# Patient Record
Sex: FEMALE | Race: WHITE | Employment: OTHER | URBAN - METROPOLITAN AREA
[De-identification: names, ages, dates, MRNs, and addresses within clinical notes are randomized per-mention and may not be internally consistent; named-entity substitution may affect disease eponyms.]

---

## 2017-01-04 ENCOUNTER — MEDICAL CORRESPONDENCE (OUTPATIENT)
Dept: HEALTH INFORMATION MANAGEMENT | Facility: CLINIC | Age: 82
End: 2017-01-04

## 2017-01-31 DIAGNOSIS — I10 HYPERTENSION GOAL BP (BLOOD PRESSURE) < 140/90: Primary | ICD-10-CM

## 2017-01-31 RX ORDER — ATENOLOL 50 MG/1
50 TABLET ORAL 2 TIMES DAILY
Qty: 60 TABLET | Refills: 3 | Status: SHIPPED | OUTPATIENT
Start: 2017-01-31 | End: 2017-03-08

## 2017-01-31 NOTE — TELEPHONE ENCOUNTER
Atenolol      Last Written Prescription Date: 09/13/2016  Last Fill Quantity: 60, # refills: 3  Last Office Visit with G, P or [facility] prescribing provider: 09/13/2016       POTASSIUM   Date Value Ref Range Status   03/17/2016 3.6 3.4 - 5.3 mmol/L Final     CREATININE   Date Value Ref Range Status   03/17/2016 0.85 0.52 - 1.04 mg/dL Final     BP Readings from Last 3 Encounters:   09/13/16 124/71   05/25/16 119/52   05/19/16 138/68     Asael BRODERICK (R)

## 2017-02-07 DIAGNOSIS — E87.6 HYPOKALEMIA: Primary | ICD-10-CM

## 2017-02-07 DIAGNOSIS — E78.5 HYPERLIPIDEMIA LDL GOAL <130: ICD-10-CM

## 2017-02-07 DIAGNOSIS — G47.00 PERSISTENT INSOMNIA: ICD-10-CM

## 2017-02-07 NOTE — TELEPHONE ENCOUNTER
Hydroxyzine      Last Written Prescription Date: 12/23/2016  Last Fill Quantity: 30,  # refills: 2   Last Office Visit with [facility] or [facility] prescribing provider: 09/13/2016                                             potassium      Last Written Prescription Date: 01/03/2017  Last Fill Quantity: 30, # refills: 0  Last Office Visit with [facility] or [facility] prescribing provider: 09/13/2016       POTASSIUM   Date Value Ref Range Status   03/17/2016 3.6 3.4 - 5.3 mmol/L Final     CREATININE   Date Value Ref Range Status   03/17/2016 0.85 0.52 - 1.04 mg/dL Final     BP Readings from Last 3 Encounters:   09/13/16 124/71   05/25/16 119/52   05/19/16 138/68     Atorvastatin     Last Written Prescription Date: 01/03/2017  Last Fill Quantity: 30, # refills: 0  Last Office Visit with [facility], [facility] or  [facility] prescribing provider: 09/13/2016       CHOL      187   4/21/2015  HDL       52   4/21/2015  LDL      103   4/21/2015  TRIG      162   4/21/2015  CHOLHDLRATIO      3.6   4/21/2015    Asael BRODERICK (R)

## 2017-02-14 RX ORDER — HYDROXYZINE HYDROCHLORIDE 25 MG/1
25-50 TABLET, FILM COATED ORAL
Qty: 30 TABLET | Refills: 0 | Status: SHIPPED | OUTPATIENT
Start: 2017-02-14 | End: 2017-02-27

## 2017-02-14 RX ORDER — ATORVASTATIN CALCIUM 20 MG/1
20 TABLET, FILM COATED ORAL DAILY
Qty: 30 TABLET | Refills: 0 | Status: SHIPPED | OUTPATIENT
Start: 2017-02-14 | End: 2017-02-27

## 2017-02-14 RX ORDER — POTASSIUM CHLORIDE 750 MG/1
10 TABLET, EXTENDED RELEASE ORAL DAILY
Qty: 30 TABLET | Refills: 0 | Status: SHIPPED | OUTPATIENT
Start: 2017-02-14 | End: 2017-02-27

## 2017-02-27 DIAGNOSIS — E87.6 HYPOKALEMIA: ICD-10-CM

## 2017-02-27 DIAGNOSIS — I10 HYPERTENSION GOAL BP (BLOOD PRESSURE) < 140/90: ICD-10-CM

## 2017-02-27 DIAGNOSIS — E78.5 HYPERLIPIDEMIA LDL GOAL <130: ICD-10-CM

## 2017-02-27 DIAGNOSIS — G47.00 PERSISTENT INSOMNIA: ICD-10-CM

## 2017-02-27 NOTE — TELEPHONE ENCOUNTER
Losartan-HCTZ and Furosemide      Last Written Prescription Date: 12/23/16  Last Fill Quantity: 90, # refills: 0  Last Office Visit with [facility] or [facility] prescribing provider: 09/13/16       Potassium   Date Value Ref Range Status   03/17/2016 3.6 3.4 - 5.3 mmol/L Final     Creatinine   Date Value Ref Range Status   03/17/2016 0.85 0.52 - 1.04 mg/dL Final     BP Readings from Last 3 Encounters:   09/13/16 124/71   05/25/16 119/52   05/19/16 138/68   Amlodipine     Last Written Prescription Date: 12/23/16  Last Fill Quantity: 90,  # refills: 0   Last Office Visit with [facility] or [facility] prescribing provider: 09/13/16  Hydroxyzine     Last Written Prescription Date: 02/14/17  Last Fill Quantity: 30,  # refills: 0   Last Office Visit with [facility] or  [facility] prescribing provider: 09/13/16  Potassium Chloride     Last Written Prescription Date: 02/14/17  Last Fill Quantity: 30,  # refills: 0   Last Office Visit with [facility] or [facility] prescribing provider: 09/13/16  Atorvastatin        Last Written Prescription Date: 02/14/17  Last Fill Quantity: 30, # refills: 0    Last Office Visit with [facility] or [facility] prescribing provider:  09/13/16   Future Office Visit:      BP Readings from Last 3 Encounters:   09/13/16 124/71   05/25/16 119/52   05/19/16 138/68     Lab Results   Component Value Date    ALT 24 03/05/2016     Lab Results   Component Value Date    CHOL 187 04/21/2015     Lab Results   Component Value Date    HDL 52 04/21/2015     Lab Results   Component Value Date     04/21/2015     Lab Results   Component Value Date    TRIG 162 04/21/2015     Lab Results   Component Value Date    CHOLHDLRATIO 3.6 04/21/2015

## 2017-03-01 ENCOUNTER — TELEPHONE (OUTPATIENT)
Dept: SLEEP MEDICINE | Facility: CLINIC | Age: 82
End: 2017-03-01

## 2017-03-01 DIAGNOSIS — G47.33 OBSTRUCTIVE SLEEP APNEA (ADULT) (PEDIATRIC): Primary | ICD-10-CM

## 2017-03-02 RX ORDER — LOSARTAN POTASSIUM AND HYDROCHLOROTHIAZIDE 25; 100 MG/1; MG/1
1 TABLET ORAL DAILY
Qty: 90 TABLET | Refills: 0 | Status: SHIPPED | OUTPATIENT
Start: 2017-03-02 | End: 2017-03-08

## 2017-03-02 RX ORDER — HYDROXYZINE HYDROCHLORIDE 25 MG/1
25-50 TABLET, FILM COATED ORAL
Qty: 30 TABLET | Refills: 0 | Status: SHIPPED | OUTPATIENT
Start: 2017-03-02 | End: 2017-03-08

## 2017-03-02 RX ORDER — FUROSEMIDE 20 MG
20 TABLET ORAL DAILY
Qty: 90 TABLET | Refills: 0 | Status: SHIPPED | OUTPATIENT
Start: 2017-03-02 | End: 2017-03-08

## 2017-03-02 RX ORDER — POTASSIUM CHLORIDE 750 MG/1
10 TABLET, EXTENDED RELEASE ORAL DAILY
Qty: 30 TABLET | Refills: 0 | Status: SHIPPED | OUTPATIENT
Start: 2017-03-02 | End: 2017-03-08

## 2017-03-02 RX ORDER — AMLODIPINE BESYLATE 10 MG/1
10 TABLET ORAL DAILY
Qty: 90 TABLET | Refills: 0 | Status: SHIPPED | OUTPATIENT
Start: 2017-03-02 | End: 2017-03-08

## 2017-03-02 RX ORDER — ATORVASTATIN CALCIUM 20 MG/1
20 TABLET, FILM COATED ORAL DAILY
Qty: 30 TABLET | Refills: 0 | Status: SHIPPED | OUTPATIENT
Start: 2017-03-02 | End: 2017-03-08

## 2017-03-07 ENCOUNTER — DOCUMENTATION ONLY (OUTPATIENT)
Dept: SLEEP MEDICINE | Facility: CLINIC | Age: 82
End: 2017-03-07

## 2017-03-07 NOTE — PROGRESS NOTES
OLIVA WANTED TO TRY ON NEW FF MASK/ HEADGEAR  SHE TRIED ON SMALL AND MEDIUM AIRFIT F 20 MASK. SHE DIDN'T WANT TO TRY ON ANY OTHERS.  HER MASK OF CHOICE WAS MEDIUM AIRFIRT F20.  SHE ALSO RECEIVED TUBING, FILTER BLACK AND WATER CHAMBER.  WENT OVER SUPPLY REPLACEMENT SCHEDULE AND HOW TO CARE FOR CPAP EQUIPMENT.  GAVE HER NUMBER TO SHADIA AT [facility] TO ORDER EQUIPMENT FOR OXYGEN TUBING.

## 2017-03-08 ENCOUNTER — OFFICE VISIT (OUTPATIENT)
Dept: FAMILY MEDICINE | Facility: CLINIC | Age: 82
End: 2017-03-08
Payer: COMMERCIAL

## 2017-03-08 VITALS
WEIGHT: 236 LBS | TEMPERATURE: 98.5 F | BODY MASS INDEX: 39.32 KG/M2 | OXYGEN SATURATION: 93 % | HEART RATE: 80 BPM | DIASTOLIC BLOOD PRESSURE: 76 MMHG | SYSTOLIC BLOOD PRESSURE: 132 MMHG | HEIGHT: 65 IN

## 2017-03-08 DIAGNOSIS — G47.00 PERSISTENT INSOMNIA: ICD-10-CM

## 2017-03-08 DIAGNOSIS — I10 HYPERTENSION GOAL BP (BLOOD PRESSURE) < 140/90: ICD-10-CM

## 2017-03-08 DIAGNOSIS — J44.9 CHRONIC OBSTRUCTIVE PULMONARY DISEASE, UNSPECIFIED COPD TYPE (H): Primary | ICD-10-CM

## 2017-03-08 DIAGNOSIS — E87.6 HYPOKALEMIA: ICD-10-CM

## 2017-03-08 DIAGNOSIS — R73.03 PREDIABETES: ICD-10-CM

## 2017-03-08 DIAGNOSIS — L98.9 SKIN LESION: ICD-10-CM

## 2017-03-08 DIAGNOSIS — F32.1 MAJOR DEPRESSIVE DISORDER, SINGLE EPISODE, MODERATE (H): ICD-10-CM

## 2017-03-08 DIAGNOSIS — J44.9 CHRONIC OBSTRUCTIVE PULMONARY DISEASE, UNSPECIFIED COPD TYPE (H): Chronic | ICD-10-CM

## 2017-03-08 DIAGNOSIS — E78.5 HYPERLIPIDEMIA LDL GOAL <130: ICD-10-CM

## 2017-03-08 LAB
ANION GAP SERPL CALCULATED.3IONS-SCNC: 8 MMOL/L (ref 3–14)
BUN SERPL-MCNC: 29 MG/DL (ref 7–30)
CALCIUM SERPL-MCNC: 8.9 MG/DL (ref 8.5–10.1)
CHLORIDE SERPL-SCNC: 106 MMOL/L (ref 94–109)
CO2 SERPL-SCNC: 30 MMOL/L (ref 20–32)
CREAT SERPL-MCNC: 0.94 MG/DL (ref 0.52–1.04)
ERYTHROCYTE [DISTWIDTH] IN BLOOD BY AUTOMATED COUNT: 13.2 % (ref 10–15)
GFR SERPL CREATININE-BSD FRML MDRD: 57 ML/MIN/1.7M2
GLUCOSE SERPL-MCNC: 112 MG/DL (ref 70–99)
HCT VFR BLD AUTO: 39.7 % (ref 35–47)
HGB BLD-MCNC: 13.1 G/DL (ref 11.7–15.7)
MCH RBC QN AUTO: 31.2 PG (ref 26.5–33)
MCHC RBC AUTO-ENTMCNC: 33 G/DL (ref 31.5–36.5)
MCV RBC AUTO: 95 FL (ref 78–100)
PLATELET # BLD AUTO: 223 10E9/L (ref 150–450)
POTASSIUM SERPL-SCNC: 3.9 MMOL/L (ref 3.4–5.3)
RBC # BLD AUTO: 4.2 10E12/L (ref 3.8–5.2)
SODIUM SERPL-SCNC: 144 MMOL/L (ref 133–144)
WBC # BLD AUTO: 8.5 10E9/L (ref 4–11)

## 2017-03-08 PROCEDURE — 85027 COMPLETE CBC AUTOMATED: CPT | Performed by: NURSE PRACTITIONER

## 2017-03-08 PROCEDURE — 36415 COLL VENOUS BLD VENIPUNCTURE: CPT | Performed by: NURSE PRACTITIONER

## 2017-03-08 PROCEDURE — 80048 BASIC METABOLIC PNL TOTAL CA: CPT | Performed by: NURSE PRACTITIONER

## 2017-03-08 PROCEDURE — 99214 OFFICE O/P EST MOD 30 MIN: CPT | Performed by: NURSE PRACTITIONER

## 2017-03-08 RX ORDER — SERTRALINE HYDROCHLORIDE 25 MG/1
25 TABLET, FILM COATED ORAL DAILY
Qty: 30 TABLET | Refills: 1 | Status: SHIPPED | OUTPATIENT
Start: 2017-03-08 | End: 2017-04-19

## 2017-03-08 RX ORDER — ATORVASTATIN CALCIUM 20 MG/1
20 TABLET, FILM COATED ORAL DAILY
Qty: 90 TABLET | Refills: 3 | Status: SHIPPED | OUTPATIENT
Start: 2017-03-08 | End: 2017-12-06

## 2017-03-08 RX ORDER — TIOTROPIUM BROMIDE 18 UG/1
CAPSULE ORAL; RESPIRATORY (INHALATION)
Qty: 90 CAPSULE | Refills: 3 | Status: SHIPPED | OUTPATIENT
Start: 2017-03-08 | End: 2017-12-06

## 2017-03-08 RX ORDER — LOSARTAN POTASSIUM AND HYDROCHLOROTHIAZIDE 25; 100 MG/1; MG/1
1 TABLET ORAL DAILY
Qty: 90 TABLET | Refills: 3 | Status: SHIPPED | OUTPATIENT
Start: 2017-03-08 | End: 2017-07-06

## 2017-03-08 RX ORDER — ATENOLOL 50 MG/1
50 TABLET ORAL 2 TIMES DAILY
Qty: 180 TABLET | Refills: 3 | Status: SHIPPED | OUTPATIENT
Start: 2017-03-08 | End: 2017-12-06

## 2017-03-08 RX ORDER — AMLODIPINE BESYLATE 10 MG/1
10 TABLET ORAL DAILY
Qty: 90 TABLET | Refills: 3 | Status: ON HOLD | OUTPATIENT
Start: 2017-03-08 | End: 2017-06-13

## 2017-03-08 RX ORDER — BUDESONIDE AND FORMOTEROL FUMARATE DIHYDRATE 160; 4.5 UG/1; UG/1
2 AEROSOL RESPIRATORY (INHALATION) 2 TIMES DAILY
Qty: 1 INHALER | Refills: 5 | Status: SHIPPED | OUTPATIENT
Start: 2017-03-08 | End: 2017-11-15

## 2017-03-08 RX ORDER — FUROSEMIDE 20 MG
20 TABLET ORAL DAILY
Qty: 90 TABLET | Refills: 3 | Status: SHIPPED | OUTPATIENT
Start: 2017-03-08 | End: 2017-06-26

## 2017-03-08 RX ORDER — POTASSIUM CHLORIDE 750 MG/1
10 TABLET, EXTENDED RELEASE ORAL DAILY
Qty: 90 TABLET | Refills: 3 | Status: SHIPPED | OUTPATIENT
Start: 2017-03-08 | End: 2017-12-06

## 2017-03-08 RX ORDER — HYDROXYZINE HYDROCHLORIDE 25 MG/1
25-50 TABLET, FILM COATED ORAL
Qty: 90 TABLET | Refills: 3 | Status: SHIPPED | OUTPATIENT
Start: 2017-03-08 | End: 2017-06-15

## 2017-03-08 RX ORDER — ALBUTEROL SULFATE 90 UG/1
2 AEROSOL, METERED RESPIRATORY (INHALATION) EVERY 4 HOURS PRN
Qty: 3 INHALER | Refills: 3 | Status: SHIPPED | OUTPATIENT
Start: 2017-03-08 | End: 2017-12-06

## 2017-03-08 NOTE — LETTER
My COPD Action Plan   Name: Susan Haq    YOB: 1932   Date: 3/8/2017    My doctor: Ema Melendez NP   My clinic: [address] [address]  [phone]  My Controller Medicine: Tiotropium (Spiriva)   Formoterol/Budesonide (Symbicort)   Dose:       My Rescue Medicine: Albuterol nebulizer solution    Dose:       My Flare Up Medicine: Prednisone   Dose:    My COPD Severity: Very Severe = FeV1 < 30 %     Use of Oxygen: 3 Liters continuously        GREEN ZONE       Doing well today      Usual level of activity and exercise    Usual amount of cough and mucus    No shortness of breath    Usual level of health (thinking clearly, sleeping well, feel like eating) Actions:      Take daily medicines    Use oxygen as prescribed    Follow regular exercise and diet plan    Avoid cigarette smoke and other irritants that harm the lungs           YELLOW ZONE          Having a bad day or flare up      Short of breath more than usual    A lot more sputum (mucus) than usual    Sputum looks yellow, green, tan, brown or bloody    More coughing or wheezing    Fever or chills    Less energy; trouble completing activities    Trouble thinking or focusing    Using quick relief inhaler or nebulizer more often    Poor sleep; symptoms wake me up    Do not feel like eating Actions:      Get plenty of rest    Take daily medicines    Use quick relief inhaler every 4-6 hours    If you use oxygen, call you doctor to see if you should adjust your oxygen    Do breathing exercises or other things to help you relax    Let a loved one, friend or neighbor know you are feeling worse    Call your care team if you have 2 or more symptoms.  Start taking steroids or antibiotics if directed by your care team           RED ZONE       Need medical care now      Severe shortness of breath (feel you can't breathe)    Fever, chills    Not enough breath to do any activity    Trouble  coughing up mucus, walking or talking    Blood in mucus    Frequent coughing   Rescue medicines are not working    Not able to sleep because of breathing    Feel confused or drowsy    Chest pain    Actions:      Call your health care team.  If you cannot reach your care team, call 911 or go to the emergency room.        Electronically signed by: Ema Mleendez, March 8, 2017  Annual Reminders:  Meet with Care Team, Flu Shot every Fall and Pneumonia Shot at least once  Pharmacy:    [facility], [place] - [address], [address]  [facility] [place] - [address] [facility] [place] - [address] [place] [address]

## 2017-03-08 NOTE — PROGRESS NOTES
SUBJECTIVE:                                                    Susan Haq is a 85 year old female who presents to clinic today for the following health issues:    Derm Problem       Duration: ongoing for the past 2 months     Description (location/character/radiation): pt is concerned about a wound above her eye.     Intensity:  moderate    Accompanying signs and symptoms: scabbed, red, dime size.     History (similar episodes/previous evaluation): None    Precipitating or alleviating factors: None    Therapies tried and outcome: she was given a cream in clinic to use for 15 days and then stop. She did this and no relief     Used Bactroban for 15 days that was prescribed at last visit.    Using different CPAP mask so that it doesn't wear at skin at that site.    She reports area has not improved.  Reports itching and some pain.  The area is scabbed.  Has been leaving it open to air.     COPD Follow-Up    Symptoms are currently: slightly worsened    Current fatigue or dyspnea with ambulation: worsened from baseline    Shortness of breath: slightly worsened    Increased or change in Cough/Sputum: No    Fever(s): No    Baseline ambulation without stopping to rest < 10 feet. Able to walk up < 1 flights of stairs without stopping to rest.    Any ER/UC or hospital admissions since your last visit? No     History   Smoking Status     Former Smoker     Quit date: 1/1/1990   Smokeless Tobacco     Former User     Lab Results   Component Value Date    FEV1 1.31 10/07/2013    IXI5LYM 44% 10/07/2013          Amount of exercise or physical activity: None    Problems taking medications regularly: No    Medication side effects: none      Diet: regular (no restrictions)    Abnormal Mood Symptoms     Onset: months    Description:   Depression: YES  Anxiety: no    Accompanying Signs & Symptoms:  Still participating in activities that you used to enjoy: no  Fatigue: YES  Irritability: no  Difficulty concentrating: no  Changes  in appetite: no  Problems with sleep: no  Heart racing/beating fast : no  Thoughts of hurting yourself or others: none     History:   Recent stress: no  Prior depression hospitalization: None  Family history of depression: no  Family history of anxiety: no      Precipitating factors:   Alcohol/drug use: no    Alleviating factors:  none       Therapies Tried and outcome: None      Problem list and histories reviewed & adjusted, as indicated.  Additional history: as documented    Patient Active Problem List   Diagnosis     Hyperlipidemia LDL goal <130     Insomnia     Osteopenia     Hypertension goal BP (blood pressure) < 140/90     Obesity     Advance care planning     Adenomatous polyp of colon     PVC's (premature ventricular contractions)     SUSSY (obstructive sleep apnea)-Moderately severe (AHI 21)     Bilateral leg edema     Prediabetes     HL (hearing loss)     Umbilical hernia     Hypoxia     Hypokalemia     SNHL (sensorineural hearing loss)     Interstitial pulmonary fibrosis (H)     Chronic obstructive pulmonary disease, unspecified COPD type (H)     Past Surgical History   Procedure Laterality Date     Hysterectomy, cervix status unknown       C bso, omentectomy w/octaviano       Appendectomy       C nonspecific procedure       (L) clavicle orif     C stomach surgery procedure unlisted       Cholecystectomy, laporoscopic  10/13/2011     Cholecystectomy, Laparoscopic     Hernia repair, umbilical  10/13/2011       Social History   Substance Use Topics     Smoking status: Former Smoker     Quit date: 1/1/1990     Smokeless tobacco: Former User     Alcohol use Yes      Comment: OCC     Family History   Problem Relation Age of Onset     DIABETES Father      Coronary Artery Disease Maternal Grandmother      Coronary Artery Disease Paternal Uncle          Current Outpatient Prescriptions   Medication Sig Dispense Refill     atorvastatin (LIPITOR) 20 MG tablet Take 1 tablet (20 mg) by mouth daily (Needs fasting lab work)  30 tablet 0     furosemide (LASIX) 20 MG tablet Take 1 tablet (20 mg) by mouth daily 90 tablet 0     amLODIPine (NORVASC) 10 MG tablet Take 1 tablet (10 mg) by mouth daily 90 tablet 0     hydrOXYzine (ATARAX) 25 MG tablet Take 1-2 tablets (25-50 mg) by mouth nightly as needed for other (insomnia) 30 tablet 0     losartan-hydrochlorothiazide (HYZAAR) 100-25 MG per tablet Take 1 tablet by mouth daily Take 1 tablet by mouth daily. 90 tablet 0     potassium chloride (K-TAB,KLOR-CON) 10 MEQ tablet Take 1 tablet (10 mEq) by mouth daily 30 tablet 0     atenolol (TENORMIN) 50 MG tablet Take 1 tablet (50 mg) by mouth 2 times daily 60 tablet 3     cloNIDine (CATAPRES) 0.2 MG tablet Take 1 tablet (0.2 mg) by mouth 2 times daily 180 tablet 1     budesonide-formoterol (SYMBICORT) 160-4.5 MCG/ACT inhaler Inhale 2 puffs into the lungs 2 times daily 1 Inhaler 5     tiotropium (SPIRIVA HANDIHALER) 18 MCG inhalation capsule Inhale  into the lungs. Inhale contents of one capsule daily 90 capsule 3     albuterol (PROAIR HFA, PROVENTIL HFA, VENTOLIN HFA) 108 (90 BASE) MCG/ACT inhaler Inhale 2 puffs into the lungs every 4 hours as needed for shortness of breath / dyspnea or wheezing 3 Inhaler 3     Respiratory Therapy Supplies (NEBULIZER COMPRESSOR) KIT 1 kit every 4 hours as needed 1 kit 0     Calcium Carbonate-Vitamin D (CALCIUM 600 + D OR) Take  by mouth.       ibuprofen (ADVIL,MOTRIN) 600 MG tablet Take 1 tablet by mouth every 6 hours as needed for pain. 60 tablet 3     FISH OIL 1000 MG OR CAPS 1 cap daily       MULTIVITAMIN TABS   OR 1 TABLET DAILY       albuterol (2.5 MG/3ML) 0.083% nebulizer solution Take 1 vial (2.5 mg) by nebulization every 4 hours as needed for shortness of breath / dyspnea or wheezing 1 Box 0     ORDER FOR DME Equipment being ordered: Oxygen - 3 liters continous 1 Device 0     ORDER FOR DME Equipment being ordered: CPAP supplies 1 Units 0     ORDER FOR DME Equipment being ordered: Oxygen 1 Units 0     ORDER  FOR DME TEDs stockings knee high to be worn during the day. 1 Units 0     ORDER FOR DME 1.  CPAP pressure 12 cm/H20 with heated humidity and auto-titrating capability.   2.  Provide mask to fit and CPAP supplies.  3.  Length of need lifetime.  4.  Ok to d/c O2 bleed in to her PAP overnight.           Allergies   Allergen Reactions     Ace Inhibitors Cough     Asa [Aluminum Hydroxide]      Penicillins      Recent Labs   Lab Test  03/08/17   1010  03/17/16   0845  03/05/16   2030  02/24/16   1156  07/22/15   1219  04/21/15   1038  02/18/15   1353  10/24/14   1010  09/03/14   0902   12/15/09   1209   A1C   --    --    --    --   5.7   --    --    --    --    --    --    LDL   --    --    --    --    --   103   --   178*  140*   < >  95   HDL   --    --    --    --    --   52   --   41*  53   < >  52   TRIG   --    --    --    --    --   162*   --   179*  111   < >  141   ALT   --    --   24  22   --    --   20   --    --    < >  31   CR  0.94  0.85  1.11*  0.91   --   0.91  0.87   --   1.07*   < >  0.88   GFRESTIMATED  57*  64  47*  59*   --   59*  62   --   49*   < >  62   GFRESTBLACK  69  77  57*  71   --   71  75   --   59*   < >  75   POTASSIUM  3.9  3.6  3.2*  3.4   --   3.2*  3.9   --   3.8   < >  3.7   TSH   --    --    --    --    --    --   2.60   --    --    --   2.41    < > = values in this interval not displayed.      BP Readings from Last 3 Encounters:   03/08/17 (!) 155/96   09/13/16 124/71   05/25/16 119/52    Wt Readings from Last 3 Encounters:   03/08/17 236 lb (107 kg)   09/13/16 231 lb (104.8 kg)   05/25/16 234 lb 12.8 oz (106.5 kg)                  Labs reviewed in EPIC    Reviewed and updated as needed this visit by clinical staff       Reviewed and updated as needed this visit by Provider         ROS:  Constitutional, HEENT, cardiovascular, pulmonary, GI, , musculoskeletal, neuro, skin, endocrine and psych systems are negative, except as otherwise noted.    OBJECTIVE:                              "                       BP (!) 155/96  Pulse 80  Temp 98.5  F (36.9  C) (Tympanic)  Ht 5' 4.5\" (1.638 m)  Wt 236 lb (107 kg)  SpO2 93%  BMI 39.88 kg/m2  Body mass index is 39.88 kg/(m^2).  GENERAL: alert, no distress, frail and elderly  NECK: no adenopathy, no asymmetry, masses, or scars and thyroid normal to palpation  RESP: lungs clear to auscultation - no rales, rhonchi or wheezes and decreased breath sounds throughout, dyspnea on exertion.  On oxygen continuous at 3 liters.  CV: regular rate and rhythm, normal S1 S2, no S3 or S4, no murmur, click or rub, no peripheral edema and peripheral pulses strong  ABDOMEN: soft, nontender, no hepatosplenomegaly, no masses and bowel sounds normal  MS: no gross musculoskeletal defects noted, no edema  SKIN: Above right eye brown dime sized scabbed mildly erythematous skin lesion.  PSYCH: mentation appears normal, affect normal/bright    Diagnostic Test Results:  Results for orders placed or performed in visit on 03/08/17 (from the past 24 hour(s))   CBC with platelets   Result Value Ref Range    WBC 8.5 4.0 - 11.0 10e9/L    RBC Count 4.20 3.8 - 5.2 10e12/L    Hemoglobin 13.1 11.7 - 15.7 g/dL    Hematocrit 39.7 35.0 - 47.0 %    MCV 95 78 - 100 fl    MCH 31.2 26.5 - 33.0 pg    MCHC 33.0 31.5 - 36.5 g/dL    RDW 13.2 10.0 - 15.0 %    Platelet Count 223 150 - 450 10e9/L   Basic metabolic panel   Result Value Ref Range    Sodium 144 133 - 144 mmol/L    Potassium 3.9 3.4 - 5.3 mmol/L    Chloride 106 94 - 109 mmol/L    Carbon Dioxide 30 20 - 32 mmol/L    Anion Gap 8 3 - 14 mmol/L    Glucose 112 (H) 70 - 99 mg/dL    Urea Nitrogen 29 7 - 30 mg/dL    Creatinine 0.94 0.52 - 1.04 mg/dL    GFR Estimate 57 (L) >60 mL/min/1.7m2    GFR Estimate If Black 69 >60 mL/min/1.7m2    Calcium 8.9 8.5 - 10.1 mg/dL        ASSESSMENT/PLAN:                                                      1. Skin lesion  Non healing wound despite treatment.    - DERMATOLOGY REFERRAL    2. Chronic obstructive pulmonary " disease, unspecified COPD type (H)   progressive disease.  Discussed small frequent meals, breaks with activity.  Continue oxygen at 3 liters.    - CBC with platelets  - budesonide-formoterol (SYMBICORT) 160-4.5 MCG/ACT Inhaler; Inhale 2 puffs into the lungs 2 times daily  Dispense: 1 Inhaler; Refill: 5  - tiotropium (SPIRIVA HANDIHALER) 18 MCG capsule; Inhale  into the lungs. Inhale contents of one capsule daily  Dispense: 90 capsule; Refill: 3  - albuterol (PROAIR HFA/PROVENTIL HFA/VENTOLIN HFA) 108 (90 BASE) MCG/ACT Inhaler; Inhale 2 puffs into the lungs every 4 hours as needed for shortness of breath / dyspnea or wheezing  Dispense: 3 Inhaler; Refill: 3    3. Prediabetes     - Basic metabolic panel    4. Hyperlipidemia LDL goal <130     - Lipid panel reflex to direct LDL; Future  - atorvastatin (LIPITOR) 20 MG tablet; Take 1 tablet (20 mg) by mouth daily  Dispense: 90 tablet; Refill: 3    5. Hypokalemia     - furosemide (LASIX) 20 MG tablet; Take 1 tablet (20 mg) by mouth daily  Dispense: 90 tablet; Refill: 3  - potassium chloride (K-TAB,KLOR-CON) 10 MEQ tablet; Take 1 tablet (10 mEq) by mouth daily  Dispense: 90 tablet; Refill: 3    6. Hypertension goal BP (blood pressure) < 140/90     - amLODIPine (NORVASC) 10 MG tablet; Take 1 tablet (10 mg) by mouth daily  Dispense: 90 tablet; Refill: 3  - losartan-hydrochlorothiazide (HYZAAR) 100-25 MG per tablet; Take 1 tablet by mouth daily Take 1 tablet by mouth daily.  Dispense: 90 tablet; Refill: 3  - atenolol (TENORMIN) 50 MG tablet; Take 1 tablet (50 mg) by mouth 2 times daily  Dispense: 180 tablet; Refill: 3    7. Persistent insomnia   Continue CPAP - new mask is working better.    - hydrOXYzine (ATARAX) 25 MG tablet; Take 1-2 tablets (25-50 mg) by mouth nightly as needed for other (insomnia)  Dispense: 90 tablet; Refill: 3    8. Chronic obstructive pulmonary disease, unspecified COPD type (H)     - CBC with platelets  - budesonide-formoterol (SYMBICORT) 160-4.5  MCG/ACT Inhaler; Inhale 2 puffs into the lungs 2 times daily  Dispense: 1 Inhaler; Refill: 5  - tiotropium (SPIRIVA HANDIHALER) 18 MCG capsule; Inhale  into the lungs. Inhale contents of one capsule daily  Dispense: 90 capsule; Refill: 3  - albuterol (PROAIR HFA/PROVENTIL HFA/VENTOLIN HFA) 108 (90 BASE) MCG/ACT Inhaler; Inhale 2 puffs into the lungs every 4 hours as needed for shortness of breath / dyspnea or wheezing  Dispense: 3 Inhaler; Refill: 3    9. Major depressive disorder, single episode, moderate (H)   The risks, benefits and treatment options of prescribed medications or other treatments have been discussed with the patient. The patient verbalized their understanding and should call or follow up if no improvement or if they develop further problems.        - sertraline (ZOLOFT) 25 MG tablet; Take 1 tablet (25 mg) by mouth daily  Dispense: 30 tablet; Refill: 1    Patient Instructions   Possible side effects of antidepressants/anxiety meds, including but not limited to GI upset, disrupted sleep, loss of libido, worsening of mood or even possible risk of increased suicidal thoughts.   Often some of these things if not severe will improve after 1-2 weeks on medications but some may not see effects for 3-4 weeks,  if tolerable patients should continue meds and see if there is improvement.  If symptoms are intolerable or for any suicidal thoughts the medication should be stopped immediately and contact the clinic.      These medications should be used for 6-9 months before stopping, to avoid rebound symptoms.   Contact the clinic if having any problems tolerating these medications.  Take the medication daily and do not stop the medication abruptly.    Follow up with me or message in one month to discuss improvement and whether or not we need to change meds or increase dose.  Follow up sooner if problems.      Please plan to contact the clinic or 24 hour nurse line at any time if you are having any thoughts of  self harm.    ANNETTA Hoover, EDWIGE  [facility]

## 2017-03-08 NOTE — LETTER
[facility]  [address] [address]  Phone: [phone]    March 8, 2017    Susan Haq  [address] [place] [address]          Dear Ms. Haq,    The results of your recent lab tests were within normal limits. Enclosed is a copy of these results.  If you have any further questions or problems, please contact our office.    Sincerely,      ANNETTA Hoover / TERRY

## 2017-03-08 NOTE — NURSING NOTE
"Initial BP (!) 155/96  Pulse 80  Temp 98.5  F (36.9  C) (Tympanic)  Ht 5' 4.5\" (1.638 m)  Wt 236 lb (107 kg)  SpO2 93%  BMI 39.88 kg/m2 Estimated body mass index is 39.88 kg/(m^2) as calculated from the following:    Height as of this encounter: 5' 4.5\" (1.638 m).    Weight as of this encounter: 236 lb (107 kg). .    Kamini Triana, IVIS ([facility])  "

## 2017-03-08 NOTE — MR AVS SNAPSHOT
After Visit Summary   3/8/2017    Susan Haq    MRN: [id number]           Patient Information     Date Of Birth          1/10/1932        Visit Information        Provider Department      3/8/2017 10:40 AM Ema Melendez NP [facility]        Today's Diagnoses     Chronic obstructive pulmonary disease, unspecified COPD type (H)    -  1    Skin lesion        Prediabetes        Hyperlipidemia LDL goal <130        Hypokalemia        Hypertension goal BP (blood pressure) < 140/90        Persistent insomnia        Chronic obstructive pulmonary disease, unspecified COPD type (H)        Major depressive disorder, single episode, moderate (H)          Care Instructions    Possible side effects of antidepressants/anxiety meds, including but not limited to GI upset, disrupted sleep, loss of libido, worsening of mood or even possible risk of increased suicidal thoughts.   Often some of these things if not severe will improve after 1-2 weeks on medications but some may not see effects for 3-4 weeks,  if tolerable patients should continue meds and see if there is improvement.  If symptoms are intolerable or for any suicidal thoughts the medication should be stopped immediately and contact the clinic.      These medications should be used for 6-9 months before stopping, to avoid rebound symptoms.   Contact the clinic if having any problems tolerating these medications.  Take the medication daily and do not stop the medication abruptly.    Follow up with me or message in one month to discuss improvement and whether or not we need to change meds or increase dose.  Follow up sooner if problems.      Please plan to contact the clinic or 24 hour nurse line at any time if you are having any thoughts of self harm.    ANNETTA Hoover              Follow-ups after your visit        Additional Services     DERMATOLOGY REFERRAL       Your provider has referred you to: [facility]: [facility]  [facility] [phone]   [email or web address]    Please be aware that coverage of these services is subject to the terms and limitations of your health insurance plan.  Call member services at your health plan with any benefit or coverage questions.      Please bring the following with you to your appointment:    (1) Any X-Rays, CTs or MRIs which have been performed.  Contact the facility where they were done to arrange for  prior to your scheduled appointment.    (2) List of current medications  (3) This referral request   (4) Any documents/labs given to you for this referral                  Your next 10 appointments already scheduled     Mar 15, 2017  2:20 PM CDT   New Visit with Sarah Suazo PA-C   [facility] ([facility])    [address] [place] [address] [phone]              Future tests that were ordered for you today     Open Future Orders        Priority Expected Expires Ordered    Lipid panel reflex to direct LDL Routine 6/7/2017 9/6/2017 3/8/2017            Who to contact     If you have questions or need follow up information about today's clinic visit or your schedule please contact [facility] directly at [phone].  Normal or non-critical lab and imaging results will be communicated to you by CallsFreeCallshart, letter or phone within 4 business days after the clinic has received the results. If you do not hear from us within 7 days, please contact the clinic through CallsFreeCallshart or phone. If you have a critical or abnormal lab result, we will notify you by phone as soon as possible.  Submit refill requests through [employer] or call your pharmacy and they will forward the refill request to us. Please allow 3 business days for your refill to be completed.          Additional Information About Your Visit        [employer] Information     [employer] lets you send messages to your doctor, view your test results, renew  "your prescriptions, schedule appointments and more. To sign up, go to [email or web address] . Click on \"Log in\" on the left side of the screen, which will take you to the Welcome page. Then click on \"Sign up Now\" on the right side of the page.     You will be asked to enter the access code listed below, as well as some personal information. Please follow the directions to create your username and password.     Your access code is: [email or web address]  Expires: 2017 11:25 AM     Your access code will  in 90 days. If you need help or a new code, please call your [place] clinic or [phone].        Care EveryWhere ID     This is your Care EveryWhere ID. This could be used by other organizations to access your [place] medical records  [id number]        Your Vitals Were     Pulse Temperature Height Pulse Oximetry BMI (Body Mass Index)       80 98.5  F (36.9  C) (Tympanic) 5' 4.5\" (1.638 m) 93% 39.88 kg/m2        Blood Pressure from Last 3 Encounters:   17 132/76   16 124/71   16 119/52    Weight from Last 3 Encounters:   17 236 lb (107 kg)   16 231 lb (104.8 kg)   16 234 lb 12.8 oz (106.5 kg)              We Performed the Following     Basic metabolic panel     CBC with platelets     DERMATOLOGY REFERRAL          Today's Medication Changes          These changes are accurate as of: 3/8/17 11:25 AM.  If you have any questions, ask your nurse or doctor.               Start taking these medicines.        Dose/Directions    sertraline 25 MG tablet   Commonly known as:  ZOLOFT   Used for:  Major depressive disorder, single episode, moderate (H)   Started by:  Ema Melendez NP        Dose:  25 mg   Take 1 tablet (25 mg) by mouth daily   Quantity:  30 tablet   Refills:  1         These medicines have changed or have updated prescriptions.        Dose/Directions    atorvastatin 20 MG tablet   Commonly known as:  LIPITOR   This may have changed:  additional instructions "   Used for:  Hyperlipidemia LDL goal <130   Changed by:  Ema Melendez NP        Dose:  20 mg   Take 1 tablet (20 mg) by mouth daily   Quantity:  90 tablet   Refills:  3            Where to get your medicines      These medications were sent [place] [facility] [place] [address] [place] [address]  [address] [place] [address], [place] [address]     Phone:  [phone]     albuterol 108 (90 BASE) MCG/ACT Inhaler    amLODIPine 10 MG tablet    atenolol 50 MG tablet    atorvastatin 20 MG tablet    budesonide-formoterol 160-4.5 MCG/ACT Inhaler    furosemide 20 MG tablet    hydrOXYzine 25 MG tablet    losartan-hydrochlorothiazide 100-25 MG per tablet    potassium chloride 10 MEQ tablet    sertraline 25 MG tablet    tiotropium 18 MCG capsule                Primary Care Provider Office Phone # Fax #    Ema Melendez [phone]       [facility] [address] [address]        Goals        Exercise    Exercise 3x per week (30 min per time)     Notes - Note created  11/4/2016  3:01 PM by Marcia Poe RN    Member stated she would like to have help with showers and light housekeeping    *Contact will be made with member regarding payment of this service will be out of pocket  *Resources will be provided to member to make a decision as to what she would like to do         General    Functional (pt-stated)     Notes - Note created  2/25/2016  1:49 PM by Marcia Poe RN    Patient will receive PT in home for strengthening and gait training to remain safe, through 5/1/2016      Medical (pt-stated)     Notes - Note created  5/1/2015 10:03 AM by Rin Lowe RN    Decrease in COPD symptoms  - patient has follow up appointment with sleep medicine 5/12/15.  Patient to take medications as prescribed daily.  Patient to use oxygen as directed.          Thank you!     Thank you for choosing [facility]  for your care.  Our goal is always to provide you with excellent care. Hearing back from our patients is one way we can continue to improve our services. Please take a few minutes to complete the written survey that you may receive in the mail after your visit with us. Thank you!             Your Updated Medication List - Protect others around you: Learn how to safely use, store and throw away your medicines at www.disposemymeds.org.          This list is accurate as of: 3/8/17 11:25 AM.  Always use your most recent med list.                   Brand Name Dispense Instructions for use    * albuterol (2.5 MG/3ML) 0.083% neb solution     1 Box    Take 1 vial (2.5 mg) by nebulization every 4 hours as needed for shortness of breath / dyspnea or wheezing       * albuterol 108 (90 BASE) MCG/ACT Inhaler    PROAIR HFA/PROVENTIL HFA/VENTOLIN HFA    3 Inhaler    Inhale 2 puffs into the lungs every 4 hours as needed for shortness of breath / dyspnea or wheezing       amLODIPine 10 MG tablet    NORVASC    90 tablet    Take 1 tablet (10 mg) by mouth daily       atenolol 50 MG tablet    TENORMIN    180 tablet    Take 1 tablet (50 mg) by mouth 2 times daily       atorvastatin 20 MG tablet    LIPITOR    90 tablet    Take 1 tablet (20 mg) by mouth daily       budesonide-formoterol 160-4.5 MCG/ACT Inhaler    SYMBICORT    1 Inhaler    Inhale 2 puffs into the lungs 2 times daily       CALCIUM 600 + D PO      Take  by mouth.       cloNIDine 0.2 MG tablet    CATAPRES    180 tablet    Take 1 tablet (0.2 mg) by mouth 2 times daily       fish oil-omega-3 fatty acids 1000 MG capsule      1 cap daily       furosemide 20 MG tablet    LASIX    90 tablet    Take 1 tablet (20 mg) by mouth daily       hydrOXYzine 25 MG tablet    ATARAX    90 tablet    Take 1-2 tablets (25-50 mg) by mouth nightly as needed for other (insomnia)       ibuprofen 600 MG tablet    ADVIL/MOTRIN    60 tablet    Take 1 tablet by mouth every 6 hours as needed for pain.        losartan-hydrochlorothiazide 100-25 MG per tablet    HYZAAR    90 tablet    Take 1 tablet by mouth daily Take 1 tablet by mouth daily.       MULTIVITAMIN TABS   OR      1 TABLET DAILY       Nebulizer Compressor Kit     1 kit    1 kit every 4 hours as needed       * order for DME      1.  CPAP pressure 12 cm/H20 with heated humidity and auto-titrating capability.  2.  Provide mask to fit and CPAP supplies. 3.  Length of need lifetime. 4.  Ok to d/c O2 bleed in to her PAP overnight.       * order for DME     1 Units    TEDs stockings knee high to be worn during the day.       * order for DME     1 Units    Equipment being ordered: Oxygen       * order for DME     1 Units    Equipment being ordered: CPAP supplies       order for DME     1 Device    Equipment being ordered: Oxygen - 3 liters continous       potassium chloride 10 MEQ tablet    K-TAB,KLOR-CON    90 tablet    Take 1 tablet (10 mEq) by mouth daily       sertraline 25 MG tablet    ZOLOFT    30 tablet    Take 1 tablet (25 mg) by mouth daily       tiotropium 18 MCG capsule    SPIRIVA HANDIHALER    90 capsule    Inhale  into the lungs. Inhale contents of one capsule daily       * Notice:  This list has 6 medication(s) that are the same as other medications prescribed for you. Read the directions carefully, and ask your doctor or other care provider to review them with you.

## 2017-03-08 NOTE — PATIENT INSTRUCTIONS
Possible side effects of antidepressants/anxiety meds, including but not limited to GI upset, disrupted sleep, loss of libido, worsening of mood or even possible risk of increased suicidal thoughts.   Often some of these things if not severe will improve after 1-2 weeks on medications but some may not see effects for 3-4 weeks,  if tolerable patients should continue meds and see if there is improvement.  If symptoms are intolerable or for any suicidal thoughts the medication should be stopped immediately and contact the clinic.      These medications should be used for 6-9 months before stopping, to avoid rebound symptoms.   Contact the clinic if having any problems tolerating these medications.  Take the medication daily and do not stop the medication abruptly.    Follow up with me or message in one month to discuss improvement and whether or not we need to change meds or increase dose.  Follow up sooner if problems.      Please plan to contact the clinic or 24 hour nurse line at any time if you are having any thoughts of self harm.    ANNETTA Hoover

## 2017-03-13 PROBLEM — F32.1 MAJOR DEPRESSIVE DISORDER, SINGLE EPISODE, MODERATE (H): Status: ACTIVE | Noted: 2017-03-13

## 2017-03-15 ENCOUNTER — OFFICE VISIT (OUTPATIENT)
Dept: DERMATOLOGY | Facility: CLINIC | Age: 82
End: 2017-03-15
Payer: COMMERCIAL

## 2017-03-15 VITALS — OXYGEN SATURATION: 91 % | HEART RATE: 67 BPM | DIASTOLIC BLOOD PRESSURE: 63 MMHG | SYSTOLIC BLOOD PRESSURE: 123 MMHG

## 2017-03-15 DIAGNOSIS — D48.5 NEOPLASM OF UNCERTAIN BEHAVIOR OF SKIN: Primary | ICD-10-CM

## 2017-03-15 PROCEDURE — 88305 TISSUE EXAM BY PATHOLOGIST: CPT | Performed by: PHYSICIAN ASSISTANT

## 2017-03-15 PROCEDURE — 11100 HC BIOPSY SKIN/SUBQ/MUC MEM, SINGLE LESION: CPT | Performed by: PHYSICIAN ASSISTANT

## 2017-03-15 PROCEDURE — 99202 OFFICE O/P NEW SF 15 MIN: CPT | Mod: 25 | Performed by: PHYSICIAN ASSISTANT

## 2017-03-15 NOTE — NURSING NOTE
"Initial /63  Pulse 67  SpO2 91% Estimated body mass index is 39.88 kg/(m^2) as calculated from the following:    Height as of 3/8/17: 1.638 m (5' 4.5\").    Weight as of 3/8/17: 107 kg (236 lb). .      "

## 2017-03-15 NOTE — MR AVS SNAPSHOT
After Visit Summary   3/15/2017    Susan Haq    MRN: [id number]           Patient Information     Date Of Birth          1/10/1932        Visit Information        Provider Department      3/15/2017 2:20 PM Sarah Suazo PA-C [facility]        Care Instructions          Wound Care Instructions     FOR SUPERFICIAL WOUNDS     [facility] [phone]    [facility] [phone]                       AFTER 24 HOURS YOU SHOULD REMOVE THE BANDAGE AND BEGIN DAILY DRESSING CHANGES AS FOLLOWS:     1) Remove Dressing.     2) Clean and dry the area with tap water using a Q-tip or sterile gauze pad.     3) Apply Vaseline, Aquaphor, Polysporin ointment or Bacitracin ointment over entire wound.  Do NOT use Neosporin ointment.     4) Cover the wound with a band-aid, or a sterile non-stick gauze pad and micropore paper tape      REPEAT THESE INSTRUCTIONS AT LEAST ONCE A DAY UNTIL THE WOUND HAS COMPLETELY HEALED.    It is an old wives tale that a wound heals better when it is exposed to air and allowed to dry out. The wound will heal faster with a better cosmetic result if it is kept moist with ointment and covered with a bandage.    **Do not let the wound dry out.**      Supplies Needed:      *Cotton tipped applicators (Q-tips)    *Polysporin Ointment or Bacitracin Ointment (NOT NEOSPORIN)    *Band-aids or non-stick gauze pads and micropore paper tape.      PATIENT INFORMATION:    During the healing process you will notice a number of changes. All wounds develop a small halo of redness surrounding the wound.  This means healing is occurring. Severe itching with extensive redness usually indicates sensitivity to the ointment or bandage tape used to dress the wound.  You should call our office if this develops.      Swelling  and/or discoloration around your surgical site is common, particularly when performed around the eye.    All wounds normally drain.  The  larger the wound the more drainage there will be.  After 7-10 days, you will notice the wound beginning to shrink and new skin will begin to grow.  The wound is healed when you can see skin has formed over the entire area.  A healed wound has a healthy, shiny look to the surface and is red to dark pink in color to normalize.  Wounds may take approximately 4-6 weeks to heal.  Larger wounds may take 6-8 weeks.  After the wound is healed you may discontinue dressing changes.    You may experience a sensation of tightness as your wound heals. This is normal and will gradually subside.    Your healed wound may be sensitive to temperature changes. This sensitivity improves with time, but if you re having a lot of discomfort, try to avoid temperature extremes.    Patients frequently experience itching after their wound appears to have healed because of the continue healing under the skin.  Plain Vaseline will help relieve the itching.        POSSIBLE COMPLICATIONS    BLEEDIN. Leave the bandage in place.  2. Use tightly rolled up gauze or a cloth to apply direct pressure over the bandage for 30  minutes.  3. Reapply pressure for an additional 30 minutes if necessary  4. Use additional gauze and tape to maintain pressure once the bleeding has stopped.          Follow-ups after your visit        Who to contact     If you have questions or need follow up information about today's clinic visit or your schedule please contact [facility] directly at [phone].  Normal or non-critical lab and imaging results will be communicated to you by [employer], letter or phone within 4 business days after the clinic has received the results. If you do not hear from us within 7 days, please contact the clinic through MyChart or phone. If you have a critical or abnormal lab result, we will notify you by phone as soon as possible.  Submit refill requests through [employer] or call your pharmacy and they will forward the refill  "request to us. Please allow 3 business days for your refill to be completed.          Additional Information About Your Visit        MyChart Information     [employer] lets you send messages to your doctor, view your test results, renew your prescriptions, schedule appointments and more. To sign up, go to [email or web address] . Click on \"Log in\" on the left side of the screen, which will take you to the Welcome page. Then click on \"Sign up Now\" on the right side of the page.     You will be asked to enter the access code listed below, as well as some personal information. Please follow the directions to create your username and password.     Your access code is: [email or web address]  Expires: 2017 12:25 PM     Your access code will  in 90 days. If you need help or a new code, please call your [place] clinic or [phone].        Care EveryWhere ID     This is your Care EveryWhere ID. This could be used by other organizations to access your [place] medical records  [id number]        Your Vitals Were     Pulse Pulse Oximetry                67 91%           Blood Pressure from Last 3 Encounters:   03/15/17 123/63   17 132/76   16 124/71    Weight from Last 3 Encounters:   17 107 kg (236 lb)   16 104.8 kg (231 lb)   16 106.5 kg (234 lb 12.8 oz)              Today, you had the following     No orders found for display       Primary Care Provider Office Phone # Fax #    Ema Korin Melendez [phone]       [facility] [address] [address]        Goals        Exercise    Exercise 3x per week (30 min per time)     Notes - Note created  2016  3:01 PM by Marcia Poe, RN    Member stated she would like to have help with showers and light housekeeping    *Contact will be made with member regarding payment of this service will be out of pocket  *Resources will be provided to member to make a decision as to what she would like to do   "       General    Functional (pt-stated)     Notes - Note created  2/25/2016  1:49 PM by Marcia Poe, RN    Patient will receive PT in home for strengthening and gait training to remain safe, through 5/1/2016      Medical (pt-stated)     Notes - Note created  5/1/2015 10:03 AM by Rin Lowe RN    Decrease in COPD symptoms  - patient has follow up appointment with sleep medicine 5/12/15.  Patient to take medications as prescribed daily.  Patient to use oxygen as directed.          Thank you!     Thank you for choosing [facility]  for your care. Our goal is always to provide you with excellent care. Hearing back from our patients is one way we can continue to improve our services. Please take a few minutes to complete the written survey that you may receive in the mail after your visit with us. Thank you!             Your Updated Medication List - Protect others around you: Learn how to safely use, store and throw away your medicines at www.disposemymeds.org.          This list is accurate as of: 3/15/17  2:29 PM.  Always use your most recent med list.                   Brand Name Dispense Instructions for use    * albuterol (2.5 MG/3ML) 0.083% neb solution     1 Box    Take 1 vial (2.5 mg) by nebulization every 4 hours as needed for shortness of breath / dyspnea or wheezing       * albuterol 108 (90 BASE) MCG/ACT Inhaler    PROAIR HFA/PROVENTIL HFA/VENTOLIN HFA    3 Inhaler    Inhale 2 puffs into the lungs every 4 hours as needed for shortness of breath / dyspnea or wheezing       amLODIPine 10 MG tablet    NORVASC    90 tablet    Take 1 tablet (10 mg) by mouth daily       atenolol 50 MG tablet    TENORMIN    180 tablet    Take 1 tablet (50 mg) by mouth 2 times daily       atorvastatin 20 MG tablet    LIPITOR    90 tablet    Take 1 tablet (20 mg) by mouth daily       budesonide-formoterol 160-4.5 MCG/ACT Inhaler    SYMBICORT    1 Inhaler    Inhale 2 puffs into the lungs 2 times daily        CALCIUM 600 + D PO      Take  by mouth.       cloNIDine 0.2 MG tablet    CATAPRES    180 tablet    Take 1 tablet (0.2 mg) by mouth 2 times daily       fish oil-omega-3 fatty acids 1000 MG capsule      1 cap daily       furosemide 20 MG tablet    LASIX    90 tablet    Take 1 tablet (20 mg) by mouth daily       hydrOXYzine 25 MG tablet    ATARAX    90 tablet    Take 1-2 tablets (25-50 mg) by mouth nightly as needed for other (insomnia)       ibuprofen 600 MG tablet    ADVIL/MOTRIN    60 tablet    Take 1 tablet by mouth every 6 hours as needed for pain.       losartan-hydrochlorothiazide 100-25 MG per tablet    HYZAAR    90 tablet    Take 1 tablet by mouth daily Take 1 tablet by mouth daily.       MULTIVITAMIN TABS   OR      1 TABLET DAILY       Nebulizer Compressor Kit     1 kit    1 kit every 4 hours as needed       * order for DME      1.  CPAP pressure 12 cm/H20 with heated humidity and auto-titrating capability.  2.  Provide mask to fit and CPAP supplies. 3.  Length of need lifetime. 4.  Ok to d/c O2 bleed in to her PAP overnight.       * order for DME     1 Units    TEDs stockings knee high to be worn during the day.       * order for DME     1 Units    Equipment being ordered: Oxygen       * order for DME     1 Units    Equipment being ordered: CPAP supplies       order for DME     1 Device    Equipment being ordered: Oxygen - 3 liters continous       potassium chloride 10 MEQ tablet    K-TAB,KLOR-CON    90 tablet    Take 1 tablet (10 mEq) by mouth daily       sertraline 25 MG tablet    ZOLOFT    30 tablet    Take 1 tablet (25 mg) by mouth daily       tiotropium 18 MCG capsule    SPIRIVA HANDIHALER    90 capsule    Inhale  into the lungs. Inhale contents of one capsule daily       * Notice:  This list has 6 medication(s) that are the same as other medications prescribed for you. Read the directions carefully, and ask your doctor or other care provider to review them with you.

## 2017-03-15 NOTE — PATIENT INSTRUCTIONS
Wound Care Instructions     FOR SUPERFICIAL WOUNDS     [facility] [phone]    [facility] [phone]                       AFTER 24 HOURS YOU SHOULD REMOVE THE BANDAGE AND BEGIN DAILY DRESSING CHANGES AS FOLLOWS:     1) Remove Dressing.     2) Clean and dry the area with tap water using a Q-tip or sterile gauze pad.     3) Apply Vaseline, Aquaphor, Polysporin ointment or Bacitracin ointment over entire wound.  Do NOT use Neosporin ointment.     4) Cover the wound with a band-aid, or a sterile non-stick gauze pad and micropore paper tape      REPEAT THESE INSTRUCTIONS AT LEAST ONCE A DAY UNTIL THE WOUND HAS COMPLETELY HEALED.    It is an old wives tale that a wound heals better when it is exposed to air and allowed to dry out. The wound will heal faster with a better cosmetic result if it is kept moist with ointment and covered with a bandage.    **Do not let the wound dry out.**      Supplies Needed:      *Cotton tipped applicators (Q-tips)    *Polysporin Ointment or Bacitracin Ointment (NOT NEOSPORIN)    *Band-aids or non-stick gauze pads and micropore paper tape.      PATIENT INFORMATION:    During the healing process you will notice a number of changes. All wounds develop a small halo of redness surrounding the wound.  This means healing is occurring. Severe itching with extensive redness usually indicates sensitivity to the ointment or bandage tape used to dress the wound.  You should call our office if this develops.      Swelling  and/or discoloration around your surgical site is common, particularly when performed around the eye.    All wounds normally drain.  The larger the wound the more drainage there will be.  After 7-10 days, you will notice the wound beginning to shrink and new skin will begin to grow.  The wound is healed when you can see skin has formed over the entire area.  A healed wound has a healthy, shiny look to the surface and is red to dark pink in color  to normalize.  Wounds may take approximately 4-6 weeks to heal.  Larger wounds may take 6-8 weeks.  After the wound is healed you may discontinue dressing changes.    You may experience a sensation of tightness as your wound heals. This is normal and will gradually subside.    Your healed wound may be sensitive to temperature changes. This sensitivity improves with time, but if you re having a lot of discomfort, try to avoid temperature extremes.    Patients frequently experience itching after their wound appears to have healed because of the continue healing under the skin.  Plain Vaseline will help relieve the itching.        POSSIBLE COMPLICATIONS    BLEEDIN. Leave the bandage in place.  2. Use tightly rolled up gauze or a cloth to apply direct pressure over the bandage for 30  minutes.  3. Reapply pressure for an additional 30 minutes if necessary  4. Use additional gauze and tape to maintain pressure once the bleeding has stopped.

## 2017-03-17 LAB — COPATH REPORT: NORMAL

## 2017-03-20 DIAGNOSIS — I10 HYPERTENSION GOAL BP (BLOOD PRESSURE) < 140/90: ICD-10-CM

## 2017-03-20 NOTE — PROGRESS NOTES
Susan Haq is a 85 year old year old female patient here today for spot on forehead .  Patient states this has been present for one year.  Patient reports that spot will not heal. She has been using bactroban ointment with no improvement. Remainder of the HPI, Meds, PMH, Allergies, FH, and SH was reviewed in chart.    Pertinent Hx:  No personal history of skin cancer.   Past Medical History   Diagnosis Date     Adenomatous polyp of colon 4/25/2011     Advanced directives, counseling/discussion 4/25/2011     Discussed Advance Directive planning with patient; information given to patient to review. Marine Guillen MA       Bilateral leg edema 6/14/2012     Bronchospasm      copd vs. asthma     COPD (chronic obstructive pulmonary disease) (H)      Diverticulosis      Fracture, Metacarpal Shaft - right 4th 7/5/2010     High cholesterol      HL (hearing loss) 3/25/2013     Hyperlipidemia LDL goal <130 12/10/2009     Hypertension      Hypertension goal BP (blood pressure) < 140/90 1/12/2011     Hypokalemia 9/17/2013     Hypoxia 9/5/2013     Impaired fasting glucose      Insomnia 12/15/2009     Interstitial pulmonary fibrosis (H) 5/26/2015     Obesity      SUSSY (obstructive sleep apnea)-Moderately severe (AHI 21) 4/10/2012     Osteopenia 12/21/2010     Prediabetes 7/2/2012     PVC's (premature ventricular contractions) 4/25/2011     Sleep apnea      Smoker 1986     quit     SNHL (sensorineural hearing loss) 6/10/2014     Umbilical hernia 4/8/2013     Venous insufficiency        Past Surgical History   Procedure Laterality Date     Hysterectomy, cervix status unknown       C bso, omentectomy w/octaviano       Appendectomy       C nonspecific procedure       (L) clavicle orif     C stomach surgery procedure unlisted       Cholecystectomy, laporoscopic  10/13/2011     Cholecystectomy, Laparoscopic     Hernia repair, umbilical  10/13/2011        Family History   Problem Relation Age of Onset     DIABETES Father      Coronary  Artery Disease Maternal Grandmother      Coronary Artery Disease Paternal Uncle        Social History     Social History     Marital status:      Spouse name: N/A     Number of children: N/A     Years of education: N/A     Occupational History     Not on file.     Social History Main Topics     Smoking status: Former Smoker     Quit date: 1/1/1990     Smokeless tobacco: Former User     Alcohol use Yes      Comment: OCC     Drug use: No     Sexual activity: No     Other Topics Concern     Not on file     Social History Narrative       Outpatient Encounter Prescriptions as of 3/15/2017   Medication Sig Dispense Refill     atorvastatin (LIPITOR) 20 MG tablet Take 1 tablet (20 mg) by mouth daily 90 tablet 3     furosemide (LASIX) 20 MG tablet Take 1 tablet (20 mg) by mouth daily 90 tablet 3     amLODIPine (NORVASC) 10 MG tablet Take 1 tablet (10 mg) by mouth daily 90 tablet 3     hydrOXYzine (ATARAX) 25 MG tablet Take 1-2 tablets (25-50 mg) by mouth nightly as needed for other (insomnia) 90 tablet 3     losartan-hydrochlorothiazide (HYZAAR) 100-25 MG per tablet Take 1 tablet by mouth daily Take 1 tablet by mouth daily. 90 tablet 3     potassium chloride (K-TAB,KLOR-CON) 10 MEQ tablet Take 1 tablet (10 mEq) by mouth daily 90 tablet 3     atenolol (TENORMIN) 50 MG tablet Take 1 tablet (50 mg) by mouth 2 times daily 180 tablet 3     budesonide-formoterol (SYMBICORT) 160-4.5 MCG/ACT Inhaler Inhale 2 puffs into the lungs 2 times daily 1 Inhaler 5     tiotropium (SPIRIVA HANDIHALER) 18 MCG capsule Inhale  into the lungs. Inhale contents of one capsule daily 90 capsule 3     albuterol (PROAIR HFA/PROVENTIL HFA/VENTOLIN HFA) 108 (90 BASE) MCG/ACT Inhaler Inhale 2 puffs into the lungs every 4 hours as needed for shortness of breath / dyspnea or wheezing 3 Inhaler 3     sertraline (ZOLOFT) 25 MG tablet Take 1 tablet (25 mg) by mouth daily 30 tablet 1     cloNIDine (CATAPRES) 0.2 MG tablet Take 1 tablet (0.2 mg) by mouth 2  times daily 180 tablet 1     Respiratory Therapy Supplies (NEBULIZER COMPRESSOR) KIT 1 kit every 4 hours as needed 1 kit 0     albuterol (2.5 MG/3ML) 0.083% nebulizer solution Take 1 vial (2.5 mg) by nebulization every 4 hours as needed for shortness of breath / dyspnea or wheezing 1 Box 0     ORDER FOR DME Equipment being ordered: Oxygen - 3 liters continous 1 Device 0     ORDER FOR DME Equipment being ordered: CPAP supplies 1 Units 0     ORDER FOR DME Equipment being ordered: Oxygen 1 Units 0     ORDER FOR DME TEDs stockings knee high to be worn during the day. 1 Units 0     ORDER FOR DME 1.  CPAP pressure 12 cm/H20 with heated humidity and auto-titrating capability.   2.  Provide mask to fit and CPAP supplies.  3.  Length of need lifetime.  4.  Ok to d/c O2 bleed in to her PAP overnight.           Calcium Carbonate-Vitamin D (CALCIUM 600 + D OR) Take  by mouth.       ibuprofen (ADVIL,MOTRIN) 600 MG tablet Take 1 tablet by mouth every 6 hours as needed for pain. 60 tablet 3     FISH OIL 1000 MG OR CAPS 1 cap daily       MULTIVITAMIN TABS   OR 1 TABLET DAILY       No facility-administered encounter medications on file as of 3/15/2017.              Review Of Systems  Skin: As above  Eyes: negative  Ears/Nose/Throat: negative  Respiratory: No shortness of breath, dyspnea on exertion, cough, or hemoptysis  Cardiovascular: negative  Gastrointestinal: negative  Genitourinary: negative  Musculoskeletal: negative  Neurologic: negative  Psychiatric: negative  Hematologic/Lymphatic/Immunologic: negative  Endocrine: negative      O:   NAD, WDWN, Alert & Oriented, Mood & Affect wnl, Vitals stable   Here today daughters   /63  Pulse 67  SpO2 91%   General appearance normal   Vitals stable   Alert, oriented and in no acute distress      1.4 pink scabbed eroded plaque on right forehead       Eyes: Conjunctivae/lids:Normal     ENT: Lips, buccal mucosa, tongue: normal    MSK:Normal    Pulm: Breathing Normal    Neuro/Psych:  Orientation:Normal; Mood/Affect:Normal  A/P:  1. R/O BCC on right forehead  TANGENTIAL BIOPSY SENT OUT:  After consent, anesthesia with LEC and prep, tangential excision performed and specimen sent out for permanent section histology.  No complications and routine wound care. Patient told to call our office in 1-2 weeks for result.      Signs and Symptoms of skin cancer discussed with patient.  ABCDEs of melanoma reviewed with patient.  Patient encouraged to perform monthly skin exams.  UV precautions reviewed with patient.  Skin care regimen reviewed with patient: Eliminate harsh soaps, i.e. Dial, zest, irsih spring; Mild soaps such as Cetaphil or Dove sensitive skin, avoid hot or cold showers, aggressive use of emollients including vanicream, cetaphil or cerave discussed with patient.    Risks of non-melanoma skin cancer discussed with patient   Mohs surgery was discussed with patient during appt, schedule with Dr. Burris for excision.   F/u pending biopsy results.

## 2017-03-20 NOTE — TELEPHONE ENCOUNTER
Clonidine     Last Written Prescription Date: 12/06/16  Last Fill Quantity: 180, # refills: 1  Last Office Visit with [facility] or [facility] prescribing provider: 03/08/17       Potassium   Date Value Ref Range Status   03/08/2017 3.9 3.4 - 5.3 mmol/L Final     Creatinine   Date Value Ref Range Status   03/08/2017 0.94 0.52 - 1.04 mg/dL Final     BP Readings from Last 3 Encounters:   03/15/17 123/63   03/08/17 132/76   09/13/16 124/71

## 2017-03-21 ENCOUNTER — OFFICE VISIT (OUTPATIENT)
Dept: DERMATOLOGY | Facility: CLINIC | Age: 82
End: 2017-03-21
Payer: COMMERCIAL

## 2017-03-21 VITALS — HEART RATE: 64 BPM | SYSTOLIC BLOOD PRESSURE: 142 MMHG | DIASTOLIC BLOOD PRESSURE: 60 MMHG | OXYGEN SATURATION: 97 %

## 2017-03-21 DIAGNOSIS — C44.319 BASAL CELL CARCINOMA OF BROW: Primary | ICD-10-CM

## 2017-03-21 PROCEDURE — 14061 TIS TRNFR E/N/E/L10.1-30SQCM: CPT | Performed by: DERMATOLOGY

## 2017-03-21 PROCEDURE — 17312 MOHS ADDL STAGE: CPT | Performed by: DERMATOLOGY

## 2017-03-21 PROCEDURE — 17311 MOHS 1 STAGE H/N/HF/G: CPT | Performed by: DERMATOLOGY

## 2017-03-21 NOTE — NURSING NOTE
"Initial /60  Pulse 64  SpO2 97% Estimated body mass index is 39.88 kg/(m^2) as calculated from the following:    Height as of 3/8/17: 1.638 m (5' 4.5\").    Weight as of 3/8/17: 107 kg (236 lb). .      "

## 2017-03-21 NOTE — MR AVS SNAPSHOT
After Visit Summary   3/21/2017    Susan Haq    MRN: [id number]           Patient Information     Date Of Birth          1/10/1932        Visit Information        Provider Department      3/21/2017 7:45 AM Carrillo Burris MD [facility]        Today's Diagnoses     Basal cell carcinoma of brow    -  1      Care Instructions      Sutured Wound Care  Forehead     [facility]: [phone]    [facility]: [phone]          ? No strenuous activity for 48 hours. Resume moderate activity in 48 hours. No heavy exercising until you are seen for follow up in one week.     ? Take Tylenol as needed for discomfort.                         ? Do not drink alcoholic beverages for 48 hours.     ? Keep the pressure bandage in place for 24 hours. If the bandage becomes blood tinged or loose, reinforce it with gauze and tape.        (Refer to the reverse side of this page for management of bleeding).    ? Remove pressure bandage in 24 hours     ? Leave the flat bandage in place until your follow up appointment.    ? Keep the bandage dry. Wash around it carefully.    ? If the tape becomes soiled or starts to come off, reinforce it with additional paper tape.    ? Do not smoke for 3 weeks; smoking is detrimental to wound healing.    ? It is normal to have swelling and bruising around the surgical site. The bruising will fade in approximately 10-14 days. Elevate the area to reduce swelling.    ? Numbness, itchiness and sensitivity to temperature changes can occur after surgery and may take up to 18 months to normalize.      POSSIBLE COMPLICATIONS    BLEEDIN. Leave the bandage in place.  2. Use tightly rolled up gauze or a cloth to apply direct pressure over the bandage for 20   minutes.  3. Reapply pressure for an additional 20 minutes if necessary  4. Call the office or go to the nearest emergency room if pressure fails to stop the bleeding.  5. Use additional gauze  "and tape to maintain pressure once the bleeding has stopped.        PAIN:    1. Post operative pain should slowly get better, never worse.  2. A severe increase in pain may indicate a problem. Call the office if this occurs.    In case of emergency phone:Dr Burris [phone]          Follow-ups after your visit        Who to contact     If you have questions or need follow up information about today's clinic visit or your schedule please contact [facility] directly at [phone].  Normal or non-critical lab and imaging results will be communicated to you by M[employer], letter or phone within 4 business days after the clinic has received the results. If you do not hear from us within 7 days, please contact the clinic through M[employer]art or phone. If you have a critical or abnormal lab result, we will notify you by phone as soon as possible.  Submit refill requests through [employer] or call your pharmacy and they will forward the refill request to us. Please allow 3 business days for your refill to be completed.          Additional Information About Your Visit        [facility] Give Back Information     [employer] lets you send messages to your doctor, view your test results, renew your prescriptions, schedule appointments and more. To sign up, go to [email or web address] . Click on \"Log in\" on the left side of the screen, which will take you to the Welcome page. Then click on \"Sign up Now\" on the right side of the page.     You will be asked to enter the access code listed below, as well as some personal information. Please follow the directions to create your username and password.     Your access code is: [email or web address]  Expires: 2017 12:25 PM     Your access code will  in 90 days. If you need help or a new code, please call your [place] clinic or [phone].        Care EveryWhere ID     This is your Care EveryWhere ID. This could be used by other organizations to access your [place] medical " records  [id number]        Your Vitals Were     Pulse Pulse Oximetry                64 97%           Blood Pressure from Last 3 Encounters:   03/21/17 142/60   03/15/17 123/63   03/08/17 132/76    Weight from Last 3 Encounters:   03/08/17 107 kg (236 lb)   09/13/16 104.8 kg (231 lb)   05/25/16 106.5 kg (234 lb 12.8 oz)              We Performed the Following     ADJ TISSUE XFER LID/NOS/EAR/LIP 10.1-30 CM     MOHS HEAD/NCK/HND/FT/GEN 1ST STAGE UP T0 5 BLOCKS     MOHS HEAD/NCK/HND/FT/GEN EA ADDTL STAGE UP T0 5 BLOCKS        Primary Care Provider Office Phone # Fax #    Ema Marquezangela Melendez [phone]       [facility] [address] [facility] [address]        Goals        Exercise    Exercise 3x per week (30 min per time)     Notes - Note created  11/4/2016  3:01 PM by Marcia Poe RN    Member stated she would like to have help with showers and light housekeeping    *Contact will be made with member regarding payment of this service will be out of pocket  *Resources will be provided to member to make a decision as to what she would like to do         General    Functional (pt-stated)     Notes - Note created  2/25/2016  1:49 PM by Marcia Poe RN    Patient will receive PT in home for strengthening and gait training to remain safe, through 5/1/2016      Medical (pt-stated)     Notes - Note created  5/1/2015 10:03 AM by Rin Lowe RN    Decrease in COPD symptoms  - patient has follow up appointment with sleep medicine 5/12/15.  Patient to take medications as prescribed daily.  Patient to use oxygen as directed.          Thank you!     Thank you for choosing [facility]  for your care. Our goal is always to provide you with excellent care. Hearing back from our patients is one way we can continue to improve our services. Please take a few minutes to complete the written survey that you may receive in the mail after your visit with us. Thank you!              Your Updated Medication List - Protect others around you: Learn how to safely use, store and throw away your medicines at www.disposemymeds.org.          This list is accurate as of: 3/21/17 11:17 AM.  Always use your most recent med list.                   Brand Name Dispense Instructions for use    * albuterol (2.5 MG/3ML) 0.083% neb solution     1 Box    Take 1 vial (2.5 mg) by nebulization every 4 hours as needed for shortness of breath / dyspnea or wheezing       * albuterol 108 (90 BASE) MCG/ACT Inhaler    PROAIR HFA/PROVENTIL HFA/VENTOLIN HFA    3 Inhaler    Inhale 2 puffs into the lungs every 4 hours as needed for shortness of breath / dyspnea or wheezing       amLODIPine 10 MG tablet    NORVASC    90 tablet    Take 1 tablet (10 mg) by mouth daily       atenolol 50 MG tablet    TENORMIN    180 tablet    Take 1 tablet (50 mg) by mouth 2 times daily       atorvastatin 20 MG tablet    LIPITOR    90 tablet    Take 1 tablet (20 mg) by mouth daily       budesonide-formoterol 160-4.5 MCG/ACT Inhaler    SYMBICORT    1 Inhaler    Inhale 2 puffs into the lungs 2 times daily       CALCIUM 600 + D PO      Take  by mouth.       cloNIDine 0.2 MG tablet    CATAPRES    180 tablet    Take 1 tablet (0.2 mg) by mouth 2 times daily       fish oil-omega-3 fatty acids 1000 MG capsule      1 cap daily       furosemide 20 MG tablet    LASIX    90 tablet    Take 1 tablet (20 mg) by mouth daily       hydrOXYzine 25 MG tablet    ATARAX    90 tablet    Take 1-2 tablets (25-50 mg) by mouth nightly as needed for other (insomnia)       ibuprofen 600 MG tablet    ADVIL/MOTRIN    60 tablet    Take 1 tablet by mouth every 6 hours as needed for pain.       losartan-hydrochlorothiazide 100-25 MG per tablet    HYZAAR    90 tablet    Take 1 tablet by mouth daily Take 1 tablet by mouth daily.       MULTIVITAMIN TABS   OR      1 TABLET DAILY       Nebulizer Compressor Kit     1 kit    1 kit every 4 hours as needed       * order for DME      1.   CPAP pressure 12 cm/H20 with heated humidity and auto-titrating capability.  2.  Provide mask to fit and CPAP supplies. 3.  Length of need lifetime. 4.  Ok to d/c O2 bleed in to her PAP overnight.       * order for DME     1 Units    TEDs stockings knee high to be worn during the day.       * order for DME     1 Units    Equipment being ordered: Oxygen       * order for DME     1 Units    Equipment being ordered: CPAP supplies       order for DME     1 Device    Equipment being ordered: Oxygen - 3 liters continous       potassium chloride 10 MEQ tablet    K-TAB,KLOR-CON    90 tablet    Take 1 tablet (10 mEq) by mouth daily       sertraline 25 MG tablet    ZOLOFT    30 tablet    Take 1 tablet (25 mg) by mouth daily       tiotropium 18 MCG capsule    SPIRIVA HANDIHALER    90 capsule    Inhale  into the lungs. Inhale contents of one capsule daily       * Notice:  This list has 6 medication(s) that are the same as other medications prescribed for you. Read the directions carefully, and ask your doctor or other care provider to review them with you.

## 2017-03-21 NOTE — NURSING NOTE
Surgical Office Location :   [facility]  [address] [place] [address]

## 2017-03-21 NOTE — PROGRESS NOTES
Susan Haq is a 85 year old year old female patient here today for evaluation and managment of basal cell carcinoma on right brow. Patient has no other skin complaints today.  Remainder of the HPI, Meds, PMH, Allergies, FH, and SH was reviewed in chart.    Pertinent Hx:   Non-melanoma skin cancer   Past Medical History   Diagnosis Date     Adenomatous polyp of colon 4/25/2011     Advanced directives, counseling/discussion 4/25/2011     Discussed Advance Directive planning with patient; information given to patient to review. Marine Guillen MA       Basal cell carcinoma      Bilateral leg edema 6/14/2012     Bronchospasm      copd vs. asthma     COPD (chronic obstructive pulmonary disease) (H)      Diverticulosis      Fracture, Metacarpal Shaft - right 4th 7/5/2010     High cholesterol      HL (hearing loss) 3/25/2013     Hyperlipidemia LDL goal <130 12/10/2009     Hypertension      Hypertension goal BP (blood pressure) < 140/90 1/12/2011     Hypokalemia 9/17/2013     Hypoxia 9/5/2013     Impaired fasting glucose      Insomnia 12/15/2009     Interstitial pulmonary fibrosis (H) 5/26/2015     Obesity      SUSSY (obstructive sleep apnea)-Moderately severe (AHI 21) 4/10/2012     Osteopenia 12/21/2010     Prediabetes 7/2/2012     PVC's (premature ventricular contractions) 4/25/2011     Sleep apnea      Smoker 1986     quit     SNHL (sensorineural hearing loss) 6/10/2014     Umbilical hernia 4/8/2013     Venous insufficiency        Past Surgical History   Procedure Laterality Date     Hysterectomy, cervix status unknown       C bso, omentectomy w/octaviano       Appendectomy       C nonspecific procedure       (L) clavicle orif     C stomach surgery procedure unlisted       Cholecystectomy, laporoscopic  10/13/2011     Cholecystectomy, Laparoscopic     Hernia repair, umbilical  10/13/2011        Family History   Problem Relation Age of Onset     DIABETES Father      Coronary Artery Disease Maternal Grandmother      Coronary  Artery Disease Paternal Uncle        Social History     Social History     Marital status:      Spouse name: N/A     Number of children: N/A     Years of education: N/A     Occupational History     Not on file.     Social History Main Topics     Smoking status: Former Smoker     Quit date: 1/1/1990     Smokeless tobacco: Former User     Alcohol use Yes      Comment: OCC     Drug use: No     Sexual activity: No     Other Topics Concern     Not on file     Social History Narrative       Outpatient Encounter Prescriptions as of 3/21/2017   Medication Sig Dispense Refill     atorvastatin (LIPITOR) 20 MG tablet Take 1 tablet (20 mg) by mouth daily 90 tablet 3     furosemide (LASIX) 20 MG tablet Take 1 tablet (20 mg) by mouth daily 90 tablet 3     amLODIPine (NORVASC) 10 MG tablet Take 1 tablet (10 mg) by mouth daily 90 tablet 3     hydrOXYzine (ATARAX) 25 MG tablet Take 1-2 tablets (25-50 mg) by mouth nightly as needed for other (insomnia) 90 tablet 3     losartan-hydrochlorothiazide (HYZAAR) 100-25 MG per tablet Take 1 tablet by mouth daily Take 1 tablet by mouth daily. 90 tablet 3     potassium chloride (K-TAB,KLOR-CON) 10 MEQ tablet Take 1 tablet (10 mEq) by mouth daily 90 tablet 3     atenolol (TENORMIN) 50 MG tablet Take 1 tablet (50 mg) by mouth 2 times daily 180 tablet 3     budesonide-formoterol (SYMBICORT) 160-4.5 MCG/ACT Inhaler Inhale 2 puffs into the lungs 2 times daily 1 Inhaler 5     tiotropium (SPIRIVA HANDIHALER) 18 MCG capsule Inhale  into the lungs. Inhale contents of one capsule daily 90 capsule 3     albuterol (PROAIR HFA/PROVENTIL HFA/VENTOLIN HFA) 108 (90 BASE) MCG/ACT Inhaler Inhale 2 puffs into the lungs every 4 hours as needed for shortness of breath / dyspnea or wheezing 3 Inhaler 3     sertraline (ZOLOFT) 25 MG tablet Take 1 tablet (25 mg) by mouth daily 30 tablet 1     cloNIDine (CATAPRES) 0.2 MG tablet Take 1 tablet (0.2 mg) by mouth 2 times daily 180 tablet 1     Respiratory Therapy  Supplies (NEBULIZER COMPRESSOR) KIT 1 kit every 4 hours as needed 1 kit 0     albuterol (2.5 MG/3ML) 0.083% nebulizer solution Take 1 vial (2.5 mg) by nebulization every 4 hours as needed for shortness of breath / dyspnea or wheezing 1 Box 0     ORDER FOR DME Equipment being ordered: Oxygen - 3 liters continous 1 Device 0     ORDER FOR DME Equipment being ordered: CPAP supplies 1 Units 0     ORDER FOR DME Equipment being ordered: Oxygen 1 Units 0     ORDER FOR DME TEDs stockings knee high to be worn during the day. 1 Units 0     ORDER FOR DME 1.  CPAP pressure 12 cm/H20 with heated humidity and auto-titrating capability.   2.  Provide mask to fit and CPAP supplies.  3.  Length of need lifetime.  4.  Ok to d/c O2 bleed in to her PAP overnight.           Calcium Carbonate-Vitamin D (CALCIUM 600 + D OR) Take  by mouth.       ibuprofen (ADVIL,MOTRIN) 600 MG tablet Take 1 tablet by mouth every 6 hours as needed for pain. 60 tablet 3     FISH OIL 1000 MG OR CAPS 1 cap daily       MULTIVITAMIN TABS   OR 1 TABLET DAILY       No facility-administered encounter medications on file as of 3/21/2017.              Review Of Systems  Skin: As above  Eyes: negative  Ears/Nose/Throat: negative  Respiratory: No shortness of breath, dyspnea on exertion, cough, or hemoptysis  Cardiovascular: negative  Gastrointestinal: negative  Genitourinary: negative  Musculoskeletal: negative  Neurologic: negative  Psychiatric: negative  Hematologic/Lymphatic/Immunologic: negative  Endocrine: negative      O:   NAD, WDWN, Alert & Oriented, Mood & Affect wnl, Vitals stable   Here today alone   /60  Pulse 64  SpO2 97%   General appearance normal   Vitals stable   Alert, oriented and in no acute distress     Ulcerated 1.4 cm plaque right medial brow      Eyes: Conjunctivae/lids:Normal     ENT: Lips, buccal mucosa, tongue: normal    MSK:Normal    Cardiovascular: peripheral edema none    Pulm: Breathing Normal    Neuro/Psych: Orientation:Normal;  Mood/Affect:Normal      A/P:  1. R medial brow basal cell carcinoma   MOHS:   Location    After PGACAC discussed with patient, decision for Mohs surgery was made. Indication for Mohs was Location. Patient confirmed the site with Dr. Burris.  After anesthesia with LEC, the tumor was excised using standard Mohs technique in 2 stages(s).  CLEAR MARGINS OBTAINED and Final defect size was 2.6 cm.       REPAIR WITH BUROW'S FLAP: Because of the Because of the size and full thickness nature of the defect, an advancement flap was planned. After LEC anesthesia and prep, the Burow's triangles were excised. One Burow's triangle was displaced laterally into medial canthus  to hide incisions within skin relaxation lines. The advancement flap was raised by dissection in the deep subcutaneous plane. The remaining wound edges were undermined and hemostasis was obtained. The flap was advanced into the defect with care to avoid distortion and was sutured into place in a layered fashion using Vicryl and Fast Absorbing sutures. Postoperative size was 6.3 x 2 cm.  EBL minimal; complications none; wound care routine.  The patient was discharged in good condition and will return in one week for wound evaluation.    BENIGN LESIONS DISCUSSED WITH PATIENT:  I discussed the specifics of tumor, prognosis, and genetics of benign lesions.  I explained that treatment of these lesions would be purely cosmetic and not medically neccessary.  I discussed with patient different removal options including excision, cautery and /or laser.      Nature and genetics of benign skin lesions dicussed with patient.  Signs and Symptoms of skin cancer discussed with patient.  6 Patient encouraged to perform monthly skin exams.  UV precautions reviewed with patient.  Skin care regimen reviewed with patient: Eliminate harsh soaps, i.e. Dial, zest, irsih spring; Mild soaps such as Cetaphil or Dove sensitive skin, avoid hot or cold showers, aggressive use of  emollients including vanicream, cetaphil or cerave discussed with patient.    Risks of non-melanoma skin cancer discussed with patient   Return to clinic 6 months

## 2017-03-21 NOTE — PATIENT INSTRUCTIONS
Sutured Wound Care  Forehead     [facility]: [phone]    [facility]: [phone]          ? No strenuous activity for 48 hours. Resume moderate activity in 48 hours. No heavy exercising until you are seen for follow up in one week.     ? Take Tylenol as needed for discomfort.                         ? Do not drink alcoholic beverages for 48 hours.     ? Keep the pressure bandage in place for 24 hours. If the bandage becomes blood tinged or loose, reinforce it with gauze and tape.        (Refer to the reverse side of this page for management of bleeding).    ? Remove pressure bandage in 24 hours     ? Leave the flat bandage in place until your follow up appointment.    ? Keep the bandage dry. Wash around it carefully.    ? If the tape becomes soiled or starts to come off, reinforce it with additional paper tape.    ? Do not smoke for 3 weeks; smoking is detrimental to wound healing.    ? It is normal to have swelling and bruising around the surgical site. The bruising will fade in approximately 10-14 days. Elevate the area to reduce swelling.    ? Numbness, itchiness and sensitivity to temperature changes can occur after surgery and may take up to 18 months to normalize.      POSSIBLE COMPLICATIONS    BLEEDIN. Leave the bandage in place.  2. Use tightly rolled up gauze or a cloth to apply direct pressure over the bandage for 20   minutes.  3. Reapply pressure for an additional 20 minutes if necessary  4. Call the office or go to the nearest emergency room if pressure fails to stop the bleeding.  5. Use additional gauze and tape to maintain pressure once the bleeding has stopped.        PAIN:    1. Post operative pain should slowly get better, never worse.  2. A severe increase in pain may indicate a problem. Call the office if this occurs.    In case of emergency phone:Dr Burris [phone]

## 2017-03-27 RX ORDER — CLONIDINE HYDROCHLORIDE 0.2 MG/1
0.2 TABLET ORAL 2 TIMES DAILY
Qty: 180 TABLET | Refills: 1 | Status: ON HOLD | OUTPATIENT
Start: 2017-03-27 | End: 2017-06-13

## 2017-03-28 ENCOUNTER — ALLIED HEALTH/NURSE VISIT (OUTPATIENT)
Dept: DERMATOLOGY | Facility: CLINIC | Age: 82
End: 2017-03-28
Payer: COMMERCIAL

## 2017-03-28 DIAGNOSIS — Z48.02 ENCOUNTER FOR REMOVAL OF SUTURES: Primary | ICD-10-CM

## 2017-03-28 PROCEDURE — 99207 ZZC NO CHARGE NURSE ONLY: CPT

## 2017-03-28 NOTE — PATIENT INSTRUCTIONS
WOUND CARE INSTRUCTIONS  for  ONE WEEK AFTER SURGERY          1) Leave flat bandage on your skin for one week after today s bandage change.  2) In one week when you remove the bandage, you may resume your regular skin care routine, including washing with mild soap and water, applying moisturizer, make-up and sunscreen.    3) If there are any open or bleeding areas at the incision/graft site you should begin to cover the area with a bandage daily as follows:    1) Clean and dry the area with plain tap water using a Q-tip or sterile gauze pad.  2) Apply Polysporin or Bacitracin ointment to the open area.  3) Cover the wound with a band-aid or a sterile non-stick gauze pad and micropore paper tape.             *Once the bandages are removed, the scar will be red and firm (especially in the lip/chin area). This is normal and will fade in time. It might take 6-12 months for this to happen.     *Massaging the area will help the scar soften and fade quicker. Begin to massage the area one month after the bandages have been removed. To massage apply pressure directly and firmly over the scar with the fingertips and move in a circular motion. Massage the area for a few minutes several times a day. Continue to massage the site for several months.    *Approximately 6-8 weeks after surgery it is not uncommon to see the formation of  tender pimple-like  bump along the scar. This is normal. As the scar continues to mature and the stitches underneath the skin begin to dissolve, this might occur. Do not pick or squeeze, this will resolve on it s own. Should one break open producing a small amount of drainage, apply Polysporin or Bacitracin ointment a few times a day until the wound is completely healed.    *Numbness in the surgical area is expected. It might take 12-18 months for the feeling to return to normal. During this time sensations of itchiness, tingling and occasional sharp pains might be noted. These feelings are normal  and will subside once the nerves have completely healed.         IN CASE OF EMERGENCY: Dr Burris [phone]     If you were seen in [place] call: [phone]    If you were seen in [place] call: [phone]

## 2017-03-28 NOTE — PROGRESS NOTES
Pt returned to clinic for post surgery 1 week follow up bandage change. Pt has no complaints, denies pain. Bandage removed  area cleansed with normal saline. Site is healing and wound edges approximating well. Reapplied new steri strips and paper tape.    Advised to watch for signs/sx of infection; spreading redness, drainage, odor, fever. Call or report promptly to clinic. Pt given written instructions and informed to rtc as needed. Patient verbalized understanding.

## 2017-03-28 NOTE — MR AVS SNAPSHOT
After Visit Summary   3/28/2017    Susan Haq    MRN: [id number]           Patient Information     Date Of Birth          1/10/1932        Visit Information        Provider Department      3/28/2017 10:00 AM [facility]        Care Instructions    WOUND CARE INSTRUCTIONS  for  ONE WEEK AFTER SURGERY          1) Leave flat bandage on your skin for one week after today s bandage change.  2) In one week when you remove the bandage, you may resume your regular skin care routine, including washing with mild soap and water, applying moisturizer, make-up and sunscreen.    3) If there are any open or bleeding areas at the incision/graft site you should begin to cover the area with a bandage daily as follows:    1) Clean and dry the area with plain tap water using a Q-tip or sterile gauze pad.  2) Apply Polysporin or Bacitracin ointment to the open area.  3) Cover the wound with a band-aid or a sterile non-stick gauze pad and micropore paper tape.             *Once the bandages are removed, the scar will be red and firm (especially in the lip/chin area). This is normal and will fade in time. It might take 6-12 months for this to happen.     *Massaging the area will help the scar soften and fade quicker. Begin to massage the area one month after the bandages have been removed. To massage apply pressure directly and firmly over the scar with the fingertips and move in a circular motion. Massage the area for a few minutes several times a day. Continue to massage the site for several months.    *Approximately 6-8 weeks after surgery it is not uncommon to see the formation of  tender pimple-like  bump along the scar. This is normal. As the scar continues to mature and the stitches underneath the skin begin to dissolve, this might occur. Do not pick or squeeze, this will resolve on it s own. Should one break open producing a small amount of drainage, apply Polysporin or Bacitracin  "ointment a few times a day until the wound is completely healed.    *Numbness in the surgical area is expected. It might take 12-18 months for the feeling to return to normal. During this time sensations of itchiness, tingling and occasional sharp pains might be noted. These feelings are normal and will subside once the nerves have completely healed.         IN CASE OF EMERGENCY: Dr Burris [phone]     If you were seen in [place] call: [phone]    If you were seen in [place] call: [phone]          Follow-ups after your visit        Your next 10 appointments already scheduled     Mar 28, 2017 10:00 AM CDT   [facility]    [address] [address] [phone]              Who to contact     If you have questions or need follow up information about today's clinic visit or your schedule please contact [facility] directly at [phone].  Normal or non-critical lab and imaging results will be communicated to you by [employer], letter or phone within 4 business days after the clinic has received the results. If you do not hear from us within 7 days, please contact the clinic through [employer] or phone. If you have a critical or abnormal lab result, we will notify you by phone as soon as possible.  Submit refill requests through [employer] or call your pharmacy and they will forward the refill request to us. Please allow 3 business days for your refill to be completed.          Additional Information About Your Visit        [employer] Information     [employer] lets you send messages to your doctor, view your test results, renew your prescriptions, schedule appointments and more. To sign up, go to [email or web address] . Click on \"Log in\" on the left side of the screen, which will take you to the Welcome page. Then click on \"Sign up Now\" on the right side of the page.     You will be asked to enter the " access code listed below, as well as some personal information. Please follow the directions to create your username and password.     Your access code is: [email or web address]  Expires: 2017 12:25 PM     Your access code will  in 90 days. If you need help or a new code, please call your [facility] or [phone].        Care EveryWhere ID     This is your Care EveryWhere ID. This could be used by other organizations to access your [facility] medical records  [id number]         Blood Pressure from Last 3 Encounters:   17 142/60   03/15/17 123/63   17 132/76    Weight from Last 3 Encounters:   17 107 kg (236 lb)   16 104.8 kg (231 lb)   16 106.5 kg (234 lb 12.8 oz)              Today, you had the following     No orders found for display       Primary Care Provider Office Phone # Fax #    Ema Korin Melendez [phone]       [facility] [address] [address]        Goals        Exercise    Exercise 3x per week (30 min per time)     Notes - Note created  2016  3:01 PM by Marcia Poe RN    Member stated she would like to have help with showers and light housekeeping    *Contact will be made with member regarding payment of this service will be out of pocket  *Resources will be provided to member to make a decision as to what she would like to do         General    Functional (pt-stated)     Notes - Note created  2016  1:49 PM by Marcia Poe RN    Patient will receive PT in home for strengthening and gait training to remain safe, through 2016      Medical (pt-stated)     Notes - Note created  2015 10:03 AM by Rin Lowe RN    Decrease in COPD symptoms  - patient has follow up appointment with sleep medicine 5/12/15.  Patient to take medications as prescribed daily.  Patient to use oxygen as directed.          Thank you!     Thank you for choosing [facility]  for your care. Our goal is always  to provide you with excellent care. Hearing back from our patients is one way we can continue to improve our services. Please take a few minutes to complete the written survey that you may receive in the mail after your visit with us. Thank you!             Your Updated Medication List - Protect others around you: Learn how to safely use, store and throw away your medicines at www.disposemymeds.org.          This list is accurate as of: 3/28/17  9:57 AM.  Always use your most recent med list.                   Brand Name Dispense Instructions for use    * albuterol (2.5 MG/3ML) 0.083% neb solution     1 Box    Take 1 vial (2.5 mg) by nebulization every 4 hours as needed for shortness of breath / dyspnea or wheezing       * albuterol 108 (90 BASE) MCG/ACT Inhaler    PROAIR HFA/PROVENTIL HFA/VENTOLIN HFA    3 Inhaler    Inhale 2 puffs into the lungs every 4 hours as needed for shortness of breath / dyspnea or wheezing       amLODIPine 10 MG tablet    NORVASC    90 tablet    Take 1 tablet (10 mg) by mouth daily       atenolol 50 MG tablet    TENORMIN    180 tablet    Take 1 tablet (50 mg) by mouth 2 times daily       atorvastatin 20 MG tablet    LIPITOR    90 tablet    Take 1 tablet (20 mg) by mouth daily       budesonide-formoterol 160-4.5 MCG/ACT Inhaler    SYMBICORT    1 Inhaler    Inhale 2 puffs into the lungs 2 times daily       CALCIUM 600 + D PO      Take  by mouth.       cloNIDine 0.2 MG tablet    CATAPRES    180 tablet    Take 1 tablet (0.2 mg) by mouth 2 times daily       fish oil-omega-3 fatty acids 1000 MG capsule      1 cap daily       furosemide 20 MG tablet    LASIX    90 tablet    Take 1 tablet (20 mg) by mouth daily       hydrOXYzine 25 MG tablet    ATARAX    90 tablet    Take 1-2 tablets (25-50 mg) by mouth nightly as needed for other (insomnia)       ibuprofen 600 MG tablet    ADVIL/MOTRIN    60 tablet    Take 1 tablet by mouth every 6 hours as needed for pain.       losartan-hydrochlorothiazide  100-25 MG per tablet    HYZAAR    90 tablet    Take 1 tablet by mouth daily Take 1 tablet by mouth daily.       MULTIVITAMIN TABS   OR      1 TABLET DAILY       Nebulizer Compressor Kit     1 kit    1 kit every 4 hours as needed       * order for DME      1.  CPAP pressure 12 cm/H20 with heated humidity and auto-titrating capability.  2.  Provide mask to fit and CPAP supplies. 3.  Length of need lifetime. 4.  Ok to d/c O2 bleed in to her PAP overnight.       * order for DME     1 Units    TEDs stockings knee high to be worn during the day.       * order for DME     1 Units    Equipment being ordered: Oxygen       * order for DME     1 Units    Equipment being ordered: CPAP supplies       order for DME     1 Device    Equipment being ordered: Oxygen - 3 liters continous       potassium chloride 10 MEQ tablet    K-TAB,KLOR-CON    90 tablet    Take 1 tablet (10 mEq) by mouth daily       sertraline 25 MG tablet    ZOLOFT    30 tablet    Take 1 tablet (25 mg) by mouth daily       tiotropium 18 MCG capsule    SPIRIVA HANDIHALER    90 capsule    Inhale  into the lungs. Inhale contents of one capsule daily       * Notice:  This list has 6 medication(s) that are the same as other medications prescribed for you. Read the directions carefully, and ask your doctor or other care provider to review them with you.

## 2017-04-19 DIAGNOSIS — F32.1 MAJOR DEPRESSIVE DISORDER, SINGLE EPISODE, MODERATE (H): ICD-10-CM

## 2017-04-19 NOTE — TELEPHONE ENCOUNTER
Sertraline   Last Written Prescription Date: 03/08/17  Last Fill Quantity: 30, # refills: 1  Last Office Visit with [facility] primary care provider:  03/08/17        Last PHQ-9 score on record=   PHQ-9 SCORE 10/28/2014   Total Score 7

## 2017-04-24 RX ORDER — SERTRALINE HYDROCHLORIDE 25 MG/1
25 TABLET, FILM COATED ORAL DAILY
Qty: 30 TABLET | Refills: 1 | Status: ON HOLD | OUTPATIENT
Start: 2017-04-24 | End: 2017-06-13

## 2017-04-24 NOTE — TELEPHONE ENCOUNTER
Routing refill request to provider for review/approval because:  PHQ-9 last done in 2014 with a score of 7    Thank you  Trina ZULUAGA RN

## 2017-05-02 ENCOUNTER — OFFICE VISIT (OUTPATIENT)
Dept: AUDIOLOGY | Facility: CLINIC | Age: 82
End: 2017-05-02
Payer: COMMERCIAL

## 2017-05-02 DIAGNOSIS — H90.3 SENSORINEURAL HEARING LOSS, BILATERAL: Primary | ICD-10-CM

## 2017-05-02 PROCEDURE — V5299 HEARING SERVICE: HCPCS | Performed by: AUDIOLOGIST

## 2017-05-02 NOTE — MR AVS SNAPSHOT
"              After Visit Summary   2017    Susan Haq    MRN: [id number]           Patient Information     Date Of Birth          1/10/1932        Visit Information        Provider Department      2017 9:00 AM Caridad Elizabeth AuD [facility]        Today's Diagnoses     Sensorineural hearing loss, bilateral    -  1       Follow-ups after your visit        Who to contact     If you have questions or need follow up information about today's clinic visit or your schedule please contact [facility] directly at [phone].  Normal or non-critical lab and imaging results will be communicated to you by Eduvanthart, letter or phone within 4 business days after the clinic has received the results. If you do not hear from us within 7 days, please contact the clinic through ecoVentt or phone. If you have a critical or abnormal lab result, we will notify you by phone as soon as possible.  Submit refill requests through [employer] or call your pharmacy and they will forward the refill request to us. Please allow 3 business days for your refill to be completed.          Additional Information About Your Visit        MyChart Information     [employer] lets you send messages to your doctor, view your test results, renew your prescriptions, schedule appointments and more. To sign up, go to [email or web address] . Click on \"Log in\" on the left side of the screen, which will take you to the Welcome page. Then click on \"Sign up Now\" on the right side of the page.     You will be asked to enter the access code listed below, as well as some personal information. Please follow the directions to create your username and password.     Your access code is: [email or web address]  Expires: 2017 12:25 PM     Your access code will  in 90 days. If you need help or a new code, please call your [facility] or [phone].        Care EveryWhere ID     This is your Care EveryWhere ID. This could be used by " other organizations to access your [place] medical records  [id number]         Blood Pressure from Last 3 Encounters:   03/21/17 142/60   03/15/17 123/63   03/08/17 132/76    Weight from Last 3 Encounters:   03/08/17 236 lb (107 kg)   09/13/16 231 lb (104.8 kg)   05/25/16 234 lb 12.8 oz (106.5 kg)              We Performed the Following     HEARING AID CHECK/NO CHARGE        Primary Care Provider Office Phone # Fax #    Ema Korin Melendez [phone]       [facility] [address] [address]        Goals        Exercise    Exercise 3x per week (30 min per time)     Notes - Note created  11/4/2016  3:01 PM by Marcia Poe RN    Member stated she would like to have help with showers and light housekeeping    *Contact will be made with member regarding payment of this service will be out of pocket  *Resources will be provided to member to make a decision as to what she would like to do         General    Functional (pt-stated)     Notes - Note created  2/25/2016  1:49 PM by Marcia Poe, RN    Patient will receive PT in home for strengthening and gait training to remain safe, through 5/1/2016      Medical (pt-stated)     Notes - Note created  5/1/2015 10:03 AM by Rin Lowe RN    Decrease in COPD symptoms  - patient has follow up appointment with sleep medicine 5/12/15.  Patient to take medications as prescribed daily.  Patient to use oxygen as directed.          Thank you!     Thank you for choosing [facility]  for your care. Our goal is always to provide you with excellent care. Hearing back from our patients is one way we can continue to improve our services. Please take a few minutes to complete the written survey that you may receive in the mail after your visit with us. Thank you!             Your Updated Medication List - Protect others around you: Learn how to safely use, store and throw away your medicines at www.disposemymeds.org.           This list is accurate as of: 5/2/17 10:28 AM.  Always use your most recent med list.                   Brand Name Dispense Instructions for use    * albuterol (2.5 MG/3ML) 0.083% neb solution     1 Box    Take 1 vial (2.5 mg) by nebulization every 4 hours as needed for shortness of breath / dyspnea or wheezing       * albuterol 108 (90 BASE) MCG/ACT Inhaler    PROAIR HFA/PROVENTIL HFA/VENTOLIN HFA    3 Inhaler    Inhale 2 puffs into the lungs every 4 hours as needed for shortness of breath / dyspnea or wheezing       amLODIPine 10 MG tablet    NORVASC    90 tablet    Take 1 tablet (10 mg) by mouth daily       atenolol 50 MG tablet    TENORMIN    180 tablet    Take 1 tablet (50 mg) by mouth 2 times daily       atorvastatin 20 MG tablet    LIPITOR    90 tablet    Take 1 tablet (20 mg) by mouth daily       budesonide-formoterol 160-4.5 MCG/ACT Inhaler    SYMBICORT    1 Inhaler    Inhale 2 puffs into the lungs 2 times daily       CALCIUM 600 + D PO      Take  by mouth.       cloNIDine 0.2 MG tablet    CATAPRES    180 tablet    Take 1 tablet (0.2 mg) by mouth 2 times daily       fish oil-omega-3 fatty acids 1000 MG capsule      1 cap daily       furosemide 20 MG tablet    LASIX    90 tablet    Take 1 tablet (20 mg) by mouth daily       hydrOXYzine 25 MG tablet    ATARAX    90 tablet    Take 1-2 tablets (25-50 mg) by mouth nightly as needed for other (insomnia)       ibuprofen 600 MG tablet    ADVIL/MOTRIN    60 tablet    Take 1 tablet by mouth every 6 hours as needed for pain.       losartan-hydrochlorothiazide 100-25 MG per tablet    HYZAAR    90 tablet    Take 1 tablet by mouth daily Take 1 tablet by mouth daily.       MULTIVITAMIN TABS   OR      1 TABLET DAILY       Nebulizer Compressor Kit     1 kit    1 kit every 4 hours as needed       * order for DME      1.  CPAP pressure 12 cm/H20 with heated humidity and auto-titrating capability.  2.  Provide mask to fit and CPAP supplies. 3.  Length of need lifetime. 4.   Ok to d/c O2 bleed in to her PAP overnight.       * order for DME     1 Units    TEDs stockings knee high to be worn during the day.       * order for DME     1 Units    Equipment being ordered: Oxygen       * order for DME     1 Units    Equipment being ordered: CPAP supplies       order for DME     1 Device    Equipment being ordered: Oxygen - 3 liters continous       potassium chloride 10 MEQ tablet    K-TAB,KLOR-CON    90 tablet    Take 1 tablet (10 mEq) by mouth daily       sertraline 25 MG tablet    ZOLOFT    30 tablet    Take 1 tablet (25 mg) by mouth daily       tiotropium 18 MCG capsule    SPIRIVA HANDIHALER    90 capsule    Inhale  into the lungs. Inhale contents of one capsule daily       * Notice:  This list has 6 medication(s) that are the same as other medications prescribed for you. Read the directions carefully, and ask your doctor or other care provider to review them with you.

## 2017-05-02 NOTE — PROGRESS NOTES
85 year old female requests repair of her 2015 Phonak Bolero V50-P hearing aid. She reports the earmold tubing is falling out.     Replaced stiff and plugged left earmold tubing. Cleaned earmold, hearing aid battery contacts and case. Verified hearing aid functionality.  See chart in the hearing aid room.     Discussed in-office repair. Reviewed warranty expires 7/14/2017.     Will  hearing aid at the specialty clinic .     Caridad YANEZ-KIAN, #[id number]

## 2017-05-15 ENCOUNTER — OFFICE VISIT (OUTPATIENT)
Dept: AUDIOLOGY | Facility: CLINIC | Age: 82
End: 2017-05-15
Payer: COMMERCIAL

## 2017-05-15 DIAGNOSIS — H90.3 SENSORINEURAL HEARING LOSS, BILATERAL: Primary | ICD-10-CM

## 2017-05-15 PROCEDURE — V5299 HEARING SERVICE: HCPCS | Performed by: AUDIOLOGIST

## 2017-05-15 NOTE — PROGRESS NOTES
85 year old requests repair of her hearing aid earmold.     Replaced the left earmold tubing. Cleaned hearing aid and the case. Verified hearing aid functionality.  See chart in the hearing aid room.     Discussed in-office repair. Friend will  repaired earmold at the specialty clinic .    Return to clinic as needed.    Caridad VALERO, #[id number]

## 2017-05-15 NOTE — MR AVS SNAPSHOT
"              After Visit Summary   5/15/2017    Susan Haq    MRN: [id number]           Patient Information     Date Of Birth          1/10/1932        Visit Information        Provider Department      5/15/2017 10:00 AM Caridad Elizabeth AuD [facility]        Today's Diagnoses     Sensorineural hearing loss, bilateral    -  1       Follow-ups after your visit        Who to contact     If you have questions or need follow up information about today's clinic visit or your schedule please contact [facility] directly at [phone].  Normal or non-critical lab and imaging results will be communicated to you by Method CRMhart, letter or phone within 4 business days after the clinic has received the results. If you do not hear from us within 7 days, please contact the clinic through DianDiant or phone. If you have a critical or abnormal lab result, we will notify you by phone as soon as possible.  Submit refill requests through Fat[employer] or call your pharmacy and they will forward the refill request to us. Please allow 3 business days for your refill to be completed.          Additional Information About Your Visit        MyChart Information     FatSk[employer] lets you send messages to your doctor, view your test results, renew your prescriptions, schedule appointments and more. To sign up, go to [email or web address] . Click on \"Log in\" on the left side of the screen, which will take you to the Welcome page. Then click on \"Sign up Now\" on the right side of the page.     You will be asked to enter the access code listed below, as well as some personal information. Please follow the directions to create your username and password.     Your access code is: [email or web address]  Expires: 2017 12:25 PM     Your access code will  in 90 days. If you need help or a new code, please call your [facility] or [phone].        Care EveryWhere ID     This is your Care EveryWhere ID. This could be used by " other organizations to access your [place] medical records  [id number]         Blood Pressure from Last 3 Encounters:   03/21/17 142/60   03/15/17 123/63   03/08/17 132/76    Weight from Last 3 Encounters:   03/08/17 236 lb (107 kg)   09/13/16 231 lb (104.8 kg)   05/25/16 234 lb 12.8 oz (106.5 kg)              We Performed the Following     HEARING AID CHECK/NO CHARGE        Primary Care Provider Office Phone # Fax #    Ema Korin Melendez [phone]       [facility] [address] [address]        Goals        Exercise    Exercise 3x per week (30 min per time)     Notes - Note created  11/4/2016  3:01 PM by Marcia Poe RN    Member stated she would like to have help with showers and light housekeeping    *Contact will be made with member regarding payment of this service will be out of pocket  *Resources will be provided to member to make a decision as to what she would like to do         General    Functional (pt-stated)     Notes - Note created  2/25/2016  1:49 PM by Marcia Poe, RN    Patient will receive PT in home for strengthening and gait training to remain safe, through 5/1/2016      Medical (pt-stated)     Notes - Note created  5/1/2015 10:03 AM by Rin Lowe RN    Decrease in COPD symptoms  - patient has follow up appointment with sleep medicine 5/12/15.  Patient to take medications as prescribed daily.  Patient to use oxygen as directed.          Thank you!     Thank you for choosing [facility]  for your care. Our goal is always to provide you with excellent care. Hearing back from our patients is one way we can continue to improve our services. Please take a few minutes to complete the written survey that you may receive in the mail after your visit with us. Thank you!             Your Updated Medication List - Protect others around you: Learn how to safely use, store and throw away your medicines at www.disposemymeds.org.           This list is accurate as of: 5/15/17 12:00 PM.  Always use your most recent med list.                   Brand Name Dispense Instructions for use    * albuterol (2.5 MG/3ML) 0.083% neb solution     1 Box    Take 1 vial (2.5 mg) by nebulization every 4 hours as needed for shortness of breath / dyspnea or wheezing       * albuterol 108 (90 BASE) MCG/ACT Inhaler    PROAIR HFA/PROVENTIL HFA/VENTOLIN HFA    3 Inhaler    Inhale 2 puffs into the lungs every 4 hours as needed for shortness of breath / dyspnea or wheezing       amLODIPine 10 MG tablet    NORVASC    90 tablet    Take 1 tablet (10 mg) by mouth daily       atenolol 50 MG tablet    TENORMIN    180 tablet    Take 1 tablet (50 mg) by mouth 2 times daily       atorvastatin 20 MG tablet    LIPITOR    90 tablet    Take 1 tablet (20 mg) by mouth daily       budesonide-formoterol 160-4.5 MCG/ACT Inhaler    SYMBICORT    1 Inhaler    Inhale 2 puffs into the lungs 2 times daily       CALCIUM 600 + D PO      Take  by mouth.       cloNIDine 0.2 MG tablet    CATAPRES    180 tablet    Take 1 tablet (0.2 mg) by mouth 2 times daily       fish oil-omega-3 fatty acids 1000 MG capsule      1 cap daily       furosemide 20 MG tablet    LASIX    90 tablet    Take 1 tablet (20 mg) by mouth daily       hydrOXYzine 25 MG tablet    ATARAX    90 tablet    Take 1-2 tablets (25-50 mg) by mouth nightly as needed for other (insomnia)       ibuprofen 600 MG tablet    ADVIL/MOTRIN    60 tablet    Take 1 tablet by mouth every 6 hours as needed for pain.       losartan-hydrochlorothiazide 100-25 MG per tablet    HYZAAR    90 tablet    Take 1 tablet by mouth daily Take 1 tablet by mouth daily.       MULTIVITAMIN TABS   OR      1 TABLET DAILY       Nebulizer Compressor Kit     1 kit    1 kit every 4 hours as needed       * order for DME      1.  CPAP pressure 12 cm/H20 with heated humidity and auto-titrating capability.  2.  Provide mask to fit and CPAP supplies. 3.  Length of need lifetime. 4.   Ok to d/c O2 bleed in to her PAP overnight.       * order for DME     1 Units    TEDs stockings knee high to be worn during the day.       * order for DME     1 Units    Equipment being ordered: Oxygen       * order for DME     1 Units    Equipment being ordered: CPAP supplies       order for DME     1 Device    Equipment being ordered: Oxygen - 3 liters continous       potassium chloride 10 MEQ tablet    K-TAB,KLOR-CON    90 tablet    Take 1 tablet (10 mEq) by mouth daily       sertraline 25 MG tablet    ZOLOFT    30 tablet    Take 1 tablet (25 mg) by mouth daily       tiotropium 18 MCG capsule    SPIRIVA HANDIHALER    90 capsule    Inhale  into the lungs. Inhale contents of one capsule daily       * Notice:  This list has 6 medication(s) that are the same as other medications prescribed for you. Read the directions carefully, and ask your doctor or other care provider to review them with you.

## 2017-06-10 ENCOUNTER — APPOINTMENT (OUTPATIENT)
Dept: MRI IMAGING | Facility: CLINIC | Age: 82
DRG: 543 | End: 2017-06-10
Attending: FAMILY MEDICINE
Payer: COMMERCIAL

## 2017-06-10 ENCOUNTER — APPOINTMENT (OUTPATIENT)
Dept: GENERAL RADIOLOGY | Facility: CLINIC | Age: 82
DRG: 543 | End: 2017-06-10
Attending: FAMILY MEDICINE
Payer: COMMERCIAL

## 2017-06-10 ENCOUNTER — HOSPITAL ENCOUNTER (INPATIENT)
Facility: CLINIC | Age: 82
LOS: 3 days | Discharge: NURSING, REHAB OR LONG-TERM CARE | DRG: 543 | End: 2017-06-13
Attending: FAMILY MEDICINE | Admitting: INTERNAL MEDICINE
Payer: COMMERCIAL

## 2017-06-10 DIAGNOSIS — J84.10 POSTINFLAMMATORY PULMONARY FIBROSIS (H): ICD-10-CM

## 2017-06-10 DIAGNOSIS — S92.514A CLOSED NONDISPLACED FRACTURE OF PROXIMAL PHALANX OF LESSER TOE OF RIGHT FOOT, INITIAL ENCOUNTER: ICD-10-CM
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
## 2017-06-10 DIAGNOSIS — R44.0 AUDITORY HALLUCINATION: ICD-10-CM

## 2017-06-10 DIAGNOSIS — I10 HYPERTENSION GOAL BP (BLOOD PRESSURE) < 140/90: Chronic | ICD-10-CM

## 2017-06-10 DIAGNOSIS — R44.0 AUDITORY HALLUCINATIONS: ICD-10-CM

## 2017-06-10 DIAGNOSIS — S82.831A CLOSED FRACTURE OF DISTAL END OF RIGHT FIBULA, UNSPECIFIED FRACTURE MORPHOLOGY, INITIAL ENCOUNTER: ICD-10-CM
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
## 2017-06-10 DIAGNOSIS — J84.10 INTERSTITIAL PULMONARY FIBROSIS (H): Chronic | ICD-10-CM

## 2017-06-10 DIAGNOSIS — S82.839A CLOSED FRACTURE OF PROXIMAL END OF FIBULA, UNSPECIFIED FRACTURE MORPHOLOGY, INITIAL ENCOUNTER: ICD-10-CM

## 2017-06-10 DIAGNOSIS — N30.00 ACUTE CYSTITIS WITHOUT HEMATURIA: ICD-10-CM

## 2017-06-10 DIAGNOSIS — L30.4 INTERTRIGO: Chronic | ICD-10-CM

## 2017-06-10 PROBLEM — Z91.81 RISK FOR FALLS: Status: ACTIVE | Noted: 2017-06-10

## 2017-06-10 LAB
ALBUMIN SERPL-MCNC: 3.1 G/DL (ref 3.4–5)
ALP SERPL-CCNC: 109 U/L (ref 40–150)
ALT SERPL W P-5'-P-CCNC: 26 U/L (ref 0–50)
ANION GAP SERPL CALCULATED.3IONS-SCNC: 8 MMOL/L (ref 3–14)
APTT PPP: 20 SEC (ref 22–37)
APTT PPP: NORMAL SEC (ref 22–37)
AST SERPL W P-5'-P-CCNC: 18 U/L (ref 0–45)
BASOPHILS # BLD AUTO: 0 10E9/L (ref 0–0.2)
BASOPHILS # BLD AUTO: NORMAL 10E9/L (ref 0–0.2)
BASOPHILS NFR BLD AUTO: 0.1 %
BASOPHILS NFR BLD AUTO: NORMAL %
BILIRUB SERPL-MCNC: 0.6 MG/DL (ref 0.2–1.3)
BUN SERPL-MCNC: 27 MG/DL (ref 7–30)
CALCIUM SERPL-MCNC: 8.3 MG/DL (ref 8.5–10.1)
CHLORIDE SERPL-SCNC: 104 MMOL/L (ref 94–109)
CK SERPL-CCNC: 80 U/L (ref 30–225)
CO2 SERPL-SCNC: 28 MMOL/L (ref 20–32)
CREAT SERPL-MCNC: 1.01 MG/DL (ref 0.52–1.04)
DIFFERENTIAL METHOD BLD: NORMAL
EOSINOPHIL # BLD AUTO: 0 10E9/L (ref 0–0.7)
EOSINOPHIL # BLD AUTO: NORMAL 10E9/L (ref 0–0.7)
EOSINOPHIL NFR BLD AUTO: 0.5 %
EOSINOPHIL NFR BLD AUTO: NORMAL %
ERYTHROCYTE [DISTWIDTH] IN BLOOD BY AUTOMATED COUNT: 12.7 % (ref 10–15)
ERYTHROCYTE [DISTWIDTH] IN BLOOD BY AUTOMATED COUNT: NORMAL % (ref 10–15)
ERYTHROCYTE [DISTWIDTH] IN BLOOD BY AUTOMATED COUNT: NORMAL % (ref 10–15)
FOLATE SERPL-MCNC: 31.2 NG/ML
GFR SERPL CREATININE-BSD FRML MDRD: 52 ML/MIN/1.7M2
GLUCOSE SERPL-MCNC: 119 MG/DL (ref 70–99)
HCT VFR BLD AUTO: 36.7 % (ref 35–47)
HCT VFR BLD AUTO: NORMAL % (ref 35–47)
HCT VFR BLD AUTO: NORMAL % (ref 35–47)
HGB BLD-MCNC: 12.3 G/DL (ref 11.7–15.7)
HGB BLD-MCNC: NORMAL G/DL (ref 11.7–15.7)
HGB BLD-MCNC: NORMAL G/DL (ref 11.7–15.7)
IMM GRANULOCYTES # BLD: 0 10E9/L (ref 0–0.4)
IMM GRANULOCYTES # BLD: NORMAL 10E9/L (ref 0–0.4)
IMM GRANULOCYTES NFR BLD: 0.4 %
IMM GRANULOCYTES NFR BLD: NORMAL %
INR PPP: 1.03 (ref 0.86–1.14)
INR PPP: NORMAL (ref 0.86–1.14)
LDH SERPL L TO P-CCNC: 267 U/L (ref 81–234)
LYMPHOCYTES # BLD AUTO: 1.3 10E9/L (ref 0.8–5.3)
LYMPHOCYTES # BLD AUTO: NORMAL 10E9/L (ref 0.8–5.3)
LYMPHOCYTES NFR BLD AUTO: 15.5 %
LYMPHOCYTES NFR BLD AUTO: NORMAL %
MCH RBC QN AUTO: 31.3 PG (ref 26.5–33)
MCH RBC QN AUTO: NORMAL PG (ref 26.5–33)
MCH RBC QN AUTO: NORMAL PG (ref 26.5–33)
MCHC RBC AUTO-ENTMCNC: 33.5 G/DL (ref 31.5–36.5)
MCHC RBC AUTO-ENTMCNC: NORMAL G/DL (ref 31.5–36.5)
MCHC RBC AUTO-ENTMCNC: NORMAL G/DL (ref 31.5–36.5)
MCV RBC AUTO: 93 FL (ref 78–100)
MCV RBC AUTO: NORMAL FL (ref 78–100)
MCV RBC AUTO: NORMAL FL (ref 78–100)
MONOCYTES # BLD AUTO: 1.2 10E9/L (ref 0–1.3)
MONOCYTES # BLD AUTO: NORMAL 10E9/L (ref 0–1.3)
MONOCYTES NFR BLD AUTO: 13.9 %
MONOCYTES NFR BLD AUTO: NORMAL %
NEUTROPHILS # BLD AUTO: 5.8 10E9/L (ref 1.6–8.3)
NEUTROPHILS # BLD AUTO: NORMAL 10E9/L (ref 1.6–8.3)
NEUTROPHILS NFR BLD AUTO: 69.6 %
NEUTROPHILS NFR BLD AUTO: NORMAL %
PLATELET # BLD AUTO: 199 10E9/L (ref 150–450)
PLATELET # BLD AUTO: NORMAL 10E9/L (ref 150–450)
PLATELET # BLD AUTO: NORMAL 10E9/L (ref 150–450)
POTASSIUM SERPL-SCNC: 3.5 MMOL/L (ref 3.4–5.3)
PROT SERPL-MCNC: 5.8 G/DL (ref 6.8–8.8)
RBC # BLD AUTO: 3.93 10E12/L (ref 3.8–5.2)
RBC # BLD AUTO: NORMAL 10E12/L (ref 3.8–5.2)
RBC # BLD AUTO: NORMAL 10E12/L (ref 3.8–5.2)
SODIUM SERPL-SCNC: 140 MMOL/L (ref 133–144)
TROPONIN I SERPL-MCNC: NORMAL UG/L (ref 0–0.04)
TSH SERPL DL<=0.005 MIU/L-ACNC: 3.06 MU/L (ref 0.4–4)
VIT B12 SERPL-MCNC: 560 PG/ML (ref 193–986)
WBC # BLD AUTO: 8.3 10E9/L (ref 4–11)
WBC # BLD AUTO: NORMAL 10E9/L (ref 4–11)
WBC # BLD AUTO: NORMAL 10E9/L (ref 4–11)

## 2017-06-10 PROCEDURE — 83615 LACTATE (LD) (LDH) ENZYME: CPT | Performed by: FAMILY MEDICINE

## 2017-06-10 PROCEDURE — 25000132 ZZH RX MED GY IP 250 OP 250 PS 637: Performed by: PHYSICIAN ASSISTANT

## 2017-06-10 PROCEDURE — 25000128 H RX IP 250 OP 636: Performed by: FAMILY MEDICINE

## 2017-06-10 PROCEDURE — A9585 GADOBUTROL INJECTION: HCPCS | Performed by: FAMILY MEDICINE

## 2017-06-10 PROCEDURE — 85610 PROTHROMBIN TIME: CPT | Performed by: FAMILY MEDICINE

## 2017-06-10 PROCEDURE — 80053 COMPREHEN METABOLIC PANEL: CPT | Performed by: FAMILY MEDICINE

## 2017-06-10 PROCEDURE — 85730 THROMBOPLASTIN TIME PARTIAL: CPT | Performed by: FAMILY MEDICINE

## 2017-06-10 PROCEDURE — 99223 1ST HOSP IP/OBS HIGH 75: CPT | Mod: AI | Performed by: PHYSICIAN ASSISTANT

## 2017-06-10 PROCEDURE — 82607 VITAMIN B-12: CPT | Performed by: FAMILY MEDICINE

## 2017-06-10 PROCEDURE — 99285 EMERGENCY DEPT VISIT HI MDM: CPT | Mod: 25 | Performed by: FAMILY MEDICINE

## 2017-06-10 PROCEDURE — 83010 ASSAY OF HAPTOGLOBIN QUANT: CPT | Performed by: FAMILY MEDICINE

## 2017-06-10 PROCEDURE — 85060 BLOOD SMEAR INTERPRETATION: CPT | Performed by: PHYSICIAN ASSISTANT

## 2017-06-10 PROCEDURE — 12000007 ZZH R&B INTERMEDIATE

## 2017-06-10 PROCEDURE — 73630 X-RAY EXAM OF FOOT: CPT | Mod: RT

## 2017-06-10 PROCEDURE — 93005 ELECTROCARDIOGRAM TRACING: CPT | Performed by: FAMILY MEDICINE

## 2017-06-10 PROCEDURE — 29505 APPLICATION LONG LEG SPLINT: CPT | Mod: Z6 | Performed by: FAMILY MEDICINE

## 2017-06-10 PROCEDURE — 73590 X-RAY EXAM OF LOWER LEG: CPT | Mod: RT

## 2017-06-10 PROCEDURE — 84484 ASSAY OF TROPONIN QUANT: CPT | Performed by: FAMILY MEDICINE

## 2017-06-10 PROCEDURE — 40000847 ZZHCL STATISTIC MORPHOLOGY W/INTERP HISTOLOGY TC 85060: Performed by: PHYSICIAN ASSISTANT

## 2017-06-10 PROCEDURE — 85025 COMPLETE CBC W/AUTO DIFF WBC: CPT | Performed by: INTERNAL MEDICINE

## 2017-06-10 PROCEDURE — 70553 MRI BRAIN STEM W/O & W/DYE: CPT

## 2017-06-10 PROCEDURE — 29505 APPLICATION LONG LEG SPLINT: CPT | Performed by: FAMILY MEDICINE

## 2017-06-10 PROCEDURE — 72170 X-RAY EXAM OF PELVIS: CPT

## 2017-06-10 PROCEDURE — 82550 ASSAY OF CK (CPK): CPT | Performed by: FAMILY MEDICINE

## 2017-06-10 PROCEDURE — 84443 ASSAY THYROID STIM HORMONE: CPT | Performed by: FAMILY MEDICINE

## 2017-06-10 PROCEDURE — 73090 X-RAY EXAM OF FOREARM: CPT | Mod: RT

## 2017-06-10 PROCEDURE — 82746 ASSAY OF FOLIC ACID SERUM: CPT | Performed by: PHYSICIAN ASSISTANT

## 2017-06-10 RX ORDER — ONDANSETRON 2 MG/ML
4 INJECTION INTRAMUSCULAR; INTRAVENOUS EVERY 6 HOURS PRN
Status: DISCONTINUED | OUTPATIENT
Start: 2017-06-10 | End: 2017-06-13 | Stop reason: HOSPADM

## 2017-06-10 RX ORDER — NALOXONE HYDROCHLORIDE 0.4 MG/ML
.1-.4 INJECTION, SOLUTION INTRAMUSCULAR; INTRAVENOUS; SUBCUTANEOUS
Status: DISCONTINUED | OUTPATIENT
Start: 2017-06-10 | End: 2017-06-13 | Stop reason: HOSPADM

## 2017-06-10 RX ORDER — HYDROXYZINE HYDROCHLORIDE 25 MG/1
25-50 TABLET, FILM COATED ORAL
Status: DISCONTINUED | OUTPATIENT
Start: 2017-06-10 | End: 2017-06-13 | Stop reason: HOSPADM

## 2017-06-10 RX ORDER — ACETAMINOPHEN 325 MG/1
650 TABLET ORAL EVERY 4 HOURS PRN
Status: DISCONTINUED | OUTPATIENT
Start: 2017-06-10 | End: 2017-06-13 | Stop reason: HOSPADM

## 2017-06-10 RX ORDER — ONDANSETRON 4 MG/1
4 TABLET, ORALLY DISINTEGRATING ORAL EVERY 6 HOURS PRN
Status: DISCONTINUED | OUTPATIENT
Start: 2017-06-10 | End: 2017-06-13 | Stop reason: HOSPADM

## 2017-06-10 RX ORDER — ATENOLOL 50 MG/1
50 TABLET ORAL 2 TIMES DAILY
Status: DISCONTINUED | OUTPATIENT
Start: 2017-06-10 | End: 2017-06-13 | Stop reason: HOSPADM

## 2017-06-10 RX ORDER — AMOXICILLIN 250 MG
1-2 CAPSULE ORAL 2 TIMES DAILY PRN
Status: DISCONTINUED | OUTPATIENT
Start: 2017-06-10 | End: 2017-06-13 | Stop reason: HOSPADM

## 2017-06-10 RX ORDER — ATORVASTATIN CALCIUM 20 MG/1
20 TABLET, FILM COATED ORAL AT BEDTIME
Status: DISCONTINUED | OUTPATIENT
Start: 2017-06-10 | End: 2017-06-13 | Stop reason: HOSPADM

## 2017-06-10 RX ORDER — ALBUTEROL SULFATE 0.83 MG/ML
1 SOLUTION RESPIRATORY (INHALATION)
Status: DISCONTINUED | OUTPATIENT
Start: 2017-06-10 | End: 2017-06-13 | Stop reason: HOSPADM

## 2017-06-10 RX ORDER — BUDESONIDE AND FORMOTEROL FUMARATE DIHYDRATE 160; 4.5 UG/1; UG/1
2 AEROSOL RESPIRATORY (INHALATION) 2 TIMES DAILY
Status: DISCONTINUED | OUTPATIENT
Start: 2017-06-10 | End: 2017-06-10 | Stop reason: CLARIF

## 2017-06-10 RX ORDER — ACETAMINOPHEN 500 MG
1000 TABLET ORAL EVERY 8 HOURS
Status: DISCONTINUED | OUTPATIENT
Start: 2017-06-10 | End: 2017-06-13 | Stop reason: HOSPADM

## 2017-06-10 RX ORDER — NALOXONE HYDROCHLORIDE 0.4 MG/ML
.1-.4 INJECTION, SOLUTION INTRAMUSCULAR; INTRAVENOUS; SUBCUTANEOUS
Status: DISCONTINUED | OUTPATIENT
Start: 2017-06-10 | End: 2017-06-10

## 2017-06-10 RX ORDER — GADOBUTROL 604.72 MG/ML
0.1 INJECTION INTRAVENOUS ONCE
Status: COMPLETED | OUTPATIENT
Start: 2017-06-10 | End: 2017-06-10

## 2017-06-10 RX ORDER — TIOTROPIUM BROMIDE 18 UG/1
18 CAPSULE ORAL; RESPIRATORY (INHALATION) DAILY
Status: DISCONTINUED | OUTPATIENT
Start: 2017-06-11 | End: 2017-06-10 | Stop reason: CLARIF

## 2017-06-10 RX ADMIN — MICONAZOLE NITRATE: 2 POWDER TOPICAL at 19:57

## 2017-06-10 RX ADMIN — ACETAMINOPHEN 1000 MG: 500 TABLET ORAL at 19:57

## 2017-06-10 RX ADMIN — GADOBUTROL 10 ML: 604.72 INJECTION INTRAVENOUS at 16:55

## 2017-06-10 RX ADMIN — HYDROXYZINE HYDROCHLORIDE 25 MG: 25 TABLET, FILM COATED ORAL at 22:34

## 2017-06-10 ASSESSMENT — ENCOUNTER SYMPTOMS
ABDOMINAL PAIN: 0
WHEEZING: 0
FREQUENCY: 0
DIARRHEA: 0
PALPITATIONS: 0
SORE THROAT: 0
DIAPHORESIS: 0
CHILLS: 0
SINUS PRESSURE: 0
BLOOD IN STOOL: 0
CONSTIPATION: 0
FEVER: 0
NAUSEA: 0
SEIZURES: 0
HEADACHES: 1
SHORTNESS OF BREATH: 1
SPEECH DIFFICULTY: 0
COUGH: 0
DYSURIA: 0
VOMITING: 0

## 2017-06-10 NOTE — LETTER
Transition Communication Hand-off for Care Transitions to Next Level of Care Provider    Name: Susan Haq  MRN #: [id number]  Primary Care Provider: Ema Melendez  Primary Care MD Name:  (Nora)  Primary Clinic: [facility] [address] [address]  Primary Care Clinic Name:  ([facility])  Reason for Hospitalization:  Auditory hallucinations [R44.0]  Interstitial pulmonary fibrosis (H) [J84.10]  Closed nondisplaced fracture of proximal phalanx of lesser toe of right foot, initial encounter [S92.514A]  Fall, initial encounter [W19.XXXA]  Closed fracture of proximal end of fibula, unspecified fracture morphology, initial encounter [S82.839A]  Closed fracture of distal end of right fibula, unspecified fracture morphology, initial encounter [S82.831A]  Admit Date/Time: 6/10/2017  1:39 PM  Discharge Date: 6/13/17  Payor Source: Payor: [employer] / Plan: ARE FOR S[employer]IORS / Product Type: HMO /     Readmission Assessment Measure (KAL) Risk Score/category: average           Reason for Communication Hand-off Referral: Fragility    Discharge Plan: .[facility]       Concern for non-adherence with plan of care:   Y/N no  Discharge Needs Assessment:      Already enrolled in Tele-monitoring program and name of program:  no  Follow-up specialty is recommended: No    Follow-up plan:  No future appointments.    Any outstanding tests or procedures:        Referrals     Future Labs/Procedures    Occupational Therapy Adult Consult     Comments:    Evaluate and treat as clinically indicated.    Reason:  Non-surgical tib/fib fracture    Physical Therapy Adult Consult     Comments:    Evaluate and treat as clinically indicated.    Reason:  Non-surgical tib/fib fracture            Key Recommendations:  Pt discharging to [facility] Phone: [phone]) Fax: ([phone]) today. Pt is from home alone, has neighbors and friends who help out at home.     Deloris Blanco MSW, LICSW, ACM  [phone]    AVS/Discharge Summary is the source of truth; this is a helpful guide for improved communication of patient story

## 2017-06-10 NOTE — IP AVS SNAPSHOT
"          [facility]: [phone]                                              INTERAGENCY TRANSFER FORM - LAB / IMAGING / EKG / EMG RESULTS   6/10/2017                    Hospital Admission Date: 6/10/2017  OLIVA NAVA   : 1/10/1932  Sex: Female        Attending Provider: Philip Shabazz MD     Allergies:  Ace Inhibitors, Asa [Aluminum Hydroxide], Penicillins    Infection:  None   Service:  HOSPITALIST    Ht:  1.638 m (5' 4.5\")   Wt:  108.7 kg (239 lb 10.2 oz)   Admission Wt:  107 kg (236 lb)    BMI:  40.5 kg/m 2   BSA:  2.22 m 2            Patient PCP Information     Provider PCP Type    Ema Melendez NP General         Lab Results - 3 Days      Haptoglobin [[id number]] (Abnormal)  Resulted: 17 1501, Result status: Final result    Ordering provider: Karol Pal PA-C  06/10/17 1630 Resulting lab: [facility]    Specimen Information    Type Source Collected On   Blood  06/10/17 1440          Components       Value Reference Range Flag Lab   Haptoglobin 228 35 - 175 mg/dL H 51   Comment:  Add on Order            Urine Culture Aerobic Bacterial [[id number]]  Resulted: 17 1032, Result status: Preliminary result    Ordering provider: Jerrod Turk MD  17 1650 Resulting lab: [facility]    Specimen Information    Type Source Collected On     17 1650          Components       Value Reference Range Flag Lab   Specimen Description Unspecified Urine   59   Culture Micro Culture in progress   59   Micro Report Status Pending   59            Blood Morphology Pathology Review [[id number]]  Resulted: 17 0853, Result status: In process    Ordering provider: Karol Pal PA-C  06/10/17 1629 Resulting lab: [facility]    Specimen Information    Type Source Collected On   Blood  06/10/17 1630            Basic metabolic panel [[id number]] (Abnormal)  Resulted: 17 0652, Result status: Final " result    Ordering provider: Cora Fernandez, DO  06/12/17 0000 Resulting lab: [facility]    Specimen Information    Type Source Collected On   Blood  06/12/17 0618          Components       Value Reference Range Flag Lab   Sodium 141 133 - 144 mmol/L  59   Potassium 3.4 3.4 - 5.3 mmol/L  59   Chloride 109 94 - 109 mmol/L  59   Carbon Dioxide 28 20 - 32 mmol/L  59   Anion Gap 4 3 - 14 mmol/L  59   Glucose 110 70 - 99 mg/dL H 59   Urea Nitrogen 18 7 - 30 mg/dL  59   Creatinine 0.75 0.52 - 1.04 mg/dL  59   GFR Estimate 73 >60 mL/min/1.7m2  59   Comment:  Non  GFR Calc   GFR Estimate If Black 88 >60 mL/min/1.7m2  59   Comment:  African American GFR Calc   Calcium 8.2 8.5 - 10.1 mg/dL L 59            CBC with platelets [[id number]] (Abnormal)  Resulted: 06/12/17 0635, Result status: Final result    Ordering provider: Cora Fernandez,   06/12/17 0000 Resulting lab: [facility]    Specimen Information    Type Source Collected On   Blood  06/12/17 0618          Components       Value Reference Range Flag Lab   WBC 6.3 4.0 - 11.0 10e9/L  59   RBC Count 3.77 3.8 - 5.2 10e12/L L 59   Hemoglobin 11.8 11.7 - 15.7 g/dL  59   Hematocrit 35.2 35.0 - 47.0 %  59   MCV 93 78 - 100 fl  59   MCH 31.3 26.5 - 33.0 pg  59   MCHC 33.5 31.5 - 36.5 g/dL  59   RDW 12.6 10.0 - 15.0 %  59   Platelet Count 204 150 - 450 10e9/L  59            UA reflex to Microscopic and Culture [[id number]] (Abnormal)  Resulted: 06/11/17 1725, Result status: Final result    Ordering provider: Cora Fernandez, DO  06/11/17 1305 Resulting lab: [facility]    Specimen Information    Type Source Collected On   Urine  06/11/17 1650          Components       Value Reference Range Flag Lab   Color Urine Yellow   59   Appearance Urine Cloudy   59   Glucose Urine Negative NEG mg/dL  59   Bilirubin Urine Negative NEG  59   Ketones Urine Negative NEG mg/dL  59   Specific Gravity Urine  1.018 1.003 - 1.035  59   Blood Urine Trace NEG A 59   pH Urine 6.0 5.0 - 7.0 pH  59   Protein Albumin Urine 30 NEG mg/dL A 59   Urobilinogen mg/dL Normal 0.0 - 2.0 mg/dL  59   Nitrite Urine Positive NEG A 59   Leukocyte Esterase Urine Large NEG A 59   Source Unspecified Urine   59   RBC Urine 59 0 - 2 /HPF H 59   WBC Urine >182 0 - 2 /HPF H 59   WBC Clumps Present NEG /HPF A 59   Bacteria Urine Many NEG /HPF A 59   Squamous Epithelial /HPF Urine 97 0 - 1 /HPF H 59   Transitional Epi 19 0 - 1 /HPF H 59   Mucous Urine Present NEG /LPF A 59            Basic metabolic panel [[id number]] (Abnormal)  Resulted: 06/11/17 0704, Result status: Final result    Ordering provider: Karol Pal PA-C  06/11/17 0000 Resulting lab: [facility]    Specimen Information    Type Source Collected On   Blood  06/11/17 0613          Components       Value Reference Range Flag Lab   Sodium 143 133 - 144 mmol/L  59   Potassium 3.2 3.4 - 5.3 mmol/L L 59   Chloride 106 94 - 109 mmol/L  59   Carbon Dioxide 29 20 - 32 mmol/L  59   Anion Gap 8 3 - 14 mmol/L  59   Glucose 115 70 - 99 mg/dL H 59   Urea Nitrogen 24 7 - 30 mg/dL  59   Creatinine 0.79 0.52 - 1.04 mg/dL  59   GFR Estimate 69 >60 mL/min/1.7m2  59   Comment:  Non  GFR Calc   GFR Estimate If Black 84 >60 mL/min/1.7m2  59   Comment:  African American GFR Calc   Calcium 8.1 8.5 - 10.1 mg/dL L 59            CBC with platelets differential [[id number]] (Abnormal)  Resulted: 06/11/17 0653, Result status: Final result    Ordering provider: Anton Haro MD  06/11/17 0000 Resulting lab: [facility]    Specimen Information    Type Source Collected On   Blood  06/11/17 0613          Components       Value Reference Range Flag Lab   WBC 7.1 4.0 - 11.0 10e9/L  59   RBC Count 3.69 3.8 - 5.2 10e12/L L 59   Hemoglobin 11.6 11.7 - 15.7 g/dL L 59   Hematocrit 34.8 35.0 - 47.0 % L 59   MCV 94 78 - 100 fl  59   MCH 31.4 26.5 - 33.0 pg  59    MCHC 33.3 31.5 - 36.5 g/dL  59   RDW 12.7 10.0 - 15.0 %  59   Platelet Count 193 150 - 450 10e9/L  59   Diff Method Automated Method   59   % Neutrophils 60.1 %  59   % Lymphocytes 20.5 %  59   % Monocytes 16.6 %  59   % Eosinophils 2.3 %  59   % Basophils 0.1 %  59   % Immature Granulocytes 0.4 %  59   Absolute Neutrophil 4.2 1.6 - 8.3 10e9/L  59   Absolute Lymphocytes 1.5 0.8 - 5.3 10e9/L  59   Absolute Monocytes 1.2 0.0 - 1.3 10e9/L  59   Absolute Eosinophils 0.2 0.0 - 0.7 10e9/L  59   Absolute Basophils 0.0 0.0 - 0.2 10e9/L  59   Abs Immature Granulocytes 0.0 0 - 0.4 10e9/L  59            Folate [[id number]]  Resulted: 06/10/17 2302, Result status: Final result    Ordering provider: Karol Pal PA-C  06/10/17 1850 Resulting lab: [facility]    Specimen Information    Type Source Collected On   Blood  06/10/17 1855          Components       Value Reference Range Flag Lab   Folate 31.2 >5.4 ng/mL  51            Vitamin B12 [[id number]]  Resulted: 06/10/17 2246, Result status: Final result    Ordering provider: Anton Haro MD  06/10/17 1624 Resulting lab: [facility]    Specimen Information    Type Source Collected On   Blood  06/10/17 1440          Components       Value Reference Range Flag Lab   Vitamin B12 560 193 - 986 pg/mL  51            CBC with platelets differential [[id number]]  Resulted: 06/10/17 1918, Result status: Final result    Ordering provider: Jerrod Turk MD  06/10/17 1855 Resulting lab: [facility]    Specimen Information    Type Source Collected On     06/10/17 1855          Components       Value Reference Range Flag Lab   WBC 8.3 4.0 - 11.0 10e9/L  59   RBC Count 3.93 3.8 - 5.2 10e12/L  59   Hemoglobin 12.3 11.7 - 15.7 g/dL  59   Hematocrit 36.7 35.0 - 47.0 %  59   MCV 93 78 - 100 fl  59   MCH 31.3 26.5 - 33.0 pg  59   MCHC 33.5 31.5 - 36.5 g/dL  59   RDW 12.7 10.0 - 15.0 %  59    Platelet Count 199 150 - 450 10e9/L  59   Diff Method Automated Method   59   % Neutrophils 69.6 %  59   % Lymphocytes 15.5 %  59   % Monocytes 13.9 %  59   % Eosinophils 0.5 %  59   % Basophils 0.1 %  59   % Immature Granulocytes 0.4 %  59   Absolute Neutrophil 5.8 1.6 - 8.3 10e9/L  59   Absolute Lymphocytes 1.3 0.8 - 5.3 10e9/L  59   Absolute Monocytes 1.2 0.0 - 1.3 10e9/L  59   Absolute Eosinophils 0.0 0.0 - 0.7 10e9/L  59   Absolute Basophils 0.0 0.0 - 0.2 10e9/L  59   Abs Immature Granulocytes 0.0 0 - 0.4 10e9/L  59            CBC with platelets differential [[id number]]  Resulted: 06/10/17 1907, Result status: Edited Result - FINAL    Ordering provider: Anton Haro MD  06/10/17 1427 Resulting lab: [facility]    Specimen Information    Type Source Collected On   Blood  06/10/17 1440          Components       Value Reference Range Flag Lab   WBC -- 4.0 - 11.0 10e9/L  59   Result:         Canceled, Test credited   Unsatisfactory specimen - clotted  specimen will need to be recollected, notified ARIADNA Barahona 6/10/17 1630     CORRECTED ON 06/10 AT 1707: PREVIOUSLY REPORTED AS 8.7     RBC Count -- 3.8 - 5.2 10e12/L  59   Result:         Canceled, Test credited   Unsatisfactory specimen - clotted  specimen will need to be recollected, notified ARIADNA Barahona 6/10/17 1630   JM  CORRECTED ON 06/10 AT 1707: PREVIOUSLY REPORTED AS 3.84     Hemoglobin -- 11.7 - 15.7 g/dL  59   Result:         Canceled, Test credited   Unsatisfactory specimen - clotted  specimen will need to be recollected, notified ARIADNA Barahona 6/10/17 1630   JM  CORRECTED ON 06/10 AT 1707: PREVIOUSLY REPORTED AS 12.1     Hematocrit -- 35.0 - 47.0 %  59   Result:         Canceled, Test credited   Unsatisfactory specimen - clotted  specimen will need to be recollected, notified ARIADNA Barahona 6/10/17 1630   JM  CORRECTED ON 06/10 AT 1707: PREVIOUSLY REPORTED AS 36.2     MCV -- 78 - 100 fl  59   Result:          Canceled, Test credited   Unsatisfactory specimen - clotted  specimen will need to be recollected, notified Tiffany Fuentes RN [facility] 6/10/17 1630   JM  CORRECTED ON 06/10 AT 1707: PREVIOUSLY REPORTED AS 94     MCH -- 26.5 - 33.0 pg  59   Result:         Canceled, Test credited   Unsatisfactory specimen - clotted  specimen will need to be recollected, notified Tiffany Fuentes RN [facility] 6/10/17 1630   JM  CORRECTED ON 06/10 AT 1707: PREVIOUSLY REPORTED AS 31.5     MCHC -- 31.5 - 36.5 g/dL  59   Result:         Canceled, Test credited   Unsatisfactory specimen - clotted  specimen will need to be recollected, notified ARIADNA Barahona 6/10/17 1630   JM  CORRECTED ON 06/10 AT 1707: PREVIOUSLY REPORTED AS 33.4     RDW -- 10.0 - 15.0 %  59   Result:         Canceled, Test credited   Unsatisfactory specimen - clotted  specimen will need to be recollected, notified ARIADNA Barahona 6/10/17 1630   JM  CORRECTED ON 06/10 AT 1707: PREVIOUSLY REPORTED AS 12.7     Platelet Count -- 150 - 450 10e9/L  59   Result:         Canceled, Test credited   Unsatisfactory specimen - clotted  specimen will need to be recollected, notified Tiffany Fuentes RN [facility] 6/10/17 1630   JM  CORRECTED ON 06/10 AT 1707: PREVIOUSLY REPORTED AS 58     Diff Method --   59   Result:         Canceled, Test credited   Unsatisfactory specimen - clotted  specimen will need to be recollected, notified ARIADNA Barahona 6/10/17 1630   JM  CORRECTED ON 06/10 AT 1707: PREVIOUSLY REPORTED AS Automated Method     % Neutrophils -- %  59   Result:         Canceled, Test credited   Unsatisfactory specimen - clotted  CORRECTED ON 06/10 AT 1903: PREVIOUSLY REPORTED AS 78.4     % Lymphocytes -- %  59   Result:         Canceled, Test credited   Unsatisfactory specimen - clotted  CORRECTED ON 06/10 AT 1903: PREVIOUSLY REPORTED AS 11.4     % Monocytes -- %  59   Result:         Canceled, Test credited   Unsatisfactory specimen - clotted  CORRECTED ON 06/10 AT 1903:  PREVIOUSLY REPORTED AS 9.2     % Eosinophils -- %  59   Result:         Canceled, Test credited   Unsatisfactory specimen - clotted  CORRECTED ON 06/10 AT 1906: PREVIOUSLY REPORTED AS 0.6     % Basophils -- %  59   Result:         Canceled, Test credited   Unsatisfactory specimen - clotted  CORRECTED ON 06/10 AT 1903: PREVIOUSLY REPORTED AS 0.1     % Immature Granulocytes -- %  59   Result:         Canceled, Test credited   Unsatisfactory specimen - clotted  CORRECTED ON 06/10 AT 1903: PREVIOUSLY REPORTED AS 0.3     Absolute Neutrophil -- 1.6 - 8.3 10e9/L  59   Result:         Canceled, Test credited   Unsatisfactory specimen - clotted  CORRECTED ON 06/10 AT 1903: PREVIOUSLY REPORTED AS 6.8     Absolute Lymphocytes -- 0.8 - 5.3 10e9/L  59   Result:         Canceled, Test credited   Unsatisfactory specimen - clotted  CORRECTED ON 06/10 AT 1903: PREVIOUSLY REPORTED AS 1.0     Absolute Monocytes -- 0.0 - 1.3 10e9/L  59   Result:         Canceled, Test credited   Unsatisfactory specimen - clotted  CORRECTED ON 06/10 AT 1903: PREVIOUSLY REPORTED AS 0.8     Absolute Eosinophils -- 0.0 - 0.7 10e9/L  59   Result:         Canceled, Test credited   Unsatisfactory specimen - clotted  CORRECTED ON 06/10 AT 1903: PREVIOUSLY REPORTED AS 0.1     Absolute Basophils -- 0.0 - 0.2 10e9/L  59   Result:         Canceled, Test credited   Unsatisfactory specimen - clotted  CORRECTED ON 06/10 AT 1906: PREVIOUSLY REPORTED AS 0.0     Abs Immature Granulocytes -- 0 - 0.4 10e9/L  59   Result:         Canceled, Test credited   Unsatisfactory specimen - clotted  CORRECTED ON 06/10 AT 1907: PREVIOUSLY REPORTED AS 0.0              Partial thromboplastin time [[id number]] (Abnormal)  Resulted: 06/10/17 1828, Result status: Final result    Ordering provider: Anton Haro MD  06/10/17 1738 Resulting lab: [facility]    Specimen Information    Type Source Collected On   Blood  06/10/17 1735          Components       Value Reference  Range Flag Lab   PTT 20 22 - 37 sec L 59   Comment:  Results confirmed by repeat test            CBC with platelets differential [[id number]]  Resulted: 06/10/17 1809, Result status: Final result    Ordering provider: Anton Haro MD  06/10/17 3159 Resulting lab: [facility]    Specimen Information    Type Source Collected On   Blood  06/10/17 1737          Components       Value Reference Range Flag Lab   WBC -- 4.0 - 11.0 10e9/L  59   Result:         Canceled, Test credited   Unsatisfactory specimen - clotted  Notified [facility] 6/10/17 1800 JM     RBC Count -- 3.8 - 5.2 10e12/L  59   Result:         Canceled, Test credited   Unsatisfactory specimen - clotted  Notified [facility] 6/10/17 1800 JM     Hemoglobin -- 11.7 - 15.7 g/dL  59   Result:         Canceled, Test credited   Unsatisfactory specimen - clotted  Notified [facility] 6/10/17 1800 JM     Hematocrit -- 35.0 - 47.0 %  59   Result:         Canceled, Test credited   Unsatisfactory specimen - clotted  Notified [facility] 6/10/17 1800 JM     MCV -- 78 - 100 fl  59   Result:         Canceled, Test credited   Unsatisfactory specimen - clotted  Notified [facility] 6/10/17 1800 JM     MCH -- 26.5 - 33.0 pg  59   Result:         Canceled, Test credited   Unsatisfactory specimen - clotted  Notified [facility] 6/10/17 1800 JM     MCHC -- 31.5 - 36.5 g/dL  59   Result:         Canceled, Test credited   Unsatisfactory specimen - clotted  Notified [facility] 6/10/17 1800 JM     RDW -- 10.0 - 15.0 %  59   Result:         Canceled, Test credited   Unsatisfactory specimen - clotted  Notified [facility] 6/10/17 1800 JM     Platelet Count -- 150 - 450 10e9/L  59   Result:         Canceled, Test credited   Unsatisfactory specimen - clotted  Notified [facility] 6/10/17 1800 JM     Diff Method --   59   Result:         Canceled, Test credited   Unsatisfactory specimen - clotted  Notified [facility] 6/10/17 1800 JM              INR [[id number]]  Resulted: 06/10/17  1755, Result status: Final result    Ordering provider: Anton Haro MD  06/10/17 1738 Resulting lab: [facility]    Specimen Information    Type Source Collected On   Blood  06/10/17 1735          Components       Value Reference Range Flag Lab   INR 1.03 0.86 - 1.14  59            INR [[id number]]  Resulted: 06/10/17 1704, Result status: Edited Result - FINAL    Ordering provider: Karol Pal PA-C  06/10/17 1627 Resulting lab: [facility]    Specimen Information    Type Source Collected On   Blood  06/10/17 1440          Components       Value Reference Range Flag Lab   INR -- 0.86 - 1.14  59   Result:         Canceled, Test credited   Unsatisfactory specimen - clotted  Specimen will need to be recollected, notified Libby in [facility] 6/10/17 1600 JM  CORRECTED ON 06/10 AT 1704: PREVIOUSLY REPORTED AS 1.12              Partial thromboplastin time [[id number]]  Resulted: 06/10/17 1704, Result status: Final result    Ordering provider: Karol Pal PA-C  06/10/17 1627 Resulting lab: [facility]    Specimen Information    Type Source Collected On   Blood  06/10/17 1440          Components       Value Reference Range Flag Lab   PTT -- 22 - 37 sec  59   Result:         Canceled, Test credited   Unsatisfactory specimen - clotted  Specimen will need to be recollected, notified Libby in [facility] 6/10/17 1600 JM              TSH with free T4 reflex [[id number]]  Resulted: 06/10/17 1700, Result status: Final result    Ordering provider: Anton Haro MD  06/10/17 1620 Resulting lab: [facility]    Specimen Information    Type Source Collected On   Blood  06/10/17 1440          Components       Value Reference Range Flag Lab   TSH 3.06 0.40 - 4.00 mU/L  59            Lactate Dehydrogenase [[id number]] (Abnormal)  Resulted: 06/10/17 1657, Result status: Final result    Ordering provider: Karol Pal PA-C  06/10/17 1627  Resulting lab: [facility]    Specimen Information    Type Source Collected On   Blood  06/10/17 1440          Components       Value Reference Range Flag Lab   Lactate Dehydrogenase 267 81 - 234 U/L H 59            CK total [[id number]]  Resulted: 06/10/17 1604, Result status: Final result    Ordering provider: Anton Haro MD  06/10/17 1441 Resulting lab: [facility]    Specimen Information    Type Source Collected On   Blood  06/10/17 1440          Components       Value Reference Range Flag Lab   CK Total 80 30 - 225 U/L  59            Comprehensive metabolic panel [[id number]] (Abnormal)  Resulted: 06/10/17 1519, Result status: Final result    Ordering provider: Anton Haro MD  06/10/17 1420 Resulting lab: [facility]    Specimen Information    Type Source Collected On   Blood  06/10/17 1440          Components       Value Reference Range Flag Lab   Sodium 140 133 - 144 mmol/L  59   Potassium 3.5 3.4 - 5.3 mmol/L  59   Chloride 104 94 - 109 mmol/L  59   Carbon Dioxide 28 20 - 32 mmol/L  59   Anion Gap 8 3 - 14 mmol/L  59   Glucose 119 70 - 99 mg/dL H 59   Urea Nitrogen 27 7 - 30 mg/dL  59   Creatinine 1.01 0.52 - 1.04 mg/dL  59   GFR Estimate 52 >60 mL/min/1.7m2 L 59   Comment:  Non  GFR Calc   GFR Estimate If Black 63 >60 mL/min/1.7m2  59   Comment:  African American GFR Calc   Calcium 8.3 8.5 - 10.1 mg/dL L 59   Bilirubin Total 0.6 0.2 - 1.3 mg/dL  59   Albumin 3.1 3.4 - 5.0 g/dL L 59   Protein Total 5.8 6.8 - 8.8 g/dL L 59   Alkaline Phosphatase 109 40 - 150 U/L  59   ALT 26 0 - 50 U/L  59   AST 18 0 - 45 U/L  59            Troponin I [[id number]]  Resulted: 06/10/17 1519, Result status: Final result    Ordering provider: Anton Haro MD  06/10/17 8037 Resulting lab: [facility]    Specimen Information    Type Source Collected On   Blood  06/10/17 1440          Components       Value Reference Range Flag Lab    Troponin I ES -- 0.000 - 0.045 ug/L  59   Result:         <0.015  The 99th percentile for upper reference range is 0.045 ug/L.  Troponin values in   the range of 0.045 - 0.120 ug/L may be associated with risks of adverse   clinical events.              Testing Performed By     Lab - Abbreviation Name Director Address Valid Date Range    51 - Unknown [facility] [place] Unknown [address] [facility] [address] 12/31/14 1010 - Present    59 - Unknown [facility] Unknown [address] [facility] [address] 12/31/14 1006 - Present    88 - Unknown [facility] Unknown Unknown 10/30/02 0000 - Present            Unresulted Labs (24h ago through future)    Start       Ordered    06/14/17 0600  Platelet count  (Pharmacological Prophylaxis - enoxaparin (LOVENOX) *Use only if creatinine clearance is greater than 30 mL/min)  EVERY THREE DAYS,   Routine     Comments:  Repeat every 3 days while on VTE prophylaxis.  Notify provider and hold enoxaparin if platelet count falls by 50% of baseline. If no result is listed, this lab has not been done the past 365 days. LATEST LAB RESULT: Platelet Count (10e9/L)       Date                     Value                 06/11/2017               193              ----------    06/11/17 1313    06/14/17 0000  Creatinine  (Pharmacological Prophylaxis - enoxaparin (LOVENOX) *Use only if creatinine clearance is greater than 30 mL/min)  EVERY THREE DAYS,   Routine     Comments:  Repeat every 3 days while on VTE prophylaxis.    06/11/17 1313         Imaging Results - 3 Days      XR Elbow Port Right 2 Views [[id number]]  Resulted: 06/12/17 1524, Result status: Final result    Ordering provider: Karol Pal PA-C  06/12/17 0005 Resulted by: Noam Asif MD    Performed: 06/12/17 0606 - 06/12/17 0630 Resulting lab: RADIOLOGY RESULTS    Narrative:       ELBOW PORTABLE RIGHT TWO VIEWS June 12, 2017 6:30 AM     HISTORY: Right elbow  pain.    COMPARISON: None.    FINDINGS: The visualized bones and joint spaces are within normal  limits.      Impression:       IMPRESSION: No evidence for fracture, dislocation or significant  degenerative change of the right elbow on these two portable views.    KATT CHOWDHURY MD      XR Shoulder Right Port G/E 2 Views [[id number]]  Resulted: 06/11/17 1045, Result status: Final result    Ordering provider: Cora Fernandez,   06/11/17 1021 Resulted by: Shanta De La Vega MD    Performed: 06/11/17 1024 - 06/11/17 1042 Resulting lab: RADIOLOGY RESULTS    Narrative:       SHOULDER TWO VIEWS RIGHT  6/11/2017 10:42 AM     HISTORY: pain    COMPARISON: None.      Impression:       IMPRESSION : No significant degenerative change or acute bony  abnormality. No fracture or dislocation.    SHANTA DE LA VEGA MD      MR Brain w/o & w Contrast [[id number]]  Resulted: 06/1935, Result status: Final result    Ordering provider: Anton Haro MD  06/10/17 1422 Resulted by: Sherwin Haynes MD    Performed: 06/10/17 1600 - 06/10/17 1656 Resulting lab: RADIOLOGY RESULTS    Narrative:       MRI BRAIN WITHOUT AND WITH CONTRAST  6/10/2017 4:56 PM    HISTORY:  Fall today, striking occiput; one week of constant auditory  hallucinations, constant hearing of music.     TECHNIQUE:  Multiplanar, multisequence MRI of the brain without and  with 10 mL IV Gadavist.    COMPARISON: None.    FINDINGS:  There is generalized atrophy of the brain.  White matter  changes are present in the cerebral hemispheres that are consistent  with small vessel ischemic disease in this age patient. Old lacunar  infarct is seen in the right corona radiata and upper right putamen.  There is no evidence of hemorrhage, mass, acute infarct, or anomaly.   There are no gadolinium enhancing lesions.    The facial structures appear normal. The arteries at the base of the  brain and the dural venous sinuses appear patent.       Impression:       IMPRESSION:  1.  No acute abnormality. No evidence of intracranial trauma.  2. Brain atrophy and white matter changes consistent with sequelae of  small vessel ischemic disease.  3. Old lacunar infarct in the right corona radiata and upper right  putamen.    ROBBIE FARFAN MD      Tib/Fib XR, right [[id number]]  Resulted: 06/10/17 1608, Result status: Final result    Ordering provider: Anton Haro MD  06/10/17 1427 Resulted by: Ramila Eubanks MD    Performed: 06/10/17 1520 - 06/10/17 1552 Resulting lab: RADIOLOGY RESULTS    Narrative:       RIGHT TIBIA AND FIBULA TWO VIEWS   6/10/2017 3:52 PM     HISTORY: Ankle pain, tib-fib pain.    COMPARISON: None.      Impression:       IMPRESSION: Oblique fracture through the distal fibula, minimally  displaced, extending to the level of the ankle mortise. Minimally  displaced fracture in the proximal aspect of the fibula, as well. This  may be associated with damage to interosseous ligament.     RAMILA EUBANKS MD      Pelvis XR, 1-2 views [[id number]]  Resulted: 06/10/17 1608, Result status: Final result    Ordering provider: Anton Haro MD  06/10/17 1448 Resulted by: Ramila Eubanks MD    Performed: 06/10/17 1520 - 06/10/17 1551 Resulting lab: RADIOLOGY RESULTS    Narrative:       PELVIS ONE TO TWO VIEWS  6/10/2017 3:51 PM     HISTORY: Fall.    COMPARISON: September 13, 2016.      Impression:       IMPRESSION: Both femoral heads articulate normally with the respective  acetabula. Minimal joint space narrowing, though no osteophyte  formation. Right lateral knee radiograph demonstrates normal alignment  without effusion. Proximal fibular fracture again visible.     RAMILA EUBANKS MD      Foot  XR, G/E 3 views, right [[id number]]  Resulted: 06/10/17 1608, Result status: Final result    Ordering provider: Anton Haro MD  06/10/17 1427 Resulted by: Ramila Eubanks MD    Performed: 06/10/17 1521 - 06/10/17 1552 Resulting lab: RADIOLOGY RESULTS    Narrative:        RIGHT FOOT THREE OR MORE VIEWS  6/10/2017 3:52 PM     HISTORY: Ecchymosis distal toes, pain at left ankle, foot ecchymosis,  bruising.    COMPARISON: None.      Impression:       IMPRESSION: Mild hammertoe alignment of second through fifth toes.  Bones are normally aligned. Lucency in the mid fifth proximal phalanx,  suggestive of a nondisplaced fracture, clinically correlate with pain.  Additional lucency in the distal aspect of the second proximal  phalanx, possible location of fracture given the appearance, though  this may be somewhat degenerative.     RAMILA EUBANKS MD      Radius/Ulna XR, PA & LAT, right [[id number]]  Resulted: 06/10/17 1603, Result status: Final result    Ordering provider: Anton Haro MD  06/10/17 1427 Resulted by: Ramila Eubanks MD    Performed: 06/10/17 1519 - 06/10/17 1553 Resulting lab: RADIOLOGY RESULTS    Narrative:       RIGHT FOREARM TWO VIEWS   6/10/2017 3:53 PM     HISTORY: Fall, forearm injury.    COMPARISON: None.      Impression:       IMPRESSION: Proximal and distal radial and ulnar articulations intact.  No acute fracture.       RAMILA EUBANKS MD      Testing Performed By     Lab - Abbreviation Name Director Address Valid Date Range    104 - Rad Rslts RADIOLOGY RESULTS Unknown Unknown 02/16/05 1553 - Present            Encounter-Level Documents:     There are no encounter-level documents.      Order-Level Documents:     There are no order-level documents.

## 2017-06-10 NOTE — IP AVS SNAPSHOT
"    [facility] SURGICAL: [phone]                                              INTERAGENCY TRANSFER FORM - PHYSICIAN ORDERS   6/10/2017                    Hospital Admission Date: 6/10/2017  OLIVA NAVA   : 1/10/1932  Sex: Female        Attending Provider: Philip Shabazz MD     Allergies:  Ace Inhibitors, Asa [Aluminum Hydroxide], Penicillins    Infection:  None   Service:  HOSPITALIST    Ht:  1.638 m (5' 4.5\")   Wt:  108.7 kg (239 lb 10.2 oz)   Admission Wt:  107 kg (236 lb)    BMI:  40.5 kg/m 2   BSA:  2.22 m 2            Patient PCP Information     Provider PCP Type    Ema Melendez NP General      ED Clinical Impression     Diagnosis Description Comment Added By Time Added    Fall, initial encounter [W19.XXXA] Fall, initial encounter [W19.XXXA]  Anton Haro MD 6/10/2017  4:39 PM    Closed fracture of proximal end of fibula, unspecified fracture morphology, initial encounter [S82.839A] Closed fracture of proximal end of fibula, unspecified fracture morphology, initial encounter [S82.839A]  Anton Haro MD 6/10/2017  4:40 PM    Closed fracture of distal end of right fibula, unspecified fracture morphology, initial encounter [S82.831A] Closed fracture of distal end of right fibula, unspecified fracture morphology, initial encounter [S82.831A]  Anton Haro MD 6/10/2017  4:41 PM    Auditory hallucinations [R44.0] Auditory hallucinations [R44.0]  Anton Haro MD 6/10/2017  4:41 PM    Interstitial pulmonary fibrosis (H) [J84.10] Interstitial pulmonary fibrosis (H) [J84.10]  Anton Haro MD 6/10/2017  5:01 PM    Closed nondisplaced fracture of proximal phalanx of lesser toe of right foot, initial encounter [S92.514A] Closed nondisplaced fracture of proximal phalanx of lesser toe of right foot, initial encounter [S92.514A]  Anton Haro MD 6/10/2017  5:02 PM      Hospital Problems as of 2017              Priority Class Noted POA    Hyperlipidemia LDL goal <130   " 12/10/2009 Yes    Insomnia   12/15/2009 Yes    Hypertension goal BP (blood pressure) < 140/90   1/12/2011 Yes    Type 2 diabetes mellitus (H) Medium  9/26/2011 Yes    SUSSY (obstructive sleep apnea)-Moderately severe (AHI 21)   4/10/2012 Yes    Bilateral leg edema   6/14/2012 Yes    Interstitial pulmonary fibrosis (H)   5/26/2015 Yes    Chronic obstructive pulmonary disease, unspecified COPD type (H) Medium  5/19/2016 Yes    Major depressive disorder, single episode, moderate (H) Medium  3/13/2017 Yes    Auditory hallucination Medium  6/11/2017 Yes    Right elbow pain Medium  6/11/2017 Yes    Alcohol abuse Medium  6/11/2017 Yes    * (Principal)Tibia/fibula fracture, right, closed, initial encounter Medium  6/11/2017 Yes    Intertrigo   6/11/2017 Yes      Non-Hospital Problems as of 6/13/2017              Priority Class Noted    Osteopenia   12/21/2010    Obesity   1/12/2011    Advance care planning   4/25/2011    Adenomatous polyp of colon   4/25/2011    PVC's (premature ventricular contractions)   4/25/2011    HL (hearing loss)   3/25/2013    Umbilical hernia   4/8/2013    SNHL (sensorineural hearing loss)   6/10/2014    Risk for falls Medium  6/10/2017      Code Status History     Date Active Date Inactive Code Status Order ID Comments User Context    6/13/2017 12:31 PM  DNR/DNI [id number]  Heaven Montana PA-C Outpatient    6/10/2017  6:50 PM 6/13/2017 12:31 PM DNR/DNI [id number]  Karol Pal PA-C Inpatient    3/29/2016  2:10 PM 6/10/2017  6:50 PM DNR/DNI [id number] POLST signed by Dr. Valentin 3/22/16 Briana Gates, RN Outpatient         Medication Review      START taking        Dose / Directions Comments    acetaminophen 500 MG tablet   Commonly known as:  TYLENOL   Used for:  Closed fracture of proximal end of fibula, unspecified fracture morphology, initial encounter        Dose:  1000 mg   Take 2 tablets (1,000 mg) by mouth every 8 hours   Quantity:  40 tablet   Refills:  0        aspirin EC  325 MG EC tablet   Used for:  Closed fracture of proximal end of fibula, unspecified fracture morphology, initial encounter        Dose:  325 mg   Take 1 tablet (325 mg) by mouth every 6 hours as needed for moderate pain   Quantity:  14 tablet   Refills:  0        folic acid 1 MG tablet   Commonly known as:  FOLVITE   Used for:  Interstitial pulmonary fibrosis (H)        Dose:  1 mg   Take 1 tablet (1 mg) by mouth daily   Quantity:  4 tablet   Refills:  0        miconazole 2 % powder   Commonly known as:  MICATIN; MICRO GUARD        Apply topically 2 times daily   Quantity:  30 g   Refills:  1        senna-docusate 8.6-50 MG per tablet   Commonly known as:  SENOKOT-S;PERICOLACE   Used for:  Closed fracture of proximal end of fibula, unspecified fracture morphology, initial encounter        Dose:  1-2 tablet   Take 1-2 tablets by mouth 2 times daily as needed (constipation )   Quantity:  100 tablet   Refills:  0        sulfamethoxazole-trimethoprim 800-160 MG per tablet   Commonly known as:  BACTRIM DS/SEPTRA DS   Used for:  Hypertension goal BP (blood pressure) < 140/90, Acute cystitis without hematuria        Dose:  1 tablet   Take 1 tablet by mouth 2 times daily for 3 days   Quantity:  6 tablet   Refills:  0        thiamine 100 MG tablet   Used for:  Auditory hallucinations        Dose:  100 mg   Take 1 tablet (100 mg) by mouth daily   Quantity:  4 tablet   Refills:  0        traMADol 50 MG tablet   Commonly known as:  ULTRAM   Used for:  Closed fracture of proximal end of fibula, unspecified fracture morphology, initial encounter        Dose:  25-50 mg   Take 0.5-1 tablets (25-50 mg) by mouth every 6 hours as needed for moderate pain or severe pain   Quantity:  28 tablet   Refills:  0          CONTINUE these medications which have NOT CHANGED        Dose / Directions Comments    * albuterol (2.5 MG/3ML) 0.083% neb solution        Dose:  1 vial   Take 1 vial (2.5 mg) by nebulization every 4 hours as needed for  shortness of breath / dyspnea or wheezing   Quantity:  1 Box   Refills:  0        * albuterol 108 (90 BASE) MCG/ACT Inhaler   Commonly known as:  PROAIR HFA/PROVENTIL HFA/VENTOLIN HFA   Used for:  Chronic obstructive pulmonary disease, unspecified COPD type (H)        Dose:  2 puff   Inhale 2 puffs into the lungs every 4 hours as needed for shortness of breath / dyspnea or wheezing   Quantity:  3 Inhaler   Refills:  3        atenolol 50 MG tablet   Commonly known as:  TENORMIN   Used for:  Hypertension goal BP (blood pressure) < 140/90        Dose:  50 mg   Take 1 tablet (50 mg) by mouth 2 times daily   Quantity:  180 tablet   Refills:  3        atorvastatin 20 MG tablet   Commonly known as:  LIPITOR   Used for:  Hyperlipidemia LDL goal <130        Dose:  20 mg   Take 1 tablet (20 mg) by mouth daily   Quantity:  90 tablet   Refills:  3        budesonide-formoterol 160-4.5 MCG/ACT Inhaler   Commonly known as:  SYMBICORT   Used for:  Chronic obstructive pulmonary disease, unspecified COPD type (H)        Dose:  2 puff   Inhale 2 puffs into the lungs 2 times daily   Quantity:  1 Inhaler   Refills:  5        CALCIUM 600 + D PO        Take  by mouth.   Refills:  0        cloNIDine 0.2 MG tablet   Commonly known as:  CATAPRES   Used for:  Hypertension goal BP (blood pressure) < 140/90        Dose:  0.2 mg   Take 1 tablet (0.2 mg) by mouth 2 times daily   Quantity:  60 tablet   Refills:  1        fish oil-omega-3 fatty acids 1000 MG capsule        1 cap daily   Refills:  0        furosemide 20 MG tablet   Commonly known as:  LASIX   Used for:  Hypokalemia        Dose:  20 mg   Take 1 tablet (20 mg) by mouth daily   Quantity:  90 tablet   Refills:  3        hydrOXYzine 25 MG tablet   Commonly known as:  ATARAX   Used for:  Persistent insomnia        Dose:  25-50 mg   Take 1-2 tablets (25-50 mg) by mouth nightly as needed for other (insomnia)   Quantity:  90 tablet   Refills:  3        losartan-hydrochlorothiazide 100-25 MG  per tablet   Commonly known as:  HYZAAR   Used for:  Hypertension goal BP (blood pressure) < 140/90        Dose:  1 tablet   Take 1 tablet by mouth daily Take 1 tablet by mouth daily.   Quantity:  90 tablet   Refills:  3        MULTIVITAMIN TABS   OR        1 TABLET DAILY   Refills:  0        Nebulizer Compressor Kit        Dose:  1 kit   1 kit every 4 hours as needed   Quantity:  1 kit   Refills:  0        * order for DME   Used for:  SUSSY (obstructive sleep apnea)        1.  CPAP pressure 12 cm/H20 with heated humidity and auto-titrating capability.  2.  Provide mask to fit and CPAP supplies. 3.  Length of need lifetime. 4.  Ok to d/c O2 bleed in to her PAP overnight.   Refills:  0        * order for DME   Used for:  Leg edema        TEDs stockings knee high to be worn during the day.   Quantity:  1 Units   Refills:  0        * order for DME   Used for:  Hypoxia        Equipment being ordered: Oxygen   Quantity:  1 Units   Refills:  0        * order for DME   Used for:  SUSSY (obstructive sleep apnea)        Equipment being ordered: CPAP supplies   Quantity:  1 Units   Refills:  0        order for DME   Used for:  COPD (chronic obstructive pulmonary disease) (H)        Equipment being ordered: Oxygen - 3 liters continous   Quantity:  1 Device   Refills:  0        potassium chloride 10 MEQ tablet   Commonly known as:  K-TAB,KLOR-CON   Used for:  Hypokalemia        Dose:  10 mEq   Take 1 tablet (10 mEq) by mouth daily   Quantity:  90 tablet   Refills:  3        tiotropium 18 MCG capsule   Commonly known as:  SPIRIVA HANDIHALER   Used for:  Chronic obstructive pulmonary disease, unspecified COPD type (H)        Inhale  into the lungs. Inhale contents of one capsule daily   Quantity:  90 capsule   Refills:  3        * Notice:  This list has 6 medication(s) that are the same as other medications prescribed for you. Read the directions carefully, and ask your doctor or other care provider to review them with you.      STOP  taking     amLODIPine 10 MG tablet   Commonly known as:  NORVASC           ibuprofen 600 MG tablet   Commonly known as:  ADVIL/MOTRIN           sertraline 25 MG tablet   Commonly known as:  ZOLOFT                     Further instructions from your care team       Orthopedic Instructions:  1.  Follow up with Dr. Carrillo Kiser on 6/15/17 in [place] for post injury visit and discuss future care for your ankle fracture.  Call [phone] to schedule your appointment.  2.  Use pain medication as directed  3.  Keep splint clean, dry and intact until your appointment. Cover with a plastic bag for bathing/ showering. ]    Blood Pressure Instructions:  Please have the staff at the TCU reassess your BP within 3 days.  We have discharged you to their facility WITHOUT your home clonidine nor amlodipine.  You will need to be watched closely to see if these medications become neccessary.            Summary of Visit     Reason for your hospital stay       Non-surgical tibia/fibula fracture             After Care     Activity       Non-weight bearing to right lower extremity.  Weight bearing as tolerating to right upper extremity.       Activity - Up with nursing assistance           Daily weights       Call Provider for weight gain of more than 2 pounds per day or 5 pounds per week.       Diet       Follow this diet upon discharge: Orders Placed This Encounter      Regular Diet Adult       Fall precautions           General info for SNF       Length of Stay Estimate: Short Term Care: Estimated # of Days <30  Condition at Discharge: Improving  Level of care:skilled   Rehabilitation Potential: Excellent  Admission H&P remains valid and up-to-date: Yes  Recent Chemotherapy: N/A  Use Nursing Home Standing Orders: Yes       Intake and output       Every shift       Mantoux instructions       Give two-step Mantoux (PPD) Per Facility Policy Yes             Referrals     Occupational Therapy Adult Consult       Evaluate and treat  as clinically indicated.    Reason:  Non-surgical tib/fib fracture       Physical Therapy Adult Consult       Evaluate and treat as clinically indicated.    Reason:  Non-surgical tib/fib fracture             Follow-Up Appointment Instructions     Future Labs/Procedures    Follow-up and recommended labs and tests      Comments:    Follow up with the physician at the TCU and with your primary care provider, Ema Melendez, within 2 days to evaluate medication change. We have discharged your without your home amlodipine nor clonidine. The following labs/tests are recommended: BP recheck.      Follow-Up Appointment Instructions     Follow-up and recommended labs and tests        Follow up with the physician at the TCU and with your primary care provider, Ema Melendez, within 2 days to evaluate medication change. We have discharged your without your home amlodipine nor clonidine. The following labs/tests are recommended: BP recheck.             Statement of Approval     Ordered          06/13/17 1318  I have reviewed and agree with all the recommendations and orders detailed in this document.  EFFECTIVE NOW     Approved and electronically signed by:  Philip Shabazz MD

## 2017-06-10 NOTE — IP AVS SNAPSHOT
[facility]    [address] [address]    Phone:  [phone]    Fax:  [phone]                                       After Visit Summary   6/10/2017    Susan Haq    MRN: [id number]           After Visit Summary Signature Page     I have received my discharge instructions, and my questions have been answered. I have discussed any challenges I see with this plan with the nurse or doctor.    ..........................................................................................................................................  Patient/Patient Representative Signature      ..........................................................................................................................................  Patient Representative Print Name and Relationship to Patient    ..................................................               ................................................  Date                                            Time    ..........................................................................................................................................  Reviewed by Signature/Title    ...................................................              ..............................................  Date                                                            Time

## 2017-06-10 NOTE — IP AVS SNAPSHOT
` `     [facility] SURGICAL: [phone]                 INTERAGENCY TRANSFER FORM - NOTES (H&P, Discharge Summary, Consults, Procedures, Therapies)   6/10/2017                    Hospital Admission Date: 6/10/2017  OLIVA HAQ   : 1/10/1932  Sex: Female        Patient PCP Information     Provider PCP Type    Ema Melendez NP General         History & Physicals      H&P by Karol Pal PA-C at 6/10/2017  4:59 PM     Author:  Karol Pal PA-C Service:  Hospitalist Author Type:  Physician Assistant Isaías MEADE    Filed:  2017 12:35 AM Date of Service:  6/10/2017  4:59 PM Creation Time:  6/10/2017  4:20 PM    Status:  Cosign Needed :  Karol Pal PA-C (Physician Assistant Isaías MEADE)    Cosign Required:  Yes             [facility]    History and Physical  Hospital Medicine       Date of Admission:  6/10/2017  Date of Service: 6/10/2017[CL1.1]     Assessment & Plan[CL1.2]   Oliva Haq is a 85 year old female with[CL1.1] pulmonary fibrosis, COPD on 3L chronically, SUSSY, HLD, HTN, lower extremity edema[CL1.3] who presents[CL1.1] following fall at home.[CL1.3]    Fibula Fracture, Right[CL1.1]  Potential Fifth Phalanx Fracture[CL1.4]   Presented following fall at home. XR noted[CL1.1] o[CL1.3]blique fracture through distal fibular, minimally displaced through ankle mortise with minimally displaced fracture in proximal fibula. Patient placed in splint currently.[CL1.1] XR foot noted lucency in mid fifth proximal phalanx - suggestive of fracture. Hip XR negative. XR right ulna/radius negative for fracture.[CL1.4]   Pain is controlled in ED[CL1.1].[CL1.4]  -ortho consultation pending  -no weight bearing[CL1.1] until ortho consulted[CL1.4]  -pain control with scheduled Tylenol, low-dose Tramadol if needed  -PT/OT pending  -car consultation pending safe dispo - home care vs TCU[CL1.1]    Right Elbow Pain   Retain pain following fall.  Ulna/radius XR negative.   -XR right elbow pending[CL1.3]    Auditory Hallucinations   Reports 1 week history of music playing in her head. MRI[CL1.1] negative.   DDx: inner ear pathology, seizure, psychosis - depression/schizophrenia hearing aids    Does follow with audiologist. Hears when hearing aids are removed. No focal neuro deficits. Does not appear in psychosis.  -continue to monitor  -may need ENT referral or EEG for possible seizure (non-dominant hemisphere foci)[CL1.4]    Fall at Home[CL1.1], Unclear Etiology[CL1.4]   Patient lives alone.[CL1.1] No presyncopal symptoms but patient appears unclear with history. Denies LOC. Did hit head and not on anti-coagulation. MRI negative.   DDx: near syncope/syncope, medications causing orthostasis, TIA/CVA  -telem[CL1.4]etry[CL1.1] overnight[CL1.4]  -orthostatics[CL1.1] if able to with foot pain[CL1.4]  -PT/OT as above  -pharmacy consultation to assess for orthostasis[CL1.1]  -holding PTA BP medications until tomorrow, suggest reducing clonidine     Alcohol Abuse   Drinks about 3oz almost daily of liquor. No history of withdrawal/seizures. Last drink 6/8 evening.     No evidence of withdrawal currently - will hold off on CIWA protocol.  -continue to monitor clinically[CL1.3]     RCRI Risk Assessment  Anesthesia issues:[CL1.4] none[CL1.3]  Baseline Activity:[CL1.4] likely <4METs has issues with SOB going up a flight of stairs and has to stop for breaks[CL1.3]  Chest Pain: no  Shortness of breath: YES at baseline, 3L chronically  Cardiac Risk Factors/Assessment:                High Risk Surgery: no              History Ischemic Heart Disease: no              History of Congestive Heart Failure: no - does have lower extremity edema on lasix              History of CVA: no              Preoperative Treatment with Insulin: no              Preoperative Creatinine greater than 2.0: no              Total Number of Points:[CL1.4] 0[CL1.3] =[CL1.4] 0.4[CL1.3]% risk of major  cardiac event[CL1.4]  -would proceed cautiously with/only if necessary with surgery given SOB[CL1.3]    Int[CL1.4]e[CL1.3]r[CL1.4]quyen[CL1.3] of Breast[CL1.4]s[CL1.3]  -start miconazole   -wound consultation[CL1.4] pending[CL1.3]    Lower Extremity Edema  -holding PTA lasix/potassium[CL1.4] given possible orthostasis  -[CL1.3]I/O[CL1.4] and[CL1.3] daily weights[CL1.4]    Pulmonary Fibrosis/COPD  Chronic Respiratory Failure[CL1.1]   Currently at baseline. On 3L NC continuously  -continue PTA oxygen  -continue PTA albuterol PRN, spirivia, symbicort[CL1.4]    SUSSY  -continue PTA CPAP settings at home.    Prediabetes[CL1.1]  L[CL1.3]ast A1C[CL1.4] (2015) 5.7. . Will monitor BG with BMP in AM[CL1.3]    HLD[CL1.1]  -continue PTA atorvastatin[CL1.4]    HTN[CL1.1]   Controlled on admission. Per pharmacy, all BP medications may be contributing but clonidine may be contributing the most.   -continue PTA atenolol  -[CL1.4]hold[CL1.3] PTA amlodipine, losartan-HCTZ[CL1.4] and clonidine[CL1.3]    Insomnia[CL1.1]  -continue PTA hydroxyzine PRN[CL1.4]    Depression[CL1.1]   Has not seen effect since starting 3/2017.  -holding PTA sertraline since only new medication change.[CL1.4]     FEN:  -[CL1.1]start NS 0.9% 75mL/hour[CL1.3]  -Will monitor electrolytes and replace as needed  -[CL1.1]regular diet with NPO at midnight until ortho consultation[CL1.3]    DVT Prophylaxis: SCDs on intact lower extremity - pharmacological ppx contraindicated due to thrombocytopenia  Code Status: DNR / DNI    Disposition: Anticipate discharge in[CL1.1] 2-3[CL1.3] days once pain is improved with orals, ortho is consulted with safe disposition[CL1.1] - given age and multiple fractures[CL1.3]. Appropriate for[CL1.1] INPATIENT[CL1.3] care    I have discussed patient and formulated plan with Dr. Turk. Assessment and plan as above.     Karol Pal PA-C  [facility][CL1.1]        Primary Care Physician[CL1.2]   Ema Melendez  [phone]    History is obtained from the patient, ED notes and review of the EMR.[CL1.1]    Past Medical History    Past Medical History:   Diagnosis Date     Adenomatous polyp of colon 4/25/2011     Advanced directives, counseling/discussion 4/25/2011    Discussed Advance Directive planning with patient; information given to patient to review. Marine Guillen MA       Basal cell carcinoma      Bilateral leg edema 6/14/2012     Bronchospasm     copd vs. asthma     COPD (chronic obstructive pulmonary disease) (H)      Diverticulosis      Fracture, Metacarpal Shaft - right 4th 7/5/2010     High cholesterol      HL (hearing loss) 3/25/2013     Hyperlipidemia LDL goal <130 12/10/2009     Hypertension      Hypertension goal BP (blood pressure) < 140/90 1/12/2011     Hypokalemia 9/17/2013     Hypokalemia 9/17/2013     Hypoxia 9/5/2013     Hypoxia 9/5/2013     Impaired fasting glucose      Insomnia 12/15/2009     Interstitial pulmonary fibrosis (H) 5/26/2015     Obesity      SUSSY (obstructive sleep apnea)-Moderately severe (AHI 21) 4/10/2012     Osteopenia 12/21/2010     Prediabetes 7/2/2012     PVC's (premature ventricular contractions) 4/25/2011     Sleep apnea      Smoker 1986    quit     SNHL (sensorineural hearing loss) 6/10/2014     Umbilical hernia 4/8/2013     Venous insufficiency       Diagnosis Date Noted     Closed fracture of fibula 06/11/2017     Priority: Medium     Auditory hallucination 06/11/2017     Priority: Medium     Right elbow pain 06/11/2017     Priority: Medium     Intertrigo 06/11/2017     Priority: Medium     Of breasts     Risk for falls 06/10/2017     Priority: Medium     Major depressive disorder, single episode, moderate (H) 03/13/2017     Priority: Medium     Chronic obstructive pulmonary disease, unspecified COPD type (H) 05/19/2016     Priority: Medium     Continuous oxygen at 3 liters        Interstitial pulmonary fibrosis (H) 05/26/2015     SNHL (sensorineural hearing loss) 06/10/2014  "    Umbilical hernia 04/08/2013     HL (hearing loss) 03/25/2013     Prediabetes 07/02/2012     Bilateral leg edema 06/14/2012     SUSSY (obstructive sleep apnea)-Moderately severe (AHI 21) 04/10/2012     Initial diagnosis 2007 at [facility]   Polysomnography 4/9/2012: 244.1 lbs.  BMI 40.7.  Adrian sleepiness scale 0.0.  Re-evalaution of previously diagnosed moderately severe SUSSY. She was on auto-BiPAP with 2L O2 bleed in and nocturnal oximetry showed persistently low SpO2. Diagnostic PSG maximum TCM of 50 mmHg and baseline TCM of 43.  Baseline ABG showed a PaCO2 of 38 which is normal. Baseline oxygen saturation was 87.8%.  The lowest oxygen saturation was 59.2%.  Snoring was reported as loud.  Apnea/Hypopnea Index 20.5 events per hour.  REM AHI 48.0.  RERA index 16.0. CPAP  titrated at pressures ranging from 6 cm/H20 up to 12 cm/H20.  The optimal pressure was 12.0 with an AHI of 0 including lateral REM sleep.       Adenomatous polyp of colon 04/25/2011     PVC's (premature ventricular contractions) 04/25/2011     Hypertension goal BP (blood pressure) < 140/90 01/12/2011     Obesity 01/12/2011     Osteopenia 12/21/2010     Insomnia 12/15/2009     Hyperlipidemia LDL goal <130 12/10/2009      Past Surgical History   Past Surgical History:   Procedure Laterality Date     APPENDECTOMY       C BSO, OMENTECTOMY W/SHANIA       C NONSPECIFIC PROCEDURE      (L) clavicle orif     C STOMACH SURGERY PROCEDURE UNLISTED       CHOLECYSTECTOMY, LAPOROSCOPIC  10/13/2011    Cholecystectomy, Laparoscopic     HERNIA REPAIR, UMBILICAL  10/13/2011     HYSTERECTOMY, CERVIX STATUS UNKNOWN        History of Present Illness[CL1.2]   Susan Haq is a 85 year old female who presents[CL1.1] following fall.     Patient presents after having a fall at home. Pt remembers standing in her kitchen at the refrigerator getting a glass of water. Patient is unclear how she fell, states \"maybe I tripped or something.\" She ended up on the " "floor, however, still holding the glass of water without spilling. Unclear how she landed but had pain in her right elbow, right lower extremity and right foot. Patient adamantly denies loss of consciousness and states she remembers the entire fall. She denies dizziness or lightheaded sensation surrounding the falls. Believes she hit the left side of her head and is not on anti-coagulation. Denies other falls at home and uses a cane at baseline. Denies HA, changes in vision.     She was unable to pick herself up but has friends who are also her care takers who stop by every day who found her still in the kitchen sitting crossed legged on the floor. Sitting on floor for likely 30min - friends had to convince patient to come to ER. They mention once standing patient up she stated, \"I think I might pass-out.\" Friends state she is at baseline mentation. Patient lives independently alone in her own home with help of her two friends. Friends agree that it is going well and they love helping the patient set up medication and purchase groceries. No home care currently.     Of note, friends mention that patient frequently tells them she needs to lie down, with standing or even sitting for long periods of time. Patient believes this is due to lightheadedness rather than a dizzy sensation.[CL1.4]     Denies any fever, chills, myalgias, cold-like symptoms (congestion, pharyngitis, sinus pressure, rhinorrhea),  CP, SOB[CL1.1] as it is baseline on 3L NC[CL1.4], cough, abdominal pain, N/V/D, dysuria, hematuria,[CL1.1] hematochezia, melena, gingivial bleeding or increased bruising or bleeding,[CL1.4] headaches, dizziness, joint swelling, joint pain, leg swelling[CL1.1] - has history of lower extremity edema[CL1.4], rashes[CL1.1]/wounds/sores. Denies any other pain including right hip or neck pain.    Patient also endorses hearing a background of classical music intermittently but for the good part of a week. She is even able to " "sing the song when she hears it. Has hearing aids but heard the music when in the MRI scanner when they were out.     Has started anti-depression (sertraline) in March for anhedonia, feeling \"blah\" with encouragement from friends. They have not seen change in mood. Patient currently feels \"happy, I am getting such good care.\"     Denies diet changes or odd diets - has meal delivering program.     Former smoker with 30 pack year history. Drinks about 1 drink a night (3oz). No history of withdrawal. Last drink  evening.[CL1.4]     Prior to Admission Medications   Prior to Admission Medications   Prescriptions Last Dose Informant Patient Reported? Taking?   Calcium Carbonate-Vitamin D (CALCIUM 600 + D OR) 6/10/2017 at am Self Yes Yes   Sig: Take  by mouth.   FISH OIL 1000 MG OR CAPS 6/10/2017 at am Self Yes Yes   Si cap daily   MULTIVITAMIN TABS   OR 6/10/2017 at am Self Yes Yes   Si TABLET DAILY   ORDER FOR DME  Self No Yes   Si.  CPAP pressure 12 cm/H20 with heated humidity and auto-titrating capability.   2.  Provide mask to fit and CPAP supplies.  3.  Length of need lifetime.  4.  Ok to d/c O2 bleed in to her PAP overnight.       ORDER FOR DME  Self No No   Sig: TEDs stockings knee high to be worn during the day.   ORDER FOR DME  Self No No   Sig: Equipment being ordered: Oxygen   ORDER FOR DME  Self No No   Sig: Equipment being ordered: CPAP supplies   ORDER FOR DME  Self No No   Sig: Equipment being ordered: Oxygen - 3 liters continous   Respiratory Therapy Supplies (NEBULIZER COMPRESSOR) KIT  Self No No   Si kit every 4 hours as needed   albuterol (2.5 MG/3ML) 0.083% nebulizer solution  Self No No   Sig: Take 1 vial (2.5 mg) by nebulization every 4 hours as needed for shortness of breath / dyspnea or wheezing   albuterol (PROAIR HFA/PROVENTIL HFA/VENTOLIN HFA) 108 (90 BASE) MCG/ACT Inhaler  Self No No   Sig: Inhale 2 puffs into the lungs every 4 hours as needed for shortness of breath / " dyspnea or wheezing   amLODIPine (NORVASC) 10 MG tablet 6/10/2017 at am Self No Yes   Sig: Take 1 tablet (10 mg) by mouth daily   atenolol (TENORMIN) 50 MG tablet 6/10/2017 at am Self No Yes   Sig: Take 1 tablet (50 mg) by mouth 2 times daily   atorvastatin (LIPITOR) 20 MG tablet 6/9/2017 at pm Self No Yes   Sig: Take 1 tablet (20 mg) by mouth daily   budesonide-formoterol (SYMBICORT) 160-4.5 MCG/ACT Inhaler 6/10/2017 at am Self No Yes   Sig: Inhale 2 puffs into the lungs 2 times daily   cloNIDine (CATAPRES) 0.2 MG tablet 6/10/2017 at am Self No Yes   Sig: Take 1 tablet (0.2 mg) by mouth 2 times daily   furosemide (LASIX) 20 MG tablet 6/10/2017 at am Self No Yes   Sig: Take 1 tablet (20 mg) by mouth daily   hydrOXYzine (ATARAX) 25 MG tablet Past Week at Unknown time Self No Yes   Sig: Take 1-2 tablets (25-50 mg) by mouth nightly as needed for other (insomnia)   ibuprofen (ADVIL,MOTRIN) 600 MG tablet  Self No No   Sig: Take 1 tablet by mouth every 6 hours as needed for pain.   losartan-hydrochlorothiazide (HYZAAR) 100-25 MG per tablet 6/10/2017 at am Self No Yes   Sig: Take 1 tablet by mouth daily Take 1 tablet by mouth daily.   potassium chloride (K-TAB,KLOR-CON) 10 MEQ tablet 6/10/2017 at am Self No Yes   Sig: Take 1 tablet (10 mEq) by mouth daily   sertraline (ZOLOFT) 25 MG tablet 6/10/2017 at am Self No Yes   Sig: Take 1 tablet (25 mg) by mouth daily   tiotropium (SPIRIVA HANDIHALER) 18 MCG capsule 6/10/2017 at am Self No Yes   Sig: Inhale  into the lungs. Inhale contents of one capsule daily      Facility-Administered Medications: None     Allergies   Allergies   Allergen Reactions     Ace Inhibitors Cough     Asa [Aluminum Hydroxide]      Penicillins      Family History    Family History   Problem Relation Age of Onset     DIABETES Father      Coronary Artery Disease Maternal Grandmother      Coronary Artery Disease Paternal Uncle        Social History   Social History     Social History     Marital status:       Spouse name: N/A     Number of children: N/A     Years of education: N/A     Occupational History      Retired      in [place]     Social History Main Topics     Smoking status: Former Smoker     Packs/day: 1.00     Years: 35.00     Quit date: 1/1/1990     Smokeless tobacco: Former User     Alcohol use Yes      Comment: Drink 1 (3oz) drink a day     Drug use: No     Sexual activity: No     Other Topics Concern     Not on file     Social History Narrative    Lives independently in own home.      Review of Systems[CL1.2]   The 10 point Review of Systems is negative other than noted in the HPI.[CL1.4]    Physical Exam   /59 (BP Location: Left arm)  Pulse 60  Temp 98.2  F (36.8  C) (Oral)  Resp 18  Wt 107 kg (236 lb)  SpO2 97%  BMI 39.88 kg/m2[CL1.2]     Weight:[CL1.1] 236 lbs 0 oz Body mass index is 39.88 kg/(m^2).[CL1.2]     Constitutional: Alert, oriented, cooperative, no apparent distress, appears nontoxic, Appears stated age.  Eyes: Sclera are anicteric, EOMI, PEERLA[CL1.1]. No nystagmus noted.[CL1.4]  HENT: Normocephalic. Atraumatic.[CL1.1] MMM.[CL1.4]  Lymph/Hematologic: No preauricular, postauricular, occipital, sub-mandibular, tonsillar, sub-mental, anterior or posterior cervical, or supraclavicular lymphadenopathy is appreciated.  Cardiovascular: Regular rate and rhythm,[CL1.1] heart sounds distant[CL1.4]. Radial pulses are 2+ bilaterally. Distal pulses are intact. No lower extremity edema.  Respiratory:[CL1.1] on 3L NC.[CL1.4] No accessory muscle usage. Speaking in full sentences.[CL1.1] Diminished throughout but clear.[CL1.4]   GI:[CL1.1]. Periumbilical hernia noted - reducible. Obese abdomen.[CL1.4] bowel sounds present, soft, non-tender,non-distended. No rebound or guarding.   Genitourinary: Deferred  Musculoskeletal: Normal muscle bulk and tone.[CL1.1] Right lower extremity in posterior short leg splint. Able to move upper extremities appropriately. Pain in  right elbow but no limitations with flexion or extension. No limitations of right wrist ROM. FROM of right shoulder. No tenderness to palpation of right shoulder, right elbow or right hip.[CL1.4]   Skin: Warm and dry, no rashes.[CL1.1] Bruising near metatarsal heads between 1st and 2nd toe of right foot.[CL1.4]   Neurologic: Neck supple. Cranial nerves 3-12 are grossly intact.  is symmetric.[CL1.1] Strength of upper extremities is 5/5 bilaterally. Coordination of upper extremities is intact with finger-to-nose testing. Strength of right lower extremity is 4/5 - likely weak core but unable to compare with LLE due to splint. Biceps DTRs 2+ bilaterally.[CL1.4]    Data[CL1.2]   Data reviewed today:[CL1.1]     Recent Labs  Lab 06/10/17  1855 06/10/17  1735 06/10/17  1440   WBC 8.3 Canceled, Test credited Unsatisfactory specimen - clottedNotified Libby [facility] 6/10/17 1800 JM Canceled, Test credited Unsatisfactory specimen - clottedspecimen will need to be recollected, notified Tiffany Fuentes RN [facility] 6/10/17 1630 JMCORRECTED ON 06/10 AT 1707: PREVIOUSLY REPORTED AS 8.7   HGB 12.3 Canceled, Test credited Unsatisfactory specimen - clottedNotified Libby [facility] 6/10/17 1800 JM Canceled, Test credited Unsatisfactory specimen - clottedspecimen will need to be recollected, notified Tiffany Fuentes RN [facility] 6/10/17 1630 JMCORRECTED ON 06/10 AT 1707: PREVIOUSLY REPORTED AS 12.1   MCV 93 Canceled, Test credited Unsatisfactory specimen - clottedNotified Libby [facility] 6/10/17 1800 JM Canceled, Test credited Unsatisfactory specimen - clottedspecimen will need to be recollected, notified Tiffany uFentes RN [facility] 6/10/17 1630 JMCORRECTED ON 06/10 AT 1707: PREVIOUSLY REPORTED AS 94    Canceled, Test credited Unsatisfactory specimen - clottedNotified Libby [facility] 6/10/17 1800 JM Canceled, Test credited Unsatisfactory specimen - clottedspecimen will need to be recollected, notified Tiffany Fuentes RN [facility] 6/10/17 1630 JMCORRECTED ON 06/10 AT 1707:  PREVIOUSLY REPORTED AS 58   INR  --  1.03 Canceled, Test credited Unsatisfactory specimen - clottedSpecimen will need to be recollected, notified Libby in [place] 6/10/17 1600 JMCORRECTED ON 06/10 AT 1704: PREVIOUSLY REPORTED AS 1.12   NA  --   --  140   POTASSIUM  --   --  3.5   CHLORIDE  --   --  104   CO2  --   --  28   BUN  --   --  27   CR  --   --  1.01   ANIONGAP  --   --  8   JAGDISH  --   --  8.3*   GLC  --   --  119*   ALBUMIN  --   --  3.1*   PROTTOTAL  --   --  5.8*   BILITOTAL  --   --  0.6   ALKPHOS  --   --  109   ALT  --   --  26   AST  --   --  18   TROPI  --   --  <0.015The 99th percentile for upper reference range is 0.045 ug/L.  Troponin values in the range of 0.045 - 0.120 ug/L may be associated with risks of adverse clinical events.     Recent Results (from the past 24 hour(s))   Pelvis XR, 1-2 views    Narrative    PELVIS ONE TO TWO VIEWS  6/10/2017 3:51 PM     HISTORY: Fall.    COMPARISON: September 13, 2016.      Impression    IMPRESSION: Both femoral heads articulate normally with the respective  acetabula. Minimal joint space narrowing, though no osteophyte  formation. Right lateral knee radiograph demonstrates normal alignment  without effusion. Proximal fibular fracture again visible.     RAMILA BARNES MD   Tib/Fib XR, right    Narrative    RIGHT TIBIA AND FIBULA TWO VIEWS   6/10/2017 3:52 PM     HISTORY: Ankle pain, tib-fib pain.    COMPARISON: None.      Impression    IMPRESSION: Oblique fracture through the distal fibula, minimally  displaced, extending to the level of the ankle mortise. Minimally  displaced fracture in the proximal aspect of the fibula, as well. This  may be associated with damage to interosseous ligament.     RAMILA BARNES MD   Foot  XR, G/E 3 views, right    Narrative    RIGHT FOOT THREE OR MORE VIEWS  6/10/2017 3:52 PM     HISTORY: Ecchymosis distal toes, pain at left ankle, foot ecchymosis,  bruising.    COMPARISON: None.      Impression    IMPRESSION: Mild hammertoe  alignment of second through fifth toes.  Bones are normally aligned. Lucency in the mid fifth proximal phalanx,  suggestive of a nondisplaced fracture, clinically correlate with pain.  Additional lucency in the distal aspect of the second proximal  phalanx, possible location of fracture given the appearance, though  this may be somewhat degenerative.     RAMILA BARNES MD   Radius/Ulna XR, PA & LAT, right    Narrative    RIGHT FOREARM TWO VIEWS   6/10/2017 3:53 PM     HISTORY: Fall, forearm injury.    COMPARISON: None.      Impression    IMPRESSION: Proximal and distal radial and ulnar articulations intact.  No acute fracture.       RAMILA BARNES MD   MR Brain w/o & w Contrast    Narrative    MRI BRAIN WITHOUT AND WITH CONTRAST  6/10/2017 4:56 PM    HISTORY:  Fall today, striking occiput; one week of constant auditory  hallucinations, constant hearing of music.     TECHNIQUE:  Multiplanar, multisequence MRI of the brain without and  with 10 mL IV Gadavist.    COMPARISON: None.    FINDINGS:  There is generalized atrophy of the brain.  White matter  changes are present in the cerebral hemispheres that are consistent  with small vessel ischemic disease in this age patient. Old lacunar  infarct is seen in the right corona radiata and upper right putamen.  There is no evidence of hemorrhage, mass, acute infarct, or anomaly.   There are no gadolinium enhancing lesions.    The facial structures appear normal. The arteries at the base of the  brain and the dural venous sinuses appear patent.       Impression    IMPRESSION:  1. No acute abnormality. No evidence of intracranial trauma.  2. Brain atrophy and white matter changes consistent with sequelae of  small vessel ischemic disease.  3. Old lacunar infarct in the right corona radiata and upper right  putamen.    ROBBIE FARFAN MD[CL1.2]     I personally revie[CL1.1]w[CL1.3]ed[CL1.1]:  EKG shows sinus bradycardia.  TIB/FIB XR shows proximal and distal fracture of  fibula.  Foot XR notes possible fracture of second proximal metatarsal and possible fifth proximal metatarsal[CL1.3]     I have discussed patient and formulated plan with[CL1.1] Dr. Turk. Assessment and plan as above.[CL1.3]     Chart documentation with keystrokes and/or Dragon voice recognition software. Although reviewed after completion, some word and grammatical error may remain.  Karol Pal PA-C  Hospital Medicine[CL1.1]             Revision History        User Key Date/Time User Provider Type Action    > CL1.2 6/11/2017 12:35 AM Karol Pal PA-C Physician Assistant Isaías MEADE Sign     CL1.3 6/11/2017 12:07 AM Karol Pal PA-C Physician Assistant Isaías MAEDE      CL1.4 6/10/2017  6:07 PM Karol Pal PA-C Physician Assistant - C      CL1.1 6/10/2017  4:20 PM Karol Pal PA-C Physician Assistant - C                      Discharge Summaries      Discharge Summaries by Heaven Montana PA-C at 6/13/2017 12:51 PM     Author:  Heaven Montana PA-C Service:  Internal Medicine Author Type:  Physician Assistant - C    Filed:  6/13/2017 12:51 PM Date of Service:  6/13/2017 12:51 PM Creation Time:  6/13/2017 12:37 PM    Status:  In Progress :  Heaven Montana PA-C (Physician Assistant Isaías MEADE)    Cosign Required:  Yes             [facility]    Discharge Summary  Hospital Medicine    Date of Admission:  6/10/2017  Date of Discharge:  6/13/2017   Discharging Provider:[CH1.1] Heaven Montana[CH1.2]  Date of Service: 6/13/2017     Primary Care     Ema Melendez  [facility] [address] [address]      Identification and Chief Complaint: Susan Haq is a 85 year old female who presented on 6/10/2017 with complaint of pain to right knee and elbow.[CH1.1]    Discharge Diagnoses[CH1.2]       Tibia/fibula fracture, right, closed, initial encounter    Chronic obstructive pulmonary disease, unspecified  COPD type (H)    Major depressive disorder, single episode, moderate (H)    Auditory hallucination    Right elbow pain    Alcohol abuse    Type 2 diabetes mellitus (H)    Hyperlipidemia LDL goal <130    Insomnia    Hypertension goal BP (blood pressure) < 140/90    SUSSY (obstructive sleep apnea)-Moderately severe (AHI 21)    Bilateral leg edema    Interstitial pulmonary fibrosis (H)    Intertrigo    Prediabetes    Closed fracture of fibula[CH1.1]    Discharge Disposition[CH1.2]   Discharged to rehabilitation facility[CH1.1]    Discharge Orders     General info for SNF   Length of Stay Estimate: Short Term Care: Estimated # of Days <30  Condition at Discharge: Improving  Level of care:skilled   Rehabilitation Potential: Excellent  Admission H&P remains valid and up-to-date: Yes  Recent Chemotherapy: N/A  Use Nursing Home Standing Orders: Yes     Mantoux instructions   Give two-step Mantoux (PPD) Per Facility Policy Yes     Intake and output   Every shift     Daily weights   Call Provider for weight gain of more than 2 pounds per day or 5 pounds per week.     Reason for your hospital stay   Non-surgical tibia/fibula fracture     Activity - Up with nursing assistance     Follow-up and recommended labs and tests    Follow up with the physician at the TCU and with your primary care provider, Ema Melendez, within 2 days to evaluate medication change. We have discharged your without your home amlodipine nor clonidine. The following labs/tests are recommended: BP recheck.     Activity   Non-weight bearing to right lower extremity.  Weight bearing as tolerating to right upper extremity.     DNR/DNI     Physical Therapy Adult Consult   Evaluate and treat as clinically indicated.    Reason:  Non-surgical tib/fib fracture     Occupational Therapy Adult Consult   Evaluate and treat as clinically indicated.    Reason:  Non-surgical tib/fib fracture     Fall precautions     Diet   Follow this diet upon discharge: Orders  Placed This Encounter     Regular Diet Adult       Discharge Medications   Current Discharge Medication List      START taking these medications    Details   miconazole (MICATIN; MICRO GUARD) 2 % powder Apply topically 2 times daily  Qty: 30 g, Refills: 1    Associated Diagnoses: Intertrigo      folic acid (FOLVITE) 1 MG tablet Take 1 tablet (1 mg) by mouth daily  Qty: 4 tablet, Refills: 0    Associated Diagnoses: Interstitial pulmonary fibrosis (H)      senna-docusate (SENOKOT-S;PERICOLACE) 8.6-50 MG per tablet Take 1-2 tablets by mouth 2 times daily as needed (constipation )  Qty: 100 tablet, Refills: 0    Associated Diagnoses: Closed fracture of proximal end of fibula, unspecified fracture morphology, initial encounter      thiamine 100 MG tablet Take 1 tablet (100 mg) by mouth daily  Qty: 4 tablet, Refills: 0    Associated Diagnoses: Auditory hallucinations      sulfamethoxazole-trimethoprim (BACTRIM DS/SEPTRA DS) 800-160 MG per tablet Take 1 tablet by mouth 2 times daily for 3 days  Qty: 6 tablet, Refills: 0    Associated Diagnoses: Hypertension goal BP (blood pressure) < 140/90; Acute cystitis without hematuria      traMADol (ULTRAM) 50 MG tablet Take 0.5-1 tablets (25-50 mg) by mouth every 6 hours as needed for moderate pain or severe pain  Qty: 28 tablet, Refills: 0    Associated Diagnoses: Closed fracture of proximal end of fibula, unspecified fracture morphology, initial encounter      acetaminophen (TYLENOL) 500 MG tablet Take 2 tablets (1,000 mg) by mouth every 8 hours  Qty: 40 tablet, Refills: 0    Associated Diagnoses: Closed fracture of proximal end of fibula, unspecified fracture morphology, initial encounter      aspirin  MG EC tablet Take 1 tablet (325 mg) by mouth every 6 hours as needed for moderate pain  Qty: 14 tablet, Refills: 0    Associated Diagnoses: Closed fracture of proximal end of fibula, unspecified fracture morphology, initial encounter         CONTINUE these medications which  have NOT CHANGED    Details   atorvastatin (LIPITOR) 20 MG tablet Take 1 tablet (20 mg) by mouth daily  Qty: 90 tablet, Refills: 3    Associated Diagnoses: Hyperlipidemia LDL goal <130      furosemide (LASIX) 20 MG tablet Take 1 tablet (20 mg) by mouth daily  Qty: 90 tablet, Refills: 3    Associated Diagnoses: Hypokalemia      hydrOXYzine (ATARAX) 25 MG tablet Take 1-2 tablets (25-50 mg) by mouth nightly as needed for other (insomnia)  Qty: 90 tablet, Refills: 3    Associated Diagnoses: Persistent insomnia      losartan-hydrochlorothiazide (HYZAAR) 100-25 MG per tablet Take 1 tablet by mouth daily Take 1 tablet by mouth daily.  Qty: 90 tablet, Refills: 3    Associated Diagnoses: Hypertension goal BP (blood pressure) < 140/90      potassium chloride (K-TAB,KLOR-CON) 10 MEQ tablet Take 1 tablet (10 mEq) by mouth daily  Qty: 90 tablet, Refills: 3    Associated Diagnoses: Hypokalemia      atenolol (TENORMIN) 50 MG tablet Take 1 tablet (50 mg) by mouth 2 times daily  Qty: 180 tablet, Refills: 3    Associated Diagnoses: Hypertension goal BP (blood pressure) < 140/90      budesonide-formoterol (SYMBICORT) 160-4.5 MCG/ACT Inhaler Inhale 2 puffs into the lungs 2 times daily  Qty: 1 Inhaler, Refills: 5    Associated Diagnoses: Chronic obstructive pulmonary disease, unspecified COPD type (H)      tiotropium (SPIRIVA HANDIHALER) 18 MCG capsule Inhale  into the lungs. Inhale contents of one capsule daily  Qty: 90 capsule, Refills: 3    Associated Diagnoses: Chronic obstructive pulmonary disease, unspecified COPD type (H)      !! ORDER FOR DME 1.  CPAP pressure 12 cm/H20 with heated humidity and auto-titrating capability.   2.  Provide mask to fit and CPAP supplies.  3.  Length of need lifetime.  4.  Ok to d/c O2 bleed in to her PAP overnight.        Associated Diagnoses: SUSSY (obstructive sleep apnea)      Calcium Carbonate-Vitamin D (CALCIUM 600 + D OR) Take  by mouth.      FISH OIL 1000 MG OR CAPS 1 cap daily      MULTIVITAMIN  TABS   OR 1 TABLET DAILY      albuterol (PROAIR HFA/PROVENTIL HFA/VENTOLIN HFA) 108 (90 BASE) MCG/ACT Inhaler Inhale 2 puffs into the lungs every 4 hours as needed for shortness of breath / dyspnea or wheezing  Qty: 3 Inhaler, Refills: 3    Associated Diagnoses: Chronic obstructive pulmonary disease, unspecified COPD type (H)      Respiratory Therapy Supplies (NEBULIZER COMPRESSOR) KIT 1 kit every 4 hours as needed  Qty: 1 kit, Refills: 0      albuterol (2.5 MG/3ML) 0.083% nebulizer solution Take 1 vial (2.5 mg) by nebulization every 4 hours as needed for shortness of breath / dyspnea or wheezing  Qty: 1 Box, Refills: 0      !! ORDER FOR DME Equipment being ordered: Oxygen - 3 liters continous  Qty: 1 Device, Refills: 0    Associated Diagnoses: COPD (chronic obstructive pulmonary disease) (H)      !! ORDER FOR DME Equipment being ordered: CPAP supplies  Qty: 1 Units, Refills: 0    Associated Diagnoses: SUSSY (obstructive sleep apnea)      !! ORDER FOR DME Equipment being ordered: Oxygen  Qty: 1 Units, Refills: 0    Associated Diagnoses: Hypoxia      !! ORDER FOR DME TEDs stockings knee high to be worn during the day.  Qty: 1 Units, Refills: 0    Associated Diagnoses: Leg edema       !! - Potential duplicate medications found. Please discuss with provider.      STOP taking these medications       sertraline (ZOLOFT) 25 MG tablet Comments:   Reason for Stopping:         cloNIDine (CATAPRES) 0.2 MG tablet Comments:   Reason for Stopping:         amLODIPine (NORVASC) 10 MG tablet Comments:   Reason for Stopping:         ibuprofen (ADVIL,MOTRIN) 600 MG tablet Comments:   Reason for Stopping:[CH1.2]             Allergies   Allergies   Allergen Reactions     Ace Inhibitors Cough     Asa [Aluminum Hydroxide]      Penicillins[CH1.3]        Consultations This Hospital Stay    PHARMACY IP CONSULT  ORTHOPEDIC SURGERY IP CONSULT  PHYSICAL THERAPY ADULT IP CONSULT  OCCUPATIONAL THERAPY ADULT IP CONSULT  CARE TRANSITION RN/SW IP  CONSULT  WOUND OSTOMY CONTINENCE NURSE  IP CONSULT[CH1.1]    Significant Results and Procedures[CH1.3]   Procedures    None[CH1.1]    Data[CH1.3]   Results for orders placed or performed during the hospital encounter of 06/10/17   MR Brain w/o & w Contrast    Narrative    MRI BRAIN WITHOUT AND WITH CONTRAST  6/10/2017 4:56 PM    HISTORY:  Fall today, striking occiput; one week of constant auditory  hallucinations, constant hearing of music.     TECHNIQUE:  Multiplanar, multisequence MRI of the brain without and  with 10 mL IV Gadavist.    COMPARISON: None.    FINDINGS:  There is generalized atrophy of the brain.  White matter  changes are present in the cerebral hemispheres that are consistent  with small vessel ischemic disease in this age patient. Old lacunar  infarct is seen in the right corona radiata and upper right putamen.  There is no evidence of hemorrhage, mass, acute infarct, or anomaly.   There are no gadolinium enhancing lesions.    The facial structures appear normal. The arteries at the base of the  brain and the dural venous sinuses appear patent.       Impression    IMPRESSION:  1. No acute abnormality. No evidence of intracranial trauma.  2. Brain atrophy and white matter changes consistent with sequelae of  small vessel ischemic disease.  3. Old lacunar infarct in the right corona radiata and upper right  putamen.    ROBBIE FARFAN MD   Radius/Ulna XR, PA & LAT, right    Narrative    RIGHT FOREARM TWO VIEWS   6/10/2017 3:53 PM     HISTORY: Fall, forearm injury.    COMPARISON: None.      Impression    IMPRESSION: Proximal and distal radial and ulnar articulations intact.  No acute fracture.       RAMILA BARNES MD   Foot  XR, G/E 3 views, right    Narrative    RIGHT FOOT THREE OR MORE VIEWS  6/10/2017 3:52 PM     HISTORY: Ecchymosis distal toes, pain at left ankle, foot ecchymosis,  bruising.    COMPARISON: None.      Impression    IMPRESSION: Mild hammertoe alignment of second through fifth  toes.  Bones are normally aligned. Lucency in the mid fifth proximal phalanx,  suggestive of a nondisplaced fracture, clinically correlate with pain.  Additional lucency in the distal aspect of the second proximal  phalanx, possible location of fracture given the appearance, though  this may be somewhat degenerative.     RAMILA BARNES MD   Tib/Fib XR, right    Narrative    RIGHT TIBIA AND FIBULA TWO VIEWS   6/10/2017 3:52 PM     HISTORY: Ankle pain, tib-fib pain.    COMPARISON: None.      Impression    IMPRESSION: Oblique fracture through the distal fibula, minimally  displaced, extending to the level of the ankle mortise. Minimally  displaced fracture in the proximal aspect of the fibula, as well. This  may be associated with damage to interosseous ligament.     RAMILA BARNES MD   Pelvis XR, 1-2 views    Narrative    PELVIS ONE TO TWO VIEWS  6/10/2017 3:51 PM     HISTORY: Fall.    COMPARISON: September 13, 2016.      Impression    IMPRESSION: Both femoral heads articulate normally with the respective  acetabula. Minimal joint space narrowing, though no osteophyte  formation. Right lateral knee radiograph demonstrates normal alignment  without effusion. Proximal fibular fracture again visible.     RAMILA BARNES MD   XR Shoulder Right Port G/E 2 Views    Narrative    SHOULDER TWO VIEWS RIGHT  6/11/2017 10:42 AM     HISTORY: pain    COMPARISON: None.      Impression    IMPRESSION : No significant degenerative change or acute bony  abnormality. No fracture or dislocation.    SHANTA HOOKS MD   XR Elbow Port Right 2 Views    Narrative    ELBOW PORTABLE RIGHT TWO VIEWS June 12, 2017 6:30 AM     HISTORY: Right elbow pain.    COMPARISON: None.    FINDINGS: The visualized bones and joint spaces are within normal  limits.      Impression    IMPRESSION: No evidence for fracture, dislocation or significant  degenerative change of the right elbow on these two portable views.    KATT CHOWDHURY MD[CH1.1]       History of  "Present Illness[CH1.3]   (From H&P) Susan Haq is a 85 year old female who presents following fall.      Patient presents after having a fall at home. Pt remembers standing in her kitchen at the refrigerator getting a glass of water. Patient is unclear how she fell, states \"maybe I tripped or something.\" She ended up on the floor, however, still holding the glass of water without spilling. Unclear how she landed but had pain in her right elbow, right lower extremity and right foot. Patient adamantly denies loss of consciousness and states she remembers the entire fall. She denies dizziness or lightheaded sensation surrounding the falls. Believes she hit the left side of her head and is not on anti-coagulation. Denies other falls at home and uses a cane at baseline. Denies HA, changes in vision.      She was unable to pick herself up but has friends who are also her care takers who stop by every day who found her still in the kitchen sitting crossed legged on the floor. Sitting on floor for likely 30min - friends had to convince patient to come to ER. They mention once standing patient up she stated, \"I think I might pass-out.\" Friends state she is at baseline mentation. Patient lives independently alone in her own home with help of her two friends. Friends agree that it is going well and they love helping the patient set up medication and purchase groceries. No home care currently.      Of note, friends mention that patient frequently tells them she needs to lie down, with standing or even sitting for long periods of time. Patient believes this is due to lightheadedness rather than a dizzy sensation.      Denies any fever, chills, myalgias, cold-like symptoms (congestion, pharyngitis, sinus pressure, rhinorrhea),  CP, SOB as it is baseline on 3L NC, cough, abdominal pain, N/V/D, dysuria, hematuria, hematochezia, melena, gingival bleeding or increased bruising or bleeding, headaches, dizziness, joint swelling, " "joint pain, leg swelling - has history of lower extremity edema, rashes/wounds/sores. Denies any other pain including right hip or neck pain.     Patient also endorses hearing a background of classical music intermittently but for the good part of a week. She is even able to sing the song when she hears it. Has hearing aids but heard the music when in the MRI scanner when they were out.      Has started anti-depression (sertraline) in March for anhedonia, feeling \"blah\" with encouragement from friends. They have not seen change in mood. Patient currently feels \"happy, I am getting such good care.\"      Denies diet changes or odd diets - has meal delivering program.      Former smoker with 30 pack year history. Drinks about 1 drink a night (3oz). No history of withdrawal. Last drink 6/8 evening.[CH1.1]     Hospital Course[CH1.3]   Susan Haq was admitted on 6/10/2017.  The following problems were addressed during her hospitalization:    S/p tibia/fibular fracture, right  Pain management is adequate with scheduled tylenol and PRN tramadol.  These will be continued on discharge to the TCU. Evaluated by orthopedic service 06/12. Recommended aspirin 325mg for 14 days after discharge.  Relays that the patient should be NWB to RLE with splint in place.  In addition, should be WBAT to RUE.  The patient plans to follow up with Dr. Kiser on 06/15 in [place].  Discharged to TCU.     Right phalanx fracture  Visualized on xray.  Continue conservative treatment as above on discharge.      Insomnia  Continue PTA melatonin on discharge.     Urinary Tract Infection  Noted 06/12.  Started on IV ceftriaxone.  Received 2 doses while inpatient.  Discharged to TCU with 3 additional days of bactrim therapy for a total of 5 days of antibiotic therapy.      Hypertension goal BP (blood pressure) < 140/90  BP initially soft on admission.  As such, all antihypertensives were held (Hyzaar, lasix, clonidine, amlodipine).  Home " atenolol was continued.  Half dose of patient's home Hyzaar was resumed on 06/11.  BP was significantly elevated on the morning of 06/13; as such, full dose Hyzaar and patient's home lasix was resumed.  Home clonidine and amlodipine were held on discharge.  The patient was instructed to follow up with the TCU physician and with her own PCP for ongoing BP management and assessment of when/if these medications should be resumed.      Chronic obstructive pulmonary disease, unspecified COPD type (H)  Continue PTA Symbicort, Spiriva, PRN albuterol on discharge.      Major depressive disorder, single episode, moderate (H)  Zoloft was held on admit given possible contribution to falls.  We will continue to hold this medication on discharge and recommend that the patient follow up with her PCP and assess need for, and/or possible medication switch.      Auditory hallucination  Alcohol abuse  Last drink was 06/08.  Reported 3oz of alcohol daily. Was not started on CIWA on admit.  Given oral multivitamins 06/13 and discharged to TCU with 4 additional days of multivitamin, folic acid and thiamine.      Type 2 diabetes mellitus (H)  Not on medication. A1c 5.7%.      Hyperlipidemia LDL goal <130  Continue PTA Lipitor on discharge.     SUSSY (obstructive sleep apnea)-Moderately severe (AHI 21)  Non-compliant with CPAP      Bilateral leg edema  Chronic, unchanged.  Weight is up 4 pounds this admission, but home lasix was held until 06/13.  Continue home lasix on discharge.      Interstitial pulmonary fibrosis (H)  Chronically on 3L NC oxygen.  Continue supplemental O2 on discharge.    Intertrigo  RN care was in place throughout admission, daily hygiene.  Discharged to TCU with Rx for miconazole BID.[CH1.1]    Pending Results   Unresulted Labs Ordered in the Past 30 Days of this Admission     Date and Time Order Name Status Description    6/11/2017 1650 Urine Culture Aerobic Bacterial Preliminary     6/10/2017 1629 Blood Morphology  Pathology Review In process           Physical Exam   Temp:  [98  F (36.7  C)-98.6  F (37  C)] 98.6  F (37  C)  Heart Rate:  [59-74] 74  Resp:  [16-18] 18  BP: (152-203)/(49-70) 193/70  SpO2:  [94 %-96 %] 94 %  Vitals:    06/10/17 1338 17 0500 17 0635   Weight: 107 kg (236 lb) 108.9 kg (240 lb 1.3 oz) 108.7 kg (239 lb 10.2 oz)[CH1.3]       Constitutional: alert and oriented x4.  No acute distress.   CV: Regular rate/rhythm.  No appreciable murmur, rub, gallop.  Respiratory: CTA bilaterally.  No appreciable rales, crackles.  NC oxygen in place.  GI: Soft, nontender.  Normoactive BS.  Skin: Warm and dry  Neuro: equal  strength    The discharge plan was discussed with the patient and patient agrees to plan.    Total time on this discharge was greater than 30 minutes.    Heaven Montana PA-C  [facility][CH1.1]             Revision History        User Key Date/Time User Provider Type Action    > CH1.2 2017 12:51 PM Heaven Montana PA-C Physician Assistant Isaías MEADE Sign     CH1.3 2017 12:50 PM Heaven Montana PA-C Physician Assistant Isaías MEADE      CH1.1 2017 12:37 PM Heaven Montana PA-C Physician Assistant Isaías MEADE                      Consult Notes      Consults by Miguel A Mcintosh PA-C at 2017 11:00 AM     Author:  Miguel A Mcintosh PA-C Service:  Orthopedics Author Type:  Physician Assistant - C    Filed:  2017 11:00 AM Date of Service:  2017 11:00 AM Creation Time:  2017 10:37 AM    Status:  Signed :  Miguel A Mcintosh PA-C (Physician Assistant Isaías MEADE)     Consult Orders:    1. Orthopedic Surgery IP Consult: Patient to be seen: Routine - within 24 hours; fall/trauma, fracture; Consultant may enter orders: Yes [[id number]] ordered by Anton Haro MD at 06/10/17 1650                [facility] Consultation    Consultation - [facility]  Level of consult: Consult, follow and place orders    Susan Haq DOB  1/10/1932, MRN [id number]     Admitting Dx: Auditory hallucinations [R44.0]  Interstitial pulmonary fibrosis (H) [J84.10]  Closed nondisplaced fracture of proximal phalanx of lesser toe of right foot, initial encounter [S92.514A]  Fall, initial encounter [W19.XXXA]  Closed fracture of proximal end of fibula, unspecified fracture morphology, initial encounter [S82.839A]  Closed fracture of distal end of right fibula, unspecified fracture morphology, initial encounter [S82.831A]     PCP: Ema Melendez, [phone]     Code status:[SO1.1]  DNR/DNI[SO1.2]     Extended Emergency Contact Information  Primary Emergency Contact: Martina Pickard   [facility]  Home Phone: [phone]  Work Phone: [phone]  Mobile Phone: [phone]  Relation: Friend     Assessment:  Right distal fibula fracture and right shoulder pain    Plan:  Patient should remain NWB on RLE.  Splint should not be removed until follow up visit.  She can wear a sling for comfort on the right arm if she chooses.    OK to d/c NPO status.  She should follow up with Dr. Carrillo Kiser on Thursday in [place].  Call [phone] to schedule.[SO1.1]      Principal Problem:    Closed fracture of fibula  Active Problems:    Chronic obstructive pulmonary disease, unspecified COPD type (H)    Major depressive disorder, single episode, moderate (H)    Risk for falls    Auditory hallucination    Right elbow pain    Alcohol abuse    Hyperlipidemia LDL goal <130    Insomnia    Hypertension goal BP (blood pressure) < 140/90    SUSSY (obstructive sleep apnea)-Moderately severe (AHI 21)    Bilateral leg edema    Prediabetes    Interstitial pulmonary fibrosis (H)    Intertrigo[SO1.2]       Chief Complaint  Right ankle pain and right shoulder pain     HPI  We have been requested by Dr. Anton Haro MD to evaluate Susan Haq who is a 85 year old year old female for a right distal fibula fracture, questionable mid 5th proximal phalanx fracture and  right shoulder pain.  Yesterday morning around 0900 the patient had removed a water jug from the refrigerator to pour a glass of water.  She took the glass, turned and tripped on a new shaggy rug she had placed there a few days prior.  She said she fell 'flat down' and immediately felt intense pain at her ankle.  She stayed on the floor because she 'had nothing to grab onto' to pull herself up.  She had friends that were scheduled to stop by that morning so she waited on the floor until they arrived and could take her to ED.  She describes the pain as sharp primarily on the lateral aspect of the ankle.  It worsens with movement and improves with rest.  She also mentions shoulder pain.  She doesn't recall falling on the shoulder, but cannot raise it up without pain.  The right shoulder pain is predominantly on the lateral aspect.  She denies any toe pain at this time.  She denies any LOC, numbness or tingling.       History is obtained from the patient     Past Medical History[SO1.1]  Past Medical History:   Diagnosis Date     Basal cell carcinoma      Bronchospasm     copd vs. asthma     Diverticulosis      Fracture, Metacarpal Shaft - right 4th 7/5/2010     High cholesterol      HL (hearing loss) 3/25/2013     Hypertension      Hypokalemia 9/17/2013     Hypoxia 9/5/2013     PVC's (premature ventricular contractions) 4/25/2011     Smoker 1986    quit     Venous insufficiency[SO1.2]        Surgical History[SO1.1]  Past Surgical History:   Procedure Laterality Date     APPENDECTOMY       C BSO, OMENTECTOMY W/SHANIA       C NONSPECIFIC PROCEDURE      (L) clavicle orif     C STOMACH SURGERY PROCEDURE UNLISTED       CHOLECYSTECTOMY, LAPOROSCOPIC  10/13/2011    Cholecystectomy, Laparoscopic     HERNIA REPAIR, UMBILICAL  10/13/2011     HYSTERECTOMY, CERVIX STATUS UNKNOWN[SO1.2]          Social History[SO1.1]  Social History     Social History     Marital status:      Spouse name: N/A     Number of children: N/A      Years of education: N/A     Occupational History      Retired      in [place]     Social History Main Topics     Smoking status: Former Smoker     Packs/day: 1.00     Years: 35.00     Quit date: 1/1/1990     Smokeless tobacco: Former User     Alcohol use Yes      Comment: Drink 1 (3oz) drink a day     Drug use: No     Sexual activity: No     Other Topics Concern     Not on file     Social History Narrative    Lives independently in own home.[SO1.2]       Family History[SO1.1]  Family History   Problem Relation Age of Onset     DIABETES Father      Coronary Artery Disease Maternal Grandmother      Coronary Artery Disease Paternal Uncle[SO1.2]         Allergies:[SO1.1]  Ace inhibitors; Asa [aluminum hydroxide]; and Penicillins[SO1.2]      Current Medications:[SO1.1]  Current Facility-Administered Medications   Medication     0.9% sodium chloride infusion     traMADol (ULTRAM) half-tab 25-50 mg     albuterol neb solution 2.5 mg     hydrOXYzine (ATARAX) tablet 25-50 mg     fluticasone-vilanterol (BREO ELLIPTA) 200-25 MCG/INH oral inhaler 1 puff     umeclidinium (INCRUSE ELLIPTA) 62.5 MCG/INH oral inhaler 1 puff     naloxone (NARCAN) injection 0.1-0.4 mg     acetaminophen (TYLENOL) tablet 650 mg     senna-docusate (SENOKOT-S;PERICOLACE) 8.6-50 MG per tablet 1-2 tablet     ondansetron (ZOFRAN-ODT) ODT tab 4 mg    Or     ondansetron (ZOFRAN) injection 4 mg     acetaminophen (TYLENOL) tablet 1,000 mg     miconazole (MICATIN; MICRO GUARD) 2 % powder     atenolol (TENORMIN) tablet 50 mg     atorvastatin (LIPITOR) tablet 20 mg[SO1.2]       Review of Systems:  The Review of Systems is negative other than noted in the HPI    Physical Exam:[SO1.1]  Temp:  [97.7  F (36.5  C)-98.7  F (37.1  C)] 97.7  F (36.5  C)  Pulse:  [57-75] 61  Heart Rate:  [58-61] 61  Resp:  [16-20] 18  BP: (125-179)/(44-84) 141/44  SpO2:  [89 %-98 %] 97 %[SO1.2]    The patient is alert and oriented x 3 and is not in any acute distress.     She does not have labored breathing and her color and turgor are normal.    The right ankle is in a fiberglass splint with ace bandage covering.  She has good capillary refill at both the fingers and toes bilaterally and her sensation is intact to light touch.  She can easily wiggle her right toes without pain. She does not have any pain with palpation of the little toe.  She is tender over the right distal fibula.  She is unable to move her ankle at this time due to the splint.  Her right shoulder is slightly tender when palpated over the entire lateral aspect.  She has limited motion due to pain.       Pertinent Labs  Lab Results: personally reviewed.[SO1.1]  Lab Results   Component Value Date    WBC 7.1 06/11/2017    HGB 11.6 (L) 06/11/2017    HCT 34.8 (L) 06/11/2017    MCV 94 06/11/2017     06/11/2017       Recent Labs  Lab 06/10/17  1735 06/10/17  1440   INR 1.03 Canceled, Test credited Unsatisfactory specimen - clottedSpecimen will need to be recollected, notified Libby in [facility] 6/10/17 1600 JMCORRECTED ON 06/10 AT 1704: PREVIOUSLY REPORTED AS 1.12[SO1.2]       Pertinent Radiology  Radiology Results: images and radiology report reviewed  Recent Results (from the past 24 hour(s))   Pelvis XR, 1-2 views    Narrative    PELVIS ONE TO TWO VIEWS  6/10/2017 3:51 PM     HISTORY: Fall.    COMPARISON: September 13, 2016.      Impression    IMPRESSION: Both femoral heads articulate normally with the respective  acetabula. Minimal joint space narrowing, though no osteophyte  formation. Right lateral knee radiograph demonstrates normal alignment  without effusion. Proximal fibular fracture again visible.     RAMILA BARNES MD   Tib/Fib XR, right    Narrative    RIGHT TIBIA AND FIBULA TWO VIEWS   6/10/2017 3:52 PM     HISTORY: Ankle pain, tib-fib pain.    COMPARISON: None.      Impression    IMPRESSION: Oblique fracture through the distal fibula, minimally  displaced, extending to the level of the ankle mortise.  Minimally  displaced fracture in the proximal aspect of the fibula, as well. This  may be associated with damage to interosseous ligament.     RAMILA BARNES MD   Foot  XR, G/E 3 views, right    Narrative    RIGHT FOOT THREE OR MORE VIEWS  6/10/2017 3:52 PM     HISTORY: Ecchymosis distal toes, pain at left ankle, foot ecchymosis,  bruising.    COMPARISON: None.      Impression    IMPRESSION: Mild hammertoe alignment of second through fifth toes.  Bones are normally aligned. Lucency in the mid fifth proximal phalanx,  suggestive of a nondisplaced fracture, clinically correlate with pain.  Additional lucency in the distal aspect of the second proximal  phalanx, possible location of fracture given the appearance, though  this may be somewhat degenerative.     RAMILA BARNES MD   Radius/Ulna XR, PA & LAT, right    Narrative    RIGHT FOREARM TWO VIEWS   6/10/2017 3:53 PM     HISTORY: Fall, forearm injury.    COMPARISON: None.      Impression    IMPRESSION: Proximal and distal radial and ulnar articulations intact.  No acute fracture.       RAMILA BARNES MD   MR Brain w/o & w Contrast    Narrative    MRI BRAIN WITHOUT AND WITH CONTRAST  6/10/2017 4:56 PM    HISTORY:  Fall today, striking occiput; one week of constant auditory  hallucinations, constant hearing of music.     TECHNIQUE:  Multiplanar, multisequence MRI of the brain without and  with 10 mL IV Gadavist.    COMPARISON: None.    FINDINGS:  There is generalized atrophy of the brain.  White matter  changes are present in the cerebral hemispheres that are consistent  with small vessel ischemic disease in this age patient. Old lacunar  infarct is seen in the right corona radiata and upper right putamen.  There is no evidence of hemorrhage, mass, acute infarct, or anomaly.   There are no gadolinium enhancing lesions.    The facial structures appear normal. The arteries at the base of the  brain and the dural venous sinuses appear patent.       Impression     IMPRESSION:  1. No acute abnormality. No evidence of intracranial trauma.  2. Brain atrophy and white matter changes consistent with sequelae of  small vessel ischemic disease.  3. Old lacunar infarct in the right corona radiata and upper right  putamen.    ROBBIE FARFAN MD       Attestation:  I have reviewed today's vital signs, notes, medications, labs and imaging.  Amount of time performed on this consult: 30 minutes.     Miguel A Mcintosh[SO1.1]       Revision History        User Key Date/Time User Provider Type Action    > SO1.2 6/11/2017 11:00 AM Miguel A Mcintosh PA-C Physician Assistant - DESHAUN Sign     SO1.1 6/11/2017 10:37 AM Miguel A Mcintosh PA-C Physician Assistant - C             Consults by Deloris Blanco LICSW at 6/11/2017 10:02 AM     Author:  Deloris Blanco LICSW Service:  (none) Author Type:      Filed:  6/11/2017 10:02 AM Date of Service:  6/11/2017 10:02 AM Creation Time:  6/11/2017 10:01 AM    Status:  Signed :  Deloris Blanco LICSW ()     Consult Orders:    1. Care Transition RN/SW IP Consult [[id number]] ordered by Karol Pal PA-C at 06/10/17 3093                CARE TRANSITION SOCIAL WORK INITIAL ASSESSMENT:      Met with: Patient.    DATA  Principal Problem:    Closed fracture of fibula  Active Problems:    Hyperlipidemia LDL goal <130    Insomnia    Hypertension goal BP (blood pressure) < 140/90    SUSSY (obstructive sleep apnea)-Moderately severe (AHI 21)    Bilateral leg edema    Prediabetes    Interstitial pulmonary fibrosis (H)    Chronic obstructive pulmonary disease, unspecified COPD type (H)    Major depressive disorder, single episode, moderate (H)    Risk for falls    Auditory hallucination    Right elbow pain    Intertrigo    Alcohol abuse       Primary Care [facility] Name:  ([facility])  Primary Care MD Name:  Eri)  Contact information and PCP information verified: Yes      ASSESSMENT  Cognitive Status: awake, alert and  oriented.       Resources List: Home Care, Transitional Care     Lives With: alone        Description of Support System: Supportive, Involved   Who is your support system?: Children, Neighbor   Support Assessment: Adequate family and caregiver support, Adequate social supports   Insurance Concerns: No Insurance issues identified          This writer met with pt, introduced self and role. Pt reports that she lives alone. This writer discussed medicare guidelines for home care and TCU. Pt does not want to dc to a TCU, she is agreeable to home care if she needs it. CTS will follow, therapy to evaluate.       PLAN    CTS to follow      Deloris GUZMAN, GABBY, [facility] [phone][AK1.1]         Revision History        User Key Date/Time User Provider Type Action    > AK1.1 6/11/2017 10:02 AM Deloris Blanco LICSW  Sign            Consults by Ashley Landeros RPH at 6/10/2017  5:55 PM     Author:  Ashley Landeros RPH Service:  (none) Author Type:  Pharmacist    Filed:  6/10/2017  5:55 PM Date of Service:  6/10/2017  5:55 PM Creation Time:  6/10/2017  5:37 PM    Status:  Signed :  Ashley Landeros RPH (Pharmacist)         Consult received regarding potential medications in which patient has been taking which have the potential to cause orthostasis.      Patient's PTA medication list includes:  Albuterol (nebs and inhaler), amlodipine, atenolol, atorvastatin, budesonide-formoterol, calcium carbonate-vitamin D, clonidine, fish oil, furosemide, hydroxyzine, ibuprofen, losartan-hydrochlorothiazide, and multivitamin.    Patient's blood pressure medications can certainly cause hypotension.  These include amlodipine, atenolol, clonidine, furosemide, and losartan-hydrochlorothiazide.  Of these furosemide is associated with orthostatsis when aggravated by alcohol, narcotic or barbituate use.      However, MOST notable is clonidine in which orthostasis occurs in approximately 30 % of patients.  Patient has  been on clonidine since 2012, however patient's renal function has most likely declined with increasing age (despite her creatinine only increasing during this time period from a baseline of 0.9 to today's value of 1.01).      It may be reasonable to try decreasing her clonidine dose or even titrate patient off this medication.    Thank you for this consultation and please contact pharmacy should you need any further assistance.    Ashley Landeros, Pharm.D.[DP1.1]         Revision History        User Key Date/Time User Provider Type Action    > DP1.1 6/10/2017  5:55 PM Ashley Landeros, [facility] Pharmacist Sign                     Progress Notes - Physician (Notes from 06/10/17 through 06/13/17)      Progress Notes by Heaven Montana PA-C at 6/13/2017 11:15 AM     Author:  Heaven Montana PA-C Service:  Internal Medicine Author Type:  Physician Assistant - C    Filed:  6/13/2017 12:21 PM Date of Service:  6/13/2017 11:15 AM Creation Time:  6/13/2017 11:15 AM    Status:  Attested :  Heaven Montana PA-C (Physician Assistant - C)    Cosigner:  Philip Shabazz MD at 6/13/2017  1:24 PM        Attestation signed by Philip Shabazz MD at 6/13/2017  1:24 PM        Physician Attestation   IPhilip, saw and evaluated Susan Haq as part of a shared visit.  I have reviewed and discussed with the advanced practice provider their history, physical and plan.    I personally reviewed the vital signs and medications.    My key history or physical exam findings: lungs clear    Key management decisions made by me: Restart home blood pressure meds including clonidine    Philip Shabazz  Date of Service (when I saw the patient): 06/13/17                               [facility]    Hospital Medicine Progress Note  Date of Service: 06/13/2017    Assessment & Plan   Susan Haq is a 85 year old female who presented on 6/10/2017 for scheduled  by Philip Shabazz MD and is being followed by  the hospital medicine service for co-management of acute and/or chronic perioperative medical problems.    S/p tibia/fibular fracture, right  Pain management is adequate  - IP orthopedic consult placed; Non Operative management  - Non-Weight baring on the Right Lower Limb  - will discharge to TCU today at 3pm    Right phalanx fracture  Visualized on xray.    - conservative treatment as above.     Insomnia  - continue melatonin    Urinary Tract Infection  - continue rocephin 1g IV, Q24 (day 2 of treatment is today)  - transition to outpatient cefazolin PO  - monitor I and O's    Hypertension goal BP (blood pressure) < 140/90  BP quite elevated this morning. Was on lower dose of home Hyzaar  - resume home Hyzaar, lasix  - continue PTA atenolol  - continue to hold home clonidine, amlodipine as this may be contributing to dizziness/fall  - recommend outpatient follow up and frequent BP checks at TCU. (still not getting home amlodipine nor clonidine)    Chronic obstructive pulmonary disease, unspecified COPD type (H)  - continue PTA Symbicort, Spiriva, PRN albuterol      Major depressive disorder, single episode, moderate (H)  - Zoloft held on admit given possible contribution to falls  - recommend outpatient follow up with PCP, assess possible medication switch.      Auditory hallucination  Alcohol abuse  Last drink 06/08.  3oz of alcohol daily. Was not started on CIWA on admit  - give multivitamin, thiamine, folic acid today      Type 2 diabetes mellitus (H)  Not on medication. A1c 5.7%.      Hyperlipidemia LDL goal <130  - continue PTA Lipitor    SUSSY (obstructive sleep apnea)-Moderately severe (AHI 21)  Non-compliant with CPAP      Bilateral leg edema  Chronic, unchanged.  Weight is up 4 pounds this admission.  - no indication for IVF  - resume home lasix today      Interstitial pulmonary fibrosis (H)      Intertrigo  - RN care in place, daily hygiene      DVT Prophylaxis: as per orthopedic surgery service - defer to  ortho  Code Status: DNR/DNI    Lines: PIV, without edema/erythema   Fox catheter: not indicated    Discussion: Medically, the patient appears stable.    Disposition: Anticipate discharge later this afternoon.     Attestation:  I have reviewed today's vital signs, notes, medications, labs and imaging.    Heaven Montana PA-C      Interval History[CH1.1]   Reports pain is well controlled.  Remains hesitant about discharge to TCU.  Otherwise denies nausea, fever, CP, SOB, cough, headache, changes to vision, abdominal pain, dysuria, and lower extremity pain.  Believes her left lower extremity is mildly edematous.[CH1.2]    Physical Exam   Temp:  [98  F (36.7  C)-98.6  F (37  C)] 98.5  F (36.9  C)  Heart Rate:  [59-70] 67  Resp:  [16-18] 18  BP: (152-203)/(49-64) 203/63  SpO2:  [94 %-96 %] 96 %    Weights:   Vitals:    06/10/17 1338 06/12/17 0500 06/13/17 0635   Weight: 107 kg (236 lb) 108.9 kg (240 lb 1.3 oz) 108.7 kg (239 lb 10.2 oz)    Body mass index is 40.5 kg/(m^2).    General: alert and oriented x4, in no acute distress  CV: Regular rate/rhythm, no appreciable murmur, rub, or gallop  Respiratory: CTA bilaterally, equal chest expansion  GI: Soft, nontender, normoactive S  Skin: Warm and dry  Musculoskeletal:[CH1.1] Negative[CH1.2] homans[CH1.1] to left lower extremity[CH1.2].[CH1.1]  Non-tender[CH1.2] to palpation of posterior calves.[CH1.1] Mild[CH1.2] edema to[CH1.1] left LE, non-pitting[CH1.2]  Neuro: equal  strength    Data     Recent Labs  Lab 06/12/17  0618 06/11/17  0613 06/10/17  1855 06/10/17  1735 06/10/17  1440   WBC 6.3 7.1 8.3 Canceled, Test credited Unsatisfactory specimen - clottedNotified Libby DRAPER 6/10/17 1800 JM Canceled, Test credited Unsatisfactory specimen - clottedspecimen will need to be recollected, notified Tiffany Fuentes RN [facility] 6/10/17 1630 JMCORRECTED ON 06/10 AT 1707: PREVIOUSLY REPORTED AS 8.7   HGB 11.8 11.6* 12.3 Canceled, Test credited Unsatisfactory specimen - clottedNotified  Libby [facility] 6/10/17 1800 JM Canceled, Test credited Unsatisfactory specimen - clottedspecimen will need to be recollected, notified Tiffany Fuentes RN [facility] 6/10/17 1630 JMCORRECTED ON 06/10 AT 1707: PREVIOUSLY REPORTED AS 12.1   MCV 93 94 93 Canceled, Test credited Unsatisfactory specimen - clottedNotified Libby [facility] 6/10/17 1800 JM Canceled, Test credited Unsatisfactory specimen - clottedspecimen will need to be recollected, notified Tiffany Fuentes RN [facility] 6/10/17 1630 JMCORRECTED ON 06/10 AT 1707: PREVIOUSLY REPORTED AS 94    193 199 Canceled, Test credited Unsatisfactory specimen - clottedNotified Libby [facility] 6/10/17 1800 JM Canceled, Test credited Unsatisfactory specimen - clottedspecimen will need to be recollected, notified Tiffany Fuentes RN [facility] 6/10/17 1630 JMCORRECTED ON 06/10 AT 1707: PREVIOUSLY REPORTED AS 58   INR  --   --   --  1.03 Canceled, Test credited Unsatisfactory specimen - clottedSpecimen will need to be recollected, enma phipps [facility] 6/10/17 1600 JMCORRECTED ON 06/10 AT 1704: PREVIOUSLY REPORTED AS 1.12    143  --   --  140   POTASSIUM 3.4 3.2*  --   --  3.5   CHLORIDE 109 106  --   --  104   CO2 28 29  --   --  28   BUN 18 24  --   --  27   CR 0.75 0.79  --   --  1.01   ANIONGAP 4 8  --   --  8   JAGDISH 8.2* 8.1*  --   --  8.3*   * 115*  --   --  119*   ALBUMIN  --   --   --   --  3.1*   PROTTOTAL  --   --   --   --  5.8*   BILITOTAL  --   --   --   --  0.6   ALKPHOS  --   --   --   --  109   ALT  --   --   --   --  26   AST  --   --   --   --  18   TROPI  --   --   --   --  <0.015The 99th percentile for upper reference range is 0.045 ug/L.  Troponin values in the range of 0.045 - 0.120 ug/L may be associated with risks of adverse clinical events.         Recent Labs  Lab 06/12/17  0618 06/11/17  0613 06/10/17  1440   * 115* 119*        Unresulted Labs Ordered in the Past 30 Days of this Admission     Date and Time Order Name Status Description    6/11/2017 1650 Urine Culture  Aerobic Bacterial Preliminary     6/10/2017 1629 Blood Morphology Pathology Review In process            Imaging  No results found for this or any previous visit (from the past 24 hour(s)).     I reviewed all new labs and imaging results over the last 24 hours. I personally reviewed[CH1.1] no images or EKG's today[CH1.2].    Medications        enoxaparin  40 mg Subcutaneous Q24H     losartan  25 mg Oral Daily     hydrochlorothiazide  12.5 mg Oral Daily     sodium chloride (PF)  3 mL Intracatheter Q8H     cefTRIAXone  1 g Intravenous Q24H     fluticasone-vilanterol  1 puff Inhalation Daily     umeclidinium  1 puff Inhalation Daily     acetaminophen  1,000 mg Oral Q8H     miconazole   Topical BID     atenolol  50 mg Oral BID     atorvastatin  20 mg Oral At Bedtime       Heaven Montana PA-C[CH1.1]          Revision History        User Key Date/Time User Provider Type Action    > CH1.2 6/13/2017 12:21 PM Heaven Montana PA-C Physician Assistant Isaías MEADE Sign     CH1.1 6/13/2017 11:15 AM Heaven Montana PA-C Physician Assistant - C             Progress Notes by Deloris Blanco LICSW at 6/13/2017  1:03 PM     Author:  Deloris Blanco LICSW Service:  (none) Author Type:      Filed:  6/13/2017  1:23 PM Date of Service:  6/13/2017  1:03 PM Creation Time:  6/13/2017  1:03 PM    Status:  Addendum :  Deloris Blanco LICSW ()         Reason for Follow up: DC planning    Anticipated discharge needs: Waiting on confirmation for TCU bed at [facility] Phone: ([phone]) Fax: ([phone]).     PAS-RR    Per DHS regulation, CTS team completed and submitted PAS-RR to [place] [employer] Direct Connect via the [employer]. CTS team advised SNF and they are aware a PAS-RR has been submitted.     CTS team reviewed with pt or health care agent that they may be contacted for a follow up appointment within 10 days of hospital discharge if SNF stay is <30 days. Contact  information for [employer] was also provided.     Pt or health care agent verbalized understanding.     PAS-RR # [id number]      Next steps: Waiting on bed, anticipate dc today    Delrois Deckermari [facility]BY [facility]MAN[facility] [phone][AK1.1]         SW addendum:Pt has been accepted at [facility] Phone: ([phone]) Fax: ([phone]) and will dc today. Pt in agreement with dc plan. Deloris Deckermari GABBY GU[facility]MAN[facility] [phone][AK1.2]       Revision History        User Key Date/Time User Provider Type Action    > AK1.2 6/13/2017  1:23 PM Deloris Blanco LICSW  Addend     AK1.1 6/13/2017  1:04 PM Deloris Blanco LICSW  Sign            Progress Notes by Yarely Plasencia PA-C at 6/12/2017 11:24 AM     Author:  Yaerly Plasencia PA-C Service:  Orthopedics Author Type:  Physician Assistant Isaías MEADE    Filed:  6/12/2017  8:33 PM Date of Service:  6/12/2017 11:24 AM Creation Time:  6/12/2017  8:24 PM    Status:  Addendum :  Yarely Plasencia PA-C (Physician Assistant - C)         [facility] Orthopaedics Progress Note[TW1.1]      RUE pain  R proximal 1/3 and distal 1/3 fibula fx with minimal displacement.   R foot proximal phalanx of lesser toe[TW1.2]     Subjective:    Pain: minimal  aching to R ankle and R arm. Pain is not focal to the R arm. Denies shooting pain, parasthesias, numbness and weakness.   denies Chest pain, SOB, O2 required: Nasal Cannula  denies nausea/ emesis  denies lightheadedness/ dizziness        Objective:  Blood pressure 168/57, pulse 69, temperature 98.2  F (36.8  C), temperature source Tympanic, resp. rate 18, weight 108.9 kg (240 lb 1.3 oz), SpO2 94 %, not currently breastfeeding.    Patient Vitals for the past 24 hrs:   BP Temp Temp src Pulse Heart Rate Resp SpO2 Weight   06/12/17 1909 168/57 98.2  F (36.8  C) Tympanic - 70 18 94 % -   06/12/17 1608 152/49 98.4  F (36.9  C) Oral - 59 16 94 % -   06/12/17 0749 189/60 98.3  F (36.8  C) Oral 69 - 16 95 %  -   06/12/17 0500 - - - - - - - 108.9 kg (240 lb 1.3 oz)   06/12/17 0313 171/51 98.5  F (36.9  C) Oral - 64 16 95 % -   06/11/17 2342 176/60 98.4  F (36.9  C) Oral - 65 18 93 % -       Wt Readings from Last 4 Encounters:   06/12/17 108.9 kg (240 lb 1.3 oz)   03/08/17 107 kg (236 lb)   09/13/16 104.8 kg (231 lb)   05/25/16 106.5 kg (234 lb 12.8 oz)     General: Alert and orientated. No apparent distress. Non-labored breathing. Appropriate affect.   MSK: R ankle: dressing Clean, dry, and intact. Skin intact at digits and proximal to splint. Splint in place, exchanged for a short leg splint. Digits mobile, ecchymosis noted at great and 2nd digit.   RUE: non-tender to palpation. AROM limited due to pain. Full elbow, wrist and shoulder PROM. Drop arm test negative.       Pertinent Labs   Lab Results: personally reviewed.     Recent Labs   Lab Test  06/12/17   0618  06/11/17   0613  06/10/17   1855  06/10/17   1735  06/10/17   1440   INR   --    --    --   1.03  Canceled, Test credited   Unsatisfactory specimen - clotted  Specimen will need to be recollected, enma phipps [facility] 6/10/17 1600 JM  CORRECTED ON 06/10 AT 1704: PREVIOUSLY REPORTED AS 1.12     HGB  11.8  11.6*  12.3  Canceled, Test credited   Unsatisfactory specimen - clotted  Notified Libby [facility] 6/10/17 1800 JM    Canceled, Test credited   Unsatisfactory specimen - clotted  specimen will need to be recollected, notified Tiffany Fuentes RN [facility] 6/10/17 1630   JM  CORRECTED ON 06/10 AT 1707: PREVIOUSLY REPORTED AS 12.1     HCT  35.2  34.8*  36.7  Canceled, Test credited   Unsatisfactory specimen - clotted  Notified Libby [facility] 6/10/17 1800 JM    Canceled, Test credited   Unsatisfactory specimen - clotted  specimen will need to be recollected, notified ARIADNA Barahona 6/10/17 1630   JM  CORRECTED ON 06/10 AT 1707: PREVIOUSLY REPORTED AS 36.2     MCV  93  94  93  Canceled, Test credited   Unsatisfactory specimen - clotted  Notified Libby DRAPER 6/10/17 1800 JM     Canceled, Test credited   Unsatisfactory specimen - clotted  specimen will need to be recollected, notified Tiffany Fuentes RN [facility] 6/10/17 1630     CORRECTED ON 06/10 AT 1707: PREVIOUSLY REPORTED AS 94     PLT  204  193  199  Canceled, Test credited   Unsatisfactory specimen - clotted  Notified Libby [facility] 6/10/17 1800 JM    Canceled, Test credited   Unsatisfactory specimen - clotted  specimen will need to be recollected, notified Tiffany Fuentes RN [facility] 6/10/17 1630     CORRECTED ON 06/10 AT 1707: PREVIOUSLY REPORTED AS 58     NA  141  143   --    --   140       Plan: Anticoagulation protocol: Lovenox inpatient and then  mg daily at discharge  x 14  days            Pain medications:  Per primary            Weight bearing status:  NWB RLE, splint to remain C/D/I until follow up. Sling for RUE and WBAT.             Disposition:  TCU likely.              Follow up: with Dr. Carrillo Kiser on 6/15 in [facility]    Discussed the possibility of having surgery. She is to discuss this with the surgeon on Thursday for potential fixation of the distal fibula fx.     Report completed by:  Yarely Plasencia PA-C  Date: 6/12/2017  Time: 8:24 PM[TW1.1]       Revision History        User Key Date/Time User Provider Type Action    > TW1.2 6/12/2017  8:33 PM Yarely Plasencia PA-C Physician Assistant - C Addend     TW1.1 6/12/2017  8:30 PM Yarely Plasencia PA-C Physician Assistant - C Sign            Progress Notes by Philip Shabazz MD at 6/12/2017  6:54 PM     Author:  Philip Shabazz MD Service:  Hospitalist Author Type:  Physician    Filed:  6/12/2017  7:10 PM Date of Service:  6/12/2017  6:54 PM Creation Time:  6/12/2017  6:54 PM    Status:  Signed :  Philip Shabazz MD (Physician)         [facility] Medicine Progress Note  Date of Service:[RK1.1] 06/12/2017        Assessment & Plan[RK1.2]   Susan Haq is a 85 year old female who  presented on 6/10/2017 with  pulmonary fibrosis, on 3L chronically, SUSSY, HLD, HTN, lower extremity edema who was admitted 6/10/17 after a fall, resulting in multiple fractures.[RK1.1]    Principal Problem:[RK1.2]      Acute[RK1.1] Tibia/fibula fracture, right, closed, initial encounter[RK1.2]  Pain management is adequate  -Non Operative management as per Orthopedics  - Non-Weight baring on the Right Lower Limb  - No surgery can eat    Right arm pain: patient reported to admitting provider it was elbow; to me this AM, she reports it is her shoulder.  She reports she cannot move the arm due to severe pain.  Xrays of shoulder and wrist are negative.  No focal pain with palpation in area of elbow.  Discussed that her severe arm pain will make it very difficult for her to care for self at home, now that she is NWB RLE.  Patient still hesitant for TCU    Right right phalanx fracture: seen on xray.    -Conservative Treatment as above.    Insomnia  -will give Melatonin[RK1.1]    Active Problems:    Chronic obstructive pulmonary disease, unspecified COPD type (H)[RK1.2] - Stable[RK1.1]    Major depressive disorder, single episode, moderate (H)    Auditory hallucination    Right elbow pain    Alcohol abuse    Type 2 diabetes mellitus (H)    Hyperlipidemia LDL goal <130    Insomnia    Hypertension goal BP (blood pressure) < 140/90    SUSSY (obstructive sleep apnea)-Moderately severe (AHI 21)    Bilateral leg edema    Interstitial pulmonary fibrosis (H)    Intertrigo[RK1.2]      DVT Prophylaxis: Enoxaparin (Lovenox) SQ  Code Status:[RK1.1] DNR/DNI[RK1.2]         Plan:   Discharge as per Ortho recommendation   Will discuss with Ortho the plan of care for the Hand  Melatonin for sleep      Disposition: Anticipate discharge 6/13/2017[RK1.1]               Interval History[RK1.2]   Denies any pain in the Right Lower Limb when not moving  On moving she is having Non Weight Spring on the Right Lower Limb  No chest pain or  SOB[RK1.1]    Physical Exam   Temp:  [98.3  F (36.8  C)-98.8  F (37.1  C)] 98.4  F (36.9  C)  Pulse:  [69] 69  Heart Rate:  [59-65] 59  Resp:  [16-18] 16  BP: (152-189)/(49-61) 152/49  SpO2:  [93 %-95 %] 94 %[RK1.2]    Weights:[RK1.1]   Vitals:    06/10/17 1338 06/12/17 0500   Weight: 107 kg (236 lb) 108.9 kg (240 lb 1.3 oz)    Body mass index is 40.57 kg/(m^2).[RK1.2]    Constitutional: Not in any acute distress   EYES: Extra Occular movements are intact, Conjunctiva is clear   NECK: no JVD  CVS: S1 S2  Regular  Respiratory: Clear to auscultation without crepitations or Wheezing  bilaterally  GI: Soft, non tender, Bowel Sounds are Positive   Skin: Warm and dry, No Rashes  CNS: Alert and Oriented x 3   Musculoskeletal: Lower Limbs with Pedal Edema[RK1.1]            Data     Recent Labs  Lab 06/12/17  0618 06/11/17  0613 06/10/17  1855 06/10/17  1735 06/10/17  1440   WBC 6.3 7.1 8.3 Canceled, Test credited Unsatisfactory specimen - clottedNotified Libby [facility] 6/10/17 1800 JM Canceled, Test credited Unsatisfactory specimen - clottedspecimen will need to be recollected, notified Tiffany Fuentes RN [facility] 6/10/17 1630 JMCORRECTED ON 06/10 AT 1707: PREVIOUSLY REPORTED AS 8.7   HGB 11.8 11.6* 12.3 Canceled, Test credited Unsatisfactory specimen - clottedNotified Libby [facility] 6/10/17 1800 JM Canceled, Test credited Unsatisfactory specimen - clottedspecimen will need to be recollected, notified Tiffany Fuentes RN [facility] 6/10/17 1630 JMCORRECTED ON 06/10 AT 1707: PREVIOUSLY REPORTED AS 12.1   MCV 93 94 93 Canceled, Test credited Unsatisfactory specimen - clottedNotified Libby [facility] 6/10/17 1800 JM Canceled, Test credited Unsatisfactory specimen - clottedspecimen will need to be recollected, notified Tiffany Fuentes RN [facility] 6/10/17 1630 JMCORRECTED ON 06/10 AT 1707: PREVIOUSLY REPORTED AS 94    193 199 Canceled, Test credited Unsatisfactory specimen - clottedNotified Libby [facility] 6/10/17 1800  Canceled, Test credited Unsatisfactory  specimen - clottedspecimen will need to be recollected, notified Tiffany Fuentes RN [facility] 6/10/17 1630 JMCORRECTED ON 06/10 AT 1707: PREVIOUSLY REPORTED AS 58   INR  --   --   --  1.03 Canceled, Test credited Unsatisfactory specimen - clottedSpecimen will need to be recollected, notified Libby in [facility] 6/10/17 1600 JMCORRECTED ON 06/10 AT 1704: PREVIOUSLY REPORTED AS 1.12    143  --   --  140   POTASSIUM 3.4 3.2*  --   --  3.5   CHLORIDE 109 106  --   --  104   CO2 28 29  --   --  28   BUN 18 24  --   --  27   CR 0.75 0.79  --   --  1.01   ANIONGAP 4 8  --   --  8   JAGDISH 8.2* 8.1*  --   --  8.3*   * 115*  --   --  119*   ALBUMIN  --   --   --   --  3.1*   PROTTOTAL  --   --   --   --  5.8*   BILITOTAL  --   --   --   --  0.6   ALKPHOS  --   --   --   --  109   ALT  --   --   --   --  26   AST  --   --   --   --  18   TROPI  --   --   --   --  <0.015The 99th percentile for upper reference range is 0.045 ug/L.  Troponin values in the range of 0.045 - 0.120 ug/L may be associated with risks of adverse clinical events.         Recent Labs  Lab 06/12/17  0618 06/11/17  0613 06/10/17  1440   * 115* 119*        Unresulted Labs Ordered in the Past 30 Days of this Admission     Date and Time Order Name Status Description    6/11/2017 1650 Urine Culture Aerobic Bacterial Preliminary     6/10/2017 1629 Blood Morphology Pathology Review In process[RK1.2]            Imaging[RK1.1]  Recent Results (from the past 24 hour(s))   XR Elbow Port Right 2 Views    Narrative    ELBOW PORTABLE RIGHT TWO VIEWS June 12, 2017 6:30 AM     HISTORY: Right elbow pain.    COMPARISON: None.    FINDINGS: The visualized bones and joint spaces are within normal  limits.      Impression    IMPRESSION: No evidence for fracture, dislocation or significant  degenerative change of the right elbow on these two portable views.    KATT CHOWDHURY MD           Medications        enoxaparin  40 mg Subcutaneous Q24H     losartan  25 mg Oral Daily      hydrochlorothiazide  12.5 mg Oral Daily     sodium chloride (PF)  3 mL Intracatheter Q8H     cefTRIAXone  1 g Intravenous Q24H     fluticasone-vilanterol  1 puff Inhalation Daily     umeclidinium  1 puff Inhalation Daily     acetaminophen  1,000 mg Oral Q8H     miconazole   Topical BID     atenolol  50 mg Oral BID     atorvastatin  20 mg Oral At Bedtime[RK1.2]              Philip MENG  Hospitalist - Internal Medicine  [facility][RK1.1]        Revision History        User Key Date/Time User Provider Type Action    > RK1.2 6/12/2017  7:10 PM Philip Shabazz MD Physician Sign     RK1.1 6/12/2017  6:54 PM Philip Shabazz MD Physician             Progress Notes by Daniela Rao RN at 6/12/2017  7:13 AM     Author:  Daniela Rao RN Service:  (none) Author Type:  Registered Nurse    Filed:  6/12/2017  7:16 AM Date of Service:  6/12/2017  7:13 AM Creation Time:  6/12/2017  7:13 AM    Status:  Signed :  Daniela Rao RN (Registered Nurse)         Patient A/O x4; moments of forgetfulness and had episode of confusion during the night where she was trying to get out of bed due to feeling she was trapped. Aware of needing to use call light and uses it appropriately; otherwise. RT set up CPAP that friends brought in from pt's home. Restful sleep on and off throughout the night.   Continue to monitor and implement poc.[ES1.1]     Revision History        User Key Date/Time User Provider Type Action    > ES1.1 6/12/2017  7:16 AM Daniela Rao RN Registered Nurse Sign            Progress Notes by Karol Pal PA-C at 6/11/2017  6:52 PM     Author:  Karol Pal PA-C Service:  Hospitalist Author Type:  Physician Assistant - C    Filed:  6/12/2017 12:36 AM Date of Service:  6/11/2017  6:52 PM Creation Time:  6/11/2017  6:52 PM    Status:  Signed :  Karol Pal PA-C (Physician Assistant - C)         UA positive. History to PCN - hives in[CL1.1]  distant[CL1.2] past. Upon review of antibiotics[CL1.1] no evidence of cephalosporins in past. Borderline long QT.[CL1.2]     Will start[CL1.1] rocephin[CL1.2] for UTI[CL1.1] and monitor closely for reaction.[CL1.2] Culture pending[CL1.1]    I have discussed patient with Dr. Fernandez. Assessment and plan as above.    Karol Pal PA-C[CL1.2]       Revision History        User Key Date/Time User Provider Type Action    > CL1.2 6/12/2017 12:36 AM Karol Pal PA-C Physician Assistant - DESHAUN Sign     CL1.1 6/11/2017  6:52 PM Karol Pal PA-C Physician Assistant - C             Progress Notes by Rosamaria Kumari RN at 6/11/2017  4:57 PM     Author:  Rosamaria Kumari RN Service:  (none) Author Type:  Registered Nurse    Filed:  6/11/2017  4:57 PM Date of Service:  6/11/2017  4:57 PM Creation Time:  6/11/2017  4:57 PM    Status:  Signed :  Rosamaria Kumari RN (Registered Nurse)         Pt's friends brought in bilateral hearing aides and home CPAP, RT notified and will set up at bedtime.[DF1.1]      Revision History        User Key Date/Time User Provider Type Action    > DF1.1 6/11/2017  4:57 PM Rosamaria Kumari, RN Registered Nurse Sign            Progress Notes by Cora Fernandez DO at 6/11/2017  7:44 AM     Author:  Cora Fernandez DO Service:  Internal Medicine Author Type:  Physician    Filed:  6/11/2017  1:53 PM Date of Service:  6/11/2017  7:44 AM Creation Time:  6/11/2017  7:44 AM    Status:  Signed :  Cora Fernandez DO (Physician)         [facility]         Assessment and Plan:[RH1.1]   Susan Haq is a 85 year old female with pulmonary fibrosis, on 3L chronically, SUSSY, HLD, HTN, lower extremity edema who was admitted 6/10/17 after a fall, resulting in multiple fractures.[RH1.2]    Assessment & Plan:[RH1.1]    Tibia/fibula fracture, right, closed, initial encounter[RH1.3]: after fall at  home.  Was not able to bear weight after fall.  XR noted oblique fracture through distal fibular, minimally displaced through ankle mortise with minimally displaced fracture in proximal fibula.   --Ortho following, appreciate recs  --NWB on RLE  --Remain in splint until seen in outpatient visit within 1 week  --physical therapy/OT.  Anticipate patient will need TCU, however she is very resistant.  --Tylenol, tramadol for pain.  Could add Norco or Percocet if needed[RH1.2]    Right arm pain: patient reported to admitting provider it was elbow; to me this AM, she reports it is her shoulder.  She reports she cannot move the arm due to severe pain.  Xrays of shoulder and wrist are negative.  No focal pain with palpation in area of elbow.  Discussed that her severe arm pain will make it very difficult for her to care for self at home, now that she is NWB RLE.  Patient still hesitant for TCU  --physical therapy/OT, pain control as above[RH1.4]    Right right phalanx fracture: seen on xray.  Treatment as above.[RH1.2]    Fall:  Patient reports mechanical fall - tripping over throw rug.  She remained on ground for ~ 30 min after fall until friends came.  Discussed [facility] at home.  Head imaging negative.  --DC telemetry  --slowly resume blood pressure medications-may lower dose of clonidine vs holding it altogether[RH1.4] on dc[RH1.5].    Auditory Hallucinations: Reports 1 week history of music playing in her head. MRI negative.   DDx: inner ear pathology, seizure, psychosis - depression/schizophrenia hearing aids    Does follow with audiologist. Hears when hearing aids are removed. No focal neuro deficits. Does not appear in psychosis.  -continue to monitor  -may need ENT referral or EEG for possible seizure (non-dominant hemisphere foci)    Hypertension: on prior to admission amlodipine 10 mg daily, clonidine 0.2 mg BID, furosemide 20 mg daily, losartan/HCTZ 100/25 daily, potassium 10 meq daily.  --slowly resume blood  pressure meds  --start losartan 25, HCTZ 12.5 - start tomorrow  --hold if SBP < 100    Alcohol Abuse   Drinks about 3oz almost daily of liquor. No history of withdrawal/seizures. Last drink 6/8 evening.     No evidence of withdrawal currently - will hold off on CIWA protocol.  -continue to monitor clinically     Intertrigo of Breasts  -start miconazole   -wound consultation pending     Lower Extremity Edema  -holding PTA lasix/potassium given possible orthostasis  -I/O and daily weights     Pulmonary Fibrosis/COPD  Chronic Respiratory Failure   Currently at baseline. On 3L NC continuously  -continue PTA oxygen  -continue PTA albuterol PRN, spirivia, symbicort     SUSSY  -continue PTA CPAP settings at home, family will bring in machine     Prediabetes  Last A1C (2015) 5.7. . Will monitor BG with BMP in AM     HLD  -continue PTA atorvastatin      Insomnia  -continue PTA hydroxyzine PRN     Depression   Has not seen effect since starting 3/2017.  -holding PTA sertraline since only new medication change.      FEN:  -[RH1.4]d/c IVF, can hydrate orally[RH1.5]  -Will monitor electrolytes and replace as needed  -regular diet with NPO at midnight until ortho consultation     DVT Prophylaxis:[RH1.4] Will need chemical DVT prophylactic given recent fracture and NWB status.[RH1.2]  Code Status: DNR / DNI     Disposition: Anticipate discharge in[RH1.4] 1-2[RH1.5] days once pain is improved with orals, ortho is consulted with safe disposition - given age and multiple fractures. Appropriate for INPATIENT care[RH1.4]           Interval History:[RH1.1]   Patient still having severe pain in entire right arm - initially points to elbow then to lateral shoulder.  Pain is controlled in leg/foot. States she fell at home because she tripped over a throw rug.  She doesn't want to go to TCU because she has been to one and did not like it.  She is not sure how she would manage at home if she is NWB on RLE and cannot use RUE to full  use.[RH1.5]         Review of Systems:   A comprehensive review of systems was performed and found to be negative except as described in this note          Medications:     Current Facility-Administered Medications   Medication     0.9% sodium chloride infusion     albuterol neb solution 2.5 mg     hydrOXYzine (ATARAX) tablet 25-50 mg     fluticasone-vilanterol (BREO ELLIPTA) 200-25 MCG/INH oral inhaler 1 puff     umeclidinium (INCRUSE ELLIPTA) 62.5 MCG/INH oral inhaler 1 puff     naloxone (NARCAN) injection 0.1-0.4 mg     acetaminophen (TYLENOL) tablet 650 mg     senna-docusate (SENOKOT-S;PERICOLACE) 8.6-50 MG per tablet 1-2 tablet     ondansetron (ZOFRAN-ODT) ODT tab 4 mg    Or     ondansetron (ZOFRAN) injection 4 mg     acetaminophen (TYLENOL) tablet 1,000 mg     miconazole (MICATIN; MICRO GUARD) 2 % powder     atenolol (TENORMIN) tablet 50 mg     atorvastatin (LIPITOR) tablet 20 mg             Physical Exam:   Vitals were reviewed  Patient Vitals for the past 24 hrs:   BP Temp Temp src Pulse Heart Rate Resp SpO2 Weight   06/11/17 0358 125/48 98.7  F (37.1  C) Oral 62 - 16 98 % -   06/10/17 2248 147/59 98.2  F (36.8  C) Oral 60 - 18 - -   06/10/17 1945 - - - 69 - - 97 % -   06/10/17 1939 - - - 75 - - 97 % -   06/10/17 1907 130/53 98.3  F (36.8  C) Oral 69 - 18 97 % -   06/10/17 1843 - - - - 58 - - -   06/10/17 1806 - - - - - - (!) 89 % -   06/10/17 1800 - - - - - - 91 % -   06/10/17 1754 126/55 - - - - - 91 % -   06/10/17 1445 - - - - - - 97 % -   06/10/17 1430 - - - - - - 97 % -   06/10/17 1415 - - - - - - 97 % -   06/10/17 1400 - - - - - - 96 % -   06/10/17 1338 179/84 97.9  F (36.6  C) Oral 57 - 20 95 % 107 kg (236 lb)[RH1.1]   Gen: alert, in no distress, obese, elderly woman, wearing oxygen.  Eyes: conjunctiva clear.  Neck: supple, no lymphadenopathy   CV: RRR, S1 and S2 normal, no murmurs, distant. Radial and pedal pulses symmetric and normal  Respiratory: Lungs clear bilaterally  Abd: Soft, obese,  non-tender.  Normal bowel sounds.  No hepatosplenomegaly   Lymph: No peripheral edema  MSK: right leg in splint.  No pain to palpation of right hand, wrist, elbow, but pain to lateral shoulder area.  Full ROM of hand/wrist, elbow, but patient has severe pain with shoulder ROM, both active and passive.  Skin: no significant rashes.[RH1.5]           Data:     Results for orders placed or performed during the hospital encounter of 06/10/17 (from the past 24 hour(s))   CBC with platelets differential   Result Value Ref Range    WBC  4.0 - 11.0 10e9/L     Canceled, Test credited   Unsatisfactory specimen - clotted  specimen will need to be recollected, notified ARIADNA Barahona 6/10/17 1630   JM  CORRECTED ON 06/10 AT 1707: PREVIOUSLY REPORTED AS 8.7      RBC Count  3.8 - 5.2 10e12/L     Canceled, Test credited   Unsatisfactory specimen - clotted  specimen will need to be recollected, notified ARIADNA Barahona 6/10/17 1630   JM  CORRECTED ON 06/10 AT 1707: PREVIOUSLY REPORTED AS 3.84      Hemoglobin  11.7 - 15.7 g/dL     Canceled, Test credited   Unsatisfactory specimen - clotted  specimen will need to be recollected, ernestoied ARIADNA Barahona 6/10/17 1630   JM  CORRECTED ON 06/10 AT 1707: PREVIOUSLY REPORTED AS 12.1      Hematocrit  35.0 - 47.0 %     Canceled, Test credited   Unsatisfactory specimen - clotted  specimen will need to be recollected, notified ARIADNA Barahona 6/10/17 1630   JM  CORRECTED ON 06/10 AT 1707: PREVIOUSLY REPORTED AS 36.2      MCV  78 - 100 fl     Canceled, Test credited   Unsatisfactory specimen - clotted  specimen will need to be recollected, ARIADNA Peralta 6/10/17 1630   JM  CORRECTED ON 06/10 AT 1707: PREVIOUSLY REPORTED AS 94      MCH  26.5 - 33.0 pg     Canceled, Test credited   Unsatisfactory specimen - clotted  specimen will need to be recollected, notified ARIADNA Barahona 6/10/17 1630   JM  CORRECTED ON 06/10 AT 1707: PREVIOUSLY REPORTED AS 31.5       MCHC  31.5 - 36.5 g/dL     Canceled, Test credited   Unsatisfactory specimen - clotted  specimen will need to be recollected, notified Tiffany Fuentes RN [facility] 6/10/17 1630   JM  CORRECTED ON 06/10 AT 1707: PREVIOUSLY REPORTED AS 33.4      RDW  10.0 - 15.0 %     Canceled, Test credited   Unsatisfactory specimen - clotted  specimen will need to be recollected, notified Tiffany Fuentes RN [facility] 6/10/17 1630   JM  CORRECTED ON 06/10 AT 1707: PREVIOUSLY REPORTED AS 12.7      Platelet Count  150 - 450 10e9/L     Canceled, Test credited   Unsatisfactory specimen - clotted  specimen will need to be recollected, notified Tiffany Fuentes RN [facility] 6/10/17 1630   JM  CORRECTED ON 06/10 AT 1707: PREVIOUSLY REPORTED AS 58      Diff Method       Canceled, Test credited   Unsatisfactory specimen - clotted  specimen will need to be recollected, notified ARIADNA Barahona 6/10/17 1630   JM  CORRECTED ON 06/10 AT 1707: PREVIOUSLY REPORTED AS Automated Method      % Neutrophils  %     Canceled, Test credited   Unsatisfactory specimen - clotted  CORRECTED ON 06/10 AT 1903: PREVIOUSLY REPORTED AS 78.4      % Lymphocytes  %     Canceled, Test credited   Unsatisfactory specimen - clotted  CORRECTED ON 06/10 AT 1903: PREVIOUSLY REPORTED AS 11.4      % Monocytes  %     Canceled, Test credited   Unsatisfactory specimen - clotted  CORRECTED ON 06/10 AT 1903: PREVIOUSLY REPORTED AS 9.2      % Eosinophils  %     Canceled, Test credited   Unsatisfactory specimen - clotted  CORRECTED ON 06/10 AT 1906: PREVIOUSLY REPORTED AS 0.6      % Basophils  %     Canceled, Test credited   Unsatisfactory specimen - clotted  CORRECTED ON 06/10 AT 1903: PREVIOUSLY REPORTED AS 0.1      % Immature Granulocytes  %     Canceled, Test credited   Unsatisfactory specimen - clotted  CORRECTED ON 06/10 AT 1903: PREVIOUSLY REPORTED AS 0.3      Absolute Neutrophil  1.6 - 8.3 10e9/L     Canceled, Test credited   Unsatisfactory specimen - clotted  CORRECTED ON 06/10 AT 1903:  PREVIOUSLY REPORTED AS 6.8      Absolute Lymphocytes  0.8 - 5.3 10e9/L     Canceled, Test credited   Unsatisfactory specimen - clotted  CORRECTED ON 06/10 AT 1903: PREVIOUSLY REPORTED AS 1.0      Absolute Monocytes  0.0 - 1.3 10e9/L     Canceled, Test credited   Unsatisfactory specimen - clotted  CORRECTED ON 06/10 AT 1903: PREVIOUSLY REPORTED AS 0.8      Absolute Eosinophils  0.0 - 0.7 10e9/L     Canceled, Test credited   Unsatisfactory specimen - clotted  CORRECTED ON 06/10 AT 1903: PREVIOUSLY REPORTED AS 0.1      Absolute Basophils  0.0 - 0.2 10e9/L     Canceled, Test credited   Unsatisfactory specimen - clotted  CORRECTED ON 06/10 AT 1906: PREVIOUSLY REPORTED AS 0.0      Abs Immature Granulocytes  0 - 0.4 10e9/L     Canceled, Test credited   Unsatisfactory specimen - clotted  CORRECTED ON 06/10 AT 1907: PREVIOUSLY REPORTED AS 0.0     Comprehensive metabolic panel   Result Value Ref Range    Sodium 140 133 - 144 mmol/L    Potassium 3.5 3.4 - 5.3 mmol/L    Chloride 104 94 - 109 mmol/L    Carbon Dioxide 28 20 - 32 mmol/L    Anion Gap 8 3 - 14 mmol/L    Glucose 119 (H) 70 - 99 mg/dL    Urea Nitrogen 27 7 - 30 mg/dL    Creatinine 1.01 0.52 - 1.04 mg/dL    GFR Estimate 52 (L) >60 mL/min/1.7m2    GFR Estimate If Black 63 >60 mL/min/1.7m2    Calcium 8.3 (L) 8.5 - 10.1 mg/dL    Bilirubin Total 0.6 0.2 - 1.3 mg/dL    Albumin 3.1 (L) 3.4 - 5.0 g/dL    Protein Total 5.8 (L) 6.8 - 8.8 g/dL    Alkaline Phosphatase 109 40 - 150 U/L    ALT 26 0 - 50 U/L    AST 18 0 - 45 U/L   Troponin I   Result Value Ref Range    Troponin I ES  0.000 - 0.045 ug/L     <0.015  The 99th percentile for upper reference range is 0.045 ug/L.  Troponin values in   the range of 0.045 - 0.120 ug/L may be associated with risks of adverse   clinical events.     CK total   Result Value Ref Range    CK Total 80 30 - 225 U/L   TSH with free T4 reflex   Result Value Ref Range    TSH 3.06 0.40 - 4.00 mU/L   Vitamin B12   Result Value Ref Range    Vitamin B12  560 193 - 986 pg/mL   INR   Result Value Ref Range    INR  0.86 - 1.14     Canceled, Test credited   Unsatisfactory specimen - clotted  Specimen will need to be recollected, notified Libby in [facility] 6/10/17 1600 JM  CORRECTED ON 06/10 AT 1704: PREVIOUSLY REPORTED AS 1.12     Partial thromboplastin time   Result Value Ref Range    PTT  22 - 37 sec     Canceled, Test credited   Unsatisfactory specimen - clotted  Specimen will need to be recollected, notified Libby in [facility] 6/10/17 1600 JM     Lactate Dehydrogenase   Result Value Ref Range    Lactate Dehydrogenase 267 (H) 81 - 234 U/L   Pelvis XR, 1-2 views    Narrative    PELVIS ONE TO TWO VIEWS  6/10/2017 3:51 PM     HISTORY: Fall.    COMPARISON: September 13, 2016.      Impression    IMPRESSION: Both femoral heads articulate normally with the respective  acetabula. Minimal joint space narrowing, though no osteophyte  formation. Right lateral knee radiograph demonstrates normal alignment  without effusion. Proximal fibular fracture again visible.     RAMILA BARNES MD   Tib/Fib XR, right    Narrative    RIGHT TIBIA AND FIBULA TWO VIEWS   6/10/2017 3:52 PM     HISTORY: Ankle pain, tib-fib pain.    COMPARISON: None.      Impression    IMPRESSION: Oblique fracture through the distal fibula, minimally  displaced, extending to the level of the ankle mortise. Minimally  displaced fracture in the proximal aspect of the fibula, as well. This  may be associated with damage to interosseous ligament.     RAMILA BARNES MD   Foot  XR, G/E 3 views, right    Narrative    RIGHT FOOT THREE OR MORE VIEWS  6/10/2017 3:52 PM     HISTORY: Ecchymosis distal toes, pain at left ankle, foot ecchymosis,  bruising.    COMPARISON: None.      Impression    IMPRESSION: Mild hammertoe alignment of second through fifth toes.  Bones are normally aligned. Lucency in the mid fifth proximal phalanx,  suggestive of a nondisplaced fracture, clinically correlate with pain.  Additional lucency in the distal  aspect of the second proximal  phalanx, possible location of fracture given the appearance, though  this may be somewhat degenerative.     RAMILA BARNES MD   Radius/Ulna XR, PA & LAT, right    Narrative    RIGHT FOREARM TWO VIEWS   6/10/2017 3:53 PM     HISTORY: Fall, forearm injury.    COMPARISON: None.      Impression    IMPRESSION: Proximal and distal radial and ulnar articulations intact.  No acute fracture.       RAMLIA BARNES MD   MR Brain w/o & w Contrast    Narrative    MRI BRAIN WITHOUT AND WITH CONTRAST  6/10/2017 4:56 PM    HISTORY:  Fall today, striking occiput; one week of constant auditory  hallucinations, constant hearing of music.     TECHNIQUE:  Multiplanar, multisequence MRI of the brain without and  with 10 mL IV Gadavist.    COMPARISON: None.    FINDINGS:  There is generalized atrophy of the brain.  White matter  changes are present in the cerebral hemispheres that are consistent  with small vessel ischemic disease in this age patient. Old lacunar  infarct is seen in the right corona radiata and upper right putamen.  There is no evidence of hemorrhage, mass, acute infarct, or anomaly.   There are no gadolinium enhancing lesions.    The facial structures appear normal. The arteries at the base of the  brain and the dural venous sinuses appear patent.       Impression    IMPRESSION:  1. No acute abnormality. No evidence of intracranial trauma.  2. Brain atrophy and white matter changes consistent with sequelae of  small vessel ischemic disease.  3. Old lacunar infarct in the right corona radiata and upper right  putamen.    ROBBIE FARFAN MD   CBC with platelets differential   Result Value Ref Range    WBC  4.0 - 11.0 10e9/L     Canceled, Test credited   Unsatisfactory specimen - clotted  Notified Libby DRAPER 6/10/17 1800 JM      RBC Count  3.8 - 5.2 10e12/L     Canceled, Test credited   Unsatisfactory specimen - clotted  Notified Libby DRAPER 6/10/17 1800 JM      Hemoglobin  11.7 - 15.7 g/dL      Canceled, Test credited   Unsatisfactory specimen - clotted  Notified [facility] 6/10/17 1800       Hematocrit  35.0 - 47.0 %     Canceled, Test credited   Unsatisfactory specimen - clotted  Notified [facility] 6/10/17 1800       MCV  78 - 100 fl     Canceled, Test credited   Unsatisfactory specimen - clotted  Notified [facility] 6/10/17 1800       MCH  26.5 - 33.0 pg     Canceled, Test credited   Unsatisfactory specimen - clotted  Notified [facility] 6/10/17 1800       MCHC  31.5 - 36.5 g/dL     Canceled, Test credited   Unsatisfactory specimen - clotted  Notified [facility] 6/10/17 1800       RDW  10.0 - 15.0 %     Canceled, Test credited   Unsatisfactory specimen - clotted  Notified [facility] 6/10/17 1800       Platelet Count  150 - 450 10e9/L     Canceled, Test credited   Unsatisfactory specimen - clotted  Notified [facility] 6/10/17 1800       Diff Method       Canceled, Test credited   Unsatisfactory specimen - clotted  Notified [facility] 6/10/17 1800      INR   Result Value Ref Range    INR 1.03 0.86 - 1.14   Partial thromboplastin time   Result Value Ref Range    PTT 20 (L) 22 - 37 sec   Splint application    Narrative    Anton Lopez MD     6/10/2017  6:45 PM  Splint application  Date/Time: 6/10/2017 4:57 PM  Performed by: ANTON LOPEZ  Authorized by: ANTON LOPEZ   Consent: Verbal consent obtained.  Risks and benefits: risks, benefits and alternatives were discussed  Consent given by: patient  Required items: required blood products, implants, devices, and special   equipment available  Patient identity confirmed: verbally with patient  Location details: right leg  Splint type: long leg  Supplies used: Ortho-Glass  Post-procedure: The splinted body part was neurovascularly unchanged   following the procedure.  Patient tolerance: Patient tolerated the procedure well with no immediate   complications     Folate   Result Value Ref Range    Folate 31.2 >5.4 ng/mL   CBC with platelets  differential   Result Value Ref Range    WBC 8.3 4.0 - 11.0 10e9/L    RBC Count 3.93 3.8 - 5.2 10e12/L    Hemoglobin 12.3 11.7 - 15.7 g/dL    Hematocrit 36.7 35.0 - 47.0 %    MCV 93 78 - 100 fl    MCH 31.3 26.5 - 33.0 pg    MCHC 33.5 31.5 - 36.5 g/dL    RDW 12.7 10.0 - 15.0 %    Platelet Count 199 150 - 450 10e9/L    Diff Method Automated Method     % Neutrophils 69.6 %    % Lymphocytes 15.5 %    % Monocytes 13.9 %    % Eosinophils 0.5 %    % Basophils 0.1 %    % Immature Granulocytes 0.4 %    Absolute Neutrophil 5.8 1.6 - 8.3 10e9/L    Absolute Lymphocytes 1.3 0.8 - 5.3 10e9/L    Absolute Monocytes 1.2 0.0 - 1.3 10e9/L    Absolute Eosinophils 0.0 0.0 - 0.7 10e9/L    Absolute Basophils 0.0 0.0 - 0.2 10e9/L    Abs Immature Granulocytes 0.0 0 - 0.4 10e9/L   CBC with platelets differential   Result Value Ref Range    WBC 7.1 4.0 - 11.0 10e9/L    RBC Count 3.69 (L) 3.8 - 5.2 10e12/L    Hemoglobin 11.6 (L) 11.7 - 15.7 g/dL    Hematocrit 34.8 (L) 35.0 - 47.0 %    MCV 94 78 - 100 fl    MCH 31.4 26.5 - 33.0 pg    MCHC 33.3 31.5 - 36.5 g/dL    RDW 12.7 10.0 - 15.0 %    Platelet Count 193 150 - 450 10e9/L    Diff Method Automated Method     % Neutrophils 60.1 %    % Lymphocytes 20.5 %    % Monocytes 16.6 %    % Eosinophils 2.3 %    % Basophils 0.1 %    % Immature Granulocytes 0.4 %    Absolute Neutrophil 4.2 1.6 - 8.3 10e9/L    Absolute Lymphocytes 1.5 0.8 - 5.3 10e9/L    Absolute Monocytes 1.2 0.0 - 1.3 10e9/L    Absolute Eosinophils 0.2 0.0 - 0.7 10e9/L    Absolute Basophils 0.0 0.0 - 0.2 10e9/L    Abs Immature Granulocytes 0.0 0 - 0.4 10e9/L   Basic metabolic panel   Result Value Ref Range    Sodium 143 133 - 144 mmol/L    Potassium 3.2 (L) 3.4 - 5.3 mmol/L    Chloride 106 94 - 109 mmol/L    Carbon Dioxide 29 20 - 32 mmol/L    Anion Gap 8 3 - 14 mmol/L    Glucose 115 (H) 70 - 99 mg/dL    Urea Nitrogen 24 7 - 30 mg/dL    Creatinine 0.79 0.52 - 1.04 mg/dL    GFR Estimate 69 >60 mL/min/1.7m2    GFR Estimate If Black 84 >60  mL/min/1.7m2    Calcium 8.1 (L) 8.5 - 10.1 mg/dL       Attestation:    I have personally discussed the patient's care with[RH1.1] Ortho[RH1.4] today    Amount of time performed on this progress note:[RH1.1] 30[RH1.4] minutes.    Dr. Cora Fernandez, DO  [facility] Internal Medicine[RH1.1]                  Revision History        User Key Date/Time User Provider Type Action    > RH1.5 6/11/2017  1:53 PM Cora Fernandez,  Physician Sign     RH1.4 6/11/2017  1:23 PM Cora Fernandez,  Physician      RH1.3 6/11/2017  1:09 PM Cora Fernandez,  Physician      RH1.2 6/11/2017  1:06 PM Cora Fernandez,  Physician      RH1.1 6/11/2017  7:44 AM Cora Fernandez,  Physician             Progress Notes by Ana Paula Escobar PT at 6/11/2017 12:37 PM     Author:  Ana Paula Escobar PT Service:  (none) Author Type:  Physical Therapist    Filed:  6/11/2017 12:37 PM Date of Service:  6/11/2017 12:37 PM Creation Time:  6/11/2017 12:37 PM    Status:  Signed :  Ana Paula Escobar PT (Physical Therapist)         Physical Therapy Evaluation     06/11/17 1230   Quick Adds   Type of Visit Initial PT Evaluation   Living Environment   Lives With alone   Living Arrangements house   Number of Stairs to Enter Home 4   Living Environment Comment Has main level set up   Functional Level Prior   Prior Functional Level Comment Indep with ADLs, assistance housekeeping, shopping and transportation   General Information   Onset of Illness/Injury or Date of Surgery - Date 06/10/17   Patient/Family Goals Statement To go home   Pertinent History of Current Problem (include personal factors and/or comorbidities that impact the POC) Pt admitted secondary to fall with R tib fx and RUE injury. Hx of COPD, ETOH, HTN and depression   Precautions/Limitations fall precautions   Weight-Bearing Status - RUE weight-bearing as tolerated   Weight-Bearing Status - LLE full weight-bearing   Weight-Bearing Status - RLE nonweight-bearing  "  General Observations Up in bed upon arrival   General Info Comments IV   Cognitive Status Examination   Orientation orientation to person, place and time   Level of Consciousness alert   Follows Commands and Answers Questions 100% of the time   Personal Safety and Judgment impaired;at risk behaviors demonstrated   Memory intact   Strength   Strength Comments LLE 5/5, R hip flex 3+/5, R knee flex/ext 4-/5   Bed Mobility   Bed Mobility Comments Min A of 1 for sup>sit, assisted with RLE   Transfer Skills   Transfer Comments Mod A of 1 from BSC/lower surface and min A 1 from elevated surface. Pt needing VC on hand placement and to use UE to control descend into chair, pt fell back into chair before chair was set up.   Gait   Gait Comments Pt able to stand pivot transfer with mod A of 2 from bed>BSC. VC to maintain NWB but question if she was able to   General Therapy Interventions   Planned Therapy Interventions gait training;transfer training;strengthening   Clinical Impression   Criteria for Skilled Therapeutic Intervention yes, treatment indicated   PT Diagnosis Impaired mobility   Influenced by the following impairments NWB, weakness and pain   Functional limitations due to impairments mobility   Clinical Presentation Stable/Uncomplicated   Clinical Decision Making (Complexity) Low complexity   Therapy Frequency` daily   Predicted Duration of Therapy Intervention (days/wks) 2-3 days   Anticipated Discharge Disposition Transitional Care Facility   Risk & Benefits of therapy have been explained Yes   Patient, Family & other staff in agreement with plan of care Yes   Clinical Impression Comments Pt would benefit from skilled PT to address strength and mobility. Recommnding TCU due to home set and limited mobility   [facility] AM-PAC TM \"6 Clicks\"   2016, Trustees of [facility], under license to [employer].  All rights reserved.   6 Clicks Short Forms Basic Mobility Inpatient Short Form   [place] " [facility] AM-PAC  \"6 Clicks\" V.2 Basic Mobility Inpatient Short Form   1. Turning from your back to your side while in a flat bed without using bedrails? 2 - A Lot   2. Moving from lying on your back to sitting on the side of a flat bed without using bedrails? 2 - A Lot   3. Moving to and from a bed to a chair (including a wheelchair)? 2 - A Lot   4. Standing up from a chair using your arms (e.g., wheelchair, or bedside chair)? 2 - A Lot   5. To walk in hospital room? 1 - Total   6. Climbing 3-5 steps with a railing? 1 - Total   Basic Mobility Raw Score (Score out of 24.Lower scores equate to lower levels of function) 10     See Care Plan for goals.[JK1.1]       Revision History        User Key Date/Time User Provider Type Action    > JK1.1 6/11/2017 12:37 PM Ana Paula Escobar PT Physical Therapist Sign            Progress Notes by Altagracia Cuellar OT at 6/11/2017 12:36 PM     Author:  Altagracia Cuellar OT Service:  Acute IP Rehab Author Type:  Occupational Therapist    Filed:  6/11/2017 12:36 PM Date of Service:  6/11/2017 12:36 PM Creation Time:  6/11/2017 12:36 PM    Status:  Signed :  Altagracia Cuellar OT (Occupational Therapist)            06/11/17 1200   Signing Clinician's Name / Credentials   Signing clinician's name / credentials Altagracia Cuellar OTR   Quick Adds   Rehab Discipline OT   ADL Training   Minutes of Treatment 15   Symptoms Noted During/After Treatment none   Treatment ADL training within precautions   Treatment Detail Pt is A of 2-3 for commode to recliner transfer. She is unable to manage hygiene after BM   Additional Documentation   Rehab Comments Recommend TCU at this point   OT Plan See POC   [facility]  \"6 Clicks\" Daily Activity Inpatient Short Form   1. Putting on and taking off regular lower body clothing? 1 - Total   2. Bathing (including washing, rinsing, drying)? 2 - A Lot   3. Toileting, which includes using toilet, bedpan or urinal? 1 - Total   4. " Putting on and taking off regular upper body clothing? 3 - A Little   5. Taking care of personal grooming such as brushing teeth? 4 - None   6. Eating meals? 4 - None   Daily Activity Raw Score (Score out of 24.Lower scores equate to lower levels of function) 15   Total Session Time   Total Session Time (minutes) 30 minutes     TORITO Salgado[SL1.1]     Revision History        User Key Date/Time User Provider Type Action    > SL1.1 6/11/2017 12:36 PM Altagracia Cuellar OT Occupational Therapist Sign            Progress Notes by Altagracia Cuellar OT at 6/11/2017 12:33 PM     Author:  Altagracia Cuellar OT Service:  Acute IP Rehab Author Type:  Occupational Therapist    Filed:  6/11/2017 12:34 PM Date of Service:  6/11/2017 12:33 PM Creation Time:  6/11/2017 12:33 PM    Status:  Signed :  Altagracia Cuellar OT (Occupational Therapist)            06/11/17 1200   Quick Adds   Type of Visit Initial Occupational Therapy Evaluation   Living Environment   Lives With alone   Living Arrangements house   Home Accessibility bed and bath on same level;stairs to enter home   Number of Stairs to Enter Home 4   Self-Care   Dominant Hand right   General Information   Onset of Illness/Injury or Date of Surgery - Date 06/10/17   Referring Physician Kae   Patient/Family Goals Statement Pt would like to return home   Additional Occupational Profile Info/Pertinent History of Current Problem 86 yo F who slipped an a rug at home and fell resulting in a proximal fib fx, distal tib and R shoulder trauma   Precautions/Limitations fall precautions   Weight-Bearing Status - RLE nonweight-bearing   General Info Comments Previously I with self cares.Gets Moms meals and friends A with homemaking. Did not use an AD at home. Walk-in shower in which she stands   Cognitive Status Examination   Orientation orientation to person, place and time   Level of Consciousness alert   Able to Follow Commands mild impairment    Cognitive Comment Impulsive   Pain Assessment   Patient Currently in Pain (7/10 R shoulder with PROM to about 55 flex)   Range of Motion (ROM)   ROM Comment No functional R shoulder flex, elbow and digits WFLs   Strength   Strength Comments Decreased strength to manage functional ADL related transfers maintaining NWB'ing   Bed Mobility Skill: Supine to Sit   Level of [place]: Supine/Sit minimum assist (75% patients effort)   Physical Assist/Nonphysical Assist: Supine/Sit 1 person assist;other (see comments)  (A to guide R leg)   Transfer Skill: Bed to Chair/Chair to Bed   Level of [place]: Bed to Chair maximum assist (25% patients effort)   Physical Assist/Nonphysical Assist: Bed to Chair 2 persons   Weight-Bearing Restrictions nonweight-bearing   Assistive Device - Transfer Skill Bed to Chair Chair to Bed Rehab Eval rolling walker   Transfer Skill: Sit to Stand   Level of [place]: Sit/Stand moderate assist (50% patients effort)   Physical Assist/Nonphysical Assist: Sit/Stand 1 person assist   Transfer Skill: Sit to Stand nonweight-bearing   Assistive Device for Transfer: Sit/Stand rolling walker   Transfer Skill: Toilet Transfer   Level of [place]: Toilet (Bedside commode)   Physical Assist/Nonphysical Assist: Toilet 2 persons;other (see comments)  (2 on 3 off for positioning)   Weight-Bearing Restrictions: Toilet nonweight-bearing   Assistive Device rolling walker   Toileting   Level of [place]: Toilet unable to perform   Activities of Daily Living Analysis   Impairments Contributing to Impaired Activities of Daily Living balance impaired;cognition impaired;flexibility decreased;pain;post surgical precautions;strength decreased   General Therapy Interventions   Planned Therapy Interventions ADL retraining;bed mobility training;transfer training   Clinical Impression   Criteria for Skilled Therapeutic Interventions Met yes, treatment indicated   OT Diagnosis weakness, impaired ADLs and  "related mobility   Influenced by the following impairments closed nondisplaced fx of proximal phalanx of lesser R toe; proximal fibula and distal tibula fx. NWB'ing RLE. R shoulder pain resulting in decreased functional use   Assessment of Occupational Performance 5 or more Performance Deficits   Identified Performance Deficits toileting, dressing, bathing, H/G, standing, ADL related mobility   Clinical Decision Making (Complexity) Low complexity   Therapy Frequency daily   Predicted Duration of Therapy Intervention (days/wks) 3 days   Anticipated Discharge Disposition Transitional Care Facility   Risks and Benefits of Treatment have been explained. Yes   Patient, Family & other staff in agreement with plan of care Yes   Clinical Impression Comments Pt is unable to maintain NWB'ing status during minimal transferring   [facility] AM-PAC  \"6 Clicks\" Daily Activity Inpatient Short Form   1. Putting on and taking off regular lower body clothing? 1 - Total   2. Bathing (including washing, rinsing, drying)? 2 - A Lot   3. Toileting, which includes using toilet, bedpan or urinal? 1 - Total   4. Putting on and taking off regular upper body clothing? 3 - A Little   5. Taking care of personal grooming such as brushing teeth? 4 - None   6. Eating meals? 4 - None   Daily Activity Raw Score (Score out of 24.Lower scores equate to lower levels of function) 15   Total Evaluation Time   Total Evaluation Time (Minutes) 15     TORITO Salgado[SL1.1]     Revision History        User Key Date/Time User Provider Type Action    > SL1.1 6/11/2017 12:34 PM Altagracia Cuellar OT Occupational Therapist Sign            Progress Notes by Daniela Crook RN at 6/10/2017  7:17 PM     Author:  Daniela Crook RN Service:  (none) Author Type:  Registered Nurse    Filed:  6/10/2017  7:18 PM Date of Service:  6/10/2017  7:17 PM Creation Time:  6/10/2017  7:17 PM    Status:  Signed :  Daniela Crook RN (Registered " Nurse)         [facility] ADMISSION NOTE    Patient admitted to room 2305 at approximately 1840 via cart from emergency room. Patient was accompanied by transport tech.     Verbal SBAR report received from Lashaun prior to patient arrival.     Patient trasferred to bed via air evelyn. Patient alert and oriented X 2. The patient is not having any pain. 0-10 Pain Scale: 5. Admission vital signs: Blood pressure 130/53, pulse 69, temperature 98.3  F (36.8  C), temperature source Oral, resp. rate 18, weight 107 kg (236 lb), SpO2 97 %, not currently breastfeeding. Patient was oriented to plan of care, call light, bed controls, tv, telephone, bathroom and visiting hours.     The following safety risks were identified during admission: fall. Yellow risk band applied: YES. Bed alarm on & active for safety.    Daniela Crook RN[ET1.1]       Revision History        User Key Date/Time User Provider Type Action    > ET1.1 6/10/2017  7:18 PM Daniela Crook RN Registered Nurse Sign            ED Provider Notes by Anton Haro MD at 6/10/2017  1:30 PM     Author:  Anton Haro MD Service:  Emergency Medicine Author Type:  Physician    Filed:  6/10/2017  6:45 PM Date of Service:  6/10/2017  1:30 PM Creation Time:  6/10/2017  2:27 PM    Status:  Signed :  Anton Haro MD (Physician)     Procedure Orders:    1. Splint application [[id number]] ordered by Anton Haro MD at 06/10/17 1657            Post-procedure Diagnoses:    1. Closed fracture of proximal end of fibula, unspecified fracture morphology, initial encounter [S82.267W]    2. Closed fracture of distal end of right fibula, unspecified fracture morphology, initial encounter [S82.831A]    3. Closed nondisplaced fracture of proximal phalanx of lesser toe of right foot, initial encounter [S92.108A]                 History     Chief Complaint   Patient presents with     Fall     Fell to the floor while getting breakfast. Not sure what caused the fall. Injured  right leg and right arm. Uses oxygen at home, 3L per [place].      HPI  Susna Haq is a 85 year old female who[SM1.1] resents with a fall that occurred at home today while she was at her refrigerator, she feels that she lost her balance and fell over - she did not have associated loss of consciousness and had no associated neck pain.  Headache developed later more generalized and mild.  There is no associated weakness of arms or legs.  She fell onto her right side and has secondary pain at the right forearm and the right ankle and foot.  Pain is as high as the level of the proximal tibia region.  She was not able to ambulate when her friends arrived.  They did however his sister to a vehicle and was transported by private vehicle here.  She appears to be lucid per her friends.  She does note a 1 week history of constantly hearing music in her head.  She says that this is  constant and unrelenting.  She has no other associated neurologic changes.  She never had seizures before.  She is otherwise been feeling well.  Preceding her fall this morning she had no associated presyncopal symptoms no chest pain or shortness of breath.  No vision difficulties.  No changes in her speech or swallowing.    She is on several agents that could cause orthostatic hypotension including Lasix, Catapres, Atarax, amlodipine, Hyzaar, atenolol    She has a history of idiopathic pulmonary fibrosis and is oxygen dependent at home[SM1.2]    I have reviewed the Medications, Allergies, Past Medical and Surgical History, and Social History in the Epic system.    Allergies:   Allergies   Allergen Reactions     Ace Inhibitors Cough     Asa [Aluminum Hydroxide]      Penicillins          No current facility-administered medications on file prior to encounter.   Current Outpatient Prescriptions on File Prior to Encounter:  sertraline (ZOLOFT) 25 MG tablet Take 1 tablet (25 mg) by mouth daily   cloNIDine (CATAPRES) 0.2 MG tablet Take 1 tablet (0.2  mg) by mouth 2 times daily   atorvastatin (LIPITOR) 20 MG tablet Take 1 tablet (20 mg) by mouth daily   furosemide (LASIX) 20 MG tablet Take 1 tablet (20 mg) by mouth daily   amLODIPine (NORVASC) 10 MG tablet Take 1 tablet (10 mg) by mouth daily   hydrOXYzine (ATARAX) 25 MG tablet Take 1-2 tablets (25-50 mg) by mouth nightly as needed for other (insomnia)   losartan-hydrochlorothiazide (HYZAAR) 100-25 MG per tablet Take 1 tablet by mouth daily Take 1 tablet by mouth daily.   potassium chloride (K-TAB,KLOR-CON) 10 MEQ tablet Take 1 tablet (10 mEq) by mouth daily   atenolol (TENORMIN) 50 MG tablet Take 1 tablet (50 mg) by mouth 2 times daily   budesonide-formoterol (SYMBICORT) 160-4.5 MCG/ACT Inhaler Inhale 2 puffs into the lungs 2 times daily   tiotropium (SPIRIVA HANDIHALER) 18 MCG capsule Inhale  into the lungs. Inhale contents of one capsule daily   albuterol (PROAIR HFA/PROVENTIL HFA/VENTOLIN HFA) 108 (90 BASE) MCG/ACT Inhaler Inhale 2 puffs into the lungs every 4 hours as needed for shortness of breath / dyspnea or wheezing   Respiratory Therapy Supplies (NEBULIZER COMPRESSOR) KIT 1 kit every 4 hours as needed   albuterol (2.5 MG/3ML) 0.083% nebulizer solution Take 1 vial (2.5 mg) by nebulization every 4 hours as needed for shortness of breath / dyspnea or wheezing   ORDER FOR DME Equipment being ordered: Oxygen - 3 liters continous   ORDER FOR DME Equipment being ordered: CPAP supplies   ORDER FOR DME Equipment being ordered: Oxygen   ORDER FOR DME TEDs stockings knee high to be worn during the day.   ORDER FOR DME 1.  CPAP pressure 12 cm/H20 with heated humidity and auto-titrating capability. 2.  Provide mask to fit and CPAP supplies.3.  Length of need lifetime.4.  Ok to d/c O2 bleed in to her PAP overnight.   Calcium Carbonate-Vitamin D (CALCIUM 600 + D OR) Take  by mouth.   ibuprofen (ADVIL,MOTRIN) 600 MG tablet Take 1 tablet by mouth every 6 hours as needed for pain.   FISH OIL 1000 MG OR CAPS 1 cap daily  "  MULTIVITAMIN TABS   OR 1 TABLET DAILY       Patient Active Problem List   Diagnosis     Hyperlipidemia LDL goal <130     Insomnia     Osteopenia     Hypertension goal BP (blood pressure) < 140/90     Obesity     Advance care planning     Adenomatous polyp of colon     PVC's (premature ventricular contractions)     SUSSY (obstructive sleep apnea)-Moderately severe (AHI 21)     Bilateral leg edema     Prediabetes     HL (hearing loss)     Umbilical hernia     Hypoxia     Hypokalemia     SNHL (sensorineural hearing loss)     Interstitial pulmonary fibrosis (H)     Chronic obstructive pulmonary disease, unspecified COPD type (H)     Major depressive disorder, single episode, moderate (H)       Past Surgical History:   Procedure Laterality Date     APPENDECTOMY       C BSO, OMENTECTOMY W/SHANIA       C NONSPECIFIC PROCEDURE      (L) clavicle orif     C STOMACH SURGERY PROCEDURE UNLISTED       CHOLECYSTECTOMY, LAPOROSCOPIC  10/13/2011    Cholecystectomy, Laparoscopic     HERNIA REPAIR, UMBILICAL  10/13/2011     HYSTERECTOMY, CERVIX STATUS UNKNOWN         Social History   Substance Use Topics     Smoking status: Former Smoker     Quit date: 1/1/1990     Smokeless tobacco: Former User     Alcohol use Yes      Comment: OCC       Most Recent Immunizations   Administered Date(s) Administered     Influenza (High Dose) 3 valent vaccine 09/13/2016     Influenza (IIV3) 10/04/2012     Influenza Vaccine IM 3yrs+ 4 Valent IIV4 10/07/2013     Pneumococcal (PCV 13) 09/13/2016     Pneumococcal 23 valent 01/24/2006     TD (ADULT, 7+) 01/01/2004     TDAP Vaccine (Boostrix) 10/07/2013     Zoster vaccine, live 05/30/2007       BMI: Estimated body mass index is 39.88 kg/(m^2) as calculated from the following:    Height as of 3/8/17: 1.638 m (5' 4.5\").    Weight as of this encounter: 107 kg (236 lb).[SM1.1]      Review of Systems   Constitutional: Negative for chills, diaphoresis and fever.   HENT: Negative for ear pain, sinus pressure and sore " throat.    Eyes: Negative for visual disturbance.   Respiratory: Positive for shortness of breath (chronic). Negative for cough and wheezing.    Cardiovascular: Negative for chest pain and palpitations.   Gastrointestinal: Negative for abdominal pain, blood in stool, constipation, diarrhea, nausea and vomiting.   Genitourinary: Negative for dysuria, frequency and urgency.   Skin: Negative for rash.   Neurological: Positive for headaches. Negative for seizures and speech difficulty.   All other systems reviewed and are negative.[SM1.2]      Physical Exam   BP: 179/84  Pulse: 57  Temp: 97.9  F (36.6  C)  Resp: 20  Weight: 107 kg (236 lb)  SpO2: 95 %[SM1.1]  Physical Exam   Constitutional: She is oriented to person, place, and time. No distress.   HENT:   Mouth/Throat: Oropharynx is clear and moist.   Eyes: Conjunctivae and EOM are normal. Pupils are equal, round, and reactive to light.   Neck: Neck supple.   Cardiovascular: Normal rate and regular rhythm.  Exam reveals no gallop and no friction rub.    No murmur heard.  Pulmonary/Chest: Effort normal and breath sounds normal. No respiratory distress. She has no wheezes. She has no rales.   Abdominal: Soft. She exhibits no distension. There is no tenderness. There is no rebound and no guarding.   Musculoskeletal: She exhibits no edema.        Cervical back: She exhibits normal range of motion, no tenderness, no bony tenderness, no swelling, no deformity, no pain and no spasm.        Right upper arm: She exhibits no tenderness, no bony tenderness, no edema and no deformity.        Left upper arm: She exhibits no tenderness, no bony tenderness and no deformity.        Right forearm: She exhibits tenderness and bony tenderness. She exhibits no swelling, no edema and no deformity.        Right hand: She exhibits normal capillary refill. Normal sensation noted. Decreased sensation is not present in the ulnar distribution, is not present in the medial distribution and is not  present in the radial distribution. Normal strength noted. She exhibits no thumb/finger opposition and no wrist extension trouble.        Right upper leg: She exhibits no tenderness, no bony tenderness and no swelling.        Right lower leg: She exhibits tenderness and bony tenderness. She exhibits no swelling, no edema and no deformity.        Right foot: There is decreased range of motion and tenderness. There is normal capillary refill and no deformity.        Feet:    Neurological: She is alert and oriented to person, place, and time. She displays normal reflexes. No cranial nerve deficit. She exhibits normal muscle tone. Coordination normal.   Skin: No rash noted. She is not diaphoretic.[SM1.2]       ED Course     ED Course[SM1.1]     Splint application[SM1.3]  Date/Time:[SM1.2] 6/10/2017 4:57 PM[SM1.3]  Performed by:[SM1.2] JUDE LOPEZ[SM1.3]  Authorized by:[SM1.2] JUDE LOPEZ[SM1.3]   Consent:[SM1.2] Verbal consent obtained[SM1.3].  Risks and benefits:[SM1.2] risks, benefits and alternatives were discussed[SM1.3]  Consent given by:[SM1.2] patient[SM1.3]  Required items:[SM1.2] required blood products, implants, devices, and special equipment available[SM1.3]  Patient identity confirmed:[SM1.2] verbally with patient[SM1.3]  Location details:[SM1.2] right leg[SM1.3]  Splint type:[SM1.2] long leg[SM1.3]  Supplies used:[SM1.2] Ortho-Glass[SM1.3]  Post-procedure:[SM1.2] The splinted body part was neurovascularly unchanged following the procedure[SM1.3].  Patient tolerance:[SM1.2] Patient tolerated the procedure well with no immediate complications[SM1.3]              EKG 1434 hrs. demonstrates a sinus rhythm at 50 bpm low-voltage, she has normal axis and no ST change.  No T wave changes.  Normal R progression and no Q waves.  Normal intervals with the exception of a mildly long [place].  No ectopy.  Normal conduction.  Impression sinus rhythm bradycardia low voltage but no other acute changes and no significant  change from her EKG done 03/05/2016.[SM1.4]    Critical Care time:[SM1.1]  none[SM1.4]       Trauma Summary Disposition     Patient is trauma admission:  Standard Emergency Evaluation    Spine  Backboard removal time: Backboard not applied   C-collar and immobilization: not indicated, cleared.  CSpine Clearance: by Nexus Criteria  Full Primary and Secondary survey with appropriate immobilization of spine completed in exam section.     Neuro  GCS at arrival:  Motor 6=Obeys commands   Verbal 5=Oriented   Eye Opening 4=Spontaneous   Total: 15     GCS at disposition: unchanged    ED Procedures completed  Splinting of right leg, CMS intact post procedure    Admitting Consultants:  Orthopedic consult with Dr. Moises Kiser MD at[SM1.3] 4:56 PM[SM1.5] . Plan: will see tomorrow  Consult order placed into Epic:   Yes  Imaging reviewed by consultant:  No[SM1.3]               Results for orders placed or performed during the hospital encounter of 06/10/17   MR Brain w/o & w Contrast    Narrative    MRI BRAIN WITHOUT AND WITH CONTRAST  6/10/2017 4:56 PM    HISTORY:  Fall today, striking occiput; one week of constant auditory  hallucinations, constant hearing of music.     TECHNIQUE:  Multiplanar, multisequence MRI of the brain without and  with 10 mL IV Gadavist.    COMPARISON: None.    FINDINGS:  There is generalized atrophy of the brain.  White matter  changes are present in the cerebral hemispheres that are consistent  with small vessel ischemic disease in this age patient. Old lacunar  infarct is seen in the right corona radiata and upper right putamen.  There is no evidence of hemorrhage, mass, acute infarct, or anomaly.   There are no gadolinium enhancing lesions.    The facial structures appear normal. The arteries at the base of the  brain and the dural venous sinuses appear patent.       Impression    IMPRESSION:  1. No acute abnormality. No evidence of intracranial trauma.  2. Brain atrophy and white matter  changes consistent with sequelae of  small vessel ischemic disease.  3. Old lacunar infarct in the right corona radiata and upper right  putamen.   Radius/Ulna XR, PA & LAT, right    Narrative    RIGHT FOREARM TWO VIEWS   6/10/2017 3:53 PM     HISTORY: Fall, forearm injury.    COMPARISON: None.      Impression    IMPRESSION: Proximal and distal radial and ulnar articulations intact.  No acute fracture.       RAMILA BARNES MD   Foot  XR, G/E 3 views, right    Narrative    RIGHT FOOT THREE OR MORE VIEWS  6/10/2017 3:52 PM     HISTORY: Ecchymosis distal toes, pain at left ankle, foot ecchymosis,  bruising.    COMPARISON: None.      Impression    IMPRESSION: Mild hammertoe alignment of second through fifth toes.  Bones are normally aligned. Lucency in the mid fifth proximal phalanx,  suggestive of a nondisplaced fracture, clinically correlate with pain.  Additional lucency in the distal aspect of the second proximal  phalanx, possible location of fracture given the appearance, though  this may be somewhat degenerative.     RAMILA BARNES MD   Tib/Fib XR, right    Narrative    RIGHT TIBIA AND FIBULA TWO VIEWS   6/10/2017 3:52 PM     HISTORY: Ankle pain, tib-fib pain.    COMPARISON: None.      Impression    IMPRESSION: Oblique fracture through the distal fibula, minimally  displaced, extending to the level of the ankle mortise. Minimally  displaced fracture in the proximal aspect of the fibula, as well. This  may be associated with damage to interosseous ligament.     RAMILA BARNES MD   Pelvis XR, 1-2 views    Narrative    PELVIS ONE TO TWO VIEWS  6/10/2017 3:51 PM     HISTORY: Fall.    COMPARISON: September 13, 2016.      Impression    IMPRESSION: Both femoral heads articulate normally with the respective  acetabula. Minimal joint space narrowing, though no osteophyte  formation. Right lateral knee radiograph demonstrates normal alignment  without effusion. Proximal fibular fracture again visible.     RAMILA  MD MOLLY   CBC with platelets differential   Result Value Ref Range    WBC  4.0 - 11.0 10e9/L     Canceled, Test credited   Unsatisfactory specimen - clotted  specimen will need to be recollected, notified ARIADNA Barahona 6/10/17 1630   JM  CORRECTED ON 06/10 AT 1707: PREVIOUSLY REPORTED AS 8.7      RBC Count  3.8 - 5.2 10e12/L     Canceled, Test credited   Unsatisfactory specimen - clotted  specimen will need to be recollected, notified ARIADNA Barahona 6/10/17 1630   JM  CORRECTED ON 06/10 AT 1707: PREVIOUSLY REPORTED AS 3.84      Hemoglobin  11.7 - 15.7 g/dL     Canceled, Test credited   Unsatisfactory specimen - clotted  specimen will need to be recollected, notified ARIADNA Barahona 6/10/17 1630   JM  CORRECTED ON 06/10 AT 1707: PREVIOUSLY REPORTED AS 12.1      Hematocrit  35.0 - 47.0 %     Canceled, Test credited   Unsatisfactory specimen - clotted  specimen will need to be recollected, notified ARIADNA Barahona 6/10/17 1630   JM  CORRECTED ON 06/10 AT 1707: PREVIOUSLY REPORTED AS 36.2      MCV  78 - 100 fl     Canceled, Test credited   Unsatisfactory specimen - clotted  specimen will need to be recollected, notified ARIADNA Barahona 6/10/17 1630   JM  CORRECTED ON 06/10 AT 1707: PREVIOUSLY REPORTED AS 94      MCH  26.5 - 33.0 pg     Canceled, Test credited   Unsatisfactory specimen - clotted  specimen will need to be recollected, notifARIADNA Aguirre 6/10/17 1630   JM  CORRECTED ON 06/10 AT 1707: PREVIOUSLY REPORTED AS 31.5      MCHC  31.5 - 36.5 g/dL     Canceled, Test credited   Unsatisfactory specimen - clotted  specimen will need to be recollected, ARIADNA Peralta 6/10/17 1630   JM  CORRECTED ON 06/10 AT 1707: PREVIOUSLY REPORTED AS 33.4      RDW  10.0 - 15.0 %     Canceled, Test credited   Unsatisfactory specimen - clotted  specimen will need to be recollected, ARIADNA Peralta 6/10/17 1630   JM  CORRECTED ON 06/10 AT 1707: PREVIOUSLY REPORTED  AS 12.7      Platelet Count  150 - 450 10e9/L     Canceled, Test credited   Unsatisfactory specimen - clotted  specimen will need to be recollected, notified Tiffany Fuentes RN [facility] 6/10/17 1630     CORRECTED ON 06/10 AT 1707: PREVIOUSLY REPORTED AS 58      Diff Method       Canceled, Test credited   Unsatisfactory specimen - clotted  specimen will need to be recollected, notified ARIADNA Barahona 6/10/17 1630     CORRECTED ON 06/10 AT 1707: PREVIOUSLY REPORTED AS Automated Method      % Neutrophils 78.4 %    % Lymphocytes 11.4 %    % Monocytes 9.2 %    % Eosinophils 0.6 %    % Basophils 0.1 %    % Immature Granulocytes 0.3 %    Absolute Neutrophil 6.8 1.6 - 8.3 10e9/L    Absolute Lymphocytes 1.0 0.8 - 5.3 10e9/L    Absolute Monocytes 0.8 0.0 - 1.3 10e9/L    Absolute Eosinophils 0.1 0.0 - 0.7 10e9/L    Absolute Basophils 0.0 0.0 - 0.2 10e9/L    Abs Immature Granulocytes 0.0 0 - 0.4 10e9/L   Comprehensive metabolic panel   Result Value Ref Range    Sodium 140 133 - 144 mmol/L    Potassium 3.5 3.4 - 5.3 mmol/L    Chloride 104 94 - 109 mmol/L    Carbon Dioxide 28 20 - 32 mmol/L    Anion Gap 8 3 - 14 mmol/L    Glucose 119 (H) 70 - 99 mg/dL    Urea Nitrogen 27 7 - 30 mg/dL    Creatinine 1.01 0.52 - 1.04 mg/dL    GFR Estimate 52 (L) >60 mL/min/1.7m2    GFR Estimate If Black 63 >60 mL/min/1.7m2    Calcium 8.3 (L) 8.5 - 10.1 mg/dL    Bilirubin Total 0.6 0.2 - 1.3 mg/dL    Albumin 3.1 (L) 3.4 - 5.0 g/dL    Protein Total 5.8 (L) 6.8 - 8.8 g/dL    Alkaline Phosphatase 109 40 - 150 U/L    ALT 26 0 - 50 U/L    AST 18 0 - 45 U/L   Troponin I   Result Value Ref Range    Troponin I ES  0.000 - 0.045 ug/L     <0.015  The 99th percentile for upper reference range is 0.045 ug/L.  Troponin values in   the range of 0.045 - 0.120 ug/L may be associated with risks of adverse   clinical events.     CK total   Result Value Ref Range    CK Total 80 30 - 225 U/L   TSH with free T4 reflex   Result Value Ref Range    TSH 3.06 0.40 - 4.00  mU/L   INR   Result Value Ref Range    INR  0.86 - 1.14     Canceled, Test credited   Unsatisfactory specimen - clotted  Specimen will need to be recollected, notified Libby in [facility] 6/10/17 1600 JM  CORRECTED ON 06/10 AT 1704: PREVIOUSLY REPORTED AS 1.12     Partial thromboplastin time   Result Value Ref Range    PTT  22 - 37 sec     Canceled, Test credited   Unsatisfactory specimen - clotted  Specimen will need to be recollected, notified Libby in [facility] 6/10/17 1600 JM     Lactate Dehydrogenase   Result Value Ref Range    Lactate Dehydrogenase 267 (H) 81 - 234 U/L   CBC with platelets differential   Result Value Ref Range    WBC  4.0 - 11.0 10e9/L     Canceled, Test credited   Unsatisfactory specimen - clotted  [facility] 6[facility]17 1800 JM      RBC Count  3.8 - 5.2 10e12/L     Canceled, Test credited   Unsatisfactory specimen - clotted  [facility] 6/10/17 1800       Hemoglobin  11.7 - 15.7 g/dL     Canceled, Test credited   Unsatisfactory specimen - clotted  [facility] 6/10/17 1800       Hematocrit  35.0 - 47.0 %     Canceled, Test credited   Unsatisfactory specimen - clotted  [facility] 6/10/17 1800       MCV  78 - 100 fl     Canceled, Test credited   Unsatisfactory specimen - clotted  [facility] 6/10/17 1800       MCH  26.5 - 33.0 pg     Canceled, Test credited   Unsatisfactory specimen - clotted  [facility] 6/10/17 1800       MCHC  31.5 - 36.5 g/dL     Canceled, Test credited   Unsatisfactory specimen - clotted  [facility] 6/10/17 1800       RDW  10.0 - 15.0 %     Canceled, Test credited   Unsatisfactory specimen - clotted  [facility] 6/10/17 1800 JM      Platelet Count  150 - 450 10e9/L     Canceled, Test credited   Unsatisfactory specimen - clotted  [facility] 6/10/17 1800 JM      Diff Method       Canceled, Test credited   Unsatisfactory specimen - clotted  [facility] 6/10/17 1800 JM     INR   Result Value Ref Range    INR  1.03 0.86 - 1.14   Partial thromboplastin time   Result Value Ref Range    PTT 20 (L) 22 - 37 sec[SM1.6]         Assessments & Plan (with Medical Decision Making)[SM1.1]     MDM: Susan Haq is a 85 year old female who presented with fall at home while at her refrigerator unprovoked.[SM1.4]  Who presented with history of oxygen dependent IPF with a fall at home today  where she lives alone.  She was found by friends who came over to sit with her as they often do and she could not get  off the floor.   She had pain in the right arm and right leg. She struck her head on the floor. She had no presyncopal symptoms prior to the fall.  Imaging demonstrated a fracture of the right fibula Both proximal and distal but it was well approximated and did not need repair. She also had a Proximal phalanx fracture second toe.  Ther were splinted with the posterior splint.      She is I believe unsafe to return to home.  Recommend evaluation In the hospital with occupational therapy and physical therapy evaluations.  has a splint -  ambulation will be more difficult.      She also noted That she was hearing music constantly over the last week when it was not playing and was Present whether her hearing aids were in or not. Others could not hear this. As she had struck her head and we need to head imaging today, Along with these new almost auditory hallucinations, I recommend we pursue MRI instead of head CT.This could potentially demonstrate a temporal lobe lesion.  This demonstrated only old changes including an old lacunar infarct but no acute changes.[SM1.7]      I have reviewed the nursing notes.      I have reviewed the findings, diagnosis, plan and need for follow up with the patient.[SM1.1]       ED to Inpatient Handoff:    Discussed with Karol GREEN[SM1.4]edwin[SM1.7].  at 1630[SM1.4] PM[SM1.8]   Patient accepted for Inpatient Stay  Pending studies include MRI  Code Status:[SM1.4] Not addressed[SM1.7]           New  Prescriptions    No medications on file[SM1.1]       Final diagnoses:   Fall, initial encounter   Closed fracture of proximal end of fibula, unspecified fracture morphology, initial encounter   Closed fracture of distal end of right fibula, unspecified fracture morphology, initial encounter   Auditory hallucinations   Interstitial pulmonary fibrosis (H)   Closed nondisplaced fracture of proximal phalanx of lesser toe of right foot, initial encounter[SM1.9]       6/10/2017   [facility] EMERGENCY DEPARTMENT[SM1.1]     Anton Haro MD  06/10/17 1845  [SM1.10]     Revision History        User Key Date/Time User Provider Type Action    > SM1.10 6/10/2017  6:45 PM Anton Haro MD Physician Sign     SM1.6 6/10/2017  6:39 PM Anton Haro MD Physician      SM1.7 6/10/2017  6:38 PM Anton Haro MD Physician      SM1.9 6/10/2017  5:02 PM Anton Haro MD Physician      SM1.8 6/10/2017  5:00 PM Anton Haro MD Physician      SM1.4 6/10/2017  4:58 PM Anton Haro MD Physician      SM1.5 6/10/2017  4:56 PM Anton Haro MD Physician      SM1.3 6/10/2017  4:55 PM Anton Haro MD Physician      SM1.2 6/10/2017  2:40 PM Anton Haro MD Physician      SM1.1 6/10/2017  2:27 PM Anton Haro MD Physician             ED Notes by Galina Rincon RN at 6/10/2017  3:18 PM     Author:  Galina Rincon RN Service:  (none) Author Type:  Registered Nurse    Filed:  6/10/2017  3:18 PM Date of Service:  6/10/2017  3:18 PM Creation Time:  6/10/2017  3:18 PM    Status:  Signed :  Galina Rincon RN (Registered Nurse)         Pt unable to stand for orthostatic HTN test d/t pain in RLE.[NS1.1]      Revision History        User Key Date/Time User Provider Type Action    > NS1.1 6/10/2017  3:18 PM Galina Rincon RN Registered Nurse Sign            ED Notes by Galina Rincon RN at 6/10/2017  2:23 PM     Author:  Galina Rincon, RN Service:  (none) Author Type:  Registered Nurse    Filed:   6/10/2017  2:29 PM Date of Service:  6/10/2017  2:23 PM Creation Time:  6/10/2017  2:29 PM    Status:  Signed :  Galina Rincon RN (Registered Nurse)         RN in room upon MD assessment.  Pt states that she has heard music in her head.  Per MD he would like to order MRI.  Pt has bump on back of head from fall.  Pt not on anticoags.  Pt denies n/v/d, lightheadedness, dizziness, CP or SOB.  Lateral Right ankle pain upon MD palpation.[NS1.1]       Revision History        User Key Date/Time User Provider Type Action    > NS1.1 6/10/2017  2:29 PM Galina Rincon, RN Registered Nurse Sign            ED Notes by Soniya Issa RN at 6/10/2017  1:49 PM     Author:  Soniya Issa RN Service:  (none) Author Type:  Registered Nurse    Filed:  6/10/2017  1:54 PM Date of Service:  6/10/2017  1:49 PM Creation Time:  6/10/2017  1:50 PM    Status:  Addendum :  Soniya Issa RN (Registered Nurse)         Patient getting water out of refrigerator when she tripped over something and fell.  Injured right ankle and toes.  Right ankle has abrasion and redness[CR1.1] 1, 2 4 and 5th[CR1.2]  toes bruising.  CMS good.  Also c/o right upper arm pain.  No deformity noted.  Ice pack to foot and ankle no neck or back pain.  Not on blood thinners No head pain[CR1.1]     Revision History        User Key Date/Time User Provider Type Action    > CR1.2 6/10/2017  1:54 PM Soniya Issa RN Registered Nurse Addend     CR1.1 6/10/2017  1:50 PM Soniya Issa RN Registered Nurse Sign                  Procedure Notes     No notes of this type exist for this encounter.         Progress Notes - Therapies (Notes from 06/10/17 through 06/13/17)      Progress Notes by Ana Paula Escobar PT at 6/11/2017 12:37 PM     Author:  Ana Paula Escobar PT Service:  (none) Author Type:  Physical Therapist    Filed:  6/11/2017 12:37 PM Date of Service:  6/11/2017 12:37 PM Creation Time:  6/11/2017 12:37 PM    Status:  Signed :  Ana Paula Escobar PT  (Physical Therapist)         Physical Therapy Evaluation     06/11/17 1230   Quick Adds   Type of Visit Initial PT Evaluation   Living Environment   Lives With alone   Living Arrangements house   Number of Stairs to Enter Home 4   Living Environment Comment Has main level set up   Functional Level Prior   Prior Functional Level Comment Indep with ADLs, assistance housekeeping, shopping and transportation   General Information   Onset of Illness/Injury or Date of Surgery - Date 06/10/17   Patient/Family Goals Statement To go home   Pertinent History of Current Problem (include personal factors and/or comorbidities that impact the POC) Pt admitted secondary to fall with R tib fx and RUE injury. Hx of COPD, ETOH, HTN and depression   Precautions/Limitations fall precautions   Weight-Bearing Status - RUE weight-bearing as tolerated   Weight-Bearing Status - LLE full weight-bearing   Weight-Bearing Status - RLE nonweight-bearing   General Observations Up in bed upon arrival   General Info Comments IV   Cognitive Status Examination   Orientation orientation to person, place and time   Level of Consciousness alert   Follows Commands and Answers Questions 100% of the time   Personal Safety and Judgment impaired;at risk behaviors demonstrated   Memory intact   Strength   Strength Comments LLE 5/5, R hip flex 3+/5, R knee flex/ext 4-/5   Bed Mobility   Bed Mobility Comments Min A of 1 for sup>sit, assisted with RLE   Transfer Skills   Transfer Comments Mod A of 1 from BSC/lower surface and min A 1 from elevated surface. Pt needing VC on hand placement and to use UE to control descend into chair, pt fell back into chair before chair was set up.   Gait   Gait Comments Pt able to stand pivot transfer with mod A of 2 from bed>BSC. VC to maintain NWB but question if she was able to   General Therapy Interventions   Planned Therapy Interventions gait training;transfer training;strengthening   Clinical Impression   Criteria for  "Skilled Therapeutic Intervention yes, treatment indicated   PT Diagnosis Impaired mobility   Influenced by the following impairments NWB, weakness and pain   Functional limitations due to impairments mobility   Clinical Presentation Stable/Uncomplicated   Clinical Decision Making (Complexity) Low complexity   Therapy Frequency` daily   Predicted Duration of Therapy Intervention (days/wks) 2-3 days   Anticipated Discharge Disposition Transitional Care Facility   Risk & Benefits of therapy have been explained Yes   Patient, Family & other staff in agreement with plan of care Yes   Clinical Impression Comments Pt would benefit from skilled PT to address strength and mobility. Recommnding TCU due to home set and limited mobility   [facility] AM-PAC TM \"6 Clicks\"   2016, Trustees of [facility], under license to [employer].  All rights reserved.   6 Clicks Short Forms Basic Mobility Inpatient Short Form   [facility] AM-PAC  \"6 Clicks\" V.2 Basic Mobility Inpatient Short Form   1. Turning from your back to your side while in a flat bed without using bedrails? 2 - A Lot   2. Moving from lying on your back to sitting on the side of a flat bed without using bedrails? 2 - A Lot   3. Moving to and from a bed to a chair (including a wheelchair)? 2 - A Lot   4. Standing up from a chair using your arms (e.g., wheelchair, or bedside chair)? 2 - A Lot   5. To walk in hospital room? 1 - Total   6. Climbing 3-5 steps with a railing? 1 - Total   Basic Mobility Raw Score (Score out of 24.Lower scores equate to lower levels of function) 10     See Care Plan for goals.[JK1.1]       Revision History        User Key Date/Time User Provider Type Action    > JK1.1 6/11/2017 12:37 PM Ana Paula Escobar, PT Physical Therapist Sign            Progress Notes by Altagracia Cuellar OT at 6/11/2017 12:36 PM     Author:  Altagracia Cuellar OT Service:  Acute IP Rehab Author Type:  Occupational Therapist    Filed:  6/11/2017 " "12:36 PM Date of Service:  6/11/2017 12:36 PM Creation Time:  6/11/2017 12:36 PM    Status:  Signed :  Altagracia Cuellar OT (Occupational Therapist)            06/11/17 1200   Signing Clinician's Name / Credentials   Signing clinician's name / credentials TORITO Salgado   Quick Adds   Rehab Discipline OT   ADL Training   Minutes of Treatment 15   Symptoms Noted During/After Treatment none   Treatment ADL training within precautions   Treatment Detail Pt is A of 2-3 for commode to recliner transfer. She is unable to manage hygiene after BM   Additional Documentation   Rehab Comments Recommend TCU at this point   OT Plan See POC   [facility] AM-PAC  \"6 Clicks\" Daily Activity Inpatient Short Form   1. Putting on and taking off regular lower body clothing? 1 - Total   2. Bathing (including washing, rinsing, drying)? 2 - A Lot   3. Toileting, which includes using toilet, bedpan or urinal? 1 - Total   4. Putting on and taking off regular upper body clothing? 3 - A Little   5. Taking care of personal grooming such as brushing teeth? 4 - None   6. Eating meals? 4 - None   Daily Activity Raw Score (Score out of 24.Lower scores equate to lower levels of function) 15   Total Session Time   Total Session Time (minutes) 30 minutes     TORITO Salgado[SL1.1]     Revision History        User Key Date/Time User Provider Type Action    > SL1.1 6/11/2017 12:36 PM Altagracia Cuellar OT Occupational Therapist Sign            Progress Notes by Altagracia Cuellar OT at 6/11/2017 12:33 PM     Author:  Altagracia Cuellar OT Service:  Acute IP Rehab Author Type:  Occupational Therapist    Filed:  6/11/2017 12:34 PM Date of Service:  6/11/2017 12:33 PM Creation Time:  6/11/2017 12:33 PM    Status:  Signed :  Altagracia Cuellar OT (Occupational Therapist)            06/11/17 1200   Quick Adds   Type of Visit Initial Occupational Therapy Evaluation   Living Environment   Lives With alone   Living " Arrangements house   Home Accessibility bed and bath on same level;stairs to enter home   Number of Stairs to Enter Home 4   Self-Care   Dominant Hand right   General Information   Onset of Illness/Injury or Date of Surgery - Date 06/10/17   Referring Physician Kae   Patient/Family Goals Statement Pt would like to return home   Additional Occupational Profile Info/Pertinent History of Current Problem 84 yo F who slipped an a rug at home and fell resulting in a proximal fib fx, distal tib and R shoulder trauma   Precautions/Limitations fall precautions   Weight-Bearing Status - RLE nonweight-bearing   General Info Comments Previously I with self cares.Gets Moms meals and friends A with homemaking. Did not use an AD at home. Walk-in shower in which she stands   Cognitive Status Examination   Orientation orientation to person, place and time   Level of Consciousness alert   Able to Follow Commands mild impairment   Cognitive Comment Impulsive   Pain Assessment   Patient Currently in Pain (7/10 R shoulder with PROM to about 55 flex)   Range of Motion (ROM)   ROM Comment No functional R shoulder flex, elbow and digits WFLs   Strength   Strength Comments Decreased strength to manage functional ADL related transfers maintaining NWB'ing   Bed Mobility Skill: Supine to Sit   Level of [place]: Supine/Sit minimum assist (75% patients effort)   Physical Assist/Nonphysical Assist: Supine/Sit 1 person assist;other (see comments)  (A to guide R leg)   Transfer Skill: Bed to Chair/Chair to Bed   Level of [place]: Bed to Chair maximum assist (25% patients effort)   Physical Assist/Nonphysical Assist: Bed to Chair 2 persons   Weight-Bearing Restrictions nonweight-bearing   Assistive Device - Transfer Skill Bed to Chair Chair to Bed Rehab Eval rolling walker   Transfer Skill: Sit to Stand   Level of [place]: Sit/Stand moderate assist (50% patients effort)   Physical Assist/Nonphysical Assist: Sit/Stand 1 person  "assist   Transfer Skill: Sit to Stand nonweight-bearing   Assistive Device for Transfer: Sit/Stand rolling walker   Transfer Skill: Toilet Transfer   Level of [place]: Toilet (Bedside commode)   Physical Assist/Nonphysical Assist: Toilet 2 persons;other (see comments)  (2 on 3 off for positioning)   Weight-Bearing Restrictions: Toilet nonweight-bearing   Assistive Device rolling walker   Toileting   Level of [place]: Toilet unable to perform   Activities of Daily Living Analysis   Impairments Contributing to Impaired Activities of Daily Living balance impaired;cognition impaired;flexibility decreased;pain;post surgical precautions;strength decreased   General Therapy Interventions   Planned Therapy Interventions ADL retraining;bed mobility training;transfer training   Clinical Impression   Criteria for Skilled Therapeutic Interventions Met yes, treatment indicated   OT Diagnosis weakness, impaired ADLs and related mobility   Influenced by the following impairments closed nondisplaced fx of proximal phalanx of lesser R toe; proximal fibula and distal tibula fx. NWB'ing RLE. R shoulder pain resulting in decreased functional use   Assessment of Occupational Performance 5 or more Performance Deficits   Identified Performance Deficits toileting, dressing, bathing, H/G, standing, ADL related mobility   Clinical Decision Making (Complexity) Low complexity   Therapy Frequency daily   Predicted Duration of Therapy Intervention (days/wks) 3 days   Anticipated Discharge Disposition Transitional Care Facility   Risks and Benefits of Treatment have been explained. Yes   Patient, Family & other staff in agreement with plan of care Yes   Clinical Impression Comments Pt is unable to maintain NWB'ing status during minimal transferring   [facility] AM-PAC  \"6 Clicks\" Daily Activity Inpatient Short Form   1. Putting on and taking off regular lower body clothing? 1 - Total   2. Bathing (including washing, rinsing, " drying)? 2 - A Lot   3. Toileting, which includes using toilet, bedpan or urinal? 1 - Total   4. Putting on and taking off regular upper body clothing? 3 - A Little   5. Taking care of personal grooming such as brushing teeth? 4 - None   6. Eating meals? 4 - None   Daily Activity Raw Score (Score out of 24.Lower scores equate to lower levels of function) 15   Total Evaluation Time   Total Evaluation Time (Minutes) 15     TORITO Salgado[SL1.1]     Revision History        User Key Date/Time User Provider Type Action    > SL1.1 6/11/2017 12:34 PM Altagracia Cuellar OT Occupational Therapist Sign

## 2017-06-10 NOTE — IP AVS SNAPSHOT
` `     [facility] SURGICAL: [phone]            Medication Administration Report for Susan Haq MIKIE as of 06/13/17 1341   Legend:    Given Hold Not Given Due Canceled Entry Other Actions    Time Time (Time) Time  Time-Action       Inactive    Active    Linked        Medications 06/07/17 06/08/17 06/09/17 06/10/17 06/11/17 06/12/17 06/13/17    acetaminophen (TYLENOL) tablet 1,000 mg  Dose: 1,000 mg Freq: EVERY 8 HOURS Route: PO  Start: 06/10/17 1900   Admin Instructions: Maximum acetaminophen dose from all sources = 75 mg/kg/day not to exceed 4 gram        1957 (1,000 mg)-Given        (0527)-Not Given       1230 (1,000 mg)-Given       2122 (1,000 mg)-Given        0216 (1,000 mg)-Given       1155 (1,000 mg)-Given During Downtime       1947 (1,000 mg)-Given        0257 (1,000 mg)-Given       1152 (1,000 mg)-Given       [ ] 1900           acetaminophen (TYLENOL) tablet 650 mg  Dose: 650 mg Freq: EVERY 4 HOURS PRN Route: PO  PRN Reason: mild pain  Start: 06/10/17 1848   Admin Instructions: Alternate ibuprofen (if ordered) with acetaminophen.  Maximum acetaminophen dose from all sources = 75 mg/kg/day not to exceed 4 grams/day.               albuterol neb solution 2.5 mg  Dose: 1 vial Freq: EVERY 2 HOURS PRN Route: NEBULIZATION  PRN Reasons: wheezing,shortness of breath / dyspnea  Start: 06/10/17 1834              atenolol (TENORMIN) tablet 50 mg  Dose: 50 mg Freq: 2 TIMES DAILY Route: PO  Start: 06/10/17 2330        0010 (50 mg)-Given              1230 (50 mg)-Given              2124 (50 mg)-Given        0849 (50 mg)-Given       1947 (50 mg)-Given        0946 (50 mg)-Given       [ ] 2000           atorvastatin (LIPITOR) tablet 20 mg  Dose: 20 mg Freq: AT BEDTIME Route: PO  Start: 06/10/17 2330        0010 (20 mg)-Given       2122 (20 mg)-Given        2205 (20 mg)-Given        [ ] 2200           cefTRIAXone (ROCEPHIN) 1 g vial to attach to  mL bag for ADULTS or NS 50 mL bag for PEDS  Dose: 1 g  Freq: EVERY 24 HOURS Route: IV  Indications of Use: URINARY TRACT INFECTION  Start: 06/11/17 2300         0103 (1 g)-New Bag       (2346)-Not Given        0148 (1 g)-New Bag           enoxaparin (LOVENOX) injection 40 mg  Dose: 40 mg Freq: EVERY 24 HOURS Route: SC  Start: 06/11/17 1330   Admin Instructions: HOLD if platelet count falls below 50% of baseline or less than 100,000/ L and notify provider.         1420 (40 mg)-Given        1330 (40 mg)-Given [C]        1258 (40 mg)-Given           fluticasone-vilanterol (BREO ELLIPTA) 200-25 MCG/INH oral inhaler 1 puff  Dose: 1 puff Freq: DAILY Route: IN  Start: 06/11/17 0800   Admin Instructions: *Do not use more frequently than once daily.*  Rinse mouth after use.    Formulary substitution for Symbicort 160-4.5 mcg         0807 (1 puff)-Given        0851 (1 puff)-Given        0947 (1 puff)-Given           folic acid (FOLVITE) tablet 1 mg  Dose: 1 mg Freq: DAILY Route: PO  Start: 06/13/17 1230   Admin Instructions: If patient is unable to tolerate oral folic acid, an intravenous dose of folic acid should be considered.           1258 (1 mg)-Given           furosemide (LASIX) tablet 20 mg  Dose: 20 mg Freq: DAILY Route: PO  Start: 06/13/17 1130          1152 (20 mg)-Given           hydrOXYzine (ATARAX) tablet 25-50 mg  Dose: 25-50 mg Freq: AT BEDTIME PRN Route: PO  PRN Reason: other  PRN Comment: insomnia  Start: 06/10/17 1835       2234 (25 mg)-Given         0112 (50 mg)-Given       2252 (50 mg)-Given            miconazole (MICATIN; MICRO GUARD) 2 % powder  Freq: 2 TIMES DAILY Route: Top  Start: 06/10/17 2000   Admin Instructions: Apply to breast folds        1957 ( )-Given        0810 ( )-Given       2125 ( )-Given        0849 ( )-Given       1947 ( )-Given        0948 ( )-Given       [ ] 2000           multivitamin, therapeutic with minerals (THERA-VIT-M) tablet 1 tablet  Dose: 1 tablet Freq: DAILY Route: PO  Start: 06/13/17 1230   Admin Instructions: If patient is  unable to tolerate oral multivitamins an intravenous dose of multivitamins should be considered.           1258 (1 tablet)-Given           naloxone (NARCAN) injection 0.1-0.4 mg  Dose: 0.1-0.4 mg Freq: EVERY 2 MIN PRN Route: IV  PRN Reason: opioid reversal  Start: 06/10/17 1845   Admin Instructions: For respiratory rate LESS than or EQUAL to 8.  Partial reversal dose:  0.1 mg titrated q 2 minutes for Analgesia Side Effects Monitoring Sedation Level of 3 (frequently drowsy, arousable, drifts to sleep during conversation).Full reversal dose:  0.4 mg bolus for Analgesia Side Effects Monitoring Sedation Level of 4 (somnolent, minimal or no response to stimulation).               ondansetron (ZOFRAN-ODT) ODT tab 4 mg  Dose: 4 mg Freq: EVERY 6 HOURS PRN Route: PO  PRN Reason: nausea  Start: 06/10/17 1848   Admin Instructions: This is Step 1 of nausea and vomiting management.  If nausea not resolved in 15 minutes, go to Step 2 prochlorperazine (COMPAZINE). Do not push through foil backing. Peel back foil and gently remove. Place on tongue immediately. Administration with liquid unnecessary              Or  ondansetron (ZOFRAN) injection 4 mg  Dose: 4 mg Freq: EVERY 6 HOURS PRN Route: IV  PRN Reasons: nausea,vomiting  Start: 06/10/17 1848   Admin Instructions: This is Step 1 of nausea and vomiting management.  If nausea not resolved in 15 minutes, go to Step 2 prochlorperazine (COMPAZINE).  Irritant.               potassium chloride (K-TAB,KLOR-CON) CR tablet 10 mEq  Dose: 10 mEq Freq: DAILY Route: PO  Start: 06/13/17 1130   Admin Instructions: DO NOT CRUSH.           1152 (10 mEq)-Given           senna-docusate (SENOKOT-S;PERICOLACE) 8.6-50 MG per tablet 1-2 tablet  Dose: 1-2 tablet Freq: 2 TIMES DAILY PRN Route: PO  PRN Comment: constipation   Start: 06/10/17 1848   Admin Instructions: If no bowel movement in 24 hours, increase to 2 tablets PO BID.  Hold for loose stools.   This is the first step of a three step  constipation treatment protocol.               sodium chloride (PF) 0.9% PF flush 3 mL  Dose: 3 mL Freq: EVERY 8 HOURS Route: IK  Start: 06/11/17 1700        1659 (3 mL)-Given        (0142)-Not Given       0849 (3 mL)-Given       1756 (3 mL)-Given        0209 (3 mL)-Given       0949 (3 mL)-Given       [ ] 1700           thiamine tablet 100 mg  Dose: 100 mg Freq: DAILY Route: PO  Start: 06/13/17 1230   End: 06/18/17 0759   Admin Instructions: Administer first dose as soon as order set is initiated unless given in ED.   If patient is unable to tolerate oral thiamine, an intravenous dose of thiamine should be considered.           1258 (100 mg)-Given           traMADol (ULTRAM) half-tab 25-50 mg  Dose: 25-50 mg Freq: EVERY 6 HOURS PRN Route: PO  PRN Reasons: moderate pain,severe pain  Start: 06/11/17 0907              umeclidinium (INCRUSE ELLIPTA) 62.5 MCG/INH oral inhaler 1 puff  Dose: 1 puff Freq: DAILY Route: IN  Start: 06/11/17 0800   Admin Instructions: Formulary substitution for Spiriva 18 mcg daily         0807 (1 puff)-Given        0851 (1 puff)-Given        0947 (1 puff)-Given          Future Medications  Medications 06/07/17 06/08/17 06/09/17 06/10/17 06/11/17 06/12/17 06/13/17       losartan (COZAAR) tablet 100 mg  Dose: 100 mg Freq: DAILY Route: PO  Start: 06/14/17 0800             And  hydrochlorothiazide (HYDRODIURIL) tablet 25 mg  Dose: 25 mg Freq: DAILY Route: PO  Start: 06/14/17 0800             Completed Medications  Medications 06/07/17 06/08/17 06/09/17 06/10/17 06/11/17 06/12/17 06/13/17         Dose: 0.1 mL/kg Freq: ONCE Route: IV  Start: 06/10/17 1656   End: 06/10/17 1655   Admin Instructions: Supplied by, and administered by MRI.        1655 (10 mL)-Given                Dose: 12.5 mg Freq: ONCE Route: PO  Start: 06/13/17 1130   End: 06/13/17 1152          1152 (12.5 mg)-Given             Dose: 25 mg Freq: ONCE Route: PO  Start: 06/13/17 1130   End: 06/13/17 1152          1152 (25 mg)-Given              Dose: 40 mEq Freq: ONCE Route: PO  Start: 06/11/17 1445   End: 06/11/17 1455   Admin Instructions: Dissolve packet contents in 4-8 ounces of cold water or juice.         1455 (40 mEq)-Given            Discontinued Medications  Medications 06/07/17 06/08/17 06/09/17 06/10/17 06/11/17 06/12/17 06/13/17         Rate: 75 mL/hr Freq: CONTINUOUS Route: IV  Last Dose: Stopped (06/11/17 1400)  Start: 06/11/17 0030   End: 06/11/17 1312        0038 ( )-New Bag       1312-Med Discontinued  1400-Stopped               Dose: 2 puff Freq: 2 TIMES DAILY Route: IN  Start: 06/10/17 2000   End: 06/10/17 1840   Admin Instructions: *Do not use more frequently than twice daily.*  Rinse mouth after use.        1840-Med Discontinued            Dose: 12.5 mg Freq: DAILY Route: PO  Start: 06/12/17 0800   End: 06/13/17 1127         0849 (12.5 mg)-Given        0946 (12.5 mg)-Given       1127-Med Discontinued         Dose: 25 mg Freq: DAILY Route: PO  Start: 06/12/17 0800   End: 06/13/17 1127         0849 (25 mg)-Given        0947 (25 mg)-Given       1127-Med Discontinued         Dose: 1 tablet Freq: DAILY Route: PO  Start: 06/14/17 0800   End: 06/13/17 1143          1143-Med Discontinued         Dose: 1 tablet Freq: DAILY Route: PO  Start: 06/13/17 1130   End: 06/13/17 1127          1127-Med Discontinued         Dose: 0.1-0.4 mg Freq: EVERY 2 MIN PRN Route: IV  PRN Reason: opioid reversal  Start: 06/10/17 1835   End: 06/10/17 1851   Admin Instructions: For respiratory rate LESS than or EQUAL to 8.  Partial reversal dose:  0.1 mg titrated q 2 minutes for Analgesia Side Effects Monitoring Sedation Level of 3 (frequently drowsy, arousable, drifts to sleep during conversation).Full reversal dose:  0.4 mg bolus for Analgesia Side Effects Monitoring Sedation Level of 4 (somnolent, minimal or no response to stimulation).        1851-Med Discontinued            Dose: 18 mcg Freq: DAILY Route: IN  Start: 06/11/17 0800   End: 06/10/17 6076    Admin Instructions: Use only ONE capsule. To ensure the full dose is administered, TWO inhalations should be performed at a rate sufficient to hear or feel the capsule vibrate.        1842-Med Discontinued            Dose: 25-50 mg Freq: EVERY 6 HOURS PRN Route: PO  PRN Reasons: moderate pain,severe pain  Start: 06/10/17 1848   End: 06/10/17 1853   Admin Instructions: Start with 25mg and may give another if not effective in 45 min        1853-Med Discontinued       Medications 06/07/17 06/08/17 06/09/17 06/10/17 06/11/17 06/12/17 06/13/17

## 2017-06-10 NOTE — ED NOTES
RN in room upon MD assessment.  Pt states that she has heard music in her head.  Per MD he would like to order MRI.  Pt has bump on back of head from fall.  Pt not on anticoags.  Pt denies n/v/d, lightheadedness, dizziness, CP or SOB.  Lateral Right ankle pain upon MD palpation.

## 2017-06-10 NOTE — IP AVS SNAPSHOT
MRN:[id number]                      After Visit Summary   6/10/2017    Susan Haq    MRN: [id number]           Thank you!     Thank you for choosing [facility] for your care. Our goal is always to provide you with excellent care. Hearing back from our patients is one way we can continue to improve our services. Please take a few minutes to complete the written survey that you may receive in the mail after you visit with us. Thank you!        Patient Information     Date Of Birth          1/10/1932        Designated Caregiver       Most Recent Value    Caregiver    Will someone help with your care after discharge? no      About your hospital stay     You were admitted on:  Lisa 10, 2017 You last received care in the:  [facility]    You were discharged on:  June 13, 2017        Reason for your hospital stay       Non-surgical tibia/fibula fracture                  Who to Call     For medical emergencies, please call 911.  For non-urgent questions about your medical care, please call your primary care provider or clinic, [phone]          Attending Provider     Provider Specialty    Anton Haro MD [facility], Jerrod Vizcaino MD Internal Medicine    Cora Fernandez DO Internal Medicine    Philip Shabazz MD Internal Medicine       Primary Care Provider Office Phone # Fax #    Ema Korin Melendez [phone]      After Care Instructions     Activity       Non-weight bearing to right lower extremity.  Weight bearing as tolerating to right upper extremity.            Activity - Up with nursing assistance           Daily weights       Call Provider for weight gain of more than 2 pounds per day or 5 pounds per week.            Diet       Follow this diet upon discharge: Orders Placed This Encounter      Regular Diet Adult            Fall precautions           General info for SNF       Length of Stay Estimate: Short Term Care: Estimated #  of Days <30  Condition at Discharge: Improving  Level of care:skilled   Rehabilitation Potential: Excellent  Admission H&P remains valid and up-to-date: Yes  Recent Chemotherapy: N/A  Use Nursing Home Standing Orders: Yes            Intake and output       Every shift            Mantoux instructions       Give two-step Mantoux (PPD) Per Facility Policy Yes                  Follow-up Appointments     Follow-up and recommended labs and tests        Follow up with the physician at the TCU and with your primary care provider, Ema Melendez, within 2 days to evaluate medication change. We have discharged your without your home amlodipine nor clonidine. The following labs/tests are recommended: BP recheck.                  Additional Services     Occupational Therapy Adult Consult       Evaluate and treat as clinically indicated.    Reason:  Non-surgical tib/fib fracture            Physical Therapy Adult Consult       Evaluate and treat as clinically indicated.    Reason:  Non-surgical tib/fib fracture                  Further instructions from your care team       Orthopedic Instructions:  1.  Follow up with Dr. Carrillo Kiser on 6/15/17 in [place] for post injury visit and discuss future care for your ankle fracture.  Call [phone] to schedule your appointment.  2.  Use pain medication as directed  3.  Keep splint clean, dry and intact until your appointment. Cover with a plastic bag for bathing/ showering. ]    Blood Pressure Instructions:  Please have the staff at the TCU reassess your BP within 3 days.  We have discharged you to their facility WITHOUT your home clonidine nor amlodipine.  You will need to be watched closely to see if these medications become neccessary.            Pending Results     Date and Time Order Name Status Description    6/11/2017 1650 Urine Culture Aerobic Bacterial Preliminary     6/10/2017 1629 Blood Morphology Pathology Review In process             Statement of Approval  "    Ordered          17 1318  I have reviewed and agree with all the recommendations and orders detailed in this document.  EFFECTIVE NOW     Approved and electronically signed by:  Philip Shabazz MD             Admission Information     Date & Time Provider Department Dept. Phone    6/10/2017 Philip Shabazz MD [facility] [phone]      Your Vitals Were     Blood Pressure Pulse Temperature Respirations Weight Pulse Oximetry    178/67 (BP Location: Left arm) 75 98.6  F (37  C) (Oral) 18 108.7 kg (239 lb 10.2 oz) 94%    BMI (Body Mass Index)                   40.5 kg/m2           MyChart Information     [employer] lets you send messages to your doctor, view your test results, renew your prescriptions, schedule appointments and more. To sign up, go to [email or web address] . Click on \"Log in\" on the left side of the screen, which will take you to the Welcome page. Then click on \"Sign up Now\" on the right side of the page.     You will be asked to enter the access code listed below, as well as some personal information. Please follow the directions to create your username and password.     Your access code is: [email or web address]  Expires: 2017  4:49 PM     Your access code will  in 90 days. If you need help or a new code, please call your [facility] clinic or [phone].        Care EveryWhere ID     This is your Care EveryWhere ID. This could be used by other organizations to access your [facility] medical records  [id number]           Review of your medicines      START taking        Dose / Directions    acetaminophen 500 MG tablet   Commonly known as:  TYLENOL   Used for:  Closed fracture of proximal end of fibula, unspecified fracture morphology, initial encounter        Dose:  1000 mg   Take 2 tablets (1,000 mg) by mouth every 8 hours   Quantity:  40 tablet   Refills:  0       aspirin  MG EC tablet   Used for:  Closed fracture of proximal end of fibula, unspecified fracture " morphology, initial encounter        Dose:  325 mg   Take 1 tablet (325 mg) by mouth every 6 hours as needed for moderate pain   Quantity:  14 tablet   Refills:  0       folic acid 1 MG tablet   Commonly known as:  FOLVITE   Used for:  Interstitial pulmonary fibrosis (H)        Dose:  1 mg   Take 1 tablet (1 mg) by mouth daily   Quantity:  4 tablet   Refills:  0       miconazole 2 % powder   Commonly known as:  MICATIN; MICRO GUARD        Apply topically 2 times daily   Quantity:  30 g   Refills:  1       senna-docusate 8.6-50 MG per tablet   Commonly known as:  SENOKOT-S;PERICOLACE   Used for:  Closed fracture of proximal end of fibula, unspecified fracture morphology, initial encounter        Dose:  1-2 tablet   Take 1-2 tablets by mouth 2 times daily as needed (constipation )   Quantity:  100 tablet   Refills:  0       sulfamethoxazole-trimethoprim 800-160 MG per tablet   Commonly known as:  BACTRIM DS/SEPTRA DS   Used for:  Hypertension goal BP (blood pressure) < 140/90, Acute cystitis without hematuria        Dose:  1 tablet   Take 1 tablet by mouth 2 times daily for 3 days   Quantity:  6 tablet   Refills:  0       thiamine 100 MG tablet   Used for:  Auditory hallucinations        Dose:  100 mg   Take 1 tablet (100 mg) by mouth daily   Quantity:  4 tablet   Refills:  0       traMADol 50 MG tablet   Commonly known as:  ULTRAM   Used for:  Closed fracture of proximal end of fibula, unspecified fracture morphology, initial encounter        Dose:  25-50 mg   Take 0.5-1 tablets (25-50 mg) by mouth every 6 hours as needed for moderate pain or severe pain   Quantity:  28 tablet   Refills:  0         CONTINUE these medicines which have NOT CHANGED        Dose / Directions    * albuterol (2.5 MG/3ML) 0.083% neb solution        Dose:  1 vial   Take 1 vial (2.5 mg) by nebulization every 4 hours as needed for shortness of breath / dyspnea or wheezing   Quantity:  1 Box   Refills:  0       * albuterol 108 (90 BASE) MCG/ACT  Inhaler   Commonly known as:  PROAIR HFA/PROVENTIL HFA/VENTOLIN HFA   Used for:  Chronic obstructive pulmonary disease, unspecified COPD type (H)        Dose:  2 puff   Inhale 2 puffs into the lungs every 4 hours as needed for shortness of breath / dyspnea or wheezing   Quantity:  3 Inhaler   Refills:  3       atenolol 50 MG tablet   Commonly known as:  TENORMIN   Used for:  Hypertension goal BP (blood pressure) < 140/90        Dose:  50 mg   Take 1 tablet (50 mg) by mouth 2 times daily   Quantity:  180 tablet   Refills:  3       atorvastatin 20 MG tablet   Commonly known as:  LIPITOR   Used for:  Hyperlipidemia LDL goal <130        Dose:  20 mg   Take 1 tablet (20 mg) by mouth daily   Quantity:  90 tablet   Refills:  3       budesonide-formoterol 160-4.5 MCG/ACT Inhaler   Commonly known as:  SYMBICORT   Used for:  Chronic obstructive pulmonary disease, unspecified COPD type (H)        Dose:  2 puff   Inhale 2 puffs into the lungs 2 times daily   Quantity:  1 Inhaler   Refills:  5       CALCIUM 600 + D PO        Take  by mouth.   Refills:  0       cloNIDine 0.2 MG tablet   Commonly known as:  CATAPRES   Used for:  Hypertension goal BP (blood pressure) < 140/90        Dose:  0.2 mg   Take 1 tablet (0.2 mg) by mouth 2 times daily   Quantity:  60 tablet   Refills:  1       fish oil-omega-3 fatty acids 1000 MG capsule        1 cap daily   Refills:  0       furosemide 20 MG tablet   Commonly known as:  LASIX   Used for:  Hypokalemia        Dose:  20 mg   Take 1 tablet (20 mg) by mouth daily   Quantity:  90 tablet   Refills:  3       hydrOXYzine 25 MG tablet   Commonly known as:  ATARAX   Used for:  Persistent insomnia        Dose:  25-50 mg   Take 1-2 tablets (25-50 mg) by mouth nightly as needed for other (insomnia)   Quantity:  90 tablet   Refills:  3       losartan-hydrochlorothiazide 100-25 MG per tablet   Commonly known as:  HYZAAR   Used for:  Hypertension goal BP (blood pressure) < 140/90        Dose:  1 tablet    Take 1 tablet by mouth daily Take 1 tablet by mouth daily.   Quantity:  90 tablet   Refills:  3       MULTIVITAMIN TABS   OR        1 TABLET DAILY   Refills:  0       Nebulizer Compressor Kit        Dose:  1 kit   1 kit every 4 hours as needed   Quantity:  1 kit   Refills:  0       * order for DME   Used for:  SUSSY (obstructive sleep apnea)        1.  CPAP pressure 12 cm/H20 with heated humidity and auto-titrating capability.  2.  Provide mask to fit and CPAP supplies. 3.  Length of need lifetime. 4.  Ok to d/c O2 bleed in to her PAP overnight.   Refills:  0       * order for DME   Used for:  Leg edema        TEDs stockings knee high to be worn during the day.   Quantity:  1 Units   Refills:  0       * order for DME   Used for:  Hypoxia        Equipment being ordered: Oxygen   Quantity:  1 Units   Refills:  0       * order for DME   Used for:  SUSYS (obstructive sleep apnea)        Equipment being ordered: CPAP supplies   Quantity:  1 Units   Refills:  0       order for DME   Used for:  COPD (chronic obstructive pulmonary disease) (H)        Equipment being ordered: Oxygen - 3 liters continous   Quantity:  1 Device   Refills:  0       potassium chloride 10 MEQ tablet   Commonly known as:  K-TAB,KLOR-CON   Used for:  Hypokalemia        Dose:  10 mEq   Take 1 tablet (10 mEq) by mouth daily   Quantity:  90 tablet   Refills:  3       tiotropium 18 MCG capsule   Commonly known as:  SPIRIVA HANDIHALER   Used for:  Chronic obstructive pulmonary disease, unspecified COPD type (H)        Inhale  into the lungs. Inhale contents of one capsule daily   Quantity:  90 capsule   Refills:  3       * Notice:  This list has 6 medication(s) that are the same as other medications prescribed for you. Read the directions carefully, and ask your doctor or other care provider to review them with you.      STOP taking     amLODIPine 10 MG tablet   Commonly known as:  NORVASC           ibuprofen 600 MG tablet   Commonly known as:   ADVIL/MOTRIN           sertraline 25 MG tablet   Commonly known as:  ZOLOFT                Where to get your medicines      These medications were sent to [facility] [place] - [address] [place] [address]  [address] [place] [address], [place] [address]     Phone:  [phone]     cloNIDine 0.2 MG tablet    folic acid 1 MG tablet         Some of these will need a paper prescription and others can be bought over the counter. Ask your nurse if you have questions.     You don't need a prescription for these medications     acetaminophen 500 MG tablet    aspirin  MG EC tablet    miconazole 2 % powder    senna-docusate 8.6-50 MG per tablet    sulfamethoxazole-trimethoprim 800-160 MG per tablet    thiamine 100 MG tablet         Information about where to get these medications is not yet available     ! Ask your nurse or doctor about these medications     traMADol 50 MG tablet                Protect others around you: Learn how to safely use, store and throw away your medicines at www.disposemymeds.org.             Medication List: This is a list of all your medications and when to take them. Check marks below indicate your daily home schedule. Keep this list as a reference.      Medications           Morning Afternoon Evening Bedtime As Needed    acetaminophen 500 MG tablet   Commonly known as:  TYLENOL   Take 2 tablets (1,000 mg) by mouth every 8 hours   Last time this was given:  1,000 mg on 6/13/2017 11:52 AM                                * albuterol (2.5 MG/3ML) 0.083% neb solution   Take 1 vial (2.5 mg) by nebulization every 4 hours as needed for shortness of breath / dyspnea or wheezing                                * albuterol 108 (90 BASE) MCG/ACT Inhaler   Commonly known as:  PROAIR HFA/PROVENTIL HFA/VENTOLIN HFA   Inhale 2 puffs into the lungs every 4 hours as needed for shortness of breath / dyspnea or wheezing                                aspirin  MG EC  tablet   Take 1 tablet (325 mg) by mouth every 6 hours as needed for moderate pain                                atenolol 50 MG tablet   Commonly known as:  TENORMIN   Take 1 tablet (50 mg) by mouth 2 times daily   Last time this was given:  50 mg on 6/13/2017  9:46 AM                                atorvastatin 20 MG tablet   Commonly known as:  LIPITOR   Take 1 tablet (20 mg) by mouth daily   Last time this was given:  20 mg on 6/12/2017 10:05 PM                                budesonide-formoterol 160-4.5 MCG/ACT Inhaler   Commonly known as:  SYMBICORT   Inhale 2 puffs into the lungs 2 times daily                                CALCIUM 600 + D PO   Take  by mouth.                                cloNIDine 0.2 MG tablet   Commonly known as:  CATAPRES   Take 1 tablet (0.2 mg) by mouth 2 times daily                                fish oil-omega-3 fatty acids 1000 MG capsule   1 cap daily                                folic acid 1 MG tablet   Commonly known as:  FOLVITE   Take 1 tablet (1 mg) by mouth daily   Last time this was given:  1 mg on 6/13/2017 12:58 PM                                furosemide 20 MG tablet   Commonly known as:  LASIX   Take 1 tablet (20 mg) by mouth daily   Last time this was given:  20 mg on 6/13/2017 11:52 AM                                hydrOXYzine 25 MG tablet   Commonly known as:  ATARAX   Take 1-2 tablets (25-50 mg) by mouth nightly as needed for other (insomnia)   Last time this was given:  50 mg on 6/12/2017 10:52 PM                                losartan-hydrochlorothiazide 100-25 MG per tablet   Commonly known as:  HYZAAR   Take 1 tablet by mouth daily Take 1 tablet by mouth daily.                                miconazole 2 % powder   Commonly known as:  MICATIN; MICRO GUARD   Apply topically 2 times daily   Last time this was given:  6/13/2017  9:48 AM                                MULTIVITAMIN TABS   OR   1 TABLET DAILY                                Nebulizer Compressor  Kit   1 kit every 4 hours as needed                                * order for DME   1.  CPAP pressure 12 cm/H20 with heated humidity and auto-titrating capability.  2.  Provide mask to fit and CPAP supplies. 3.  Length of need lifetime. 4.  Ok to d/c O2 bleed in to her PAP overnight.                                * order for DME   TEDs stockings knee high to be worn during the day.                                * order for DME   Equipment being ordered: Oxygen                                * order for DME   Equipment being ordered: CPAP supplies                                order for DME   Equipment being ordered: Oxygen - 3 liters continous                                potassium chloride 10 MEQ tablet   Commonly known as:  K-TAB,KLOR-CON   Take 1 tablet (10 mEq) by mouth daily   Last time this was given:  10 mEq on 6/13/2017 11:52 AM                                senna-docusate 8.6-50 MG per tablet   Commonly known as:  SENOKOT-S;PERICOLACE   Take 1-2 tablets by mouth 2 times daily as needed (constipation )                                sulfamethoxazole-trimethoprim 800-160 MG per tablet   Commonly known as:  BACTRIM DS/SEPTRA DS   Take 1 tablet by mouth 2 times daily for 3 days                                thiamine 100 MG tablet   Take 1 tablet (100 mg) by mouth daily   Last time this was given:  100 mg on 6/13/2017 12:58 PM                                tiotropium 18 MCG capsule   Commonly known as:  SPIRIVA HANDIHALER   Inhale  into the lungs. Inhale contents of one capsule daily                                traMADol 50 MG tablet   Commonly known as:  ULTRAM   Take 0.5-1 tablets (25-50 mg) by mouth every 6 hours as needed for moderate pain or severe pain                                * Notice:  This list has 6 medication(s) that are the same as other medications prescribed for you. Read the directions carefully, and ask your doctor or other care provider to review them with you.

## 2017-06-10 NOTE — ED PROVIDER NOTES
History     Chief Complaint   Patient presents with     Fall     Fell to the floor while getting breakfast. Not sure what caused the fall. Injured right leg and right arm. Uses oxygen at home, 3L per [place].      HPI  Susan Haq is a 85 year old female who resents with a fall that occurred at home today while she was at her refrigerator, she feels that she lost her balance and fell over - she did not have associated loss of consciousness and had no associated neck pain.  Headache developed later more generalized and mild.  There is no associated weakness of arms or legs.  She fell onto her right side and has secondary pain at the right forearm and the right ankle and foot.  Pain is as high as the level of the proximal tibia region.  She was not able to ambulate when her friends arrived.  They did however his sister to a vehicle and was transported by private vehicle here.  She appears to be lucid per her friends.  She does note a 1 week history of constantly hearing music in her head.  She says that this is  constant and unrelenting.  She has no other associated neurologic changes.  She never had seizures before.  She is otherwise been feeling well.  Preceding her fall this morning she had no associated presyncopal symptoms no chest pain or shortness of breath.  No vision difficulties.  No changes in her speech or swallowing.    She is on several agents that could cause orthostatic hypotension including Lasix, Catapres, Atarax, amlodipine, Hyzaar, atenolol    She has a history of idiopathic pulmonary fibrosis and is oxygen dependent at home    I have reviewed the Medications, Allergies, Past Medical and Surgical History, and Social History in the Epic system.    Allergies:   Allergies   Allergen Reactions     Ace Inhibitors Cough     Asa [Aluminum Hydroxide]      Penicillins          No current facility-administered medications on file prior to encounter.   Current Outpatient Prescriptions on File Prior to  Encounter:  sertraline (ZOLOFT) 25 MG tablet Take 1 tablet (25 mg) by mouth daily   cloNIDine (CATAPRES) 0.2 MG tablet Take 1 tablet (0.2 mg) by mouth 2 times daily   atorvastatin (LIPITOR) 20 MG tablet Take 1 tablet (20 mg) by mouth daily   furosemide (LASIX) 20 MG tablet Take 1 tablet (20 mg) by mouth daily   amLODIPine (NORVASC) 10 MG tablet Take 1 tablet (10 mg) by mouth daily   hydrOXYzine (ATARAX) 25 MG tablet Take 1-2 tablets (25-50 mg) by mouth nightly as needed for other (insomnia)   losartan-hydrochlorothiazide (HYZAAR) 100-25 MG per tablet Take 1 tablet by mouth daily Take 1 tablet by mouth daily.   potassium chloride (K-TAB,KLOR-CON) 10 MEQ tablet Take 1 tablet (10 mEq) by mouth daily   atenolol (TENORMIN) 50 MG tablet Take 1 tablet (50 mg) by mouth 2 times daily   budesonide-formoterol (SYMBICORT) 160-4.5 MCG/ACT Inhaler Inhale 2 puffs into the lungs 2 times daily   tiotropium (SPIRIVA HANDIHALER) 18 MCG capsule Inhale  into the lungs. Inhale contents of one capsule daily   albuterol (PROAIR HFA/PROVENTIL HFA/VENTOLIN HFA) 108 (90 BASE) MCG/ACT Inhaler Inhale 2 puffs into the lungs every 4 hours as needed for shortness of breath / dyspnea or wheezing   Respiratory Therapy Supplies (NEBULIZER COMPRESSOR) KIT 1 kit every 4 hours as needed   albuterol (2.5 MG/3ML) 0.083% nebulizer solution Take 1 vial (2.5 mg) by nebulization every 4 hours as needed for shortness of breath / dyspnea or wheezing   ORDER FOR DME Equipment being ordered: Oxygen - 3 liters continous   ORDER FOR DME Equipment being ordered: CPAP supplies   ORDER FOR DME Equipment being ordered: Oxygen   ORDER FOR DME TEDs stockings knee high to be worn during the day.   ORDER FOR DME 1.  CPAP pressure 12 cm/H20 with heated humidity and auto-titrating capability. 2.  Provide mask to fit and CPAP supplies.3.  Length of need lifetime.4.  Ok to d/c O2 bleed in to her PAP overnight.   Calcium Carbonate-Vitamin D (CALCIUM 600 + D OR) Take  by mouth.  "  ibuprofen (ADVIL,MOTRIN) 600 MG tablet Take 1 tablet by mouth every 6 hours as needed for pain.   FISH OIL 1000 MG OR CAPS 1 cap daily   MULTIVITAMIN TABS   OR 1 TABLET DAILY       Patient Active Problem List   Diagnosis     Hyperlipidemia LDL goal <130     Insomnia     Osteopenia     Hypertension goal BP (blood pressure) < 140/90     Obesity     Advance care planning     Adenomatous polyp of colon     PVC's (premature ventricular contractions)     SUSSY (obstructive sleep apnea)-Moderately severe (AHI 21)     Bilateral leg edema     Prediabetes     HL (hearing loss)     Umbilical hernia     Hypoxia     Hypokalemia     SNHL (sensorineural hearing loss)     Interstitial pulmonary fibrosis (H)     Chronic obstructive pulmonary disease, unspecified COPD type (H)     Major depressive disorder, single episode, moderate (H)       Past Surgical History:   Procedure Laterality Date     APPENDECTOMY       C BSO, OMENTECTOMY W/SHANIA       C NONSPECIFIC PROCEDURE      (L) clavicle orif     C STOMACH SURGERY PROCEDURE UNLISTED       CHOLECYSTECTOMY, LAPOROSCOPIC  10/13/2011    Cholecystectomy, Laparoscopic     HERNIA REPAIR, UMBILICAL  10/13/2011     HYSTERECTOMY, CERVIX STATUS UNKNOWN         Social History   Substance Use Topics     Smoking status: Former Smoker     Quit date: 1/1/1990     Smokeless tobacco: Former User     Alcohol use Yes      Comment: OCC       Most Recent Immunizations   Administered Date(s) Administered     Influenza (High Dose) 3 valent vaccine 09/13/2016     Influenza (IIV3) 10/04/2012     Influenza Vaccine IM 3yrs+ 4 Valent IIV4 10/07/2013     Pneumococcal (PCV 13) 09/13/2016     Pneumococcal 23 valent 01/24/2006     TD (ADULT, 7+) 01/01/2004     TDAP Vaccine (Boostrix) 10/07/2013     Zoster vaccine, live 05/30/2007       BMI: Estimated body mass index is 39.88 kg/(m^2) as calculated from the following:    Height as of 3/8/17: 1.638 m (5' 4.5\").    Weight as of this encounter: 107 kg (236 " lb).      Review of Systems   Constitutional: Negative for chills, diaphoresis and fever.   HENT: Negative for ear pain, sinus pressure and sore throat.    Eyes: Negative for visual disturbance.   Respiratory: Positive for shortness of breath (chronic). Negative for cough and wheezing.    Cardiovascular: Negative for chest pain and palpitations.   Gastrointestinal: Negative for abdominal pain, blood in stool, constipation, diarrhea, nausea and vomiting.   Genitourinary: Negative for dysuria, frequency and urgency.   Skin: Negative for rash.   Neurological: Positive for headaches. Negative for seizures and speech difficulty.   All other systems reviewed and are negative.      Physical Exam   BP: 179/84  Pulse: 57  Temp: 97.9  F (36.6  C)  Resp: 20  Weight: 107 kg (236 lb)  SpO2: 95 %  Physical Exam   Constitutional: She is oriented to person, place, and time. No distress.   HENT:   Mouth/Throat: Oropharynx is clear and moist.   Eyes: Conjunctivae and EOM are normal. Pupils are equal, round, and reactive to light.   Neck: Neck supple.   Cardiovascular: Normal rate and regular rhythm.  Exam reveals no gallop and no friction rub.    No murmur heard.  Pulmonary/Chest: Effort normal and breath sounds normal. No respiratory distress. She has no wheezes. She has no rales.   Abdominal: Soft. She exhibits no distension. There is no tenderness. There is no rebound and no guarding.   Musculoskeletal: She exhibits no edema.        Cervical back: She exhibits normal range of motion, no tenderness, no bony tenderness, no swelling, no deformity, no pain and no spasm.        Right upper arm: She exhibits no tenderness, no bony tenderness, no edema and no deformity.        Left upper arm: She exhibits no tenderness, no bony tenderness and no deformity.        Right forearm: She exhibits tenderness and bony tenderness. She exhibits no swelling, no edema and no deformity.        Right hand: She exhibits normal capillary refill. Normal  sensation noted. Decreased sensation is not present in the ulnar distribution, is not present in the medial distribution and is not present in the radial distribution. Normal strength noted. She exhibits no thumb/finger opposition and no wrist extension trouble.        Right upper leg: She exhibits no tenderness, no bony tenderness and no swelling.        Right lower leg: She exhibits tenderness and bony tenderness. She exhibits no swelling, no edema and no deformity.        Right foot: There is decreased range of motion and tenderness. There is normal capillary refill and no deformity.        Feet:    Neurological: She is alert and oriented to person, place, and time. She displays normal reflexes. No cranial nerve deficit. She exhibits normal muscle tone. Coordination normal.   Skin: No rash noted. She is not diaphoretic.       ED Course     ED Course     Splint application  Date/Time: 6/10/2017 4:57 PM  Performed by: JUDE LOPEZ  Authorized by: JUDE LOPEZ   Consent: Verbal consent obtained.  Risks and benefits: risks, benefits and alternatives were discussed  Consent given by: patient  Required items: required blood products, implants, devices, and special equipment available  Patient identity confirmed: verbally with patient  Location details: right leg  Splint type: long leg  Supplies used: Ortho-Glass  Post-procedure: The splinted body part was neurovascularly unchanged following the procedure.  Patient tolerance: Patient tolerated the procedure well with no immediate complications              EKG 1434 hrs. demonstrates a sinus rhythm at 50 bpm low-voltage, she has normal axis and no ST change.  No T wave changes.  Normal R progression and no Q waves.  Normal intervals with the exception of a mildly long OK.  No ectopy.  Normal conduction.  Impression sinus rhythm bradycardia low voltage but no other acute changes and no significant change from her EKG done 03/05/2016.    Critical Care time:  none        Trauma Summary Disposition     Patient is trauma admission:  Standard Emergency Evaluation    Spine  Backboard removal time: Backboard not applied   C-collar and immobilization: not indicated, cleared.  CSpine Clearance: by Nexus Criteria  Full Primary and Secondary survey with appropriate immobilization of spine completed in exam section.     Neuro  GCS at arrival:  Motor 6=Obeys commands   Verbal 5=Oriented   Eye Opening 4=Spontaneous   Total: 15     GCS at disposition: unchanged    ED Procedures completed  Splinting of right leg, CMS intact post procedure    Admitting Consultants:  Orthopedic consult with Dr. Moises Kiser MD at 4:56 PM . Plan: will see tomorrow  Consult order placed into Epic:   Yes  Imaging reviewed by consultant:  No               Results for orders placed or performed during the hospital encounter of 06/10/17   MR Brain w/o & w Contrast    Narrative    MRI BRAIN WITHOUT AND WITH CONTRAST  6/10/2017 4:56 PM    HISTORY:  Fall today, striking occiput; one week of constant auditory  hallucinations, constant hearing of music.     TECHNIQUE:  Multiplanar, multisequence MRI of the brain without and  with 10 mL IV Gadavist.    COMPARISON: None.    FINDINGS:  There is generalized atrophy of the brain.  White matter  changes are present in the cerebral hemispheres that are consistent  with small vessel ischemic disease in this age patient. Old lacunar  infarct is seen in the right corona radiata and upper right putamen.  There is no evidence of hemorrhage, mass, acute infarct, or anomaly.   There are no gadolinium enhancing lesions.    The facial structures appear normal. The arteries at the base of the  brain and the dural venous sinuses appear patent.       Impression    IMPRESSION:  1. No acute abnormality. No evidence of intracranial trauma.  2. Brain atrophy and white matter changes consistent with sequelae of  small vessel ischemic disease.  3. Old lacunar infarct in the right corona radiata  and upper right  putamen.   Radius/Ulna XR, PA & LAT, right    Narrative    RIGHT FOREARM TWO VIEWS   6/10/2017 3:53 PM     HISTORY: Fall, forearm injury.    COMPARISON: None.      Impression    IMPRESSION: Proximal and distal radial and ulnar articulations intact.  No acute fracture.       RAMILA BARNES MD   Foot  XR, G/E 3 views, right    Narrative    RIGHT FOOT THREE OR MORE VIEWS  6/10/2017 3:52 PM     HISTORY: Ecchymosis distal toes, pain at left ankle, foot ecchymosis,  bruising.    COMPARISON: None.      Impression    IMPRESSION: Mild hammertoe alignment of second through fifth toes.  Bones are normally aligned. Lucency in the mid fifth proximal phalanx,  suggestive of a nondisplaced fracture, clinically correlate with pain.  Additional lucency in the distal aspect of the second proximal  phalanx, possible location of fracture given the appearance, though  this may be somewhat degenerative.     RAMILA BARNES MD   Tib/Fib XR, right    Narrative    RIGHT TIBIA AND FIBULA TWO VIEWS   6/10/2017 3:52 PM     HISTORY: Ankle pain, tib-fib pain.    COMPARISON: None.      Impression    IMPRESSION: Oblique fracture through the distal fibula, minimally  displaced, extending to the level of the ankle mortise. Minimally  displaced fracture in the proximal aspect of the fibula, as well. This  may be associated with damage to interosseous ligament.     RAMILA BARNES MD   Pelvis XR, 1-2 views    Narrative    PELVIS ONE TO TWO VIEWS  6/10/2017 3:51 PM     HISTORY: Fall.    COMPARISON: September 13, 2016.      Impression    IMPRESSION: Both femoral heads articulate normally with the respective  acetabula. Minimal joint space narrowing, though no osteophyte  formation. Right lateral knee radiograph demonstrates normal alignment  without effusion. Proximal fibular fracture again visible.     RAMILA BARNES MD   CBC with platelets differential   Result Value Ref Range    WBC  4.0 - 11.0 10e9/L     Canceled, Test credited    Unsatisfactory specimen - clotted  specimen will need to be recollected, notified Tiffany Fuentes RN [facility] 6/10/17 1630   JM  CORRECTED ON 06/10 AT 1707: PREVIOUSLY REPORTED AS 8.7      RBC Count  3.8 - 5.2 10e12/L     Canceled, Test credited   Unsatisfactory specimen - clotted  specimen will need to be recollected, notified Tiffany Fuentes RN [facility] 6/10/17 1630   JM  CORRECTED ON 06/10 AT 1707: PREVIOUSLY REPORTED AS 3.84      Hemoglobin  11.7 - 15.7 g/dL     Canceled, Test credited   Unsatisfactory specimen - clotted  specimen will need to be recollected, notified Tiffany Fuentes RN [facility] 6/10/17 1630   JM  CORRECTED ON 06/10 AT 1707: PREVIOUSLY REPORTED AS 12.1      Hematocrit  35.0 - 47.0 %     Canceled, Test credited   Unsatisfactory specimen - clotted  specimen will need to be recollected, notified Tiffany Fuentes RN [facility] 6/10/17 1630   JM  CORRECTED ON 06/10 AT 1707: PREVIOUSLY REPORTED AS 36.2      MCV  78 - 100 fl     Canceled, Test credited   Unsatisfactory specimen - clotted  specimen will need to be recollected, notified Tiffany Fuenets RN [facility] 6/10/17 1630   JM  CORRECTED ON 06/10 AT 1707: PREVIOUSLY REPORTED AS 94      MCH  26.5 - 33.0 pg     Canceled, Test credited   Unsatisfactory specimen - clotted  specimen will need to be recollected, notified Tiffany Fuentes RN [facility] 6/10/17 1630   JM  CORRECTED ON 06/10 AT 1707: PREVIOUSLY REPORTED AS 31.5      MCHC  31.5 - 36.5 g/dL     Canceled, Test credited   Unsatisfactory specimen - clotted  specimen will need to be recollected, notified Tiffany Fuentes RN [facility] 6/10/17 1630   JM  CORRECTED ON 06/10 AT 1707: PREVIOUSLY REPORTED AS 33.4      RDW  10.0 - 15.0 %     Canceled, Test credited   Unsatisfactory specimen - clotted  specimen will need to be recollected, notified ARIADNA Barahona 6/10/17 1630   JM  CORRECTED ON 06/10 AT 1707: PREVIOUSLY REPORTED AS 12.7      Platelet Count  150 - 450 10e9/L     Canceled, Test credited   Unsatisfactory specimen - clotted  specimen will  need to be recollected, notified Tiffany Fuentes RN [facility] 6/10/17 1630     CORRECTED ON 06/10 AT 1707: PREVIOUSLY REPORTED AS 58      Diff Method       Canceled, Test credited   Unsatisfactory specimen - clotted  specimen will need to be recollected, notified Tiffany Fuentes RN [facility] 6/10/17 1630     CORRECTED ON 06/10 AT 1707: PREVIOUSLY REPORTED AS Automated Method      % Neutrophils 78.4 %    % Lymphocytes 11.4 %    % Monocytes 9.2 %    % Eosinophils 0.6 %    % Basophils 0.1 %    % Immature Granulocytes 0.3 %    Absolute Neutrophil 6.8 1.6 - 8.3 10e9/L    Absolute Lymphocytes 1.0 0.8 - 5.3 10e9/L    Absolute Monocytes 0.8 0.0 - 1.3 10e9/L    Absolute Eosinophils 0.1 0.0 - 0.7 10e9/L    Absolute Basophils 0.0 0.0 - 0.2 10e9/L    Abs Immature Granulocytes 0.0 0 - 0.4 10e9/L   Comprehensive metabolic panel   Result Value Ref Range    Sodium 140 133 - 144 mmol/L    Potassium 3.5 3.4 - 5.3 mmol/L    Chloride 104 94 - 109 mmol/L    Carbon Dioxide 28 20 - 32 mmol/L    Anion Gap 8 3 - 14 mmol/L    Glucose 119 (H) 70 - 99 mg/dL    Urea Nitrogen 27 7 - 30 mg/dL    Creatinine 1.01 0.52 - 1.04 mg/dL    GFR Estimate 52 (L) >60 mL/min/1.7m2    GFR Estimate If Black 63 >60 mL/min/1.7m2    Calcium 8.3 (L) 8.5 - 10.1 mg/dL    Bilirubin Total 0.6 0.2 - 1.3 mg/dL    Albumin 3.1 (L) 3.4 - 5.0 g/dL    Protein Total 5.8 (L) 6.8 - 8.8 g/dL    Alkaline Phosphatase 109 40 - 150 U/L    ALT 26 0 - 50 U/L    AST 18 0 - 45 U/L   Troponin I   Result Value Ref Range    Troponin I ES  0.000 - 0.045 ug/L     <0.015  The 99th percentile for upper reference range is 0.045 ug/L.  Troponin values in   the range of 0.045 - 0.120 ug/L may be associated with risks of adverse   clinical events.     CK total   Result Value Ref Range    CK Total 80 30 - 225 U/L   TSH with free T4 reflex   Result Value Ref Range    TSH 3.06 0.40 - 4.00 mU/L   INR   Result Value Ref Range    INR  0.86 - 1.14     Canceled, Test credited   Unsatisfactory specimen -  clotted  Specimen will need to be recollected, notified Libby in [facility] 6/10/17 1600 JM  CORRECTED ON 06/10 AT 1704: PREVIOUSLY REPORTED AS 1.12     Partial thromboplastin time   Result Value Ref Range    PTT  22 - 37 sec     Canceled, Test credited   Unsatisfactory specimen - clotted  Specimen will need to be recollected, notified Libby in [facility] 6/10/17 1600 JM     Lactate Dehydrogenase   Result Value Ref Range    Lactate Dehydrogenase 267 (H) 81 - 234 U/L   CBC with platelets differential   Result Value Ref Range    WBC  4.0 - 11.0 10e9/L     Canceled, Test credited   Unsatisfactory specimen - clotted  Notified [facility] 6/10/17 1800 JM      RBC Count  3.8 - 5.2 10e12/L     Canceled, Test credited   Unsatisfactory specimen - clotted  Notified [facility] 6/10/17 1800 JM      Hemoglobin  11.7 - 15.7 g/dL     Canceled, Test credited   Unsatisfactory specimen - clotted  Notified [facility] 6/10/17 1800 JM      Hematocrit  35.0 - 47.0 %     Canceled, Test credited   Unsatisfactory specimen - clotted  Notified [facility] 6/10/17 1800 JM      MCV  78 - 100 fl     Canceled, Test credited   Unsatisfactory specimen - clotted  Notified [facility] 6/10/17 1800 JM      MCH  26.5 - 33.0 pg     Canceled, Test credited   Unsatisfactory specimen - clotted  Notified [facility] 6/10/17 1800 JM      MCHC  31.5 - 36.5 g/dL     Canceled, Test credited   Unsatisfactory specimen - clotted  Notified [facility] 6/10/17 1800 JM      RDW  10.0 - 15.0 %     Canceled, Test credited   Unsatisfactory specimen - clotted  Notified [facility] 6/10/17 1800 JM      Platelet Count  150 - 450 10e9/L     Canceled, Test credited   Unsatisfactory specimen - clotted  Notified [facility] 6/10/17 1800 JM      Diff Method       Canceled, Test credited   Unsatisfactory specimen - clotted  Notified [facility] 6/10/17 1800 JM     INR   Result Value Ref Range    INR 1.03 0.86 - 1.14   Partial thromboplastin time   Result Value Ref Range    PTT 20 (L) 22 - 37 sec          Assessments & Plan (with Medical Decision Making)     MDM: Susan Haq is a 85 year old female who presented with fall at home while at her refrigerator unprovoked.  Who presented with history of oxygen dependent IPF with a fall at home today  where she lives alone.  She was found by friends who came over to sit with her as they often do and she could not get  off the floor.   She had pain in the right arm and right leg. She struck her head on the floor. She had no presyncopal symptoms prior to the fall.  Imaging demonstrated a fracture of the right fibula Both proximal and distal but it was well approximated and did not need repair. She also had a Proximal phalanx fracture second toe.  Ther were splinted with the posterior splint.      She is I believe unsafe to return to home.  Recommend evaluation In the hospital with occupational therapy and physical therapy evaluations.  has a splint -  ambulation will be more difficult.      She also noted That she was hearing music constantly over the last week when it was not playing and was Present whether her hearing aids were in or not. Others could not hear this. As she had struck her head and we need to head imaging today, Along with these new almost auditory hallucinations, I recommend we pursue MRI instead of head CT.This could potentially demonstrate a temporal lobe lesion.  This demonstrated only old changes including an old lacunar infarct but no acute changes.      I have reviewed the nursing notes.      I have reviewed the findings, diagnosis, plan and need for follow up with the patient.       ED to Inpatient Handoff:    Discussed with Karol Gore.  at 1630 PM   Patient accepted for Inpatient Stay  Pending studies include MRI  Code Status: Not addressed           New Prescriptions    No medications on file       Final diagnoses:   Fall, initial encounter   Closed fracture of proximal end of fibula, unspecified fracture morphology, initial  encounter   Closed fracture of distal end of right fibula, unspecified fracture morphology, initial encounter   Auditory hallucinations   Interstitial pulmonary fibrosis (H)   Closed nondisplaced fracture of proximal phalanx of lesser toe of right foot, initial encounter       6/10/2017   [facility] EMERGENCY DEPARTMENT     Anton Haro MD  06/10/17 [id number]

## 2017-06-10 NOTE — IP AVS SNAPSHOT
` ` Patient Information     Patient Name Sex Susan Sanabria ([id number]) Female 1/10/1932       Room Bed    2309 1391-34      Patient Demographics     Address Phone    [address] [place] [address] [phone] (Home)  none (Work)      Patient Ethnicity & Race     Ethnic Group Patient Race    American White      Emergency Contact(s)     Name Relation Home Work Mobile    Martina Pickard Friend [phone]      Documents on File        Status Date Received Description       Documents for the Patient    Insurance Card  () 06/30/10     Face Sheet Received () 09     Privacy Notice - [place] Received 09     External Medication Information Consent Accepted () 09     Face Sheet Received () 06/24/10     External Medication Information Consent Accepted () 09/08/10     Insurance Card  09/27/10     Patient ID  () 09/27/10     Consent for Services - Hospital/Clinic Received () 10/29/10     Insurance Card Received () 13 [employer]    Consent for Services - Hospital/Clinic Received () 11     External Medication Information Consent Accepted () 10/05/11     CMS IM for Patient Signature       Consent for Services - Hospital/Clinic Received () 12     HIM YEMI Authorization - File Only   2/3/12 YEMI- [facility]    External Medication Information Consent Accepted 10/04/12     Consent for EHR Access  13 Copied from existing Consent for services - C/HOD collected on 2012    [facility] Specified Other       Consent for Services - Hospital/Clinic Received () 13     Consent for Services - Hospital/Clinic Received () 14     Insurance Card Received () 14 [employer]    Patient ID Received () 14     Consent for Services - Hospital/Clinic  () 03/11/15 CONSENT FOR SERVICE    Consent to Communicate   Consent to  Communicate  4/22/15    Consent for Services - Hospital/Clinic Received () 05/15/15     Consent for Services/Privacy Notice - Hospital/Clinic Received () 16     Insurance Card  ()      Insurance Card Received 16 [employer]    Advance Directives and Living Will Received 16 POLST 2016    Patient ID Received 16 [place] 2019    HIM YEMI Authorization  16 [facility] HUMAN SERV    Consent for Services/Privacy Notice - Hospital/Clinic Received 17     Consent for Services - Hospital/Clinic Received (Deleted) 11     Advance Directives and Living Will Received (Deleted) 12        Documents for the Encounter    CMS IM for Patient Signature Received 17     ECG   ECG Report      Admission Information     Attending Provider Admitting Provider Admission Type Admission Date/Time    Philip Shabazz MD  Emergency 06/10/17  1339    Discharge Date Hospital Service Auth/Cert Status Service Area     Hospitalist [facility]    Unit Room/Bed Admission Status       [facility] 2305/2305- Admission (Confirmed)       Admission     Complaint    Risk for falls      Hospital Account     Name Acct ID Class Status Primary Coverage    Susan Haq [id number] Inpatient Open UCARE - [employer] FOR S[employer]IORS            Guarantor Account (for Hospital Account #[id number])     Name Relation to Pt Service Area Active? Acct Type    Susan Haq  FCS Yes Personal/Family    Address Phone          [address] [place] [address] [phone](H)              Coverage Information (for Hospital Account #[id number])     F/O Payor/Plan Precert #    UCARE/UCARE FOR SENIORS     Subscriber Subscriber #    Susan Haq [id number]    Address Phone    [address]  [place] [address] [phone]

## 2017-06-10 NOTE — H&P
[facility]    History and Physical  Hospital Medicine       Date of Admission:  6/10/2017  Date of Service: 6/10/2017     Assessment & Plan   Susan Haq is a 85 year old female with pulmonary fibrosis, COPD on 3L chronically, SUSSY, HLD, HTN, lower extremity edema who presents following fall at home.    Fibula Fracture, Right  Potential Fifth Phalanx Fracture   Presented following fall at home. XR noted oblique fracture through distal fibular, minimally displaced through ankle mortise with minimally displaced fracture in proximal fibula. Patient placed in splint currently. XR foot noted lucency in mid fifth proximal phalanx - suggestive of fracture. Hip XR negative. XR right ulna/radius negative for fracture.   Pain is controlled in ED.  -ortho consultation pending  -no weight bearing until ortho consulted  -pain control with scheduled Tylenol, low-dose Tramadol if needed  -PT/OT pending  -car consultation pending safe dispo - home care vs TCU    Right Elbow Pain   Retain pain following fall. Ulna/radius XR negative.   -XR right elbow pending    Auditory Hallucinations   Reports 1 week history of music playing in her head. MRI negative.   DDx: inner ear pathology, seizure, psychosis - depression/schizophrenia hearing aids    Does follow with audiologist. Hears when hearing aids are removed. No focal neuro deficits. Does not appear in psychosis.  -continue to monitor  -may need ENT referral or EEG for possible seizure (non-dominant hemisphere foci)    Fall at Home, Unclear Etiology   Patient lives alone. No presyncopal symptoms but patient appears unclear with history. Denies LOC. Did hit head and not on anti-coagulation. MRI negative.   DDx: near syncope/syncope, medications causing orthostasis, TIA/CVA  -telemetry overnight  -orthostatics if able to with foot pain  -PT/OT as above  -pharmacy consultation to assess for orthostasis  -holding PTA BP medications until tomorrow, suggest  reducing clonidine     Alcohol Abuse   Drinks about 3oz almost daily of liquor. No history of withdrawal/seizures. Last drink 6/8 evening.     No evidence of withdrawal currently - will hold off on CIWA protocol.  -continue to monitor clinically     RCRI Risk Assessment  Anesthesia issues: none  Baseline Activity: likely <4METs has issues with SOB going up a flight of stairs and has to stop for breaks  Chest Pain: no  Shortness of breath: YES at baseline, 3L chronically  Cardiac Risk Factors/Assessment:                High Risk Surgery: no              History Ischemic Heart Disease: no              History of Congestive Heart Failure: no - does have lower extremity edema on lasix              History of CVA: no              Preoperative Treatment with Insulin: no              Preoperative Creatinine greater than 2.0: no              Total Number of Points: 0 = 0.4% risk of major cardiac event  -would proceed cautiously with/only if necessary with surgery given SOB    Intertrigo of Breasts  -start miconazole   -wound consultation pending    Lower Extremity Edema  -holding PTA lasix/potassium given possible orthostasis  -I/O and daily weights    Pulmonary Fibrosis/COPD  Chronic Respiratory Failure   Currently at baseline. On 3L NC continuously  -continue PTA oxygen  -continue PTA albuterol PRN, spirivia, symbicort    SUSSY  -continue PTA CPAP settings at home.    Prediabetes  Last A1C (2015) 5.7. . Will monitor BG with BMP in AM    HLD  -continue PTA atorvastatin    HTN   Controlled on admission. Per pharmacy, all BP medications may be contributing but clonidine may be contributing the most.   -continue PTA atenolol  -hold PTA amlodipine, losartan-HCTZ and clonidine    Insomnia  -continue PTA hydroxyzine PRN    Depression   Has not seen effect since starting 3/2017.  -holding PTA sertraline since only new medication change.     FEN:  -start NS 0.9% 75mL/hour  -Will monitor electrolytes and replace as  needed  -regular diet with NPO at midnight until ortho consultation    DVT Prophylaxis: SCDs on intact lower extremity - pharmacological ppx contraindicated due to thrombocytopenia  Code Status: DNR / DNI    Disposition: Anticipate discharge in 2-3 days once pain is improved with orals, ortho is consulted with safe disposition - given age and multiple fractures. Appropriate for INPATIENT care    I have discussed patient and formulated plan with Dr. Turk. Assessment and plan as above.     Karol Pal PA-C  [facility]        Primary Care Physician   Ema Melendez [phone]    History is obtained from the patient, ED notes and review of the EMR.    Past Medical History    Past Medical History:   Diagnosis Date     Adenomatous polyp of colon 4/25/2011     Advanced directives, counseling/discussion 4/25/2011    Discussed Advance Directive planning with patient; information given to patient to review. Marine Guillen MA       Basal cell carcinoma      Bilateral leg edema 6/14/2012     Bronchospasm     copd vs. asthma     COPD (chronic obstructive pulmonary disease) (H)      Diverticulosis      Fracture, Metacarpal Shaft - right 4th 7/5/2010     High cholesterol      HL (hearing loss) 3/25/2013     Hyperlipidemia LDL goal <130 12/10/2009     Hypertension      Hypertension goal BP (blood pressure) < 140/90 1/12/2011     Hypokalemia 9/17/2013     Hypokalemia 9/17/2013     Hypoxia 9/5/2013     Hypoxia 9/5/2013     Impaired fasting glucose      Insomnia 12/15/2009     Interstitial pulmonary fibrosis (H) 5/26/2015     Obesity      SUSSY (obstructive sleep apnea)-Moderately severe (AHI 21) 4/10/2012     Osteopenia 12/21/2010     Prediabetes 7/2/2012     PVC's (premature ventricular contractions) 4/25/2011     Sleep apnea      Smoker 1986    quit     SNHL (sensorineural hearing loss) 6/10/2014     Umbilical hernia 4/8/2013     Venous insufficiency       Diagnosis Date Noted     Closed fracture of fibula  06/11/2017     Priority: Medium     Auditory hallucination 06/11/2017     Priority: Medium     Right elbow pain 06/11/2017     Priority: Medium     Intertrigo 06/11/2017     Priority: Medium     Of breasts     Risk for falls 06/10/2017     Priority: Medium     Major depressive disorder, single episode, moderate (H) 03/13/2017     Priority: Medium     Chronic obstructive pulmonary disease, unspecified COPD type (H) 05/19/2016     Priority: Medium     Continuous oxygen at 3 liters        Interstitial pulmonary fibrosis (H) 05/26/2015     SNHL (sensorineural hearing loss) 06/10/2014     Umbilical hernia 04/08/2013     HL (hearing loss) 03/25/2013     Prediabetes 07/02/2012     Bilateral leg edema 06/14/2012     SUSSY (obstructive sleep apnea)-Moderately severe (AHI 21) 04/10/2012     Initial diagnosis 2007 at [facility]   Polysomnography 4/9/2012: 244.1 lbs.  BMI 40.7.  [place] sleepiness scale 0.0.  Re-evalaution of previously diagnosed moderately severe SUSSY. She was on auto-BiPAP with 2L O2 bleed in and nocturnal oximetry showed persistently low SpO2. Diagnostic PSG maximum TCM of 50 mmHg and baseline TCM of 43.  Baseline ABG showed a PaCO2 of 38 which is normal. Baseline oxygen saturation was 87.8%.  The lowest oxygen saturation was 59.2%.  Snoring was reported as loud.  Apnea/Hypopnea Index 20.5 events per hour.  REM AHI 48.0.  RERA index 16.0. CPAP  titrated at pressures ranging from 6 cm/H20 up to 12 cm/H20.  The optimal pressure was 12.0 with an AHI of 0 including lateral REM sleep.       Adenomatous polyp of colon 04/25/2011     PVC's (premature ventricular contractions) 04/25/2011     Hypertension goal BP (blood pressure) < 140/90 01/12/2011     Obesity 01/12/2011     Osteopenia 12/21/2010     Insomnia 12/15/2009     Hyperlipidemia LDL goal <130 12/10/2009      Past Surgical History   Past Surgical History:   Procedure Laterality Date     APPENDECTOMY       C BSO, OMENTECTOMY W/SHANIA       C  "NONSPECIFIC PROCEDURE      (L) clavicle orif     C STOMACH SURGERY PROCEDURE UNLISTED       CHOLECYSTECTOMY, LAPOROSCOPIC  10/13/2011    Cholecystectomy, Laparoscopic     HERNIA REPAIR, UMBILICAL  10/13/2011     HYSTERECTOMY, CERVIX STATUS UNKNOWN        History of Present Illness   Susan Haq is a 85 year old female who presents following fall.     Patient presents after having a fall at home. Pt remembers standing in her kitchen at the refrigerator getting a glass of water. Patient is unclear how she fell, states \"maybe I tripped or something.\" She ended up on the floor, however, still holding the glass of water without spilling. Unclear how she landed but had pain in her right elbow, right lower extremity and right foot. Patient adamantly denies loss of consciousness and states she remembers the entire fall. She denies dizziness or lightheaded sensation surrounding the falls. Believes she hit the left side of her head and is not on anti-coagulation. Denies other falls at home and uses a cane at baseline. Denies HA, changes in vision.     She was unable to pick herself up but has friends who are also her care takers who stop by every day who found her still in the kitchen sitting crossed legged on the floor. Sitting on floor for likely 30min - friends had to convince patient to come to ER. They mention once standing patient up she stated, \"I think I might pass-out.\" Friends state she is at baseline mentation. Patient lives independently alone in her own home with help of her two friends. Friends agree that it is going well and they love helping the patient set up medication and purchase groceries. No home care currently.     Of note, friends mention that patient frequently tells them she needs to lie down, with standing or even sitting for long periods of time. Patient believes this is due to lightheadedness rather than a dizzy sensation.     Denies any fever, chills, myalgias, cold-like symptoms " "(congestion, pharyngitis, sinus pressure, rhinorrhea),  CP, SOB as it is baseline on 3L NC, cough, abdominal pain, N/V/D, dysuria, hematuria, hematochezia, melena, gingivial bleeding or increased bruising or bleeding, headaches, dizziness, joint swelling, joint pain, leg swelling - has history of lower extremity edema, rashes/wounds/sores. Denies any other pain including right hip or neck pain.    Patient also endorses hearing a background of classical music intermittently but for the good part of a week. She is even able to sing the song when she hears it. Has hearing aids but heard the music when in the MRI scanner when they were out.     Has started anti-depression (sertraline) in March for anhedonia, feeling \"blah\" with encouragement from friends. They have not seen change in mood. Patient currently feels \"happy, I am getting such good care.\"     Denies diet changes or odd diets - has meal delivering program.     Former smoker with 30 pack year history. Drinks about 1 drink a night (3oz). No history of withdrawal. Last drink  evening.     Prior to Admission Medications   Prior to Admission Medications   Prescriptions Last Dose Informant Patient Reported? Taking?   Calcium Carbonate-Vitamin D (CALCIUM 600 + D OR) 6/10/2017 at am Self Yes Yes   Sig: Take  by mouth.   FISH OIL 1000 MG OR CAPS 6/10/2017 at am Self Yes Yes   Si cap daily   MULTIVITAMIN TABS   OR 6/10/2017 at am Self Yes Yes   Si TABLET DAILY   ORDER FOR DME  Self No Yes   Si.  CPAP pressure 12 cm/H20 with heated humidity and auto-titrating capability.   2.  Provide mask to fit and CPAP supplies.  3.  Length of need lifetime.  4.  Ok to d/c O2 bleed in to her PAP overnight.       ORDER FOR DME  Self No No   Sig: TEDs stockings knee high to be worn during the day.   ORDER FOR DME  Self No No   Sig: Equipment being ordered: Oxygen   ORDER FOR DME  Self No No   Sig: Equipment being ordered: CPAP supplies   ORDER FOR DME  Self No No   Sig: " Equipment being ordered: Oxygen - 3 liters continous   Respiratory Therapy Supplies (NEBULIZER COMPRESSOR) KIT  Self No No   Si kit every 4 hours as needed   albuterol (2.5 MG/3ML) 0.083% nebulizer solution  Self No No   Sig: Take 1 vial (2.5 mg) by nebulization every 4 hours as needed for shortness of breath / dyspnea or wheezing   albuterol (PROAIR HFA/PROVENTIL HFA/VENTOLIN HFA) 108 (90 BASE) MCG/ACT Inhaler  Self No No   Sig: Inhale 2 puffs into the lungs every 4 hours as needed for shortness of breath / dyspnea or wheezing   amLODIPine (NORVASC) 10 MG tablet 6/10/2017 at am Self No Yes   Sig: Take 1 tablet (10 mg) by mouth daily   atenolol (TENORMIN) 50 MG tablet 6/10/2017 at am Self No Yes   Sig: Take 1 tablet (50 mg) by mouth 2 times daily   atorvastatin (LIPITOR) 20 MG tablet 2017 at pm Self No Yes   Sig: Take 1 tablet (20 mg) by mouth daily   budesonide-formoterol (SYMBICORT) 160-4.5 MCG/ACT Inhaler 6/10/2017 at am Self No Yes   Sig: Inhale 2 puffs into the lungs 2 times daily   cloNIDine (CATAPRES) 0.2 MG tablet 6/10/2017 at am Self No Yes   Sig: Take 1 tablet (0.2 mg) by mouth 2 times daily   furosemide (LASIX) 20 MG tablet 6/10/2017 at am Self No Yes   Sig: Take 1 tablet (20 mg) by mouth daily   hydrOXYzine (ATARAX) 25 MG tablet Past Week at Unknown time Self No Yes   Sig: Take 1-2 tablets (25-50 mg) by mouth nightly as needed for other (insomnia)   ibuprofen (ADVIL,MOTRIN) 600 MG tablet  Self No No   Sig: Take 1 tablet by mouth every 6 hours as needed for pain.   losartan-hydrochlorothiazide (HYZAAR) 100-25 MG per tablet 6/10/2017 at am Self No Yes   Sig: Take 1 tablet by mouth daily Take 1 tablet by mouth daily.   potassium chloride (K-TAB,KLOR-CON) 10 MEQ tablet 6/10/2017 at am Self No Yes   Sig: Take 1 tablet (10 mEq) by mouth daily   sertraline (ZOLOFT) 25 MG tablet 6/10/2017 at am Self No Yes   Sig: Take 1 tablet (25 mg) by mouth daily   tiotropium (SPIRIVA HANDIHALER) 18 MCG capsule  6/10/2017 at am Self No Yes   Sig: Inhale  into the lungs. Inhale contents of one capsule daily      Facility-Administered Medications: None     Allergies   Allergies   Allergen Reactions     Ace Inhibitors Cough     Asa [Aluminum Hydroxide]      Penicillins      Family History    Family History   Problem Relation Age of Onset     DIABETES Father      Coronary Artery Disease Maternal Grandmother      Coronary Artery Disease Paternal Uncle        Social History   Social History     Social History     Marital status:      Spouse name: N/A     Number of children: N/A     Years of education: N/A     Occupational History      Retired      in [place]     Social History Main Topics     Smoking status: Former Smoker     Packs/day: 1.00     Years: 35.00     Quit date: 1/1/1990     Smokeless tobacco: Former User     Alcohol use Yes      Comment: Drink 1 (3oz) drink a day     Drug use: No     Sexual activity: No     Other Topics Concern     Not on file     Social History Narrative    Lives independently in own home.      Review of Systems   The 10 point Review of Systems is negative other than noted in the HPI.    Physical Exam   /59 (BP Location: Left arm)  Pulse 60  Temp 98.2  F (36.8  C) (Oral)  Resp 18  Wt 107 kg (236 lb)  SpO2 97%  BMI 39.88 kg/m2     Weight: 236 lbs 0 oz Body mass index is 39.88 kg/(m^2).     Constitutional: Alert, oriented, cooperative, no apparent distress, appears nontoxic, Appears stated age.  Eyes: Sclera are anicteric, EOMI, PEERLA. No nystagmus noted.  HENT: Normocephalic. Atraumatic. MMM.  Lymph/Hematologic: No preauricular, postauricular, occipital, sub-mandibular, tonsillar, sub-mental, anterior or posterior cervical, or supraclavicular lymphadenopathy is appreciated.  Cardiovascular: Regular rate and rhythm, heart sounds distant. Radial pulses are 2+ bilaterally. Distal pulses are intact. No lower extremity edema.  Respiratory: on 3L NC. No accessory  muscle usage. Speaking in full sentences. Diminished throughout but clear.   GI:. Periumbilical hernia noted - reducible. Obese abdomen. bowel sounds present, soft, non-tender,non-distended. No rebound or guarding.   Genitourinary: Deferred  Musculoskeletal: Normal muscle bulk and tone. Right lower extremity in posterior short leg splint. Able to move upper extremities appropriately. Pain in right elbow but no limitations with flexion or extension. No limitations of right wrist ROM. FROM of right shoulder. No tenderness to palpation of right shoulder, right elbow or right hip.   Skin: Warm and dry, no rashes. Bruising near metatarsal heads between 1st and 2nd toe of right foot.   Neurologic: Neck supple. Cranial nerves 3-12 are grossly intact.  is symmetric. Strength of upper extremities is 5/5 bilaterally. Coordination of upper extremities is intact with finger-to-nose testing. Strength of right lower extremity is 4/5 - likely weak core but unable to compare with LLE due to splint. Biceps DTRs 2+ bilaterally.    Data   Data reviewed today:     Recent Labs  Lab 06/10/17  1855 06/10/17  1735 06/10/17  1440   WBC 8.3 Canceled, Test credited Unsatisfactory specimen - clottedNotified Libby [facility] 6/10/17 1800 JM Canceled, Test credited Unsatisfactory specimen - clottedspecimen will need to be recollected, notified Tiffany Fuentes RN [place] 6/10/17 1630 JMCORRECTED ON 06/10 AT 1707: PREVIOUSLY REPORTED AS 8.7   HGB 12.3 Canceled, Test credited Unsatisfactory specimen - clottedNotified Libby [facility] 6/10/17 1800 JM Canceled, Test credited Unsatisfactory specimen - clottedspecimen will need to be recollected, notified Tiffany Fuentes RN [place] 6/10/17 1630 JMCORRECTED ON 06/10 AT 1707: PREVIOUSLY REPORTED AS 12.1   MCV 93 Canceled, Test credited Unsatisfactory specimen - clottedNotified Libby [facility] 6/10/17 1800 JM Canceled, Test credited Unsatisfactory specimen - clottedspecimen will need to be recollected, notified ARIADNA BarahonaED  6/10/17 1630 JMCORRECTED ON 06/10 AT 1707: PREVIOUSLY REPORTED AS 94    Canceled, Test credited Unsatisfactory specimen - clottedNotified Libby [facility] 6/10/17 1800 JM Canceled, Test credited Unsatisfactory specimen - clottedspecimen will need to be recollected, notified Tiffany Fuentes RN [facility] 6/10/17 1630 JMCORRECTED ON 06/10 AT 1707: PREVIOUSLY REPORTED AS 58   INR  --  1.03 Canceled, Test credited Unsatisfactory specimen - clottedSpecimen will need to be recollected, notified Libby in [facility] 6/10/17 1600 JMCORRECTED ON 06/10 AT 1704: PREVIOUSLY REPORTED AS 1.12   NA  --   --  140   POTASSIUM  --   --  3.5   CHLORIDE  --   --  104   CO2  --   --  28   BUN  --   --  27   CR  --   --  1.01   ANIONGAP  --   --  8   JAGDISH  --   --  8.3*   GLC  --   --  119*   ALBUMIN  --   --  3.1*   PROTTOTAL  --   --  5.8*   BILITOTAL  --   --  0.6   ALKPHOS  --   --  109   ALT  --   --  26   AST  --   --  18   TROPI  --   --  <0.015The 99th percentile for upper reference range is 0.045 ug/L.  Troponin values in the range of 0.045 - 0.120 ug/L may be associated with risks of adverse clinical events.     Recent Results (from the past 24 hour(s))   Pelvis XR, 1-2 views    Narrative    PELVIS ONE TO TWO VIEWS  6/10/2017 3:51 PM     HISTORY: Fall.    COMPARISON: September 13, 2016.      Impression    IMPRESSION: Both femoral heads articulate normally with the respective  acetabula. Minimal joint space narrowing, though no osteophyte  formation. Right lateral knee radiograph demonstrates normal alignment  without effusion. Proximal fibular fracture again visible.     RAMILA BARNES MD   Tib/Fib XR, right    Narrative    RIGHT TIBIA AND FIBULA TWO VIEWS   6/10/2017 3:52 PM     HISTORY: Ankle pain, tib-fib pain.    COMPARISON: None.      Impression    IMPRESSION: Oblique fracture through the distal fibula, minimally  displaced, extending to the level of the ankle mortise. Minimally  displaced fracture in the proximal aspect of the fibula, as  well. This  may be associated with damage to interosseous ligament.     RAMILA BARNES MD   Foot  XR, G/E 3 views, right    Narrative    RIGHT FOOT THREE OR MORE VIEWS  6/10/2017 3:52 PM     HISTORY: Ecchymosis distal toes, pain at left ankle, foot ecchymosis,  bruising.    COMPARISON: None.      Impression    IMPRESSION: Mild hammertoe alignment of second through fifth toes.  Bones are normally aligned. Lucency in the mid fifth proximal phalanx,  suggestive of a nondisplaced fracture, clinically correlate with pain.  Additional lucency in the distal aspect of the second proximal  phalanx, possible location of fracture given the appearance, though  this may be somewhat degenerative.     RAMILA BARNES MD   Radius/Ulna XR, PA & LAT, right    Narrative    RIGHT FOREARM TWO VIEWS   6/10/2017 3:53 PM     HISTORY: Fall, forearm injury.    COMPARISON: None.      Impression    IMPRESSION: Proximal and distal radial and ulnar articulations intact.  No acute fracture.       ARMILA BARNES MD   MR Brain w/o & w Contrast    Narrative    MRI BRAIN WITHOUT AND WITH CONTRAST  6/10/2017 4:56 PM    HISTORY:  Fall today, striking occiput; one week of constant auditory  hallucinations, constant hearing of music.     TECHNIQUE:  Multiplanar, multisequence MRI of the brain without and  with 10 mL IV Gadavist.    COMPARISON: None.    FINDINGS:  There is generalized atrophy of the brain.  White matter  changes are present in the cerebral hemispheres that are consistent  with small vessel ischemic disease in this age patient. Old lacunar  infarct is seen in the right corona radiata and upper right putamen.  There is no evidence of hemorrhage, mass, acute infarct, or anomaly.   There are no gadolinium enhancing lesions.    The facial structures appear normal. The arteries at the base of the  brain and the dural venous sinuses appear patent.       Impression    IMPRESSION:  1. No acute abnormality. No evidence of intracranial trauma.  2.  Brain atrophy and white matter changes consistent with sequelae of  small vessel ischemic disease.  3. Old lacunar infarct in the right corona radiata and upper right  putamen.    ROBBIE FARFAN MD     I personally reviewed:  EKG shows sinus bradycardia.  TIB/FIB XR shows proximal and distal fracture of fibula.  Foot XR notes possible fracture of second proximal metatarsal and possible fifth proximal metatarsal     I have discussed patient and formulated plan with Dr. Turk. Assessment and plan as above.     Chart documentation with keystrokes and/or Dragon voice recognition software. Although reviewed after completion, some word and grammatical error may remain.  Karol Pal [facility]

## 2017-06-10 NOTE — CONSULTS
Consult received regarding potential medications in which patient has been taking which have the potential to cause orthostasis.      Patient's PTA medication list includes:  Albuterol (nebs and inhaler), amlodipine, atenolol, atorvastatin, budesonide-formoterol, calcium carbonate-vitamin D, clonidine, fish oil, furosemide, hydroxyzine, ibuprofen, losartan-hydrochlorothiazide, and multivitamin.    Patient's blood pressure medications can certainly cause hypotension.  These include amlodipine, atenolol, clonidine, furosemide, and losartan-hydrochlorothiazide.  Of these furosemide is associated with orthostatsis when aggravated by alcohol, narcotic or barbituate use.      However, MOST notable is clonidine in which orthostasis occurs in approximately 30 % of patients.  Patient has been on clonidine since 2012, however patient's renal function has most likely declined with increasing age (despite her creatinine only increasing during this time period from a baseline of 0.9 to today's value of 1.01).      It may be reasonable to try decreasing her clonidine dose or even titrate patient off this medication.    Thank you for this consultation and please contact pharmacy should you need any further assistance.    Ashley Landeros, Pharm.D.

## 2017-06-10 NOTE — ED NOTES
Patient getting water out of refrigerator when she tripped over something and fell.  Injured right ankle and toes.  Right ankle has abrasion and redness 1, 2 4 and 5th  toes bruising.  CMS good.  Also c/o right upper arm pain.  No deformity noted.  Ice pack to foot and ankle no neck or back pain.  Not on blood thinners No head pain

## 2017-06-10 NOTE — IP AVS SNAPSHOT
"` `           [facility] SURGICAL: [phone]                                              INTERAGENCY TRANSFER FORM - NURSING   6/10/2017                    Hospital Admission Date: 6/10/2017  OLIVA NAVA   : 1/10/1932  Sex: Female        Attending Provider: Philip Shabazz MD     Allergies:  Ace Inhibitors, Asa [Aluminum Hydroxide], Penicillins    Infection:  None   Service:  HOSPITALIST    Ht:  1.638 m (5' 4.5\")   Wt:  108.7 kg (239 lb 10.2 oz)   Admission Wt:  107 kg (236 lb)    BMI:  40.5 kg/m 2   BSA:  2.22 m 2            Patient PCP Information     Provider PCP Type    Ema Melendez NP General      Current Code Status     Date Active Code Status Order ID Comments User Context       Prior      Code Status History     Date Active Date Inactive Code Status Order ID Comments User Context    2017 12:31 PM  DNR/DNI [id number]  Heaven Montana PA-C Outpatient    6/10/2017  6:50 PM 2017 12:31 PM DNR/DNI [id number]  Karol Pal PA-C Inpatient    3/29/2016  2:10 PM 6/10/2017  6:50 PM DNR/DNI [id number] POLST signed by Dr. Valentin 3/22/16 Briana Gates, RN Outpatient      Advance Directives        Does patient have a scanned Advance Directive/ACP document in EPIC?           No        Hospital Problems as of 2017              Priority Class Noted POA    Hyperlipidemia LDL goal <130   12/10/2009 Yes    Insomnia   12/15/2009 Yes    Hypertension goal BP (blood pressure) < 140/90   2011 Yes    Type 2 diabetes mellitus (H) Medium  2011 Yes    SUSSY (obstructive sleep apnea)-Moderately severe (AHI 21)   4/10/2012 Yes    Bilateral leg edema   2012 Yes    Interstitial pulmonary fibrosis (H)   2015 Yes    Chronic obstructive pulmonary disease, unspecified COPD type (H) Medium  2016 Yes    Major depressive disorder, single episode, moderate (H) Medium  3/13/2017 Yes    Auditory hallucination Medium  2017 Yes    Right elbow pain Medium  " 6/11/2017 Yes    Alcohol abuse Medium  6/11/2017 Yes    * (Principal)Tibia/fibula fracture, right, closed, initial encounter Medium  6/11/2017 Yes    Intertrigo   6/11/2017 Yes      Non-Hospital Problems as of 6/13/2017              Priority Class Noted    Osteopenia   12/21/2010    Obesity   1/12/2011    Advance care planning   4/25/2011    Adenomatous polyp of colon   4/25/2011    PVC's (premature ventricular contractions)   4/25/2011    HL (hearing loss)   3/25/2013    Umbilical hernia   4/8/2013    SNHL (sensorineural hearing loss)   6/10/2014    Risk for falls Medium  6/10/2017      Immunizations     Name Date      Influenza (High Dose) 3 valent vaccine 09/13/16     Influenza (High Dose) 3 valent vaccine 09/28/15     Influenza (High Dose) 3 valent vaccine 10/28/14     Influenza (High Dose) 3 valent vaccine 09/27/10     Influenza (IIV3) 10/04/12     Influenza (IIV3) 10/01/11     Influenza (IIV3) 10/22/08     Influenza Vaccine IM 3yrs+ 4 Valent IIV4 10/07/13     Pneumococcal (PCV 13) 09/13/16     Pneumococcal 23 valent 01/24/06     TD (ADULT, 7+) 01/01/04     TDAP Vaccine (Boostrix) 10/07/13     Zoster vaccine, live 05/30/07          END      ASSESSMENT     Discharge Profile Flowsheet     EXPECTED DISCHARGE     Referrals Placed  Home Care 06/16/16 1032    Expected Discharge Date  06/13/17 06/11/17 1001   Existing Resources/Services  DME 05/14/15 1143    DISCHARGE NEEDS ASSESSMENT     SKIN      Concerns To Be Addressed  other (see comments) (need of HHA to assist with bathing/light housekeeping) 12/20/16 1139   Inspection  Full 06/12/17 1819    Equipment Used at Home  oxygen 05/14/15 1143   Skin WDL  ex 06/13/17 0340    GASTROINTESTINAL (ADULT,PEDIATRIC,OB)     Skin Color/Characteristics  bruised (ecchymotic) 06/13/17 0340    GI WDL  ex 06/13/17 1001   Skin Temperature  warm 06/13/17 0340    Abdominal Appearance  obese 06/13/17 1001   Skin Moisture  dry 06/13/17 0340    Last Bowel Movement  06/12/17 06/13/17 0958   " Skin Elasticity  quick return to original state 06/12/17 0227    Passing flatus  yes 06/13/17 0340   Skin Integrity  rash(es) 06/13/17 0340    COMMUNICATION ASSESSMENT     SAFETY      Patient's communication style  spoken language (English or Bilingual) 06/10/17 1334   Safety WDL  WDL 06/13/17 0340    FINAL RESOURCES     Safety Factors  bed in low position;wheels locked;call light in reach;upper side rails raised x 2;ID band on 06/13/17 0340    Resources List  Home Care;Transitional Care 06/11/17 1001                      Assessment WDL (Within Defined Limits) Definitions           Safety WDL     Effective: 09/28/15    Row Information: <b>WDL Definition:</b> Bed in low position, wheels locked; call light in reach; upper side rails up x 2; ID band on<br> <font color=\"gray\"><i>Item=AS safety wdl>>List=AS safety wdl>>Version=F14</i></font>      Skin WDL     Effective: 09/28/15    Row Information: <b>WDL Definition:</b> Warm; dry; intact; elastic; without discoloration; pressure points without redness<br> <font color=\"gray\"><i>Item=AS skin wdl>>List=AS skin wdl>>Version=F14</i></font>      Vitals     Vital Signs Flowsheet     VITAL SIGNS     HEIGHT AND WEIGHT      Temp  98.6  F (37  C) 06/13/17 1131   Height Method  Actual 06/10/17 1338    Temp src  Oral 06/13/17 1131   Weight  108.7 kg (239 lb 10.2 oz) 06/13/17 0635    Resp  18 06/13/17 1131   SITTING ORTHOSTATIC BP      Pulse  75 06/13/17 1302   Sitting Orthostatic BP  129/59 (unable to obtain standing BP.) 06/10/17 1949    Heart Rate  74 06/13/17 1131   Sitting Orthostatic Pulse  72 bpm 06/10/17 1506    Pulse/Heart Rate Source  Monitor 06/13/17 1300   LINDA COMA SCALE      BP  178/67 06/13/17 1302   Best Eye Response  4-->(E4) spontaneous 06/13/17 0318    BP Location  Left arm 06/13/17 1300   Best Motor Response  6-->(M6) obeys commands 06/13/17 0318    LYING ORTHOSTATIC BP     Best Verbal Response  5-->(V5) oriented 06/13/17 0318    Lying Orthostatic BP  -- " 06/10/17 1948   Cee Coma Scale Score  15 06/13/17 0318    Lying Orthostatic Pulse  70 bpm 06/10/17 1506   ECG      OXYGEN THERAPY     ECG Rhythm  Sinus rhythm 06/11/17 1021    SpO2  94 % 06/13/17 1131   Ectopy  None 06/11/17 1021    O2 Device  Nasal cannula 06/13/17 1131   TX Interval  0.21 06/11/17 1021    Oxygen Delivery  3 LPM 06/13/17 1148   QRS Interval  0.08 06/11/17 1021    PAIN/COMFORT     QT Interval  0.36 06/11/17 1021    Patient Currently in Pain  denies 06/13/17 0318   Lead Monitored  Lead II 06/11/17 1021    Preferred Pain Scale  CAPA (Clinically Aligned Pain Assessment) ([facility] Adults Only) 06/13/17 0318   POSITIONING      0-10 Pain Scale  5 06/10/17 1338   Body Position  supine 06/13/17 0318    Pain Management Interventions  pain plan reviewed with patient/caregiver 06/13/17 0318   Head of Bed (HOB)  HOB at 20-30 degrees 06/13/17 0318    CLINICALLY ALIGNED PAIN ASSESSMENT (CAPA) ([facility] ADULTS ONLY)     Chair  Recline and up in chair 06/13/17 1046    Comfort  negligible pain 06/13/17 0950   DAILY CARE      Change in Pain  about the same 06/13/17 0318   Activity Type  up in chair 06/13/17 1153    Pain Control  fully effective 06/13/17 0318   Activity Level of Assistance  assistance, 1 person 06/13/17 1046    Functioning  pain keeps me from doing most of what I need to do 06/13/17 0318   Activity Assistive Device  mechanical lift 06/13/17 0957    Sleep  normal sleep 06/13/17 0318                 Patient Lines/Drains/Airways Status    Active LINES/DRAINS/AIRWAYS     Name: Placement date: Placement time: Site: Days: Last dressing change:    Peripheral IV 06/10/17 06/10/17   1737      2     Rash 06/10/17 1838 Bilateral chest other (see comments) 06/10/17   1838    2             Patient Lines/Drains/Airways Status    Active PICC/CVC     None            Intake/Output Detail Report     Date Intake     Output Net    Shift P.O. I.V. IV Piggyback Total Urine Total        Noc 06/11/17 2300 - 06/12/17 0659 -- -- -- -- 600 600 -600    Day 06/12/17 0700 - 06/12/17 1459 600 -- -- 600 350 350 250    Pam 06/12/17 1500 - 06/12/17 2259 480 -- -- 480 1150 1150 -670    Noc 06/12/17 2300 - 06/13/17 0659 -- -- -- -- 400 400 -400    Day 06/13/17 0700 - 06/13/17 1459 620 -- -- 620 200 200 420      Last Void/BM       Most Recent Value    Urine Occurrence 1 at 06/13/2017 0830    Stool Occurrence 1 at 06/13/2017 0830      Case Management/Discharge Planning     Case Management/Discharge Planning Flowsheet     REFERRAL INFORMATION     Expected Discharge Date  06/13/17 06/11/17 1001    Did the Initial Social Work Assessment result in a Social Work Case?  Yes 06/11/17 1001   ASSESSMENT/CONCERNS TO BE ADDRESSED      Admission Type  inpatient 06/11/17 1001   Concerns To Be Addressed  other (see comments) (need of HHA to assist with bathing/light housekeeping) 12/20/16 1139    Arrived From  home or self-care 06/11/17 1001   DISCHARGE PLANNING      [facility] Name  -- ([facility]) 06/11/17 1001   Equipment Used at Home  oxygen 05/14/15 1143    Primary Care MD Name  -- (Nora) 06/11/17 1001   FINAL RESOURCES      LIVING ENVIRONMENT     Resources List  Home Care;Transitional Care 06/11/17 1001    Lives With  alone 06/11/17 1238   Referrals Placed  Home Care 06/16/16 1032    Living Arrangements  house 06/11/17 1238   Existing Resources/Services  [facility] [place] 05/14/15 1143    ASSESSMENT OF FAMILY/SOCIAL SUPPORT     ABUSE RISK SCREEN      Who is your support system?  Children;Neighbor 06/11/17 1001   QUESTION TO PATIENT:  Has a member of your family or a partner(now or in the past) intimidated, hurt, manipulated, or controlled you in any way?  no 06/10/17 1338    Description of Support System  Supportive;Involved 06/11/17 1001   QUESTION TO PATIENT: Do you feel safe going back to the place where you are living?  yes 06/10/17 1338    Support Assessment  Adequate family and caregiver support;Adequate social supports  06/11/17 1001   OBSERVATION: Is there reason to believe there has been maltreatment of a vulnerable adult (ie. Physical/Sexual/Emotional abuse, self neglect, lack of adequate food, shelter, medical care, or financial exploitation)?  no 06/10/17 1338    COPING/STRESS     (R) MENTAL HEALTH SUICIDE RISK      Major Change/Loss/Stressor  none 06/10/17 1847   Are you depressed or being treated for depression?  No 06/10/17 1847    EXPECTED DISCHARGE

## 2017-06-11 ENCOUNTER — APPOINTMENT (OUTPATIENT)
Dept: GENERAL RADIOLOGY | Facility: CLINIC | Age: 82
DRG: 543 | End: 2017-06-11
Attending: INTERNAL MEDICINE
Payer: COMMERCIAL

## 2017-06-11 ENCOUNTER — APPOINTMENT (OUTPATIENT)
Dept: OCCUPATIONAL THERAPY | Facility: CLINIC | Age: 82
DRG: 543 | End: 2017-06-11
Payer: COMMERCIAL

## 2017-06-11 ENCOUNTER — APPOINTMENT (OUTPATIENT)
Dept: PHYSICAL THERAPY | Facility: CLINIC | Age: 82
DRG: 543 | End: 2017-06-11
Payer: COMMERCIAL

## 2017-06-11 PROBLEM — S82.409A CLOSED FRACTURE OF FIBULA: Status: RESOLVED | Noted: 2017-06-11 | Resolved: 2017-06-11

## 2017-06-11 PROBLEM — R44.0 AUDITORY HALLUCINATION: Status: ACTIVE | Noted: 2017-06-11

## 2017-06-11 PROBLEM — L30.4 INTERTRIGO: Status: ACTIVE | Noted: 2017-06-11

## 2017-06-11 PROBLEM — F10.10 ALCOHOL ABUSE: Status: ACTIVE | Noted: 2017-06-11

## 2017-06-11 PROBLEM — S82.401A TIBIA/FIBULA FRACTURE, RIGHT, CLOSED, INITIAL ENCOUNTER: Status: ACTIVE | Noted: 2017-06-11

## 2017-06-11 PROBLEM — S82.409A CLOSED FRACTURE OF FIBULA: Status: ACTIVE | Noted: 2017-06-11

## 2017-06-11 PROBLEM — S82.201A TIBIA/FIBULA FRACTURE, RIGHT, CLOSED, INITIAL ENCOUNTER: Status: ACTIVE | Noted: 2017-06-11

## 2017-06-11 PROBLEM — F32.1 MAJOR DEPRESSIVE DISORDER, SINGLE EPISODE, MODERATE (H): Chronic | Status: ACTIVE | Noted: 2017-03-13

## 2017-06-11 PROBLEM — M25.521 RIGHT ELBOW PAIN: Status: ACTIVE | Noted: 2017-06-11

## 2017-06-11 LAB
ALBUMIN UR-MCNC: 30 MG/DL
ANION GAP SERPL CALCULATED.3IONS-SCNC: 8 MMOL/L (ref 3–14)
APPEARANCE UR: ABNORMAL
BACTERIA #/AREA URNS HPF: ABNORMAL /HPF
BASOPHILS # BLD AUTO: 0 10E9/L (ref 0–0.2)
BASOPHILS NFR BLD AUTO: 0.1 %
BILIRUB UR QL STRIP: NEGATIVE
BUN SERPL-MCNC: 24 MG/DL (ref 7–30)
CALCIUM SERPL-MCNC: 8.1 MG/DL (ref 8.5–10.1)
CHLORIDE SERPL-SCNC: 106 MMOL/L (ref 94–109)
CO2 SERPL-SCNC: 29 MMOL/L (ref 20–32)
COLOR UR AUTO: YELLOW
CREAT SERPL-MCNC: 0.79 MG/DL (ref 0.52–1.04)
DIFFERENTIAL METHOD BLD: ABNORMAL
EOSINOPHIL # BLD AUTO: 0.2 10E9/L (ref 0–0.7)
EOSINOPHIL NFR BLD AUTO: 2.3 %
ERYTHROCYTE [DISTWIDTH] IN BLOOD BY AUTOMATED COUNT: 12.7 % (ref 10–15)
GFR SERPL CREATININE-BSD FRML MDRD: 69 ML/MIN/1.7M2
GLUCOSE SERPL-MCNC: 115 MG/DL (ref 70–99)
GLUCOSE UR STRIP-MCNC: NEGATIVE MG/DL
HCT VFR BLD AUTO: 34.8 % (ref 35–47)
HGB BLD-MCNC: 11.6 G/DL (ref 11.7–15.7)
HGB UR QL STRIP: ABNORMAL
IMM GRANULOCYTES # BLD: 0 10E9/L (ref 0–0.4)
IMM GRANULOCYTES NFR BLD: 0.4 %
KETONES UR STRIP-MCNC: NEGATIVE MG/DL
LEUKOCYTE ESTERASE UR QL STRIP: ABNORMAL
LYMPHOCYTES # BLD AUTO: 1.5 10E9/L (ref 0.8–5.3)
LYMPHOCYTES NFR BLD AUTO: 20.5 %
MCH RBC QN AUTO: 31.4 PG (ref 26.5–33)
MCHC RBC AUTO-ENTMCNC: 33.3 G/DL (ref 31.5–36.5)
MCV RBC AUTO: 94 FL (ref 78–100)
MONOCYTES # BLD AUTO: 1.2 10E9/L (ref 0–1.3)
MONOCYTES NFR BLD AUTO: 16.6 %
MUCOUS THREADS #/AREA URNS LPF: PRESENT /LPF
NEUTROPHILS # BLD AUTO: 4.2 10E9/L (ref 1.6–8.3)
NEUTROPHILS NFR BLD AUTO: 60.1 %
NITRATE UR QL: POSITIVE
PH UR STRIP: 6 PH (ref 5–7)
PLATELET # BLD AUTO: 193 10E9/L (ref 150–450)
POTASSIUM SERPL-SCNC: 3.2 MMOL/L (ref 3.4–5.3)
RBC # BLD AUTO: 3.69 10E12/L (ref 3.8–5.2)
RBC #/AREA URNS AUTO: 59 /HPF (ref 0–2)
SODIUM SERPL-SCNC: 143 MMOL/L (ref 133–144)
SP GR UR STRIP: 1.02 (ref 1–1.03)
SQUAMOUS #/AREA URNS AUTO: 97 /HPF (ref 0–1)
TRANS CELLS #/AREA URNS HPF: 19 /HPF (ref 0–1)
URN SPEC COLLECT METH UR: ABNORMAL
UROBILINOGEN UR STRIP-MCNC: NORMAL MG/DL (ref 0–2)
WBC # BLD AUTO: 7.1 10E9/L (ref 4–11)
WBC #/AREA URNS AUTO: >182 /HPF (ref 0–2)
WBC CLUMPS #/AREA URNS HPF: PRESENT /HPF

## 2017-06-11 PROCEDURE — 12000000 ZZH R&B MED SURG/OB

## 2017-06-11 PROCEDURE — 97161 PT EVAL LOW COMPLEX 20 MIN: CPT | Mod: GP | Performed by: PHYSICAL THERAPIST

## 2017-06-11 PROCEDURE — 87086 URINE CULTURE/COLONY COUNT: CPT | Performed by: INTERNAL MEDICINE

## 2017-06-11 PROCEDURE — 87088 URINE BACTERIA CULTURE: CPT | Performed by: INTERNAL MEDICINE

## 2017-06-11 PROCEDURE — 25000128 H RX IP 250 OP 636: Performed by: PHYSICIAN ASSISTANT

## 2017-06-11 PROCEDURE — 40000133 ZZH STATISTIC OT WARD VISIT

## 2017-06-11 PROCEDURE — 25000128 H RX IP 250 OP 636: Performed by: INTERNAL MEDICINE

## 2017-06-11 PROCEDURE — 40000193 ZZH STATISTIC PT WARD VISIT: Performed by: PHYSICAL THERAPIST

## 2017-06-11 PROCEDURE — 81001 URINALYSIS AUTO W/SCOPE: CPT | Performed by: INTERNAL MEDICINE

## 2017-06-11 PROCEDURE — 36415 COLL VENOUS BLD VENIPUNCTURE: CPT | Performed by: FAMILY MEDICINE

## 2017-06-11 PROCEDURE — 25000132 ZZH RX MED GY IP 250 OP 250 PS 637: Performed by: INTERNAL MEDICINE

## 2017-06-11 PROCEDURE — 87186 SC STD MICRODIL/AGAR DIL: CPT | Performed by: INTERNAL MEDICINE

## 2017-06-11 PROCEDURE — 97535 SELF CARE MNGMENT TRAINING: CPT | Mod: GO

## 2017-06-11 PROCEDURE — 97165 OT EVAL LOW COMPLEX 30 MIN: CPT | Mod: GO

## 2017-06-11 PROCEDURE — 99232 SBSQ HOSP IP/OBS MODERATE 35: CPT | Performed by: INTERNAL MEDICINE

## 2017-06-11 PROCEDURE — 85025 COMPLETE CBC W/AUTO DIFF WBC: CPT | Performed by: FAMILY MEDICINE

## 2017-06-11 PROCEDURE — 25000132 ZZH RX MED GY IP 250 OP 250 PS 637: Performed by: PHYSICIAN ASSISTANT

## 2017-06-11 PROCEDURE — 80048 BASIC METABOLIC PNL TOTAL CA: CPT | Performed by: FAMILY MEDICINE

## 2017-06-11 PROCEDURE — 73030 X-RAY EXAM OF SHOULDER: CPT | Mod: RT

## 2017-06-11 RX ORDER — SODIUM CHLORIDE 9 MG/ML
INJECTION, SOLUTION INTRAVENOUS CONTINUOUS
Status: DISCONTINUED | OUTPATIENT
Start: 2017-06-11 | End: 2017-06-11

## 2017-06-11 RX ORDER — HYDROCHLOROTHIAZIDE 12.5 MG/1
12.5 CAPSULE ORAL DAILY
Status: DISCONTINUED | OUTPATIENT
Start: 2017-06-12 | End: 2017-06-13

## 2017-06-11 RX ORDER — CEFTRIAXONE 1 G/1
1 INJECTION, POWDER, FOR SOLUTION INTRAMUSCULAR; INTRAVENOUS EVERY 24 HOURS
Status: DISCONTINUED | OUTPATIENT
Start: 2017-06-11 | End: 2017-06-13 | Stop reason: HOSPADM

## 2017-06-11 RX ORDER — LOSARTAN POTASSIUM 25 MG/1
25 TABLET ORAL DAILY
Status: DISCONTINUED | OUTPATIENT
Start: 2017-06-12 | End: 2017-06-13

## 2017-06-11 RX ORDER — POTASSIUM CHLORIDE 1.5 G/1.58G
40 POWDER, FOR SOLUTION ORAL ONCE
Status: COMPLETED | OUTPATIENT
Start: 2017-06-11 | End: 2017-06-11

## 2017-06-11 RX ADMIN — POTASSIUM CHLORIDE 40 MEQ: 1.5 POWDER, FOR SOLUTION ORAL at 14:55

## 2017-06-11 RX ADMIN — UMECLIDINIUM 1 PUFF: 62.5 AEROSOL, POWDER ORAL at 08:07

## 2017-06-11 RX ADMIN — ATORVASTATIN CALCIUM 20 MG: 20 TABLET, FILM COATED ORAL at 21:22

## 2017-06-11 RX ADMIN — MICONAZOLE NITRATE: 2 POWDER TOPICAL at 08:10

## 2017-06-11 RX ADMIN — SODIUM CHLORIDE: 0.9 INJECTION, SOLUTION INTRAVENOUS at 00:38

## 2017-06-11 RX ADMIN — ATENOLOL 50 MG: 50 TABLET ORAL at 21:24

## 2017-06-11 RX ADMIN — ATENOLOL 50 MG: 50 TABLET ORAL at 00:10

## 2017-06-11 RX ADMIN — ACETAMINOPHEN 1000 MG: 500 TABLET ORAL at 21:22

## 2017-06-11 RX ADMIN — MICONAZOLE NITRATE: 2 POWDER TOPICAL at 21:25

## 2017-06-11 RX ADMIN — ENOXAPARIN SODIUM 40 MG: 40 INJECTION SUBCUTANEOUS at 14:20

## 2017-06-11 RX ADMIN — ATENOLOL 50 MG: 50 TABLET ORAL at 12:30

## 2017-06-11 RX ADMIN — FLUTICASONE FUROATE AND VILANTEROL TRIFENATATE 1 PUFF: 200; 25 POWDER RESPIRATORY (INHALATION) at 08:07

## 2017-06-11 RX ADMIN — ACETAMINOPHEN 1000 MG: 500 TABLET ORAL at 12:30

## 2017-06-11 RX ADMIN — ATORVASTATIN CALCIUM 20 MG: 20 TABLET, FILM COATED ORAL at 00:10

## 2017-06-11 NOTE — PROGRESS NOTES
[facility] [place] Hospitalist Progress Note         Assessment and Plan:   Susan Haq is a 85 year old female with pulmonary fibrosis, on 3L chronically, SUSSY, HLD, HTN, lower extremity edema who was admitted 6/10/17 after a fall, resulting in multiple fractures.    Assessment & Plan:    Tibia/fibula fracture, right, closed, initial encounter: after fall at home.  Was not able to bear weight after fall.  XR noted oblique fracture through distal fibular, minimally displaced through ankle mortise with minimally displaced fracture in proximal fibula.   --Ortho following, appreciate recs  --NWB on RLE  --Remain in splint until seen in outpatient visit within 1 week  --physical therapy/OT.  Anticipate patient will need TCU, however she is very resistant.  --Tylenol, tramadol for pain.  Could add Norco or Percocet if needed    Right arm pain: patient reported to admitting provider it was elbow; to me this AM, she reports it is her shoulder.  She reports she cannot move the arm due to severe pain.  Xrays of shoulder and wrist are negative.  No focal pain with palpation in area of elbow.  Discussed that her severe arm pain will make it very difficult for her to care for self at home, now that she is NWB RLE.  Patient still hesitant for TCU  --physical therapy/OT, pain control as above    Right right phalanx fracture: seen on xray.  Treatment as above.    Fall:  Patient reports mechanical fall - tripping over throw rug.  She remained on ground for ~ 30 min after fall until friends came.  Discussed [facility] at home.  Head imaging negative.  --DC telemetry  --slowly resume blood pressure medications-may lower dose of clonidine vs holding it altogether on dc.    Auditory Hallucinations: Reports 1 week history of music playing in her head. MRI negative.   DDx: inner ear pathology, seizure, psychosis - depression/schizophrenia hearing aids    Does follow with audiologist. Hears when  hearing aids are removed. No focal neuro deficits. Does not appear in psychosis.  -continue to monitor  -may need ENT referral or EEG for possible seizure (non-dominant hemisphere foci)    Hypertension: on prior to admission amlodipine 10 mg daily, clonidine 0.2 mg BID, furosemide 20 mg daily, losartan/HCTZ 100/25 daily, potassium 10 meq daily.  --slowly resume blood pressure meds  --start losartan 25, HCTZ 12.5 - start tomorrow  --hold if SBP < 100    Alcohol Abuse   Drinks about 3oz almost daily of liquor. No history of withdrawal/seizures. Last drink 6/8 evening.     No evidence of withdrawal currently - will hold off on CIWA protocol.  -continue to monitor clinically     Intertrigo of Breasts  -start miconazole   -wound consultation pending     Lower Extremity Edema  -holding PTA lasix/potassium given possible orthostasis  -I/O and daily weights     Pulmonary Fibrosis/COPD  Chronic Respiratory Failure   Currently at baseline. On 3L NC continuously  -continue PTA oxygen  -continue PTA albuterol PRN, spirivia, symbicort     SUSSY  -continue PTA CPAP settings at home, family will bring in machine     Prediabetes  Last A1C (2015) 5.7. . Will monitor BG with BMP in AM     HLD  -continue PTA atorvastatin      Insomnia  -continue PTA hydroxyzine PRN     Depression   Has not seen effect since starting 3/2017.  -holding PTA sertraline since only new medication change.      FEN:  -d/c IVF, can hydrate orally  -Will monitor electrolytes and replace as needed  -regular diet with NPO at midnight until ortho consultation     DVT Prophylaxis: Will need chemical DVT prophylactic given recent fracture and NWB status.  Code Status: DNR / DNI     Disposition: Anticipate discharge in 1-2 days once pain is improved with orals, ortho is consulted with safe disposition - given age and multiple fractures. Appropriate for INPATIENT care           Interval History:   Patient still having severe pain in entire right arm - initially  points to elbow then to lateral shoulder.  Pain is controlled in leg/foot. States she fell at home because she tripped over a throw rug.  She doesn't want to go to TCU because she has been to one and did not like it.  She is not sure how she would manage at home if she is NWB on RLE and cannot use RUE to full use.         Review of Systems:   A comprehensive review of systems was performed and found to be negative except as described in this note          Medications:     Current Facility-Administered Medications   Medication     0.9% sodium chloride infusion     albuterol neb solution 2.5 mg     hydrOXYzine (ATARAX) tablet 25-50 mg     fluticasone-vilanterol (BREO ELLIPTA) 200-25 MCG/INH oral inhaler 1 puff     umeclidinium (INCRUSE ELLIPTA) 62.5 MCG/INH oral inhaler 1 puff     naloxone (NARCAN) injection 0.1-0.4 mg     acetaminophen (TYLENOL) tablet 650 mg     senna-docusate (SENOKOT-S;PERICOLACE) 8.6-50 MG per tablet 1-2 tablet     ondansetron (ZOFRAN-ODT) ODT tab 4 mg    Or     ondansetron (ZOFRAN) injection 4 mg     acetaminophen (TYLENOL) tablet 1,000 mg     miconazole (MICATIN; MICRO GUARD) 2 % powder     atenolol (TENORMIN) tablet 50 mg     atorvastatin (LIPITOR) tablet 20 mg             Physical Exam:   Vitals were reviewed  Patient Vitals for the past 24 hrs:   BP Temp Temp src Pulse Heart Rate Resp SpO2 Weight   06/11/17 0358 125/48 98.7  F (37.1  C) Oral 62 - 16 98 % -   06/10/17 2248 147/59 98.2  F (36.8  C) Oral 60 - 18 - -   06/10/17 1945 - - - 69 - - 97 % -   06/10/17 1939 - - - 75 - - 97 % -   06/10/17 1907 130/53 98.3  F (36.8  C) Oral 69 - 18 97 % -   06/10/17 1843 - - - - 58 - - -   06/10/17 1806 - - - - - - (!) 89 % -   06/10/17 1800 - - - - - - 91 % -   06/10/17 1754 126/55 - - - - - 91 % -   06/10/17 1445 - - - - - - 97 % -   06/10/17 1430 - - - - - - 97 % -   06/10/17 1415 - - - - - - 97 % -   06/10/17 1400 - - - - - - 96 % -   06/10/17 1338 179/84 97.9  F (36.6  C) Oral 57 - 20 95 % 107 kg  (236 lb)   Gen: alert, in no distress, obese, elderly woman, wearing oxygen.  Eyes: conjunctiva clear.  Neck: supple, no lymphadenopathy   CV: RRR, S1 and S2 normal, no murmurs, distant. Radial and pedal pulses symmetric and normal  Respiratory: Lungs clear bilaterally  Abd: Soft, obese, non-tender.  Normal bowel sounds.  No hepatosplenomegaly   Lymph: No peripheral edema  MSK: right leg in splint.  No pain to palpation of right hand, wrist, elbow, but pain to lateral shoulder area.  Full ROM of hand/wrist, elbow, but patient has severe pain with shoulder ROM, both active and passive.  Skin: no significant rashes.           Data:     Results for orders placed or performed during the hospital encounter of 06/10/17 (from the past 24 hour(s))   CBC with platelets differential   Result Value Ref Range    WBC  4.0 - 11.0 10e9/L     Canceled, Test credited   Unsatisfactory specimen - clotted  specimen will need to be recollected, notified ARIADNA Barahona 6/10/17 1630   JM  CORRECTED ON 06/10 AT 1707: PREVIOUSLY REPORTED AS 8.7      RBC Count  3.8 - 5.2 10e12/L     Canceled, Test credited   Unsatisfactory specimen - clotted  specimen will need to be recollected, notified ARIADNA Barahona 6/10/17 1630   JM  CORRECTED ON 06/10 AT 1707: PREVIOUSLY REPORTED AS 3.84      Hemoglobin  11.7 - 15.7 g/dL     Canceled, Test credited   Unsatisfactory specimen - clotted  specimen will need to be recollected, notified ARIADNA Barahona 6/10/17 1630   JM  CORRECTED ON 06/10 AT 1707: PREVIOUSLY REPORTED AS 12.1      Hematocrit  35.0 - 47.0 %     Canceled, Test credited   Unsatisfactory specimen - clotted  specimen will need to be recollected, notified ARIADNA Barahona 6/10/17 1630   JM  CORRECTED ON 06/10 AT 1707: PREVIOUSLY REPORTED AS 36.2      MCV  78 - 100 fl     Canceled, Test credited   Unsatisfactory specimen - clotted  specimen will need to be recollected, notified ARIADNA Barahona 6/10/17 1630   JM  CORRECTED  ON 06/10 AT 1707: PREVIOUSLY REPORTED AS 94      MCH  26.5 - 33.0 pg     Canceled, Test credited   Unsatisfactory specimen - clotted  specimen will need to be recollected, notified Tiffany Fuentes RN [facility] 6/10/17 1630   JM  CORRECTED ON 06/10 AT 1707: PREVIOUSLY REPORTED AS 31.5      MCHC  31.5 - 36.5 g/dL     Canceled, Test credited   Unsatisfactory specimen - clotted  specimen will need to be recollected, notified Tiffany Fuentes RN [facility] 6/10/17 1630   JM  CORRECTED ON 06/10 AT 1707: PREVIOUSLY REPORTED AS 33.4      RDW  10.0 - 15.0 %     Canceled, Test credited   Unsatisfactory specimen - clotted  specimen will need to be recollected, notified Tiffany Fuentes RN [facility] 6/10/17 1630   JM  CORRECTED ON 06/10 AT 1707: PREVIOUSLY REPORTED AS 12.7      Platelet Count  150 - 450 10e9/L     Canceled, Test credited   Unsatisfactory specimen - clotted  specimen will need to be recollected, notified Tiffany Fuentes RN [facility] 6/10/17 1630   JM  CORRECTED ON 06/10 AT 1707: PREVIOUSLY REPORTED AS 58      Diff Method       Canceled, Test credited   Unsatisfactory specimen - clotted  specimen will need to be recollected, ernestoied Tiffany Fuentes RN [facility] 6/10/17 1630   JM  CORRECTED ON 06/10 AT 1707: PREVIOUSLY REPORTED AS Automated Method      % Neutrophils  %     Canceled, Test credited   Unsatisfactory specimen - clotted  CORRECTED ON 06/10 AT 1903: PREVIOUSLY REPORTED AS 78.4      % Lymphocytes  %     Canceled, Test credited   Unsatisfactory specimen - clotted  CORRECTED ON 06/10 AT 1903: PREVIOUSLY REPORTED AS 11.4      % Monocytes  %     Canceled, Test credited   Unsatisfactory specimen - clotted  CORRECTED ON 06/10 AT 1903: PREVIOUSLY REPORTED AS 9.2      % Eosinophils  %     Canceled, Test credited   Unsatisfactory specimen - clotted  CORRECTED ON 06/10 AT 1906: PREVIOUSLY REPORTED AS 0.6      % Basophils  %     Canceled, Test credited   Unsatisfactory specimen - clotted  CORRECTED ON 06/10 AT 1903: PREVIOUSLY REPORTED AS 0.1      %  Immature Granulocytes  %     Canceled, Test credited   Unsatisfactory specimen - clotted  CORRECTED ON 06/10 AT 1903: PREVIOUSLY REPORTED AS 0.3      Absolute Neutrophil  1.6 - 8.3 10e9/L     Canceled, Test credited   Unsatisfactory specimen - clotted  CORRECTED ON 06/10 AT 1903: PREVIOUSLY REPORTED AS 6.8      Absolute Lymphocytes  0.8 - 5.3 10e9/L     Canceled, Test credited   Unsatisfactory specimen - clotted  CORRECTED ON 06/10 AT 1903: PREVIOUSLY REPORTED AS 1.0      Absolute Monocytes  0.0 - 1.3 10e9/L     Canceled, Test credited   Unsatisfactory specimen - clotted  CORRECTED ON 06/10 AT 1903: PREVIOUSLY REPORTED AS 0.8      Absolute Eosinophils  0.0 - 0.7 10e9/L     Canceled, Test credited   Unsatisfactory specimen - clotted  CORRECTED ON 06/10 AT 1903: PREVIOUSLY REPORTED AS 0.1      Absolute Basophils  0.0 - 0.2 10e9/L     Canceled, Test credited   Unsatisfactory specimen - clotted  CORRECTED ON 06/10 AT 1906: PREVIOUSLY REPORTED AS 0.0      Abs Immature Granulocytes  0 - 0.4 10e9/L     Canceled, Test credited   Unsatisfactory specimen - clotted  CORRECTED ON 06/10 AT 1907: PREVIOUSLY REPORTED AS 0.0     Comprehensive metabolic panel   Result Value Ref Range    Sodium 140 133 - 144 mmol/L    Potassium 3.5 3.4 - 5.3 mmol/L    Chloride 104 94 - 109 mmol/L    Carbon Dioxide 28 20 - 32 mmol/L    Anion Gap 8 3 - 14 mmol/L    Glucose 119 (H) 70 - 99 mg/dL    Urea Nitrogen 27 7 - 30 mg/dL    Creatinine 1.01 0.52 - 1.04 mg/dL    GFR Estimate 52 (L) >60 mL/min/1.7m2    GFR Estimate If Black 63 >60 mL/min/1.7m2    Calcium 8.3 (L) 8.5 - 10.1 mg/dL    Bilirubin Total 0.6 0.2 - 1.3 mg/dL    Albumin 3.1 (L) 3.4 - 5.0 g/dL    Protein Total 5.8 (L) 6.8 - 8.8 g/dL    Alkaline Phosphatase 109 40 - 150 U/L    ALT 26 0 - 50 U/L    AST 18 0 - 45 U/L   Troponin I   Result Value Ref Range    Troponin I ES  0.000 - 0.045 ug/L     <0.015  The 99th percentile for upper reference range is 0.045 ug/L.  Troponin values in   the range  of 0.045 - 0.120 ug/L may be associated with risks of adverse   clinical events.     CK total   Result Value Ref Range    CK Total 80 30 - 225 U/L   TSH with free T4 reflex   Result Value Ref Range    TSH 3.06 0.40 - 4.00 mU/L   Vitamin B12   Result Value Ref Range    Vitamin B12 560 193 - 986 pg/mL   INR   Result Value Ref Range    INR  0.86 - 1.14     Canceled, Test credited   Unsatisfactory specimen - clotted  Specimen will need to be recollected, notified Libby in [facility] 6/10/17 1600 JM  CORRECTED ON 06/10 AT 1704: PREVIOUSLY REPORTED AS 1.12     Partial thromboplastin time   Result Value Ref Range    PTT  22 - 37 sec     Canceled, Test credited   Unsatisfactory specimen - clotted  Specimen will need to be recollected, notified Libby in [facility] 6/10/17 1600 JM     Lactate Dehydrogenase   Result Value Ref Range    Lactate Dehydrogenase 267 (H) 81 - 234 U/L   Pelvis XR, 1-2 views    Narrative    PELVIS ONE TO TWO VIEWS  6/10/2017 3:51 PM     HISTORY: Fall.    COMPARISON: September 13, 2016.      Impression    IMPRESSION: Both femoral heads articulate normally with the respective  acetabula. Minimal joint space narrowing, though no osteophyte  formation. Right lateral knee radiograph demonstrates normal alignment  without effusion. Proximal fibular fracture again visible.     RAMILA BARNES MD   Tib/Fib XR, right    Narrative    RIGHT TIBIA AND FIBULA TWO VIEWS   6/10/2017 3:52 PM     HISTORY: Ankle pain, tib-fib pain.    COMPARISON: None.      Impression    IMPRESSION: Oblique fracture through the distal fibula, minimally  displaced, extending to the level of the ankle mortise. Minimally  displaced fracture in the proximal aspect of the fibula, as well. This  may be associated with damage to interosseous ligament.     RAMILA BARNES MD   Foot  XR, G/E 3 views, right    Narrative    RIGHT FOOT THREE OR MORE VIEWS  6/10/2017 3:52 PM     HISTORY: Ecchymosis distal toes, pain at left ankle, foot  ecchymosis,  bruising.    COMPARISON: None.      Impression    IMPRESSION: Mild hammertoe alignment of second through fifth toes.  Bones are normally aligned. Lucency in the mid fifth proximal phalanx,  suggestive of a nondisplaced fracture, clinically correlate with pain.  Additional lucency in the distal aspect of the second proximal  phalanx, possible location of fracture given the appearance, though  this may be somewhat degenerative.     RAMILA BARNES MD   Radius/Ulna XR, PA & LAT, right    Narrative    RIGHT FOREARM TWO VIEWS   6/10/2017 3:53 PM     HISTORY: Fall, forearm injury.    COMPARISON: None.      Impression    IMPRESSION: Proximal and distal radial and ulnar articulations intact.  No acute fracture.       RAMILA BARNES MD   MR Brain w/o & w Contrast    Narrative    MRI BRAIN WITHOUT AND WITH CONTRAST  6/10/2017 4:56 PM    HISTORY:  Fall today, striking occiput; one week of constant auditory  hallucinations, constant hearing of music.     TECHNIQUE:  Multiplanar, multisequence MRI of the brain without and  with 10 mL IV Gadavist.    COMPARISON: None.    FINDINGS:  There is generalized atrophy of the brain.  White matter  changes are present in the cerebral hemispheres that are consistent  with small vessel ischemic disease in this age patient. Old lacunar  infarct is seen in the right corona radiata and upper right putamen.  There is no evidence of hemorrhage, mass, acute infarct, or anomaly.   There are no gadolinium enhancing lesions.    The facial structures appear normal. The arteries at the base of the  brain and the dural venous sinuses appear patent.       Impression    IMPRESSION:  1. No acute abnormality. No evidence of intracranial trauma.  2. Brain atrophy and white matter changes consistent with sequelae of  small vessel ischemic disease.  3. Old lacunar infarct in the right corona radiata and upper right  putamen.    ROBBIE FARFAN MD   CBC with platelets differential   Result Value  Ref Range    WBC  4.0 - 11.0 10e9/L     Canceled, Test credited   Unsatisfactory specimen - clotted  Notified [facility] 6/10/17 1800 JM      RBC Count  3.8 - 5.2 10e12/L     Canceled, Test credited   Unsatisfactory specimen - clotted  Notified [facility] 6/10/17 1800 JM      Hemoglobin  11.7 - 15.7 g/dL     Canceled, Test credited   Unsatisfactory specimen - clotted  Notified [facility] 6/10/17 1800       Hematocrit  35.0 - 47.0 %     Canceled, Test credited   Unsatisfactory specimen - clotted  Notified [facility] 6/10/17 1800 JM      MCV  78 - 100 fl     Canceled, Test credited   Unsatisfactory specimen - clotted  Notified [facility] 6/10/17 1800       MCH  26.5 - 33.0 pg     Canceled, Test credited   Unsatisfactory specimen - clotted  Notified [facility] 6/10/17 1800       MCHC  31.5 - 36.5 g/dL     Canceled, Test credited   Unsatisfactory specimen - clotted  Notified [facility] 6/10/17 1800 JM      RDW  10.0 - 15.0 %     Canceled, Test credited   Unsatisfactory specimen - clotted  Notified [facility] 6/10/17 1800 JM      Platelet Count  150 - 450 10e9/L     Canceled, Test credited   Unsatisfactory specimen - clotted  Notified [facility] 6/10/17 1800 JM      Diff Method       Canceled, Test credited   Unsatisfactory specimen - clotted  Notified [facility] 6/10/17 1800 JM     INR   Result Value Ref Range    INR 1.03 0.86 - 1.14   Partial thromboplastin time   Result Value Ref Range    PTT 20 (L) 22 - 37 sec   Splint application    Narrative    Anton Lopez MD     6/10/2017  6:45 PM  Splint application  Date/Time: 6/10/2017 4:57 PM  Performed by: ANTON LOPEZ  Authorized by: ANTON LOPEZ   Consent: Verbal consent obtained.  Risks and benefits: risks, benefits and alternatives were discussed  Consent given by: patient  Required items: required blood products, implants, devices, and special   equipment available  Patient identity confirmed: verbally with patient  Location details: right leg  Splint type: long  leg  Supplies used: Ortho-Glass  Post-procedure: The splinted body part was neurovascularly unchanged   following the procedure.  Patient tolerance: Patient tolerated the procedure well with no immediate   complications     Folate   Result Value Ref Range    Folate 31.2 >5.4 ng/mL   CBC with platelets differential   Result Value Ref Range    WBC 8.3 4.0 - 11.0 10e9/L    RBC Count 3.93 3.8 - 5.2 10e12/L    Hemoglobin 12.3 11.7 - 15.7 g/dL    Hematocrit 36.7 35.0 - 47.0 %    MCV 93 78 - 100 fl    MCH 31.3 26.5 - 33.0 pg    MCHC 33.5 31.5 - 36.5 g/dL    RDW 12.7 10.0 - 15.0 %    Platelet Count 199 150 - 450 10e9/L    Diff Method Automated Method     % Neutrophils 69.6 %    % Lymphocytes 15.5 %    % Monocytes 13.9 %    % Eosinophils 0.5 %    % Basophils 0.1 %    % Immature Granulocytes 0.4 %    Absolute Neutrophil 5.8 1.6 - 8.3 10e9/L    Absolute Lymphocytes 1.3 0.8 - 5.3 10e9/L    Absolute Monocytes 1.2 0.0 - 1.3 10e9/L    Absolute Eosinophils 0.0 0.0 - 0.7 10e9/L    Absolute Basophils 0.0 0.0 - 0.2 10e9/L    Abs Immature Granulocytes 0.0 0 - 0.4 10e9/L   CBC with platelets differential   Result Value Ref Range    WBC 7.1 4.0 - 11.0 10e9/L    RBC Count 3.69 (L) 3.8 - 5.2 10e12/L    Hemoglobin 11.6 (L) 11.7 - 15.7 g/dL    Hematocrit 34.8 (L) 35.0 - 47.0 %    MCV 94 78 - 100 fl    MCH 31.4 26.5 - 33.0 pg    MCHC 33.3 31.5 - 36.5 g/dL    RDW 12.7 10.0 - 15.0 %    Platelet Count 193 150 - 450 10e9/L    Diff Method Automated Method     % Neutrophils 60.1 %    % Lymphocytes 20.5 %    % Monocytes 16.6 %    % Eosinophils 2.3 %    % Basophils 0.1 %    % Immature Granulocytes 0.4 %    Absolute Neutrophil 4.2 1.6 - 8.3 10e9/L    Absolute Lymphocytes 1.5 0.8 - 5.3 10e9/L    Absolute Monocytes 1.2 0.0 - 1.3 10e9/L    Absolute Eosinophils 0.2 0.0 - 0.7 10e9/L    Absolute Basophils 0.0 0.0 - 0.2 10e9/L    Abs Immature Granulocytes 0.0 0 - 0.4 10e9/L   Basic metabolic panel   Result Value Ref Range    Sodium 143 133 - 144 mmol/L     Potassium 3.2 (L) 3.4 - 5.3 mmol/L    Chloride 106 94 - 109 mmol/L    Carbon Dioxide 29 20 - 32 mmol/L    Anion Gap 8 3 - 14 mmol/L    Glucose 115 (H) 70 - 99 mg/dL    Urea Nitrogen 24 7 - 30 mg/dL    Creatinine 0.79 0.52 - 1.04 mg/dL    GFR Estimate 69 >60 mL/min/1.7m2    GFR Estimate If Black 84 >60 mL/min/1.7m2    Calcium 8.1 (L) 8.5 - 10.1 mg/dL       Attestation:    I have personally discussed the patient's care with Ortho today    Amount of time performed on this progress note: 30 minutes.    Dr. Cora Fernandez, DO  [facility]

## 2017-06-11 NOTE — PLAN OF CARE
Problem: Goal Outcome Summary  Goal: Goal Outcome Summary  OT eval and tx initiated. Pt is an extensive A for cares at this time d/t NWB'ing and difficulting maintaining during mobility. At this point would recommend TCU

## 2017-06-11 NOTE — PROGRESS NOTES
Pt's friends brought in bilateral hearing aides and home CPAP, RT notified and will set up at bedtime.

## 2017-06-11 NOTE — PLAN OF CARE
Problem: Fracture Orthopaedic (Adult)  Goal: Signs and Symptoms of Listed Potential Problems Will be Absent or Manageable (Fracture Orthopaedic)  Signs and symptoms of listed potential problems will be absent or manageable by discharge/transition of care (reference Fracture Orthopaedic (Adult) CPG).  Outcome: Improving  Pt denies pain in RLE. + CMS. Bruising on metatarsals on R foot. Splint on & intact. RLE elevated with pillow. A & O self & situation. Bed alarm on for safety. Unable to obtain standing BP. Will use ceiling lift to move, until seen by Ortho for weight bearing status. O2 on at 3 lpm via NC, per home use. RT notified of need for CPAP, as pt unable to bring one from home. LS diminished through out. VSS, afebrile. Notified PA no urine out put for this shift. Bladder scan for 64cc. Daniela Crook RN

## 2017-06-11 NOTE — PROGRESS NOTES
"   06/11/17 1200   Signing Clinician's Name / Credentials   Signing clinician's name / credentials TORITO Salgado   Quick Adds   Rehab Discipline OT   ADL Training   Minutes of Treatment 15   Symptoms Noted During/After Treatment none   Treatment ADL training within precautions   Treatment Detail Pt is A of 2-3 for commode to recliner transfer. She is unable to manage hygiene after BM   Additional Documentation   Rehab Comments Recommend TCU at this point   OT Plan See [facility] AM-PAC  \"6 Clicks\" Daily Activity Inpatient Short Form   1. Putting on and taking off regular lower body clothing? 1 - Total   2. Bathing (including washing, rinsing, drying)? 2 - A Lot   3. Toileting, which includes using toilet, bedpan or urinal? 1 - Total   4. Putting on and taking off regular upper body clothing? 3 - A Little   5. Taking care of personal grooming such as brushing teeth? 4 - None   6. Eating meals? 4 - None   Daily Activity Raw Score (Score out of 24.Lower scores equate to lower levels of function) 15   Total Session Time   Total Session Time (minutes) 30 minutes     TORITO Salgado  "

## 2017-06-11 NOTE — PROGRESS NOTES
06/11/17 1200   Quick Adds   Type of Visit Initial Occupational Therapy Evaluation   Living Environment   Lives With alone   Living Arrangements house   Home Accessibility bed and bath on same level;stairs to enter home   Number of Stairs to Enter Home 4   Self-Care   Dominant Hand right   General Information   Onset of Illness/Injury or Date of Surgery - Date 06/10/17   Referring Physician Kae   Patient/Family Goals Statement Pt would like to return home   Additional Occupational Profile Info/Pertinent History of Current Problem 84 yo F who slipped an a rug at home and fell resulting in a proximal fib fx, distal tib and R shoulder trauma   Precautions/Limitations fall precautions   Weight-Bearing Status - RLE nonweight-bearing   General Info Comments Previously I with self cares.Gets Moms meals and friends A with homemaking. Did not use an AD at home. Walk-in shower in which she stands   Cognitive Status Examination   Orientation orientation to person, place and time   Level of Consciousness alert   Able to Follow Commands mild impairment   Cognitive Comment Impulsive   Pain Assessment   Patient Currently in Pain (7/10 R shoulder with PROM to about 55 flex)   Range of Motion (ROM)   ROM Comment No functional R shoulder flex, elbow and digits WFLs   Strength   Strength Comments Decreased strength to manage functional ADL related transfers maintaining NWB'ing   Bed Mobility Skill: Supine to Sit   Level of [place]: Supine/Sit minimum assist (75% patients effort)   Physical Assist/Nonphysical Assist: Supine/Sit 1 person assist;other (see comments)  (A to guide R leg)   Transfer Skill: Bed to Chair/Chair to Bed   Level of [place]: Bed to Chair maximum assist (25% patients effort)   Physical Assist/Nonphysical Assist: Bed to Chair 2 persons   Weight-Bearing Restrictions nonweight-bearing   Assistive Device - Transfer Skill Bed to Chair Chair to Bed Rehab Eval rolling walker   Transfer Skill: Sit to  "Stand   Level of [place]: Sit/Stand moderate assist (50% patients effort)   Physical Assist/Nonphysical Assist: Sit/Stand 1 person assist   Transfer Skill: Sit to Stand nonweight-bearing   Assistive Device for Transfer: Sit/Stand rolling walker   Transfer Skill: Toilet Transfer   Level of [place]: Toilet (Bedside commode)   Physical Assist/Nonphysical Assist: Toilet 2 persons;other (see comments)  (2 on 3 off for positioning)   Weight-Bearing Restrictions: Toilet nonweight-bearing   Assistive Device rolling walker   Toileting   Level of [place]: Toilet unable to perform   Activities of Daily Living Analysis   Impairments Contributing to Impaired Activities of Daily Living balance impaired;cognition impaired;flexibility decreased;pain;post surgical precautions;strength decreased   General Therapy Interventions   Planned Therapy Interventions ADL retraining;bed mobility training;transfer training   Clinical Impression   Criteria for Skilled Therapeutic Interventions Met yes, treatment indicated   OT Diagnosis weakness, impaired ADLs and related mobility   Influenced by the following impairments closed nondisplaced fx of proximal phalanx of lesser R toe; proximal fibula and distal tibula fx. NWB'ing RLE. R shoulder pain resulting in decreased functional use   Assessment of Occupational Performance 5 or more Performance Deficits   Identified Performance Deficits toileting, dressing, bathing, H/G, standing, ADL related mobility   Clinical Decision Making (Complexity) Low complexity   Therapy Frequency daily   Predicted Duration of Therapy Intervention (days/wks) 3 days   Anticipated Discharge Disposition Transitional Care Facility   Risks and Benefits of Treatment have been explained. Yes   Patient, Family & other staff in agreement with plan of care Yes   Clinical Impression Comments Pt is unable to maintain NWB'ing status during minimal transferring   [facility] AM-PAC  \"6 Clicks\" Daily Activity " Inpatient Short Form   1. Putting on and taking off regular lower body clothing? 1 - Total   2. Bathing (including washing, rinsing, drying)? 2 - A Lot   3. Toileting, which includes using toilet, bedpan or urinal? 1 - Total   4. Putting on and taking off regular upper body clothing? 3 - A Little   5. Taking care of personal grooming such as brushing teeth? 4 - None   6. Eating meals? 4 - None   Daily Activity Raw Score (Score out of 24.Lower scores equate to lower levels of function) 15   Total Evaluation Time   Total Evaluation Time (Minutes) 15     TORITO Salgado

## 2017-06-11 NOTE — CONSULTS
[facility] Consultation    Consultation - [facility]  Level of consult: Consult, follow and place orders    Susan JOSE Haq,  1/10/1932, MRN [id number]     Admitting Dx: Auditory hallucinations [R44.0]  Interstitial pulmonary fibrosis (H) [J84.10]  Closed nondisplaced fracture of proximal phalanx of lesser toe of right foot, initial encounter [S92.514A]  Fall, initial encounter [W19.XXXA]  Closed fracture of proximal end of fibula, unspecified fracture morphology, initial encounter [S82.839A]  Closed fracture of distal end of right fibula, unspecified fracture morphology, initial encounter [S82.831A]     PCP: Ema Melendez, [phone]     Code status:  DNR/DNI     Extended Emergency Contact Information  Primary Emergency Contact: Martina Pickard   [facility]  Home Phone: [phone]  Work Phone: [phone]  Mobile Phone: [phone]  Relation: Friend     Assessment:  Right distal fibula fracture and right shoulder pain    Plan:  Patient should remain NWB on RLE.  Splint should not be removed until follow up visit.  She can wear a sling for comfort on the right arm if she chooses.    OK to d/c NPO status.  She should follow up with Dr. Carrillo Kiser on Thursday in [place].  Call [phone] to schedule.      Principal Problem:    Closed fracture of fibula  Active Problems:    Chronic obstructive pulmonary disease, unspecified COPD type (H)    Major depressive disorder, single episode, moderate (H)    Risk for falls    Auditory hallucination    Right elbow pain    Alcohol abuse    Hyperlipidemia LDL goal <130    Insomnia    Hypertension goal BP (blood pressure) < 140/90    SUSSY (obstructive sleep apnea)-Moderately severe (AHI 21)    Bilateral leg edema    Prediabetes    Interstitial pulmonary fibrosis (H)    Intertrigo       Chief Complaint  Right ankle pain and right shoulder pain     HPI  We have been requested by Dr. Anton Haro MD to evaluate Susan DELUNA  Severino who is a 85 year old year old female for a right distal fibula fracture, questionable mid 5th proximal phalanx fracture and right shoulder pain.  Yesterday morning around 0900 the patient had removed a water jug from the refrigerator to pour a glass of water.  She took the glass, turned and tripped on a new shaggy rug she had placed there a few days prior.  She said she fell 'flat down' and immediately felt intense pain at her ankle.  She stayed on the floor because she 'had nothing to grab onto' to pull herself up.  She had friends that were scheduled to stop by that morning so she waited on the floor until they arrived and could take her to ED.  She describes the pain as sharp primarily on the lateral aspect of the ankle.  It worsens with movement and improves with rest.  She also mentions shoulder pain.  She doesn't recall falling on the shoulder, but cannot raise it up without pain.  The right shoulder pain is predominantly on the lateral aspect.  She denies any toe pain at this time.  She denies any LOC, numbness or tingling.       History is obtained from the patient     Past Medical History  Past Medical History:   Diagnosis Date     Basal cell carcinoma      Bronchospasm     copd vs. asthma     Diverticulosis      Fracture, Metacarpal Shaft - right 4th 7/5/2010     High cholesterol      HL (hearing loss) 3/25/2013     Hypertension      Hypokalemia 9/17/2013     Hypoxia 9/5/2013     PVC's (premature ventricular contractions) 4/25/2011     Smoker 1986    quit     Venous insufficiency        Surgical History  Past Surgical History:   Procedure Laterality Date     APPENDECTOMY       C BSO, OMENTECTOMY W/SHANIA       C NONSPECIFIC PROCEDURE      (L) clavicle orif     C STOMACH SURGERY PROCEDURE UNLISTED       CHOLECYSTECTOMY, LAPOROSCOPIC  10/13/2011    Cholecystectomy, Laparoscopic     HERNIA REPAIR, UMBILICAL  10/13/2011     HYSTERECTOMY, CERVIX STATUS UNKNOWN          Social History  Social History      Social History     Marital status:      Spouse name: N/A     Number of children: N/A     Years of education: N/A     Occupational History      Retired      in [place]     Social History Main Topics     Smoking status: Former Smoker     Packs/day: 1.00     Years: 35.00     Quit date: 1/1/1990     Smokeless tobacco: Former User     Alcohol use Yes      Comment: Drink 1 (3oz) drink a day     Drug use: No     Sexual activity: No     Other Topics Concern     Not on file     Social History Narrative    Lives independently in own home.       Family History  Family History   Problem Relation Age of Onset     DIABETES Father      Coronary Artery Disease Maternal Grandmother      Coronary Artery Disease Paternal Uncle         Allergies:  Ace inhibitors; Asa [aluminum hydroxide]; and Penicillins      Current Medications:  Current Facility-Administered Medications   Medication     0.9% sodium chloride infusion     traMADol (ULTRAM) half-tab 25-50 mg     albuterol neb solution 2.5 mg     hydrOXYzine (ATARAX) tablet 25-50 mg     fluticasone-vilanterol (BREO ELLIPTA) 200-25 MCG/INH oral inhaler 1 puff     umeclidinium (INCRUSE ELLIPTA) 62.5 MCG/INH oral inhaler 1 puff     naloxone (NARCAN) injection 0.1-0.4 mg     acetaminophen (TYLENOL) tablet 650 mg     senna-docusate (SENOKOT-S;PERICOLACE) 8.6-50 MG per tablet 1-2 tablet     ondansetron (ZOFRAN-ODT) ODT tab 4 mg    Or     ondansetron (ZOFRAN) injection 4 mg     acetaminophen (TYLENOL) tablet 1,000 mg     miconazole (MICATIN; MICRO GUARD) 2 % powder     atenolol (TENORMIN) tablet 50 mg     atorvastatin (LIPITOR) tablet 20 mg       Review of Systems:  The Review of Systems is negative other than noted in the HPI    Physical Exam:  Temp:  [97.7  F (36.5  C)-98.7  F (37.1  C)] 97.7  F (36.5  C)  Pulse:  [57-75] 61  Heart Rate:  [58-61] 61  Resp:  [16-20] 18  BP: (125-179)/(44-84) 141/44  SpO2:  [89 %-98 %] 97 %    The patient is alert and oriented  x 3 and is not in any acute distress.    She does not have labored breathing and her color and turgor are normal.    The right ankle is in a fiberglass splint with ace bandage covering.  She has good capillary refill at both the fingers and toes bilaterally and her sensation is intact to light touch.  She can easily wiggle her right toes without pain. She does not have any pain with palpation of the little toe.  She is tender over the right distal fibula.  She is unable to move her ankle at this time due to the splint.  Her right shoulder is slightly tender when palpated over the entire lateral aspect.  She has limited motion due to pain.       Pertinent Labs  Lab Results: personally reviewed.  Lab Results   Component Value Date    WBC 7.1 06/11/2017    HGB 11.6 (L) 06/11/2017    HCT 34.8 (L) 06/11/2017    MCV 94 06/11/2017     06/11/2017       Recent Labs  Lab 06/10/17  1735 06/10/17  1440   INR 1.03 Canceled, Test credited Unsatisfactory specimen - clottedSpecimen will need to be recollected, notified Libby in [place] 6/10/17 1600 JMCORRECTED ON 06/10 AT 1704: PREVIOUSLY REPORTED AS 1.12       Pertinent Radiology  Radiology Results: images and radiology report reviewed  Recent Results (from the past 24 hour(s))   Pelvis XR, 1-2 views    Narrative    PELVIS ONE TO TWO VIEWS  6/10/2017 3:51 PM     HISTORY: Fall.    COMPARISON: September 13, 2016.      Impression    IMPRESSION: Both femoral heads articulate normally with the respective  acetabula. Minimal joint space narrowing, though no osteophyte  formation. Right lateral knee radiograph demonstrates normal alignment  without effusion. Proximal fibular fracture again visible.     RAMILA BARNES MD   Tib/Fib XR, right    Narrative    RIGHT TIBIA AND FIBULA TWO VIEWS   6/10/2017 3:52 PM     HISTORY: Ankle pain, tib-fib pain.    COMPARISON: None.      Impression    IMPRESSION: Oblique fracture through the distal fibula, minimally  displaced, extending to the level  of the ankle mortise. Minimally  displaced fracture in the proximal aspect of the fibula, as well. This  may be associated with damage to interosseous ligament.     RAMILA BARNES MD   Foot  XR, G/E 3 views, right    Narrative    RIGHT FOOT THREE OR MORE VIEWS  6/10/2017 3:52 PM     HISTORY: Ecchymosis distal toes, pain at left ankle, foot ecchymosis,  bruising.    COMPARISON: None.      Impression    IMPRESSION: Mild hammertoe alignment of second through fifth toes.  Bones are normally aligned. Lucency in the mid fifth proximal phalanx,  suggestive of a nondisplaced fracture, clinically correlate with pain.  Additional lucency in the distal aspect of the second proximal  phalanx, possible location of fracture given the appearance, though  this may be somewhat degenerative.     RAMILA BARNES MD   Radius/Ulna XR, PA & LAT, right    Narrative    RIGHT FOREARM TWO VIEWS   6/10/2017 3:53 PM     HISTORY: Fall, forearm injury.    COMPARISON: None.      Impression    IMPRESSION: Proximal and distal radial and ulnar articulations intact.  No acute fracture.       RAMILA BARNES MD   MR Brain w/o & w Contrast    Narrative    MRI BRAIN WITHOUT AND WITH CONTRAST  6/10/2017 4:56 PM    HISTORY:  Fall today, striking occiput; one week of constant auditory  hallucinations, constant hearing of music.     TECHNIQUE:  Multiplanar, multisequence MRI of the brain without and  with 10 mL IV Gadavist.    COMPARISON: None.    FINDINGS:  There is generalized atrophy of the brain.  White matter  changes are present in the cerebral hemispheres that are consistent  with small vessel ischemic disease in this age patient. Old lacunar  infarct is seen in the right corona radiata and upper right putamen.  There is no evidence of hemorrhage, mass, acute infarct, or anomaly.   There are no gadolinium enhancing lesions.    The facial structures appear normal. The arteries at the base of the  brain and the dural venous sinuses appear patent.        Impression    IMPRESSION:  1. No acute abnormality. No evidence of intracranial trauma.  2. Brain atrophy and white matter changes consistent with sequelae of  small vessel ischemic disease.  3. Old lacunar infarct in the right corona radiata and upper right  putamen.    ROBBIE AFRFAN MD       Attestation:  I have reviewed today's vital signs, notes, medications, labs and imaging.  Amount of time performed on this consult: 30 minutes.     Miguel A Mcintosh

## 2017-06-11 NOTE — PLAN OF CARE
Problem: Fracture Orthopaedic (Adult)  Goal: Signs and Symptoms of Listed Potential Problems Will be Absent or Manageable (Fracture Orthopaedic)  Signs and symptoms of listed potential problems will be absent or manageable by discharge/transition of care (reference Fracture Orthopaedic (Adult) CPG).   Outcome: No Change  Patient denies the need for pain meds. Rt arm still bothering her though. Right leg immobilized with good CMS. Heavy assist with transfers and actually needed the ceiling lift to transfer back into bed this afternoon. Is realizing that TCU placement is needed. VSS. Will monitor.

## 2017-06-11 NOTE — PROGRESS NOTES
UA positive. History to PCN - hives in distant past. Upon review of antibiotics no evidence of cephalosporins in past. Borderline long QT.     Will start rocephin for UTI and monitor closely for reaction. Culture pending    I have discussed patient with Dr. Fernandez. Assessment and plan as above.    Karol Pal PA-C

## 2017-06-11 NOTE — PROGRESS NOTES
Physical Therapy Evaluation     06/11/17 1230   Quick Adds   Type of Visit Initial PT Evaluation   Living Environment   Lives With alone   Living Arrangements house   Number of Stairs to Enter Home 4   Living Environment Comment Has main level set up   Functional Level Prior   Prior Functional Level Comment Indep with ADLs, assistance housekeeping, shopping and transportation   General Information   Onset of Illness/Injury or Date of Surgery - Date 06/10/17   Patient/Family Goals Statement To go home   Pertinent History of Current Problem (include personal factors and/or comorbidities that impact the POC) Pt admitted secondary to fall with R tib fx and RUE injury. Hx of COPD, ETOH, HTN and depression   Precautions/Limitations fall precautions   Weight-Bearing Status - RUE weight-bearing as tolerated   Weight-Bearing Status - LLE full weight-bearing   Weight-Bearing Status - RLE nonweight-bearing   General Observations Up in bed upon arrival   General Info Comments IV   Cognitive Status Examination   Orientation orientation to person, place and time   Level of Consciousness alert   Follows Commands and Answers Questions 100% of the time   Personal Safety and Judgment impaired;at risk behaviors demonstrated   Memory intact   Strength   Strength Comments LLE 5/5, R hip flex 3+/5, R knee flex/ext 4-/5   Bed Mobility   Bed Mobility Comments Min A of 1 for sup>sit, assisted with RLE   Transfer Skills   Transfer Comments Mod A of 1 from BSC/lower surface and min A 1 from elevated surface. Pt needing VC on hand placement and to use UE to control descend into chair, pt fell back into chair before chair was set up.   Gait   Gait Comments Pt able to stand pivot transfer with mod A of 2 from bed>BSC. VC to maintain NWB but question if she was able to   General Therapy Interventions   Planned Therapy Interventions gait training;transfer training;strengthening   Clinical Impression   Criteria for Skilled Therapeutic Intervention  "yes, treatment indicated   PT Diagnosis Impaired mobility   Influenced by the following impairments NWB, weakness and pain   Functional limitations due to impairments mobility   Clinical Presentation Stable/Uncomplicated   Clinical Decision Making (Complexity) Low complexity   Therapy Frequency` daily   Predicted Duration of Therapy Intervention (days/wks) 2-3 days   Anticipated Discharge Disposition Transitional Care Facility   Risk & Benefits of therapy have been explained Yes   Patient, Family & other staff in agreement with plan of care Yes   Clinical Impression Comments Pt would benefit from skilled PT to address strength and mobility. Recommnding TCU due to home set and limited mobility   [facility] AM-PAC TM \"6 Clicks\"   2016, Trustees of [facility], under license to [employer].  All rights reserved.   6 Clicks Short Forms Basic Mobility Inpatient Short Form   [facility] AM-PAC  \"6 Clicks\" V.2 Basic Mobility Inpatient Short Form   1. Turning from your back to your side while in a flat bed without using bedrails? 2 - A Lot   2. Moving from lying on your back to sitting on the side of a flat bed without using bedrails? 2 - A Lot   3. Moving to and from a bed to a chair (including a wheelchair)? 2 - A Lot   4. Standing up from a chair using your arms (e.g., wheelchair, or bedside chair)? 2 - A Lot   5. To walk in hospital room? 1 - Total   6. Climbing 3-5 steps with a railing? 1 - Total   Basic Mobility Raw Score (Score out of 24.Lower scores equate to lower levels of function) 10     See Care Plan for goals.    "

## 2017-06-11 NOTE — PROGRESS NOTES
[place] [facility] ADMISSION NOTE    Patient admitted to room 2305 at approximately 1840 via cart from emergency room. Patient was accompanied by transport tech.     Verbal SBAR report received from Lashaun prior to patient arrival.     Patient trasferred to bed via air evelyn. Patient alert and oriented X 2. The patient is not having any pain. 0-10 Pain Scale: 5. Admission vital signs: Blood pressure 130/53, pulse 69, temperature 98.3  F (36.8  C), temperature source Oral, resp. rate 18, weight 107 kg (236 lb), SpO2 97 %, not currently breastfeeding. Patient was oriented to plan of care, call light, bed controls, tv, telephone, bathroom and visiting hours.     The following safety risks were identified during admission: fall. Yellow risk band applied: YES. Bed alarm on & active for safety.    Daniela Crook RN

## 2017-06-11 NOTE — CONSULTS
CARE TRANSITION SOCIAL WORK INITIAL ASSESSMENT:      Met with: Patient.    DATA  Principal Problem:    Closed fracture of fibula  Active Problems:    Hyperlipidemia LDL goal <130    Insomnia    Hypertension goal BP (blood pressure) < 140/90    SUSSY (obstructive sleep apnea)-Moderately severe (AHI 21)    Bilateral leg edema    Prediabetes    Interstitial pulmonary fibrosis (H)    Chronic obstructive pulmonary disease, unspecified COPD type (H)    Major depressive disorder, single episode, moderate (H)    Risk for falls    Auditory hallucination    Right elbow pain    Intertrigo    Alcohol abuse       Primary Care [facility] Name:  ([facility])  Primary Care MD Name:  (Nora)  Contact information and PCP information verified: Yes      ASSESSMENT  Cognitive Status: awake, alert and oriented.       Resources List: Home Care, Transitional Care     Lives With: alone        Description of Support System: Supportive, Involved   Who is your support system?: Children, Neighbor   Support Assessment: Adequate family and caregiver support, Adequate social supports   Insurance Concerns: No Insurance issues identified          This writer met with pt, introduced self and role. Pt reports that she lives alone. This writer discussed medicare guidelines for home care and TCU. Pt does not want to dc to a TCU, she is agreeable to home care if she needs it. CTS will follow, therapy to evaluate.       PLAN    CTS to follow      Deloris GUZMAN, [facility] CenterSW, [phone]

## 2017-06-12 ENCOUNTER — APPOINTMENT (OUTPATIENT)
Dept: GENERAL RADIOLOGY | Facility: CLINIC | Age: 82
DRG: 543 | End: 2017-06-12
Attending: PHYSICIAN ASSISTANT
Payer: COMMERCIAL

## 2017-06-12 PROBLEM — L30.4 INTERTRIGO: Chronic | Status: ACTIVE | Noted: 2017-06-11

## 2017-06-12 LAB
ANION GAP SERPL CALCULATED.3IONS-SCNC: 4 MMOL/L (ref 3–14)
BUN SERPL-MCNC: 18 MG/DL (ref 7–30)
CALCIUM SERPL-MCNC: 8.2 MG/DL (ref 8.5–10.1)
CHLORIDE SERPL-SCNC: 109 MMOL/L (ref 94–109)
CO2 SERPL-SCNC: 28 MMOL/L (ref 20–32)
CREAT SERPL-MCNC: 0.75 MG/DL (ref 0.52–1.04)
ERYTHROCYTE [DISTWIDTH] IN BLOOD BY AUTOMATED COUNT: 12.6 % (ref 10–15)
GFR SERPL CREATININE-BSD FRML MDRD: 73 ML/MIN/1.7M2
GLUCOSE SERPL-MCNC: 110 MG/DL (ref 70–99)
HAPTOGLOB SERPL-MCNC: 228 MG/DL (ref 35–175)
HCT VFR BLD AUTO: 35.2 % (ref 35–47)
HGB BLD-MCNC: 11.8 G/DL (ref 11.7–15.7)
MCH RBC QN AUTO: 31.3 PG (ref 26.5–33)
MCHC RBC AUTO-ENTMCNC: 33.5 G/DL (ref 31.5–36.5)
MCV RBC AUTO: 93 FL (ref 78–100)
PLATELET # BLD AUTO: 204 10E9/L (ref 150–450)
POTASSIUM SERPL-SCNC: 3.4 MMOL/L (ref 3.4–5.3)
RBC # BLD AUTO: 3.77 10E12/L (ref 3.8–5.2)
SODIUM SERPL-SCNC: 141 MMOL/L (ref 133–144)
WBC # BLD AUTO: 6.3 10E9/L (ref 4–11)

## 2017-06-12 PROCEDURE — 25000132 ZZH RX MED GY IP 250 OP 250 PS 637: Performed by: INTERNAL MEDICINE

## 2017-06-12 PROCEDURE — 36415 COLL VENOUS BLD VENIPUNCTURE: CPT | Performed by: INTERNAL MEDICINE

## 2017-06-12 PROCEDURE — 73070 X-RAY EXAM OF ELBOW: CPT | Mod: RT

## 2017-06-12 PROCEDURE — 40000274 ZZH STATISTIC RCP CONSULT EA 30 MIN

## 2017-06-12 PROCEDURE — 80048 BASIC METABOLIC PNL TOTAL CA: CPT | Performed by: INTERNAL MEDICINE

## 2017-06-12 PROCEDURE — 40000193 ZZH STATISTIC PT WARD VISIT: Performed by: REHABILITATION PRACTITIONER

## 2017-06-12 PROCEDURE — 25000128 H RX IP 250 OP 636: Performed by: PHYSICIAN ASSISTANT

## 2017-06-12 PROCEDURE — 97530 THERAPEUTIC ACTIVITIES: CPT | Mod: GP | Performed by: REHABILITATION PRACTITIONER

## 2017-06-12 PROCEDURE — 97110 THERAPEUTIC EXERCISES: CPT | Mod: GP | Performed by: REHABILITATION PRACTITIONER

## 2017-06-12 PROCEDURE — 12000000 ZZH R&B MED SURG/OB

## 2017-06-12 PROCEDURE — 25000128 H RX IP 250 OP 636: Performed by: INTERNAL MEDICINE

## 2017-06-12 PROCEDURE — 85027 COMPLETE CBC AUTOMATED: CPT | Performed by: INTERNAL MEDICINE

## 2017-06-12 PROCEDURE — 25000132 ZZH RX MED GY IP 250 OP 250 PS 637: Performed by: PHYSICIAN ASSISTANT

## 2017-06-12 PROCEDURE — 99232 SBSQ HOSP IP/OBS MODERATE 35: CPT | Performed by: INTERNAL MEDICINE

## 2017-06-12 RX ADMIN — ATORVASTATIN CALCIUM 20 MG: 20 TABLET, FILM COATED ORAL at 22:05

## 2017-06-12 RX ADMIN — LOSARTAN POTASSIUM 25 MG: 25 TABLET, FILM COATED ORAL at 08:49

## 2017-06-12 RX ADMIN — ATENOLOL 50 MG: 50 TABLET ORAL at 08:49

## 2017-06-12 RX ADMIN — FLUTICASONE FUROATE AND VILANTEROL TRIFENATATE 1 PUFF: 200; 25 POWDER RESPIRATORY (INHALATION) at 08:51

## 2017-06-12 RX ADMIN — ENOXAPARIN SODIUM 40 MG: 40 INJECTION SUBCUTANEOUS at 13:30

## 2017-06-12 RX ADMIN — UMECLIDINIUM 1 PUFF: 62.5 AEROSOL, POWDER ORAL at 08:51

## 2017-06-12 RX ADMIN — HYDROXYZINE HYDROCHLORIDE 50 MG: 25 TABLET, FILM COATED ORAL at 22:52

## 2017-06-12 RX ADMIN — ACETAMINOPHEN 1000 MG: 500 TABLET ORAL at 02:16

## 2017-06-12 RX ADMIN — MICONAZOLE NITRATE: 2 POWDER TOPICAL at 08:49

## 2017-06-12 RX ADMIN — HYDROXYZINE HYDROCHLORIDE 50 MG: 25 TABLET, FILM COATED ORAL at 01:12

## 2017-06-12 RX ADMIN — ACETAMINOPHEN 1000 MG: 500 TABLET ORAL at 19:47

## 2017-06-12 RX ADMIN — ATENOLOL 50 MG: 50 TABLET ORAL at 19:47

## 2017-06-12 RX ADMIN — HYDROCHLOROTHIAZIDE 12.5 MG: 12.5 CAPSULE ORAL at 08:49

## 2017-06-12 RX ADMIN — CEFTRIAXONE 1 G: 1 INJECTION, POWDER, FOR SOLUTION INTRAMUSCULAR; INTRAVENOUS at 01:03

## 2017-06-12 RX ADMIN — MICONAZOLE NITRATE: 2 POWDER TOPICAL at 19:47

## 2017-06-12 RX ADMIN — ACETAMINOPHEN 1000 MG: 500 TABLET ORAL at 11:55

## 2017-06-12 NOTE — DOWNTIME EVENT NOTE
The EMR was down for 5 hours on 6/12/2017.    RN/NST/MD was responsible for completing the paper charting during this time period.     The following information was re-entered into the system by Caroline Hyde: MAR    The following information will remain in the paper chart: Head-to-toe assessment, Care plan and education    Caroline Hyde  6/12/2017

## 2017-06-12 NOTE — PROGRESS NOTES
Patient A/O x4; moments of forgetfulness and had episode of confusion during the night where she was trying to get out of bed due to feeling she was trapped. Aware of needing to use call light and uses it appropriately; otherwise. RT set up CPAP that friends brought in from pt's home. Restful sleep on and off throughout the night.   Continue to monitor and implement poc.

## 2017-06-12 NOTE — PROGRESS NOTES
[facility] Medicine Progress Note  Date of Service: 06/12/2017        Assessment & Plan   Susan Haq is a 85 year old female who presented on 6/10/2017 with  pulmonary fibrosis, on 3L chronically, SUSSY, HLD, HTN, lower extremity edema who was admitted 6/10/17 after a fall, resulting in multiple fractures.    Principal Problem:      Acute Tibia/fibula fracture, right, closed, initial encounter  Pain management is adequate  -Non Operative management as per Orthopedics  - Non-Weight baring on the Right Lower Limb  - No surgery can eat    Right arm pain: patient reported to admitting provider it was elbow; to me this AM, she reports it is her shoulder.  She reports she cannot move the arm due to severe pain.  Xrays of shoulder and wrist are negative.  No focal pain with palpation in area of elbow.  Discussed that her severe arm pain will make it very difficult for her to care for self at home, now that she is NWB RLE.  Patient still hesitant for TCU    Right right phalanx fracture: seen on xray.    -Conservative Treatment as above.    Insomnia  -will give Melatonin    Active Problems:    Chronic obstructive pulmonary disease, unspecified COPD type (H) - Stable    Major depressive disorder, single episode, moderate (H)    Auditory hallucination    Right elbow pain    Alcohol abuse    Type 2 diabetes mellitus (H)    Hyperlipidemia LDL goal <130    Insomnia    Hypertension goal BP (blood pressure) < 140/90    SUSSY (obstructive sleep apnea)-Moderately severe (AHI 21)    Bilateral leg edema    Interstitial pulmonary fibrosis (H)    Intertrigo      DVT Prophylaxis: Enoxaparin (Lovenox) SQ  Code Status: DNR/DNI         Plan:   Discharge as per Ortho recommendation   Will discuss with Ortho the plan of care for the Hand  Melatonin for sleep      Disposition: Anticipate discharge 6/13/2017               Interval History   Denies any pain in the Right Lower Limb when not moving  On  moving she is having Non Weight [place] on the Right Lower Limb  No chest pain or SOB    Physical Exam   Temp:  [98.3  F (36.8  C)-98.8  F (37.1  C)] 98.4  F (36.9  C)  Pulse:  [69] 69  Heart Rate:  [59-65] 59  Resp:  [16-18] 16  BP: (152-189)/(49-61) 152/49  SpO2:  [93 %-95 %] 94 %    Weights:   Vitals:    06/10/17 1338 06/12/17 0500   Weight: 107 kg (236 lb) 108.9 kg (240 lb 1.3 oz)    Body mass index is 40.57 kg/(m^2).    Constitutional: Not in any acute distress   EYES: Extra Occular movements are intact, Conjunctiva is clear   NECK: no JVD  CVS: S1 S2  Regular  Respiratory: Clear to auscultation without crepitations or Wheezing  bilaterally  GI: Soft, non tender, Bowel Sounds are Positive   Skin: Warm and dry, No Rashes  CNS: Alert and Oriented x 3   Musculoskeletal: Lower Limbs with Pedal Edema            Data     Recent Labs  Lab 06/12/17  0618 06/11/17  0613 06/10/17  1855 06/10/17  1735 06/10/17  1440   WBC 6.3 7.1 8.3 Canceled, Test credited Unsatisfactory specimen - clottedNotified Libby [facility] 6/10/17 1800 JM Canceled, Test credited Unsatisfactory specimen - clottedspecimen will need to be recollected, notified Tiffany Fuentes RN [facility] 6/10/17 1630 JMCORRECTED ON 06/10 AT 1707: PREVIOUSLY REPORTED AS 8.7   HGB 11.8 11.6* 12.3 Canceled, Test credited Unsatisfactory specimen - clottedNotified Libby [facility] 6/10/17 1800 JM Canceled, Test credited Unsatisfactory specimen - clottedspecimen will need to be recollected, notified Tiffany Fuentes RN [facility] 6/10/17 1630 JMCORRECTED ON 06/10 AT 1707: PREVIOUSLY REPORTED AS 12.1   MCV 93 94 93 Canceled, Test credited Unsatisfactory specimen - clottedNotified Libby [facility] 6/10/17 1800 JM Canceled, Test credited Unsatisfactory specimen - clottedspecimen will need to be recollected, notified ARIADNA Barahona 6/10/17 1630 JMCORRECTED ON 06/10 AT 1707: PREVIOUSLY REPORTED AS 94    193 199 Canceled, Test credited Unsatisfactory specimen - clottedNotified Libby [place] 6/10/17 1800 JM  Canceled, Test credited Unsatisfactory specimen - clottedspecimen will need to be recollected, notified Tiffany Fuentes RN [facility] 6/10/17 1630 JMCORRECTED ON 06/10 AT 1707: PREVIOUSLY REPORTED AS 58   INR  --   --   --  1.03 Canceled, Test credited Unsatisfactory specimen - clottedSpecimen will need to be recollected, notified Libby phipps [facility] 6/10/17 1600 JMCORRECTED ON 06/10 AT 1704: PREVIOUSLY REPORTED AS 1.12    143  --   --  140   POTASSIUM 3.4 3.2*  --   --  3.5   CHLORIDE 109 106  --   --  104   CO2 28 29  --   --  28   BUN 18 24  --   --  27   CR 0.75 0.79  --   --  1.01   ANIONGAP 4 8  --   --  8   JAGDISH 8.2* 8.1*  --   --  8.3*   * 115*  --   --  119*   ALBUMIN  --   --   --   --  3.1*   PROTTOTAL  --   --   --   --  5.8*   BILITOTAL  --   --   --   --  0.6   ALKPHOS  --   --   --   --  109   ALT  --   --   --   --  26   AST  --   --   --   --  18   TROPI  --   --   --   --  <0.015The 99th percentile for upper reference range is 0.045 ug/L.  Troponin values in the range of 0.045 - 0.120 ug/L may be associated with risks of adverse clinical events.         Recent Labs  Lab 06/12/17  0618 06/11/17  0613 06/10/17  1440   * 115* 119*        Unresulted Labs Ordered in the Past 30 Days of this Admission     Date and Time Order Name Status Description    6/11/2017 1650 Urine Culture Aerobic Bacterial Preliminary     6/10/2017 1629 Blood Morphology Pathology Review In process            Imaging  Recent Results (from the past 24 hour(s))   XR Elbow Port Right 2 Views    Narrative    ELBOW PORTABLE RIGHT TWO VIEWS June 12, 2017 6:30 AM     HISTORY: Right elbow pain.    COMPARISON: None.    FINDINGS: The visualized bones and joint spaces are within normal  limits.      Impression    IMPRESSION: No evidence for fracture, dislocation or significant  degenerative change of the right elbow on these two portable views.    KATT CHOWDHURY MD           Medications        enoxaparin  40 mg Subcutaneous Q24H      losartan  25 mg Oral Daily     hydrochlorothiazide  12.5 mg Oral Daily     sodium chloride (PF)  3 mL Intracatheter Q8H     cefTRIAXone  1 g Intravenous Q24H     fluticasone-vilanterol  1 puff Inhalation Daily     umeclidinium  1 puff Inhalation Daily     acetaminophen  1,000 mg Oral Q8H     miconazole   Topical BID     atenolol  50 mg Oral BID     atorvastatin  20 mg Oral At Bedtime              Philip CHAPMAN.  Hospitalist - Internal Medicine  [facility]

## 2017-06-12 NOTE — PLAN OF CARE
Problem: Goal Outcome Summary  Goal: Goal Outcome Summary  Up to chair for supper using ceiling lift.  Pt denies pain with RLE in splint.  Moves right arm independently.  Ate well for supper feeding self. Voided very small amount cloudy odorous urine on commode.  UA positive, DORA GREEN aware.

## 2017-06-13 ENCOUNTER — APPOINTMENT (OUTPATIENT)
Dept: PHYSICAL THERAPY | Facility: CLINIC | Age: 82
DRG: 543 | End: 2017-06-13
Payer: COMMERCIAL

## 2017-06-13 ENCOUNTER — CARE COORDINATION (OUTPATIENT)
Dept: CASE MANAGEMENT | Facility: CLINIC | Age: 82
End: 2017-06-13

## 2017-06-13 VITALS
OXYGEN SATURATION: 94 % | SYSTOLIC BLOOD PRESSURE: 178 MMHG | WEIGHT: 239.64 LBS | HEART RATE: 75 BPM | BODY MASS INDEX: 40.5 KG/M2 | DIASTOLIC BLOOD PRESSURE: 67 MMHG | TEMPERATURE: 98.6 F | RESPIRATION RATE: 18 BRPM

## 2017-06-13 PROCEDURE — 99239 HOSP IP/OBS DSCHRG MGMT >30: CPT | Performed by: PHYSICIAN ASSISTANT

## 2017-06-13 PROCEDURE — 25000132 ZZH RX MED GY IP 250 OP 250 PS 637: Performed by: PHYSICIAN ASSISTANT

## 2017-06-13 PROCEDURE — 25000128 H RX IP 250 OP 636: Performed by: INTERNAL MEDICINE

## 2017-06-13 PROCEDURE — 25000132 ZZH RX MED GY IP 250 OP 250 PS 637: Performed by: INTERNAL MEDICINE

## 2017-06-13 PROCEDURE — 25000128 H RX IP 250 OP 636: Performed by: PHYSICIAN ASSISTANT

## 2017-06-13 RX ORDER — FUROSEMIDE 20 MG
20 TABLET ORAL DAILY
Status: DISCONTINUED | OUTPATIENT
Start: 2017-06-13 | End: 2017-06-13 | Stop reason: HOSPADM

## 2017-06-13 RX ORDER — LANOLIN ALCOHOL/MO/W.PET/CERES
100 CREAM (GRAM) TOPICAL DAILY
Status: DISCONTINUED | OUTPATIENT
Start: 2017-06-13 | End: 2017-06-13 | Stop reason: HOSPADM

## 2017-06-13 RX ORDER — ACETAMINOPHEN 500 MG
1000 TABLET ORAL EVERY 8 HOURS
Qty: 40 TABLET | Refills: 0 | DISCHARGE
Start: 2017-06-13 | End: 2017-12-06

## 2017-06-13 RX ORDER — HYDROCHLOROTHIAZIDE 12.5 MG/1
12.5 CAPSULE ORAL ONCE
Status: COMPLETED | OUTPATIENT
Start: 2017-06-13 | End: 2017-06-13

## 2017-06-13 RX ORDER — LOSARTAN POTASSIUM AND HYDROCHLOROTHIAZIDE 25; 100 MG/1; MG/1
1 TABLET ORAL DAILY
Status: DISCONTINUED | OUTPATIENT
Start: 2017-06-13 | End: 2017-06-13

## 2017-06-13 RX ORDER — LOSARTAN POTASSIUM 50 MG/1
100 TABLET ORAL DAILY
Status: DISCONTINUED | OUTPATIENT
Start: 2017-06-14 | End: 2017-06-13 | Stop reason: HOSPADM

## 2017-06-13 RX ORDER — LANOLIN ALCOHOL/MO/W.PET/CERES
100 CREAM (GRAM) TOPICAL DAILY
Qty: 4 TABLET | Refills: 0 | DISCHARGE
Start: 2017-06-13 | End: 2017-06-15

## 2017-06-13 RX ORDER — AMOXICILLIN 250 MG
1-2 CAPSULE ORAL 2 TIMES DAILY PRN
Qty: 100 TABLET | Refills: 0 | DISCHARGE
Start: 2017-06-13 | End: 2017-12-06

## 2017-06-13 RX ORDER — LOSARTAN POTASSIUM AND HYDROCHLOROTHIAZIDE 25; 100 MG/1; MG/1
1 TABLET ORAL DAILY
Status: DISCONTINUED | OUTPATIENT
Start: 2017-06-14 | End: 2017-06-13 | Stop reason: RX

## 2017-06-13 RX ORDER — SULFAMETHOXAZOLE/TRIMETHOPRIM 800-160 MG
1 TABLET ORAL 2 TIMES DAILY
Qty: 6 TABLET | Refills: 0 | DISCHARGE
Start: 2017-06-13 | End: 2017-06-16

## 2017-06-13 RX ORDER — MULTIPLE VITAMINS W/ MINERALS TAB 9MG-400MCG
1 TAB ORAL DAILY
Status: DISCONTINUED | OUTPATIENT
Start: 2017-06-13 | End: 2017-06-13 | Stop reason: HOSPADM

## 2017-06-13 RX ORDER — POTASSIUM CHLORIDE 750 MG/1
10 TABLET, EXTENDED RELEASE ORAL DAILY
Status: DISCONTINUED | OUTPATIENT
Start: 2017-06-13 | End: 2017-06-13 | Stop reason: HOSPADM

## 2017-06-13 RX ORDER — TRAMADOL HYDROCHLORIDE 50 MG/1
25-50 TABLET ORAL EVERY 6 HOURS PRN
Qty: 28 TABLET | Refills: 0 | Status: SHIPPED | DISCHARGE
Start: 2017-06-13 | End: 2017-06-15

## 2017-06-13 RX ORDER — HYDROCHLOROTHIAZIDE 25 MG/1
25 TABLET ORAL DAILY
Status: DISCONTINUED | OUTPATIENT
Start: 2017-06-14 | End: 2017-06-13 | Stop reason: HOSPADM

## 2017-06-13 RX ORDER — FOLIC ACID 1 MG/1
1 TABLET ORAL DAILY
Status: DISCONTINUED | OUTPATIENT
Start: 2017-06-13 | End: 2017-06-13 | Stop reason: HOSPADM

## 2017-06-13 RX ORDER — FOLIC ACID 1 MG/1
1 TABLET ORAL DAILY
Qty: 4 TABLET | Refills: 0 | Status: SHIPPED | OUTPATIENT
Start: 2017-06-13 | End: 2017-06-15

## 2017-06-13 RX ORDER — LOSARTAN POTASSIUM 25 MG/1
25 TABLET ORAL ONCE
Status: COMPLETED | OUTPATIENT
Start: 2017-06-13 | End: 2017-06-13

## 2017-06-13 RX ORDER — CLONIDINE HYDROCHLORIDE 0.2 MG/1
0.2 TABLET ORAL 2 TIMES DAILY
Qty: 60 TABLET | Refills: 1 | Status: SHIPPED | OUTPATIENT
Start: 2017-06-13 | End: 2017-06-15

## 2017-06-13 RX ADMIN — ENOXAPARIN SODIUM 40 MG: 40 INJECTION SUBCUTANEOUS at 12:58

## 2017-06-13 RX ADMIN — FUROSEMIDE 20 MG: 20 TABLET ORAL at 11:52

## 2017-06-13 RX ADMIN — LOSARTAN POTASSIUM 25 MG: 25 TABLET, FILM COATED ORAL at 11:52

## 2017-06-13 RX ADMIN — MICONAZOLE NITRATE: 2 POWDER TOPICAL at 09:48

## 2017-06-13 RX ADMIN — ACETAMINOPHEN 1000 MG: 500 TABLET ORAL at 02:57

## 2017-06-13 RX ADMIN — FOLIC ACID 1 MG: 1 TABLET ORAL at 12:58

## 2017-06-13 RX ADMIN — HYDROCHLOROTHIAZIDE 12.5 MG: 12.5 CAPSULE ORAL at 09:46

## 2017-06-13 RX ADMIN — POTASSIUM CHLORIDE 10 MEQ: 750 TABLET, FILM COATED, EXTENDED RELEASE ORAL at 11:52

## 2017-06-13 RX ADMIN — Medication 100 MG: at 12:58

## 2017-06-13 RX ADMIN — CEFTRIAXONE 1 G: 1 INJECTION, POWDER, FOR SOLUTION INTRAMUSCULAR; INTRAVENOUS at 01:48

## 2017-06-13 RX ADMIN — LOSARTAN POTASSIUM 25 MG: 25 TABLET, FILM COATED ORAL at 09:47

## 2017-06-13 RX ADMIN — HYDROCHLOROTHIAZIDE 12.5 MG: 12.5 CAPSULE ORAL at 11:52

## 2017-06-13 RX ADMIN — ATENOLOL 50 MG: 50 TABLET ORAL at 09:46

## 2017-06-13 RX ADMIN — UMECLIDINIUM 1 PUFF: 62.5 AEROSOL, POWDER ORAL at 09:47

## 2017-06-13 RX ADMIN — ACETAMINOPHEN 1000 MG: 500 TABLET ORAL at 11:52

## 2017-06-13 RX ADMIN — FLUTICASONE FUROATE AND VILANTEROL TRIFENATATE 1 PUFF: 200; 25 POWDER RESPIRATORY (INHALATION) at 09:47

## 2017-06-13 RX ADMIN — MULTIPLE VITAMINS W/ MINERALS TAB 1 TABLET: TAB at 12:58

## 2017-06-13 NOTE — PROGRESS NOTES
[facility] Medicine   Care Update  Date of Service: 6/13/2017     BP recheck after admin of home hyzzar and lasix 178/64.    Will discharge to TCU on clonidine.        Heaven Montana PA-C

## 2017-06-13 NOTE — DISCHARGE INSTRUCTIONS
Orthopedic Instructions:  1.  Follow up with Dr. Carrillo Kiser on 6/15/17 in [place] for post injury visit and discuss future care for your ankle fracture.  Call [phone] to schedule your appointment.  2.  Use pain medication as directed  3.  Keep splint clean, dry and intact until your appointment. Cover with a plastic bag for bathing/ showering. ]    Blood Pressure Instructions:  Please have the staff at the TCU reassess your BP within 3 days.  We have discharged you to their facility WITHOUT your home clonidine nor amlodipine.  You will need to be watched closely to see if these medications become neccessary.

## 2017-06-13 NOTE — PHARMACY - DISCHARGE MEDICATION RECONCILIATION
Discharge medication review for this patient is complete. Pharmacist assisted with medication reconciliation of discharge medications with prior to admission medications.     The following changes were made to the discharge medication list based on pharmacist review:  Added:  Clonidine restarted intentionally at discharge by Heaven Montana  Discontinued: none  Changed: none      Patient's Discharge Medication List  - medications as listed on After Visit Summary (AVS)     Review of your medicines      START taking       Dose / Directions    acetaminophen 500 MG tablet   Commonly known as:  TYLENOL   Used for:  Closed fracture of proximal end of fibula, unspecified fracture morphology, initial encounter        Dose:  1000 mg   Take 2 tablets (1,000 mg) by mouth every 8 hours   Quantity:  40 tablet   Refills:  0       aspirin  MG EC tablet   Used for:  Closed fracture of proximal end of fibula, unspecified fracture morphology, initial encounter        Dose:  325 mg   Take 1 tablet (325 mg) by mouth every 6 hours as needed for moderate pain   Quantity:  14 tablet   Refills:  0       folic acid 1 MG tablet   Commonly known as:  FOLVITE   Used for:  Interstitial pulmonary fibrosis (H)        Dose:  1 mg   Take 1 tablet (1 mg) by mouth daily   Quantity:  4 tablet   Refills:  0       miconazole 2 % powder   Commonly known as:  MICATIN; MICRO GUARD        Apply topically 2 times daily   Quantity:  30 g   Refills:  1       senna-docusate 8.6-50 MG per tablet   Commonly known as:  SENOKOT-S;PERICOLACE   Used for:  Closed fracture of proximal end of fibula, unspecified fracture morphology, initial encounter        Dose:  1-2 tablet   Take 1-2 tablets by mouth 2 times daily as needed (constipation )   Quantity:  100 tablet   Refills:  0       sulfamethoxazole-trimethoprim 800-160 MG per tablet   Commonly known as:  BACTRIM DS/SEPTRA DS   Used for:  Hypertension goal BP (blood pressure) < 140/90, Acute cystitis without  hematuria        Dose:  1 tablet   Take 1 tablet by mouth 2 times daily for 3 days   Quantity:  6 tablet   Refills:  0       thiamine 100 MG tablet   Used for:  Auditory hallucinations        Dose:  100 mg   Take 1 tablet (100 mg) by mouth daily   Quantity:  4 tablet   Refills:  0       traMADol 50 MG tablet   Commonly known as:  ULTRAM   Used for:  Closed fracture of proximal end of fibula, unspecified fracture morphology, initial encounter        Dose:  25-50 mg   Take 0.5-1 tablets (25-50 mg) by mouth every 6 hours as needed for moderate pain or severe pain   Quantity:  28 tablet   Refills:  0         CONTINUE these medicines which have NOT CHANGED       Dose / Directions    * albuterol (2.5 MG/3ML) 0.083% neb solution        Dose:  1 vial   Take 1 vial (2.5 mg) by nebulization every 4 hours as needed for shortness of breath / dyspnea or wheezing   Quantity:  1 Box   Refills:  0       * albuterol 108 (90 BASE) MCG/ACT Inhaler   Commonly known as:  PROAIR HFA/PROVENTIL HFA/VENTOLIN HFA   Used for:  Chronic obstructive pulmonary disease, unspecified COPD type (H)        Dose:  2 puff   Inhale 2 puffs into the lungs every 4 hours as needed for shortness of breath / dyspnea or wheezing   Quantity:  3 Inhaler   Refills:  3       atenolol 50 MG tablet   Commonly known as:  TENORMIN   Used for:  Hypertension goal BP (blood pressure) < 140/90        Dose:  50 mg   Take 1 tablet (50 mg) by mouth 2 times daily   Quantity:  180 tablet   Refills:  3       atorvastatin 20 MG tablet   Commonly known as:  LIPITOR   Used for:  Hyperlipidemia LDL goal <130        Dose:  20 mg   Take 1 tablet (20 mg) by mouth daily   Quantity:  90 tablet   Refills:  3       budesonide-formoterol 160-4.5 MCG/ACT Inhaler   Commonly known as:  SYMBICORT   Used for:  Chronic obstructive pulmonary disease, unspecified COPD type (H)        Dose:  2 puff   Inhale 2 puffs into the lungs 2 times daily   Quantity:  1 Inhaler   Refills:  5       CALCIUM 600  + D PO        Take  by mouth.   Refills:  0       cloNIDine 0.2 MG tablet   Commonly known as:  CATAPRES   Used for:  Hypertension goal BP (blood pressure) < 140/90        Dose:  0.2 mg   Take 1 tablet (0.2 mg) by mouth 2 times daily   Quantity:  60 tablet   Refills:  1       fish oil-omega-3 fatty acids 1000 MG capsule        1 cap daily   Refills:  0       furosemide 20 MG tablet   Commonly known as:  LASIX   Used for:  Hypokalemia        Dose:  20 mg   Take 1 tablet (20 mg) by mouth daily   Quantity:  90 tablet   Refills:  3       hydrOXYzine 25 MG tablet   Commonly known as:  ATARAX   Used for:  Persistent insomnia        Dose:  25-50 mg   Take 1-2 tablets (25-50 mg) by mouth nightly as needed for other (insomnia)   Quantity:  90 tablet   Refills:  3       losartan-hydrochlorothiazide 100-25 MG per tablet   Commonly known as:  HYZAAR   Used for:  Hypertension goal BP (blood pressure) < 140/90        Dose:  1 tablet   Take 1 tablet by mouth daily Take 1 tablet by mouth daily.   Quantity:  90 tablet   Refills:  3       MULTIVITAMIN TABS   OR        1 TABLET DAILY   Refills:  0       Nebulizer Compressor Kit        Dose:  1 kit   1 kit every 4 hours as needed   Quantity:  1 kit   Refills:  0       * order for DME   Used for:  SUSSY (obstructive sleep apnea)        1.  CPAP pressure 12 cm/H20 with heated humidity and auto-titrating capability.  2.  Provide mask to fit and CPAP supplies. 3.  Length of need lifetime. 4.  Ok to d/c O2 bleed in to her PAP overnight.   Refills:  0       * order for DME   Used for:  Leg edema        TEDs stockings knee high to be worn during the day.   Quantity:  1 Units   Refills:  0       * order for DME   Used for:  Hypoxia        Equipment being ordered: Oxygen   Quantity:  1 Units   Refills:  0       * order for DME   Used for:  SUSSY (obstructive sleep apnea)        Equipment being ordered: CPAP supplies   Quantity:  1 Units   Refills:  0       order for DME   Used for:  COPD (chronic  obstructive pulmonary disease) (H)        Equipment being ordered: Oxygen - 3 liters continous   Quantity:  1 Device   Refills:  0       potassium chloride 10 MEQ tablet   Commonly known as:  K-TAB,KLOR-CON   Used for:  Hypokalemia        Dose:  10 mEq   Take 1 tablet (10 mEq) by mouth daily   Quantity:  90 tablet   Refills:  3       tiotropium 18 MCG capsule   Commonly known as:  SPIRIVA HANDIHALER   Used for:  Chronic obstructive pulmonary disease, unspecified COPD type (H)        Inhale  into the lungs. Inhale contents of one capsule daily   Quantity:  90 capsule   Refills:  3       * Notice:  This list has 6 medication(s) that are the same as other medications prescribed for you. Read the directions carefully, and ask your doctor or other care provider to review them with you.      STOP taking          amLODIPine 10 MG tablet   Commonly known as:  NORVASC           ibuprofen 600 MG tablet   Commonly known as:  ADVIL/MOTRIN           sertraline 25 MG tablet   Commonly known as:  ZOLOFT                Where to get your medicines      These medications were sent to [facility] [place] - [address] [place] [address]  [address] [place] [address], [place] [address]     Phone:  [phone]      cloNIDine 0.2 MG tablet     folic acid 1 MG tablet         Some of these will need a paper prescription and others can be bought over the counter. Ask your nurse if you have questions.     You don't need a prescription for these medications      acetaminophen 500 MG tablet     aspirin  MG EC tablet     miconazole 2 % powder     senna-docusate 8.6-50 MG per tablet     sulfamethoxazole-trimethoprim 800-160 MG per tablet     thiamine 100 MG tablet         Information about where to get these medications is not yet available     ! Ask your nurse or doctor about these medications      traMADol 50 MG tablet

## 2017-06-13 NOTE — PLAN OF CARE
Problem: Discharge Planning  Goal: Discharge Planning (Adult, OB, Behavioral, Peds)  Outcome: Adequate for Discharge Date Met:  06/13/17  [place] [facility] DISCHARGE NOTE     Patient discharged to transitional care unit at 3:35 PM via wheel chair. Accompanied by other:[facility] transport. Discharge instructions reviewed with caregiver, opportunity offered to ask questions. Prescriptions sent with patient to fill . All belongings sent with patient.  Report called to Phyllis Robbins at [facility] in SBAR format.     Problem: Goal Outcome Summary  Goal: Goal Outcome Summary  Outcome: Adequate for Discharge Date Met:  06/13/17  Pt up to chair and commode using ceiling lift today.  Pain managed using scheduled tylenol.  Pt reports being able to move and use her right arm more today. Denies pain to RLE, toes warm and bruised, wiggles toes upon command, sensation intact.

## 2017-06-13 NOTE — DISCHARGE SUMMARY
[facility]    Discharge Summary  Hospital Medicine    Date of Admission:  6/10/2017  Date of Discharge:  6/13/2017   Discharging Provider: Philip Shabazz  Date of Service: 6/13/2017     Primary Care     Ema Melendez  [facility] [address] [facility] [address]      Identification and Chief Complaint: Susan Haq is a 85 year old female who presented on 6/10/2017 with complaint of pain to right knee and elbow.    Discharge Diagnoses       Right tib/fib fracture secondary to fall and underlying osteoporosis    Chronic obstructive pulmonary disease, unspecified COPD type (H)    Major depressive disorder, single episode, moderate (H)    Auditory hallucination    Right elbow pain    Alcohol abuse    Type 2 diabetes mellitus (H)    Hyperlipidemia LDL goal <130    Insomnia    Hypertension goal BP (blood pressure) < 140/90    SUSSY (obstructive sleep apnea)-Moderately severe (AHI 21)    Bilateral leg edema    Interstitial pulmonary fibrosis (H)    Intertrigo    Prediabetes    Closed fracture of fibula    Discharge Disposition   Discharged to rehabilitation facility    Discharge Orders     General info for SNF   Length of Stay Estimate: Short Term Care: Estimated # of Days <30  Condition at Discharge: Improving  Level of care:skilled   Rehabilitation Potential: Excellent  Admission H&P remains valid and up-to-date: Yes  Recent Chemotherapy: N/A  Use Nursing Home Standing Orders: Yes     Mantoux instructions   Give two-step Mantoux (PPD) Per Facility Policy Yes     Intake and output   Every shift     Daily weights   Call Provider for weight gain of more than 2 pounds per day or 5 pounds per week.     Reason for your hospital stay   Non-surgical tibia/fibula fracture     Activity - Up with nursing assistance     Follow-up and recommended labs and tests    Follow up with the physician at the TCU and with your primary care provider, Ema Melendez, within 2 days to  evaluate medication change. We have discharged your without your home amlodipine nor clonidine. The following labs/tests are recommended: BP recheck.     Activity   Non-weight bearing to right lower extremity.  Weight bearing as tolerating to right upper extremity.     DNR/DNI     Physical Therapy Adult Consult   Evaluate and treat as clinically indicated.    Reason:  Non-surgical tib/fib fracture     Occupational Therapy Adult Consult   Evaluate and treat as clinically indicated.    Reason:  Non-surgical tib/fib fracture     Fall precautions     Diet   Follow this diet upon discharge: Orders Placed This Encounter     Regular Diet Adult       Discharge Medications   Current Discharge Medication List      START taking these medications    Details   miconazole (MICATIN; MICRO GUARD) 2 % powder Apply topically 2 times daily  Qty: 30 g, Refills: 1    Associated Diagnoses: Intertrigo      folic acid (FOLVITE) 1 MG tablet Take 1 tablet (1 mg) by mouth daily  Qty: 4 tablet, Refills: 0    Associated Diagnoses: Interstitial pulmonary fibrosis (H)      senna-docusate (SENOKOT-S;PERICOLACE) 8.6-50 MG per tablet Take 1-2 tablets by mouth 2 times daily as needed (constipation )  Qty: 100 tablet, Refills: 0    Associated Diagnoses: Closed fracture of proximal end of fibula, unspecified fracture morphology, initial encounter      thiamine 100 MG tablet Take 1 tablet (100 mg) by mouth daily  Qty: 4 tablet, Refills: 0    Associated Diagnoses: Auditory hallucinations      sulfamethoxazole-trimethoprim (BACTRIM DS/SEPTRA DS) 800-160 MG per tablet Take 1 tablet by mouth 2 times daily for 3 days  Qty: 6 tablet, Refills: 0    Associated Diagnoses: Hypertension goal BP (blood pressure) < 140/90; Acute cystitis without hematuria      traMADol (ULTRAM) 50 MG tablet Take 0.5-1 tablets (25-50 mg) by mouth every 6 hours as needed for moderate pain or severe pain  Qty: 28 tablet, Refills: 0    Associated Diagnoses: Closed fracture of proximal  end of fibula, unspecified fracture morphology, initial encounter      acetaminophen (TYLENOL) 500 MG tablet Take 2 tablets (1,000 mg) by mouth every 8 hours  Qty: 40 tablet, Refills: 0    Associated Diagnoses: Closed fracture of proximal end of fibula, unspecified fracture morphology, initial encounter      aspirin  MG EC tablet Take 1 tablet (325 mg) by mouth every 6 hours as needed for moderate pain  Qty: 14 tablet, Refills: 0    Associated Diagnoses: Closed fracture of proximal end of fibula, unspecified fracture morphology, initial encounter         CONTINUE these medications which have CHANGED    Details   cloNIDine (CATAPRES) 0.2 MG tablet Take 1 tablet (0.2 mg) by mouth 2 times daily  Qty: 60 tablet, Refills: 1    Associated Diagnoses: Hypertension goal BP (blood pressure) < 140/90         CONTINUE these medications which have NOT CHANGED    Details   atorvastatin (LIPITOR) 20 MG tablet Take 1 tablet (20 mg) by mouth daily  Qty: 90 tablet, Refills: 3    Associated Diagnoses: Hyperlipidemia LDL goal <130      furosemide (LASIX) 20 MG tablet Take 1 tablet (20 mg) by mouth daily  Qty: 90 tablet, Refills: 3    Associated Diagnoses: Hypokalemia      hydrOXYzine (ATARAX) 25 MG tablet Take 1-2 tablets (25-50 mg) by mouth nightly as needed for other (insomnia)  Qty: 90 tablet, Refills: 3    Associated Diagnoses: Persistent insomnia      losartan-hydrochlorothiazide (HYZAAR) 100-25 MG per tablet Take 1 tablet by mouth daily Take 1 tablet by mouth daily.  Qty: 90 tablet, Refills: 3    Associated Diagnoses: Hypertension goal BP (blood pressure) < 140/90      potassium chloride (K-TAB,KLOR-CON) 10 MEQ tablet Take 1 tablet (10 mEq) by mouth daily  Qty: 90 tablet, Refills: 3    Associated Diagnoses: Hypokalemia      atenolol (TENORMIN) 50 MG tablet Take 1 tablet (50 mg) by mouth 2 times daily  Qty: 180 tablet, Refills: 3    Associated Diagnoses: Hypertension goal BP (blood pressure) < 140/90       budesonide-formoterol (SYMBICORT) 160-4.5 MCG/ACT Inhaler Inhale 2 puffs into the lungs 2 times daily  Qty: 1 Inhaler, Refills: 5    Associated Diagnoses: Chronic obstructive pulmonary disease, unspecified COPD type (H)      tiotropium (SPIRIVA HANDIHALER) 18 MCG capsule Inhale  into the lungs. Inhale contents of one capsule daily  Qty: 90 capsule, Refills: 3    Associated Diagnoses: Chronic obstructive pulmonary disease, unspecified COPD type (H)      !! ORDER FOR DME 1.  CPAP pressure 12 cm/H20 with heated humidity and auto-titrating capability.   2.  Provide mask to fit and CPAP supplies.  3.  Length of need lifetime.  4.  Ok to d/c O2 bleed in to her PAP overnight.        Associated Diagnoses: SUSSY (obstructive sleep apnea)      Calcium Carbonate-Vitamin D (CALCIUM 600 + D OR) Take  by mouth.      FISH OIL 1000 MG OR CAPS 1 cap daily      MULTIVITAMIN TABS   OR 1 TABLET DAILY      albuterol (PROAIR HFA/PROVENTIL HFA/VENTOLIN HFA) 108 (90 BASE) MCG/ACT Inhaler Inhale 2 puffs into the lungs every 4 hours as needed for shortness of breath / dyspnea or wheezing  Qty: 3 Inhaler, Refills: 3    Associated Diagnoses: Chronic obstructive pulmonary disease, unspecified COPD type (H)      Respiratory Therapy Supplies (NEBULIZER COMPRESSOR) KIT 1 kit every 4 hours as needed  Qty: 1 kit, Refills: 0      albuterol (2.5 MG/3ML) 0.083% nebulizer solution Take 1 vial (2.5 mg) by nebulization every 4 hours as needed for shortness of breath / dyspnea or wheezing  Qty: 1 Box, Refills: 0      !! ORDER FOR DME Equipment being ordered: Oxygen - 3 liters continous  Qty: 1 Device, Refills: 0    Associated Diagnoses: COPD (chronic obstructive pulmonary disease) (H)      !! ORDER FOR DME Equipment being ordered: CPAP supplies  Qty: 1 Units, Refills: 0    Associated Diagnoses: SUSSY (obstructive sleep apnea)      !! ORDER FOR DME Equipment being ordered: Oxygen  Qty: 1 Units, Refills: 0    Associated Diagnoses: Hypoxia      !! ORDER FOR DME  TEDs stockings knee high to be worn during the day.  Qty: 1 Units, Refills: 0    Associated Diagnoses: Leg edema       !! - Potential duplicate medications found. Please discuss with provider.      STOP taking these medications       sertraline (ZOLOFT) 25 MG tablet Comments:   Reason for Stopping:         amLODIPine (NORVASC) 10 MG tablet Comments:   Reason for Stopping:         ibuprofen (ADVIL,MOTRIN) 600 MG tablet Comments:   Reason for Stopping:             Allergies   Allergies   Allergen Reactions     Ace Inhibitors Cough     Asa [Aluminum Hydroxide]      Penicillins        Consultations This Hospital Stay    PHARMACY IP CONSULT  ORTHOPEDIC SURGERY IP CONSULT  PHYSICAL THERAPY ADULT IP CONSULT  OCCUPATIONAL THERAPY ADULT IP CONSULT  CARE TRANSITION RN/SW IP CONSULT  WOUND OSTOMY CONTINENCE NURSE  IP CONSULT    Significant Results and Procedures   Procedures    None    Data   Results for orders placed or performed during the hospital encounter of 06/10/17   MR Brain w/o & w Contrast    Narrative    MRI BRAIN WITHOUT AND WITH CONTRAST  6/10/2017 4:56 PM    HISTORY:  Fall today, striking occiput; one week of constant auditory  hallucinations, constant hearing of music.     TECHNIQUE:  Multiplanar, multisequence MRI of the brain without and  with 10 mL IV Gadavist.    COMPARISON: None.    FINDINGS:  There is generalized atrophy of the brain.  White matter  changes are present in the cerebral hemispheres that are consistent  with small vessel ischemic disease in this age patient. Old lacunar  infarct is seen in the right corona radiata and upper right putamen.  There is no evidence of hemorrhage, mass, acute infarct, or anomaly.   There are no gadolinium enhancing lesions.    The facial structures appear normal. The arteries at the base of the  brain and the dural venous sinuses appear patent.       Impression    IMPRESSION:  1. No acute abnormality. No evidence of intracranial trauma.  2. Brain atrophy and white  matter changes consistent with sequelae of  small vessel ischemic disease.  3. Old lacunar infarct in the right corona radiata and upper right  putamen.    ROBBIE FARFAN MD   Radius/Ulna XR, PA & LAT, right    Narrative    RIGHT FOREARM TWO VIEWS   6/10/2017 3:53 PM     HISTORY: Fall, forearm injury.    COMPARISON: None.      Impression    IMPRESSION: Proximal and distal radial and ulnar articulations intact.  No acute fracture.       RAMILA BARNES MD   Foot  XR, G/E 3 views, right    Narrative    RIGHT FOOT THREE OR MORE VIEWS  6/10/2017 3:52 PM     HISTORY: Ecchymosis distal toes, pain at left ankle, foot ecchymosis,  bruising.    COMPARISON: None.      Impression    IMPRESSION: Mild hammertoe alignment of second through fifth toes.  Bones are normally aligned. Lucency in the mid fifth proximal phalanx,  suggestive of a nondisplaced fracture, clinically correlate with pain.  Additional lucency in the distal aspect of the second proximal  phalanx, possible location of fracture given the appearance, though  this may be somewhat degenerative.     RAMILA BARNES MD   Tib/Fib XR, right    Narrative    RIGHT TIBIA AND FIBULA TWO VIEWS   6/10/2017 3:52 PM     HISTORY: Ankle pain, tib-fib pain.    COMPARISON: None.      Impression    IMPRESSION: Oblique fracture through the distal fibula, minimally  displaced, extending to the level of the ankle mortise. Minimally  displaced fracture in the proximal aspect of the fibula, as well. This  may be associated with damage to interosseous ligament.     RAMILA BARNES MD   Pelvis XR, 1-2 views    Narrative    PELVIS ONE TO TWO VIEWS  6/10/2017 3:51 PM     HISTORY: Fall.    COMPARISON: September 13, 2016.      Impression    IMPRESSION: Both femoral heads articulate normally with the respective  acetabula. Minimal joint space narrowing, though no osteophyte  formation. Right lateral knee radiograph demonstrates normal alignment  without effusion. Proximal fibular fracture again  "visible.     RAMILA BARNES MD   XR Shoulder Right Port G/E 2 Views    Narrative    SHOULDER TWO VIEWS RIGHT  6/11/2017 10:42 AM     HISTORY: pain    COMPARISON: None.      Impression    IMPRESSION : No significant degenerative change or acute bony  abnormality. No fracture or dislocation.    SHANTA HOOKS MD   XR Elbow Port Right 2 Views    Narrative    ELBOW PORTABLE RIGHT TWO VIEWS June 12, 2017 6:30 AM     HISTORY: Right elbow pain.    COMPARISON: None.    FINDINGS: The visualized bones and joint spaces are within normal  limits.      Impression    IMPRESSION: No evidence for fracture, dislocation or significant  degenerative change of the right elbow on these two portable views.    KATT CHOWDHURY MD       History of Present Illness   (From H&P) Susan Haq is a 85 year old female who presents following fall.      Patient presents after having a fall at home. Pt remembers standing in her kitchen at the refrigerator getting a glass of water. Patient is unclear how she fell, states \"maybe I tripped or something.\" She ended up on the floor, however, still holding the glass of water without spilling. Unclear how she landed but had pain in her right elbow, right lower extremity and right foot. Patient adamantly denies loss of consciousness and states she remembers the entire fall. She denies dizziness or lightheaded sensation surrounding the falls. Believes she hit the left side of her head and is not on anti-coagulation. Denies other falls at home and uses a cane at baseline. Denies HA, changes in vision.      She was unable to pick herself up but has friends who are also her care takers who stop by every day who found her still in the kitchen sitting crossed legged on the floor. Sitting on floor for likely 30min - friends had to convince patient to come to ER. They mention once standing patient up she stated, \"I think I might pass-out.\" Friends state she is at baseline mentation. Patient lives independently " "alone in her own home with help of her two friends. Friends agree that it is going well and they love helping the patient set up medication and purchase groceries. No home care currently.      Of note, friends mention that patient frequently tells them she needs to lie down, with standing or even sitting for long periods of time. Patient believes this is due to lightheadedness rather than a dizzy sensation.      Denies any fever, chills, myalgias, cold-like symptoms (congestion, pharyngitis, sinus pressure, rhinorrhea),  CP, SOB as it is baseline on 3L NC, cough, abdominal pain, N/V/D, dysuria, hematuria, hematochezia, melena, gingival bleeding or increased bruising or bleeding, headaches, dizziness, joint swelling, joint pain, leg swelling - has history of lower extremity edema, rashes/wounds/sores. Denies any other pain including right hip or neck pain.     Patient also endorses hearing a background of classical music intermittently but for the good part of a week. She is even able to sing the song when she hears it. Has hearing aids but heard the music when in the MRI scanner when they were out.      Has started anti-depression (sertraline) in March for anhedonia, feeling \"blah\" with encouragement from friends. They have not seen change in mood. Patient currently feels \"happy, I am getting such good care.\"      Denies diet changes or odd diets - has meal delivering program.      Former smoker with 30 pack year history. Drinks about 1 drink a night (3oz). No history of withdrawal. Last drink 6/8 evening.     Hospital Course   Susan Haq was admitted on 6/10/2017.  The following problems were addressed during her hospitalization:    S/p tibia/fibular fracture, right  Pain management is adequate with scheduled tylenol and PRN tramadol.  These will be continued on discharge to the TCU. Evaluated by orthopedic service 06/12. Recommended aspirin 325mg for 14 days after discharge.  Relays that the patient should " be NWB to RLE with splint in place.  In addition, should be WBAT to RUE.  The patient plans to follow up with Dr. Kiser on 06/15 in [place].  Discharged to TCU.     Right phalanx fracture  Visualized on xray.  Continue conservative treatment as above on discharge.      Insomnia  Continue PTA melatonin on discharge.     Urinary Tract Infection  Noted 06/12.  Started on IV ceftriaxone.  Received 2 doses while inpatient.  Discharged to TCU with 3 additional days of bactrim therapy for a total of 5 days of antibiotic therapy.      Hypertension goal BP (blood pressure) < 140/90  BP initially soft on admission.  As such, all antihypertensives were held (Hyzaar, lasix, clonidine, amlodipine).  Home atenolol was continued.  Half dose of patient's home Hyzaar was resumed on 06/11.  BP was significantly elevated on the morning of 06/13; as such, full dose Hyzaar and patient's home lasix was resumed.  Home clonidine and amlodipine were held on discharge.  The patient was instructed to follow up with the TCU physician and with her own PCP for ongoing BP management and assessment of when/if these medications should be resumed.      Chronic obstructive pulmonary disease, unspecified COPD type (H)  Continue PTA Symbicort, Spiriva, PRN albuterol on discharge.      Major depressive disorder, single episode, moderate (H)  Zoloft was held on admit given possible contribution to falls.  We will continue to hold this medication on discharge and recommend that the patient follow up with her PCP and assess need for, and/or possible medication switch.      Auditory hallucination  Alcohol abuse  Last drink was 06/08.  Reported 3oz of alcohol daily. Was not started on CIWA on admit.  Given oral multivitamins 06/13 and discharged to TCU with 4 additional days of multivitamin, folic acid and thiamine.      Type 2 diabetes mellitus (H)  Not on medication. A1c 5.7%.      Hyperlipidemia LDL goal <130  Continue PTA Lipitor on  discharge.     SUSSY (obstructive sleep apnea)-Moderately severe (AHI 21)  Non-compliant with CPAP      Bilateral leg edema  Chronic, unchanged.  Weight is up 4 pounds this admission, but home lasix was held until 06/13.  Continue home lasix on discharge.      Interstitial pulmonary fibrosis (H)  Chronically on 3L NC oxygen.  Continue supplemental O2 on discharge.    Intertrigo  RN care was in place throughout admission, daily hygiene.  Discharged to TCU with Rx for miconazole BID.    Pending Results   Unresulted Labs Ordered in the Past 30 Days of this Admission     Date and Time Order Name Status Description    6/11/2017 1650 Urine Culture Aerobic Bacterial Preliminary     6/10/2017 1629 Blood Morphology Pathology Review In process           Physical Exam   Temp:  [98  F (36.7  C)-98.6  F (37  C)] 98.6  F (37  C)  Pulse:  [75] 75  Heart Rate:  [59-74] 74  Resp:  [16-18] 18  BP: (152-203)/(49-70) 178/67  SpO2:  [94 %-96 %] 94 %  Vitals:    06/10/17 1338 06/12/17 0500 06/13/17 0635   Weight: 107 kg (236 lb) 108.9 kg (240 lb 1.3 oz) 108.7 kg (239 lb 10.2 oz)       Constitutional: alert and oriented x4.  No acute distress.   CV: Regular rate/rhythm.  No appreciable murmur, rub, gallop.  Respiratory: CTA bilaterally.  No appreciable rales, crackles.  NC oxygen in place.  GI: Soft, nontender.  Normoactive BS.  Skin: Warm and dry  Neuro: equal  strength    The discharge plan was discussed with the patient and patient agrees to plan.    Total time on this discharge was greater than 30 minutes.    Heaven Montana PA-C  [facility]

## 2017-06-13 NOTE — PROGRESS NOTES
Reason for Follow up: DC planning    Anticipated discharge needs: Waiting on confirmation for TCU bed at [facility] Phone: ([phone]) Fax: ([phone]).     PAS-RR    Per DHS regulation, CTS team completed and submitted PAS-RR to [place] [employer] Direct [employer] via the [employer]. CTS team advised SNF and they are aware a PAS-RR has been submitted.     CTS team reviewed with pt or health care agent that they may be contacted for a follow up appointment within 10 days of hospital discharge if SNF stay is <30 days. Contact information for [employer] was also provided.     Pt or health care agent verbalized understanding.     PAS-RR # [id number]      Next steps: Waiting on bed, anticipate dc today    Deloris GUZMAN, GABBY, [facility] [phone]          addendum:Pt has been accepted at [facility] Phone: ([phone]) Fax: ([phone]) and will dc today. Pt in agreement with dc plan. Deloris GUZMAN, GABBY, [facility] [phone]

## 2017-06-13 NOTE — PLAN OF CARE
Problem: Goal Outcome Summary  Goal: Goal Outcome Summary  Physical Therapy Discharge Summary     Reason for therapy discharge:    Discharged to transitional care facility.     Progress towards therapy goal(s). See goals on Care Plan in [facility] electronic health record for goal details.  Goals partially met.  Barriers to achieving goals:   decreased strength      Therapy recommendation(s):    Continued therapy is recommended.  Rationale/Recommendations:  to addresss strength, improve pt's ability to transfer w/ NWB status.      Armida Matt, PT

## 2017-06-13 NOTE — PROGRESS NOTES
[facility] Orthopaedics Progress Note      RUE pain  R proximal 1/3 and distal 1/3 fibula fx with minimal displacement.   R foot proximal phalanx of lesser toe     Subjective:    Pain: minimal  aching to R ankle and R arm. Pain is not focal to the R arm. Denies shooting pain, parasthesias, numbness and weakness.   denies Chest pain, SOB, O2 required: Nasal Cannula  denies nausea/ emesis  denies lightheadedness/ dizziness        Objective:  Blood pressure 168/57, pulse 69, temperature 98.2  F (36.8  C), temperature source Tympanic, resp. rate 18, weight 108.9 kg (240 lb 1.3 oz), SpO2 94 %, not currently breastfeeding.    Patient Vitals for the past 24 hrs:   BP Temp Temp src Pulse Heart Rate Resp SpO2 Weight   06/12/17 1909 168/57 98.2  F (36.8  C) Tympanic - 70 18 94 % -   06/12/17 1608 152/49 98.4  F (36.9  C) Oral - 59 16 94 % -   06/12/17 0749 189/60 98.3  F (36.8  C) Oral 69 - 16 95 % -   06/12/17 0500 - - - - - - - 108.9 kg (240 lb 1.3 oz)   06/12/17 0313 171/51 98.5  F (36.9  C) Oral - 64 16 95 % -   06/11/17 2342 176/60 98.4  F (36.9  C) Oral - 65 18 93 % -       Wt Readings from Last 4 Encounters:   06/12/17 108.9 kg (240 lb 1.3 oz)   03/08/17 107 kg (236 lb)   09/13/16 104.8 kg (231 lb)   05/25/16 106.5 kg (234 lb 12.8 oz)     General: Alert and orientated. No apparent distress. Non-labored breathing. Appropriate affect.   MSK: R ankle: dressing Clean, dry, and intact. Skin intact at digits and proximal to splint. Splint in place, exchanged for a short leg splint. Digits mobile, ecchymosis noted at great and 2nd digit.   RUE: non-tender to palpation. AROM limited due to pain. Full elbow, wrist and shoulder PROM. Drop arm test negative.       Pertinent Labs   Lab Results: personally reviewed.     Recent Labs   Lab Test  06/12/17   0618  06/11/17   0613  06/10/17   1855  06/10/17   1735  06/10/17   1440   INR   --    --    --   1.03  Canceled, Test credited   Unsatisfactory specimen - clotted  Specimen will  need to be recollected, notified Libby in [facility] 6/10/17 1600 JM  CORRECTED ON 06/10 AT 1704: PREVIOUSLY REPORTED AS 1.12     HGB  11.8  11.6*  12.3  Canceled, Test credited   Unsatisfactory specimen - clotted  Notified Libby [facility] 6/10/17 1800 JM    Canceled, Test credited   Unsatisfactory specimen - clotted  specimen will need to be recollected, notified Tiffany Fuentes RN [facility] 6/10/17 1630   JM  CORRECTED ON 06/10 AT 1707: PREVIOUSLY REPORTED AS 12.1     HCT  35.2  34.8*  36.7  Canceled, Test credited   Unsatisfactory specimen - clotted  Notified Libby [facility] 6/10/17 1800 JM    Canceled, Test credited   Unsatisfactory specimen - clotted  specimen will need to be recollected, notified Tiffany Fuentes RN [facility] 6/10/17 1630   JM  CORRECTED ON 06/10 AT 1707: PREVIOUSLY REPORTED AS 36.2     MCV  93  94  93  Canceled, Test credited   Unsatisfactory specimen - clotted  Notified Libby [facility] 6/10/17 1800 JM    Canceled, Test credited   Unsatisfactory specimen - clotted  specimen will need to be recollected, notified Tiffany Fuentes RN [facility] 6/10/17 1630   JM  CORRECTED ON 06/10 AT 1707: PREVIOUSLY REPORTED AS 94     PLT  204  193  199  Canceled, Test credited   Unsatisfactory specimen - clotted  Notified [facility] 6/10/17 1800 JM    Canceled, Test credited   Unsatisfactory specimen - clotted  specimen will need to be recollected, notified Tiffany Fuentes RN [facility] 6/10/17 1630   JM  CORRECTED ON 06/10 AT 1707: PREVIOUSLY REPORTED AS 58     NA  141  143   --    --   140       Plan: Anticoagulation protocol: Lovenox inpatient and then  mg daily at discharge  x 14  days            Pain medications:  Per primary            Weight bearing status:  NWB RLE, splint to remain C/D/I until follow up. Sling for RUE and WBAT.             Disposition:  TCU likely.              Follow up: with Dr. Carrillo Kiser on 6/15 in [facility] cares and rehabilitation    Discussed the possibility of having surgery. She is to discuss this  with the surgeon on Thursday for potential fixation of the distal fibula fx.     Report completed by:  Yarely Plasencia PA-C  Date: 6/12/2017  Time: 8:24 PM

## 2017-06-13 NOTE — PLAN OF CARE
Problem: Goal Outcome Summary  Goal: Goal Outcome Summary  Occupational Therapy Discharge Summary     Reason for therapy discharge:    Discharged to transitional care facility.     Progress towards therapy goal(s). See goals on Care Plan in [facility] electronic health record for goal details.  Goals partially met.  Barriers to achieving goals:   discharge from facility.     Therapy recommendation(s):    Continued therapy is recommended.  Rationale/Recommendations:  TCU to increase independence with ADLs and functional transfers within NWB precautions. .

## 2017-06-13 NOTE — PLAN OF CARE
"Problem: Fracture Orthopaedic (Adult)  Goal: Signs and Symptoms of Listed Potential Problems Will be Absent or Manageable (Fracture Orthopaedic)  Signs and symptoms of listed potential problems will be absent or manageable by discharge/transition of care (reference Fracture Orthopaedic (Adult) CPG).   Outcome: No Change  Patient alert and oriented.  Uses call light appropriately.  Denies Patient in right leg but says \"my right shoulder is sore\".  Scheduled tylenol 1000 mg given at 0300.  Patient using cpap from home with 3 liters oxygen.  No reports of dyspnea, But patient has remained in bed all shift.  Uses bedpan when needed.  Right foot toes bruised but good cms.  Right leg wrapped in ace/brace.  Bed alarm activated for patient safety.        "

## 2017-06-13 NOTE — PROGRESS NOTES
[facility] Medicine Progress Note  Date of Service: 06/13/2017    Assessment & Plan   Susan Haq is a 85 year old female who presented on 6/10/2017 for scheduled  by Philip Shabazz MD and is being followed by the hospital medicine service for co-management of acute and/or chronic perioperative medical problems.    S/p tibia/fibular fracture, right  Pain management is adequate  - IP orthopedic consult placed; Non Operative management  - Non-Weight baring on the Right Lower Limb  - will discharge to TCU today at 3pm    Right phalanx fracture  Visualized on xray.    - conservative treatment as above.     Insomnia  - continue melatonin    Urinary Tract Infection  - continue rocephin 1g IV, Q24 (day 2 of treatment is today)  - transition to outpatient cefazolin PO  - monitor I and O's    Hypertension goal BP (blood pressure) < 140/90  BP quite elevated this morning. Was on lower dose of home Hyzaar  - resume home Hyzaar, lasix  - continue PTA atenolol  - continue to hold home clonidine, amlodipine as this may be contributing to dizziness/fall  - recommend outpatient follow up and frequent BP checks at TCU. (still not getting home amlodipine nor clonidine)    Chronic obstructive pulmonary disease, unspecified COPD type (H)  - continue PTA Symbicort, Spiriva, PRN albuterol      Major depressive disorder, single episode, moderate (H)  - Zoloft held on admit given possible contribution to falls  - recommend outpatient follow up with PCP, assess possible medication switch.      Auditory hallucination  Alcohol abuse  Last drink 06/08.  3oz of alcohol daily. Was not started on CIWA on admit  - give multivitamin, thiamine, folic acid today      Type 2 diabetes mellitus (H)  Not on medication. A1c 5.7%.      Hyperlipidemia LDL goal <130  - continue PTA Lipitor    SUSSY (obstructive sleep apnea)-Moderately severe (AHI 21)  Non-compliant with CPAP      Bilateral leg edema  Chronic,  unchanged.  Weight is up 4 pounds this admission.  - no indication for IVF  - resume home lasix today      Interstitial pulmonary fibrosis (H)      Intertrigo  - RN care in place, daily hygiene      DVT Prophylaxis: as per orthopedic surgery service - defer to ortho  Code Status: DNR/DNI    Lines: PIV, without edema/erythema   Fox catheter: not indicated    Discussion: Medically, the patient appears stable.    Disposition: Anticipate discharge later this afternoon.     Attestation:  I have reviewed today's vital signs, notes, medications, labs and imaging.    Heaven Montana PA-C      Interval History   Reports pain is well controlled.  Remains hesitant about discharge to TCU.  Otherwise denies nausea, fever, CP, SOB, cough, headache, changes to vision, abdominal pain, dysuria, and lower extremity pain.  Believes her left lower extremity is mildly edematous.    Physical Exam   Temp:  [98  F (36.7  C)-98.6  F (37  C)] 98.5  F (36.9  C)  Heart Rate:  [59-70] 67  Resp:  [16-18] 18  BP: (152-203)/(49-64) 203/63  SpO2:  [94 %-96 %] 96 %    Weights:   Vitals:    06/10/17 1338 06/12/17 0500 06/13/17 0635   Weight: 107 kg (236 lb) 108.9 kg (240 lb 1.3 oz) 108.7 kg (239 lb 10.2 oz)    Body mass index is 40.5 kg/(m^2).    General: alert and oriented x4, in no acute distress  CV: Regular rate/rhythm, no appreciable murmur, rub, or gallop  Respiratory: CTA bilaterally, equal chest expansion  GI: Soft, nontender, normoactive S  Skin: Warm and dry  Musculoskeletal: Negative homans to left lower extremity.  Non-tender to palpation of posterior calves. Mild edema to left LE, non-pitting  Neuro: equal  strength    Data     Recent Labs  Lab 06/12/17  0618 06/11/17  0613 06/10/17  1855 06/10/17  1735 06/10/17  1440   WBC 6.3 7.1 8.3 Canceled, Test credited Unsatisfactory specimen - clottedNotified Libby DRAPER 6/10/17 1800 JM Canceled, Test credited Unsatisfactory specimen - clottedspecimen will need to be recollected, notified  Tiffany Fuentes RN [facility] 6/10/17 1630 JMCORRECTED ON 06/10 AT 1707: PREVIOUSLY REPORTED AS 8.7   HGB 11.8 11.6* 12.3 Canceled, Test credited Unsatisfactory specimen - clottedNotified Libby [facility] 6/10/17 1800 JM Canceled, Test credited Unsatisfactory specimen - clottedspecimen will need to be recollected, notified Tiffany Fuentes RN [facility] 6/10/17 1630 JMCORRECTED ON 06/10 AT 1707: PREVIOUSLY REPORTED AS 12.1   MCV 93 94 93 Canceled, Test credited Unsatisfactory specimen - clottedNotified Libby [facility] 6/10/17 1800 JM Canceled, Test credited Unsatisfactory specimen - clottedspecimen will need to be recollected, notified Tiffany Fuentes RN [facility] 6/10/17 1630 JMCORRECTED ON 06/10 AT 1707: PREVIOUSLY REPORTED AS 94    193 199 Canceled, Test credited Unsatisfactory specimen - clottedNotified Libby [facility] 6/10/17 1800 JM Canceled, Test credited Unsatisfactory specimen - clottedspecimen will need to be recollected, notified Tiffany Fuentes RN [facility] 6/10/17 1630 JMCORRECTED ON 06/10 AT 1707: PREVIOUSLY REPORTED AS 58   INR  --   --   --  1.03 Canceled, Test credited Unsatisfactory specimen - clottedSpecimen will need to be recollected, enma phipps [facility] 6/10/17 1600 JMCORRECTED ON 06/10 AT 1704: PREVIOUSLY REPORTED AS 1.12    143  --   --  140   POTASSIUM 3.4 3.2*  --   --  3.5   CHLORIDE 109 106  --   --  104   CO2 28 29  --   --  28   BUN 18 24  --   --  27   CR 0.75 0.79  --   --  1.01   ANIONGAP 4 8  --   --  8   JAGDISH 8.2* 8.1*  --   --  8.3*   * 115*  --   --  119*   ALBUMIN  --   --   --   --  3.1*   PROTTOTAL  --   --   --   --  5.8*   BILITOTAL  --   --   --   --  0.6   ALKPHOS  --   --   --   --  109   ALT  --   --   --   --  26   AST  --   --   --   --  18   TROPI  --   --   --   --  <0.015The 99th percentile for upper reference range is 0.045 ug/L.  Troponin values in the range of 0.045 - 0.120 ug/L may be associated with risks of adverse clinical events.         Recent Labs  Lab 06/12/17  0618 06/11/17  0613  06/10/17  1440   * 115* 119*        Unresulted Labs Ordered in the Past 30 Days of this Admission     Date and Time Order Name Status Description    6/11/2017 1650 Urine Culture Aerobic Bacterial Preliminary     6/10/2017 1629 Blood Morphology Pathology Review In process            Imaging  No results found for this or any previous visit (from the past 24 hour(s)).     I reviewed all new labs and imaging results over the last 24 hours. I personally reviewed no images or EKG's today.    Medications        enoxaparin  40 mg Subcutaneous Q24H     losartan  25 mg Oral Daily     hydrochlorothiazide  12.5 mg Oral Daily     sodium chloride (PF)  3 mL Intracatheter Q8H     cefTRIAXone  1 g Intravenous Q24H     fluticasone-vilanterol  1 puff Inhalation Daily     umeclidinium  1 puff Inhalation Daily     acetaminophen  1,000 mg Oral Q8H     miconazole   Topical BID     atenolol  50 mg Oral BID     atorvastatin  20 mg Oral At Bedtime       Heaven Montana PA-C

## 2017-06-13 NOTE — PLAN OF CARE
Problem: Goal Outcome Summary  Goal: Goal Outcome Summary  Outcome: No Change  Pt continues to be NWB on right leg- using a ceiling lift to get into chair and commode. PT has been doing exercises while in bed. Is using scheduled Tylenol for pain with relief. Rash noted under left breast- powder applied. Expiratory wheezes noted in both lungs- chronically on 3 liters of oxygen. Plan is for TCU and continue to follow up with ortho outpatient. /57  Pulse 69  Temp 98.2  F (36.8  C) (Tympanic)  Resp 18  Wt 108.9 kg (240 lb 1.3 oz)  SpO2 94%  BMI 40.57 kg/m2  Will continue to monitor.  Odette Hyde RN BSN

## 2017-06-14 LAB
BACTERIA SPEC CULT: ABNORMAL
COPATH REPORT: NORMAL
MICRO REPORT STATUS: ABNORMAL
MICROORGANISM SPEC CULT: ABNORMAL
SPECIMEN SOURCE: ABNORMAL

## 2017-06-15 ENCOUNTER — NURSING HOME VISIT (OUTPATIENT)
Dept: GERIATRICS | Facility: CLINIC | Age: 82
End: 2017-06-15
Payer: COMMERCIAL

## 2017-06-15 ENCOUNTER — TELEPHONE (OUTPATIENT)
Dept: FAMILY MEDICINE | Facility: CLINIC | Age: 82
End: 2017-06-15

## 2017-06-15 VITALS
SYSTOLIC BLOOD PRESSURE: 95 MMHG | BODY MASS INDEX: 40.9 KG/M2 | TEMPERATURE: 98.1 F | WEIGHT: 242 LBS | OXYGEN SATURATION: 96 % | DIASTOLIC BLOOD PRESSURE: 51 MMHG | RESPIRATION RATE: 16 BRPM | HEART RATE: 63 BPM

## 2017-06-15 DIAGNOSIS — F32.1 MAJOR DEPRESSIVE DISORDER, SINGLE EPISODE, MODERATE (H): ICD-10-CM

## 2017-06-15 DIAGNOSIS — S82.201A TIBIA/FIBULA FRACTURE, RIGHT, CLOSED, INITIAL ENCOUNTER: Primary | ICD-10-CM

## 2017-06-15 DIAGNOSIS — I10 HYPERTENSION GOAL BP (BLOOD PRESSURE) < 140/90: Chronic | ICD-10-CM

## 2017-06-15 DIAGNOSIS — S82.401A TIBIA/FIBULA FRACTURE, RIGHT, CLOSED, INITIAL ENCOUNTER: Primary | ICD-10-CM

## 2017-06-15 DIAGNOSIS — R44.0 AUDITORY HALLUCINATIONS: ICD-10-CM

## 2017-06-15 DIAGNOSIS — G47.00 INSOMNIA, UNSPECIFIED TYPE: ICD-10-CM

## 2017-06-15 DIAGNOSIS — M25.511 ACUTE PAIN OF RIGHT SHOULDER: ICD-10-CM

## 2017-06-15 PROCEDURE — 99310 SBSQ NF CARE HIGH MDM 45: CPT | Performed by: NURSE PRACTITIONER

## 2017-06-15 NOTE — PROGRESS NOTES
[facility]  PRIMARY CARE PROVIDER AND CLINIC:  [facility] [address] / [place] *  Chief Complaint   Patient presents with     Hospital F/U       HPI:    Susan Haq is a 85 year old  (1/10/1932),admitted to the [place] [facility] from [facility].  Hospital stay 6/10/17 through 6/13/17.  Admitted to this facility for  rehab, medical management and nursing care.  Current issues are:      Tibia/fibula fracture, right, closed, initial encounter  Fall at home - presumed to be mechanical in nature, that resulted in fracture tib/fib, needs ongoing follow up at ortho.     Acute pain of right shoulder  New onset since fall, activity exacerbates the pain.     Hypertension goal BP (blood pressure) < 140/90  On clonidine, losartan, hydrochlorothiazide, atenolol, lasix, K+. BP today noted to be extremely low.     Auditory hallucinations  She has reported hearing classical music being played while in the hospital, no music noted by others. She reports none of that today.     Insomnia, unspecified type  She reports chronci difficulty with insomnia, takes hydroxyzine at home, DC summary reports to resume melatonin, but she is unclear if she has ever been on this before, cannot recall. No record of melatonin in previous office visits.       CODE STATUS/ADVANCE DIRECTIVES DISCUSSION:   DNR / DNI  Patient's living condition: lives alone    ALLERGIES:Ace inhibitors; Asa [aluminum hydroxide]; and Penicillins  PAST MEDICAL HISTORY:  has a past medical history of Basal cell carcinoma; Bronchospasm; Diverticulosis; Fracture, Metacarpal Shaft - right 4th (7/5/2010); High cholesterol; HL (hearing loss) (3/25/2013); Hypertension; Hypokalemia (9/17/2013); Hypoxia (9/5/2013); PVC's (premature ventricular contractions) (4/25/2011); Smoker (1986); Type 2 diabetes mellitus (H) (9/26/2011); and Venous insufficiency. She also has no past medical history of  Cancer (H); Chronic kidney disease; Malignant melanoma (H); Squamous cell carcinoma (H); or Thyroid disease.  PAST SURGICAL HISTORY:  has a past surgical history that includes hysterectomy, cervix status unknown; BSO, OMENTECTOMY W/SHANIA; appendectomy; NONSPECIFIC PROCEDURE; STOMACH SURGERY PROCEDURE UNLISTED; cholecystectomy, laporoscopic (10/13/2011); and hernia repair, umbilical (10/13/2011).  FAMILY HISTORY: family history includes Coronary Artery Disease in her maternal grandmother and paternal uncle; DIABETES in her father.  SOCIAL HISTORY:  reports that she quit smoking about 27 years ago. She has a 35.00 pack-year smoking history. She has quit using smokeless tobacco. She reports that she drinks alcohol. She reports that she does not use illicit drugs.    Post Discharge Medication Reconciliation Status: discharge medications reconciled and changed, per note/orders (see AVS).  Current Outpatient Prescriptions   Medication Sig Dispense Refill     miconazole (MICATIN; MICRO GUARD) 2 % powder Apply topically 2 times daily 30 g 1     folic acid (FOLVITE) 1 MG tablet Take 1 tablet (1 mg) by mouth daily 4 tablet 0     senna-docusate (SENOKOT-S;PERICOLACE) 8.6-50 MG per tablet Take 1-2 tablets by mouth 2 times daily as needed (constipation ) 100 tablet 0     thiamine 100 MG tablet Take 1 tablet (100 mg) by mouth daily 4 tablet 0     sulfamethoxazole-trimethoprim (BACTRIM DS/SEPTRA DS) 800-160 MG per tablet Take 1 tablet by mouth 2 times daily for 3 days 6 tablet 0     traMADol (ULTRAM) 50 MG tablet Take 0.5-1 tablets (25-50 mg) by mouth every 6 hours as needed for moderate pain or severe pain 28 tablet 0     acetaminophen (TYLENOL) 500 MG tablet Take 2 tablets (1,000 mg) by mouth every 8 hours 40 tablet 0     aspirin  MG EC tablet Take 1 tablet (325 mg) by mouth every 6 hours as needed for moderate pain 14 tablet 0     cloNIDine (CATAPRES) 0.2 MG tablet Take 1 tablet (0.2 mg) by mouth 2 times daily 60 tablet 1      atorvastatin (LIPITOR) 20 MG tablet Take 1 tablet (20 mg) by mouth daily 90 tablet 3     furosemide (LASIX) 20 MG tablet Take 1 tablet (20 mg) by mouth daily 90 tablet 3     hydrOXYzine (ATARAX) 25 MG tablet Take 1-2 tablets (25-50 mg) by mouth nightly as needed for other (insomnia) 90 tablet 3     losartan-hydrochlorothiazide (HYZAAR) 100-25 MG per tablet Take 1 tablet by mouth daily Take 1 tablet by mouth daily. 90 tablet 3     potassium chloride (K-TAB,KLOR-CON) 10 MEQ tablet Take 1 tablet (10 mEq) by mouth daily 90 tablet 3     atenolol (TENORMIN) 50 MG tablet Take 1 tablet (50 mg) by mouth 2 times daily 180 tablet 3     budesonide-formoterol (SYMBICORT) 160-4.5 MCG/ACT Inhaler Inhale 2 puffs into the lungs 2 times daily 1 Inhaler 5     tiotropium (SPIRIVA HANDIHALER) 18 MCG capsule Inhale  into the lungs. Inhale contents of one capsule daily 90 capsule 3     albuterol (PROAIR HFA/PROVENTIL HFA/VENTOLIN HFA) 108 (90 BASE) MCG/ACT Inhaler Inhale 2 puffs into the lungs every 4 hours as needed for shortness of breath / dyspnea or wheezing 3 Inhaler 3     Respiratory Therapy Supplies (NEBULIZER COMPRESSOR) KIT 1 kit every 4 hours as needed 1 kit 0     albuterol (2.5 MG/3ML) 0.083% nebulizer solution Take 1 vial (2.5 mg) by nebulization every 4 hours as needed for shortness of breath / dyspnea or wheezing 1 Box 0     ORDER FOR DME Equipment being ordered: Oxygen - 3 liters continous 1 Device 0     ORDER FOR DME Equipment being ordered: CPAP supplies 1 Units 0     ORDER FOR DME Equipment being ordered: Oxygen 1 Units 0     ORDER FOR DME TEDs stockings knee high to be worn during the day. 1 Units 0     ORDER FOR DME 1.  CPAP pressure 12 cm/H20 with heated humidity and auto-titrating capability.   2.  Provide mask to fit and CPAP supplies.  3.  Length of need lifetime.  4.  Ok to d/c O2 bleed in to her PAP overnight.           Calcium Carbonate-Vitamin D (CALCIUM 600 + D OR) Take  by mouth.       FISH OIL 1000 MG OR  CAPS 1 cap daily       MULTIVITAMIN TABS   OR 1 TABLET DAILY         ROS:  Susan Haq complains of no significant pain, no chest pain or pressure, noshortness of breath. Sleep is reported as poor last night, appetite good. Susan Haq does complain of bowel changes, is constipated. S/he does not complain of bladder changes.  All other systems reviewed and are negative unless otherwise noted in HPI.     Exam:  BP 95/51  Pulse 63  Temp 98.1  F (36.7  C)  Resp 16  Wt 242 lb (109.8 kg)  SpO2 96%  BMI 40.9 kg/m2  GENERAL APPEARANCE:  Alert, in no acute distress  HEAD:  Normal, normocephalic, atraumatic  EYE EXAM: normal external eye, conjunctiva, lids, MAMI  NECK EXAM: supple, no JVD  CHEST/RESP:  respiratory effort normal, lung sounds diminished, clear , no respiratory distress  CV:  Rate reg, rhythm reg, no murmur, trace peripheral edema   GI/ABDOMEN:  normal bowel sounds, soft, obese, nontender, large periumbilical hernia-easily reduced, no pain  M/S:   extremities abnormal, gait abnormal-NWB to R LE until seen by ortho, normal muscle tone, and range of motion normal in unaffected extremities.  SKIN EXAM: bruising to R toes, + CMS  NEUROLOGIC EXAM: Normal gross motor movement, tone and coordination. No tremor.  Cranial nerves 2-12 are normal tested and grossly at patient's baseline  PSYCH:  Alert and oriented to self and surroundings, affect pleasant , judgement appropriate       Lab/Diagnostic data:   CBC RESULTS:   Recent Labs   Lab Test  06/12/17 0618  06/11/17 0613   WBC  6.3  7.1   RBC  3.77*  3.69*   HGB  11.8  11.6*   HCT  35.2  34.8*   MCV  93  94   MCH  31.3  31.4   MCHC  33.5  33.3   RDW  12.6  12.7   PLT  204  193       Last Basic Metabolic Panel:  Recent Labs   Lab Test  06/12/17 0618 06/11/17 0613   NA  141  143   POTASSIUM  3.4  3.2*   CHLORIDE  109  106   JAGDISH  8.2*  8.1*   CO2  28  29   BUN  18  24   CR  0.75  0.79   GLC  110*  115*       Liver Function Studies -   Recent Labs    Lab Test  06/10/17   1440  03/05/16   2030   PROTTOTAL  5.8*  6.6*   ALBUMIN  3.1*  3.4   BILITOTAL  0.6  0.3   ALKPHOS  109  115   AST  18  21   ALT  26  24       TSH   Date Value Ref Range Status   06/10/2017 3.06 0.40 - 4.00 mU/L Final   02/18/2015 2.60 0.40 - 4.00 mU/L Final     Comment:     Effective 7/30/2014, the reference range for this assay has changed to reflect   new instrumentation/methodology.     ]    Lab Results   Component Value Date    A1C 5.7 07/22/2015    A1C 5.9@ 10/22/2008       ASSESSMENT/PLAN:  Tibia/fibula fracture, right, closed, initial encounter  Needs to see ortho to determine next steps, for now has splint and NWB to R LE. Minimal pain, bruising in toes is dark purple>brown    Acute pain of right shoulder  New since fall, has limited ROM, pain with spontaneous movement and less pain with passive ROM but is limited. No fracture found on Xray.     Hypertension goal BP (blood pressure) < 140/90  BP apparently trending low during hospital stay and some changes made. Today, BP very low, but is noted to be on clonidine-which was to be stopped during hospital stay. Since admission to TCU BP trending <130 SBP. No headache, no dizziness. Medications clarified and clonidine to be stopped. Will monitor.     Auditory hallucinations  She reports no hallucinations today. Monitor.     Insomnia, unspecified type  She reports chronic difficulty with sleep, using hydroxyzine at home-reports 1 tab nightly. Will monitor sleep habits, consider addition of melatonin. She is unclear if she has ever been on this before.        Orders:  Order Clarification:  1.  D/C folic acid after 4 doses at TCU-ETOH use.  2.  Senna S 1 tab po bid prn constipation.  3.  D/C thiamine after 4 doses at TCU-ETOH use.  4.  Clarify Tramadol to:   25 mg po q 6 hours prn pain 1-5   50 mg po q 6 hours prn pain 6-10.  5.  D/C prn aspirin 325 mg.  6.  Aspirin 325 mg po qd x 14 days for DVT prophylaxis.  7.  Clarify hydroxyzine 25 mg  to 1 tab po q hs prn insomnia.  8.  Clarify Ca/D to Ca600/D400 po qd supplement.  9.  D/C MVI after 4 dose at [facility]-ETOH use.  10.  D/C intake and output.  11.  Prune Juice 4 oz today-constipation.  12.  No prn tyl-D/C if ordered.  13.  Max daily dose tyl 3 gm.  14.  D/C Clonidine.  15.  Lab draw on 6/19/17   BMP- HTN   CBC -Anemia      Electronically signed by:  Dayanna Guzmán CNP   [facility]  [phone] cell

## 2017-06-15 NOTE — PATIENT INSTRUCTIONS
Orders:  Order Clarification:  1.  D/C folic acid after 4 doses at TCU-ETOH use.  2.  Senna S 1 tab po bid prn constipation.  3.  D/C thiamine after 4 doses at TCU-ETOH use.  4.  Clarify Tramadol to:   25 mg po q 6 hours prn pain 1-5   50 mg po q 6 hours prn pain 6-10.  5.  D/C prn aspirin 325 mg.  6.  Aspirin 325 mg po qd x 14 days for DVT prophylaxis.  7.  Clarify hydroxyzine 25 mg to 1 tab po q hs prn insomnia.  8.  Clarify Ca/D to Ca600/D400 po qd supplement.  9.  D/C MTV after 4 dose at TCU-ETOH use.  10.  D?C intake and output.  11.  Prune Juice 4 oz today-constipation.  12.  No prn tyl-D/C if ordered.  13.  Max daily dose tyl 3 gm.  14.  D/C Clonidine.  15.  Lab draw on 6/19/17   BMP- HTN   CBC -Anemia

## 2017-06-18 VITALS
DIASTOLIC BLOOD PRESSURE: 77 MMHG | HEART RATE: 67 BPM | OXYGEN SATURATION: 96 % | SYSTOLIC BLOOD PRESSURE: 141 MMHG | TEMPERATURE: 98 F | BODY MASS INDEX: 40.9 KG/M2 | WEIGHT: 242 LBS | RESPIRATION RATE: 24 BRPM

## 2017-06-19 ENCOUNTER — NURSING HOME VISIT (OUTPATIENT)
Dept: GERIATRICS | Facility: CLINIC | Age: 82
End: 2017-06-19
Payer: COMMERCIAL

## 2017-06-19 DIAGNOSIS — S82.201A TIBIA/FIBULA FRACTURE, RIGHT, CLOSED, INITIAL ENCOUNTER: Primary | ICD-10-CM

## 2017-06-19 DIAGNOSIS — I10 HYPERTENSION GOAL BP (BLOOD PRESSURE) < 140/90: ICD-10-CM

## 2017-06-19 DIAGNOSIS — J44.9 CHRONIC OBSTRUCTIVE PULMONARY DISEASE, UNSPECIFIED COPD TYPE (H): ICD-10-CM

## 2017-06-19 DIAGNOSIS — S82.401A TIBIA/FIBULA FRACTURE, RIGHT, CLOSED, INITIAL ENCOUNTER: Primary | ICD-10-CM

## 2017-06-19 PROCEDURE — 99309 SBSQ NF CARE MODERATE MDM 30: CPT | Performed by: NURSE PRACTITIONER

## 2017-06-19 NOTE — PROGRESS NOTES
[facility]    Chief Complaint   Patient presents with     RECHECK       HPI:    Susan Haq is a 85 year old  (1/10/1932), who is being seen today for an episodic care visit at [facility].  HPI information obtained from: facility chart records, facility staff and patient report.Today's concern is:  Tibia/fibula fracture, right, closed, initial encounter  No pain, moving toes, remains NWB. Sees ortho the end of this week.     Hypertension goal BP (blood pressure) < 140/90  No headache, no dizziness.     Chronic obstructive pulmonary disease, unspecified COPD type (H)  Complaints of some wheezing, has been using albuterol inhaler. Afebrile.       ALLERGIES: Ace inhibitors; Asa [aluminum hydroxide]; and Penicillins  Past Medical, Surgical, Family and Social History reviewed and updated in [facility].    Current Outpatient Prescriptions   Medication Sig Dispense Refill     TRAMADOL HCL PO Take 25 mg by mouth every 6 hours as needed for moderate to severe pain (for pain 1-5) And 50 mg po q 6 hours for pain 6-10       HYDROXYZINE HCL PO Take 25 mg by mouth At Bedtime       miconazole (MICATIN; MICRO GUARD) 2 % powder Apply topically 2 times daily 30 g 1     senna-docusate (SENOKOT-S;PERICOLACE) 8.6-50 MG per tablet Take 1-2 tablets by mouth 2 times daily as needed (constipation ) 100 tablet 0     acetaminophen (TYLENOL) 500 MG tablet Take 2 tablets (1,000 mg) by mouth every 8 hours 40 tablet 0     atorvastatin (LIPITOR) 20 MG tablet Take 1 tablet (20 mg) by mouth daily 90 tablet 3     furosemide (LASIX) 20 MG tablet Take 1 tablet (20 mg) by mouth daily 90 tablet 3     losartan-hydrochlorothiazide (HYZAAR) 100-25 MG per tablet Take 1 tablet by mouth daily Take 1 tablet by mouth daily. 90 tablet 3     potassium chloride (K-TAB,KLOR-CON) 10 MEQ tablet Take 1 tablet (10 mEq) by mouth daily 90 tablet 3     atenolol (TENORMIN) 50 MG tablet Take 1 tablet (50 mg) by mouth 2 times daily 180 tablet 3      budesonide-formoterol (SYMBICORT) 160-4.5 MCG/ACT Inhaler Inhale 2 puffs into the lungs 2 times daily 1 Inhaler 5     tiotropium (SPIRIVA HANDIHALER) 18 MCG capsule Inhale  into the lungs. Inhale contents of one capsule daily 90 capsule 3     albuterol (PROAIR HFA/PROVENTIL HFA/VENTOLIN HFA) 108 (90 BASE) MCG/ACT Inhaler Inhale 2 puffs into the lungs every 4 hours as needed for shortness of breath / dyspnea or wheezing 3 Inhaler 3     Respiratory Therapy Supplies (NEBULIZER COMPRESSOR) KIT 1 kit every 4 hours as needed 1 kit 0     albuterol (2.5 MG/3ML) 0.083% nebulizer solution Take 1 vial (2.5 mg) by nebulization every 4 hours as needed for shortness of breath / dyspnea or wheezing 1 Box 0     ORDER FOR DME Equipment being ordered: Oxygen - 3 liters continous 1 Device 0     ORDER FOR DME Equipment being ordered: CPAP supplies 1 Units 0     ORDER FOR DME Equipment being ordered: Oxygen 1 Units 0     ORDER FOR DME TEDs stockings knee high to be worn during the day. 1 Units 0     ORDER FOR DME 1.  CPAP pressure 12 cm/H20 with heated humidity and auto-titrating capability.   2.  Provide mask to fit and CPAP supplies.  3.  Length of need lifetime.  4.  Ok to d/c O2 bleed in to her PAP overnight.           Calcium Carbonate-Vitamin D (CALCIUM 600 + D OR) Take  by mouth.       FISH OIL 1000 MG OR CAPS 1 cap daily         REVIEW OF SYSTEMS:  10 point ROS of systems including Constitutional, Eyes, Respiratory, Cardiovascular, Gastroenterology, Genitourinary, Integumentary, Muscularskeletal, Psychiatric were all negative except for pertinent positives noted in my HPI.    Physical Exam:  /77  Pulse 67  Temp 98  F (36.7  C)  Resp 24  Wt 242 lb (109.8 kg)  SpO2 96%  BMI 40.9 kg/m2  GENERAL APPEARANCE:  Alert, in no distress  HEAD:  Normal, normocephalic, atraumatic  EYE EXAM: normal external eye, conjunctiva, lids, MAMI  NECK EXAM: supple, no JVD  CHEST/RESP:  respiratory effort normal, lung sounds exp wheezing  noted bilaterally , no acute respiratory distress  CV:  Rate reg, rhythm reg, no murmur, no peripheral edema   M/S:   extremities normal, gait abnormal-nonambulatory at this time, normal muscle tone, and range of motion normal   SKIN EXAM: CMS + toes R foot, bruising improving, no other skin issues.   NEUROLOGIC EXAM: Normal gross motor movement, tone and coordination. No tremor.  Cranial nerves 2-12 are normal tested and grossly at patient's baseline  PSYCH:  Alert and oriented to self and surroundings with forgetfulness , affect pleasant , judgement appropriate     Recent Labs:  Date:  6/19/2017   Na 140, K+ 3.5, BUN 25, Creat 0.78, eGFR >60  Ca 9.1   Hgb 11.3    Assessment/Plan:  Tibia/fibula fracture, right, closed, initial encounter  Stable, minimal pain, following NWB restrictions. Awaiting ortho recommendations later this week. continue therapy and current medical management, progressing     Hypertension goal BP (blood pressure) < 140/80  Generally normotensive on current regimen, goal BP <140/80 to reduce risk of falls, hypotension. The current medical regimen is effective;  continue present plan and medications.      Chronic obstructive pulmonary disease, unspecified COPD type (H)  Wheeze noted. Will schedule nebs, have albuterol inhaler at bedside.         Orders:  1. Albuterol neb tid x 3 days for wheezing.  2.  Albuterol inhaler-may keep at bedside for self administration prn for SOB/wheezing.        Electronically signed by  Dayanna Guzmán CNP   [facility]  [phone] cell

## 2017-06-19 NOTE — PATIENT INSTRUCTIONS
Orders:  1. Albuterol neb tid x 3 days for wheezing.  2.  Albuterol inhaler-may keep at bedside for self administration prn for SOB/wheezing.

## 2017-06-22 RX ORDER — SERTRALINE HYDROCHLORIDE 25 MG/1
TABLET, FILM COATED ORAL
Qty: 30 TABLET | Refills: 1 | OUTPATIENT
Start: 2017-06-22

## 2017-06-22 NOTE — TELEPHONE ENCOUNTER
"Dayanna, please see notes below, patient's pharmacy [facility] apparently requested this sertraline refill.     What to recommend?   Tawana Khan RNC    "

## 2017-06-22 NOTE — TELEPHONE ENCOUNTER
Looks like patient is at TCU right now - they should be providing her medications.  This was a new medication/new diagnosis in March.  She needs follow up in clinic to discuss when she is discharged from TCU.  Ana Paula Talley, CNP

## 2017-06-22 NOTE — TELEPHONE ENCOUNTER
She is currently in a TCU and all medications are being provided for her while there. It appears that the sertraline was stopped upon her discharge from the hospital . She is not currently on it while at TCU.    We will be managing her medications while at the TCU and ordering needed medications upon discharge .     Thanks,  Dayanna Guzmán, [facility]  [phone] cell

## 2017-06-22 NOTE — TELEPHONE ENCOUNTER
Sertraline  Last Written Prescription Date: 17  Last Fill Quantity: 30, # refills: 1  Last Office Visit with [facility] primary care provider:  3/8/17       Last PHQ-9 score on record=   PHQ-9 SCORE 10/28/2014   Total Score 7     Routing refill request to provider for review/approval because:  PHQ9 >5  Rx  17    Reny CHAVEZ Rn

## 2017-06-26 ENCOUNTER — NURSING HOME VISIT (OUTPATIENT)
Dept: GERIATRICS | Facility: CLINIC | Age: 82
End: 2017-06-26
Payer: COMMERCIAL

## 2017-06-26 VITALS
OXYGEN SATURATION: 97 % | WEIGHT: 240 LBS | TEMPERATURE: 97.5 F | RESPIRATION RATE: 18 BRPM | BODY MASS INDEX: 40.56 KG/M2 | HEART RATE: 64 BPM | DIASTOLIC BLOOD PRESSURE: 64 MMHG | SYSTOLIC BLOOD PRESSURE: 144 MMHG

## 2017-06-26 DIAGNOSIS — S82.401S TIBIA/FIBULA FRACTURE, RIGHT, SEQUELA: ICD-10-CM

## 2017-06-26 DIAGNOSIS — I10 HYPERTENSION GOAL BP (BLOOD PRESSURE) < 140/90: Primary | ICD-10-CM

## 2017-06-26 DIAGNOSIS — G47.00 INSOMNIA, UNSPECIFIED TYPE: Chronic | ICD-10-CM

## 2017-06-26 DIAGNOSIS — S82.201S TIBIA/FIBULA FRACTURE, RIGHT, SEQUELA: ICD-10-CM

## 2017-06-26 DIAGNOSIS — J44.9 CHRONIC OBSTRUCTIVE PULMONARY DISEASE, UNSPECIFIED COPD TYPE (H): ICD-10-CM

## 2017-06-26 DIAGNOSIS — M25.511 RIGHT SHOULDER PAIN, UNSPECIFIED CHRONICITY: ICD-10-CM

## 2017-06-26 PROBLEM — S82.401A TIBIA/FIBULA FRACTURE, RIGHT, CLOSED, INITIAL ENCOUNTER: Status: RESOLVED | Noted: 2017-06-11 | Resolved: 2017-06-26

## 2017-06-26 PROBLEM — S82.201A TIBIA/FIBULA FRACTURE, RIGHT, CLOSED, INITIAL ENCOUNTER: Status: RESOLVED | Noted: 2017-06-11 | Resolved: 2017-06-26

## 2017-06-26 PROCEDURE — 99309 SBSQ NF CARE MODERATE MDM 30: CPT | Performed by: NURSE PRACTITIONER

## 2017-06-26 NOTE — PROGRESS NOTES
[facility]    Chief Complaint   Patient presents with     Nursing Home Acute       HPI:    Susan Haq is a 85 year old  (1/10/1932), who is being seen today for an episodic care visit at [facility].  HPI information obtained from: facility chart records, facility staff and patient report.Today's concern is:  Hypertension goal BP (blood pressure) < 140/90  No headache, no dizziness. BP trends 120-166 systolic. Low K+, on both hydrochlorothiazide and lasix.     Tibia/fibula fracture, right, sequela  Saw ortho last week, now WBAT with CAM walker in place. Minimal pain, no prn tramadol use.     Right shoulder pain, unspecified chronicity  Stable, no new concerns.     Chronic obstructive pulmonary disease, unspecified COPD type (H)  Chronic dyspnea, on O2, no new concerns.     Insomnia, unspecified type  Reports chronic difficulty with sleep, on hydroxyzine which is contraindicated in elderly. Pharmacy requests review of insomnia.       ALLERGIES: Ace inhibitors; Asa [aluminum hydroxide]; and Penicillins  Past Medical, Surgical, Family and Social History reviewed and updated in [employer].    Current Outpatient Prescriptions   Medication Sig Dispense Refill     ASPIRIN PO Take 325 mg by mouth daily       TRAZODONE HCL PO Take 50 mg by mouth nightly as needed for sleep       SIMETHICONE-80 PO Take 80 mg by mouth every morning And TID prn belching       TRAMADOL HCL PO Take 25 mg by mouth every 6 hours as needed for moderate to severe pain (for pain 1-5) And 50 mg po q 6 hours for pain 6-10       miconazole (MICATIN; MICRO GUARD) 2 % powder Apply topically 2 times daily 30 g 1     senna-docusate (SENOKOT-S;PERICOLACE) 8.6-50 MG per tablet Take 1-2 tablets by mouth 2 times daily as needed (constipation ) 100 tablet 0     acetaminophen (TYLENOL) 500 MG tablet Take 2 tablets (1,000 mg) by mouth every 8 hours 40 tablet 0     atorvastatin (LIPITOR) 20 MG tablet Take 1 tablet (20 mg) by mouth daily 90 tablet  3     losartan-hydrochlorothiazide (HYZAAR) 100-25 MG per tablet Take 1 tablet by mouth daily Take 1 tablet by mouth daily. 90 tablet 3     potassium chloride (K-TAB,KLOR-CON) 10 MEQ tablet Take 1 tablet (10 mEq) by mouth daily 90 tablet 3     atenolol (TENORMIN) 50 MG tablet Take 1 tablet (50 mg) by mouth 2 times daily 180 tablet 3     budesonide-formoterol (SYMBICORT) 160-4.5 MCG/ACT Inhaler Inhale 2 puffs into the lungs 2 times daily 1 Inhaler 5     tiotropium (SPIRIVA HANDIHALER) 18 MCG capsule Inhale  into the lungs. Inhale contents of one capsule daily 90 capsule 3     albuterol (PROAIR HFA/PROVENTIL HFA/VENTOLIN HFA) 108 (90 BASE) MCG/ACT Inhaler Inhale 2 puffs into the lungs every 4 hours as needed for shortness of breath / dyspnea or wheezing 3 Inhaler 3     Respiratory Therapy Supplies (NEBULIZER COMPRESSOR) KIT 1 kit every 4 hours as needed 1 kit 0     albuterol (2.5 MG/3ML) 0.083% nebulizer solution Take 1 vial (2.5 mg) by nebulization every 4 hours as needed for shortness of breath / dyspnea or wheezing 1 Box 0     ORDER FOR DME Equipment being ordered: Oxygen - 3 liters continous 1 Device 0     ORDER FOR DME Equipment being ordered: CPAP supplies 1 Units 0     ORDER FOR DME Equipment being ordered: Oxygen 1 Units 0     ORDER FOR DME TEDs stockings knee high to be worn during the day. 1 Units 0     ORDER FOR DME 1.  CPAP pressure 12 cm/H20 with heated humidity and auto-titrating capability.   2.  Provide mask to fit and CPAP supplies.  3.  Length of need lifetime.  4.  Ok to d/c O2 bleed in to her PAP overnight.           Calcium Carbonate-Vitamin D (CALCIUM 600 + D OR) Take  by mouth.       FISH OIL 1000 MG OR CAPS 1 cap daily       Medications reviewed:  Medications reconciled to facility chart and changes were made to reflect current medications as identified as above med list. Below are the changes that were made:   Medications stopped since last EPIC medication reconciliation:   Medications  Discontinued During This Encounter   Medication Reason     HYDROXYZINE HCL PO      furosemide (LASIX) 20 MG tablet        Medications started since last EPIC medication reconciliation:  Orders Placed This Encounter   Medications     ASPIRIN PO     Sig: Take 325 mg by mouth daily     TRAZODONE HCL PO     Sig: Take 50 mg by mouth nightly as needed for sleep     SIMETHICONE-80 PO     Sig: Take 80 mg by mouth every morning And TID prn belching         REVIEW OF SYSTEMS:  4 point ROS including Respiratory, CV, GI and , other than that noted in the HPI,  is negative    Physical Exam:  /64  Pulse 64  Temp 97.5  F (36.4  C)  Resp 18  Wt 240 lb (108.9 kg)  SpO2 97%  BMI 40.56 kg/m2  GENERAL APPEARANCE:  Alert, in no acute distress  HEAD:  Normal, normocephalic, atraumatic  EYE EXAM: normal external eye, conjunctiva, lids, MAMI  NECK EXAM: supple, no JVD  CHEST/RESP:  respiratory effort normal, lung sounds CTA , no respiratory distress, on chronic O2  SKIN EXAM:  Small firm round raised not reddened area on middle of chest wall , with black center   CV:  Rate reg, rhythm reg, no murmur, trace peripheral edema   M/S:   extremities normal, gait abnormal-with rolling walker - now with CAM bood, normal muscle tone, and range of motion normal   SKIN EXAM: CDI-no open areas to R LE  NEUROLOGIC EXAM: Normal gross motor movement, tone and coordination. No tremor.  Cranial nerves 2-12 are normal tested and grossly at patient's baseline  PSYCH:  Alert and oriented to person-place-time , affect pleasant , judgement appropriate     Recent Labs:  All labs reviewed,none recent    Assessment/Plan:  Hypertension goal BP (blood pressure) < 140/90  Generally normotensive on current regimen, goal BP <140/90 to reduce risk of falls, hypotension. Is on both hydrochlorothiazide and lasix, minimal edema and does have low normal K+.  Will discontinue lasix and monitor .      Tibia/fibula fracture, right, sequela  Now with CAM walker  and hopes to discharge soon. Ambulates with rolling walker and CAM walker with therapy staff. continue therapy and current medical management, progressing     Right shoulder pain, unspecified chronicity  Stable, no new concerns. Monitor.     Chronic obstructive pulmonary disease, unspecified COPD type (H)  Chronic, compensated with no recent exacerbation. no smoking,  chronci O2 use, prn nebulizer use.  The current medical regimen is effective;  continue present plan and medications.      Insomnia, unspecified type  Reports ongoing difficulty with sleep, using prn hydroxyzine 3-4 times per week. This medication contraindicated in elderly. Will trial trazodone instead, monitor for improved sleep.       Orders:  1. DC hydroxyzine  2. Trazodone 50 mg po qhs prn insomnia  3. DC Lasix  4. Simethicone 80 mg po qam and TID prn belching  5. Warm, moist cloth pack - on 15 min to blackhead on mid chest TID till resolved Dx blackhead  6. Lab draw 7/6/17 BMP Dx HTN        Electronically signed by  Dayanna Guzmán CNP   [facility]  [phone] cell

## 2017-06-29 VITALS
RESPIRATION RATE: 20 BRPM | DIASTOLIC BLOOD PRESSURE: 65 MMHG | BODY MASS INDEX: 41.4 KG/M2 | WEIGHT: 245 LBS | SYSTOLIC BLOOD PRESSURE: 170 MMHG | OXYGEN SATURATION: 96 % | TEMPERATURE: 97.5 F | HEART RATE: 69 BPM

## 2017-06-29 NOTE — PROGRESS NOTES
[facility]  PRIMARY CARE PROVIDER AND CLINIC:  Ema Melendez [facility] [address] [facility] / [place] *  Chief Complaint   Patient presents with     Hospital F/U     Nursing Home Acute       HPI:    Susan Haq is a 85 year old  (1/10/1932), who had a mechanical fall at home, developed right tibia/fibula fx, admitted to the [facility] from [facility].  Hospital stay 6/10/17 through 6/13/17.  Admitted to this facility for  rehab, medical management and nursing care.  Current issues are:      Tibia/fibula fracture, right, sequela  Physical deconditioning  - now allowed WBAT with CAM walker in place.   - Started on OT/PT, reports making a progress. And very pleased.   - Reports pain is sharp with sudden twisting the leg, 3/10 when severe, pain is of a very short duration.     Right shoulder pain, unspecified chronicity  - Reports was painful initially after the fall, now better, however, gets pain in the right shoulder and right forearm, sharp for a second or so when abducts arm.   - Spoke to therapist about it, started on exercise.     Hypertension goal BP (blood pressure) < 140/90  - no HA or dizziness. However, reports some hesitation when stands up from a sitting position    Insomnia:  - reports sleeping pattern is same like at home.   - sleeps about 6.5-7.5 hours. Does not nap. Sometimes feel fresh. Uses CPAP machine at home.       CODE STATUS/ADVANCE DIRECTIVES DISCUSSION:   DNR / DNI  Patient's living condition: lives alone    ALLERGIES:Ace inhibitors; Asa [aluminum hydroxide]; and Penicillins  PAST MEDICAL HISTORY:  has a past medical history of Basal cell carcinoma; Bronchospasm; Diverticulosis; Fracture, Metacarpal Shaft - right 4th (7/5/2010); High cholesterol; HL (hearing loss) (3/25/2013); Hypertension; Hypokalemia (9/17/2013); Hypoxia (9/5/2013); PVC's (premature ventricular contractions) (4/25/2011); Smoker (1986); Type 2  diabetes mellitus (H) (9/26/2011); and Venous insufficiency. She also has no past medical history of Cancer (H); Chronic kidney disease; Malignant melanoma (H); Squamous cell carcinoma; or Thyroid disease.  PAST SURGICAL HISTORY:  has a past surgical history that includes hysterectomy, cervix status unknown; BSO, OMENTECTOMY W/SHANIA; appendectomy; NONSPECIFIC PROCEDURE; STOMACH SURGERY PROCEDURE UNLISTED; cholecystectomy, laporoscopic (10/13/2011); and hernia repair, umbilical (10/13/2011).  FAMILY HISTORY: family history includes Coronary Artery Disease in her maternal grandmother and paternal uncle; DIABETES in her father.  SOCIAL HISTORY:  reports that she quit smoking about 27 years ago. She has a 35.00 pack-year smoking history. She has quit using smokeless tobacco. She reports that she drinks alcohol. She reports that she does not use illicit drugs.    Post Discharge Medication Reconciliation Status: discharge medications reconciled and changed, per note/orders (see AVS).  Current Outpatient Prescriptions   Medication Sig Dispense Refill     TRAZODONE HCL PO Take 50 mg by mouth nightly as needed for sleep       SIMETHICONE-80 PO Take 80 mg by mouth every morning And TID prn belching       TRAMADOL HCL PO Take 25 mg by mouth every 6 hours as needed for moderate to severe pain (for pain 1-5) And 50 mg po q 6 hours for pain 6-10       miconazole (MICATIN; MICRO GUARD) 2 % powder Apply topically 2 times daily 30 g 1     senna-docusate (SENOKOT-S;PERICOLACE) 8.6-50 MG per tablet Take 1-2 tablets by mouth 2 times daily as needed (constipation ) 100 tablet 0     acetaminophen (TYLENOL) 500 MG tablet Take 2 tablets (1,000 mg) by mouth every 8 hours 40 tablet 0     atorvastatin (LIPITOR) 20 MG tablet Take 1 tablet (20 mg) by mouth daily 90 tablet 3     losartan-hydrochlorothiazide (HYZAAR) 100-25 MG per tablet Take 1 tablet by mouth daily Take 1 tablet by mouth daily. 90 tablet 3     potassium chloride (K-TAB,KLOR-CON)  10 MEQ tablet Take 1 tablet (10 mEq) by mouth daily 90 tablet 3     atenolol (TENORMIN) 50 MG tablet Take 1 tablet (50 mg) by mouth 2 times daily 180 tablet 3     budesonide-formoterol (SYMBICORT) 160-4.5 MCG/ACT Inhaler Inhale 2 puffs into the lungs 2 times daily 1 Inhaler 5     tiotropium (SPIRIVA HANDIHALER) 18 MCG capsule Inhale  into the lungs. Inhale contents of one capsule daily 90 capsule 3     albuterol (PROAIR HFA/PROVENTIL HFA/VENTOLIN HFA) 108 (90 BASE) MCG/ACT Inhaler Inhale 2 puffs into the lungs every 4 hours as needed for shortness of breath / dyspnea or wheezing 3 Inhaler 3     Respiratory Therapy Supplies (NEBULIZER COMPRESSOR) KIT 1 kit every 4 hours as needed 1 kit 0     albuterol (2.5 MG/3ML) 0.083% nebulizer solution Take 1 vial (2.5 mg) by nebulization every 4 hours as needed for shortness of breath / dyspnea or wheezing 1 Box 0     ORDER FOR DME Equipment being ordered: Oxygen - 3 liters continous 1 Device 0     ORDER FOR DME Equipment being ordered: CPAP supplies 1 Units 0     ORDER FOR DME Equipment being ordered: Oxygen 1 Units 0     ORDER FOR DME TEDs stockings knee high to be worn during the day. 1 Units 0     ORDER FOR DME 1.  CPAP pressure 12 cm/H20 with heated humidity and auto-titrating capability.   2.  Provide mask to fit and CPAP supplies.  3.  Length of need lifetime.  4.  Ok to d/c O2 bleed in to her PAP overnight.           Calcium Carbonate-Vitamin D (CALCIUM 600 + D OR) Take  by mouth.       FISH OIL 1000 MG OR CAPS 1 cap daily         ROS:  10 point ROS of systems including Constitutional, Eyes, Respiratory, Cardiovascular, Gastroenterology, Genitourinary, Integumentary, Muscularskeletal, Psychiatric were all negative except for pertinent positives noted in my HPI.    Exam:  /65  Pulse 69  Temp 97.5  F (36.4  C)  Resp 20  Wt 245 lb (111.1 kg)  SpO2 96%  BMI 41.4 kg/m2  GENERAL APPEARANCE:  Alert, in no distress, morbidly obese  ENT:  Mouth and posterior  oropharynx normal, moist mucous membranes, edentulous  with denture plateboth levels. Hearing aids in place.   EYES:  EOM, conjunctivae, lids, pupils and irises normal  NECK:  No adenopathy,masses or thyromegaly  RESP:  respiratory effort and palpation of chest normal, lungs clear to auscultation , no respiratory distress  CV:  Palpation and auscultation of heart done , regular rate and rhythm, no murmur, rub, or gallop, peripheral edema traces, + in let leg  ABDOMEN:  normal bowel sounds, soft, nontender, no hepatosplenomegaly or other masses  M/S:   Gait and station abnormal CAM boot right leg.   SKIN:  Inspection of skin and subcutaneous tissue baseline, Palpation of skin and subcutaneous tissue baseline  NEURO:   Cranial nerves 2-12 are normal tested and grossly at patient's baseline  PSYCH:  oriented X 3, normal insight, judgement and memory    Lab/Diagnostic data:     CBC RESULTS:   Recent Labs   Lab Test  06/12/17 0618  06/11/17   0613   WBC  6.3  7.1   RBC  3.77*  3.69*   HGB  11.8  11.6*   HCT  35.2  34.8*   MCV  93  94   MCH  31.3  31.4   MCHC  33.5  33.3   RDW  12.6  12.7   PLT  204  193       Last Basic Metabolic Panel:  Recent Labs   Lab Test  06/12/17 0618  06/11/17   0613   NA  141  143   POTASSIUM  3.4  3.2*   CHLORIDE  109  106   JAGDISH  8.2*  8.1*   CO2  28  29   BUN  18  24   CR  0.75  0.79   GLC  110*  115*       Liver Function Studies -   Recent Labs   Lab Test  06/10/17   1440  03/05/16   2030   PROTTOTAL  5.8*  6.6*   ALBUMIN  3.1*  3.4   BILITOTAL  0.6  0.3   ALKPHOS  109  115   AST  18  21   ALT  26  24       TSH   Date Value Ref Range Status   06/10/2017 3.06 0.40 - 4.00 mU/L Final   02/18/2015 2.60 0.40 - 4.00 mU/L Final     Comment:     Effective 7/30/2014, the reference range for this assay has changed to reflect   new instrumentation/methodology.     ]    Lab Results   Component Value Date    A1C 5.7 07/22/2015    A1C 5.9@ 10/22/2008       ASSESSMENT/PLAN:  Tibia/fibula fracture,  right, sequela  Right shoulder pain, unspecified chronicity  Physical deconditioning  - Physical function improving with OT/PT, continue.  - Analgesia optimal  - Continue DVT Prophylaxis according to Orthopedist's recommendations  - Follow on the surgeon's recommendations      Hypertension goal BP (blood pressure) < 140/90   BP Readings from Last 3 Encounters:   06/29/17 170/65   06/26/17 144/64   06/18/17 141/77   - on average 135's in the Facility EHR.   - Clonidine and lasix stopped.   - On Losartan, HCTZ 100-25 mg, KCL 10 mEq, Atenolol 50 mg bid,   - Controlled. Continue meds  - Keep SBP> 130 mmHg and DBP > 65 mmHg (levels below these increase mortality as shown by standard studies and observations).      COPD:  - on Symbicort, Spiriva, Albuterol, CPAP, stable.   - O2 dependent 24/7  - Uses CPAP.   - Stable     Insomnia:  - Atarax discontinued, Trazodone 50 mg started prn, no difference, will dc      Orders:  - DC trazodone  - .See above, otherwise, continue the rest of the current POC.     The above assessment and plan was discussed with the patient in detail, and pt is in agreement; patient  asked questions which were nswered in detail, and patient verbalized understanding. .    Information reviewed:  Medications, vital signs, orders, nursing notes, problem list, hospital information. Total time spent with patient visit was 45 min including patient visit, review of past records and , nursing staff and physical therapist. Greater than 50% of total time spent with counseling and coordinating care. Discuss about transfer plan to home.     Electronically signed by:  Comfort Macdonald MD

## 2017-07-03 ENCOUNTER — NURSING HOME VISIT (OUTPATIENT)
Dept: GERIATRICS | Facility: CLINIC | Age: 82
End: 2017-07-03
Payer: COMMERCIAL

## 2017-07-03 DIAGNOSIS — G47.00 INSOMNIA, UNSPECIFIED TYPE: ICD-10-CM

## 2017-07-03 DIAGNOSIS — I10 HYPERTENSION GOAL BP (BLOOD PRESSURE) < 140/90: ICD-10-CM

## 2017-07-03 DIAGNOSIS — S82.201S TIBIA/FIBULA FRACTURE, RIGHT, SEQUELA: Primary | ICD-10-CM

## 2017-07-03 DIAGNOSIS — M25.511 RIGHT SHOULDER PAIN, UNSPECIFIED CHRONICITY: ICD-10-CM

## 2017-07-03 DIAGNOSIS — R53.81 PHYSICAL DECONDITIONING: ICD-10-CM

## 2017-07-03 DIAGNOSIS — J44.9 CHRONIC OBSTRUCTIVE PULMONARY DISEASE, UNSPECIFIED COPD TYPE (H): ICD-10-CM

## 2017-07-03 DIAGNOSIS — S82.401S TIBIA/FIBULA FRACTURE, RIGHT, SEQUELA: Primary | ICD-10-CM

## 2017-07-03 PROBLEM — E66.01 MORBID OBESITY (H): Status: ACTIVE | Noted: 2017-07-03

## 2017-07-03 PROCEDURE — 99306 1ST NF CARE HIGH MDM 50: CPT | Performed by: FAMILY MEDICINE

## 2017-07-03 PROCEDURE — 99207 ZZC CDG-CORRECTLY CODED, REVIEWED AND AGREE: CPT | Performed by: FAMILY MEDICINE

## 2017-07-06 ENCOUNTER — DISCHARGE SUMMARY NURSING HOME (OUTPATIENT)
Dept: GERIATRICS | Facility: CLINIC | Age: 82
End: 2017-07-06
Payer: COMMERCIAL

## 2017-07-06 VITALS
HEIGHT: 66 IN | HEART RATE: 60 BPM | SYSTOLIC BLOOD PRESSURE: 127 MMHG | DIASTOLIC BLOOD PRESSURE: 71 MMHG | RESPIRATION RATE: 18 BRPM | BODY MASS INDEX: 39.95 KG/M2 | OXYGEN SATURATION: 95 % | TEMPERATURE: 97.3 F | WEIGHT: 248.6 LBS

## 2017-07-06 DIAGNOSIS — M25.511 ACUTE PAIN OF RIGHT SHOULDER: ICD-10-CM

## 2017-07-06 DIAGNOSIS — S82.201S TIBIA/FIBULA FRACTURE, RIGHT, SEQUELA: Primary | ICD-10-CM

## 2017-07-06 DIAGNOSIS — G47.00 INSOMNIA, UNSPECIFIED TYPE: ICD-10-CM

## 2017-07-06 DIAGNOSIS — E11.9 TYPE 2 DIABETES MELLITUS WITHOUT COMPLICATION, WITHOUT LONG-TERM CURRENT USE OF INSULIN (H): ICD-10-CM

## 2017-07-06 DIAGNOSIS — S82.401S TIBIA/FIBULA FRACTURE, RIGHT, SEQUELA: Primary | ICD-10-CM

## 2017-07-06 DIAGNOSIS — I10 HYPERTENSION GOAL BP (BLOOD PRESSURE) < 140/90: ICD-10-CM

## 2017-07-06 DIAGNOSIS — J84.10 INTERSTITIAL PULMONARY FIBROSIS (H): ICD-10-CM

## 2017-07-06 DIAGNOSIS — J44.9 CHRONIC OBSTRUCTIVE PULMONARY DISEASE, UNSPECIFIED COPD TYPE (H): ICD-10-CM

## 2017-07-06 DIAGNOSIS — M25.521 RIGHT ELBOW PAIN: ICD-10-CM

## 2017-07-06 PROCEDURE — 99207 ZZC CDG-CORRECTLY CODED, REVIEWED AND AGREE: CPT | Performed by: NURSE PRACTITIONER

## 2017-07-06 PROCEDURE — 99316 NF DSCHRG MGMT 30 MIN+: CPT | Performed by: NURSE PRACTITIONER

## 2017-07-06 RX ORDER — LOSARTAN POTASSIUM AND HYDROCHLOROTHIAZIDE 25; 100 MG/1; MG/1
1 TABLET ORAL DAILY
COMMUNITY
End: 2017-12-06

## 2017-07-06 NOTE — PROGRESS NOTES
"  MEDICAL NECESSITY STATEMENT FOR DME    Demographic Information on Susan Haq:    Susan Haq  Gender: female  : 1/10/1932  [address] [place] [address] [phone] (home) none (work)    Medical Record: [id number]  Social Security Number: [id number]  Primary Care Provider: Ema Melendez  Insurance: Payor: ARE / Plan: ARE FOR SENIORS / Product Type: HMO /       Tibia/fibula fracture, right, sequela [S82.201S, S82.401S]    DME:  WALKER: front wheel rolling walker  (TYPE)    VITAL SIGNS:  Vitals: /71  Pulse 60  Temp 97.3  F (36.3  C)  Resp 18  Ht 5' 6\" (1.676 m)  Wt 248 lb 9.6 oz (112.8 kg)  SpO2 95%  BMI 40.13 kg/m2  BMI= Body mass index is 40.13 kg/(m^2).       MEDICAL NECESSITY STATEMENT (describe reason to support DME here including length of need)  Tibia/fibula fracture, right, sequela [S82.201S, S82.401S]  Patient with new tib/fib fracture and now with CAM walker for several weeks. Will need front wheel rolling walker for several weeks to months in order to maintain safety at home.     ELECTRONICALLY SIGNED BY MICHELLE CERTIFIED PROVIDER:  CHIP Fortune CNP   NPI: [id number]   [facility]  [address] [place] [address], [address]  [place] [address]        "

## 2017-07-06 NOTE — PROGRESS NOTES
[facility] DISCHARGE SUMMARY    PATIENT'S NAME: Susan Haq  YOB: 1932  MEDICAL RECORD NUMBER:  [id number]    PRIMARY CARE PROVIDER AND CLINIC RESPONSIBLE AFTER TRANSFER: Ema Melendez [facility] [address] [facility] [address]*     CODE STATUS/ADVANCE DIRECTIVES DISCUSSION:   DNR / DNI       Allergies   Allergen Reactions     Ace Inhibitors Cough     Asa [Aluminum Hydroxide]      Penicillins        TRANSFERRING PROVIDERS: CHIP Fortune CNP, Renae Saldaña MD  DATE OF SNF ADMISSION:  June / 13 / 2017  DATE OF SNF (anticipated) DISCHARGE: July / 07 / 2017  DISCHARGE DISPOSITION: [facility] Provider   Nursing Facility: [facility] stay 6/10/17 to 6/13/17.     Condition on Discharge:  Improving.  Function:  Ambulatory with CAM walker and rolling walker   Cognitive Scores: SLUMS 24/30 and CPT 5/6    Equipment: walker    DISCHARGE DIAGNOSIS:   1. Tibia/fibula fracture, right, sequela    2. Acute pain of right shoulder    3. Right elbow pain    4. Chronic obstructive pulmonary disease, unspecified COPD type (H)    5. Interstitial pulmonary fibrosis (H)    6. Type 2 diabetes mellitus without complication, without long-term current use of insulin (H)    7. Hypertension goal BP (blood pressure) < 140/90    8. Insomnia, unspecified type        HPI Nursing Facility Course:  HPI information obtained from: facility chart records, facility staff, patient report and [facility] chart review.    Tibia/fibula fracture, right, sequela  Patient with fall at home, suffering R tib/fib fracture and medical management without ORIF. She is now full weight bearing with CAM walker, has follow up with ortho planned for 7/7/17 . She has minimal pain, has not used narcotic in several days and will not be sent home with any narcotics. She is noted to be somewhat impulsive and has difficulty applying the CAM walker appropriately. She      Acute pain of right shoulder  Did have pain after fall, but this has resolved.     Right elbow pain  She continues to have some mild R elbow pain with good ROM, full function.     Chronic obstructive pulmonary disease, unspecified COPD type (H)  Interstitial pulmonary fibrosis (H)  Stable, chronic O2 use and has needed supplies at home.     Type 2 diabetes mellitus without complication, without long-term current use of insulin (H)  No routine medication use. Last A1C within normal limits. Diet controlled. The current medical regimen is effective;  continue present plan and medications.      Hypertension goal BP (blood pressure) < 140/90  Have stopped clonidine and amlodipine, BP has been trending as noted below. She remains on losartan/ hydrochlorothiazide, potassium, atenolol.   BP goal is < 150/80mmHg to avoid risk of hypotension, falls, dizziness and tissue hypoperfusion.   BP Readings from Last 6 Encounters:   07/06/17 127/71   06/29/17 170/65   06/26/17 144/64   06/18/17 141/77   06/15/17 95/51   06/13/17 178/67        Insomnia, unspecified type  Continues to have chronic poor sleep, previously on hydroxyzine and this has been stopped (due to anticholinergic effects in elderly), trazodone not used. May benefit from trial of another sleep medication/scheduled trazodone.         PAST MEDICAL HISTORY:  has a past medical history of Basal cell carcinoma; Bronchospasm; Diverticulosis; Fracture, Metacarpal Shaft - right 4th (7/5/2010); High cholesterol; HL (hearing loss) (3/25/2013); Hypertension; Hypokalemia (9/17/2013); Hypoxia (9/5/2013); PVC's (premature ventricular contractions) (4/25/2011); Smoker (1986); Type 2 diabetes mellitus (H) (9/26/2011); and Venous insufficiency. She also has no past medical history of Cancer (H); Chronic kidney disease; Malignant melanoma (H); Squamous cell carcinoma; or Thyroid disease.    DISCHARGE MEDICATIONS:  Current Outpatient Prescriptions   Medication Sig Dispense Refill      losartan-hydrochlorothiazide (HYZAAR) 100-25 MG per tablet Take 1 tablet by mouth daily       SIMETHICONE-80 PO Take 80 mg by mouth every morning And TID prn belching       miconazole (MICATIN; MICRO GUARD) 2 % powder Apply topically 2 times daily 30 g 1     senna-docusate (SENOKOT-S;PERICOLACE) 8.6-50 MG per tablet Take 1-2 tablets by mouth 2 times daily as needed (constipation ) 100 tablet 0     acetaminophen (TYLENOL) 500 MG tablet Take 2 tablets (1,000 mg) by mouth every 8 hours 40 tablet 0     atorvastatin (LIPITOR) 20 MG tablet Take 1 tablet (20 mg) by mouth daily 90 tablet 3     potassium chloride (K-TAB,KLOR-CON) 10 MEQ tablet Take 1 tablet (10 mEq) by mouth daily 90 tablet 3     atenolol (TENORMIN) 50 MG tablet Take 1 tablet (50 mg) by mouth 2 times daily 180 tablet 3     budesonide-formoterol (SYMBICORT) 160-4.5 MCG/ACT Inhaler Inhale 2 puffs into the lungs 2 times daily 1 Inhaler 5     tiotropium (SPIRIVA HANDIHALER) 18 MCG capsule Inhale  into the lungs. Inhale contents of one capsule daily 90 capsule 3     albuterol (PROAIR HFA/PROVENTIL HFA/VENTOLIN HFA) 108 (90 BASE) MCG/ACT Inhaler Inhale 2 puffs into the lungs every 4 hours as needed for shortness of breath / dyspnea or wheezing 3 Inhaler 3     Respiratory Therapy Supplies (NEBULIZER COMPRESSOR) KIT 1 kit every 4 hours as needed 1 kit 0     albuterol (2.5 MG/3ML) 0.083% nebulizer solution Take 1 vial (2.5 mg) by nebulization every 4 hours as needed for shortness of breath / dyspnea or wheezing 1 Box 0     ORDER FOR DME Equipment being ordered: Oxygen - 3 liters continous 1 Device 0     ORDER FOR DME Equipment being ordered: CPAP supplies 1 Units 0     ORDER FOR DME Equipment being ordered: Oxygen 1 Units 0     ORDER FOR DME TEDs stockings knee high to be worn during the day. 1 Units 0     ORDER FOR DME 1.  CPAP pressure 12 cm/H20 with heated humidity and auto-titrating capability.   2.  Provide mask to fit and CPAP supplies.  3.  Length of need  "lifetime.  4.  Ok to d/c O2 bleed in to her PAP overnight.           Calcium Carbonate-Vitamin D (CALCIUM 600 + D OR) Take  by mouth.       FISH OIL 1000 MG OR CAPS 1 cap daily         MEDICATION CHANGES/RATIONALE:   Trial of trazodone not effective  Folic acid, thiamine, multivit discontinue after 4 days, per discharge summary  Tramadol discontinued, non-use  Clonidine discontinued  Lasix discontinued  Atarax discontinued    Controlled medications sent with patient: not applicable/none     ROS:    4 point ROS including Respiratory, CV, GI and , other than that noted in the HPI,  is negative    Physical Exam:   Vitals: /71  Pulse 60  Temp 97.3  F (36.3  C)  Resp 18  Ht 5' 6\" (1.676 m)  Wt 248 lb 9.6 oz (112.8 kg)  SpO2 95%  BMI 40.13 kg/m2  BMI= Body mass index is 40.13 kg/(m^2).     BP 7/1-7/6: 124-146/54-80 mmHg    GENERAL APPEARANCE:  Alert, in no distress, cooperative  RESP:  respiratory effort and palpation of chest normal, lungs clear to auscultation   CV:  Palpation and auscultation of heart done , regular rate and rhythm, no murmur, rub, or gallop, no edema  PSYCH:  oriented to place and time, insight and judgement impaired    DISCHARGE PLAN:  Physical Therapy, Registered Nurse and From:  to be determined  Patient instructed to follow-up with:  PCP in 5--7 days       [facility] scheduled appointments:  No future appointments.    MTM referral needed and placed by this provider: No    Pending labs:BMP-done and all values within normal limits     SNF labs  CBC RESULTS:   Recent Labs   Lab Test  06/12/17 0618  06/11/17   0613   WBC  6.3  7.1   RBC  3.77*  3.69*   HGB  11.8  11.6*   HCT  35.2  34.8*   MCV  93  94   MCH  31.3  31.4   MCHC  33.5  33.3   RDW  12.6  12.7   PLT  204  193       Last Basic Metabolic Panel:  Recent Labs   Lab Test  06/12/17 0618  06/11/17 0613   NA  141  143   POTASSIUM  3.4  3.2*   CHLORIDE  109  106   JAGDISH  8.2*  8.1*   CO2  28  29   BUN  18  24   CR  0.75  " 0.79   GLC  110*  115*     Liver Function Studies -   Recent Labs   Lab Test  06/10/17   1440  03/05/16   2030   PROTTOTAL  5.8*  6.6*   ALBUMIN  3.1*  3.4   BILITOTAL  0.6  0.3   ALKPHOS  109  115   AST  18  21   ALT  26  24       TSH   Date Value Ref Range Status   06/10/2017 3.06 0.40 - 4.00 mU/L Final   02/18/2015 2.60 0.40 - 4.00 mU/L Final     Comment:     Effective 7/30/2014, the reference range for this assay has changed to reflect   new instrumentation/methodology.       Lab Results   Component Value Date    A1C 5.7 07/22/2015    A1C 5.9@ 10/22/2008     Discharge Treatments:none    TOTAL DISCHARGE TIME:   Greater than 30 minutes  Electronically signed by:  CHIP Fortune CNP

## 2017-07-10 ENCOUNTER — CARE COORDINATION (OUTPATIENT)
Dept: CASE MANAGEMENT | Facility: CLINIC | Age: 82
End: 2017-07-10

## 2017-07-10 ENCOUNTER — MEDICAL CORRESPONDENCE (OUTPATIENT)
Dept: HEALTH INFORMATION MANAGEMENT | Facility: CLINIC | Age: 82
End: 2017-07-10

## 2017-07-12 ENCOUNTER — CARE COORDINATION (OUTPATIENT)
Dept: CASE MANAGEMENT | Facility: CLINIC | Age: 82
End: 2017-07-12

## 2017-07-12 NOTE — PROGRESS NOTES
Clinic Care Coordination Contact  OUTREACH    Referral Information:  Referral Source: CTS  Reason for Contact: TCU discharge  DATE OF SNF ADMISSION:  June / 13 / 2017  DATE OF SNF (anticipated) DISCHARGE: July / 07 / 2017  Nursing Facility: [facility] stay 6/10/17 to 6/13/17.   Care Conference: No     Universal Utilization:   ED Visits in last year: 0   Hospital visits in last year: 1  Last PCP appointment: 03/08/17  Missed Appointments:  (NA)  Concerns: Multiple providers  Multiple Providers or Specialists: Pulmonary,Audiology, Sleep Medicine, Dermatology    Clinical Concerns:  Current Medical Concerns:    DISCHARGE DIAGNOSIS:   1. Tibia/fibula fracture, right, sequela    2. Acute pain of right shoulder    3. Right elbow pain    4. Chronic obstructive pulmonary disease, unspecified COPD type (H)    5. Interstitial pulmonary fibrosis (H)    6. Type 2 diabetes mellitus without complication, without long-term current use of insulin (H)    7. Hypertension goal BP (blood pressure) < 140/90    8. Insomnia, unspecified type    Current Behavioral Concerns: Reports non  Education Provided to patient: Reviewed TCU discharge instructions  Clinical Pathway Name: None      Medication Management: Patient's friend Martina sets up her medications two weeks at a time.       Functional Status:  Mobility Status: Independent w/Device  Equipment Currently Used at Home: oxygen, respiratory supplies, walker, rolling  Transportation: Martina, her friend provides transportation.            Psychosocial:  Current living arrangement:: Patient lives alone. Reports her friend Martina is very supportive. Martina drives patient to her appointments and takes her grocery shopping.      Financial/Insurance: [facility] [employer]       Resources and Interventions:  Current Resources:  Home Care        Advanced Care Plans/Directives on file:: Yes  Referrals Placed:  (N/A)     Patient/Caregiver understanding: Patient  reports she is doing well at home. No increased SOB, uses continuous oxygen at 3 L, and BiPap at night. She is waiting for walker to be delivered to her home. Patient has a [facility] Nurse assessment today at 2:00. She will call to schedule TCU follow up clinic visit.     Frequency of Care Coordination: Not assessed  Upcoming appointment:  (Patient will call to schedule)     Plan: RN Care Coordinator will follow up with patient after home care is complete.    Chiara Hyde RN  Clinic Care Coordinator  [facility]  [phone]

## 2017-07-12 NOTE — PROGRESS NOTES
Clinic Care Coordination Contact  Care Team Conversations    RN Care Coordinator will contact patient tomorrow for follow up after TCU discharge.    Chiara Hyde RN  Clinic Care Coordinator  [facility]  [phone]

## 2017-07-13 ENCOUNTER — TELEPHONE (OUTPATIENT)
Dept: FAMILY MEDICINE | Facility: CLINIC | Age: 82
End: 2017-07-13

## 2017-07-18 ENCOUNTER — MEDICAL CORRESPONDENCE (OUTPATIENT)
Dept: HEALTH INFORMATION MANAGEMENT | Facility: CLINIC | Age: 82
End: 2017-07-18

## 2017-07-18 ENCOUNTER — OFFICE VISIT (OUTPATIENT)
Dept: FAMILY MEDICINE | Facility: CLINIC | Age: 82
End: 2017-07-18
Payer: COMMERCIAL

## 2017-07-18 ENCOUNTER — TELEPHONE (OUTPATIENT)
Dept: FAMILY MEDICINE | Facility: CLINIC | Age: 82
End: 2017-07-18

## 2017-07-18 VITALS
WEIGHT: 236 LBS | OXYGEN SATURATION: 95 % | HEART RATE: 61 BPM | DIASTOLIC BLOOD PRESSURE: 58 MMHG | TEMPERATURE: 99.1 F | BODY MASS INDEX: 37.93 KG/M2 | HEIGHT: 66 IN | SYSTOLIC BLOOD PRESSURE: 112 MMHG | RESPIRATION RATE: 14 BRPM

## 2017-07-18 DIAGNOSIS — S82.831D CLOSED FRACTURE FIBULA, HEAD, RIGHT, WITH ROUTINE HEALING, SUBSEQUENT ENCOUNTER: Primary | ICD-10-CM

## 2017-07-18 DIAGNOSIS — M25.511 ACUTE PAIN OF RIGHT SHOULDER: ICD-10-CM

## 2017-07-18 PROCEDURE — 99214 OFFICE O/P EST MOD 30 MIN: CPT | Performed by: FAMILY MEDICINE

## 2017-07-18 NOTE — PATIENT INSTRUCTIONS
Schedule MRI for right shoulder.    You may start physical therapy for range of motion for right shoulder.  Focus on gentle and slow movements and light weights.  If need specific referral, home health may send request to clinic.    Continue orthopedic recommendations for the right lower leg.  See ortho as planned tomorrow.    May take ibuprofen 200 1-2 tablets orally every 8 hrs as needed for pain; take with food.

## 2017-07-18 NOTE — PROGRESS NOTES
SUBJECTIVE:                                                    Susan Haq is a 85 year old female who presents to clinic today for the following health issues:          Hospital Follow-up Visit:    Hospital/Nursing Home/IP Rehab Facility: [facility]  Date of Admission: 6/13/2017   Date of Discharge: 7/7/2017  Reason(s) for Admission: Hospital f/u from initialfall at home on 6/10/2017.            Problems taking medications regularly:  None       Medication changes since discharge: None       Problems adhering to non-medication therapy:  None    Summary of hospitalization:  [facility] discharge summary reviewed  Discharge Orders         General info for SNF   Length of Stay Estimate: Short Term Care: Estimated # of Days <30  Condition at Discharge: Improving  Level of care:skilled   Rehabilitation Potential: Excellent  Admission H&P remains valid and up-to-date: Yes  Recent Chemotherapy: N/A  Use Nursing Home Standing Orders: Yes      Mantoux instructions   Give two-step Mantoux (PPD) Per Facility Policy Yes      Intake and output   Every shift      Daily weights   Call Provider for weight gain of more than 2 pounds per day or 5 pounds per week.      Reason for your hospital stay   Non-surgical tibia/fibula fracture      Activity - Up with nursing assistance      Follow-up and recommended labs and tests    Follow up with the physician at the TCU and with your primary care provider, Ema Melendez, within 2 days to evaluate medication change. We have discharged your without your home amlodipine nor clonidine. The following labs/tests are recommended: BP recheck.      Activity   Non-weight bearing to right lower extremity.  Weight bearing as tolerating to right upper extremity.      DNR/DNI      Physical Therapy Adult Consult   Evaluate and treat as clinically indicated.    Reason:  Non-surgical tib/fib fracture      Occupational Therapy Adult Consult   Evaluate and treat as clinically  indicated.    Reason:  Non-surgical tib/fib fracture      Fall precautions      Diet   Follow this diet upon discharge: Orders Placed This Encounter     Regular Diet Adult       [facility] discharge summary not available at time of this visit.  Diagnostic Tests/Treatments reviewed.  Follow up needed: per ortho  Other Healthcare Providers Involved in Patient s Care:         Homecare, Surgical follow-up appointment - ortho and Physical Therapy  Update since discharge: improved. Ambulation better with cane.  Still needs help cooking, showering, getting dressed , mainly due to right frozen shoulder.  Patient has a PCA.    Patient states right shjoulder developed less and less movement and more pain over the last few weeks.    Post Discharge Medication Reconciliation: discharge medications reconciled, continue medications without change.  Plan of care communicated with patient and caregiver     Coding guidelines for this visit:  Type of Medical   Decision Making Face-to-Face Visit       within 7 Days of discharge Face-to-Face Visit        within 14 days of discharge   Moderate Complexity [id number]   High Complexity [id number]                Problem list and histories reviewed & adjusted, as indicated.  Additional history: as documented    Patient Active Problem List   Diagnosis     Hyperlipidemia LDL goal <130     Insomnia     Osteopenia     Hypertension goal BP (blood pressure) < 140/90     Obesity     Advance care planning     Adenomatous polyp of colon     PVC's (premature ventricular contractions)     SUSYS (obstructive sleep apnea)-Moderately severe (AHI 21)     Bilateral leg edema     HL (hearing loss)     Umbilical hernia     SNHL (sensorineural hearing loss)     Interstitial pulmonary fibrosis (H)     Chronic obstructive pulmonary disease, unspecified COPD type (H)     Major depressive disorder, single episode, moderate (H)     Risk for falls     Auditory hallucination     Right elbow pain     Intertrigo      Alcohol abuse     Type 2 diabetes mellitus (H)     Acute pain of right shoulder     Tibia/fibula fracture, right, sequela     Morbid obesity (H)     Past Surgical History:   Procedure Laterality Date     APPENDECTOMY       C BSO, OMENTECTOMY W/SHANIA       C NONSPECIFIC PROCEDURE      (L) clavicle orif     C STOMACH SURGERY PROCEDURE UNLISTED       CHOLECYSTECTOMY, LAPOROSCOPIC  10/13/2011    Cholecystectomy, Laparoscopic     HERNIA REPAIR, UMBILICAL  10/13/2011     HYSTERECTOMY, CERVIX STATUS UNKNOWN         Social History   Substance Use Topics     Smoking status: Former Smoker     Packs/day: 1.00     Years: 35.00     Quit date: 1/1/1990     Smokeless tobacco: Former User     Alcohol use Yes      Comment: Drink 1 (3oz) drink a day     Family History   Problem Relation Age of Onset     DIABETES Father      Coronary Artery Disease Maternal Grandmother      Coronary Artery Disease Paternal Uncle          Current Outpatient Prescriptions   Medication Sig Dispense Refill     losartan-hydrochlorothiazide (HYZAAR) 100-25 MG per tablet Take 1 tablet by mouth daily       miconazole (MICATIN; MICRO GUARD) 2 % powder Apply topically 2 times daily 30 g 1     senna-docusate (SENOKOT-S;PERICOLACE) 8.6-50 MG per tablet Take 1-2 tablets by mouth 2 times daily as needed (constipation ) 100 tablet 0     acetaminophen (TYLENOL) 500 MG tablet Take 2 tablets (1,000 mg) by mouth every 8 hours 40 tablet 0     atorvastatin (LIPITOR) 20 MG tablet Take 1 tablet (20 mg) by mouth daily 90 tablet 3     potassium chloride (K-TAB,KLOR-CON) 10 MEQ tablet Take 1 tablet (10 mEq) by mouth daily 90 tablet 3     atenolol (TENORMIN) 50 MG tablet Take 1 tablet (50 mg) by mouth 2 times daily 180 tablet 3     budesonide-formoterol (SYMBICORT) 160-4.5 MCG/ACT Inhaler Inhale 2 puffs into the lungs 2 times daily 1 Inhaler 5     tiotropium (SPIRIVA HANDIHALER) 18 MCG capsule Inhale  into the lungs. Inhale contents of one capsule daily 90 capsule 3     albuterol  (PROAIR HFA/PROVENTIL HFA/VENTOLIN HFA) 108 (90 BASE) MCG/ACT Inhaler Inhale 2 puffs into the lungs every 4 hours as needed for shortness of breath / dyspnea or wheezing 3 Inhaler 3     Respiratory Therapy Supplies (NEBULIZER COMPRESSOR) KIT 1 kit every 4 hours as needed 1 kit 0     albuterol (2.5 MG/3ML) 0.083% nebulizer solution Take 1 vial (2.5 mg) by nebulization every 4 hours as needed for shortness of breath / dyspnea or wheezing 1 Box 0     ORDER FOR DME Equipment being ordered: Oxygen - 3 liters continous 1 Device 0     ORDER FOR DME Equipment being ordered: CPAP supplies 1 Units 0     ORDER FOR DME Equipment being ordered: Oxygen 1 Units 0     ORDER FOR DME 1.  CPAP pressure 12 cm/H20 with heated humidity and auto-titrating capability.   2.  Provide mask to fit and CPAP supplies.  3.  Length of need lifetime.  4.  Ok to d/c O2 bleed in to her PAP overnight.           Calcium Carbonate-Vitamin D (CALCIUM 600 + D OR) Take  by mouth.       FISH OIL 1000 MG OR CAPS 1 cap daily       SIMETHICONE-80 PO Take 80 mg by mouth every morning And TID prn belching       ORDER FOR DME TEDs stockings knee high to be worn during the day. 1 Units 0     Allergies   Allergen Reactions     Ace Inhibitors Cough     Asa [Aluminum Hydroxide]      Penicillins        Reviewed and updated as needed this visit by clinical staff  Tobacco  Allergies  Med Hx  Surg Hx  Fam Hx  Soc Hx      Reviewed and updated as needed this visit by Provider         ROS:  C: NEGATIVE for fever, chills, change in weight  I: NEGATIVE for worrisome rashes, moles or lesions  E: NEGATIVE for vision changes or irritation  E/M: NEGATIVE for ear, mouth and throat problems  R: NEGATIVE for significant cough or SOB  CV: NEGATIVE for chest pain, palpitations or peripheral edema  GI: NEGATIVE for nausea, abdominal pain, heartburn, or change in bowel habits  : NEGATIVE for frequency, dysuria, or hematuria  MUSCULOSKELETAL:see above  N: NEGATIVE for weakness,  "dizziness or paresthesias  E: NEGATIVE for temperature intolerance, skin/hair changes  H: NEGATIVE for bleeding problems  P: NEGATIVE for changes in mood or affect    OBJECTIVE:                                                    /58  Pulse 61  Temp 99.1  F (37.3  C) (Tympanic)  Resp 14  Ht 5' 6\" (1.676 m)  Wt 236 lb (107 kg)  SpO2 95%  BMI 38.09 kg/m2  Body mass index is 38.09 kg/(m^2).  GENERAL: alert and no distress, ambulates with some antalgia and with use of cane  RGIHT SHOULDER: no swselling/deformity/atrophy/discoloration; limited AROM due to pain and stiffness; better range of motion on passive exam with anterosuperior pain on elevation beyond 135 degrees; no clicks/crepitation; dminished strength due to pain reported along medial upper and proximal forearm when resisting   RIGHT LOWER LEG: walking boot in place; not removed today.  \      Diagnostic test results:  Diagnostic Test Results:  Results for orders placed or performed during the hospital encounter of 06/10/17   Splint application    Narrative    Anton Lopez MD     6/10/2017  6:45 PM  Splint application  Date/Time: 6/10/2017 4:57 PM  Performed by: ANTON LOPEZ  Authorized by: ANTON LOPEZ   Consent: Verbal consent obtained.  Risks and benefits: risks, benefits and alternatives were discussed  Consent given by: patient  Required items: required blood products, implants, devices, and special   equipment available  Patient identity confirmed: verbally with patient  Location details: right leg  Splint type: long leg  Supplies used: Ortho-Glass  Post-procedure: The splinted body part was neurovascularly unchanged   following the procedure.  Patient tolerance: Patient tolerated the procedure well with no immediate   complications     MR Brain w/o & w Contrast    Narrative    MRI BRAIN WITHOUT AND WITH CONTRAST  6/10/2017 4:56 PM    HISTORY:  Fall today, striking occiput; one week of constant auditory  hallucinations, constant hearing " of music.     TECHNIQUE:  Multiplanar, multisequence MRI of the brain without and  with 10 mL IV Gadavist.    COMPARISON: None.    FINDINGS:  There is generalized atrophy of the brain.  White matter  changes are present in the cerebral hemispheres that are consistent  with small vessel ischemic disease in this age patient. Old lacunar  infarct is seen in the right corona radiata and upper right putamen.  There is no evidence of hemorrhage, mass, acute infarct, or anomaly.   There are no gadolinium enhancing lesions.    The facial structures appear normal. The arteries at the base of the  brain and the dural venous sinuses appear patent.       Impression    IMPRESSION:  1. No acute abnormality. No evidence of intracranial trauma.  2. Brain atrophy and white matter changes consistent with sequelae of  small vessel ischemic disease.  3. Old lacunar infarct in the right corona radiata and upper right  putamen.    ROBBIE FARFAN MD   Radius/Ulna XR, PA & LAT, right    Narrative    RIGHT FOREARM TWO VIEWS   6/10/2017 3:53 PM     HISTORY: Fall, forearm injury.    COMPARISON: None.      Impression    IMPRESSION: Proximal and distal radial and ulnar articulations intact.  No acute fracture.       RAMILA BARNES MD   Foot  XR, G/E 3 views, right    Narrative    RIGHT FOOT THREE OR MORE VIEWS  6/10/2017 3:52 PM     HISTORY: Ecchymosis distal toes, pain at left ankle, foot ecchymosis,  bruising.    COMPARISON: None.      Impression    IMPRESSION: Mild hammertoe alignment of second through fifth toes.  Bones are normally aligned. Lucency in the mid fifth proximal phalanx,  suggestive of a nondisplaced fracture, clinically correlate with pain.  Additional lucency in the distal aspect of the second proximal  phalanx, possible location of fracture given the appearance, though  this may be somewhat degenerative.     RAMILA BARNES MD   Tib/Fib XR, right    Narrative    RIGHT TIBIA AND FIBULA TWO VIEWS   6/10/2017 3:52 PM      HISTORY: Ankle pain, tib-fib pain.    COMPARISON: None.      Impression    IMPRESSION: Oblique fracture through the distal fibula, minimally  displaced, extending to the level of the ankle mortise. Minimally  displaced fracture in the proximal aspect of the fibula, as well. This  may be associated with damage to interosseous ligament.     RAMILA BARNES MD   Pelvis XR, 1-2 views    Narrative    PELVIS ONE TO TWO VIEWS  6/10/2017 3:51 PM     HISTORY: Fall.    COMPARISON: September 13, 2016.      Impression    IMPRESSION: Both femoral heads articulate normally with the respective  acetabula. Minimal joint space narrowing, though no osteophyte  formation. Right lateral knee radiograph demonstrates normal alignment  without effusion. Proximal fibular fracture again visible.     RAMILA BARNES MD   XR Shoulder Right Port G/E 2 Views    Narrative    SHOULDER TWO VIEWS RIGHT  6/11/2017 10:42 AM     HISTORY: pain    COMPARISON: None.      Impression    IMPRESSION : No significant degenerative change or acute bony  abnormality. No fracture or dislocation.    SHANTA HOOKS MD   XR Elbow Port Right 2 Views    Narrative    ELBOW PORTABLE RIGHT TWO VIEWS June 12, 2017 6:30 AM     HISTORY: Right elbow pain.    COMPARISON: None.    FINDINGS: The visualized bones and joint spaces are within normal  limits.      Impression    IMPRESSION: No evidence for fracture, dislocation or significant  degenerative change of the right elbow on these two portable views.    KATT CHOWDHURY MD   CBC with platelets differential   Result Value Ref Range    WBC  4.0 - 11.0 10e9/L     Canceled, Test credited   Unsatisfactory specimen - clotted  specimen will need to be recollected, notified Tiffany Fuentes RN [place] 6/10/17 1630   JM  CORRECTED ON 06/10 AT 1707: PREVIOUSLY REPORTED AS 8.7      RBC Count  3.8 - 5.2 10e12/L     Canceled, Test credited   Unsatisfactory specimen - clotted  specimen will need to be recollected, notified Tiffany Fuentes RN  [place] 6/10/17 1630   JM  CORRECTED ON 06/10 AT 1707: PREVIOUSLY REPORTED AS 3.84      Hemoglobin  11.7 - 15.7 g/dL     Canceled, Test credited   Unsatisfactory specimen - clotted  specimen will need to be recollected, notified ARIADNA Barahona 6/10/17 1630   JM  CORRECTED ON 06/10 AT 1707: PREVIOUSLY REPORTED AS 12.1      Hematocrit  35.0 - 47.0 %     Canceled, Test credited   Unsatisfactory specimen - clotted  specimen will need to be recollected, notified ARIADNA Barahona 6/10/17 1630   JM  CORRECTED ON 06/10 AT 1707: PREVIOUSLY REPORTED AS 36.2      MCV  78 - 100 fl     Canceled, Test credited   Unsatisfactory specimen - clotted  specimen will need to be recollected, notified ARIADNA Barahona 6/10/17 1630   JM  CORRECTED ON 06/10 AT 1707: PREVIOUSLY REPORTED AS 94      MCH  26.5 - 33.0 pg     Canceled, Test credited   Unsatisfactory specimen - clotted  specimen will need to be recollected, notified ARIADNA Barahona 6/10/17 1630   JM  CORRECTED ON 06/10 AT 1707: PREVIOUSLY REPORTED AS 31.5      MCHC  31.5 - 36.5 g/dL     Canceled, Test credited   Unsatisfactory specimen - clotted  specimen will need to be recollected, notified ARIADNA Barahona 6/10/17 1630   JM  CORRECTED ON 06/10 AT 1707: PREVIOUSLY REPORTED AS 33.4      RDW  10.0 - 15.0 %     Canceled, Test credited   Unsatisfactory specimen - clotted  specimen will need to be recollected, notified ARIADNA Barahona 6/10/17 1630   JM  CORRECTED ON 06/10 AT 1707: PREVIOUSLY REPORTED AS 12.7      Platelet Count  150 - 450 10e9/L     Canceled, Test credited   Unsatisfactory specimen - clotted  specimen will need to be recollected, notified ARIADNA Barahona 6/10/17 1630   JM  CORRECTED ON 06/10 AT 1707: PREVIOUSLY REPORTED AS 58      Diff Method       Canceled, Test credited   Unsatisfactory specimen - clotted  specimen will need to be recollected, notified ARIADNA Barahona 6/10/17 1630   JM  CORRECTED ON 06/10 AT 1707: PREVIOUSLY  REPORTED AS Automated Method      % Neutrophils  %     Canceled, Test credited   Unsatisfactory specimen - clotted  CORRECTED ON 06/10 AT 1903: PREVIOUSLY REPORTED AS 78.4      % Lymphocytes  %     Canceled, Test credited   Unsatisfactory specimen - clotted  CORRECTED ON 06/10 AT 1903: PREVIOUSLY REPORTED AS 11.4      % Monocytes  %     Canceled, Test credited   Unsatisfactory specimen - clotted  CORRECTED ON 06/10 AT 1903: PREVIOUSLY REPORTED AS 9.2      % Eosinophils  %     Canceled, Test credited   Unsatisfactory specimen - clotted  CORRECTED ON 06/10 AT 1906: PREVIOUSLY REPORTED AS 0.6      % Basophils  %     Canceled, Test credited   Unsatisfactory specimen - clotted  CORRECTED ON 06/10 AT 1903: PREVIOUSLY REPORTED AS 0.1      % Immature Granulocytes  %     Canceled, Test credited   Unsatisfactory specimen - clotted  CORRECTED ON 06/10 AT 1903: PREVIOUSLY REPORTED AS 0.3      Absolute Neutrophil  1.6 - 8.3 10e9/L     Canceled, Test credited   Unsatisfactory specimen - clotted  CORRECTED ON 06/10 AT 1903: PREVIOUSLY REPORTED AS 6.8      Absolute Lymphocytes  0.8 - 5.3 10e9/L     Canceled, Test credited   Unsatisfactory specimen - clotted  CORRECTED ON 06/10 AT 1903: PREVIOUSLY REPORTED AS 1.0      Absolute Monocytes  0.0 - 1.3 10e9/L     Canceled, Test credited   Unsatisfactory specimen - clotted  CORRECTED ON 06/10 AT 1903: PREVIOUSLY REPORTED AS 0.8      Absolute Eosinophils  0.0 - 0.7 10e9/L     Canceled, Test credited   Unsatisfactory specimen - clotted  CORRECTED ON 06/10 AT 1903: PREVIOUSLY REPORTED AS 0.1      Absolute Basophils  0.0 - 0.2 10e9/L     Canceled, Test credited   Unsatisfactory specimen - clotted  CORRECTED ON 06/10 AT 1906: PREVIOUSLY REPORTED AS 0.0      Abs Immature Granulocytes  0 - 0.4 10e9/L     Canceled, Test credited   Unsatisfactory specimen - clotted  CORRECTED ON 06/10 AT 1907: PREVIOUSLY REPORTED AS 0.0     Comprehensive metabolic panel   Result Value Ref Range    Sodium 140 133 -  144 mmol/L    Potassium 3.5 3.4 - 5.3 mmol/L    Chloride 104 94 - 109 mmol/L    Carbon Dioxide 28 20 - 32 mmol/L    Anion Gap 8 3 - 14 mmol/L    Glucose 119 (H) 70 - 99 mg/dL    Urea Nitrogen 27 7 - 30 mg/dL    Creatinine 1.01 0.52 - 1.04 mg/dL    GFR Estimate 52 (L) >60 mL/min/1.7m2    GFR Estimate If Black 63 >60 mL/min/1.7m2    Calcium 8.3 (L) 8.5 - 10.1 mg/dL    Bilirubin Total 0.6 0.2 - 1.3 mg/dL    Albumin 3.1 (L) 3.4 - 5.0 g/dL    Protein Total 5.8 (L) 6.8 - 8.8 g/dL    Alkaline Phosphatase 109 40 - 150 U/L    ALT 26 0 - 50 U/L    AST 18 0 - 45 U/L   Troponin I   Result Value Ref Range    Troponin I ES  0.000 - 0.045 ug/L     <0.015  The 99th percentile for upper reference range is 0.045 ug/L.  Troponin values in   the range of 0.045 - 0.120 ug/L may be associated with risks of adverse   clinical events.     CK total   Result Value Ref Range    CK Total 80 30 - 225 U/L   TSH with free T4 reflex   Result Value Ref Range    TSH 3.06 0.40 - 4.00 mU/L   Vitamin B12   Result Value Ref Range    Vitamin B12 560 193 - 986 pg/mL   INR   Result Value Ref Range    INR  0.86 - 1.14     Canceled, Test credited   Unsatisfactory specimen - clotted  Specimen will need to be recollected, notified Libby in [place] 6/10/17 1600   CORRECTED ON 06/10 AT 1704: PREVIOUSLY REPORTED AS 1.12     Partial thromboplastin time   Result Value Ref Range    PTT  22 - 37 sec     Canceled, Test credited   Unsatisfactory specimen - clotted  Specimen will need to be recollected, notified Libby in [facility] 6/10/17 1600      Lactate Dehydrogenase   Result Value Ref Range    Lactate Dehydrogenase 267 (H) 81 - 234 U/L   Blood Morphology Pathology Review   Result Value Ref Range    Copath Report       Patient Name: OLIVA NVAA  MR#: [id number]  Specimen #: [id number]  Collected: 6/10/2017  Received: 6/12/2017  Reported: 6/14/2017 19:14  Ordering Phy(s): LAINEY RENEE    For improved result formatting, select 'View Enhanced Report Format'  under  Linked Documents section.    TEST(S):  Peripheral Smear Morphology    FINAL DIAGNOSIS:  Peripheral blood morphology:  - No diagnostic abnormalities on 6/10/2017.    COMMENT:  The patient's hemoglobin has decreased from 13.1 g/dL to 11.6 g/dL on  6/11/2017 and 11.8 g/dL on 6/11/2017.  The hemoglobin level of 11.6 g/dL  with decreased red blood cell count qualifies for normocytic anemia.  The hemoglobin level and red blood cell count on 6/12/2017 are within  normal limits.    Normocytic anemia may be due dilutional or due to early vitamin B12,  folate, or iron deficiency, blood loss, anemia of chronic disease,  chronic renal disease, primary bone marrow disorder or bone marrow  suppression (i.e. toxin such as alcohol, viral or drug i nduced), or  medications, among other causes.  In this patient vitamin B12 and folate  are normal, GFR varied from 52 on 6/10/17 to 73 on 6/12/17, creatinine  is normal and TSH is normal.  Iron studies may be helpful if hemoglobin  falls below normal again..    Electronically signed out by:    Deon Nevarez M.D.    CLINICAL HISTORY:  85 year old female.  From electronic medical record:  COPD, type 2  diabetes mellitus, hypertension, SUSSY, interstitial pulmonary fibrosis,  alcohol abuse, osteopenia.  On 6/10/2017 folate, vitamin AB12, TSH,  creatinine, ALT, AST, INR were normal, GFR was decreased at 52,  haptoglobin was increased at 228 mg/dL (normal range  mg/dL) and  PTT was decreased at 20 sec (normal range 22-37 sec).  On 6/12/2017 GFR  was normal at 73 and creatinine was normal.  Additional CBC data is as  follows:    Date         WBC   RBC  Hemoglobin  HCT   MCV   MCHC  RDW  Platelet  3/8/17        8.50    4.20   13.1         39.7    95     33.0    13.2  223  6/11/17        7.10    3.69   11.6         34.8    94     33.3    12.7  193  6/12/17       6.30    3.77   11.8         35.2    93     33.5    12.6  204    PERIPHERAL BLOOD DATA:  PERIPHERAL BLOOD DATA (Date:  06/10/2017  )  Patient Value (Reference Range >18 year old female)    8.30    WBC        (4.0-11.0 x 10*9/L)  3.93    RBC         (3.8-5.2 x 10*12/L)  12.3    HGB         (11.7-15.7 g/dL)  36.7    HCT         (35.0-47.0 %)  93.4    MCV        (78-100fL)  31.3    MCH        (26.5-33.0 pg)  33.5    MCHC      (31.5-36.5 g/dL)  12.7    RDW       (10.0-15.0 %)   199    PLT         (150-450 x 10*9/L)    PERIPHERAL BLOOD DIFFERENTIAL - Manual 200 cells.  (Reference ranges >18 year old)    Percent  73.0%  Neutrophils  15.0%  Lymphocytes  12.0%  Monocytes   0.0%  Eosinophils   0.0%  Basophils    Absolute  6.06   Neutrophils  (Ref normal 1.6 - 8.3 x 10*9/L)  12.5   Lymphocytes  (Ref normal 0.8 - 5.3 x 10*9/L)  1.00   Monocytes  (Ref normal 0 -1.3 x 10*9/L)  0.00   Eosinophils  (Ref normal 0 - 0.7 x 10*9/L)   0.00   Basophils  (Ref normal 0 - 0.2 x 10*9/L)    PERIPHERAL BLOOD MORPHOLOGY:    ERYTHROCYTES:  The red cells are normocytic, normochromic and normal in  number for the patient's age and gender.  Anisocytosis is minimal.  There is an occasional ovalocyte.  No features of hemolysis or increased  polychromasia are identified.  No intracellular microorganisms are  identified.    LEUKOCYTES:  The leukocytes are normal in number, morphology and  differential distribution. No immature precursors or evidence of  neutrophilic dysplasia is seen. No atypical lymphoid cells are seen. No  intracellular microorganisms are identified.    PLATELETS:  The platelets are normal in number and morphology.    CPT Codes:  A: 41862-XYZE    TESTING LAB LOCATION:  [address] [address] [phone]    COLLECTION SITE:  Client:  [facility]  Location:  [facility] (K)     Haptoglobin   Result Value Ref Range    Haptoglobin 228 (H) 35 - 175 mg/dL   CBC with platelets differential   Result Value Ref Range    WBC  4.0 - 11.0 10e9/L     Canceled, Test  credited   Unsatisfactory specimen - clotted  Notified [facility] 6/10/17 1800       RBC Count  3.8 - 5.2 10e12/L     Canceled, Test credited   Unsatisfactory specimen - clotted  Notified [facility] 6/10/17 1800       Hemoglobin  11.7 - 15.7 g/dL     Canceled, Test credited   Unsatisfactory specimen - clotted  Notified [facility] 6/10/17 1800       Hematocrit  35.0 - 47.0 %     Canceled, Test credited   Unsatisfactory specimen - clotted  Notified [facility] 6/10/17 1800       MCV  78 - 100 fl     Canceled, Test credited   Unsatisfactory specimen - clotted  Notified [facility] 6/10/17 1800       MCH  26.5 - 33.0 pg     Canceled, Test credited   Unsatisfactory specimen - clotted  Notified [facility] 6/10/17 1800       MCHC  31.5 - 36.5 g/dL     Canceled, Test credited   Unsatisfactory specimen - clotted  Notified [facility] 6/10/17 1800       RDW  10.0 - 15.0 %     Canceled, Test credited   Unsatisfactory specimen - clotted  Notified [facility] 6/10/17 1800       Platelet Count  150 - 450 10e9/L     Canceled, Test credited   Unsatisfactory specimen - clotted  Notified [facility] 6/10/17 1800       Diff Method       Canceled, Test credited   Unsatisfactory specimen - clotted  Notified [facility] 6/10/17 1800      INR   Result Value Ref Range    INR 1.03 0.86 - 1.14   Partial thromboplastin time   Result Value Ref Range    PTT 20 (L) 22 - 37 sec   Folate   Result Value Ref Range    Folate 31.2 >5.4 ng/mL   CBC with platelets differential   Result Value Ref Range    WBC 8.3 4.0 - 11.0 10e9/L    RBC Count 3.93 3.8 - 5.2 10e12/L    Hemoglobin 12.3 11.7 - 15.7 g/dL    Hematocrit 36.7 35.0 - 47.0 %    MCV 93 78 - 100 fl    MCH 31.3 26.5 - 33.0 pg    MCHC 33.5 31.5 - 36.5 g/dL    RDW 12.7 10.0 - 15.0 %    Platelet Count 199 150 - 450 10e9/L    Diff Method Automated Method     % Neutrophils 69.6 %    % Lymphocytes 15.5 %    % Monocytes 13.9 %    % Eosinophils 0.5 %    % Basophils 0.1 %    % Immature Granulocytes 0.4  %    Absolute Neutrophil 5.8 1.6 - 8.3 10e9/L    Absolute Lymphocytes 1.3 0.8 - 5.3 10e9/L    Absolute Monocytes 1.2 0.0 - 1.3 10e9/L    Absolute Eosinophils 0.0 0.0 - 0.7 10e9/L    Absolute Basophils 0.0 0.0 - 0.2 10e9/L    Abs Immature Granulocytes 0.0 0 - 0.4 10e9/L   CBC with platelets differential   Result Value Ref Range    WBC 7.1 4.0 - 11.0 10e9/L    RBC Count 3.69 (L) 3.8 - 5.2 10e12/L    Hemoglobin 11.6 (L) 11.7 - 15.7 g/dL    Hematocrit 34.8 (L) 35.0 - 47.0 %    MCV 94 78 - 100 fl    MCH 31.4 26.5 - 33.0 pg    MCHC 33.3 31.5 - 36.5 g/dL    RDW 12.7 10.0 - 15.0 %    Platelet Count 193 150 - 450 10e9/L    Diff Method Automated Method     % Neutrophils 60.1 %    % Lymphocytes 20.5 %    % Monocytes 16.6 %    % Eosinophils 2.3 %    % Basophils 0.1 %    % Immature Granulocytes 0.4 %    Absolute Neutrophil 4.2 1.6 - 8.3 10e9/L    Absolute Lymphocytes 1.5 0.8 - 5.3 10e9/L    Absolute Monocytes 1.2 0.0 - 1.3 10e9/L    Absolute Eosinophils 0.2 0.0 - 0.7 10e9/L    Absolute Basophils 0.0 0.0 - 0.2 10e9/L    Abs Immature Granulocytes 0.0 0 - 0.4 10e9/L   Basic metabolic panel   Result Value Ref Range    Sodium 143 133 - 144 mmol/L    Potassium 3.2 (L) 3.4 - 5.3 mmol/L    Chloride 106 94 - 109 mmol/L    Carbon Dioxide 29 20 - 32 mmol/L    Anion Gap 8 3 - 14 mmol/L    Glucose 115 (H) 70 - 99 mg/dL    Urea Nitrogen 24 7 - 30 mg/dL    Creatinine 0.79 0.52 - 1.04 mg/dL    GFR Estimate 69 >60 mL/min/1.7m2    GFR Estimate If Black 84 >60 mL/min/1.7m2    Calcium 8.1 (L) 8.5 - 10.1 mg/dL   UA reflex to Microscopic and Culture   Result Value Ref Range    Color Urine Yellow     Appearance Urine Cloudy     Glucose Urine Negative NEG mg/dL    Bilirubin Urine Negative NEG    Ketones Urine Negative NEG mg/dL    Specific Gravity Urine 1.018 1.003 - 1.035    Blood Urine Trace (A) NEG    pH Urine 6.0 5.0 - 7.0 pH    Protein Albumin Urine 30 (A) NEG mg/dL    Urobilinogen mg/dL Normal 0.0 - 2.0 mg/dL    Nitrite Urine Positive (A) NEG     Leukocyte Esterase Urine Large (A) NEG    Source Unspecified Urine     RBC Urine 59 (H) 0 - 2 /HPF    WBC Urine >182 (H) 0 - 2 /HPF    WBC Clumps Present (A) NEG /HPF    Bacteria Urine Many (A) NEG /HPF    Squamous Epithelial /HPF Urine 97 (H) 0 - 1 /HPF    Transitional Epi 19 (H) 0 - 1 /HPF    Mucous Urine Present (A) NEG /LPF   CBC with platelets   Result Value Ref Range    WBC 6.3 4.0 - 11.0 10e9/L    RBC Count 3.77 (L) 3.8 - 5.2 10e12/L    Hemoglobin 11.8 11.7 - 15.7 g/dL    Hematocrit 35.2 35.0 - 47.0 %    MCV 93 78 - 100 fl    MCH 31.3 26.5 - 33.0 pg    MCHC 33.5 31.5 - 36.5 g/dL    RDW 12.6 10.0 - 15.0 %    Platelet Count 204 150 - 450 10e9/L   Basic metabolic panel   Result Value Ref Range    Sodium 141 133 - 144 mmol/L    Potassium 3.4 3.4 - 5.3 mmol/L    Chloride 109 94 - 109 mmol/L    Carbon Dioxide 28 20 - 32 mmol/L    Anion Gap 4 3 - 14 mmol/L    Glucose 110 (H) 70 - 99 mg/dL    Urea Nitrogen 18 7 - 30 mg/dL    Creatinine 0.75 0.52 - 1.04 mg/dL    GFR Estimate 73 >60 mL/min/1.7m2    GFR Estimate If Black 88 >60 mL/min/1.7m2    Calcium 8.2 (L) 8.5 - 10.1 mg/dL   Orthopedic Surgery IP Consult: Patient to be seen: Routine - within 24 hours; fall/trauma, fracture; Consultant may enter orders: Yes    Narrative    Miguel A Mcintosh PA-C     2017 11:00 AM  [facility] Consultation    Consultation - [facility]  Level of consult: Consult, follow and place orders    MARQUIS Navarrete 1/10/1932, MRN [id number]     Admitting Dx: Auditory hallucinations [R44.0]  Interstitial pulmonary fibrosis (H) [J84.10]  Closed nondisplaced fracture of proximal phalanx of lesser toe of   right foot, initial encounter [S92.514A]  Fall, initial encounter [W19.XXXA]  Closed fracture of proximal end of fibula, unspecified fracture   morphology, initial encounter [S82.839A]  Closed fracture of distal end of right fibula, unspecified   fracture morphology, initial encounter [S82.831A]     PCP:  Ema Melendez, [phone]     Code status:  DNR/DNI     Extended Emergency Contact Information  Primary Emergency Contact: Martina Pickard   [facility]  Home Phone: [phone]  Work Phone: [phone]  Mobile Phone: [phone]  Relation: Friend     Assessment:  Right distal fibula fracture and right shoulder pain    Plan:  Patient should remain NWB on RLE.  Splint should not be removed   until follow up visit.  She can wear a sling for comfort on the   right arm if she chooses.    OK to d/c NPO status.  She should follow up with Dr. Carrillo Kiser on Thursday in [place].  Call [phone] to   schedule.      Principal Problem:    Closed fracture of fibula  Active Problems:    Chronic obstructive pulmonary disease, unspecified COPD type   (H)    Major depressive disorder, single episode, moderate (H)    Risk for falls    Auditory hallucination    Right elbow pain    Alcohol abuse    Hyperlipidemia LDL goal <130    Insomnia    Hypertension goal BP (blood pressure) < 140/90    SUSSY (obstructive sleep apnea)-Moderately severe (AHI 21)    Bilateral leg edema    Prediabetes    Interstitial pulmonary fibrosis (H)    Intertrigo       Chief Complaint  Right ankle pain and right shoulder pain     HPI  We have been requested by Dr. Anton Haro MD to evaluate Susan Haq who is a 85 year old year old female for a right distal   fibula fracture, questionable mid 5th proximal phalanx fracture   and right shoulder pain.  Yesterday morning around 0900 the   patient had removed a water jug from the refrigerator to pour a   glass of water.  She took the glass, turned and tripped on a new   shaggy rug she had placed there a few days prior.  She said she   fell 'flat down' and immediately felt intense pain at her ankle.    She stayed on the floor because she 'had nothing to grab onto' to   pull herself up.  She had friends that were scheduled to stop by   that morning so she waited on the floor until  they arrived and   could take her to ED.  She describes the pain as sharp primarily   on the lateral aspect of the ankle.  It worsens with movement and   improves with rest.  She also mentions shoulder pain.  She   doesn't recall falling on the shoulder, but cannot raise it up   without pain.  The right shoulder pain is predominantly on the   lateral aspect.  She denies any toe pain at this time.  She   denies any LOC, numbness or tingling.       History is obtained from the patient     Past Medical History  Past Medical History:   Diagnosis Date     Basal cell carcinoma      Bronchospasm     copd vs. asthma     Diverticulosis      Fracture, Metacarpal Shaft - right 4th 7/5/2010     High cholesterol      HL (hearing loss) 3/25/2013     Hypertension      Hypokalemia 9/17/2013     Hypoxia 9/5/2013     PVC's (premature ventricular contractions) 4/25/2011     Smoker 1986    quit     Venous insufficiency        Surgical History  Past Surgical History:   Procedure Laterality Date     APPENDECTOMY       C BSO, OMENTECTOMY W/SHANIA       C NONSPECIFIC PROCEDURE      (L) clavicle orif     C STOMACH SURGERY PROCEDURE UNLISTED       CHOLECYSTECTOMY, LAPOROSCOPIC  10/13/2011    Cholecystectomy, Laparoscopic     HERNIA REPAIR, UMBILICAL  10/13/2011     HYSTERECTOMY, CERVIX STATUS UNKNOWN          Social History  Social History     Social History     Marital status:      Spouse name: N/A     Number of children: N/A     Years of education: N/A     Occupational History      Retired      in [employer]     Social History Main Topics     Smoking status: Former Smoker     Packs/day: 1.00     Years: 35.00     Quit date: 1/1/1990     Smokeless tobacco: Former User     Alcohol use Yes      Comment: Drink 1 (3oz) drink a day     Drug use: No     Sexual activity: No     Other Topics Concern     Not on file     Social History Narrative    Lives independently in own home.       Family History  Family History   Problem  Relation Age of Onset     DIABETES Father      Coronary Artery Disease Maternal Grandmother      Coronary Artery Disease Paternal Uncle         Allergies:  Ace inhibitors; Asa [aluminum hydroxide]; and Penicillins      Current Medications:  Current Facility-Administered Medications   Medication     0.9% sodium chloride infusion     traMADol (ULTRAM) half-tab 25-50 mg     albuterol neb solution 2.5 mg     hydrOXYzine (ATARAX) tablet 25-50 mg     fluticasone-vilanterol (BREO ELLIPTA) 200-25 MCG/INH oral   inhaler 1 puff     umeclidinium (INCRUSE ELLIPTA) 62.5 MCG/INH oral inhaler 1 puff       naloxone (NARCAN) injection 0.1-0.4 mg     acetaminophen (TYLENOL) tablet 650 mg     senna-docusate (SENOKOT-S;PERICOLACE) 8.6-50 MG per tablet 1-2   tablet     ondansetron (ZOFRAN-ODT) ODT tab 4 mg    Or     ondansetron (ZOFRAN) injection 4 mg     acetaminophen (TYLENOL) tablet 1,000 mg     miconazole (MICATIN; MICRO GUARD) 2 % powder     atenolol (TENORMIN) tablet 50 mg     atorvastatin (LIPITOR) tablet 20 mg       Review of Systems:  The Review of Systems is negative other than noted in the HPI    Physical Exam:  Temp:  [97.7  F (36.5  C)-98.7  F (37.1  C)] 97.7  F (36.5  C)  Pulse:  [57-75] 61  Heart Rate:  [58-61] 61  Resp:  [16-20] 18  BP: (125-179)/(44-84) 141/44  SpO2:  [89 %-98 %] 97 %    The patient is alert and oriented x 3 and is not in any acute   distress.    She does not have labored breathing and her color and turgor are   normal.    The right ankle is in a fiberglass splint with ace bandage   covering.  She has good capillary refill at both the fingers and   toes bilaterally and her sensation is intact to light touch.  She   can easily wiggle her right toes without pain. She does not have   any pain with palpation of the little toe.  She is tender over   the right distal fibula.  She is unable to move her ankle at this   time due to the splint.  Her right shoulder is slightly tender   when palpated over the  entire lateral aspect.  She has limited   motion due to pain.       Pertinent Labs  Lab Results: personally reviewed.  Lab Results   Component Value Date    WBC 7.1 06/11/2017    HGB 11.6 (L) 06/11/2017    HCT 34.8 (L) 06/11/2017    MCV 94 06/11/2017     06/11/2017       Recent Labs  Lab 06/10/17  1735 06/10/17  1440   INR 1.03 Canceled, Test credited Unsatisfactory specimen -   clottedSpecimen will need to be recollected, notified Libby in   [place] 6/10/17 1600 JMCORRECTED ON 06/10 AT 1704: PREVIOUSLY   REPORTED AS 1.12       Pertinent Radiology  Radiology Results: images and radiology report reviewed  Recent Results (from the past 24 hour(s))   Pelvis XR, 1-2 views    Narrative    PELVIS ONE TO TWO VIEWS  6/10/2017 3:51 PM     HISTORY: Fall.    COMPARISON: September 13, 2016.      Impression    IMPRESSION: Both femoral heads articulate normally with the   respective  acetabula. Minimal joint space narrowing, though no osteophyte  formation. Right lateral knee radiograph demonstrates normal   alignment  without effusion. Proximal fibular fracture again visible.     RAMILA BRANES MD   Tib/Fib XR, right    Narrative    RIGHT TIBIA AND FIBULA TWO VIEWS   6/10/2017 3:52 PM     HISTORY: Ankle pain, tib-fib pain.    COMPARISON: None.      Impression    IMPRESSION: Oblique fracture through the distal fibula,   minimally  displaced, extending to the level of the ankle mortise. Minimally  displaced fracture in the proximal aspect of the fibula, as well.   This  may be associated with damage to interosseous ligament.     RAMILA BARNES MD   Foot  XR, G/E 3 views, right    Narrative    RIGHT FOOT THREE OR MORE VIEWS  6/10/2017 3:52 PM     HISTORY: Ecchymosis distal toes, pain at left ankle, foot   ecchymosis,  bruising.    COMPARISON: None.      Impression    IMPRESSION: Mild hammertoe alignment of second through fifth   toes.  Bones are normally aligned. Lucency in the mid fifth proximal   phalanx,  suggestive of a  nondisplaced fracture, clinically correlate with   pain.  Additional lucency in the distal aspect of the second proximal  phalanx, possible location of fracture given the appearance,   though  this may be somewhat degenerative.     RAMILA BARNES MD   Radius/Ulna XR, PA & LAT, right    Narrative    RIGHT FOREARM TWO VIEWS   6/10/2017 3:53 PM     HISTORY: Fall, forearm injury.    COMPARISON: None.      Impression    IMPRESSION: Proximal and distal radial and ulnar articulations   intact.  No acute fracture.       RAMILA BARNES MD   MR Brain w/o & w Contrast    Narrative    MRI BRAIN WITHOUT AND WITH CONTRAST  6/10/2017 4:56 PM    HISTORY:  Fall today, striking occiput; one week of constant   auditory  hallucinations, constant hearing of music.     TECHNIQUE:  Multiplanar, multisequence MRI of the brain without   and  with 10 mL IV Gadavist.    COMPARISON: None.    FINDINGS:  There is generalized atrophy of the brain.  White   matter  changes are present in the cerebral hemispheres that are   consistent  with small vessel ischemic disease in this age patient. Old   lacunar  infarct is seen in the right corona radiata and upper right   putamen.  There is no evidence of hemorrhage, mass, acute infarct, or   anomaly.   There are no gadolinium enhancing lesions.    The facial structures appear normal. The arteries at the base of   the  brain and the dural venous sinuses appear patent.       Impression    IMPRESSION:  1. No acute abnormality. No evidence of intracranial trauma.  2. Brain atrophy and white matter changes consistent with   sequelae of  small vessel ischemic disease.  3. Old lacunar infarct in the right corona radiata and upper   right  putamen.    ROBBIE FARFAN MD       Attestation:  I have reviewed today's vital signs, notes, medications, labs and   imaging.  Amount of time performed on this consult: 30 minutes.     Miguel A Mcintosh     Care Transition RN/SW IP Consult    Narrative    Francis  GABBY Puentes     6/11/2017 10:02 AM  CARE TRANSITION SOCIAL WORK INITIAL ASSESSMENT:      Met with: Patient.    DATA  Principal Problem:    Closed fracture of fibula  Active Problems:    Hyperlipidemia LDL goal <130    Insomnia    Hypertension goal BP (blood pressure) < 140/90    SUSSY (obstructive sleep apnea)-Moderately severe (AHI 21)    Bilateral leg edema    Prediabetes    Interstitial pulmonary fibrosis (H)    Chronic obstructive pulmonary disease, unspecified COPD type   (H)    Major depressive disorder, single episode, moderate (H)    Risk for falls    Auditory hallucination    Right elbow pain    Intertrigo    Alcohol abuse       Primary Care Clinic Name:  ([facility])  Primary Care MD Name:  (Nora)  Contact information and PCP information verified: Yes      ASSESSMENT  Cognitive Status: awake, alert and oriented.       Resources List: Home Care, Transitional Care     Lives With: alone        Description of Support System: Supportive, Involved   Who is your support system?: Children, Neighbor   Support Assessment: Adequate family and caregiver support,   Adequate social supports   Insurance Concerns: No Insurance issues identified          This writer met with pt, introduced self and role. Pt reports   that she lives alone. This writer discussed medicare guidelines   for home care and TCU. Pt does not want to dc to a TCU, she is   agreeable to home care if she needs it. CTS will follow, therapy   to evaluate.       PLAN    CTS to follow      Deloris Blanco MSW, [facility], [facility] [phone]       Urine Culture Aerobic Bacterial   Result Value Ref Range    Specimen Description Unspecified Urine     Culture Micro >100,000 colonies/mL Escherichia coli (A)     Micro Report Status FINAL 06/14/2017     Organism: >100,000 colonies/mL Escherichia coli        Susceptibility    >100,000 colonies/ml escherichia coli (christiano) -  (no method available)     AMPICILLIN <=2 Susceptible  ug/mL     CEFAZOLIN Value in next row  ug/mL       <=4 SusceptibleCefazolin BLANCA breakpoints are for the treatment of uncomplicated urinary tract infections.  For the treatment of systemic infections, please contact the laboratory for additional testing.     CEFOXITIN Value in next row  ug/mL      <=4 SusceptibleCefazolin BLANCA breakpoints are for the treatment of uncomplicated urinary tract infections.  For the treatment of systemic infections, please contact the laboratory for additional testing.     CEFTAZIDIME Value in next row  ug/mL      <=4 SusceptibleCefazolin BLANCA breakpoints are for the treatment of uncomplicated urinary tract infections.  For the treatment of systemic infections, please contact the laboratory for additional testing.     CEFTRIAXONE Value in next row  ug/mL      <=4 SusceptibleCefazolin BLANCA breakpoints are for the treatment of uncomplicated urinary tract infections.  For the treatment of systemic infections, please contact the laboratory for additional testing.     CIPROFLOXACIN Value in next row  ug/mL      <=4 SusceptibleCefazolin BLANCA breakpoints are for the treatment of uncomplicated urinary tract infections.  For the treatment of systemic infections, please contact the laboratory for additional testing.     GENTAMICIN Value in next row  ug/mL      <=4 SusceptibleCefazolin BLANCA breakpoints are for the treatment of uncomplicated urinary tract infections.  For the treatment of systemic infections, please contact the laboratory for additional testing.     LEVOFLOXACIN Value in next row  ug/mL      <=4 SusceptibleCefazolin BLANCA breakpoints are for the treatment of uncomplicated urinary tract infections.  For the treatment of systemic infections, please contact the laboratory for additional testing.     NITROFURANTOIN Value in next row  ug/mL      <=4 SusceptibleCefazolin BLANCA breakpoints are for the treatment of uncomplicated urinary tract infections.  For the treatment of systemic infections, please contact the laboratory for additional  testing.     TOBRAMYCIN Value in next row  ug/mL      <=4 SusceptibleCefazolin BLANCA breakpoints are for the treatment of uncomplicated urinary tract infections.  For the treatment of systemic infections, please contact the laboratory for additional testing.     Trimethoprim/Sulfa Value in next row  ug/mL      <=4 SusceptibleCefazolin BLANCA breakpoints are for the treatment of uncomplicated urinary tract infections.  For the treatment of systemic infections, please contact the laboratory for additional testing.     AMPICILLIN/SULBACTAM Value in next row  ug/mL      <=4 SusceptibleCefazolin BLANCA breakpoints are for the treatment of uncomplicated urinary tract infections.  For the treatment of systemic infections, please contact the laboratory for additional testing.     Piperacillin/Tazo Value in next row  ug/mL      <=4 SusceptibleCefazolin BLANCA breakpoints are for the treatment of uncomplicated urinary tract infections.  For the treatment of systemic infections, please contact the laboratory for additional testing.     CEFEPIME Value in next row  ug/mL      <=4 SusceptibleCefazolin BLANCA breakpoints are for the treatment of uncomplicated urinary tract infections.  For the treatment of systemic infections, please contact the laboratory for additional testing.     *Note: Due to a large number of results and/or encounters for the requested time period, some results have not been displayed. A complete set of results can be found in Results Review.        ASSESSMENT/PLAN:                                                        ICD-10-CM    1. Closed fracture fibula, head, right, with routine healing, subsequent encounter S82.835W Patient to see ortho tomorrow per her friend's report.  Continue using walking boot until further instruiction from ortho.  Physical and OT at home.  Return precautions discussed and given to patient.     2. Acute pain of right shoulder M25.511 MR Shoulder Right w/o Contrast  DDx: tendinitis, frozen  shoulder, rotator cuff tear, DJD  With hx of trauma should r/o tear; refer to ortho if present. May also need ortho if frozen shoulder.  May do physical therapy for range of motion primarily for now.    NSAID use diascussed.  Return precautions discussed and given to patient.       Patient uses assistive device for ambulation.  She received physical and occupation al therapy for mbulation training, safety training, ADLs and cognition.    Follow up with Provider - depending on MRI Result.   Patient Instructions   Schedule MRI for right shoulder.    You may start physical therapy for range of motion for right shoulder.  Focus on gentle and slow movements and light weights.  If need specific referral, home health may send request to clinic.    Continue orthopedic recommendations for the right lower leg.  See ortho as planned tomorrow.    May take ibuprofen 200 1-2 tablets orally every 8 hrs as needed for pain; take with food.        Benitez Polanco MD  [facility]

## 2017-07-18 NOTE — NURSING NOTE
"Chief Complaint   Patient presents with     Musculoskeletal Problem     Right arm pain since initial fall at home on 6/10/2017.  X-rays obtained from hospital after initial fall.  Patient here today with PCP, Trina.       Initial /48  Pulse 61  Temp 99.1  F (37.3  C) (Tympanic)  Resp 16  Ht 5' 6\" (1.676 m)  Wt 236 lb (107 kg)  SpO2 95%  BMI 38.09 kg/m2 Estimated body mass index is 38.09 kg/(m^2) as calculated from the following:    Height as of this encounter: 5' 6\" (1.676 m).    Weight as of this encounter: 236 lb (107 kg).    Medication Reconciliation:  complete    Itzel Maza CMA (AAMA)  "

## 2017-07-18 NOTE — TELEPHONE ENCOUNTER
Forms from Home Health [facility]-Occupational Therapy Care Plan. Orders were signed, faxed and sent to [facility].    [facility]

## 2017-07-18 NOTE — NURSING NOTE
"Chief Complaint   Patient presents with     Musculoskeletal Problem     Right arm pain since initial fall at home on 6/10/2017.  X-rays obtained from hospital after initial fall.  Patient here today with PCP, Trina.       Initial BP (!) 107/37  Pulse 61  Temp 99.1  F (37.3  C) (Tympanic)  Resp 16  Ht 5' 6\" (1.676 m)  Wt 236 lb (107 kg)  BMI 38.09 kg/m2 Estimated body mass index is 38.09 kg/(m^2) as calculated from the following:    Height as of this encounter: 5' 6\" (1.676 m).    Weight as of this encounter: 236 lb (107 kg).    Medication Reconciliation:  complete    Itzel Maza CMA (AAMA)  "

## 2017-07-18 NOTE — MR AVS SNAPSHOT
After Visit Summary   7/18/2017    Susan Haq    MRN: [id number]           Patient Information     Date Of Birth          1/10/1932        Visit Information        Provider Department      7/18/2017 1:00 PM Benitez Polanco MD [facility]        Today's Diagnoses     Closed fracture fibula, head, right, with routine healing, subsequent encounter    -  1    Acute pain of right shoulder          Care Instructions    Schedule MRI for right shoulder.    You may start physical therapy for range of motion for right shoulder.  Focus on gentle and slow movements and light weights.  If need specific referral, home health may send request to clinic.    Continue orthopedic recommendations for the right lower leg.  See ortho as planned tomorrow.    May take ibuprofen 200 1-2 tablets orally every 8 hrs as needed for pain; take with food.            Follow-ups after your visit        Follow-up notes from your care team     Return if symptoms worsen or fail to improve.      Future tests that were ordered for you today     Open Future Orders        Priority Expected Expires Ordered    MR Shoulder Right w/o Contrast Routine  7/18/2018 7/18/2017            Who to contact     If you have questions or need follow up information about today's clinic visit or your schedule please contact [facility] directly at [phone].  Normal or non-critical lab and imaging results will be communicated to you by MyChart, letter or phone within 4 business days after the clinic has received the results. If you do not hear from us within 7 days, please contact the clinic through MyChart or phone. If you have a critical or abnormal lab result, we will notify you by phone as soon as possible.  Submit refill requests through [facility] or call your pharmacy and they will forward the refill request to us. Please allow 3 business days for your refill to be completed.          Additional Information  "About Your Visit        MyChart Information     [employer] lets you send messages to your doctor, view your test results, renew your prescriptions, schedule appointments and more. To sign up, go to [email or web address] . Click on \"Log in\" on the left side of the screen, which will take you to the Welcome page. Then click on \"Sign up Now\" on the right side of the page.     You will be asked to enter the access code listed below, as well as some personal information. Please follow the directions to create your username and password.     Your access code is: [email or web address]  Expires: 2017  4:49 PM     Your access code will  in 90 days. If you need help or a new code, please call your [facility] clinic or [phone].        Care EveryWhere ID     This is your Care EveryWhere ID. This could be used by other organizations to access your [facility] medical records  [id number]        Your Vitals Were     Pulse Temperature Respirations Height Pulse Oximetry BMI (Body Mass Index)    61 99.1  F (37.3  C) (Tympanic) 16 5' 6\" (1.676 m) 95% 38.09 kg/m2       Blood Pressure from Last 3 Encounters:   17 110/48   17 127/71   17 170/65    Weight from Last 3 Encounters:   17 236 lb (107 kg)   17 248 lb 9.6 oz (112.8 kg)   17 245 lb (111.1 kg)               Primary Care Provider Office Phone # Fax #    Ema Melendez [phone]       [facility] [address] [address]        Goals        Exercise    Exercise 3x per week (30 min per time)     Notes - Note created  2016  3:01 PM by Marcia Poe, ARIADNA    Member stated she would like to have help with showers and light housekeeping    *Contact will be made with member regarding payment of this service will be out of pocket  *Resources will be provided to member to make a decision as to what she would like to do         General    Functional (pt-stated)     Notes - Note created  2016  " 1:49 PM by Marcia Poe, RN    Patient will receive PT in home for strengthening and gait training to remain safe, through 5/1/2016      Medical (pt-stated)     Notes - Note created  5/1/2015 10:03 AM by Rin Lowe RN    Decrease in COPD symptoms  - patient has follow up appointment with sleep medicine 5/12/15.  Patient to take medications as prescribed daily.  Patient to use oxygen as directed.          Equal Access to Services     [facility]: Hadii guanako hogan hadpeteyo Sodillon, waaxda luqadaha, qaybta kaalmada bell, nyla wilkersonjasonselam baltazar . So [facility] [phone].    ATENCIÓN: Si jamarcusla jefry, tiene a petersen disposición servicios gratuitos de asistencia lingüística. Llame al [phone].    We comply with applicable federal civil rights laws and [place] laws. We do not discriminate on the basis of race, color, national origin, age, disability sex, sexual orientation or gender identity.            Thank you!     Thank you for choosing [facility]  for your care. Our goal is always to provide you with excellent care. Hearing back from our patients is one way we can continue to improve our services. Please take a few minutes to complete the written survey that you may receive in the mail after your visit with us. Thank you!             Your Updated Medication List - Protect others around you: Learn how to safely use, store and throw away your medicines at www.disposemymeds.org.          This list is accurate as of: 7/18/17  2:38 PM.  Always use your most recent med list.                   Brand Name Dispense Instructions for use Diagnosis    acetaminophen 500 MG tablet    TYLENOL    40 tablet    Take 2 tablets (1,000 mg) by mouth every 8 hours    Closed fracture of proximal end of fibula, unspecified fracture morphology, initial encounter       * albuterol (2.5 MG/3ML) 0.083% neb solution     1 Box    Take 1 vial (2.5 mg) by nebulization every 4 hours as needed for shortness of  breath / dyspnea or wheezing        * albuterol 108 (90 BASE) MCG/ACT Inhaler    PROAIR HFA/PROVENTIL HFA/VENTOLIN HFA    3 Inhaler    Inhale 2 puffs into the lungs every 4 hours as needed for shortness of breath / dyspnea or wheezing    Chronic obstructive pulmonary disease, unspecified COPD type (H)       atenolol 50 MG tablet    TENORMIN    180 tablet    Take 1 tablet (50 mg) by mouth 2 times daily    Hypertension goal BP (blood pressure) < 140/90       atorvastatin 20 MG tablet    LIPITOR    90 tablet    Take 1 tablet (20 mg) by mouth daily    Hyperlipidemia LDL goal <130       budesonide-formoterol 160-4.5 MCG/ACT Inhaler    SYMBICORT    1 Inhaler    Inhale 2 puffs into the lungs 2 times daily    Chronic obstructive pulmonary disease, unspecified COPD type (H)       CALCIUM 600 + D PO      Take  by mouth.        fish oil-omega-3 fatty acids 1000 MG capsule      1 cap daily        losartan-hydrochlorothiazide 100-25 MG per tablet    HYZAAR     Take 1 tablet by mouth daily        miconazole 2 % powder    MICATIN; MICRO GUARD    30 g    Apply topically 2 times daily    Intertrigo       Nebulizer Compressor Kit     1 kit    1 kit every 4 hours as needed        * order for DME      1.  CPAP pressure 12 cm/H20 with heated humidity and auto-titrating capability.  2.  Provide mask to fit and CPAP supplies. 3.  Length of need lifetime. 4.  Ok to d/c O2 bleed in to her PAP overnight.    SUSSY (obstructive sleep apnea)       * order for DME     1 Units    TEDs stockings knee high to be worn during the day.    Leg edema       * order for DME     1 Units    Equipment being ordered: Oxygen    Hypoxia       * order for DME     1 Units    Equipment being ordered: CPAP supplies    SUSSY (obstructive sleep apnea)       order for DME     1 Device    Equipment being ordered: Oxygen - 3 liters continous    COPD (chronic obstructive pulmonary disease) (H)       potassium chloride 10 MEQ tablet    K-TAB,KLOR-CON    90 tablet    Take 1  tablet (10 mEq) by mouth daily    Hypokalemia       senna-docusate 8.6-50 MG per tablet    SENOKOT-S;PERICOLACE    100 tablet    Take 1-2 tablets by mouth 2 times daily as needed (constipation )    Closed fracture of proximal end of fibula, unspecified fracture morphology, initial encounter       SIMETHICONE-80 PO      Take 80 mg by mouth every morning And TID prn belching        tiotropium 18 MCG capsule    SPIRIVA HANDIHALER    90 capsule    Inhale  into the lungs. Inhale contents of one capsule daily    Chronic obstructive pulmonary disease, unspecified COPD type (H)       * Notice:  This list has 6 medication(s) that are the same as other medications prescribed for you. Read the directions carefully, and ask your doctor or other care provider to review them with you.

## 2017-07-19 ENCOUNTER — CARE COORDINATION (OUTPATIENT)
Dept: CARE COORDINATION | Facility: CLINIC | Age: 82
End: 2017-07-19

## 2017-07-19 NOTE — PROGRESS NOTES
Clinic Care Coordination Contact  Care Team Conversations    Notification received that patient is using home care, through [employer]    This writer sent a fax to [employer] mkt[facility]- asking to be notified when the patient has been discharged or if they need assistance from a Care Coordinatior    Will check status in about 4 weeks      Cristiana Riley

## 2017-07-20 ENCOUNTER — HOSPITAL ENCOUNTER (OUTPATIENT)
Dept: MRI IMAGING | Facility: CLINIC | Age: 82
Discharge: HOME | End: 2017-07-20
Attending: FAMILY MEDICINE | Admitting: FAMILY MEDICINE
Payer: COMMERCIAL

## 2017-07-20 DIAGNOSIS — M75.121 COMPLETE TEAR OF RIGHT ROTATOR CUFF: Primary | ICD-10-CM

## 2017-07-20 DIAGNOSIS — M25.511 ACUTE PAIN OF RIGHT SHOULDER: ICD-10-CM

## 2017-07-20 PROCEDURE — 73221 MRI JOINT UPR EXTREM W/O DYE: CPT | Mod: RT

## 2017-07-21 DIAGNOSIS — J84.10 INTERSTITIAL PULMONARY FIBROSIS (H): Chronic | ICD-10-CM

## 2017-07-21 NOTE — TELEPHONE ENCOUNTER
Folic Acid    Last Written Prescription Date: 06/13/17  Last Fill Quantity: 4,  # refills: 0   Last Office Visit with G, [facility] or [facility] prescribing provider: 07/18/17

## 2017-07-26 ENCOUNTER — MEDICAL CORRESPONDENCE (OUTPATIENT)
Dept: HEALTH INFORMATION MANAGEMENT | Facility: CLINIC | Age: 82
End: 2017-07-26

## 2017-07-27 RX ORDER — FOLIC ACID 1 MG/1
1 TABLET ORAL DAILY
Qty: 4 TABLET | Refills: 0 | Status: SHIPPED | OUTPATIENT
Start: 2017-07-27 | End: 2017-11-16

## 2017-07-27 NOTE — TELEPHONE ENCOUNTER
Ema,    Routing refill request to provider for review/approval because:  Medication is not on medication list. Carline GARCIA RN

## 2017-07-28 ENCOUNTER — TELEPHONE (OUTPATIENT)
Dept: FAMILY MEDICINE | Facility: CLINIC | Age: 82
End: 2017-07-28

## 2017-07-28 NOTE — TELEPHONE ENCOUNTER
Reason for Call:  Form, our goal is to have forms completed with 72 hours, however, some forms may require a visit or additional information.    Type of letter, form or note:  Physicians Orders    Who is the form from?: Home care    Where did the form come from: form was faxed in    What clinic location was the form placed at?: [facility]    Where the form was placed: 's Box    What number is listed as a contact on the form?: fax to [phone]       Additional comments: 2nd request      Call taken on 7/28/2017 at 7:18 AM by Dorys Campbell

## 2017-07-28 NOTE — TELEPHONE ENCOUNTER
Form from Home Health Care [facility]-Physical Therapy Care Plan. Orders were signed, faxed and sent to scanning.    [facility]

## 2017-07-31 ENCOUNTER — TELEPHONE (OUTPATIENT)
Dept: FAMILY MEDICINE | Facility: CLINIC | Age: 82
End: 2017-07-31

## 2017-07-31 ENCOUNTER — MEDICAL CORRESPONDENCE (OUTPATIENT)
Dept: HEALTH INFORMATION MANAGEMENT | Facility: CLINIC | Age: 82
End: 2017-07-31

## 2017-07-31 PROCEDURE — G0180 MD CERTIFICATION HHA PATIENT: HCPCS | Performed by: FAMILY MEDICINE

## 2017-07-31 NOTE — TELEPHONE ENCOUNTER
Form from Dr. TATTOFF  was faxed back to [phone]  This form or order was addressing home care orders   It was signed by provider and all questions were addressed  Copy was sent to scan to be placed in chart     Sarika Jimenez  Clinic Station

## 2017-08-01 ENCOUNTER — TELEPHONE (OUTPATIENT)
Dept: FAMILY MEDICINE | Facility: CLINIC | Age: 82
End: 2017-08-01

## 2017-08-01 ENCOUNTER — MEDICAL CORRESPONDENCE (OUTPATIENT)
Dept: HEALTH INFORMATION MANAGEMENT | Facility: CLINIC | Age: 82
End: 2017-08-01

## 2017-08-01 NOTE — TELEPHONE ENCOUNTER
Forms from [facility]-Phys[facility] Orders & Physical Therapy Care Plan. Orders were signed, faxed and sent to [facility].    [facility]

## 2017-08-03 ENCOUNTER — OFFICE VISIT (OUTPATIENT)
Dept: AUDIOLOGY | Facility: CLINIC | Age: 82
End: 2017-08-03
Payer: COMMERCIAL

## 2017-08-03 DIAGNOSIS — H90.3 SENSORINEURAL HEARING LOSS, BILATERAL: Primary | ICD-10-CM

## 2017-08-03 DIAGNOSIS — F32.1 MAJOR DEPRESSIVE DISORDER, SINGLE EPISODE, MODERATE (H): ICD-10-CM

## 2017-08-03 PROCEDURE — V5299 HEARING SERVICE: HCPCS | Performed by: AUDIOLOGIST

## 2017-08-03 NOTE — PROGRESS NOTES
85 year old female comes in for a hearing aid check. She reports not hearing well with her 2015 Phonak Bolero V50-P hearing aids for the past 3 weeks.    Replaced earmold tubing and hearing aid ear hooks bilaterally. Verified hearing aid functionality.      Removed cerumen from left canal.     Discussed use of the hearing aid volume control.    See chart in the hearing aid room.     Return to clinic as needed.    Caridad VALERO, #[id number]

## 2017-08-03 NOTE — MR AVS SNAPSHOT
"              After Visit Summary   8/3/2017    Susan Haq    MRN: [id number]           Patient Information     Date Of Birth          1/10/1932        Visit Information        Provider Department      8/3/2017 11:00 AM Caridad Elizabeth [facility]D [facility]        Today's Diagnoses     Sensorineural hearing loss, bilateral    -  1       Follow-ups after your visit        Who to contact     If you have questions or need follow up information about today's clinic visit or your schedule please contact [facility] directly at [phone].  Normal or non-critical lab and imaging results will be communicated to you by [employer], letter or phone within 4 business days after the clinic has received the results. If you do not hear from us within 7 days, please contact the clinic through [email or web address] or phone. If you have a critical or abnormal lab result, we will notify you by phone as soon as possible.  Submit refill requests through [employer] or call your pharmacy and they will forward the refill request to us. Please allow 3 business days for your refill to be completed.          Additional Information About Your Visit        MyChart Information     [employer] lets you send messages to your doctor, view your test results, renew your prescriptions, schedule appointments and more. To sign up, go to [email or web address] . Click on \"Log in\" on the left side of the screen, which will take you to the Welcome page. Then click on \"Sign up Now\" on the right side of the page.     You will be asked to enter the access code listed below, as well as some personal information. Please follow the directions to create your username and password.     Your access code is: [email or web address]  Expires: 2017  4:49 PM     Your access code will  in 90 days. If you need help or a new code, please call your [facility] or [phone].        Care EveryWhere ID     This is your Care EveryWhere ID. This could be used by " other organizations to access your [facility] medical records  [id number]         Blood Pressure from Last 3 Encounters:   07/18/17 112/58   07/06/17 127/71   06/29/17 170/65    Weight from Last 3 Encounters:   07/18/17 236 lb (107 kg)   07/06/17 248 lb 9.6 oz (112.8 kg)   06/29/17 245 lb (111.1 kg)              We Performed the Following     HEARING AID CLEANING        Primary Care Provider Office Phone # Fax #    Ema Korin Melendez [phone]       [facility] [address] [address]        Goals        Exercise    Exercise 3x per week (30 min per time)     Notes - Note created  11/4/2016  3:01 PM by Marcia Poe RN    Member stated she would like to have help with showers and light housekeeping    *Contact will be made with member regarding payment of this service will be out of pocket  *Resources will be provided to member to make a decision as to what she would like to do         General    Functional (pt-stated)     Notes - Note created  2/25/2016  1:49 PM by Marcia Poe, RN    Patient will receive PT in home for strengthening and gait training to remain safe, through 5/1/2016      Medical (pt-stated)     Notes - Note created  5/1/2015 10:03 AM by Rin Lowe RN    Decrease in COPD symptoms  - patient has follow up appointment with sleep medicine 5/12/15.  Patient to take medications as prescribed daily.  Patient to use oxygen as directed.          Equal Access to Services     DESIREE PORTILLO : Goldie Tony, wamirandada amor, qaybta kaalmafabricio ruizay danis baltazar . So [facility] [phone].    ATENCIÓN: Si habla soniyaañol, tiene a petersen disposición servicios gratuitos de asistencia lingüística. Llame al [phone].    We comply with applicable federal civil rights laws and [place] laws. We do not discriminate on the basis of race, color, national origin, age, disability sex, sexual orientation or gender  identity.            Thank you!     Thank you for choosing [facility]  for your care. Our goal is always to provide you with excellent care. Hearing back from our patients is one way we can continue to improve our services. Please take a few minutes to complete the written survey that you may receive in the mail after your visit with us. Thank you!             Your Updated Medication List - Protect others around you: Learn how to safely use, store and throw away your medicines at www.disposemymeds.org.          This list is accurate as of: 8/3/17 12:06 PM.  Always use your most recent med list.                   Brand Name Dispense Instructions for use Diagnosis    acetaminophen 500 MG tablet    TYLENOL    40 tablet    Take 2 tablets (1,000 mg) by mouth every 8 hours    Closed fracture of proximal end of fibula, unspecified fracture morphology, initial encounter       * albuterol (2.5 MG/3ML) 0.083% neb solution     1 Box    Take 1 vial (2.5 mg) by nebulization every 4 hours as needed for shortness of breath / dyspnea or wheezing        * albuterol 108 (90 BASE) MCG/ACT Inhaler    PROAIR HFA/PROVENTIL HFA/VENTOLIN HFA    3 Inhaler    Inhale 2 puffs into the lungs every 4 hours as needed for shortness of breath / dyspnea or wheezing    Chronic obstructive pulmonary disease, unspecified COPD type (H)       atenolol 50 MG tablet    TENORMIN    180 tablet    Take 1 tablet (50 mg) by mouth 2 times daily    Hypertension goal BP (blood pressure) < 140/90       atorvastatin 20 MG tablet    LIPITOR    90 tablet    Take 1 tablet (20 mg) by mouth daily    Hyperlipidemia LDL goal <130       budesonide-formoterol 160-4.5 MCG/ACT Inhaler    SYMBICORT    1 Inhaler    Inhale 2 puffs into the lungs 2 times daily    Chronic obstructive pulmonary disease, unspecified COPD type (H)       CALCIUM 600 + D PO      Take  by mouth.        fish oil-omega-3 fatty acids 1000 MG capsule      1 cap daily        folic acid 1 MG  tablet    FOLVITE    4 tablet    Take 1 tablet (1 mg) by mouth daily    Interstitial pulmonary fibrosis (H)       losartan-hydrochlorothiazide 100-25 MG per tablet    HYZAAR     Take 1 tablet by mouth daily        miconazole 2 % powder    MICATIN; MICRO GUARD    30 g    Apply topically 2 times daily    Intertrigo       Nebulizer Compressor Kit     1 kit    1 kit every 4 hours as needed        * order for DME      1.  CPAP pressure 12 cm/H20 with heated humidity and auto-titrating capability.  2.  Provide mask to fit and CPAP supplies. 3.  Length of need lifetime. 4.  Ok to d/c O2 bleed in to her PAP overnight.    SUSSY (obstructive sleep apnea)       * order for DME     1 Units    TEDs stockings knee high to be worn during the day.    Leg edema       * order for DME     1 Units    Equipment being ordered: Oxygen    Hypoxia       * order for DME     1 Units    Equipment being ordered: CPAP supplies    SUSSY (obstructive sleep apnea)       order for DME     1 Device    Equipment being ordered: Oxygen - 3 liters continous    COPD (chronic obstructive pulmonary disease) (H)       potassium chloride 10 MEQ tablet    K-TAB,KLOR-CON    90 tablet    Take 1 tablet (10 mEq) by mouth daily    Hypokalemia       senna-docusate 8.6-50 MG per tablet    SENOKOT-S;PERICOLACE    100 tablet    Take 1-2 tablets by mouth 2 times daily as needed (constipation )    Closed fracture of proximal end of fibula, unspecified fracture morphology, initial encounter       SIMETHICONE-80 PO      Take 80 mg by mouth every morning And TID prn belching        tiotropium 18 MCG capsule    SPIRIVA HANDIHALER    90 capsule    Inhale  into the lungs. Inhale contents of one capsule daily    Chronic obstructive pulmonary disease, unspecified COPD type (H)       * Notice:  This list has 6 medication(s) that are the same as other medications prescribed for you. Read the directions carefully, and ask your doctor or other care provider to review them with you.

## 2017-08-04 NOTE — TELEPHONE ENCOUNTER
Sertraline     Last Written Prescription Date: 04/24/17   Ended:  06/13/17  Last Fill Quantity: 30, # refills: 1  Last Office Visit with G primary care provider:  07/18/17        Last PHQ-9 score on record=   PHQ-9 SCORE 10/28/2014   Total Score 7

## 2017-08-08 ENCOUNTER — TELEPHONE (OUTPATIENT)
Dept: FAMILY MEDICINE | Facility: CLINIC | Age: 82
End: 2017-08-08

## 2017-08-08 RX ORDER — SERTRALINE HYDROCHLORIDE 25 MG/1
TABLET, FILM COATED ORAL
Qty: 30 TABLET | Refills: 1 | Status: SHIPPED | OUTPATIENT
Start: 2017-08-08 | End: 2017-10-03

## 2017-08-08 NOTE — TELEPHONE ENCOUNTER
2 forms from [employer].-PT patient missed Visit & OT patient missed visit. Orders were signed, faxed and sent to scanning.    [facility] [place]

## 2017-08-08 NOTE — TELEPHONE ENCOUNTER
Routing refill request to provider for review/approval because:  Drug not active on patient's medication list    Jale Mcdaniel RN

## 2017-08-18 ENCOUNTER — MEDICAL CORRESPONDENCE (OUTPATIENT)
Dept: HEALTH INFORMATION MANAGEMENT | Facility: CLINIC | Age: 82
End: 2017-08-18

## 2017-08-18 ENCOUNTER — TELEPHONE (OUTPATIENT)
Dept: FAMILY MEDICINE | Facility: CLINIC | Age: 82
End: 2017-08-18

## 2017-08-18 NOTE — TELEPHONE ENCOUNTER
Form from [facility] DC Summary.    [facility] [place]

## 2017-08-31 ENCOUNTER — TELEPHONE (OUTPATIENT)
Dept: FAMILY MEDICINE | Facility: CLINIC | Age: 82
End: 2017-08-31

## 2017-09-01 ENCOUNTER — CARE COORDINATION (OUTPATIENT)
Dept: CARE COORDINATION | Facility: CLINIC | Age: 82
End: 2017-09-01

## 2017-09-06 NOTE — PROGRESS NOTES
Clinic Care Coordination Contact  OUTREACH     Referral Information:  Referral Source: CTS  Reason for Contact: Home Care discharge  DATE OF SNF ADMISSION:  June / 13 / 2017  DATE OF SNF (anticipated) DISCHARGE: July / 07 / 2017  Nursing Facility: [facility] stay 6/10/17 to 6/13/17.   Care Conference: No     Universal Utilization:   ED Visits in last year: 0   Hospital visits in last year: 1  Last PCP appointment: 7/18/17  Missed Appointments:  (NA)  Concerns: Multiple providers  Multiple Providers or Specialists: Pulmonary,Audiology, Sleep Medicine, Dermatology     Clinical Concerns:  Current Medical Concerns:    DISCHARGE DIAGNOSIS:   1. Tibia/fibula fracture, right, sequela    2. Acute pain of right shoulder    3. Right elbow pain    4. Chronic obstructive pulmonary disease, unspecified COPD type (H)    5. Interstitial pulmonary fibrosis (H)    6. Type 2 diabetes mellitus without complication, without long-term current use of insulin (H)    7. Hypertension goal BP (blood pressure) < 140/90    8. Insomnia, unspecified type    Current Behavioral Concerns: Reports none  Education Provided to patient: Reviewed discharge instructions  Clinical Pathway Name: None        Medication Management: Patient's friend Martina sets up her medications two weeks at a time.         Functional Status:  Mobility Status: Independent w/Device  Equipment Currently Used at Home: oxygen, respiratory supplies, walker, rolling  Transportation: Martina, her friend provides transportation.              Psychosocial:  Current living arrangement:: Patient lives alone. Reports her friend Martina and her other friends are good support. Martina drives patient to her appointments and takes her grocery shopping.       Financial/Insurance: [facility]        Resources and Interventions:  N/A          Advanced Care Plans/Directives on file:: Yes  Referrals Placed:  (N/A)     Patient/Caregiver understanding:   Patient has completed home care services. Reports she is doing well at home. Her friend Martina who usually helps her is on vacation in [place]. Her other friend came over today to help her with her bathing.  Patient reports she does not have any questions or new health concerns.      Frequency of Care Coordination: N/A  Upcoming appointment:  (Patient will call to schedule)     Plan:   Patient has good support with friends. Reports she has no concerns or unmet needs.  No further outreach at this time. Open to future contact as needed.      Chiara Hyde RN  Clinic Care Coordinator  [facility]  [phone]

## 2017-10-03 DIAGNOSIS — F32.1 MAJOR DEPRESSIVE DISORDER, SINGLE EPISODE, MODERATE (H): ICD-10-CM

## 2017-10-03 NOTE — TELEPHONE ENCOUNTER
Sertraline     Last Written Prescription Date: 08/08/2017  Last Fill Quantity: 30, # refills: 1  Last Office Visit with [facility] primary care provider:  07/18/2017        Last PHQ-9 score on record=   PHQ-9 SCORE 10/28/2014   Total Score 7

## 2017-10-04 RX ORDER — SERTRALINE HYDROCHLORIDE 25 MG/1
TABLET, FILM COATED ORAL
Qty: 30 TABLET | Refills: 0 | Status: SHIPPED | OUTPATIENT
Start: 2017-10-04 | End: 2017-11-16

## 2017-11-07 ENCOUNTER — MEDICAL CORRESPONDENCE (OUTPATIENT)
Dept: HEALTH INFORMATION MANAGEMENT | Facility: CLINIC | Age: 82
End: 2017-11-07

## 2017-11-07 PROCEDURE — 99207 C MD CERTIFICATION HHA PATIENT: CPT | Performed by: FAMILY MEDICINE

## 2017-11-08 ENCOUNTER — TELEPHONE (OUTPATIENT)
Dept: FAMILY MEDICINE | Facility: CLINIC | Age: 82
End: 2017-11-08

## 2017-11-08 NOTE — TELEPHONE ENCOUNTER
Form from [facility] Cert and Plan of Care. Orders were signed, faxed and sent to scanning.    [facility]

## 2017-11-15 DIAGNOSIS — J44.9 CHRONIC OBSTRUCTIVE PULMONARY DISEASE, UNSPECIFIED COPD TYPE (H): Chronic | ICD-10-CM

## 2017-11-15 DIAGNOSIS — R53.81 PHYSICAL DECONDITIONING: Primary | ICD-10-CM

## 2017-11-15 DIAGNOSIS — J84.10 INTERSTITIAL PULMONARY FIBROSIS (H): Chronic | ICD-10-CM

## 2017-11-15 DIAGNOSIS — F32.1 MAJOR DEPRESSIVE DISORDER, SINGLE EPISODE, MODERATE (H): ICD-10-CM

## 2017-11-16 ENCOUNTER — CARE COORDINATION (OUTPATIENT)
Dept: CARE COORDINATION | Facility: CLINIC | Age: 82
End: 2017-11-16

## 2017-11-16 PROBLEM — R53.81 PHYSICAL DECONDITIONING: Status: ACTIVE | Noted: 2017-11-16

## 2017-11-16 RX ORDER — BUDESONIDE AND FORMOTEROL FUMARATE DIHYDRATE 160; 4.5 UG/1; UG/1
AEROSOL RESPIRATORY (INHALATION)
Qty: 10.2 G | Refills: 5 | Status: SHIPPED | OUTPATIENT
Start: 2017-11-16 | End: 2018-03-02

## 2017-11-16 RX ORDER — SERTRALINE HYDROCHLORIDE 25 MG/1
TABLET, FILM COATED ORAL
Qty: 30 TABLET | Refills: 1 | Status: SHIPPED | OUTPATIENT
Start: 2017-11-16 | End: 2017-12-06 | Stop reason: DRUGHIGH

## 2017-11-16 RX ORDER — FOLIC ACID 1 MG/1
1 TABLET ORAL DAILY
Qty: 30 TABLET | Refills: 1 | Status: SHIPPED | OUTPATIENT
Start: 2017-11-16 | End: 2017-12-06

## 2017-11-17 NOTE — PROGRESS NOTES
Clinic Care Coordination Contact  [facility]/Voicemail       Clinical Data: Care Coordinator Outreach  Outreach attempted x 1.  Left message on voicemail with call back information and requested return call.  Plan: Care Coordinator will try to reach patient again in 3-5 business days.    ZONIA Saleh  [facility]   [phone]  [email or web address]

## 2017-11-21 NOTE — PROGRESS NOTES
Clinic Care Coordination Contact  OUTREACH    Referral Information:  Referral Source: PCP  Reason for Contact: Concerns with ADL's, Falls, Frailty  Care Conference: No     Universal Utilization:   ED Visits in last year: 0  Hospital visits in last year: 1  Last PCP appointment: 07/18/17  Missed Appointments: 1  Concerns: Outside Utilization  Multiple Providers or Specialists: Yes (Pulmonary,Audiology, Sleep Medicine, Dermatology)    Clinical Concerns:  Current Medical Concerns: COPD, Major Depressive Disorder, Deconditioning, Frequent Falls, Pt's friends report that patient is not completing tasks independently. Pt is mainly on her hide a bed in front of the TC and has become extremely reliant on PCs/friend Trina for daily tasks. Pt's friends are requesting a home health assessment. Pt should come into see PCP, last visit was in July, 2017. Pt does have a history of major depression and is being treated with medication (beneficial to reassess at PCP apt), also pt may benefit from [facility] services or in home therapy will check with [facility] on benefits and coverage.   Current Behavioral Concerns:  Lack of motivation, Extremely sedentary/reliant on PCA for most things  Education Provided to patient: Home Care Services, Care Coordination, [facility] Support Services   Clinical Pathway: None    Medication Management:  Pt utilizes a medication dispensing machine, Pt's friend Martina sets this up for her, [employer]. Provided education to caregiver for this, pt's caregiver reports concerns-no recent medication changes     Functional Status:  Mobility Status: Independent w/Device- pt utilizes a walker, pt received assistance from a PCA with stand by assist in the shower, assists with housekeeping, linen changes (pt does have occasional incontinence)   Equipment Currently Used at Home: walker, standard, other-walker tray, pill dispensing machine  Transportation: Pt's PCA and friend Martina provide      Psychosocial:  Current living arrangement:: I live alone in my own home-[facility]  Financial/Insurance: [facility], Call placed to the [facility] to inquire about PCA services and if pt has MA- will await return call     Resources and Interventions:  Current Resources: PCA  Advanced Care Plans/Directives on file:: Yes  Referrals Placed: Home Care, Mental Health    Patient/Caregiver understanding: Pt's friend, Martina reports understanding denies any additional questions.  Frequency of Care Coordination: 2 weeks      Plan: Pt's friends will assist pt in setting up an appointment with PCP to be reassessed for mood and reported decondintioning and to assess for home care services. [facility] will follow-up with pt in 1 week.    ZONIA Saleh  Clinic Care Coordinator  [phone]  [email or web address]

## 2017-11-27 NOTE — PROGRESS NOTES
Neal Moctezuma Case Management [phone]. Left message requesting call back to discuss available services within pt's budget. Will await return call.    ZONIA Saleh  [facility]   [phone]  [email or web address]

## 2017-11-29 NOTE — PROGRESS NOTES
Neal Moctezuma returned call and reports that pt is on the Alternative Care Waiver so she is not eligible for [facility]. Pt may be able to fit an ICLS worker which focuses on skill building- managing appointments, paying bills, life skills, etc. Not sure this would be beneficial for patient-call placed to Aime's friend-left message requesting call back. Will await return call.    ZONIA Saleh  [facility]   [phone]  [email or web address]

## 2017-12-06 ENCOUNTER — OFFICE VISIT (OUTPATIENT)
Dept: FAMILY MEDICINE | Facility: CLINIC | Age: 82
End: 2017-12-06
Payer: COMMERCIAL

## 2017-12-06 VITALS
SYSTOLIC BLOOD PRESSURE: 133 MMHG | TEMPERATURE: 97.9 F | BODY MASS INDEX: 39.08 KG/M2 | DIASTOLIC BLOOD PRESSURE: 60 MMHG | HEART RATE: 66 BPM | OXYGEN SATURATION: 95 % | WEIGHT: 243.2 LBS | HEIGHT: 66 IN

## 2017-12-06 DIAGNOSIS — E66.01 MORBID OBESITY (H): ICD-10-CM

## 2017-12-06 DIAGNOSIS — J84.10 INTERSTITIAL PULMONARY FIBROSIS (H): Chronic | ICD-10-CM

## 2017-12-06 DIAGNOSIS — G47.00 INSOMNIA, UNSPECIFIED TYPE: Chronic | ICD-10-CM

## 2017-12-06 DIAGNOSIS — J44.9 CHRONIC OBSTRUCTIVE PULMONARY DISEASE, UNSPECIFIED COPD TYPE (H): Chronic | ICD-10-CM

## 2017-12-06 DIAGNOSIS — E87.6 HYPOKALEMIA: ICD-10-CM

## 2017-12-06 DIAGNOSIS — E11.9 TYPE 2 DIABETES MELLITUS WITHOUT COMPLICATION, WITHOUT LONG-TERM CURRENT USE OF INSULIN (H): Chronic | ICD-10-CM

## 2017-12-06 DIAGNOSIS — R53.81 PHYSICAL DECONDITIONING: ICD-10-CM

## 2017-12-06 DIAGNOSIS — I10 HYPERTENSION GOAL BP (BLOOD PRESSURE) < 140/90: Chronic | ICD-10-CM

## 2017-12-06 DIAGNOSIS — E78.5 HYPERLIPIDEMIA LDL GOAL <130: Primary | Chronic | ICD-10-CM

## 2017-12-06 DIAGNOSIS — Z23 NEED FOR PROPHYLACTIC VACCINATION AND INOCULATION AGAINST INFLUENZA: ICD-10-CM

## 2017-12-06 DIAGNOSIS — Z00.01 ENCOUNTER FOR ROUTINE ADULT HEALTH EXAMINATION WITH ABNORMAL FINDINGS: ICD-10-CM

## 2017-12-06 DIAGNOSIS — G47.33 OSA (OBSTRUCTIVE SLEEP APNEA): Chronic | ICD-10-CM

## 2017-12-06 DIAGNOSIS — Z13.89 SCREENING FOR DIABETIC PERIPHERAL NEUROPATHY: ICD-10-CM

## 2017-12-06 DIAGNOSIS — F32.1 MAJOR DEPRESSIVE DISORDER, SINGLE EPISODE, MODERATE (H): Chronic | ICD-10-CM

## 2017-12-06 DIAGNOSIS — Z91.81 AT RISK FOR FALLING: ICD-10-CM

## 2017-12-06 DIAGNOSIS — J30.2 ACUTE SEASONAL ALLERGIC RHINITIS, UNSPECIFIED TRIGGER: ICD-10-CM

## 2017-12-06 PROBLEM — M25.521 RIGHT ELBOW PAIN: Status: RESOLVED | Noted: 2017-06-11 | Resolved: 2017-12-06

## 2017-12-06 PROBLEM — S82.401S TIBIA/FIBULA FRACTURE, RIGHT, SEQUELA: Status: RESOLVED | Noted: 2017-06-26 | Resolved: 2017-12-06

## 2017-12-06 PROBLEM — S82.201S TIBIA/FIBULA FRACTURE, RIGHT, SEQUELA: Status: RESOLVED | Noted: 2017-06-26 | Resolved: 2017-12-06

## 2017-12-06 LAB
ANION GAP SERPL CALCULATED.3IONS-SCNC: 8 MMOL/L (ref 3–14)
BUN SERPL-MCNC: 20 MG/DL (ref 7–30)
CALCIUM SERPL-MCNC: 8.9 MG/DL (ref 8.5–10.1)
CHLORIDE SERPL-SCNC: 99 MMOL/L (ref 94–109)
CHOLEST SERPL-MCNC: 191 MG/DL
CO2 SERPL-SCNC: 31 MMOL/L (ref 20–32)
CREAT SERPL-MCNC: 0.86 MG/DL (ref 0.52–1.04)
GFR SERPL CREATININE-BSD FRML MDRD: 63 ML/MIN/1.7M2
GLUCOSE SERPL-MCNC: 111 MG/DL (ref 70–99)
HBA1C MFR BLD: 5.7 % (ref 4.3–6)
HDLC SERPL-MCNC: 61 MG/DL
LDLC SERPL CALC-MCNC: 91 MG/DL
NONHDLC SERPL-MCNC: 130 MG/DL
POTASSIUM SERPL-SCNC: 3.2 MMOL/L (ref 3.4–5.3)
SODIUM SERPL-SCNC: 138 MMOL/L (ref 133–144)
TRIGL SERPL-MCNC: 196 MG/DL

## 2017-12-06 PROCEDURE — 2894A VOIDCORRECT: CPT | Mod: 25 | Performed by: NURSE PRACTITIONER

## 2017-12-06 PROCEDURE — G0008 ADMIN INFLUENZA VIRUS VAC: HCPCS | Performed by: NURSE PRACTITIONER

## 2017-12-06 PROCEDURE — 99207 C FOOT EXAM  NO CHARGE: CPT | Performed by: NURSE PRACTITIONER

## 2017-12-06 PROCEDURE — 90662 IIV NO PRSV INCREASED AG IM: CPT | Performed by: NURSE PRACTITIONER

## 2017-12-06 PROCEDURE — 83036 HEMOGLOBIN GLYCOSYLATED A1C: CPT | Performed by: NURSE PRACTITIONER

## 2017-12-06 PROCEDURE — 80061 LIPID PANEL: CPT | Performed by: NURSE PRACTITIONER

## 2017-12-06 PROCEDURE — 99214 OFFICE O/P EST MOD 30 MIN: CPT | Mod: 25 | Performed by: NURSE PRACTITIONER

## 2017-12-06 PROCEDURE — 80048 BASIC METABOLIC PNL TOTAL CA: CPT | Performed by: NURSE PRACTITIONER

## 2017-12-06 PROCEDURE — 99397 PER PM REEVAL EST PAT 65+ YR: CPT | Performed by: NURSE PRACTITIONER

## 2017-12-06 PROCEDURE — 36415 COLL VENOUS BLD VENIPUNCTURE: CPT | Performed by: NURSE PRACTITIONER

## 2017-12-06 PROCEDURE — 99207 C PAF COMPLETED  NO CHARGE: CPT | Performed by: NURSE PRACTITIONER

## 2017-12-06 RX ORDER — POTASSIUM CHLORIDE 750 MG/1
10 TABLET, EXTENDED RELEASE ORAL 2 TIMES DAILY
Qty: 90 TABLET | Refills: 3 | COMMUNITY
Start: 2017-12-06 | End: 2018-03-02

## 2017-12-06 RX ORDER — ACETAMINOPHEN 500 MG
1000 TABLET ORAL EVERY 8 HOURS
Qty: 60 TABLET | Refills: 6 | Status: SHIPPED | OUTPATIENT
Start: 2017-12-06 | End: 2021-01-14

## 2017-12-06 RX ORDER — ATENOLOL 50 MG/1
50 TABLET ORAL 2 TIMES DAILY
Qty: 180 TABLET | Refills: 3 | Status: SHIPPED | OUTPATIENT
Start: 2017-12-06 | End: 2018-06-01

## 2017-12-06 RX ORDER — LOSARTAN POTASSIUM AND HYDROCHLOROTHIAZIDE 25; 100 MG/1; MG/1
1 TABLET ORAL DAILY
Qty: 90 TABLET | Refills: 3 | Status: SHIPPED | OUTPATIENT
Start: 2017-12-06 | End: 2018-10-16

## 2017-12-06 RX ORDER — ALBUTEROL SULFATE 90 UG/1
2 AEROSOL, METERED RESPIRATORY (INHALATION) EVERY 4 HOURS PRN
Qty: 3 INHALER | Refills: 3 | Status: SHIPPED | OUTPATIENT
Start: 2017-12-06 | End: 2018-06-01

## 2017-12-06 RX ORDER — POTASSIUM CHLORIDE 750 MG/1
10 TABLET, EXTENDED RELEASE ORAL DAILY
Qty: 90 TABLET | Refills: 3 | Status: SHIPPED | OUTPATIENT
Start: 2017-12-06 | End: 2017-12-06

## 2017-12-06 RX ORDER — POTASSIUM CHLORIDE 750 MG/1
10 TABLET, EXTENDED RELEASE ORAL 2 TIMES DAILY
Qty: 60 TABLET | Refills: 6 | Status: SHIPPED | OUTPATIENT
Start: 2017-12-06 | End: 2018-08-01

## 2017-12-06 RX ORDER — AMOXICILLIN 250 MG
1-2 CAPSULE ORAL 2 TIMES DAILY PRN
Qty: 100 TABLET | Refills: 1 | Status: SHIPPED | OUTPATIENT
Start: 2017-12-06 | End: 2018-02-28

## 2017-12-06 RX ORDER — FOLIC ACID 1 MG/1
1 TABLET ORAL DAILY
Qty: 90 TABLET | Refills: 3 | Status: SHIPPED | OUTPATIENT
Start: 2017-12-06 | End: 2018-10-16

## 2017-12-06 RX ORDER — LORATADINE 10 MG/1
10 TABLET ORAL DAILY
Qty: 30 TABLET | Refills: 1 | Status: SHIPPED | OUTPATIENT
Start: 2017-12-06 | End: 2018-02-28

## 2017-12-06 RX ORDER — TIOTROPIUM BROMIDE 18 UG/1
CAPSULE ORAL; RESPIRATORY (INHALATION)
Qty: 90 CAPSULE | Refills: 3 | Status: SHIPPED | OUTPATIENT
Start: 2017-12-06 | End: 2018-03-02

## 2017-12-06 RX ORDER — ATORVASTATIN CALCIUM 20 MG/1
20 TABLET, FILM COATED ORAL DAILY
Qty: 90 TABLET | Refills: 3 | Status: SHIPPED | OUTPATIENT
Start: 2017-12-06 | End: 2018-10-16

## 2017-12-06 ASSESSMENT — ANXIETY QUESTIONNAIRES
IF YOU CHECKED OFF ANY PROBLEMS ON THIS QUESTIONNAIRE, HOW DIFFICULT HAVE THESE PROBLEMS MADE IT FOR YOU TO DO YOUR WORK, TAKE CARE OF THINGS AT HOME, OR GET ALONG WITH OTHER PEOPLE: SOMEWHAT DIFFICULT
3. WORRYING TOO MUCH ABOUT DIFFERENT THINGS: SEVERAL DAYS
GAD7 TOTAL SCORE: 1
6. BECOMING EASILY ANNOYED OR IRRITABLE: NOT AT ALL
7. FEELING AFRAID AS IF SOMETHING AWFUL MIGHT HAPPEN: NOT AT ALL
5. BEING SO RESTLESS THAT IT IS HARD TO SIT STILL: NOT AT ALL
1. FEELING NERVOUS, ANXIOUS, OR ON EDGE: NOT AT ALL
2. NOT BEING ABLE TO STOP OR CONTROL WORRYING: NOT AT ALL

## 2017-12-06 ASSESSMENT — PATIENT HEALTH QUESTIONNAIRE - PHQ9
5. POOR APPETITE OR OVEREATING: NOT AT ALL
SUM OF ALL RESPONSES TO PHQ QUESTIONS 1-9: 15

## 2017-12-06 NOTE — PROGRESS NOTES
SUBJECTIVE:   Susan Haq is a 85 year old female who presents for Preventive Visit.  Are you in the first 12 months of your Medicare Part B coverage?  No    Healthy Habits:    Do you get at least three servings of calcium containing foods daily (dairy, green leafy vegetables, etc.)? yes    Amount of exercise or daily activities, outside of work: none    Problems taking medications regularly No    Medication side effects: No    Have you had an eye exam in the past two years? Unknown     Do you see a dentist twice per year? no    Do you have sleep apnea, excessive snoring or daytime drowsiness?yes, sleep apnea    COGNITIVE SCREEN  1) Repeat 3 items (Banana, Sunrise, Chair)    2) Clock draw: NORMAL  3) 3 item recall: Recalls 1 object   Results: NORMAL clock, 1-2 items recalled: COGNITIVE IMPAIRMENT LESS LIKELY    Mini-CogTM Copyright JAZZMINE Murdock. Licensed by the author for use in [facility] [employer]; reprinted with permission (ashly@[facility]). All rights reserved.      Diabetes Follow-up      Patient is checking blood sugars: not at all    Diabetic concerns: None     Symptoms of hypoglycemia (low blood sugar): none     Paresthesias (numbness or burning in feet) or sores: No     Date of last diabetic eye exam:      Hyperlipidemia Follow-Up      Rate your low fat/cholesterol diet?: good    Taking statin?  No    Other lipid medications/supplements?:  none    Hypertension Follow-up      Outpatient blood pressures are not being checked.    Low Salt Diet: no added salt    BP Readings from Last 2 Encounters:   12/06/17 133/60   07/18/17 112/58     Hemoglobin A1C (%)   Date Value   12/06/2017 5.7   07/22/2015 5.7     LDL Cholesterol Calculated (mg/dL)   Date Value   12/06/2017 91   04/21/2015 103     Depression Followup    Status since last visit: some mild improvement since starting Sertraline but with persistent depressive symptoms.    See PHQ-9 for current symptoms.  Other associated symptoms: None    Complicating  factors:   Significant life event:  No   Current substance abuse:  None  Anxiety or Panic symptoms:  No    PHQ-9 Score and MyChart F/U Questions 12/6/2017   Total Score 15   Q9: Suicide Ideation Not at all     In the past two weeks have you had thoughts of suicide or self-harm?  No.    Do you have concerns about your personal safety or the safety of others?   No    PHQ-9  English  PHQ-9   Any Language  Suicide Assessment Five-step Evaluation and Treatment (SAFE-T)  COPD Follow-Up    Symptoms are currently: stable    Current fatigue or dyspnea with ambulation: stable     Shortness of breath: stable    Increased or change in Cough/Sputum: Yes-  More from post nasal drainage    Fever(s): No    Baseline ambulation without stopping to rest:  < 1 rooms. Able to walk up 0 flights of stairs without stopping to rest.    Any ER/UC or hospital admissions since your last visit? No     History   Smoking Status     Former Smoker     Packs/day: 1.00     Years: 35.00     Quit date: 1/1/1990   Smokeless Tobacco     Former User     Lab Results   Component Value Date    FEV1 1.31 10/07/2013    OZG5EEN 44% 10/07/2013           Reviewed and updated as needed this visit by clinical staff  Tobacco  Allergies  Meds  Problems  Med Hx  Surg Hx  Fam Hx  Soc Hx          Reviewed and updated as needed this visit by Provider  Allergies  Meds  Problems        Social History   Substance Use Topics     Smoking status: Former Smoker     Packs/day: 1.00     Years: 35.00     Quit date: 1/1/1990     Smokeless tobacco: Former User     Alcohol use Yes      Comment: Drink 1 (3oz) drink a day       The patient does not drink >3 drinks per day nor >7 drinks per week.     Today's PHQ-2 Score:   PHQ-2 ( 1999 Pfizer) 12/6/2017 12/6/2017   Q1: Little interest or pleasure in doing things 3 0   Q2: Feeling down, depressed or hopeless 3 2   PHQ-2 Score 6 2         Do you feel safe in your environment - Yes    Do you have a Health Care Directive?: Yes:  Advance Directive has been received and scanned.    Current providers sharing in care for this patient include: Patient Care Team:  Ema Melendez, NP as PCP - General (Nurse Practitioner - Family)      Hearing impairment: Yes, HAS HEARING AIDS     Ability to successfully perform activities of daily living: Yes, no assistance needed     Fall risk:  Fallen 2 or more times in the past year?: No  Any fall with injury in the past year?: No      Home safety:  none identified      The following health maintenance items are reviewed in Epic and correct as of today:  Health Maintenance   Topic Date Due     FOOT EXAM Q1 YEAR  01/10/1933     EYE EXAM Q1 YEAR  01/10/1933     DEPRESSION ACTION PLAN Q1 YR  01/10/1950     MICROALBUMIN Q1 YEAR  10/05/2012     PHQ-9 Q6 MONTHS  04/30/2015     A1C Q6 MO  01/22/2016     LIPID MONITORING Q1 YEAR  04/21/2016     COPD ACTION PLAN Q1 YR  01/29/2017     FALL RISK ASSESSMENT  01/29/2017     INFLUENZA VACCINE (SYSTEM ASSIGNED)  09/01/2017     BMP Q1 YR  06/12/2018     TSH W/ FREE T4 REFLEX Q2 YEAR  06/10/2019     ADVANCE DIRECTIVE PLANNING Q5 YRS  06/28/2021     TETANUS IMMUNIZATION (SYSTEM ASSIGNED)  10/07/2023     SPIROMETRY ONETIME  Completed     PNEUMOCOCCAL  Completed     Labs reviewed in EPIC  BP Readings from Last 3 Encounters:   12/06/17 133/60   07/18/17 112/58   07/06/17 127/71    Wt Readings from Last 3 Encounters:   12/06/17 243 lb 3.2 oz (110.3 kg)   07/18/17 236 lb (107 kg)   07/06/17 248 lb 9.6 oz (112.8 kg)                  Patient Active Problem List   Diagnosis     Hyperlipidemia LDL goal <130     Insomnia     Osteopenia     Hypertension goal BP (blood pressure) < 140/90     Obesity     Advance care planning     Adenomatous polyp of colon     PVC's (premature ventricular contractions)     SUSSY (obstructive sleep apnea)-Moderately severe (AHI 21)     Bilateral leg edema     HL (hearing loss)     Umbilical hernia     SNHL (sensorineural hearing loss)     Interstitial  pulmonary fibrosis (H)     Chronic obstructive pulmonary disease, unspecified COPD type (H)     Major depressive disorder, single episode, moderate (H)     Risk for falls     Auditory hallucination     Intertrigo     Alcohol abuse     Type 2 diabetes mellitus (H)     Acute pain of right shoulder     Morbid obesity (H)     Physical deconditioning     Past Surgical History:   Procedure Laterality Date     APPENDECTOMY       C BSO, OMENTECTOMY W/SHANIA       C NONSPECIFIC PROCEDURE      (L) clavicle orif     C STOMACH SURGERY PROCEDURE UNLISTED       CHOLECYSTECTOMY, LAPOROSCOPIC  10/13/2011    Cholecystectomy, Laparoscopic     HERNIA REPAIR, UMBILICAL  10/13/2011     HYSTERECTOMY, CERVIX STATUS UNKNOWN         Social History   Substance Use Topics     Smoking status: Former Smoker     Packs/day: 1.00     Years: 35.00     Quit date: 1/1/1990     Smokeless tobacco: Former User     Alcohol use Yes      Comment: Drink 1 (3oz) drink a day     Family History   Problem Relation Age of Onset     DIABETES Father      Coronary Artery Disease Maternal Grandmother      Coronary Artery Disease Paternal Uncle          Current Outpatient Prescriptions   Medication Sig Dispense Refill     folic acid (FOLVITE) 1 MG tablet Take 1 tablet (1 mg) by mouth daily 90 tablet 3     losartan-hydrochlorothiazide (HYZAAR) 100-25 MG per tablet Take 1 tablet by mouth daily 90 tablet 3     senna-docusate (SENOKOT-S;PERICOLACE) 8.6-50 MG per tablet Take 1-2 tablets by mouth 2 times daily as needed (constipation ) 100 tablet 1     acetaminophen (TYLENOL) 500 MG tablet Take 2 tablets (1,000 mg) by mouth every 8 hours 60 tablet 6     atorvastatin (LIPITOR) 20 MG tablet Take 1 tablet (20 mg) by mouth daily 90 tablet 3     potassium chloride (K-TAB,KLOR-CON) 10 MEQ tablet Take 1 tablet (10 mEq) by mouth daily 90 tablet 3     atenolol (TENORMIN) 50 MG tablet Take 1 tablet (50 mg) by mouth 2 times daily 180 tablet 3     tiotropium (SPIRIVA HANDIHALER) 18 MCG  capsule Inhale  into the lungs. Inhale contents of one capsule daily 90 capsule 3     albuterol (PROAIR HFA/PROVENTIL HFA/VENTOLIN HFA) 108 (90 BASE) MCG/ACT Inhaler Inhale 2 puffs into the lungs every 4 hours as needed for shortness of breath / dyspnea or wheezing 3 Inhaler 3     loratadine (CLARITIN) 10 MG tablet Take 1 tablet (10 mg) by mouth daily 30 tablet 1     sertraline (ZOLOFT) 50 MG tablet Take 1 tablet (50 mg) by mouth daily 90 tablet 3     SYMBICORT 160-4.5 MCG/ACT Inhaler INHALE 2 PUFFS INTO THE LUNGS 2 TIMES DAILY 10.2 g 5     SIMETHICONE-80 PO Take 80 mg by mouth every morning And TID prn belching       miconazole (MICATIN; MICRO GUARD) 2 % powder Apply topically 2 times daily 30 g 1     Respiratory Therapy Supplies (NEBULIZER COMPRESSOR) KIT 1 kit every 4 hours as needed 1 kit 0     ORDER FOR DME Equipment being ordered: Oxygen - 3 liters continous 1 Device 0     ORDER FOR DME Equipment being ordered: CPAP supplies 1 Units 0     ORDER FOR DME Equipment being ordered: Oxygen 1 Units 0     ORDER FOR DME TEDs stockings knee high to be worn during the day. 1 Units 0     ORDER FOR DME 1.  CPAP pressure 12 cm/H20 with heated humidity and auto-titrating capability.   2.  Provide mask to fit and CPAP supplies.  3.  Length of need lifetime.  4.  Ok to d/c O2 bleed in to her PAP overnight.           Calcium Carbonate-Vitamin D (CALCIUM 600 + D OR) Take  by mouth.       FISH OIL 1000 MG OR CAPS 1 cap daily       [DISCONTINUED] sertraline (ZOLOFT) 25 MG tablet TAKE 1 TABLET (25 MG) BY MOUTH DAILY 30 tablet 1     [DISCONTINUED] losartan-hydrochlorothiazide (HYZAAR) 100-25 MG per tablet Take 1 tablet by mouth daily       [DISCONTINUED] atorvastatin (LIPITOR) 20 MG tablet Take 1 tablet (20 mg) by mouth daily 90 tablet 3     [DISCONTINUED] atenolol (TENORMIN) 50 MG tablet Take 1 tablet (50 mg) by mouth 2 times daily 180 tablet 3     [DISCONTINUED] tiotropium (SPIRIVA HANDIHALER) 18 MCG capsule Inhale  into the  lungs. Inhale contents of one capsule daily 90 capsule 3     [DISCONTINUED] albuterol (PROAIR HFA/PROVENTIL HFA/VENTOLIN HFA) 108 (90 BASE) MCG/ACT Inhaler Inhale 2 puffs into the lungs every 4 hours as needed for shortness of breath / dyspnea or wheezing 3 Inhaler 3     albuterol (2.5 MG/3ML) 0.083% nebulizer solution Take 1 vial (2.5 mg) by nebulization every 4 hours as needed for shortness of breath / dyspnea or wheezing 1 Box 0     Allergies   Allergen Reactions     Ace Inhibitors Cough     Asa [Aluminum Hydroxide]      Penicillins      Recent Labs   Lab Test  12/06/17   1035  06/12/17   0618   06/10/17   1440   03/05/16   2030  02/24/16   1156  07/22/15   1219  04/21/15   1038  02/18/15   1353  10/24/14   1010   A1C  5.7   --    --    --    --    --    --   5.7   --    --    --    LDL  91   --    --    --    --    --    --    --   103   --   178*   HDL  61   --    --    --    --    --    --    --   52   --   41*   TRIG  196*   --    --    --    --    --    --    --   162*   --   179*   ALT   --    --    --   26   --   24  22   --    --   20   --    CR  0.86  0.75   < >  1.01   < >  1.11*  0.91   --   0.91  0.87   --    GFRESTIMATED  63  73   < >  52*   < >  47*  59*   --   59*  62   --    GFRESTBLACK  76  88   < >  63   < >  57*  71   --   71  75   --    POTASSIUM  3.2*  3.4   < >  3.5   < >  3.2*  3.4   --   3.2*  3.9   --    TSH   --    --    --   3.06   --    --    --    --    --   2.60   --     < > = values in this interval not displayed.              Pneumonia Vaccine:Adults age 65+ who received Pneumovax (PPSV23) at 65 years or older: Should be given PCV13 > 1 year after their most recent PPSV23  Mammogram Screening: Patient over age 75, has elected to stop mammography screening.    ROS:  Constitutional, HEENT, cardiovascular, pulmonary, GI, , musculoskeletal, neuro, skin, endocrine and psych systems are negative, except as otherwise noted.      OBJECTIVE:   /60  Pulse 66  Temp 97.9  F (36.6  C)  "(Tympanic)  Ht 5' 6\" (1.676 m)  Wt 243 lb 3.2 oz (110.3 kg)  SpO2 95%  BMI 39.25 kg/m2 Estimated body mass index is 39.25 kg/(m^2) as calculated from the following:    Height as of this encounter: 5' 6\" (1.676 m).    Weight as of this encounter: 243 lb 3.2 oz (110.3 kg).  EXAM:   GENERAL: alert, no distress, obese and elderly  EYES: Eyes grossly normal to inspection, PERRL and conjunctivae and sclerae normal  HENT: normal cephalic/atraumatic, ear canals and TM's normal, nose and mouth without ulcers or lesions, nasal mucosa edematous , oropharynx clear and oral mucous membranes moist  NECK: no adenopathy, no asymmetry, masses, or scars and thyroid normal to palpation  RESP: lungs clear to auscultation - no rales, rhonchi or wheezes, prolonged expiratory phase and decreased breath sounds throughout; 94% on 3 liters portable oxygen.  No dyspnea at rest, but mild with any exertion. RR 20.  CV: regular rate and rhythm, normal S1 S2, no S3 or S4, no murmur, click or rub, no peripheral edema and peripheral pulses strong  ABDOMEN: soft, nontender, no hepatosplenomegaly, no masses and bowel sounds normal  MS: no gross musculoskeletal defects noted, no edema  SKIN: no suspicious lesions or rashes  NEURO: Normal strength and tone, mentation intact and speech normal  PSYCH: mentation appears normal, affect normal/bright    ASSESSMENT / PLAN:   1. Type 2 diabetes mellitus without complication, without long-term current use of insulin (H)   Controlled.  Lab Results   Component Value Date    A1C 5.7 12/06/2017    A1C 5.7 07/22/2015    A1C 5.9@ 10/22/2008       - HEMOGLOBIN A1C  - Lipid panel reflex to direct LDL Fasting  - losartan-hydrochlorothiazide (HYZAAR) 100-25 MG per tablet; Take 1 tablet by mouth daily  Dispense: 90 tablet; Refill: 3  - Basic metabolic panel    2. Interstitial pulmonary fibrosis (H)   Progressive worsening - currently stable today.  Continue inhalers as ordered.    - folic acid (FOLVITE) 1 MG tablet; " Take 1 tablet (1 mg) by mouth daily  Dispense: 90 tablet; Refill: 3    3. At risk for falling   Stopped Trazodone and monitor medications.  Discussed safety precautions    4. Screening for diabetic peripheral neuropathy     - FOOT EXAM  NO CHARGE [08300.998]    5. Need for prophylactic vaccination and inoculation against influenza     - FLU VACCINE, INCREASED ANTIGEN, PRESV FREE, AGE 65+ [66549]  - Vaccine Administration, Initial [46479]    6. Hyperlipidemia LDL goal <130     - atorvastatin (LIPITOR) 20 MG tablet; Take 1 tablet (20 mg) by mouth daily  Dispense: 90 tablet; Refill: 3    7. Insomnia, unspecified type       8. Hypertension goal BP (blood pressure) < 140/90  Controlled.    - losartan-hydrochlorothiazide (HYZAAR) 100-25 MG per tablet; Take 1 tablet by mouth daily  Dispense: 90 tablet; Refill: 3  - atenolol (TENORMIN) 50 MG tablet; Take 1 tablet (50 mg) by mouth 2 times daily  Dispense: 180 tablet; Refill: 3  - Basic metabolic panel    9. SUSSY (obstructive sleep apnea)-Moderately severe (AHI 21)  Uses CPAP at night    10. Chronic obstructive pulmonary disease, unspecified COPD type (H)  Stable - progressive worsening.  Decreased mobility due to dyspnea.    - tiotropium (SPIRIVA HANDIHALER) 18 MCG capsule; Inhale  into the lungs. Inhale contents of one capsule daily  Dispense: 90 capsule; Refill: 3  - albuterol (PROAIR HFA/PROVENTIL HFA/VENTOLIN HFA) 108 (90 BASE) MCG/ACT Inhaler; Inhale 2 puffs into the lungs every 4 hours as needed for shortness of breath / dyspnea or wheezing  Dispense: 3 Inhaler; Refill: 3  - COPD ACTION PLAN    11. Major depressive disorder, single episode, moderate (H)   Not controlled.  Increased today.    - sertraline (ZOLOFT) 50 MG tablet; Take 1 tablet (50 mg) by mouth daily  Dispense: 90 tablet; Refill: 3    12. Physical deconditioning       13. Morbid obesity (H)     15. Hypokalemia   Increased due to low K+ today when checked.    - potassium chloride (K-TAB,KLOR-CON) 10 MEQ  "tablet; Take 1 tablet (10 mEq) by mouth daily  Dispense: 90 tablet; Refill: 3    16. Chronic obstructive pulmonary disease, unspecified COPD type (H)     - tiotropium (SPIRIVA HANDIHALER) 18 MCG capsule; Inhale  into the lungs. Inhale contents of one capsule daily  Dispense: 90 capsule; Refill: 3  - albuterol (PROAIR HFA/PROVENTIL HFA/VENTOLIN HFA) 108 (90 BASE) MCG/ACT Inhaler; Inhale 2 puffs into the lungs every 4 hours as needed for shortness of breath / dyspnea or wheezing  Dispense: 3 Inhaler; Refill: 3  - COPD ACTION PLAN    17. Acute seasonal allergic rhinitis, unspecified trigger     - loratadine (CLARITIN) 10 MG tablet; Take 1 tablet (10 mg) by mouth daily  Dispense: 30 tablet; Refill: 1    End of Life Planning:  Patient currently has an advanced directive: Yes.  Practitioner is supportive of decision.    COUNSELING:  Reviewed preventive health counseling, as reflected in patient instructions       Regular exercise       Healthy diet/nutrition       Vision screening       Osteoporosis Prevention/Bone Health        Estimated body mass index is 39.25 kg/(m^2) as calculated from the following:    Height as of this encounter: 5' 6\" (1.676 m).    Weight as of this encounter: 243 lb 3.2 oz (110.3 kg).     reports that she quit smoking about 27 years ago. She has a 35.00 pack-year smoking history. She has quit using smokeless tobacco.        Appropriate preventive services were discussed with this patient, including applicable screening as appropriate for cardiovascular disease, diabetes, osteopenia/osteoporosis, and glaucoma.  As appropriate for age/gender, discussed screening for colorectal cancer, prostate cancer, breast cancer, and cervical cancer. Checklist reviewing preventive services available has been given to the patient.    Reviewed patients plan of care and provided an AVS. The Complex Care Plan (for patients with higher acuity and needing more deliberate coordination of services) for Susan meets the " Care Plan requirement. This Care Plan has been established and reviewed with the Patient and caregiver.    Counseling Resources:  ATP IV Guidelines  Pooled Cohorts Equation Calculator  Breast Cancer Risk Calculator  FRAX Risk Assessment  ICSI Preventive Guidelines  Dietary Guidelines for Americans, 2010  USDA's MyPlate  ASA Prophylaxis  Lung CA Screening    Ema Melendez NP  [facility] HospitalInjectable Influenza Immunization Documentation    1.  Is the person to be vaccinated sick today?   No    2. Does the person to be vaccinated have an allergy to a component   of the vaccine?   No  Egg Allergy Algorithm Link    3. Has the person to be vaccinated ever had a serious reaction   to influenza vaccine in the past?   No    4. Has the person to be vaccinated ever had Guillain-Barré syndrome?   No    Form completed by Lucia Gordon CMA            Injectable Influenza Immunization Documentation    1.  Is the person to be vaccinated sick today?   No    2. Does the person to be vaccinated have an allergy to a component   of the vaccine?   No  Egg Allergy Algorithm Link    3. Has the person to be vaccinated ever had a serious reaction   to influenza vaccine in the past?   No    4. Has the person to be vaccinated ever had Guillain-Barré syndrome?   No    Form completed by Lucia Gordon CMA

## 2017-12-06 NOTE — PATIENT INSTRUCTIONS
Increase Sertraline to 50 mg once daily (moods, depression) in morning.  Add Loratadine (Claritin) 10 mg once daily (allergies, runny nose) in morning.    Continue all other medications as prescribed.    Follow up with me in 6 months.    Ema Melendez, ANNETTA      Preventive Health Recommendations    Female Ages 65 +    Yearly exam:     See your health care provider every year in order to  o Review health changes.   o Discuss preventive care.    o Review your medicines if your doctor has prescribed any.      You no longer need a yearly Pap test unless you've had an abnormal Pap test in the past 10 years. If you have vaginal symptoms, such as bleeding or discharge, be sure to talk with your provider about a Pap test.      Every 1 to 2 years, have a mammogram.  If you are over 69, talk with your health care provider about whether or not you want to continue having screening mammograms.      Every 10 years, have a colonoscopy. Or, have a yearly FIT test (stool test). These exams will check for colon cancer.       Have a cholesterol test every 5 years, or more often if your doctor advises it.       Have a diabetes test (fasting glucose) every three years. If you are at risk for diabetes, you should have this test more often.       At age 65, have a bone density scan (DEXA) to check for osteoporosis (brittle bone disease).    Shots:    Get a flu shot each year.    Get a tetanus shot every 10 years.    Talk to your doctor about your pneumonia vaccines. There are now two you should receive - Pneumovax (PPSV 23) and Prevnar (PCV 13).    Talk to your doctor about the shingles vaccine.    Talk to your doctor about the hepatitis B vaccine.    Nutrition:     Eat at least 5 servings of fruits and vegetables each day.      Eat whole-grain bread, whole-wheat pasta and brown rice instead of white grains and rice.      Talk to your provider about Calcium and Vitamin D.     Lifestyle    Exercise at least 150 minutes a week (30 minutes  a day, 5 days a week). This will help you control your weight and prevent disease.      Limit alcohol to one drink per day.      No smoking.       Wear sunscreen to prevent skin cancer.       See your dentist twice a year for an exam and cleaning.      See your eye doctor every 1 to 2 years to screen for conditions such as glaucoma, macular degeneration and cataracts.    ANNETTA Hoover

## 2017-12-06 NOTE — NURSING NOTE
"Chief Complaint   Patient presents with     Physical     Flu Shot       Initial LMP 02/24/2017 (Exact Date) Estimated body mass index is 23.49 kg/(m^2) as calculated from the following:    Height as of 4/4/17: 5' 7\" (1.702 m).    Weight as of 4/4/17: 150 lb (68 kg).  Medication Reconciliation: complete     Lucia Gordon CMA   "

## 2017-12-06 NOTE — MR AVS SNAPSHOT
After Visit Summary   12/6/2017    Susan Haq    MRN: [id number]           Patient Information     Date Of Birth          1/10/1932        Visit Information        Provider Department      12/6/2017 9:20 AM Ema Melendez NP [facility]        Today's Diagnoses     Hyperlipidemia LDL goal <130    -  1    Type 2 diabetes mellitus without complication, without long-term current use of insulin (H)        Interstitial pulmonary fibrosis (H)        At risk for falling        Screening for diabetic peripheral neuropathy        Need for prophylactic vaccination and inoculation against influenza        Insomnia, unspecified type        Hypertension goal BP (blood pressure) < 140/90        SUSSY (obstructive sleep apnea)-Moderately severe (AHI 21)        Chronic obstructive pulmonary disease, unspecified COPD type (H)        Major depressive disorder, single episode, moderate (H)        Physical deconditioning        Morbid obesity (H)        Closed fracture of proximal end of fibula, unspecified fracture morphology, initial encounter        Hypokalemia        Chronic obstructive pulmonary disease, unspecified COPD type (H)        Acute seasonal allergic rhinitis, unspecified trigger          Care Instructions    Increase Sertraline to 50 mg once daily (moods, depression) in morning.  Add Loratadine (Claritin) 10 mg once daily (allergies, runny nose) in morning.    Continue all other medications as prescribed.    Follow up with me in 6 months.    ANNETTA Hoover      Preventive Health Recommendations    Female Ages 65 +    Yearly exam:     See your health care provider every year in order to  o Review health changes.   o Discuss preventive care.    o Review your medicines if your doctor has prescribed any.      You no longer need a yearly Pap test unless you've had an abnormal Pap test in the past 10 years. If you have vaginal symptoms, such as bleeding or discharge, be sure to  talk with your provider about a Pap test.      Every 1 to 2 years, have a mammogram.  If you are over 69, talk with your health care provider about whether or not you want to continue having screening mammograms.      Every 10 years, have a colonoscopy. Or, have a yearly FIT test (stool test). These exams will check for colon cancer.       Have a cholesterol test every 5 years, or more often if your doctor advises it.       Have a diabetes test (fasting glucose) every three years. If you are at risk for diabetes, you should have this test more often.       At age 65, have a bone density scan (DEXA) to check for osteoporosis (brittle bone disease).    Shots:    Get a flu shot each year.    Get a tetanus shot every 10 years.    Talk to your doctor about your pneumonia vaccines. There are now two you should receive - Pneumovax (PPSV 23) and Prevnar (PCV 13).    Talk to your doctor about the shingles vaccine.    Talk to your doctor about the hepatitis B vaccine.    Nutrition:     Eat at least 5 servings of fruits and vegetables each day.      Eat whole-grain bread, whole-wheat pasta and brown rice instead of white grains and rice.      Talk to your provider about Calcium and Vitamin D.     Lifestyle    Exercise at least 150 minutes a week (30 minutes a day, 5 days a week). This will help you control your weight and prevent disease.      Limit alcohol to one drink per day.      No smoking.       Wear sunscreen to prevent skin cancer.       See your dentist twice a year for an exam and cleaning.      See your eye doctor every 1 to 2 years to screen for conditions such as glaucoma, macular degeneration and cataracts.    ANNETTA Hoover            Follow-ups after your visit        Who to contact     If you have questions or need follow up information about today's clinic visit or your schedule please contact [facility] directly at [phone].  Normal or non-critical lab and imaging results will be  "communicated to you by [employer], letter or phone within 4 business days after the clinic has received the results. If you do not hear from us within 7 days, please contact the clinic through [employer] or phone. If you have a critical or abnormal lab result, we will notify you by phone as soon as possible.  Submit refill requests through [employer] or call your pharmacy and they will forward the refill request to us. Please allow 3 business days for your refill to be completed.          Additional Information About Your Visit        [employer] Information     [employer] lets you send messages to your doctor, view your test results, renew your prescriptions, schedule appointments and more. To sign up, go to [email or web address] . Click on \"Log in\" on the left side of the screen, which will take you to the Welcome page. Then click on \"Sign up Now\" on the right side of the page.     You will be asked to enter the access code listed below, as well as some personal information. Please follow the directions to create your username and password.     Your access code is: [email or web address]  Expires: 3/6/2018 10:17 AM     Your access code will  in 90 days. If you need help or a new code, please call your [place] clinic or [phone].        Care EveryWhere ID     This is your Care EveryWhere ID. This could be used by other organizations to access your [place] medical records  [id number]        Your Vitals Were     Pulse Temperature Height Pulse Oximetry BMI (Body Mass Index)       66 97.9  F (36.6  C) (Tympanic) 5' 6\" (1.676 m) 95% 39.25 kg/m2        Blood Pressure from Last 3 Encounters:   17 133/60   17 112/58   17 127/71    Weight from Last 3 Encounters:   17 243 lb 3.2 oz (110.3 kg)   17 236 lb (107 kg)   17 248 lb 9.6 oz (112.8 kg)              We Performed the Following     Albumin Random Urine Quantitative with Creat Ratio     Basic metabolic panel     COPD ACTION PLAN     DEPRESSION " ACTION PLAN (DAP)     FLU VACCINE, INCREASED ANTIGEN, PRESV FREE, AGE 65+ [55065]     FOOT EXAM  NO CHARGE [24093.114]     HEMOGLOBIN A1C     Lipid panel reflex to direct LDL Fasting     Vaccine Administration, Initial [52920]          Today's Medication Changes          These changes are accurate as of: 12/6/17 10:24 AM.  If you have any questions, ask your nurse or doctor.               Start taking these medicines.        Dose/Directions    loratadine 10 MG tablet   Commonly known as:  CLARITIN   Used for:  Acute seasonal allergic rhinitis, unspecified trigger   Started by:  Ema Melendez NP        Dose:  10 mg   Take 1 tablet (10 mg) by mouth daily   Quantity:  30 tablet   Refills:  1         These medicines have changed or have updated prescriptions.        Dose/Directions    sertraline 50 MG tablet   Commonly known as:  ZOLOFT   This may have changed:    - medication strength  - how much to take  - how to take this  - when to take this  - additional instructions   Used for:  Major depressive disorder, single episode, moderate (H)   Changed by:  Ema Melendez NP        Dose:  50 mg   Take 1 tablet (50 mg) by mouth daily   Quantity:  90 tablet   Refills:  3            Where to get your medicines      These medications were sent to [facility] [place] - [address]  [address] [place] [address], [place] [address]     Phone:  [phone]     acetaminophen 500 MG tablet    albuterol 108 (90 BASE) MCG/ACT Inhaler    atenolol 50 MG tablet    atorvastatin 20 MG tablet    folic acid 1 MG tablet    loratadine 10 MG tablet    losartan-hydrochlorothiazide 100-25 MG per tablet    potassium chloride 10 MEQ tablet    senna-docusate 8.6-50 MG per tablet    sertraline 50 MG tablet    tiotropium 18 MCG capsule                Primary Care Provider Office Phone # Fax #    Ema Melendez [phone] [address]  [place] [address]        Goals        Exercise    Exercise 3x per week (30 min per time)     Notes - Note created  11/4/2016  3:01 PM by Marcia Poe RN    Member stated she would like to have help with showers and light housekeeping    *Contact will be made with member regarding payment of this service will be out of pocket  *Resources will be provided to member to make a decision as to what she would like to do         General    Functional (pt-stated)     Notes - Note created  2/25/2016  1:49 PM by Marcia Poe, RN    Patient will receive PT in home for strengthening and gait training to remain safe, through 5/1/2016      Medical (pt-stated)     Notes - Note created  5/1/2015 10:03 AM by Rin Lowe RN    Decrease in COPD symptoms  - patient has follow up appointment with sleep medicine 5/12/15.  Patient to take medications as prescribed daily.  Patient to use oxygen as directed.          Equal Access to Services     [facility]: Goldie Tony, jeff chinchilla, nyla quiñones . So [facility] [phone].    ATENCIÓN: Si habla español, tiene a petersen disposición servicios gratuitos de asistencia lingüística. Llame al [phone].    We comply with applicable federal civil rights laws and [place] laws. We do not discriminate on the basis of race, color, national origin, age, disability, sex, sexual orientation, or gender identity.            Thank you!     Thank you for choosing [facility]  for your care. Our goal is always to provide you with excellent care. Hearing back from our patients is one way we can continue to improve our services. Please take a few minutes to complete the written survey that you may receive in the mail after your visit with us. Thank you!             Your Updated Medication List - Protect others around you: Learn how to safely use, store and throw away your medicines at www.disposemymeds.org.          This  list is accurate as of: 12/6/17 10:24 AM.  Always use your most recent med list.                   Brand Name Dispense Instructions for use Diagnosis    acetaminophen 500 MG tablet    TYLENOL    60 tablet    Take 2 tablets (1,000 mg) by mouth every 8 hours    Closed fracture of proximal end of fibula, unspecified fracture morphology, initial encounter       * albuterol (2.5 MG/3ML) 0.083% neb solution     1 Box    Take 1 vial (2.5 mg) by nebulization every 4 hours as needed for shortness of breath / dyspnea or wheezing        * albuterol 108 (90 BASE) MCG/ACT Inhaler    PROAIR HFA/PROVENTIL HFA/VENTOLIN HFA    3 Inhaler    Inhale 2 puffs into the lungs every 4 hours as needed for shortness of breath / dyspnea or wheezing    Chronic obstructive pulmonary disease, unspecified COPD type (H)       atenolol 50 MG tablet    TENORMIN    180 tablet    Take 1 tablet (50 mg) by mouth 2 times daily    Hypertension goal BP (blood pressure) < 140/90       atorvastatin 20 MG tablet    LIPITOR    90 tablet    Take 1 tablet (20 mg) by mouth daily    Hyperlipidemia LDL goal <130       CALCIUM 600 + D PO      Take  by mouth.        fish oil-omega-3 fatty acids 1000 MG capsule      1 cap daily        folic acid 1 MG tablet    FOLVITE    90 tablet    Take 1 tablet (1 mg) by mouth daily    Interstitial pulmonary fibrosis (H)       loratadine 10 MG tablet    CLARITIN    30 tablet    Take 1 tablet (10 mg) by mouth daily    Acute seasonal allergic rhinitis, unspecified trigger       losartan-hydrochlorothiazide 100-25 MG per tablet    HYZAAR    90 tablet    Take 1 tablet by mouth daily    Type 2 diabetes mellitus without complication, without long-term current use of insulin (H), Hypertension goal BP (blood pressure) < 140/90       miconazole 2 % powder    MICATIN; MICRO GUARD    30 g    Apply topically 2 times daily    Intertrigo       Nebulizer Compressor Kit     1 kit    1 kit every 4 hours as needed        * order for DME      1.  CPAP  pressure 12 cm/H20 with heated humidity and auto-titrating capability.  2.  Provide mask to fit and CPAP supplies. 3.  Length of need lifetime. 4.  Ok to d/c O2 bleed in to her PAP overnight.    SUSYS (obstructive sleep apnea)       * order for DME     1 Units    TEDs stockings knee high to be worn during the day.    Leg edema       * order for DME     1 Units    Equipment being ordered: Oxygen    Hypoxia       * order for DME     1 Units    Equipment being ordered: CPAP supplies    SUSSY (obstructive sleep apnea)       order for DME     1 Device    Equipment being ordered: Oxygen - 3 liters continous    COPD (chronic obstructive pulmonary disease) (H)       potassium chloride 10 MEQ tablet    K-TAB,KLOR-CON    90 tablet    Take 1 tablet (10 mEq) by mouth daily    Hypokalemia       senna-docusate 8.6-50 MG per tablet    SENOKOT-S;PERICOLACE    100 tablet    Take 1-2 tablets by mouth 2 times daily as needed (constipation )    Closed fracture of proximal end of fibula, unspecified fracture morphology, initial encounter       sertraline 50 MG tablet    ZOLOFT    90 tablet    Take 1 tablet (50 mg) by mouth daily    Major depressive disorder, single episode, moderate (H)       SIMETHICONE-80 PO      Take 80 mg by mouth every morning And TID prn belching        SYMBICORT 160-4.5 MCG/ACT Inhaler   Generic drug:  budesonide-formoterol     10.2 g    INHALE 2 PUFFS INTO THE LUNGS 2 TIMES DAILY    Chronic obstructive pulmonary disease, unspecified COPD type (H)       tiotropium 18 MCG capsule    SPIRIVA HANDIHALER    90 capsule    Inhale  into the lungs. Inhale contents of one capsule daily    Chronic obstructive pulmonary disease, unspecified COPD type (H)       * Notice:  This list has 6 medication(s) that are the same as other medications prescribed for you. Read the directions carefully, and ask your doctor or other care provider to review them with you.

## 2017-12-07 ASSESSMENT — ANXIETY QUESTIONNAIRES: GAD7 TOTAL SCORE: 1

## 2018-01-12 ENCOUNTER — CARE COORDINATION (OUTPATIENT)
Dept: CARE COORDINATION | Facility: CLINIC | Age: 83
End: 2018-01-12

## 2018-01-12 NOTE — PROGRESS NOTES
Clinic Care Coordination Contact  [facility]/Voicemail    Referral Source: PCP  Clinical Data: Care Coordinator Outreach  Outreach attempted x 1.  Left message on voicemail with call back information and requested return call.  Plan: Care Coordinator will try to reach patient again in 3-5 business days.    ZONIA Saleh  [facility]   [phone]  [email or web address]

## 2018-01-16 NOTE — PROGRESS NOTES
Clinic Care Coordination Contact  OUTREACH    Referral Information:  Referral Source: PCP  Reason for Contact: Follow-up on [facility]  Care Conference: No     Universal Utilization:   ED Visits in last year: 0  Hospital visits in last year: 1  Last PCP appointment: 12/06/17  Missed Appointments: 1  Concerns: Outside Utilization  Multiple Providers or Specialists: Yes (Pulmonary,Audiology, Sleep Medicine, Dermatology)    Clinical Concerns:  Current Medical Concerns: Pt has a history of COPD, Major Depressive Disorder, Deconditioning. VIKTOR SALDANA was assisting pt's friends to think of ways to encourage pt to be more active as she was relying heavily on her friends for her basic needs. VIKTOR SALDANA had checked into [facility] services to see if someone providing encouragement and helping motivate pt to complete daily tasks would be helpful. Pt does not have the right insurance to have that service. However, another service that is similar is available. Call to pt's friend, Martina, today to discuss. Pt's friend reports that pt has decided that she would like to go on a cruise and since she will need the help of her friends to do this she has decided to pay for her friends to go as well. They are set to fo on 2/12/18. Since deciding to go on the cruise patient has been much more awake and active. Pt is walking around her home daily with the help of friends and seems to have perked up immensly.  Current Behavioral Concerns:  History of Major Depression leading to lack of interest in daily activities and heavy reliance on friends  Education Provided to patient: ICLS service through the [facility]   Clinical Pathway Name: Depression    Medication Management:  Pt is adherent, Pt's friend helps with set-up and provides reminders when needed,No recent change have been made     Functional Status:  Mobility Status: Independent w/Device  Equipment Currently Used at Home: walker, standard  Transportation: Pt's friends provide      Psychosocial:  Current living arrangement:: I live alone  Financial/Insurance: [employer], Pt is on the Alternative Care Waiver managed by Neal Moctezuma     Resources and Interventions:  Current Resources: PCA  Advanced Care Plans/Directives on file:: Yes  Referrals Placed: Mental Health  Patient/Caregiver understanding: Pt's friends denies any questions or concerns regarding outreach today.  Frequency of Care Coordination: 4 weeks      Plan: VIKTOR SALDANA will follow-up in 4 weeks.    ZONIA Saleh  [facility]   [phone]  [email or web address]

## 2018-02-26 ENCOUNTER — CARE COORDINATION (OUTPATIENT)
Dept: CARE COORDINATION | Facility: CLINIC | Age: 83
End: 2018-02-26

## 2018-02-26 NOTE — PROGRESS NOTES
Clinic Care Coordination Contact  [facility]/Voicemail    Referral Source: PCP  Clinical Data: Care Coordinator Outreach  Outreach attempted x 2.  Left message on voicemail with call back information and requested return call.  Plan: Care Coordinator will send an unable to contact letter and will do no further outreaches at this time.    ZONIA Saleh  [facility]   [phone]  [email or web address]

## 2018-02-27 ENCOUNTER — TELEPHONE (OUTPATIENT)
Dept: FAMILY MEDICINE | Facility: CLINIC | Age: 83
End: 2018-02-27

## 2018-02-27 NOTE — TELEPHONE ENCOUNTER
No answer, no voicemail.     Tried her friend Martina (we have a consent to communicate with her, Left message on answering machine for Martina or patient to call back.    Per a previous care coordination note, may be on a cruise .     Ema, mike KIM, we will keep trying.   Tawana Khan RNC

## 2018-02-27 NOTE — TELEPHONE ENCOUNTER
Patient on my schedule in a few weeks for worsening breathing issues.    Can we triage this?  She may need to be seen sooner.    ANNETTA Hoover

## 2018-02-27 NOTE — TELEPHONE ENCOUNTER
Spoke with pt's friend and she states that pt is on 3L of O2 continuously.  Pt is having difficulty with increased SOB with exertion.  Denies chest pain. Pt with a cough X3 weeks.  Non-productive and dry.  Afebrile.     Pt with Symbicort and Albuterol inhalers and uses daily. Pt denies need for ED, these precautions provided.  Pt now scheduled for Friday 3/2/18 in clinic.    Jeni GREEN RN

## 2018-02-28 DIAGNOSIS — F32.1 MAJOR DEPRESSIVE DISORDER, SINGLE EPISODE, MODERATE (H): ICD-10-CM

## 2018-02-28 DIAGNOSIS — J30.2 ACUTE SEASONAL ALLERGIC RHINITIS, UNSPECIFIED TRIGGER: ICD-10-CM

## 2018-02-28 DIAGNOSIS — I10 HYPERTENSION GOAL BP (BLOOD PRESSURE) < 140/90: ICD-10-CM

## 2018-02-28 DIAGNOSIS — K59.00 CONSTIPATION: Primary | ICD-10-CM

## 2018-02-28 RX ORDER — AMLODIPINE BESYLATE 10 MG/1
TABLET ORAL
Qty: 90 TABLET | Refills: 1 | Status: SHIPPED | OUTPATIENT
Start: 2018-02-28 | End: 2018-06-01

## 2018-02-28 RX ORDER — LORATADINE 10 MG/1
TABLET ORAL
Qty: 90 TABLET | Refills: 1 | Status: SHIPPED | OUTPATIENT
Start: 2018-02-28 | End: 2021-01-14

## 2018-02-28 RX ORDER — SERTRALINE HYDROCHLORIDE 25 MG/1
TABLET, FILM COATED ORAL
Qty: 90 TABLET | Refills: 1 | Status: SHIPPED | OUTPATIENT
Start: 2018-02-28 | End: 2018-06-01

## 2018-02-28 RX ORDER — ASPIRIN 81 MG
TABLET, DELAYED RELEASE (ENTERIC COATED) ORAL
Qty: 100 TABLET | Refills: 1 | Status: SHIPPED | OUTPATIENT
Start: 2018-02-28 | End: 2021-12-29

## 2018-03-02 ENCOUNTER — OFFICE VISIT (OUTPATIENT)
Dept: FAMILY MEDICINE | Facility: CLINIC | Age: 83
End: 2018-03-02
Payer: COMMERCIAL

## 2018-03-02 ENCOUNTER — RADIANT APPOINTMENT (OUTPATIENT)
Dept: GENERAL RADIOLOGY | Facility: CLINIC | Age: 83
End: 2018-03-02
Attending: NURSE PRACTITIONER
Payer: COMMERCIAL

## 2018-03-02 VITALS
HEIGHT: 66 IN | DIASTOLIC BLOOD PRESSURE: 50 MMHG | BODY MASS INDEX: 39.86 KG/M2 | TEMPERATURE: 98.2 F | OXYGEN SATURATION: 94 % | SYSTOLIC BLOOD PRESSURE: 115 MMHG | WEIGHT: 248 LBS | HEART RATE: 64 BPM

## 2018-03-02 DIAGNOSIS — R06.02 SOB (SHORTNESS OF BREATH) ON EXERTION: Primary | ICD-10-CM

## 2018-03-02 DIAGNOSIS — E11.9 TYPE 2 DIABETES MELLITUS WITHOUT COMPLICATION, WITHOUT LONG-TERM CURRENT USE OF INSULIN (H): Chronic | ICD-10-CM

## 2018-03-02 DIAGNOSIS — J84.10 INTERSTITIAL PULMONARY FIBROSIS (H): Chronic | ICD-10-CM

## 2018-03-02 DIAGNOSIS — R53.81 PHYSICAL DECONDITIONING: ICD-10-CM

## 2018-03-02 DIAGNOSIS — R06.02 SOB (SHORTNESS OF BREATH) ON EXERTION: ICD-10-CM

## 2018-03-02 DIAGNOSIS — J44.9 CHRONIC OBSTRUCTIVE PULMONARY DISEASE, UNSPECIFIED COPD TYPE (H): Chronic | ICD-10-CM

## 2018-03-02 DIAGNOSIS — E66.01 MORBID OBESITY (H): ICD-10-CM

## 2018-03-02 DIAGNOSIS — F32.1 MAJOR DEPRESSIVE DISORDER, SINGLE EPISODE, MODERATE (H): Chronic | ICD-10-CM

## 2018-03-02 PROBLEM — L30.4 INTERTRIGO: Chronic | Status: RESOLVED | Noted: 2017-06-11 | Resolved: 2018-03-02

## 2018-03-02 PROCEDURE — 99215 OFFICE O/P EST HI 40 MIN: CPT | Performed by: NURSE PRACTITIONER

## 2018-03-02 PROCEDURE — 71046 X-RAY EXAM CHEST 2 VIEWS: CPT | Mod: FY

## 2018-03-02 RX ORDER — BUDESONIDE AND FORMOTEROL FUMARATE DIHYDRATE 160; 4.5 UG/1; UG/1
AEROSOL RESPIRATORY (INHALATION)
Qty: 10.2 G | Refills: 5 | Status: SHIPPED | OUTPATIENT
Start: 2018-03-02 | End: 2018-06-01

## 2018-03-02 RX ORDER — TIOTROPIUM BROMIDE 18 UG/1
CAPSULE ORAL; RESPIRATORY (INHALATION)
Qty: 90 CAPSULE | Refills: 3 | Status: SHIPPED | OUTPATIENT
Start: 2018-03-02 | End: 2020-12-15

## 2018-03-02 NOTE — MR AVS SNAPSHOT
After Visit Summary   3/2/2018    Susan Haq    MRN: [id number]           Patient Information     Date Of Birth          1/10/1932        Visit Information        Provider Department      3/2/2018 2:00 PM Ema Melendez NP [facility]        Today's Diagnoses     SOB (shortness of breath) on exertion    -  1    Chronic obstructive pulmonary disease, unspecified COPD type (H)        Interstitial pulmonary fibrosis (H)        Type 2 diabetes mellitus without complication, without long-term current use of insulin (H)        Physical deconditioning        Major depressive disorder, single episode, moderate (H)        Morbid obesity (H)          Care Instructions      What is COPD?  COPD stands for chronic obstructive pulmonary disease. It means the airways in your lungs are blocked (obstructed). Because of this, it is hard to breathe. You may have trouble with daily activities because of shortness of breath. Over time the shortness of breath usually worsens making it more and more difficult to take care of yourself and take part in activities. Chronic bronchitis and emphysema are two common types of COPD.  What happens in chronic bronchitis?    The cells in the airways make more mucus than normal. The mucus builds up, narrowing the airways. This means less air travels into and out of the lungs. The lining of the airways may also become inflamed (swollen) and causes the airways to narrow even more.        What happens in emphysema?    The small airways are damaged and lose their stretchiness. The airways collapse when you exhale, causing air to get trapped in the air sacs. This means that less oxygen enters the blood vessels and less oxygen is delivered to all of the cells of your body. This makes it hard to breathe.     Damage to cilia    Cilia are small hairs that line and protect the airways. Smoking damages the cilia. Damaged cilia can t sweep mucus and particles away. Some of  the cilia are destroyed. This damage worsens COPD.  How did I get COPD?  Most people get COPD from smoking. Cigarette smoke damages lungs, which can develop into COPD over many years.  How COPD affects you  COPD makes you work harder to breathe. Air may get trapped in the lungs, which prevents your lungs from filling completely with fresh oxygen-filled air when you inhale (breathe in). It's harder to take deep breaths especially when you are active and start breathing faster. Over time, your lungs may become enlarged filling the lung with air that does not transfer oxygen into the blood. These problems cause you to have shortness of breath (also called dyspnea). Wheezing (hoarse, whistling breathing), chronic cough, and fatigue (feeling tired and worn out) are also common.   Date Last Reviewed: 5/1/2016 2000-2017 [employer]. [address] [place] [address]. All rights reserved. This information is not intended as a substitute for professional medical care. Always follow your healthcare professional's instructions.        Chronic Lung Disease: Tips for Safe Exercise  After you have met with your healthcare provider to be assessed and set up an exercise plan, follow these tips for better exercise. You can use these tips at your pulmonary facility or at home.   Prepare for your workout  Here's how to get ready:    Plan your workout for the time of day when you normally have the most energy.    Dress for comfort. Wear shoes that support your feet.    Use a bronchodilator if one has been prescribed. For best results, use it 20 to 30 minutes before exercise or any other strenuous activity.    Clear your lungs of mucus if needed.    Use oxygen if it s prescribed for use during activity. Increase the flow rate only if your healthcare provider has told you to. Increasing it on your own can be dangerous.    Check the weather before you start. On warm or humid days, reduce your workout, rest more  often, and drink extra fluids. Exercise earlier in the day, before it gets hot. If it s cold outside or if air quality is poor, exercise indoors. Walk inside your home or in a mall.        My starting goal is ________ minutes of exercise, ________ days a week.   Warm up and cool down  Here's what you can do before and after exercising:     To warm up, start with a few stretches. This gets your muscles ready for exercise.    After your stretches, move on to heavier activity. Pace yourself and remember to breathe.    Toward the end of your workout, decrease your effort so your body can cool down. Then stretch again. This relaxes your muscles and helps prevent soreness.    Rest and relax.  Stay safe during exercise  Tips for safe exercise:     Follow the guidelines your healthcare provider or pulmonary rehab team has set for you.    Pace yourself. Stop and rest when you need to.    Drink plenty of water before, during, and after exercise.    Remember that shortness of breath is OK, as long as you can talk and are in control of your breathing. Everyone gets short of breath during exercise--even people without chronic lung disease. But if you can t speak, you re pushing yourself too hard. If you have increased shortness of breath, slow down. If it continues, stop and rest.    Use pursed-lip breathing to control shortness of breath.    Keep your rescue inhaler with you. Use it if you need to.  Watch for signs of overexertion  Stop exercising right away and contact your healthcare provider if you feel any of these:    Unusual or increasing shortness of breath    Chest pain or discomfort    Burning, tightness, heaviness, or pressure in your chest    Unusual aching in your arms, shoulder, neck, jaw, or back    A racing or skipping heartbeat    Feeling much more tired than usual    Lightheadedness, dizziness, or nausea    Unusual joint pain  Date Last Reviewed: 5/1/2016 2000-2017 [employer]. [address] [place] [address]  [place] [address]. All rights reserved. This information is not intended as a substitute for professional medical care. Always follow your healthcare professional's instructions.        Exercising with Chronic Lung Disease: Using the Dyspnea Scale    The Dyspnea Scale measures shortness of breath. While you exercise, think about how short of breath you feel. Notice how hard you are working to breathe. Then pick the number and words on the scale that best reflects how you feel at your current level of effort. For instance, if your shortness of breath is very slight, you re at level 1. If you feel severely short of breath, you re at level 5. If you can t breathe at all, you re at level 10. Use the Dyspnea Scale to help pace your workout. Unless your healthcare provider or pulmonary rehab team advises otherwise, try to keep your effort level around 3 to 5 on the scale.  Signs of overexertion  Stop if you notice any of the signs below during exercise. If you re at the pulmonary rehabilitation facility, tell a team member how you re feeling. If you re exercising on your own, call your pulmonary rehab team or your healthcare provider. Stay alert for these signs:    Unusual or increasing shortness of breath    Chest pain or discomfort    Burning, tightness, heaviness, or pressure in your chest    Unusual pain in your shoulders, arm, neck, jaw, or back    A racing or skipping heartbeat    Lightheadedness, dizziness, or nausea    Feeling much more tired than usual    Unusual joint pain  Dyspnea Scale adapted by permission from American Association of Cardiovascular and Pulmonary Rehabilitation, 2004, Guidelines for Cardiac Rehabilitation and Secondary Prevention Programs, 4th ed. ([place], [place]: [employer]), 81.        Checking your heart rate  You may be told to monitor your heart rate during exercise. Press two fingers (not your thumb) on the inside of your wrist. Count the number of beats you feel for 10  "seconds. Multiply the number of beats by 6. This is your heart rate (the number of times your heart beats each minute). You ll be told what the rate should be when you exercise. This number is your target heart rate.  My target heart rate: ___________________   Date Last Reviewed: 5/1/2016 2000-2017 [employer]. [address] [place] [address]. All rights reserved. This information is not intended as a substitute for professional medical care. Always follow your healthcare professional's instructions.                Follow-ups after your visit        Additional Services     PULMONARY REHAB REFERRAL       ON continuous oxygen 3 liters.                  Future tests that were ordered for you today     Open Future Orders        Priority Expected Expires Ordered    PULMONARY REHAB REFERRAL Routine  3/2/2019 3/2/2018    6 minute walk test Routine  3/2/2019 3/2/2018    XR Chest 2 Views Routine 3/2/2018 3/2/2019 3/2/2018            Who to contact     If you have questions or need follow up information about today's clinic visit or your schedule please contact [facility] directly at [phone].  Normal or non-critical lab and imaging results will be communicated to you by MyChart, letter or phone within 4 business days after the clinic has received the results. If you do not hear from us within 7 days, please contact the clinic through [employer] or phone. If you have a critical or abnormal lab result, we will notify you by phone as soon as possible.  Submit refill requests through [employer] or call your pharmacy and they will forward the refill request to us. Please allow 3 business days for your refill to be completed.          Additional Information About Your Visit        MyChart Information     [employer] lets you send messages to your doctor, view your test results, renew your prescriptions, schedule appointments and more. To sign up, go to [email or web address] . Click on \"Log " "in\" on the left side of the screen, which will take you to the Welcome page. Then click on \"Sign up Now\" on the right side of the page.     You will be asked to enter the access code listed below, as well as some personal information. Please follow the directions to create your username and password.     Your access code is: [email or web address]  Expires: 3/6/2018 10:17 AM     Your access code will  in 90 days. If you need help or a new code, please call your [facility] clinic or [phone].        Care EveryWhere ID     This is your Care EveryWhere ID. This could be used by other organizations to access your [facility] medical records  [id number]        Your Vitals Were     Pulse Temperature Height Pulse Oximetry Breastfeeding? BMI (Body Mass Index)    64 98.2  F (36.8  C) (Tympanic) 5' 6\" (1.676 m) 94% No 40.03 kg/m2       Blood Pressure from Last 3 Encounters:   18 115/50   17 133/60   17 112/58    Weight from Last 3 Encounters:   18 248 lb (112.5 kg)   17 243 lb 3.2 oz (110.3 kg)   17 236 lb (107 kg)                 Today's Medication Changes          These changes are accurate as of 3/2/18  3:02 PM.  If you have any questions, ask your nurse or doctor.               These medicines have changed or have updated prescriptions.        Dose/Directions    budesonide-formoterol 160-4.5 MCG/ACT Inhaler   Commonly known as:  SYMBICORT   This may have changed:  See the new instructions.   Used for:  Chronic obstructive pulmonary disease, unspecified COPD type (H)   Changed by:  Ema Melendez NP        INHALE 2 PUFFS INTO THE LUNGS 2 TIMES DAILY   Quantity:  10.2 g   Refills:  5            Where to get your medicines      These medications were sent to [facility] [place] - [address] [place] [address]  [address] [place] [address], [place] [address]     Phone:  [phone]     budesonide-formoterol 160-4.5 MCG/ACT Inhaler    tiotropium 18 " MCG capsule                Primary Care Provider Office Phone # Fax #    Ema Melendez, EDWIGE [phone] [address] [facility] [address]        Goals        Exercise    Exercise 3x per week (30 min per time)     Notes - Note created  11/4/2016  3:01 PM by Marcia Poe RN    Member stated she would like to have help with showers and light housekeeping    *Contact will be made with member regarding payment of this service will be out of pocket  *Resources will be provided to member to make a decision as to what she would like to do         General    Functional (pt-stated)     Notes - Note created  2/25/2016  1:49 PM by Marcia Poe, RN    Patient will receive PT in home for strengthening and gait training to remain safe, through 5/1/2016      Medical (pt-stated)     Notes - Note created  5/1/2015 10:03 AM by Rin Lowe RN    Decrease in COPD symptoms  - patient has follow up appointment with sleep medicine 5/12/15.  Patient to take medications as prescribed daily.  Patient to use oxygen as directed.          Equal Access to Services     : Hadii guanako betts [place], waaxda luqadaha, qaybta kaalmajoanne luu, nyla baltazar . So [facility] [phone].    ATENCIÓN: Si habla español, tiene a petersen disposición servicios gratuitos de asistencia lingüística. Llame al [phone].    We comply with applicable federal civil rights laws and [place] laws. We do not discriminate on the basis of race, color, national origin, age, disability, sex, sexual orientation, or gender identity.            Thank you!     Thank you for choosing [facility]  for your care. Our goal is always to provide you with excellent care. Hearing back from our patients is one way we can continue to improve our services. Please take a few minutes to complete the written survey that you may receive in the mail after your visit with us. Thank you!              Your Updated Medication List - Protect others around you: Learn how to safely use, store and throw away your medicines at www.disposemymeds.org.          This list is accurate as of 3/2/18  3:02 PM.  Always use your most recent med list.                   Brand Name Dispense Instructions for use Diagnosis    acetaminophen 500 MG tablet    TYLENOL    60 tablet    Take 2 tablets (1,000 mg) by mouth every 8 hours        * albuterol (2.5 MG/3ML) 0.083% neb solution     1 Box    Take 1 vial (2.5 mg) by nebulization every 4 hours as needed for shortness of breath / dyspnea or wheezing        * albuterol 108 (90 BASE) MCG/ACT Inhaler    PROAIR HFA/PROVENTIL HFA/VENTOLIN HFA    3 Inhaler    Inhale 2 puffs into the lungs every 4 hours as needed for shortness of breath / dyspnea or wheezing    Chronic obstructive pulmonary disease, unspecified COPD type (H)       ALLERGY RELIEF 10 MG tablet   Generic drug:  loratadine     90 tablet    TAKE ONE TABLET BY MOUTH ONCE DAILY    Acute seasonal allergic rhinitis, unspecified trigger       amLODIPine 10 MG tablet    NORVASC    90 tablet    TAKE 1 TABLET (10 MG) BY MOUTH DAILY    Hypertension goal BP (blood pressure) < 140/90       atenolol 50 MG tablet    TENORMIN    180 tablet    Take 1 tablet (50 mg) by mouth 2 times daily    Hypertension goal BP (blood pressure) < 140/90       atorvastatin 20 MG tablet    LIPITOR    90 tablet    Take 1 tablet (20 mg) by mouth daily    Hyperlipidemia LDL goal <130       budesonide-formoterol 160-4.5 MCG/ACT Inhaler    SYMBICORT    10.2 g    INHALE 2 PUFFS INTO THE LUNGS 2 TIMES DAILY    Chronic obstructive pulmonary disease, unspecified COPD type (H)       CALCIUM 600 + D PO      Take  by mouth.        fish oil-omega-3 fatty acids 1000 MG capsule      1 cap daily        folic acid 1 MG tablet    FOLVITE    90 tablet    Take 1 tablet (1 mg) by mouth daily    Interstitial pulmonary fibrosis (H)       losartan-hydrochlorothiazide 100-25 MG per  tablet    HYZAAR    90 tablet    Take 1 tablet by mouth daily    Type 2 diabetes mellitus without complication, without long-term current use of insulin (H), Hypertension goal BP (blood pressure) < 140/90       miconazole 2 % powder    MICATIN; MICRO GUARD    30 g    Apply topically 2 times daily    Intertrigo       Nebulizer Compressor Kit     1 kit    1 kit every 4 hours as needed        * order for DME      1.  CPAP pressure 12 cm/H20 with heated humidity and auto-titrating capability.  2.  Provide mask to fit and CPAP supplies. 3.  Length of need lifetime. 4.  Ok to d/c O2 bleed in to her PAP overnight.    SUSSY (obstructive sleep apnea)       * order for DME     1 Units    TEDs stockings knee high to be worn during the day.    Leg edema       * order for DME     1 Units    Equipment being ordered: Oxygen    Hypoxia       * order for DME     1 Units    Equipment being ordered: CPAP supplies    SUSSY (obstructive sleep apnea)       order for DME     1 Device    Equipment being ordered: Oxygen - 3 liters continous    COPD (chronic obstructive pulmonary disease) (H)       potassium chloride 10 MEQ tablet    K-TAB,KLOR-CON    60 tablet    Take 1 tablet (10 mEq) by mouth 2 times daily    Hypokalemia       SENNA PLUS 8.6-50 MG per tablet   Generic drug:  senna-docusate     100 tablet    TAKE ONE TO TWO TABLETS BY MOUTH TWICE DAILY AS NEEDED FOR CONSTIPATION    Constipation       sertraline 25 MG tablet    ZOLOFT    90 tablet    TAKE ONE TABLET BY MOUTH ONCE DAILY    Major depressive disorder, single episode, moderate (H)       SIMETHICONE-80 PO      Take 80 mg by mouth every morning And TID prn belching        tiotropium 18 MCG capsule    SPIRIVA HANDIHALER    90 capsule    Inhale  into the lungs. Inhale contents of one capsule daily    Chronic obstructive pulmonary disease, unspecified COPD type (H), SOB (shortness of breath) on exertion       * Notice:  This list has 6 medication(s) that are the same as other  medications prescribed for you. Read the directions carefully, and ask your doctor or other care provider to review them with you.

## 2018-03-02 NOTE — PATIENT INSTRUCTIONS
What is COPD?  COPD stands for chronic obstructive pulmonary disease. It means the airways in your lungs are blocked (obstructed). Because of this, it is hard to breathe. You may have trouble with daily activities because of shortness of breath. Over time the shortness of breath usually worsens making it more and more difficult to take care of yourself and take part in activities. Chronic bronchitis and emphysema are two common types of COPD.  What happens in chronic bronchitis?    The cells in the airways make more mucus than normal. The mucus builds up, narrowing the airways. This means less air travels into and out of the lungs. The lining of the airways may also become inflamed (swollen) and causes the airways to narrow even more.        What happens in emphysema?    The small airways are damaged and lose their stretchiness. The airways collapse when you exhale, causing air to get trapped in the air sacs. This means that less oxygen enters the blood vessels and less oxygen is delivered to all of the cells of your body. This makes it hard to breathe.     Damage to cilia    Cilia are small hairs that line and protect the airways. Smoking damages the cilia. Damaged cilia can t sweep mucus and particles away. Some of the cilia are destroyed. This damage worsens COPD.  How did I get COPD?  Most people get COPD from smoking. Cigarette smoke damages lungs, which can develop into COPD over many years.  How COPD affects you  COPD makes you work harder to breathe. Air may get trapped in the lungs, which prevents your lungs from filling completely with fresh oxygen-filled air when you inhale (breathe in). It's harder to take deep breaths especially when you are active and start breathing faster. Over time, your lungs may become enlarged filling the lung with air that does not transfer oxygen into the blood. These problems cause you to have shortness of breath (also called dyspnea). Wheezing (hoarse, whistling breathing),  chronic cough, and fatigue (feeling tired and worn out) are also common.   Date Last Reviewed: 5/1/2016 2000-2017 [employer] [address] [facility], [place] [address]. All rights reserved. This information is not intended as a substitute for professional medical care. Always follow your healthcare professional's instructions.        Chronic Lung Disease: Tips for Safe Exercise  After you have met with your healthcare provider to be assessed and set up an exercise plan, follow these tips for better exercise. You can use these tips at your pulmonary facility or at home.   Prepare for your workout  Here's how to get ready:    Plan your workout for the time of day when you normally have the most energy.    Dress for comfort. Wear shoes that support your feet.    Use a bronchodilator if one has been prescribed. For best results, use it 20 to 30 minutes before exercise or any other strenuous activity.    Clear your lungs of mucus if needed.    Use oxygen if it s prescribed for use during activity. Increase the flow rate only if your healthcare provider has told you to. Increasing it on your own can be dangerous.    Check the weather before you start. On warm or humid days, reduce your workout, rest more often, and drink extra fluids. Exercise earlier in the day, before it gets hot. If it s cold outside or if air quality is poor, exercise indoors. Walk inside your home or in a mall.        My starting goal is ________ minutes of exercise, ________ days a week.   Warm up and cool down  Here's what you can do before and after exercising:     To warm up, start with a few stretches. This gets your muscles ready for exercise.    After your stretches, move on to heavier activity. Pace yourself and remember to breathe.    Toward the end of your workout, decrease your effort so your body can cool down. Then stretch again. This relaxes your muscles and helps prevent soreness.    Rest and relax.  Stay safe during  exercise  Tips for safe exercise:     Follow the guidelines your healthcare provider or pulmonary rehab team has set for you.    Pace yourself. Stop and rest when you need to.    Drink plenty of water before, during, and after exercise.    Remember that shortness of breath is OK, as long as you can talk and are in control of your breathing. Everyone gets short of breath during exercise--even people without chronic lung disease. But if you can t speak, you re pushing yourself too hard. If you have increased shortness of breath, slow down. If it continues, stop and rest.    Use pursed-lip breathing to control shortness of breath.    Keep your rescue inhaler with you. Use it if you need to.  Watch for signs of overexertion  Stop exercising right away and contact your healthcare provider if you feel any of these:    Unusual or increasing shortness of breath    Chest pain or discomfort    Burning, tightness, heaviness, or pressure in your chest    Unusual aching in your arms, shoulder, neck, jaw, or back    A racing or skipping heartbeat    Feeling much more tired than usual    Lightheadedness, dizziness, or nausea    Unusual joint pain  Date Last Reviewed: 5/1/2016 2000-2017 [employer]. [address] [place] [address]. All rights reserved. This information is not intended as a substitute for professional medical care. Always follow your healthcare professional's instructions.        Exercising with Chronic Lung Disease: Using the Dyspnea Scale    The Dyspnea Scale measures shortness of breath. While you exercise, think about how short of breath you feel. Notice how hard you are working to breathe. Then pick the number and words on the scale that best reflects how you feel at your current level of effort. For instance, if your shortness of breath is very slight, you re at level 1. If you feel severely short of breath, you re at level 5. If you can t breathe at all, you re at level 10. Use the  Dyspnea Scale to help pace your workout. Unless your healthcare provider or pulmonary rehab team advises otherwise, try to keep your effort level around 3 to 5 on the scale.  Signs of overexertion  Stop if you notice any of the signs below during exercise. If you re at the pulmonary rehabilitation facility, tell a team member how you re feeling. If you re exercising on your own, call your pulmonary rehab team or your healthcare provider. Stay alert for these signs:    Unusual or increasing shortness of breath    Chest pain or discomfort    Burning, tightness, heaviness, or pressure in your chest    Unusual pain in your shoulders, arm, neck, jaw, or back    A racing or skipping heartbeat    Lightheadedness, dizziness, or nausea    Feeling much more tired than usual    Unusual joint pain  Dyspnea Scale adapted by permission from American Association of Cardiovascular and Pulmonary [employer], 2004, Guidelines for Cardiac Rehabilitation and Secondary Prevention Programs, 4th ed. ([place], [place]: [employer]), 81.        Checking your heart rate  You may be told to monitor your heart rate during exercise. Press two fingers (not your thumb) on the inside of your wrist. Count the number of beats you feel for 10 seconds. Multiply the number of beats by 6. This is your heart rate (the number of times your heart beats each minute). You ll be told what the rate should be when you exercise. This number is your target heart rate.  My target heart rate: ___________________   Date Last Reviewed: 5/1/2016 2000-2017 [employer] [address] [place] [address], [place] [address]. All rights reserved. This information is not intended as a substitute for professional medical care. Always follow your healthcare professional's instructions.

## 2018-03-02 NOTE — NURSING NOTE
"Initial /50  Pulse 64  Temp 98.2  F (36.8  C) (Tympanic)  Ht 5' 6\" (1.676 m)  Wt 248 lb (112.5 kg)  SpO2 94%  Breastfeeding? No  BMI 40.03 kg/m2 Estimated body mass index is 40.03 kg/(m^2) as calculated from the following:    Height as of this encounter: 5' 6\" (1.676 m).    Weight as of this encounter: 248 lb (112.5 kg). .    Kamini Triana, IVIS ([facility])  "

## 2018-03-02 NOTE — PROGRESS NOTES
SUBJECTIVE:   Susan Haq is a 86 year old female who presents to clinic today for the following health issues:    Shortness of breath       Duration: gradually getting worse in the last couple weeks.     Description (location/character/radiation): Pt is concerned about a recent increase of shortness of breath     Accompanying signs and symptoms: She cannot change a body position without feeling SOB, eating is getting difficult because she feels she cannot breathe.     Patient has been using Oxygen 24/7. At night she wears a Cpap and oxygen      Here today with care giver and friend.    Hypertension Follow-up      Outpatient blood pressures are not being checked.    Low Salt Diet: no added salt    COPD Follow-Up    Symptoms are currently: slightly worsened    Current fatigue or dyspnea with ambulation: worsened from baseline    Shortness of breath: slightly worsened    Increased or change in Cough/Sputum: No    Fever(s): No    Baseline ambulation without stopping to rest:  < 1 rooom. Able to walk up < 1 flights of stairs without stopping to rest.    Any ER/UC or hospital admissions since your last visit? No     Current using Symbicort twice daily in addition to Albuterol every 4-6 hours as needed.  Has Spiriva but not using currently - was in the past.  No fevers/chills.  Slowly progressing worsening shortness of breath - mostly with activity and with eating per care giver.  Wears 3 liters continuously day/night.  Activity is limited due to shortness of breath     History   Smoking Status     Former Smoker     Packs/day: 1.00     Years: 35.00     Quit date: 1/1/1990   Smokeless Tobacco     Former User     Lab Results   Component Value Date    FEV1 1.31 10/07/2013    YUD2RLG 44% 10/07/2013         Problem list and histories reviewed & adjusted, as indicated.  Additional history: as documented    Patient Active Problem List   Diagnosis     Hyperlipidemia LDL goal <130     Insomnia     Osteopenia      Hypertension goal BP (blood pressure) < 140/90     Obesity     Advance care planning     Adenomatous polyp of colon     PVC's (premature ventricular contractions)     SUSSY (obstructive sleep apnea)-Moderately severe (AHI 21)     Bilateral leg edema     HL (hearing loss)     Umbilical hernia     SNHL (sensorineural hearing loss)     Interstitial pulmonary fibrosis (H)     Chronic obstructive pulmonary disease, unspecified COPD type (H)     Major depressive disorder, single episode, moderate (H)     Risk for falls     Auditory hallucination     Alcohol abuse     Acute pain of right shoulder     Morbid obesity (H)     Physical deconditioning     Past Surgical History:   Procedure Laterality Date     APPENDECTOMY       C BSO, OMENTECTOMY W/SHANIA       C NONSPECIFIC PROCEDURE      (L) clavicle orif     C STOMACH SURGERY PROCEDURE UNLISTED       CHOLECYSTECTOMY, LAPOROSCOPIC  10/13/2011    Cholecystectomy, Laparoscopic     HERNIA REPAIR, UMBILICAL  10/13/2011     HYSTERECTOMY, CERVIX STATUS UNKNOWN         Social History   Substance Use Topics     Smoking status: Former Smoker     Packs/day: 1.00     Years: 35.00     Quit date: 1/1/1990     Smokeless tobacco: Former User     Alcohol use Yes      Comment: Drink 1 (3oz) drink a day     Family History   Problem Relation Age of Onset     DIABETES Father      Coronary Artery Disease Maternal Grandmother      Coronary Artery Disease Paternal Uncle          Current Outpatient Prescriptions   Medication Sig Dispense Refill     budesonide-formoterol (SYMBICORT) 160-4.5 MCG/ACT Inhaler INHALE 2 PUFFS INTO THE LUNGS 2 TIMES DAILY 10.2 g 5     tiotropium (SPIRIVA HANDIHALER) 18 MCG capsule Inhale  into the lungs. Inhale contents of one capsule daily 90 capsule 3     amLODIPine (NORVASC) 10 MG tablet TAKE 1 TABLET (10 MG) BY MOUTH DAILY 90 tablet 1     sertraline (ZOLOFT) 25 MG tablet TAKE ONE TABLET BY MOUTH ONCE DAILY 90 tablet 1     ALLERGY RELIEF 10 MG tablet TAKE ONE TABLET BY MOUTH  ONCE DAILY 90 tablet 1     losartan-hydrochlorothiazide (HYZAAR) 100-25 MG per tablet Take 1 tablet by mouth daily 90 tablet 3     atorvastatin (LIPITOR) 20 MG tablet Take 1 tablet (20 mg) by mouth daily 90 tablet 3     atenolol (TENORMIN) 50 MG tablet Take 1 tablet (50 mg) by mouth 2 times daily 180 tablet 3     albuterol (PROAIR HFA/PROVENTIL HFA/VENTOLIN HFA) 108 (90 BASE) MCG/ACT Inhaler Inhale 2 puffs into the lungs every 4 hours as needed for shortness of breath / dyspnea or wheezing 3 Inhaler 3     potassium chloride (K-TAB,KLOR-CON) 10 MEQ tablet Take 1 tablet (10 mEq) by mouth 2 times daily 60 tablet 6     SIMETHICONE-80 PO Take 80 mg by mouth every morning And TID prn belching       miconazole (MICATIN; MICRO GUARD) 2 % powder Apply topically 2 times daily 30 g 1     Respiratory Therapy Supplies (NEBULIZER COMPRESSOR) KIT 1 kit every 4 hours as needed 1 kit 0     albuterol (2.5 MG/3ML) 0.083% nebulizer solution Take 1 vial (2.5 mg) by nebulization every 4 hours as needed for shortness of breath / dyspnea or wheezing 1 Box 0     Calcium Carbonate-Vitamin D (CALCIUM 600 + D OR) Take  by mouth.       FISH OIL 1000 MG OR CAPS 1 cap daily       SENNA PLUS 8.6-50 MG per tablet TAKE ONE TO TWO TABLETS BY MOUTH TWICE DAILY AS NEEDED FOR CONSTIPATION 100 tablet 1     folic acid (FOLVITE) 1 MG tablet Take 1 tablet (1 mg) by mouth daily 90 tablet 3     acetaminophen (TYLENOL) 500 MG tablet Take 2 tablets (1,000 mg) by mouth every 8 hours 60 tablet 6     ORDER FOR DME Equipment being ordered: Oxygen - 3 liters continous 1 Device 0     ORDER FOR DME Equipment being ordered: CPAP supplies 1 Units 0     ORDER FOR DME Equipment being ordered: Oxygen 1 Units 0     ORDER FOR DME TEDs stockings knee high to be worn during the day. 1 Units 0     ORDER FOR DME 1.  CPAP pressure 12 cm/H20 with heated humidity and auto-titrating capability.   2.  Provide mask to fit and CPAP supplies.  3.  Length of need lifetime.  4.  Ok to d/c  "O2 bleed in to her PAP overnight.           Allergies   Allergen Reactions     Ace Inhibitors Cough     Asa [Aluminum Hydroxide]      Penicillins      Recent Labs   Lab Test  12/06/17   1035  06/12/17   0618   06/10/17   1440   03/05/16   2030  02/24/16   1156  07/22/15   1219  04/21/15   1038  02/18/15   1353  10/24/14   1010   A1C  5.7   --    --    --    --    --    --   5.7   --    --    --    LDL  91   --    --    --    --    --    --    --   103   --   178*   HDL  61   --    --    --    --    --    --    --   52   --   41*   TRIG  196*   --    --    --    --    --    --    --   162*   --   179*   ALT   --    --    --   26   --   24  22   --    --   20   --    CR  0.86  0.75   < >  1.01   < >  1.11*  0.91   --   0.91  0.87   --    GFRESTIMATED  63  73   < >  52*   < >  47*  59*   --   59*  62   --    GFRESTBLACK  76  88   < >  63   < >  57*  71   --   71  75   --    POTASSIUM  3.2*  3.4   < >  3.5   < >  3.2*  3.4   --   3.2*  3.9   --    TSH   --    --    --   3.06   --    --    --    --    --   2.60   --     < > = values in this interval not displayed.      BP Readings from Last 3 Encounters:   03/02/18 115/50   12/06/17 133/60   07/18/17 112/58    Wt Readings from Last 3 Encounters:   03/02/18 248 lb (112.5 kg)   12/06/17 243 lb 3.2 oz (110.3 kg)   07/18/17 236 lb (107 kg)                  Labs reviewed in EPIC    Reviewed and updated as needed this visit by clinical staff       Reviewed and updated as needed this visit by Provider         ROS:  Constitutional, HEENT, cardiovascular, pulmonary, GI, , musculoskeletal, neuro, skin, endocrine and psych systems are negative, except as otherwise noted.    OBJECTIVE:     /50  Pulse 64  Temp 98.2  F (36.8  C) (Tympanic)  Ht 5' 6\" (1.676 m)  Wt 248 lb (112.5 kg)  SpO2 94%  Breastfeeding? No  BMI 40.03 kg/m2  Body mass index is 40.03 kg/(m^2).  GENERAL: alert, no distress, frail and elderly  NECK: no adenopathy, no asymmetry, masses, or scars and " thyroid normal to palpation  RESP: no rales , no rhonchi, no wheezes, prolonged expiratory phase and decreased breath sounds throughout; no dyspnea at rest.  Oxygen at 94% on 3 liters portable oxygen. No cough.  CV: regular rate and rhythm, normal S1 S2, no S3 or S4, no murmur, click or rub, no peripheral edema and peripheral pulses strong  ABDOMEN: soft, nontender, no hepatosplenomegaly, no masses and bowel sounds normal  MS: no gross musculoskeletal defects noted, no edema    Diagnostic Test Results:  Results for orders placed or performed in visit on 12/06/17   HEMOGLOBIN A1C   Result Value Ref Range    Hemoglobin A1C 5.7 4.3 - 6.0 %   Lipid panel reflex to direct LDL Fasting   Result Value Ref Range    Cholesterol 191 <200 mg/dL    Triglycerides 196 (H) <150 mg/dL    HDL Cholesterol 61 >49 mg/dL    LDL Cholesterol Calculated 91 <100 mg/dL    Non HDL Cholesterol 130 (H) <130 mg/dL   Basic metabolic panel   Result Value Ref Range    Sodium 138 133 - 144 mmol/L    Potassium 3.2 (L) 3.4 - 5.3 mmol/L    Chloride 99 94 - 109 mmol/L    Carbon Dioxide 31 20 - 32 mmol/L    Anion Gap 8 3 - 14 mmol/L    Glucose 111 (H) 70 - 99 mg/dL    Urea Nitrogen 20 7 - 30 mg/dL    Creatinine 0.86 0.52 - 1.04 mg/dL    GFR Estimate 63 >60 mL/min/1.7m2    GFR Estimate If Black 76 >60 mL/min/1.7m2    Calcium 8.9 8.5 - 10.1 mg/dL       ASSESSMENT/PLAN:     1. Chronic obstructive pulmonary disease, unspecified COPD type (H)   Progressing chronic disease - discussed COPD and nature of illness.  Nothing to suspect acute illness or exacerbation as symptoms have been slowly worsening and absent cough, fevers, chills, night sweats.  Discussed use of oxygen - to keep sats > 90%.  May need less at rest and more with exertion.  Ordered RT to do oxygen titration with patient and Pulm rehab to help with ADL's and daily activities at home.  Activity as tolerated discussed.  Small frequent meals.  Given information on COPD to care giver, patient and  friend.    - budesonide-formoterol (SYMBICORT) 160-4.5 MCG/ACT Inhaler; INHALE 2 PUFFS INTO THE LUNGS 2 TIMES DAILY  Dispense: 10.2 g; Refill: 5  - tiotropium (SPIRIVA HANDIHALER) 18 MCG capsule; Inhale  into the lungs. Inhale contents of one capsule daily  Dispense: 90 capsule; Refill: 3  - PULMONARY REHAB REFERRAL; Future  - 6 minute walk test; Future  - XR Chest 2 Views; Future    2. SOB (shortness of breath) on exertion     - tiotropium (SPIRIVA HANDIHALER) 18 MCG capsule; Inhale  into the lungs. Inhale contents of one capsule daily  Dispense: 90 capsule; Refill: 3  - PULMONARY REHAB REFERRAL; Future  - 6 minute walk test; Future  - XR Chest 2 Views; Future    3. Interstitial pulmonary fibrosis (H)       4. Type 2 diabetes mellitus without complication, without long-term current use of insulin (H)  Stable.    5. Physical deconditioning       6. Major depressive disorder, single episode, moderate (H)  Stable     7. Morbid obesity (H)  Body mass index is 40.03 kg/(m^2).  Encouraged activity per Pulm rehab     Total times spent with patient 40 minutes of which > 50% of the time was spent counseling and coordination of care discussion as above, COPD education, medication education, SE, referrals and follow up       Patient Instructions       What is COPD?  COPD stands for chronic obstructive pulmonary disease. It means the airways in your lungs are blocked (obstructed). Because of this, it is hard to breathe. You may have trouble with daily activities because of shortness of breath. Over time the shortness of breath usually worsens making it more and more difficult to take care of yourself and take part in activities. Chronic bronchitis and emphysema are two common types of COPD.  What happens in chronic bronchitis?    The cells in the airways make more mucus than normal. The mucus builds up, narrowing the airways. This means less air travels into and out of the lungs. The lining of the airways may also become  inflamed (swollen) and causes the airways to narrow even more.        What happens in emphysema?    The small airways are damaged and lose their stretchiness. The airways collapse when you exhale, causing air to get trapped in the air sacs. This means that less oxygen enters the blood vessels and less oxygen is delivered to all of the cells of your body. This makes it hard to breathe.     Damage to cilia    Cilia are small hairs that line and protect the airways. Smoking damages the cilia. Damaged cilia can t sweep mucus and particles away. Some of the cilia are destroyed. This damage worsens COPD.  How did I get COPD?  Most people get COPD from smoking. Cigarette smoke damages lungs, which can develop into COPD over many years.  How COPD affects you  COPD makes you work harder to breathe. Air may get trapped in the lungs, which prevents your lungs from filling completely with fresh oxygen-filled air when you inhale (breathe in). It's harder to take deep breaths especially when you are active and start breathing faster. Over time, your lungs may become enlarged filling the lung with air that does not transfer oxygen into the blood. These problems cause you to have shortness of breath (also called dyspnea). Wheezing (hoarse, whistling breathing), chronic cough, and fatigue (feeling tired and worn out) are also common.   Date Last Reviewed: 5/1/2016 2000-2017 [employer]. [address] [place] [address], [place] [address]. All rights reserved. This information is not intended as a substitute for professional medical care. Always follow your healthcare professional's instructions.        Chronic Lung Disease: Tips for Safe Exercise  After you have met with your healthcare provider to be assessed and set up an exercise plan, follow these tips for better exercise. You can use these tips at your pulmonary facility or at home.   Prepare for your workout  Here's how to get ready:    Plan your workout for the time of  day when you normally have the most energy.    Dress for comfort. Wear shoes that support your feet.    Use a bronchodilator if one has been prescribed. For best results, use it 20 to 30 minutes before exercise or any other strenuous activity.    Clear your lungs of mucus if needed.    Use oxygen if it s prescribed for use during activity. Increase the flow rate only if your healthcare provider has told you to. Increasing it on your own can be dangerous.    Check the weather before you start. On warm or humid days, reduce your workout, rest more often, and drink extra fluids. Exercise earlier in the day, before it gets hot. If it s cold outside or if air quality is poor, exercise indoors. Walk inside your home or in a mall.        My starting goal is ________ minutes of exercise, ________ days a week.   Warm up and cool down  Here's what you can do before and after exercising:     To warm up, start with a few stretches. This gets your muscles ready for exercise.    After your stretches, move on to heavier activity. Pace yourself and remember to breathe.    Toward the end of your workout, decrease your effort so your body can cool down. Then stretch again. This relaxes your muscles and helps prevent soreness.    Rest and relax.  Stay safe during exercise  Tips for safe exercise:     Follow the guidelines your healthcare provider or pulmonary rehab team has set for you.    Pace yourself. Stop and rest when you need to.    Drink plenty of water before, during, and after exercise.    Remember that shortness of breath is OK, as long as you can talk and are in control of your breathing. Everyone gets short of breath during exercise--even people without chronic lung disease. But if you can t speak, you re pushing yourself too hard. If you have increased shortness of breath, slow down. If it continues, stop and rest.    Use pursed-lip breathing to control shortness of breath.    Keep your rescue inhaler with you. Use it if  you need to.  Watch for signs of overexertion  Stop exercising right away and contact your healthcare provider if you feel any of these:    Unusual or increasing shortness of breath    Chest pain or discomfort    Burning, tightness, heaviness, or pressure in your chest    Unusual aching in your arms, shoulder, neck, jaw, or back    A racing or skipping heartbeat    Feeling much more tired than usual    Lightheadedness, dizziness, or nausea    Unusual joint pain  Date Last Reviewed: 5/1/2016 2000-2017 [employer]. [address] [place] [address]. All rights reserved. This information is not intended as a substitute for professional medical care. Always follow your healthcare professional's instructions.        Exercising with Chronic Lung Disease: Using the Dyspnea Scale    The Dyspnea Scale measures shortness of breath. While you exercise, think about how short of breath you feel. Notice how hard you are working to breathe. Then pick the number and words on the scale that best reflects how you feel at your current level of effort. For instance, if your shortness of breath is very slight, you re at level 1. If you feel severely short of breath, you re at level 5. If you can t breathe at all, you re at level 10. Use the Dyspnea Scale to help pace your workout. Unless your healthcare provider or pulmonary rehab team advises otherwise, try to keep your effort level around 3 to 5 on the scale.  Signs of overexertion  Stop if you notice any of the signs below during exercise. If you re at the pulmonary rehabilitation facility, tell a team member how you re feeling. If you re exercising on your own, call your pulmonary rehab team or your healthcare provider. Stay alert for these signs:    Unusual or increasing shortness of breath    Chest pain or discomfort    Burning, tightness, heaviness, or pressure in your chest    Unusual pain in your shoulders, arm, neck, jaw, or back    A racing or  skipping heartbeat    Lightheadedness, dizziness, or nausea    Feeling much more tired than usual    Unusual joint pain  Dyspnea Scale adapted by permission from American Association of Cardiovascular and Pulmonary Rehabilitation, 2004, Guidelines for Cardiac Rehabilitation and Secondary Prevention Programs, 4th ed. ([place], [place]: [employer]), 81.        Checking your heart rate  You may be told to monitor your heart rate during exercise. Press two fingers (not your thumb) on the inside of your wrist. Count the number of beats you feel for 10 seconds. Multiply the number of beats by 6. This is your heart rate (the number of times your heart beats each minute). You ll be told what the rate should be when you exercise. This number is your target heart rate.  My target heart rate: ___________________   Date Last Reviewed: 5/1/2016 2000-2017 [employer]. [address] [place] [address], [place] [address]. All rights reserved. This information is not intended as a substitute for professional medical care. Always follow your healthcare professional's instructions.            Ema Melendez NP  [facility]

## 2018-03-16 ENCOUNTER — TELEPHONE (OUTPATIENT)
Dept: FAMILY MEDICINE | Facility: CLINIC | Age: 83
End: 2018-03-16

## 2018-03-16 DIAGNOSIS — J44.9 CHRONIC OBSTRUCTIVE PULMONARY DISEASE, UNSPECIFIED COPD TYPE (H): Primary | Chronic | ICD-10-CM

## 2018-03-16 DIAGNOSIS — R06.02 SOB (SHORTNESS OF BREATH): ICD-10-CM

## 2018-03-16 NOTE — TELEPHONE ENCOUNTER
Reason for Call:  Other     Detailed comments: Radha from [facility] calling as they need the Pulmonary Rehab Referral signed or co-signed by Dr. Valentin. Without an MD signing the referral, its not considered valid by insurance. They need this referral signed before the patients appointment on Tuesday.    Phone Number Patient can be reached at: Other phone number:  [facility] [phone]    Best Time: any    Can we leave a detailed message on this number? YES    Call taken on 3/16/2018 at 3:31 PM by Brigida Thornton

## 2018-03-20 ENCOUNTER — HOSPITAL ENCOUNTER (OUTPATIENT)
Dept: CARDIAC REHAB | Facility: CLINIC | Age: 83
End: 2018-03-20
Attending: NURSE PRACTITIONER
Payer: COMMERCIAL

## 2018-03-20 DIAGNOSIS — J44.9 CHRONIC OBSTRUCTIVE PULMONARY DISEASE, UNSPECIFIED COPD TYPE (H): Chronic | ICD-10-CM

## 2018-03-20 DIAGNOSIS — R06.02 SOB (SHORTNESS OF BREATH) ON EXERTION: ICD-10-CM

## 2018-03-20 NOTE — PROGRESS NOTES
Patient arrived with friend for initial evaluation for pulmonary rehab. Unfortunately, after filling out surveys and medical history, pt decided to go home and think about the commitment. Since her friend is her , they will need to coordinate schedules and discuss things before she she officially joins the program. Eval not completed. 6MWT not done.    Thank you,  Galina Limon, RRT-NPS

## 2018-03-22 ENCOUNTER — TELEPHONE (OUTPATIENT)
Dept: CARDIAC REHAB | Facility: CLINIC | Age: 83
End: 2018-03-22

## 2018-04-10 ENCOUNTER — HOSPITAL ENCOUNTER (OUTPATIENT)
Dept: CARDIAC REHAB | Facility: CLINIC | Age: 83
End: 2018-04-10
Attending: NURSE PRACTITIONER
Payer: COMMERCIAL

## 2018-04-10 PROCEDURE — 40000244 ZZH STATISTIC VISIT PULM REHAB

## 2018-04-10 PROCEDURE — G0424 PULMONARY REHAB W EXER: HCPCS

## 2018-04-17 ENCOUNTER — HOSPITAL ENCOUNTER (OUTPATIENT)
Dept: CARDIAC REHAB | Facility: CLINIC | Age: 83
End: 2018-04-17
Attending: NURSE PRACTITIONER
Payer: COMMERCIAL

## 2018-04-17 PROCEDURE — 40000244 ZZH STATISTIC VISIT PULM REHAB

## 2018-04-17 PROCEDURE — G0424 PULMONARY REHAB W EXER: HCPCS

## 2018-04-24 ENCOUNTER — HOSPITAL ENCOUNTER (OUTPATIENT)
Dept: CARDIAC REHAB | Facility: CLINIC | Age: 83
End: 2018-04-24
Attending: NURSE PRACTITIONER
Payer: COMMERCIAL

## 2018-04-24 PROCEDURE — 40000244 ZZH STATISTIC VISIT PULM REHAB

## 2018-04-24 PROCEDURE — G0424 PULMONARY REHAB W EXER: HCPCS

## 2018-04-25 DIAGNOSIS — E87.6 HYPOKALEMIA: ICD-10-CM

## 2018-04-26 ENCOUNTER — HOSPITAL ENCOUNTER (OUTPATIENT)
Dept: CARDIAC REHAB | Facility: CLINIC | Age: 83
End: 2018-04-26
Attending: NURSE PRACTITIONER
Payer: COMMERCIAL

## 2018-04-26 PROCEDURE — G0424 PULMONARY REHAB W EXER: HCPCS

## 2018-04-26 PROCEDURE — 40000244 ZZH STATISTIC VISIT PULM REHAB

## 2018-04-27 NOTE — TELEPHONE ENCOUNTER
"Requested Prescriptions   Pending Prescriptions Disp Refills     furosemide (LASIX) 20 MG tablet [Pharmacy Med Name: FUROSEMIDE 20MG TAB** 20MG TAB]  Last Written Prescription Date:  03/08/2017  (DC'ed on 06/26/2017)  Last Fill Quantity: 90,  # refills: 3   Last office visit: 3/2/2018 with prescribing provider:  flores   Future Office Visit:     90 tablet 3     Sig: TAKE 1 TABLET (20 MG) BY MOUTH DAILY    Diuretics (Including Combos) Protocol Failed    4/25/2018  4:51 PM       Failed - Normal serum potassium on file in past 12 months    Recent Labs   Lab Test  12/06/17   1035   POTASSIUM  3.2*                   Passed - Blood pressure under 140/90 in past 12 months    BP Readings from Last 3 Encounters:   03/02/18 115/50   12/06/17 133/60   07/18/17 112/58                Passed - Recent (12 mo) or future (30 days) visit within the authorizing provider's specialty    Patient had office visit in the last 12 months or has a visit in the next 30 days with authorizing provider or within the authorizing provider's specialty.  See \"Patient Info\" tab in inbasket, or \"Choose Columns\" in Meds & Orders section of the refill encounter.           Passed - Patient is age 18 or older       Passed - No active pregancy on record       Passed - Normal serum creatinine on file in past 12 months    Recent Labs   Lab Test  12/06/17   1035   CR  0.86             Passed - Normal serum sodium on file in past 12 months    Recent Labs   Lab Test  12/06/17   1035   NA  138             Passed - No positive pregnancy test in past 12 months        Asael Joe RT (R)    "

## 2018-04-30 RX ORDER — FUROSEMIDE 20 MG
TABLET ORAL
Qty: 90 TABLET | Refills: 3 | OUTPATIENT
Start: 2018-04-30

## 2018-04-30 NOTE — TELEPHONE ENCOUNTER
It looks like patient had visit on 06/26/17 with Dayanna Guzmán CNP who discontinued this order. Called patient to clarify. She gave verbal permission to talk to her PCA-Trina. She states that patient is NOT taking furosemide at this time.      Malia Navarro RN

## 2018-05-03 ENCOUNTER — HOSPITAL ENCOUNTER (OUTPATIENT)
Dept: CARDIAC REHAB | Facility: CLINIC | Age: 83
End: 2018-05-03
Attending: NURSE PRACTITIONER
Payer: COMMERCIAL

## 2018-05-03 PROCEDURE — 40000244 ZZH STATISTIC VISIT PULM REHAB

## 2018-05-03 PROCEDURE — G0424 PULMONARY REHAB W EXER: HCPCS

## 2018-05-10 ENCOUNTER — HOSPITAL ENCOUNTER (OUTPATIENT)
Dept: CARDIAC REHAB | Facility: CLINIC | Age: 83
End: 2018-05-10
Attending: NURSE PRACTITIONER
Payer: COMMERCIAL

## 2018-05-10 PROCEDURE — G0424 PULMONARY REHAB W EXER: HCPCS

## 2018-05-10 PROCEDURE — 40000244 ZZH STATISTIC VISIT PULM REHAB

## 2018-05-15 ENCOUNTER — HOSPITAL ENCOUNTER (OUTPATIENT)
Dept: CARDIAC REHAB | Facility: CLINIC | Age: 83
End: 2018-05-15
Attending: NURSE PRACTITIONER
Payer: COMMERCIAL

## 2018-05-15 PROCEDURE — 40000244 ZZH STATISTIC VISIT PULM REHAB

## 2018-05-15 PROCEDURE — G0424 PULMONARY REHAB W EXER: HCPCS

## 2018-05-17 ENCOUNTER — HOSPITAL ENCOUNTER (OUTPATIENT)
Dept: CARDIAC REHAB | Facility: CLINIC | Age: 83
End: 2018-05-17
Attending: NURSE PRACTITIONER
Payer: COMMERCIAL

## 2018-05-17 PROCEDURE — G0424 PULMONARY REHAB W EXER: HCPCS

## 2018-05-17 PROCEDURE — 40000244 ZZH STATISTIC VISIT PULM REHAB

## 2018-05-17 PROCEDURE — G0239 OTH RESP PROC, GROUP: HCPCS

## 2018-05-22 ENCOUNTER — HOSPITAL ENCOUNTER (OUTPATIENT)
Dept: CARDIAC REHAB | Facility: CLINIC | Age: 83
End: 2018-05-22
Attending: NURSE PRACTITIONER
Payer: COMMERCIAL

## 2018-05-22 PROCEDURE — 40000244 ZZH STATISTIC VISIT PULM REHAB: Performed by: REHABILITATION PRACTITIONER

## 2018-05-22 PROCEDURE — G0424 PULMONARY REHAB W EXER: HCPCS | Performed by: REHABILITATION PRACTITIONER

## 2018-05-24 ENCOUNTER — HOSPITAL ENCOUNTER (OUTPATIENT)
Dept: CARDIAC REHAB | Facility: CLINIC | Age: 83
End: 2018-05-24
Attending: NURSE PRACTITIONER
Payer: COMMERCIAL

## 2018-05-24 PROCEDURE — 40000244 ZZH STATISTIC VISIT PULM REHAB

## 2018-05-24 PROCEDURE — G0424 PULMONARY REHAB W EXER: HCPCS | Mod: KX

## 2018-05-31 ENCOUNTER — TELEPHONE (OUTPATIENT)
Dept: FAMILY MEDICINE | Facility: CLINIC | Age: 83
End: 2018-05-31

## 2018-05-31 NOTE — TELEPHONE ENCOUNTER
Ema,    Per the patient she no longer wants to participate in the pulmonary rehab.  She is very tired today and did not go to pulmonary rehab.  The humidity and heat has her down.  She does have air conditioning in her home and has it on.  When asked about her medications for breathing she is taking all of them.  Patient states her nebulizer is in a closet somewhere and she does not feel she needs that right now.  Advised patient to follow up if her breathing worsens so we can discuss nebs and medications for her neb machine. Carline GARCIA RN

## 2018-05-31 NOTE — TELEPHONE ENCOUNTER
Reason for Call:  Order for Physical Therapy    Detailed comments: patient is calling stating that she was told today at PT that this was her last visit, until JOSE Melendez orders more visits. I do not see any referrals for PT, but I do see that she has Pulmonary Rehab.     Phone Number Patient can be reached at: Home number on file [phone] (home)    Best Time: any    Can we leave a detailed message on this number? YES   Dorys Campbell  [facility]  [facility]      Call taken on 5/31/2018 at 12:23 PM by Dorys Campbell

## 2018-06-01 ENCOUNTER — RADIANT APPOINTMENT (OUTPATIENT)
Dept: GENERAL RADIOLOGY | Facility: CLINIC | Age: 83
End: 2018-06-01
Attending: NURSE PRACTITIONER
Payer: COMMERCIAL

## 2018-06-01 ENCOUNTER — OFFICE VISIT (OUTPATIENT)
Dept: FAMILY MEDICINE | Facility: CLINIC | Age: 83
End: 2018-06-01
Payer: COMMERCIAL

## 2018-06-01 VITALS
OXYGEN SATURATION: 91 % | HEIGHT: 66 IN | RESPIRATION RATE: 28 BRPM | HEART RATE: 74 BPM | DIASTOLIC BLOOD PRESSURE: 77 MMHG | SYSTOLIC BLOOD PRESSURE: 127 MMHG | TEMPERATURE: 97.1 F

## 2018-06-01 DIAGNOSIS — R53.81 PHYSICAL DECONDITIONING: ICD-10-CM

## 2018-06-01 DIAGNOSIS — J44.9 CHRONIC OBSTRUCTIVE PULMONARY DISEASE, UNSPECIFIED COPD TYPE (H): Primary | Chronic | ICD-10-CM

## 2018-06-01 DIAGNOSIS — R06.02 SOB (SHORTNESS OF BREATH): ICD-10-CM

## 2018-06-01 DIAGNOSIS — E78.5 HYPERLIPIDEMIA LDL GOAL <130: Chronic | ICD-10-CM

## 2018-06-01 DIAGNOSIS — J84.10 INTERSTITIAL PULMONARY FIBROSIS (H): Chronic | ICD-10-CM

## 2018-06-01 DIAGNOSIS — R73.09 ELEVATED GLUCOSE: ICD-10-CM

## 2018-06-01 DIAGNOSIS — F32.1 MAJOR DEPRESSIVE DISORDER, SINGLE EPISODE, MODERATE (H): ICD-10-CM

## 2018-06-01 DIAGNOSIS — J44.9 CHRONIC OBSTRUCTIVE PULMONARY DISEASE, UNSPECIFIED COPD TYPE (H): Chronic | ICD-10-CM

## 2018-06-01 DIAGNOSIS — I10 HYPERTENSION GOAL BP (BLOOD PRESSURE) < 140/90: Chronic | ICD-10-CM

## 2018-06-01 DIAGNOSIS — J44.1 COPD EXACERBATION (H): ICD-10-CM

## 2018-06-01 PROBLEM — R44.0 AUDITORY HALLUCINATION: Status: RESOLVED | Noted: 2017-06-11 | Resolved: 2018-06-01

## 2018-06-01 LAB
ANION GAP SERPL CALCULATED.3IONS-SCNC: 6 MMOL/L (ref 3–14)
BUN SERPL-MCNC: 26 MG/DL (ref 7–30)
CALCIUM SERPL-MCNC: 8.8 MG/DL (ref 8.5–10.1)
CHLORIDE SERPL-SCNC: 104 MMOL/L (ref 94–109)
CHOLEST SERPL-MCNC: 181 MG/DL
CO2 SERPL-SCNC: 29 MMOL/L (ref 20–32)
CREAT SERPL-MCNC: 0.91 MG/DL (ref 0.52–1.04)
GFR SERPL CREATININE-BSD FRML MDRD: 58 ML/MIN/1.7M2
GLUCOSE SERPL-MCNC: 113 MG/DL (ref 70–99)
HBA1C MFR BLD: 5.3 % (ref 0–5.6)
HDLC SERPL-MCNC: 60 MG/DL
LDLC SERPL CALC-MCNC: 93 MG/DL
NONHDLC SERPL-MCNC: 121 MG/DL
POTASSIUM SERPL-SCNC: 3.7 MMOL/L (ref 3.4–5.3)
SODIUM SERPL-SCNC: 139 MMOL/L (ref 133–144)
TRIGL SERPL-MCNC: 140 MG/DL

## 2018-06-01 PROCEDURE — 80061 LIPID PANEL: CPT | Performed by: NURSE PRACTITIONER

## 2018-06-01 PROCEDURE — 36415 COLL VENOUS BLD VENIPUNCTURE: CPT | Performed by: NURSE PRACTITIONER

## 2018-06-01 PROCEDURE — 71046 X-RAY EXAM CHEST 2 VIEWS: CPT | Mod: FY

## 2018-06-01 PROCEDURE — 80048 BASIC METABOLIC PNL TOTAL CA: CPT | Performed by: NURSE PRACTITIONER

## 2018-06-01 PROCEDURE — 83036 HEMOGLOBIN GLYCOSYLATED A1C: CPT | Performed by: NURSE PRACTITIONER

## 2018-06-01 PROCEDURE — 99215 OFFICE O/P EST HI 40 MIN: CPT | Performed by: NURSE PRACTITIONER

## 2018-06-01 RX ORDER — ALBUTEROL SULFATE 90 UG/1
2 AEROSOL, METERED RESPIRATORY (INHALATION) EVERY 4 HOURS PRN
Qty: 3 INHALER | Refills: 3 | Status: SHIPPED | OUTPATIENT
Start: 2018-06-01 | End: 2019-12-10

## 2018-06-01 RX ORDER — ATENOLOL 50 MG/1
50 TABLET ORAL 2 TIMES DAILY
Qty: 180 TABLET | Refills: 3 | Status: SHIPPED | OUTPATIENT
Start: 2018-06-01 | End: 2019-07-20

## 2018-06-01 RX ORDER — BUDESONIDE AND FORMOTEROL FUMARATE DIHYDRATE 160; 4.5 UG/1; UG/1
AEROSOL RESPIRATORY (INHALATION)
Qty: 10.2 G | Refills: 5 | Status: SHIPPED | OUTPATIENT
Start: 2018-06-01 | End: 2019-03-13

## 2018-06-01 RX ORDER — SERTRALINE HYDROCHLORIDE 25 MG/1
25 TABLET, FILM COATED ORAL DAILY
Qty: 90 TABLET | Refills: 1 | Status: SHIPPED | OUTPATIENT
Start: 2018-06-01 | End: 2018-09-14

## 2018-06-01 RX ORDER — ALBUTEROL SULFATE 0.83 MG/ML
1 SOLUTION RESPIRATORY (INHALATION) EVERY 4 HOURS PRN
Qty: 1 BOX | Refills: 3 | Status: SHIPPED | OUTPATIENT
Start: 2018-06-01 | End: 2018-08-20

## 2018-06-01 RX ORDER — PREDNISONE 20 MG/1
20 TABLET ORAL DAILY
Qty: 5 TABLET | Refills: 0 | Status: SHIPPED | OUTPATIENT
Start: 2018-06-01 | End: 2018-09-14

## 2018-06-01 RX ORDER — AMLODIPINE BESYLATE 10 MG/1
TABLET ORAL
Qty: 90 TABLET | Refills: 1 | Status: SHIPPED | OUTPATIENT
Start: 2018-06-01 | End: 2018-10-16

## 2018-06-01 RX ORDER — DOXYCYCLINE 100 MG/1
100 CAPSULE ORAL 2 TIMES DAILY
Qty: 20 CAPSULE | Refills: 0 | Status: SHIPPED | OUTPATIENT
Start: 2018-06-01 | End: 2018-09-14

## 2018-06-01 NOTE — LETTER
June 1, 2018      Susan DELUNA Severino  [address] [place] [address]        Dear ,    We are writing to inform you of your test results.    Your test results fall within the expected range(s) or remain unchanged from previous results.  Please continue with current treatment plan.    Resulted Orders   Hemoglobin A1c   Result Value Ref Range    Hemoglobin A1C 5.3 0 - 5.6 %      Comment:      Normal <5.7% Prediabetes 5.7-6.4%  Diabetes 6.5% or higher - adopted from ADA   consensus guidelines.     Basic metabolic panel   Result Value Ref Range    Sodium 139 133 - 144 mmol/L    Potassium 3.7 3.4 - 5.3 mmol/L    Chloride 104 94 - 109 mmol/L    Carbon Dioxide 29 20 - 32 mmol/L    Anion Gap 6 3 - 14 mmol/L    Glucose 113 (H) 70 - 99 mg/dL    Urea Nitrogen 26 7 - 30 mg/dL    Creatinine 0.91 0.52 - 1.04 mg/dL    GFR Estimate 58 (L) >60 mL/min/1.7m2      Comment:      Non  GFR Calc    GFR Estimate If Black 71 >60 mL/min/1.7m2      Comment:       GFR Calc    Calcium 8.8 8.5 - 10.1 mg/dL   Lipid panel reflex to direct LDL Fasting   Result Value Ref Range    Cholesterol 181 <200 mg/dL    Triglycerides 140 <150 mg/dL    HDL Cholesterol 60 >49 mg/dL    LDL Cholesterol Calculated 93 <100 mg/dL      Comment:      Desirable:       <100 mg/dl    Non HDL Cholesterol 121 <130 mg/dL       If you have any questions or concerns, please call the clinic at the number listed above.       Sincerely,        Ema Melendez NP

## 2018-06-01 NOTE — PROGRESS NOTES
SUBJECTIVE:   Susan Haq is a 86 year old female who presents to clinic today for the following health issues:      ENT Symptoms             Symptoms: cc Present Absent Comment   Fever/Chills   x    Fatigue  x     Muscle Aches   x    Eye Irritation   x    Sneezing   x    Nasal Kaveh/Drg  x  Slightly    Sinus Pressure/Pain   x    Loss of smell   x    Dental pain   x    Sore Throat   x    Swollen Glands   x    Ear Pain/Fullness   x    Cough x x  Occasional cough with clear mucous   Wheeze x x  Pt states that she has been very wheezy with SOB.   Chest Pain   x    Shortness of breath  x     Rash   x    Other  x  Diarrhea x 2 days ago.     Symptom duration:  Wheezing and SOB started a week ago.   Symptom severity:  Getting worse   Treatments tried:  Prescribed medications and inhalers. Pt uses O2   Contacts:  None     Reports some night sweats and chills.    COPD Follow-Up    Symptoms are currently: slightly worsened    Current fatigue or dyspnea with ambulation: worsened from baseline    Shortness of breath: slightly worsened    Increased or change in Cough/Sputum: Yes-  Cough and sputum    Fever(s): No    Baseline ambulation without stopping to rest:  < 10  feet. Able to walk up 0 flights of stairs without stopping to rest.    Any ER/UC or hospital admissions since your last visit? No     History   Smoking Status     Former Smoker     Packs/day: 1.00     Years: 35.00     Quit date: 1/1/1990   Smokeless Tobacco     Former User     Lab Results   Component Value Date    FEV1 1.31 10/07/2013    RIG8FLQ 44% 10/07/2013         Problem list and histories reviewed & adjusted, as indicated.  Additional history: as documented    Patient Active Problem List   Diagnosis     Hyperlipidemia LDL goal <130     Insomnia     Osteopenia     Hypertension goal BP (blood pressure) < 140/90     Obesity     Advance care planning     Adenomatous polyp of colon     PVC's (premature ventricular contractions)     SUSSY (obstructive sleep  apnea)-Moderately severe (AHI 21)     Bilateral leg edema     HL (hearing loss)     Umbilical hernia     SNHL (sensorineural hearing loss)     Interstitial pulmonary fibrosis (H)     Chronic obstructive pulmonary disease, unspecified COPD type (H)     Major depressive disorder, single episode, moderate (H)     Risk for falls     Alcohol abuse     Acute pain of right shoulder     Morbid obesity (H)     Physical deconditioning     Past Surgical History:   Procedure Laterality Date     APPENDECTOMY       C BSO, OMENTECTOMY W/SHANIA       C NONSPECIFIC PROCEDURE      (L) clavicle orif     C STOMACH SURGERY PROCEDURE UNLISTED       CHOLECYSTECTOMY, LAPOROSCOPIC  10/13/2011    Cholecystectomy, Laparoscopic     HERNIA REPAIR, UMBILICAL  10/13/2011     HYSTERECTOMY, CERVIX STATUS UNKNOWN         Social History   Substance Use Topics     Smoking status: Former Smoker     Packs/day: 1.00     Years: 35.00     Quit date: 1/1/1990     Smokeless tobacco: Former User     Alcohol use Yes      Comment: Drink 1 (3oz) drink a day     Family History   Problem Relation Age of Onset     DIABETES Father      Coronary Artery Disease Maternal Grandmother      Coronary Artery Disease Paternal Uncle          Current Outpatient Prescriptions   Medication Sig Dispense Refill     acetaminophen (TYLENOL) 500 MG tablet Take 2 tablets (1,000 mg) by mouth every 8 hours 60 tablet 6     albuterol (2.5 MG/3ML) 0.083% nebulizer solution Take 1 vial (2.5 mg) by nebulization every 4 hours as needed for shortness of breath / dyspnea or wheezing 1 Box 0     albuterol (PROAIR HFA/PROVENTIL HFA/VENTOLIN HFA) 108 (90 BASE) MCG/ACT Inhaler Inhale 2 puffs into the lungs every 4 hours as needed for shortness of breath / dyspnea or wheezing 3 Inhaler 3     ALLERGY RELIEF 10 MG tablet TAKE ONE TABLET BY MOUTH ONCE DAILY 90 tablet 1     amLODIPine (NORVASC) 10 MG tablet TAKE 1 TABLET (10 MG) BY MOUTH DAILY 90 tablet 1     atenolol (TENORMIN) 50 MG tablet Take 1 tablet  (50 mg) by mouth 2 times daily 180 tablet 3     atorvastatin (LIPITOR) 20 MG tablet Take 1 tablet (20 mg) by mouth daily 90 tablet 3     budesonide-formoterol (SYMBICORT) 160-4.5 MCG/ACT Inhaler INHALE 2 PUFFS INTO THE LUNGS 2 TIMES DAILY 10.2 g 5     Calcium Carbonate-Vitamin D (CALCIUM 600 + D OR) Take  by mouth.       FISH OIL 1000 MG OR CAPS 1 cap daily       folic acid (FOLVITE) 1 MG tablet Take 1 tablet (1 mg) by mouth daily 90 tablet 3     losartan-hydrochlorothiazide (HYZAAR) 100-25 MG per tablet Take 1 tablet by mouth daily 90 tablet 3     ORDER FOR DME Equipment being ordered: Oxygen - 3 liters continous 1 Device 0     ORDER FOR DME Equipment being ordered: CPAP supplies 1 Units 0     ORDER FOR DME Equipment being ordered: Oxygen 1 Units 0     ORDER FOR DME TEDs stockings knee high to be worn during the day. 1 Units 0     ORDER FOR DME 1.  CPAP pressure 12 cm/H20 with heated humidity and auto-titrating capability.   2.  Provide mask to fit and CPAP supplies.  3.  Length of need lifetime.  4.  Ok to d/c O2 bleed in to her PAP overnight.           potassium chloride (K-TAB,KLOR-CON) 10 MEQ tablet Take 1 tablet (10 mEq) by mouth 2 times daily 60 tablet 6     Respiratory Therapy Supplies (NEBULIZER COMPRESSOR) KIT 1 kit every 4 hours as needed 1 kit 0     SENNA PLUS 8.6-50 MG per tablet TAKE ONE TO TWO TABLETS BY MOUTH TWICE DAILY AS NEEDED FOR CONSTIPATION 100 tablet 1     sertraline (ZOLOFT) 25 MG tablet TAKE ONE TABLET BY MOUTH ONCE DAILY 90 tablet 1     SIMETHICONE-80 PO Take 80 mg by mouth every morning And TID prn belching       tiotropium (SPIRIVA HANDIHALER) 18 MCG capsule Inhale  into the lungs. Inhale contents of one capsule daily 90 capsule 3     Allergies   Allergen Reactions     Ace Inhibitors Cough     Asa [Aluminum Hydroxide]      Penicillins      Recent Labs   Lab Test  12/06/17   1035  06/12/17   0618   06/10/17   1440   03/05/16   2030  02/24/16   1156  07/22/15   1219  04/21/15   1038   "02/18/15   1353  10/24/14   1010   A1C  5.7   --    --    --    --    --    --   5.7   --    --    --    LDL  91   --    --    --    --    --    --    --   103   --   178*   HDL  61   --    --    --    --    --    --    --   52   --   41*   TRIG  196*   --    --    --    --    --    --    --   162*   --   179*   ALT   --    --    --   26   --   24  22   --    --   20   --    CR  0.86  0.75   < >  1.01   < >  1.11*  0.91   --   0.91  0.87   --    GFRESTIMATED  63  73   < >  52*   < >  47*  59*   --   59*  62   --    GFRESTBLACK  76  88   < >  63   < >  57*  71   --   71  75   --    POTASSIUM  3.2*  3.4   < >  3.5   < >  3.2*  3.4   --   3.2*  3.9   --    TSH   --    --    --   3.06   --    --    --    --    --   2.60   --     < > = values in this interval not displayed.      BP Readings from Last 3 Encounters:   06/01/18 127/77   03/02/18 115/50   12/06/17 133/60    Wt Readings from Last 3 Encounters:   03/02/18 248 lb (112.5 kg)   12/06/17 243 lb 3.2 oz (110.3 kg)   07/18/17 236 lb (107 kg)                  Labs reviewed in EPIC    Reviewed and updated as needed this visit by clinical staff       Reviewed and updated as needed this visit by Provider         ROS:  Constitutional, HEENT, cardiovascular, pulmonary, GI, , musculoskeletal, neuro, skin, endocrine and psych systems are negative, except as otherwise noted.    OBJECTIVE:     /77  Pulse 74  Temp 97.1  F (36.2  C) (Tympanic)  Resp 28  Ht 5' 6\" (1.676 m)  SpO2 91%  There is no height or weight on file to calculate BMI.  GENERAL: alert, no distress, over weight, frail and elderly  NECK: no adenopathy, no asymmetry, masses, or scars and thyroid normal to palpation  RESP: no rales , no rhonchi, decreased breath sounds throughout and oxygen at 91% on 3 liters portable oxygen.  Dyspnea with any exertion.  CV: regular rate and rhythm, normal S1 S2, no S3 or S4, no murmur, click or rub, no peripheral edema and peripheral pulses strong  ABDOMEN: soft, " nontender, no hepatosplenomegaly, no masses and bowel sounds normal  MS: no gross musculoskeletal defects noted, no edema  On 3 liters currently - was on 2 liters.  91% oxygen. Mild dyspnea at rest.  Diagnostic Test Results:  Results for orders placed or performed in visit on 06/01/18 (from the past 24 hour(s))   Hemoglobin A1c   Result Value Ref Range    Hemoglobin A1C 5.3 0 - 5.6 %   Basic metabolic panel   Result Value Ref Range    Sodium 139 133 - 144 mmol/L    Potassium 3.7 3.4 - 5.3 mmol/L    Chloride 104 94 - 109 mmol/L    Carbon Dioxide 29 20 - 32 mmol/L    Anion Gap 6 3 - 14 mmol/L    Glucose 113 (H) 70 - 99 mg/dL    Urea Nitrogen 26 7 - 30 mg/dL    Creatinine 0.91 0.52 - 1.04 mg/dL    GFR Estimate 58 (L) >60 mL/min/1.7m2    GFR Estimate If Black 71 >60 mL/min/1.7m2    Calcium 8.8 8.5 - 10.1 mg/dL   Lipid panel reflex to direct LDL Fasting   Result Value Ref Range    Cholesterol 181 <200 mg/dL    Triglycerides 140 <150 mg/dL    HDL Cholesterol 60 >49 mg/dL    LDL Cholesterol Calculated 93 <100 mg/dL    Non HDL Cholesterol 121 <130 mg/dL     X-ray ordered and independantly visualized in the office today.  Findings significant for: negative for infilatrate.  Official radiology interpretation pending    ASSESSMENT/PLAN:       1. COPD exacerbation (H)     - doxycycline (VIBRAMYCIN) 100 MG capsule; Take 1 capsule (100 mg) by mouth 2 times daily  Dispense: 20 capsule; Refill: 0  - predniSONE (DELTASONE) 20 MG tablet; Take 1 tablet (20 mg) by mouth daily  Dispense: 5 tablet; Refill: 0  - order for DME; Equipment being ordered: Nebulizer  Dispense: 1 each; Refill: 0    2. SOB (shortness of breath)     - XR Chest 2 Views; Future  - albuterol (2.5 MG/3ML) 0.083% neb solution; Take 1 vial (2.5 mg) by nebulization every 4 hours as needed for shortness of breath / dyspnea or wheezing  Dispense: 1 Box; Refill: 3  - doxycycline (VIBRAMYCIN) 100 MG capsule; Take 1 capsule (100 mg) by mouth 2 times daily  Dispense: 20  capsule; Refill: 0  - predniSONE (DELTASONE) 20 MG tablet; Take 1 tablet (20 mg) by mouth daily  Dispense: 5 tablet; Refill: 0  - order for DME; Equipment being ordered: Nebulizer  Dispense: 1 each; Refill: 0    3. Chronic obstructive pulmonary disease, unspecified COPD type (H)     - XR Chest 2 Views; Future  - albuterol (2.5 MG/3ML) 0.083% neb solution; Take 1 vial (2.5 mg) by nebulization every 4 hours as needed for shortness of breath / dyspnea or wheezing  Dispense: 1 Box; Refill: 3  - albuterol (PROAIR HFA/PROVENTIL HFA/VENTOLIN HFA) 108 (90 Base) MCG/ACT Inhaler; Inhale 2 puffs into the lungs every 4 hours as needed for shortness of breath / dyspnea or wheezing  Dispense: 3 Inhaler; Refill: 3  - budesonide-formoterol (SYMBICORT) 160-4.5 MCG/ACT Inhaler; INHALE 2 PUFFS INTO THE LUNGS 2 TIMES DAILY  Dispense: 10.2 g; Refill: 5  - doxycycline (VIBRAMYCIN) 100 MG capsule; Take 1 capsule (100 mg) by mouth 2 times daily  Dispense: 20 capsule; Refill: 0  - predniSONE (DELTASONE) 20 MG tablet; Take 1 tablet (20 mg) by mouth daily  Dispense: 5 tablet; Refill: 0  - order for DME; Equipment being ordered: Nebulizer  Dispense: 1 each; Refill: 0    4. Interstitial pulmonary fibrosis (H)     - doxycycline (VIBRAMYCIN) 100 MG capsule; Take 1 capsule (100 mg) by mouth 2 times daily  Dispense: 20 capsule; Refill: 0  - predniSONE (DELTASONE) 20 MG tablet; Take 1 tablet (20 mg) by mouth daily  Dispense: 5 tablet; Refill: 0  - order for DME; Equipment being ordered: Nebulizer  Dispense: 1 each; Refill: 0    5. Physical deconditioning   Due to COPD, lack of mobility.  Declines PT/OT    6. Hypertension goal BP (blood pressure) < 140/90  Controlled.    - amLODIPine (NORVASC) 10 MG tablet; TAKE 1 TABLET (10 MG) BY MOUTH DAILY  Dispense: 90 tablet; Refill: 1  - atenolol (TENORMIN) 50 MG tablet; Take 1 tablet (50 mg) by mouth 2 times daily  Dispense: 180 tablet; Refill: 3  - Basic metabolic panel    7. Major depressive disorder,  single episode, moderate (H)  Improved.    - sertraline (ZOLOFT) 25 MG tablet; Take 1 tablet (25 mg) by mouth daily  Dispense: 90 tablet; Refill: 1    8. Chronic obstructive pulmonary disease, unspecified COPD type (H)  Worsening - Pulmonary Rehab improving per care givers in room.  Resume when current symptoms improve.    - XR Chest 2 Views; Future  - albuterol (2.5 MG/3ML) 0.083% neb solution; Take 1 vial (2.5 mg) by nebulization every 4 hours as needed for shortness of breath / dyspnea or wheezing  Dispense: 1 Box; Refill: 3  - albuterol (PROAIR HFA/PROVENTIL HFA/VENTOLIN HFA) 108 (90 Base) MCG/ACT Inhaler; Inhale 2 puffs into the lungs every 4 hours as needed for shortness of breath / dyspnea or wheezing  Dispense: 3 Inhaler; Refill: 3  - budesonide-formoterol (SYMBICORT) 160-4.5 MCG/ACT Inhaler; INHALE 2 PUFFS INTO THE LUNGS 2 TIMES DAILY  Dispense: 10.2 g; Refill: 5  - doxycycline (VIBRAMYCIN) 100 MG capsule; Take 1 capsule (100 mg) by mouth 2 times daily  Dispense: 20 capsule; Refill: 0  - predniSONE (DELTASONE) 20 MG tablet; Take 1 tablet (20 mg) by mouth daily  Dispense: 5 tablet; Refill: 0  - order for DME; Equipment being ordered: Nebulizer  Dispense: 1 each; Refill: 0    9. Elevated glucose     - Hemoglobin A1c  - Albumin Random Urine Quantitative with Creat Ratio; Future    10. Hyperlipidemia LDL goal <130     - Lipid panel reflex to direct LDL Fasting    Patient Instructions       Prescription for antibiotic (Doxycycline) 100 mg twice daily X 10 day.  Prednisone 20 mg once daily for 5 days - start this tomorrow - take early in the morning.    Use Albuterol nebulizer - every 4-6 hours until cough, shortness of breath and wheezing has improved then can use 2-3 X daily.    Continue other inhalers as prescribed.    Follow up with me as problems arise - or if symptoms not improving.  Continue at 3 liters until breathing improves - then reduce down to 2 liters.    May return to Cardiac Rehab next week if  feeling better.    ANNETTA Hoover        Thank you for choosing [facility].  You may be receiving a survey in the mail from Iraida Rubio regarding your visit today.  Please take a few minutes to complete and return the survey to let us know how we are doing.      If you have questions or concerns, please contact us via [facility] [employer] or you can contact your care team at [phone].    Our Clinic hours are:  Monday 6:40 am  to 7:00 pm  Tuesday -Friday 6:40 am to 5:00 pm    The [place] outpatient lab hours are:  Monday - Friday 6:10 am to 4:45 pm  Saturdays 7:00 am to 11:00 am  Appointments are required, call [phone]    If you have clinical questions after hours or would like to schedule an appointment,  call the clinic at [phone].    COPD Flare    You have had a flare-up of your COPD.  COPD, or chronic obstructive pulmonary disease, is a common lung disease. It causes your airways to become irritated and narrower. This makes it harder for you to breathe. Emphysema and chronic bronchitis are both types of COPD. This is a chronic condition, which means you always have it. Sometimes it gets worse. When this happens, it is called a flare-up.  Symptoms of COPD  People with COPD may have symptoms most of the time. In a flare-up, your symptoms get worse. These symptoms may mean you are having a flare-up:    Shortness of breath, shallow or rapid breathing, or wheezing that gets worse    Lung infection    Cough that gets worse    More mucus, thicker mucus or mucus of a different color    Tiredness, decreased energy, or trouble doing your usual activities    Fever    Chest tightness    Your symptoms don t get better even when you use your usual medicines, inhalers, and nebulizer    Trouble talking    You feel confused  Causes of flare-ups  Unfortunately, a flare-up can happen even though you did everything right, and you followed your doctor s instructions. Some causes of flare-ups are:    Smoking or secondhand  smoke    Colds, the flu, or respiratory infections    Air pollution    Sudden change in the weather    Dust, irritating chemicals, or strong fumes    Not taking your medicines as prescribed  Home care  Here are some things you can do at home to treat a flare-up:    Try not to panic. This makes it harder to breathe, and keeps you from doing the right things.    Don t smoke or be around others who are smoking.    Try to drink more fluids than usual during a flare-up, unless your doctor has told you not to because of heart and kidney problems. More fluids can help loosen the mucus.    Use your inhalers and nebulizer, if you have one, as you have been told to.    If you were given antibiotics, take them until they are used up or your doctor tells you to stop. It s important to finish the antibiotics, even though you feel better. This will make sure the infection has cleared.    If you were given prednisone or another steroid, finish it even if you feel better.  Preventing a flare-up  Even though flare-ups happen, the best way to treat one is to prevent it before it starts. Here are some pointers:    Don t smoke or be around others who are smoking.    Take your medicines as you have been told.    Talk with your doctor about getting a flu shot every year. Also find out if you need a pneumonia shot.    If there is a weather advisory warning to stay indoors, try to stay inside when possible.    Try to eat healthy and get plenty of sleep.    Try to avoid things that usually set you off, like dust, chemical fumes, hairsprays, or strong perfumes.  Follow-up care  Follow up with your healthcare provider, or as advised.  If a culture was done, you will be told if your treatment needs to be changed. You can call as directed for the results.  If X-rays were done, you will be notified of any new findings that may affect your care.  Call 911  Call 911 if any of these occur:    You have trouble breathing    You feel confused or it s  difficult to wake you up    You faint or lose consciousness    You have a rapid heart rate    You have new pain in your chest, arm, shoulder, neck or upper back  When to seek medical advice  Call your healthcare provider right away if any of these occur:    Wheezing or shortness of breath gets worse    You need to use your inhalers more often than usual without relief    Fever of 100.4 F (38 C) or higher, or as directed by your healthcare provider    Coughing up lots of dark-colored or bloody mucus (sputum)    Chest pain with each breath    You do not start to get better within 24 hours    Swelling of your ankles gets worse    Dizziness or weakness  Date Last Reviewed: 9/1/2016 2000-2017 [employer]. [address] [place] [address], [place] [address]. All rights reserved. This information is not intended as a substitute for professional medical care. Always follow your healthcare professional's instructions.            Ema Melendez NP  [facility]    Total times spent with patient 40 minutes of which > 50% of the time was spent counseling and coordination of care discussion of COPD exacerbation, oxygen use, medication review, SE, new medications, neb use, Pulm rehab, breathing exercises, recommendations, and follow up

## 2018-06-01 NOTE — MR AVS SNAPSHOT
After Visit Summary   6/1/2018    Susan Haq    MRN: [id number]           Patient Information     Date Of Birth          1/10/1932        Visit Information        Provider Department      6/1/2018 9:00 AM Ema Melendez NP [facility]        Today's Diagnoses     Chronic obstructive pulmonary disease, unspecified COPD type (H)    -  1    COPD exacerbation (H)        SOB (shortness of breath)        Interstitial pulmonary fibrosis (H)        Physical deconditioning        Hypertension goal BP (blood pressure) < 140/90        Major depressive disorder, single episode, moderate (H)        Chronic obstructive pulmonary disease, unspecified COPD type (H)        Elevated glucose        Hyperlipidemia LDL goal <130          Care Instructions        Prescription for antibiotic (Doxycycline) 100 mg twice daily X 10 day.  Prednisone 20 mg once daily for 5 days - start this tomorrow - take early in the morning.    Use Albuterol nebulizer - every 4-6 hours until cough, shortness of breath and wheezing has improved then can use 2-3 X daily.    Continue other inhalers as prescribed.    Follow up with me as problems arise - or if symptoms not improving.  Continue at 3 liters until breathing improves - then reduce down to 2 liters.    May return to Cardiac Rehab next week if feeling better.    ANNETTA Hoover        Thank you for choosing [facility].  You may be receiving a survey in the mail from [employer] regarding your visit today.  Please take a few minutes to complete and return the survey to let us know how we are doing.      If you have questions or concerns, please contact us via [employer] or you can contact your care team at [phone].    Our Clinic hours are:  Monday 6:40 am  to 7:00 pm  Tuesday -Friday 6:40 am to 5:00 pm    The [place] outpatient lab hours are:  Monday - Friday 6:10 am to 4:45 pm  Saturdays 7:00 am to 11:00 am  Appointments are required, call  [phone]    If you have clinical questions after hours or would like to schedule an appointment,  call the clinic at [phone].    COPD Flare    You have had a flare-up of your COPD.  COPD, or chronic obstructive pulmonary disease, is a common lung disease. It causes your airways to become irritated and narrower. This makes it harder for you to breathe. Emphysema and chronic bronchitis are both types of COPD. This is a chronic condition, which means you always have it. Sometimes it gets worse. When this happens, it is called a flare-up.  Symptoms of COPD  People with COPD may have symptoms most of the time. In a flare-up, your symptoms get worse. These symptoms may mean you are having a flare-up:    Shortness of breath, shallow or rapid breathing, or wheezing that gets worse    Lung infection    Cough that gets worse    More mucus, thicker mucus or mucus of a different color    Tiredness, decreased energy, or trouble doing your usual activities    Fever    Chest tightness    Your symptoms don t get better even when you use your usual medicines, inhalers, and nebulizer    Trouble talking    You feel confused  Causes of flare-ups  Unfortunately, a flare-up can happen even though you did everything right, and you followed your doctor s instructions. Some causes of flare-ups are:    Smoking or secondhand smoke    Colds, the flu, or respiratory infections    Air pollution    Sudden change in the weather    Dust, irritating chemicals, or strong fumes    Not taking your medicines as prescribed  Home care  Here are some things you can do at home to treat a flare-up:    Try not to panic. This makes it harder to breathe, and keeps you from doing the right things.    Don t smoke or be around others who are smoking.    Try to drink more fluids than usual during a flare-up, unless your doctor has told you not to because of heart and kidney problems. More fluids can help loosen the mucus.    Use your inhalers and  nebulizer, if you have one, as you have been told to.    If you were given antibiotics, take them until they are used up or your doctor tells you to stop. It s important to finish the antibiotics, even though you feel better. This will make sure the infection has cleared.    If you were given prednisone or another steroid, finish it even if you feel better.  Preventing a flare-up  Even though flare-ups happen, the best way to treat one is to prevent it before it starts. Here are some pointers:    Don t smoke or be around others who are smoking.    Take your medicines as you have been told.    Talk with your doctor about getting a flu shot every year. Also find out if you need a pneumonia shot.    If there is a weather advisory warning to stay indoors, try to stay inside when possible.    Try to eat healthy and get plenty of sleep.    Try to avoid things that usually set you off, like dust, chemical fumes, hairsprays, or strong perfumes.  Follow-up care  Follow up with your healthcare provider, or as advised.  If a culture was done, you will be told if your treatment needs to be changed. You can call as directed for the results.  If X-rays were done, you will be notified of any new findings that may affect your care.  Call 911  Call 911 if any of these occur:    You have trouble breathing    You feel confused or it s difficult to wake you up    You faint or lose consciousness    You have a rapid heart rate    You have new pain in your chest, arm, shoulder, neck or upper back  When to seek medical advice  Call your healthcare provider right away if any of these occur:    Wheezing or shortness of breath gets worse    You need to use your inhalers more often than usual without relief    Fever of 100.4 F (38 C) or higher, or as directed by your healthcare provider    Coughing up lots of dark-colored or bloody mucus (sputum)    Chest pain with each breath    You do not start to get better within 24 hours    Swelling of  "your ankles gets worse    Dizziness or weakness  Date Last Reviewed: 9/1/2016 2000-2017 [employer]. [address] [place] [address], [place] [address]. All rights reserved. This information is not intended as a substitute for professional medical care. Always follow your healthcare professional's instructions.                Follow-ups after your visit        Your next 10 appointments already scheduled     Jun 05, 2018  1:00 PM CDT   Pulmonary Treatment with [facility]   [facility] ([facility])    [address] [facility] [address] [phone]              Who to contact     If you have questions or need follow up information about today's clinic visit or your schedule please contact [facility] directly at [phone].  Normal or non-critical lab and imaging results will be communicated to you by MyChart, letter or phone within 4 business days after the clinic has received the results. If you do not hear from us within 7 days, please contact the clinic through MyChart or phone. If you have a critical or abnormal lab result, we will notify you by phone as soon as possible.  Submit refill requests through [employer] or call your pharmacy and they will forward the refill request to us. Please allow 3 business days for your refill to be completed.          Additional Information About Your Visit        Care EveryWhere ID     This is your Care EveryWhere ID. This could be used by other organizations to access your [facility] medical records  [id number]        Your Vitals Were     Pulse Temperature Respirations Height Pulse Oximetry       74 97.1  F (36.2  C) (Tympanic) 28 5' 6\" (1.676 m) 91%        Blood Pressure from Last 3 Encounters:   06/01/18 127/77   03/02/18 115/50   12/06/17 133/60    Weight from Last 3 Encounters:   03/02/18 248 lb (112.5 kg)   12/06/17 243 lb 3.2 oz (110.3 kg)   07/18/17 236 lb (107 kg)              We Performed the " Following     Albumin Random Urine Quantitative with Creat Ratio     Basic metabolic panel     Hemoglobin A1c     Lipid panel reflex to direct LDL Fasting          Today's Medication Changes          These changes are accurate as of 6/1/18 10:12 AM.  If you have any questions, ask your nurse or doctor.               Start taking these medicines.        Dose/Directions    doxycycline 100 MG capsule   Commonly known as:  VIBRAMYCIN   Used for:  COPD exacerbation (H), SOB (shortness of breath), Chronic obstructive pulmonary disease, unspecified COPD type (H), Interstitial pulmonary fibrosis (H)   Started by:  Ema Melendez NP        Dose:  100 mg   Take 1 capsule (100 mg) by mouth 2 times daily   Quantity:  20 capsule   Refills:  0       order for DME   Used for:  COPD exacerbation (H), SOB (shortness of breath), Chronic obstructive pulmonary disease, unspecified COPD type (H), Interstitial pulmonary fibrosis (H)   Started by:  Ema Melendez NP        Equipment being ordered: Nebulizer   Quantity:  1 each   Refills:  0       predniSONE 20 MG tablet   Commonly known as:  DELTASONE   Used for:  COPD exacerbation (H), SOB (shortness of breath), Chronic obstructive pulmonary disease, unspecified COPD type (H), Interstitial pulmonary fibrosis (H)   Started by:  Ema Melendez NP        Dose:  20 mg   Take 1 tablet (20 mg) by mouth daily   Quantity:  5 tablet   Refills:  0         These medicines have changed or have updated prescriptions.        Dose/Directions    sertraline 25 MG tablet   Commonly known as:  ZOLOFT   This may have changed:  See the new instructions.   Used for:  Major depressive disorder, single episode, moderate (H)   Changed by:  Ema Melendez NP        Dose:  25 mg   Take 1 tablet (25 mg) by mouth daily   Quantity:  90 tablet   Refills:  1            Where to get your medicines      These medications were sent to [facility] [place]  [address] [place] [address]  [address] [place] [address], [place] [address]     Phone:  [phone]     albuterol (2.5 MG/3ML) 0.083% neb solution    albuterol 108 (90 Base) MCG/ACT Inhaler    amLODIPine 10 MG tablet    atenolol 50 MG tablet    budesonide-formoterol 160-4.5 MCG/ACT Inhaler    doxycycline 100 MG capsule    predniSONE 20 MG tablet    sertraline 25 MG tablet         Some of these will need a paper prescription and others can be bought over the counter.  Ask your nurse if you have questions.     Bring a paper prescription for each of these medications     order for DME                Primary Care Provider Office Phone # Fax #    Ema Melendez [phone] [address] [facility] [address]        Goals        Exercise    Exercise 3x per week (30 min per time)     Notes - Note created  11/4/2016  3:01 PM by Marcia Poe RN    Member stated she would like to have help with showers and light housekeeping    *Contact will be made with member regarding payment of this service will be out of pocket  *Resources will be provided to member to make a decision as to what she would like to do         General    Functional (pt-stated)     Notes - Note created  2/25/2016  1:49 PM by Marcia Poe RN    Patient will receive PT in home for strengthening and gait training to remain safe, through 5/1/2016      Medical (pt-stated)     Notes - Note created  5/1/2015 10:03 AM by Rin Lowe RN    Decrease in COPD symptoms  - patient has follow up appointment with sleep medicine 5/12/15.  Patient to take medications as prescribed daily.  Patient to use oxygen as directed.          Equal Access to Services     SUSIE [facility]: Goldie Tony, jeff chinchilla, qahalina rodriguezalnyla pinto. So [facility] [phone].    ATENCIÓN: Si habla español, tiene a petersen disposición servicios gratuitos de asistencia lingüística. Llame al  [phone].    We comply with applicable federal civil rights laws and [place] laws. We do not discriminate on the basis of race, color, national origin, age, disability, sex, sexual orientation, or gender identity.            Thank you!     Thank you for choosing [facility]  for your care. Our goal is always to provide you with excellent care. Hearing back from our patients is one way we can continue to improve our services. Please take a few minutes to complete the written survey that you may receive in the mail after your visit with us. Thank you!             Your Updated Medication List - Protect others around you: Learn how to safely use, store and throw away your medicines at www.disposemymeds.org.          This list is accurate as of 6/1/18 10:12 AM.  Always use your most recent med list.                   Brand Name Dispense Instructions for use Diagnosis    acetaminophen 500 MG tablet    TYLENOL    60 tablet    Take 2 tablets (1,000 mg) by mouth every 8 hours        * albuterol (2.5 MG/3ML) 0.083% neb solution     1 Box    Take 1 vial (2.5 mg) by nebulization every 4 hours as needed for shortness of breath / dyspnea or wheezing    SOB (shortness of breath), Chronic obstructive pulmonary disease, unspecified COPD type (H)       * albuterol 108 (90 Base) MCG/ACT Inhaler    PROAIR HFA/PROVENTIL HFA/VENTOLIN HFA    3 Inhaler    Inhale 2 puffs into the lungs every 4 hours as needed for shortness of breath / dyspnea or wheezing    Chronic obstructive pulmonary disease, unspecified COPD type (H)       ALLERGY RELIEF 10 MG tablet   Generic drug:  loratadine     90 tablet    TAKE ONE TABLET BY MOUTH ONCE DAILY    Acute seasonal allergic rhinitis, unspecified trigger       amLODIPine 10 MG tablet    NORVASC    90 tablet    TAKE 1 TABLET (10 MG) BY MOUTH DAILY    Hypertension goal BP (blood pressure) < 140/90       atenolol 50 MG tablet    TENORMIN    180 tablet    Take 1 tablet (50 mg) by mouth 2  times daily    Hypertension goal BP (blood pressure) < 140/90       atorvastatin 20 MG tablet    LIPITOR    90 tablet    Take 1 tablet (20 mg) by mouth daily    Hyperlipidemia LDL goal <130       budesonide-formoterol 160-4.5 MCG/ACT Inhaler    SYMBICORT    10.2 g    INHALE 2 PUFFS INTO THE LUNGS 2 TIMES DAILY    Chronic obstructive pulmonary disease, unspecified COPD type (H)       CALCIUM 600 + D PO      Take  by mouth.        doxycycline 100 MG capsule    VIBRAMYCIN    20 capsule    Take 1 capsule (100 mg) by mouth 2 times daily    COPD exacerbation (H), SOB (shortness of breath), Chronic obstructive pulmonary disease, unspecified COPD type (H), Interstitial pulmonary fibrosis (H)       fish oil-omega-3 fatty acids 1000 MG capsule      1 cap daily        folic acid 1 MG tablet    FOLVITE    90 tablet    Take 1 tablet (1 mg) by mouth daily    Interstitial pulmonary fibrosis (H)       losartan-hydrochlorothiazide 100-25 MG per tablet    HYZAAR    90 tablet    Take 1 tablet by mouth daily    Type 2 diabetes mellitus without complication, without long-term current use of insulin (H), Hypertension goal BP (blood pressure) < 140/90       Nebulizer Compressor Kit     1 kit    1 kit every 4 hours as needed        * order for DME      1.  CPAP pressure 12 cm/H20 with heated humidity and auto-titrating capability.  2.  Provide mask to fit and CPAP supplies. 3.  Length of need lifetime. 4.  Ok to d/c O2 bleed in to her PAP overnight.    SUSSY (obstructive sleep apnea)       * order for DME     1 Units    TEDs stockings knee high to be worn during the day.    Leg edema       * order for DME     1 Units    Equipment being ordered: Oxygen    Hypoxia       * order for DME     1 Units    Equipment being ordered: CPAP supplies    SUSSY (obstructive sleep apnea)       order for DME     1 Device    Equipment being ordered: Oxygen - 3 liters continous    COPD (chronic obstructive pulmonary disease) (H)       order for DME     1 each     Equipment being ordered: Nebulizer    COPD exacerbation (H), SOB (shortness of breath), Chronic obstructive pulmonary disease, unspecified COPD type (H), Interstitial pulmonary fibrosis (H)       potassium chloride 10 MEQ tablet    K-TAB,KLOR-CON    60 tablet    Take 1 tablet (10 mEq) by mouth 2 times daily    Hypokalemia       predniSONE 20 MG tablet    DELTASONE    5 tablet    Take 1 tablet (20 mg) by mouth daily    COPD exacerbation (H), SOB (shortness of breath), Chronic obstructive pulmonary disease, unspecified COPD type (H), Interstitial pulmonary fibrosis (H)       SENNA PLUS 8.6-50 MG per tablet   Generic drug:  senna-docusate     100 tablet    TAKE ONE TO TWO TABLETS BY MOUTH TWICE DAILY AS NEEDED FOR CONSTIPATION    Constipation       sertraline 25 MG tablet    ZOLOFT    90 tablet    Take 1 tablet (25 mg) by mouth daily    Major depressive disorder, single episode, moderate (H)       SIMETHICONE-80 PO      Take 80 mg by mouth every morning And TID prn belching        tiotropium 18 MCG capsule    SPIRIVA HANDIHALER    90 capsule    Inhale  into the lungs. Inhale contents of one capsule daily    Chronic obstructive pulmonary disease, unspecified COPD type (H), SOB (shortness of breath) on exertion       * Notice:  This list has 6 medication(s) that are the same as other medications prescribed for you. Read the directions carefully, and ask your doctor or other care provider to review them with you.

## 2018-06-01 NOTE — PATIENT INSTRUCTIONS
Prescription for antibiotic (Doxycycline) 100 mg twice daily X 10 day.  Prednisone 20 mg once daily for 5 days - start this tomorrow - take early in the morning.    Use Albuterol nebulizer - every 4-6 hours until cough, shortness of breath and wheezing has improved then can use 2-3 X daily.    Continue other inhalers as prescribed.    Follow up with me as problems arise - or if symptoms not improving.  Continue at 3 liters until breathing improves - then reduce down to 2 liters.    May return to Cardiac Rehab next week if feeling better.    ANNETTA Hoover        Thank you for choosing [facility].  You may be receiving a survey in the mail from [employer] regarding your visit today.  Please take a few minutes to complete and return the survey to let us know how we are doing.      If you have questions or concerns, please contact us via [employer] or you can contact your care team at [phone].    Our Clinic hours are:  Monday 6:40 am  to 7:00 pm  Tuesday -Friday 6:40 am to 5:00 pm    The [place] outpatient lab hours are:  Monday - Friday 6:10 am to 4:45 pm  Saturdays 7:00 am to 11:00 am  Appointments are required, call [phone]    If you have clinical questions after hours or would like to schedule an appointment,  call the clinic at [phone].    COPD Flare    You have had a flare-up of your COPD.  COPD, or chronic obstructive pulmonary disease, is a common lung disease. It causes your airways to become irritated and narrower. This makes it harder for you to breathe. Emphysema and chronic bronchitis are both types of COPD. This is a chronic condition, which means you always have it. Sometimes it gets worse. When this happens, it is called a flare-up.  Symptoms of COPD  People with COPD may have symptoms most of the time. In a flare-up, your symptoms get worse. These symptoms may mean you are having a flare-up:    Shortness of breath, shallow or rapid breathing, or wheezing that gets  worse    Lung infection    Cough that gets worse    More mucus, thicker mucus or mucus of a different color    Tiredness, decreased energy, or trouble doing your usual activities    Fever    Chest tightness    Your symptoms don t get better even when you use your usual medicines, inhalers, and nebulizer    Trouble talking    You feel confused  Causes of flare-ups  Unfortunately, a flare-up can happen even though you did everything right, and you followed your doctor s instructions. Some causes of flare-ups are:    Smoking or secondhand smoke    Colds, the flu, or respiratory infections    Air pollution    Sudden change in the weather    Dust, irritating chemicals, or strong fumes    Not taking your medicines as prescribed  Home care  Here are some things you can do at home to treat a flare-up:    Try not to panic. This makes it harder to breathe, and keeps you from doing the right things.    Don t smoke or be around others who are smoking.    Try to drink more fluids than usual during a flare-up, unless your doctor has told you not to because of heart and kidney problems. More fluids can help loosen the mucus.    Use your inhalers and nebulizer, if you have one, as you have been told to.    If you were given antibiotics, take them until they are used up or your doctor tells you to stop. It s important to finish the antibiotics, even though you feel better. This will make sure the infection has cleared.    If you were given prednisone or another steroid, finish it even if you feel better.  Preventing a flare-up  Even though flare-ups happen, the best way to treat one is to prevent it before it starts. Here are some pointers:    Don t smoke or be around others who are smoking.    Take your medicines as you have been told.    Talk with your doctor about getting a flu shot every year. Also find out if you need a pneumonia shot.    If there is a weather advisory warning to stay indoors, try to stay inside when  possible.    Try to eat healthy and get plenty of sleep.    Try to avoid things that usually set you off, like dust, chemical fumes, hairsprays, or strong perfumes.  Follow-up care  Follow up with your healthcare provider, or as advised.  If a culture was done, you will be told if your treatment needs to be changed. You can call as directed for the results.  If X-rays were done, you will be notified of any new findings that may affect your care.  Call 911  Call 911 if any of these occur:    You have trouble breathing    You feel confused or it s difficult to wake you up    You faint or lose consciousness    You have a rapid heart rate    You have new pain in your chest, arm, shoulder, neck or upper back  When to seek medical advice  Call your healthcare provider right away if any of these occur:    Wheezing or shortness of breath gets worse    You need to use your inhalers more often than usual without relief    Fever of 100.4 F (38 C) or higher, or as directed by your healthcare provider    Coughing up lots of dark-colored or bloody mucus (sputum)    Chest pain with each breath    You do not start to get better within 24 hours    Swelling of your ankles gets worse    Dizziness or weakness  Date Last Reviewed: 9/1/2016 2000-2017 [employer]. [address] [place] [address], [place] [address]. All rights reserved. This information is not intended as a substitute for professional medical care. Always follow your healthcare professional's instructions.

## 2018-06-11 DIAGNOSIS — R73.09 ELEVATED GLUCOSE: ICD-10-CM

## 2018-06-11 LAB
CREAT UR-MCNC: 99 MG/DL
MICROALBUMIN UR-MCNC: <5 MG/L
MICROALBUMIN/CREAT UR: NORMAL MG/G CR (ref 0–25)

## 2018-06-11 PROCEDURE — 82043 UR ALBUMIN QUANTITATIVE: CPT | Performed by: NURSE PRACTITIONER

## 2018-06-14 ENCOUNTER — TELEPHONE (OUTPATIENT)
Dept: FAMILY MEDICINE | Facility: CLINIC | Age: 83
End: 2018-06-14

## 2018-06-14 NOTE — TELEPHONE ENCOUNTER
"Ema,    Patient was connected to me as the [facility] did a PHQ-9 and the patient answered several days of thoughts of self harm or suicide.  When I spoke to the patient she feels she answered this question wrong.  She does not feels she is suicidal or wanting to hurt herself.  When asked about a plan however she states \"my mother was a nurse and my father a .  If I wanted to do something I think I know how\".   When asked how her health is as we just saw her 2 weeks ago.  About the same as two weeks ago.  Patient states she has several good friends and they check on her through out the week.  She wonders how long she will go on like this?  Do you want to see her again?  Recall and check a PHQ-9 in a week?  Please advise. Carline GARCIA RN    "

## 2018-06-14 NOTE — TELEPHONE ENCOUNTER
Patient completed Phq9 and call was transferred to clinic RN for triage due to protocol for suicidal indications.

## 2018-06-14 NOTE — TELEPHONE ENCOUNTER
Ema-   It appears this patient was just seen 6/1/18, A phq-9 was not done, do you want her called and this done over the phone? Thank You . Shane CHAVEZ CMA

## 2018-06-14 NOTE — TELEPHONE ENCOUNTER
PHQ9 Due between: 4/6/18-8/6/18    Please contact patient to complete follow up PHQ9 before their DUE DATE.     Index date 12/6/17, phq9 score 15.    This is important feedback for your care team to assess your symptoms and treatment plan.    You completed this same questionnaire   PHQ-9 SCORE 12/6/2017   Total Score -   Total Score 15       MA STAFF: If upon calling patient and PHQ9 score is higher that 5 route to the provider. You may also seek an RN for review.

## 2018-06-15 ASSESSMENT — PATIENT HEALTH QUESTIONNAIRE - PHQ9: SUM OF ALL RESPONSES TO PHQ QUESTIONS 1-9: 11

## 2018-08-01 DIAGNOSIS — E87.6 HYPOKALEMIA: ICD-10-CM

## 2018-08-01 NOTE — TELEPHONE ENCOUNTER
"Requested Prescriptions   Pending Prescriptions Disp Refills     potassium chloride (K-TAB,KLOR-CON) 10 MEQ tablet [Pharmacy Med Name: POTASSIUM CL 10 MEQ TAB SA 10 TAB]  Last Written Prescription Date:  12/06/2017  Last Fill Quantity: 60,  # refills: 6   Last office visit: 6/1/2018 with prescribing provider:  flores   Future Office Visit:     60 tablet 6     Sig: TAKE 1 TABLET (10 MEQ) BY MOUTH 2 TIMES DAILY    Potassium Supplements Protocol Passed    8/1/2018  2:27 PM       Passed - Recent (12 mo) or future (30 days) visit within the authorizing provider's specialty    Patient had office visit in the last 12 months or has a visit in the next 30 days with authorizing provider or within the authorizing provider's specialty.  See \"Patient Info\" tab in inbasket, or \"Choose Columns\" in Meds & Orders section of the refill encounter.           Passed - Patient is age 18 or older       Passed - Normal serum potassium in past 12 months    Recent Labs   Lab Test  06/01/18   1020   POTASSIUM  3.7                    Asael Joe RT (R)    "

## 2018-08-02 RX ORDER — POTASSIUM CHLORIDE 750 MG/1
TABLET, EXTENDED RELEASE ORAL
Qty: 60 TABLET | Refills: 9 | Status: SHIPPED | OUTPATIENT
Start: 2018-08-02 | End: 2019-07-05

## 2018-08-02 NOTE — TELEPHONE ENCOUNTER
Prescription approved per [facility] Refill Protocol.  Soniya TADEO RN

## 2018-08-20 DIAGNOSIS — R06.02 SOB (SHORTNESS OF BREATH): ICD-10-CM

## 2018-08-20 DIAGNOSIS — J44.9 CHRONIC OBSTRUCTIVE PULMONARY DISEASE, UNSPECIFIED COPD TYPE (H): Chronic | ICD-10-CM

## 2018-08-20 NOTE — TELEPHONE ENCOUNTER
"Requested Prescriptions   Pending Prescriptions Disp Refills     albuterol (2.5 MG/3ML) 0.083% neb solution [Pharmacy Med Name: ALBUTEROL 0.83 MG/ML SOLN (2.5 MG/3ML NEBU]  Last Written Prescription Date:  06/01/18   Last Fill Quantity: 1 Box,  # refills: 3   Last office visit: 6/1/2018 with prescribing provider:  06/01/18   Future Office Visit:     75 mL 3     Sig: NEBULIZE ONE VIAL (2.5 MG) EVERY FOUR HOURS AS NEEDED FOR SHORTNESS OF BREATH/DYSPNEA OR WHEEZING    Asthma Maintenance Inhalers - Anticholinergics Passed    8/20/2018  2:27 PM       Passed - Patient is age 12 years or older       Passed - Recent (12 mo) or future (30 days) visit within the authorizing provider's specialty    Patient had office visit in the last 12 months or has a visit in the next 30 days with authorizing provider or within the authorizing provider's specialty.  See \"Patient Info\" tab in inbasket, or \"Choose Columns\" in Meds & Orders section of the refill encounter.              "

## 2018-08-22 RX ORDER — ALBUTEROL SULFATE 0.83 MG/ML
SOLUTION RESPIRATORY (INHALATION)
Qty: 75 ML | Refills: 3 | Status: ON HOLD | OUTPATIENT
Start: 2018-08-22 | End: 2018-09-20

## 2018-08-22 NOTE — TELEPHONE ENCOUNTER
Prescription approved per [facility] Refill Protocol.  Alexandra DUNCAN RN

## 2018-09-14 ENCOUNTER — OFFICE VISIT (OUTPATIENT)
Dept: FAMILY MEDICINE | Facility: CLINIC | Age: 83
End: 2018-09-14
Payer: COMMERCIAL

## 2018-09-14 VITALS
TEMPERATURE: 97.3 F | HEIGHT: 66 IN | WEIGHT: 252 LBS | DIASTOLIC BLOOD PRESSURE: 74 MMHG | SYSTOLIC BLOOD PRESSURE: 128 MMHG | HEART RATE: 67 BPM | BODY MASS INDEX: 40.5 KG/M2 | RESPIRATION RATE: 18 BRPM | OXYGEN SATURATION: 96 %

## 2018-09-14 DIAGNOSIS — J84.10 INTERSTITIAL PULMONARY FIBROSIS (H): Chronic | ICD-10-CM

## 2018-09-14 DIAGNOSIS — I10 HYPERTENSION GOAL BP (BLOOD PRESSURE) < 140/90: Chronic | ICD-10-CM

## 2018-09-14 DIAGNOSIS — J44.9 CHRONIC OBSTRUCTIVE PULMONARY DISEASE, UNSPECIFIED COPD TYPE (H): Chronic | ICD-10-CM

## 2018-09-14 DIAGNOSIS — Z23 NEED FOR PROPHYLACTIC VACCINATION AND INOCULATION AGAINST INFLUENZA: ICD-10-CM

## 2018-09-14 DIAGNOSIS — E66.01 MORBID OBESITY (H): ICD-10-CM

## 2018-09-14 DIAGNOSIS — R21 RASH AND NONSPECIFIC SKIN ERUPTION: Primary | ICD-10-CM

## 2018-09-14 DIAGNOSIS — F32.1 MAJOR DEPRESSIVE DISORDER, SINGLE EPISODE, MODERATE (H): Chronic | ICD-10-CM

## 2018-09-14 PROCEDURE — G0008 ADMIN INFLUENZA VIRUS VAC: HCPCS | Performed by: NURSE PRACTITIONER

## 2018-09-14 PROCEDURE — 99215 OFFICE O/P EST HI 40 MIN: CPT | Mod: 25 | Performed by: NURSE PRACTITIONER

## 2018-09-14 PROCEDURE — 90662 IIV NO PRSV INCREASED AG IM: CPT | Performed by: NURSE PRACTITIONER

## 2018-09-14 RX ORDER — ESCITALOPRAM OXALATE 5 MG/1
TABLET ORAL
Qty: 30 TABLET | Refills: 1 | Status: SHIPPED | OUTPATIENT
Start: 2018-09-14 | End: 2018-11-14 | Stop reason: DRUGHIGH

## 2018-09-14 RX ORDER — NYSTATIN 100000 [USP'U]/G
POWDER TOPICAL 3 TIMES DAILY PRN
Qty: 60 G | Refills: 1 | Status: SHIPPED | OUTPATIENT
Start: 2018-09-14 | End: 2021-01-14

## 2018-09-14 ASSESSMENT — ANXIETY QUESTIONNAIRES
3. WORRYING TOO MUCH ABOUT DIFFERENT THINGS: SEVERAL DAYS
1. FEELING NERVOUS, ANXIOUS, OR ON EDGE: NOT AT ALL
IF YOU CHECKED OFF ANY PROBLEMS ON THIS QUESTIONNAIRE, HOW DIFFICULT HAVE THESE PROBLEMS MADE IT FOR YOU TO DO YOUR WORK, TAKE CARE OF THINGS AT HOME, OR GET ALONG WITH OTHER PEOPLE: SOMEWHAT DIFFICULT
5. BEING SO RESTLESS THAT IT IS HARD TO SIT STILL: NOT AT ALL
7. FEELING AFRAID AS IF SOMETHING AWFUL MIGHT HAPPEN: NOT AT ALL
2. NOT BEING ABLE TO STOP OR CONTROL WORRYING: NOT AT ALL
GAD7 TOTAL SCORE: 1
6. BECOMING EASILY ANNOYED OR IRRITABLE: NOT AT ALL

## 2018-09-14 ASSESSMENT — PATIENT HEALTH QUESTIONNAIRE - PHQ9: 5. POOR APPETITE OR OVEREATING: NOT AT ALL

## 2018-09-14 ASSESSMENT — PAIN SCALES - GENERAL: PAINLEVEL: NO PAIN (0)

## 2018-09-14 NOTE — PROGRESS NOTES
SUBJECTIVE:   Susan Haq is a 86 year old female who presents to clinic today for the following health issues:    Chief Complaint   Patient presents with     Depression     Discuss starting medication therapy.      Derm Problem     Dry red flaky rash under breasts and spreading to torso.      Depression Followup    Status since last visit: Worsened, tried zoloft with no improvement. Trina GUILLEN who is with her today states she lacks motivation, doesn't want to get up and get ready for the day. She would rather stay in bed and watch television.     See PHQ-9 for current symptoms.  Other associated symptoms: None    Complicating factors:   Significant life event:  No   Current substance abuse:  None  Anxiety or Panic symptoms:  No    PHQ-9 12/6/2017 6/14/2018 9/14/2018   Total Score 15 11 17   Q9: Suicide Ideation Not at all Several days Several days     In the past two weeks have you had thoughts of suicide or self-harm?  No.    Do you have concerns about your personal safety or the safety of others?   No  PHQ-9  English  PHQ-9   Any Language  Suicide Assessment Five-step Evaluation and Treatment (SAFE-T)    Amount of exercise or physical activity: None    Problems taking medications regularly: No    Medication side effects: none    Diet: regular (no restrictions)    Hypertension Follow-up      Outpatient blood pressures are not being checked.    Low Salt Diet: no added salt    COPD Follow-Up    Symptoms are currently: stable    Current fatigue or dyspnea with ambulation: stable     Shortness of breath: stable    Increased or change in Cough/Sputum: Yes-  slight    Fever(s): No    Baseline ambulation without stopping to rest:  < 1 rooms. Able to walk up < 1 flights of stairs without stopping to rest.    Any ER/UC or hospital admissions since your last visit? No     History   Smoking Status     Former Smoker     Packs/day: 1.00     Years: 35.00     Quit date: 1/1/1990   Smokeless Tobacco     Former User      Lab Results   Component Value Date    FEV1 1.31 10/07/2013    GDM8TES 44% 10/07/2013         Problem list and histories reviewed & adjusted, as indicated.  Additional history: as documented    Patient Active Problem List   Diagnosis     Hyperlipidemia LDL goal <130     Insomnia     Osteopenia     Hypertension goal BP (blood pressure) < 140/90     Obesity     Advance care planning     Adenomatous polyp of colon     PVC's (premature ventricular contractions)     SUSSY (obstructive sleep apnea)-Moderately severe (AHI 21)     Bilateral leg edema     HL (hearing loss)     Umbilical hernia     SNHL (sensorineural hearing loss)     Interstitial pulmonary fibrosis (H)     Chronic obstructive pulmonary disease, unspecified COPD type (H)     Major depressive disorder, single episode, moderate (H)     Risk for falls     Alcohol abuse     Acute pain of right shoulder     Morbid obesity (H)     Physical deconditioning     Past Surgical History:   Procedure Laterality Date     APPENDECTOMY       C BSO, OMENTECTOMY W/SHANIA       C NONSPECIFIC PROCEDURE      (L) clavicle orif     C STOMACH SURGERY PROCEDURE UNLISTED       CHOLECYSTECTOMY, LAPOROSCOPIC  10/13/2011    Cholecystectomy, Laparoscopic     HERNIA REPAIR, UMBILICAL  10/13/2011     HYSTERECTOMY, CERVIX STATUS UNKNOWN         Social History   Substance Use Topics     Smoking status: Former Smoker     Packs/day: 1.00     Years: 35.00     Quit date: 1/1/1990     Smokeless tobacco: Former User     Alcohol use Yes      Comment: Drink 1 (3oz) drink a day     Family History   Problem Relation Age of Onset     Diabetes Father      Coronary Artery Disease Maternal Grandmother      Coronary Artery Disease Paternal Uncle          Current Outpatient Prescriptions   Medication Sig Dispense Refill     acetaminophen (TYLENOL) 500 MG tablet Take 2 tablets (1,000 mg) by mouth every 8 hours 60 tablet 6     albuterol (2.5 MG/3ML) 0.083% neb solution NEBULIZE ONE VIAL (2.5 MG) EVERY FOUR HOURS  AS NEEDED FOR SHORTNESS OF BREATH/DYSPNEA OR WHEEZING 75 mL 3     albuterol (PROAIR HFA/PROVENTIL HFA/VENTOLIN HFA) 108 (90 Base) MCG/ACT Inhaler Inhale 2 puffs into the lungs every 4 hours as needed for shortness of breath / dyspnea or wheezing 3 Inhaler 3     ALLERGY RELIEF 10 MG tablet TAKE ONE TABLET BY MOUTH ONCE DAILY 90 tablet 1     amLODIPine (NORVASC) 10 MG tablet TAKE 1 TABLET (10 MG) BY MOUTH DAILY 90 tablet 1     atenolol (TENORMIN) 50 MG tablet Take 1 tablet (50 mg) by mouth 2 times daily 180 tablet 3     atorvastatin (LIPITOR) 20 MG tablet Take 1 tablet (20 mg) by mouth daily 90 tablet 3     budesonide-formoterol (SYMBICORT) 160-4.5 MCG/ACT Inhaler INHALE 2 PUFFS INTO THE LUNGS 2 TIMES DAILY 10.2 g 5     Calcium Carbonate-Vitamin D (CALCIUM 600 + D OR) Take  by mouth.       doxycycline (VIBRAMYCIN) 100 MG capsule Take 1 capsule (100 mg) by mouth 2 times daily 20 capsule 0     FISH OIL 1000 MG OR CAPS 1 cap daily       folic acid (FOLVITE) 1 MG tablet Take 1 tablet (1 mg) by mouth daily 90 tablet 3     losartan-hydrochlorothiazide (HYZAAR) 100-25 MG per tablet Take 1 tablet by mouth daily 90 tablet 3     order for DME Equipment being ordered: Nebulizer 1 each 0     ORDER FOR DME Equipment being ordered: Oxygen - 3 liters continous 1 Device 0     ORDER FOR DME Equipment being ordered: CPAP supplies 1 Units 0     ORDER FOR DME Equipment being ordered: Oxygen 1 Units 0     ORDER FOR DME TEDs stockings knee high to be worn during the day. 1 Units 0     ORDER FOR DME 1.  CPAP pressure 12 cm/H20 with heated humidity and auto-titrating capability.   2.  Provide mask to fit and CPAP supplies.  3.  Length of need lifetime.  4.  Ok to d/c O2 bleed in to her PAP overnight.           potassium chloride (K-TAB,KLOR-CON) 10 MEQ tablet TAKE 1 TABLET (10 MEQ) BY MOUTH 2 TIMES DAILY 60 tablet 9     Respiratory Therapy Supplies (NEBULIZER COMPRESSOR) KIT 1 kit every 4 hours as needed 1 kit 0     SENNA PLUS 8.6-50 MG per  tablet TAKE ONE TO TWO TABLETS BY MOUTH TWICE DAILY AS NEEDED FOR CONSTIPATION 100 tablet 1     SIMETHICONE-80 PO Take 80 mg by mouth every morning And TID prn belching       tiotropium (SPIRIVA HANDIHALER) 18 MCG capsule Inhale  into the lungs. Inhale contents of one capsule daily 90 capsule 3     predniSONE (DELTASONE) 20 MG tablet Take 1 tablet (20 mg) by mouth daily (Patient not taking: Reported on 9/14/2018) 5 tablet 0     sertraline (ZOLOFT) 25 MG tablet Take 1 tablet (25 mg) by mouth daily (Patient not taking: Reported on 9/14/2018) 90 tablet 1     Allergies   Allergen Reactions     Ace Inhibitors Cough     Asa [Aspirin]      Penicillins      Recent Labs   Lab Test  06/01/18   1020  12/06/17   1035   06/10/17   1440   03/05/16   2030  02/24/16   1156  07/22/15   1219  04/21/15   1038  02/18/15   1353   A1C  5.3  5.7   --    --    --    --    --   5.7   --    --    LDL  93  91   --    --    --    --    --    --   103   --    HDL  60  61   --    --    --    --    --    --   52   --    TRIG  140  196*   --    --    --    --    --    --   162*   --    ALT   --    --    --   26   --   24  22   --    --   20   CR  0.91  0.86   < >  1.01   < >  1.11*  0.91   --   0.91  0.87   GFRESTIMATED  58*  63   < >  52*   < >  47*  59*   --   59*  62   GFRESTBLACK  71  76   < >  63   < >  57*  71   --   71  75   POTASSIUM  3.7  3.2*   < >  3.5   < >  3.2*  3.4   --   3.2*  3.9   TSH   --    --    --   3.06   --    --    --    --    --   2.60    < > = values in this interval not displayed.      BP Readings from Last 3 Encounters:   09/14/18 128/74   06/01/18 127/77   03/02/18 115/50    Wt Readings from Last 3 Encounters:   09/14/18 252 lb (114.3 kg)   03/02/18 248 lb (112.5 kg)   12/06/17 243 lb 3.2 oz (110.3 kg)                  Labs reviewed in EPIC    Reviewed and updated as needed this visit by clinical staff  Tobacco  Allergies  Meds  Med Hx  Surg Hx  Fam Hx  Soc Hx      Reviewed and updated as needed this visit by  "Provider  Tobacco  Allergies  Meds         ROS:  Constitutional, HEENT, cardiovascular, pulmonary, GI, , musculoskeletal, neuro, skin, endocrine and psych systems are negative, except as otherwise noted.  POSITIVE for rash under breast bilateral - itchy, come/goes, rash and itchiness bilateral LE    OBJECTIVE:     /74 (BP Location: Right arm, Patient Position: Chair, Cuff Size: Adult Regular)  Pulse 67  Temp 97.3  F (36.3  C) (Tympanic)  Resp 18  Ht 5' 6\" (1.676 m)  Wt 252 lb (114.3 kg)  SpO2 96%  BMI 40.67 kg/m2  Body mass index is 40.67 kg/(m^2).  GENERAL: alert, no distress and elderly  NECK: no adenopathy, no asymmetry, masses, or scars and thyroid normal to palpation  RESP: lungs clear to auscultation - no rales, rhonchi or wheezes  CV: regular rate and rhythm, normal S1 S2, no S3 or S4, no murmur, click or rub, no peripheral edema and peripheral pulses strong  ABDOMEN: soft, nontender, no hepatosplenomegaly, no masses and bowel sounds normal  MS: no gross musculoskeletal defects noted, no edema  PSYCH: mentation appears normal, affect normal/bright  SKIN: lower extremities scaly, dry patches lower extremities, bilateral under breast with mild erythematous macular rash.    Diagnostic Test Results:  none     ASSESSMENT/PLAN:     1. Major depressive disorder, single episode, moderate (H)  Worsened.  Stopped Sertraline 1 month ago.  Start Lexapro.  The risks, benefits and treatment options of prescribed medications or other treatments have been discussed with the patient. The patient verbalized their understanding and should call or follow up if no improvement or if they develop further problems.      Caregivers here today - discussed medication and dosing.  Follow-up with me in in 1 month for recheck.  Monitor symptoms at home.    - escitalopram (LEXAPRO) 5 MG tablet; Take 1 tablet (5 mg) daily for 2 weeks, then if no improvement in symptoms can increase to 2 tablets (10 mg) daily  Dispense: 30 " tablet; Refill: 1    2. Need for prophylactic vaccination and inoculation against influenza     - FLU VACCINE, INCREASED ANTIGEN, PRESV FREE, AGE 65+ [47218]  - Vaccine Administration, Initial [32765]    3. Chronic obstructive pulmonary disease, unspecified COPD type (H)   Stable on continuous O2    4. Interstitial pulmonary fibrosis (H)  Progressive disease - patient is aware.    5. Rash and nonspecific skin eruption     - camphor-menthol (DERMASARRA) 0.5-0.5 % LOTN; Apply 1 mL topically every 8 hours as needed for skin care (apply to rash on legs)  Dispense: 1 Bottle; Refill: 1  - nystatin (MYCOSTATIN) 442626 UNIT/GM POWD; Apply topically 3 times daily as needed  Dispense: 60 g; Refill: 1    6. Hypertension goal BP (blood pressure) < 140/90   Controlled.    7. Morbid obesity (H)  Exercise limited due to COPD and #4    Total times spent with patient 40 minutes of which > 50% of the time was spent counseling and coordination of care review of above chronic conditions, complaints, medications, depression in elderly, recommendations, treatment plan and follow up     Patient Instructions   Possible side effects of antidepressants/anxiety meds, including but not limited to GI upset, disrupted sleep, loss of libido, worsening of mood or even possible risk of increased suicidal thoughts.   Often some of these things if not severe will improve after 1-2 weeks on medications but some may not see effects for 3-4 weeks,  if tolerable patients should continue meds and see if there is improvement.  If symptoms are intolerable or for any suicidal thoughts the medication should be stopped immediately and contact the clinic.      These medications should be used for 6-9 months before stopping, to avoid rebound symptoms.   Contact the clinic if having any problems tolerating these medications.  Take the medication daily and do not stop the medication abruptly.    Follow up in one month to discuss improvement and whether or not we  need to change meds or increase dose.  Follow up sooner if problems. Escitalopram (Lexapro) Take 1 tablet (5 mg) daily for 2 weeks, then if no improvement in symptoms can increase to 2 tablets (10 mg) daily     Please plan to contact the clinic or 24 hour nurse line at any time if you are having any thoughts of self harm.    ANNETTA Hoover, EDWIGE  [facility]

## 2018-09-14 NOTE — PROGRESS NOTES
Injectable Influenza Immunization Documentation    1.  Is the person to be vaccinated sick today?   No    2. Does the person to be vaccinated have an allergy to a component   of the vaccine?   No  Egg Allergy Algorithm Link    3. Has the person to be vaccinated ever had a serious reaction   to influenza vaccine in the past?   No    4. Has the person to be vaccinated ever had Guillain-Barré syndrome?   No    Due to injection administration, patient instructed to remain in clinic for 15 minutes  afterwards, and to report any adverse reaction to me immediately.    Form completed by Perla Tompkins MA

## 2018-09-14 NOTE — MR AVS SNAPSHOT
After Visit Summary   9/14/2018    Susan Haq    MRN: [id number]           Patient Information     Date Of Birth          1/10/1932        Visit Information        Provider Department      9/14/2018 10:20 AM Ema Melendez NP [facility]        Today's Diagnoses     Need for prophylactic vaccination and inoculation against influenza    -  1    Major depressive disorder, single episode, moderate (H)        Chronic obstructive pulmonary disease, unspecified COPD type (H)        Interstitial pulmonary fibrosis (H)          Care Instructions    Possible side effects of antidepressants/anxiety meds, including but not limited to GI upset, disrupted sleep, loss of libido, worsening of mood or even possible risk of increased suicidal thoughts.   Often some of these things if not severe will improve after 1-2 weeks on medications but some may not see effects for 3-4 weeks,  if tolerable patients should continue meds and see if there is improvement.  If symptoms are intolerable or for any suicidal thoughts the medication should be stopped immediately and contact the clinic.      These medications should be used for 6-9 months before stopping, to avoid rebound symptoms.   Contact the clinic if having any problems tolerating these medications.  Take the medication daily and do not stop the medication abruptly.    Follow up in one month to discuss improvement and whether or not we need to change meds or increase dose.  Follow up sooner if problems. Escitalopram (Lexapro) Take 1 tablet (5 mg) daily for 2 weeks, then if no improvement in symptoms can increase to 2 tablets (10 mg) daily     Please plan to contact the clinic or 24 hour nurse line at any time if you are having any thoughts of self harm.    ANNETTA Hoover              Follow-ups after your visit        Who to contact     If you have questions or need follow up information about today's clinic visit or your schedule please  "contact [facility] directly at [phone].  Normal or non-critical lab and imaging results will be communicated to you by MyChart, letter or phone within 4 business days after the clinic has received the results. If you do not hear from us within 7 days, please contact the clinic through MyChart or phone. If you have a critical or abnormal lab result, we will notify you by phone as soon as possible.  Submit refill requests through [employer] or call your pharmacy and they will forward the refill request to us. Please allow 3 business days for your refill to be completed.          Additional Information About Your Visit        Care EveryWhere ID     This is your Care EveryWhere ID. This could be used by other organizations to access your [facility] medical records  [id number]        Your Vitals Were     Pulse Temperature Respirations Height Pulse Oximetry BMI (Body Mass Index)    67 97.3  F (36.3  C) (Tympanic) 18 5' 6\" (1.676 m) 96% 40.67 kg/m2       Blood Pressure from Last 3 Encounters:   09/14/18 128/74   06/01/18 127/77   03/02/18 115/50    Weight from Last 3 Encounters:   09/14/18 252 lb (114.3 kg)   03/02/18 248 lb (112.5 kg)   12/06/17 243 lb 3.2 oz (110.3 kg)              We Performed the Following     FLU VACCINE, INCREASED ANTIGEN, PRESV FREE, AGE 65+ [41529]     Vaccine Administration, Initial [83808]          Today's Medication Changes          These changes are accurate as of 9/14/18 10:49 AM.  If you have any questions, ask your nurse or doctor.               Start taking these medicines.        Dose/Directions    escitalopram 5 MG tablet   Commonly known as:  LEXAPRO   Used for:  Major depressive disorder, single episode, moderate (H)   Started by:  Ema Melendez, NP        Take 1 tablet (5 mg) daily for 2 weeks, then if no improvement in symptoms can increase to 2 tablets (10 mg) daily   Quantity:  30 tablet   Refills:  1            Where to get your medicines      These " medications were sent to [facility] [place] - [place], [place] - [address] [place] [address]  [address] [place] [address], [place] [address]     Phone:  [phone]     escitalopram 5 MG tablet                Primary Care Provider Office Phone # Fax #    Ema Melendez [phone]       [address] [facility] [place] [address]        Goals        Exercise    Exercise 3x per week (30 min per time)     Notes - Note created  11/4/2016  3:01 PM by Marcia Poe, ARIADNA    Member stated she would like to have help with showers and light housekeeping    *Contact will be made with member regarding payment of this service will be out of pocket  *Resources will be provided to member to make a decision as to what she would like to do         General    Functional (pt-stated)     Notes - Note created  2/25/2016  1:49 PM by Marcia Poe RN    Patient will receive PT in home for strengthening and gait training to remain safe, through 5/1/2016      Medical (pt-stated)     Notes - Note created  5/1/2015 10:03 AM by Rin Lowe RN    Decrease in COPD symptoms  - patient has follow up appointment with sleep medicine 5/12/15.  Patient to take medications as prescribed daily.  Patient to use oxygen as directed.          Equal Access to Services     DESIREE PORTILLO : Goldie arreguino Sodillon, waaxda luqadaha, qaybta kaalmada adeerrol, nyla peters. So [facility] [phone].    ATENCIÓN: Si habla español, tiene a petersen disposición servicios gratLos Alamos Medical Centeros de asistencia lingüística. ame al [phone].    We comply with applicable federal civil rights laws and [place] laws. We do not discriminate on the basis of race, color, national origin, age, disability, sex, sexual orientation, or gender identity.            Thank you!     Thank you for choosing [facility]  for your care. Our goal is always to provide you with excellent care. Hearing back from  our patients is one way we can continue to improve our services. Please take a few minutes to complete the written survey that you may receive in the mail after your visit with us. Thank you!             Your Updated Medication List - Protect others around you: Learn how to safely use, store and throw away your medicines at www.disposemymeds.org.          This list is accurate as of 9/14/18 10:49 AM.  Always use your most recent med list.                   Brand Name Dispense Instructions for use Diagnosis    acetaminophen 500 MG tablet    TYLENOL    60 tablet    Take 2 tablets (1,000 mg) by mouth every 8 hours        * albuterol 108 (90 Base) MCG/ACT inhaler    PROAIR HFA/PROVENTIL HFA/VENTOLIN HFA    3 Inhaler    Inhale 2 puffs into the lungs every 4 hours as needed for shortness of breath / dyspnea or wheezing    Chronic obstructive pulmonary disease, unspecified COPD type (H)       * albuterol (2.5 MG/3ML) 0.083% neb solution     75 mL    NEBULIZE ONE VIAL (2.5 MG) EVERY FOUR HOURS AS NEEDED FOR SHORTNESS OF BREATH/DYSPNEA OR WHEEZING    SOB (shortness of breath), Chronic obstructive pulmonary disease, unspecified COPD type (H)       ALLERGY RELIEF 10 MG tablet   Generic drug:  loratadine     90 tablet    TAKE ONE TABLET BY MOUTH ONCE DAILY    Acute seasonal allergic rhinitis, unspecified trigger       amLODIPine 10 MG tablet    NORVASC    90 tablet    TAKE 1 TABLET (10 MG) BY MOUTH DAILY    Hypertension goal BP (blood pressure) < 140/90       atenolol 50 MG tablet    TENORMIN    180 tablet    Take 1 tablet (50 mg) by mouth 2 times daily    Hypertension goal BP (blood pressure) < 140/90       atorvastatin 20 MG tablet    LIPITOR    90 tablet    Take 1 tablet (20 mg) by mouth daily    Hyperlipidemia LDL goal <130       budesonide-formoterol 160-4.5 MCG/ACT Inhaler    SYMBICORT    10.2 g    INHALE 2 PUFFS INTO THE LUNGS 2 TIMES DAILY    Chronic obstructive pulmonary disease, unspecified COPD type (H)       CALCIUM  600 + D PO      Take  by mouth.        escitalopram 5 MG tablet    LEXAPRO    30 tablet    Take 1 tablet (5 mg) daily for 2 weeks, then if no improvement in symptoms can increase to 2 tablets (10 mg) daily    Major depressive disorder, single episode, moderate (H)       fish oil-omega-3 fatty acids 1000 MG capsule      1 cap daily        folic acid 1 MG tablet    FOLVITE    90 tablet    Take 1 tablet (1 mg) by mouth daily    Interstitial pulmonary fibrosis (H)       losartan-hydrochlorothiazide 100-25 MG per tablet    HYZAAR    90 tablet    Take 1 tablet by mouth daily    Type 2 diabetes mellitus without complication, without long-term current use of insulin (H), Hypertension goal BP (blood pressure) < 140/90       Nebulizer Compressor Kit     1 kit    1 kit every 4 hours as needed        * order for DME      1.  CPAP pressure 12 cm/H20 with heated humidity and auto-titrating capability.  2.  Provide mask to fit and CPAP supplies. 3.  Length of need lifetime. 4.  Ok to d/c O2 bleed in to her PAP overnight.    SUSSY (obstructive sleep apnea)       * order for DME     1 Units    TEDs stockings knee high to be worn during the day.    Leg edema       * order for DME     1 Units    Equipment being ordered: Oxygen    Hypoxia       * order for DME     1 Units    Equipment being ordered: CPAP supplies    SUSSY (obstructive sleep apnea)       order for DME     1 Device    Equipment being ordered: Oxygen - 3 liters continous    COPD (chronic obstructive pulmonary disease) (H)       order for DME     1 each    Equipment being ordered: Nebulizer    COPD exacerbation (H), SOB (shortness of breath), Chronic obstructive pulmonary disease, unspecified COPD type (H), Interstitial pulmonary fibrosis (H)       potassium chloride 10 MEQ tablet    K-TAB,KLOR-CON    60 tablet    TAKE 1 TABLET (10 MEQ) BY MOUTH 2 TIMES DAILY    Hypokalemia       SENNA PLUS 8.6-50 MG per tablet   Generic drug:  senna-docusate     100 tablet    TAKE ONE TO TWO  TABLETS BY MOUTH TWICE DAILY AS NEEDED FOR CONSTIPATION    Constipation       SIMETHICONE-80 PO      Take 80 mg by mouth every morning And TID prn belching        tiotropium 18 MCG capsule    SPIRIVA HANDIHALER    90 capsule    Inhale  into the lungs. Inhale contents of one capsule daily    Chronic obstructive pulmonary disease, unspecified COPD type (H), SOB (shortness of breath) on exertion       * Notice:  This list has 6 medication(s) that are the same as other medications prescribed for you. Read the directions carefully, and ask your doctor or other care provider to review them with you.

## 2018-09-14 NOTE — PATIENT INSTRUCTIONS
Possible side effects of antidepressants/anxiety meds, including but not limited to GI upset, disrupted sleep, loss of libido, worsening of mood or even possible risk of increased suicidal thoughts.   Often some of these things if not severe will improve after 1-2 weeks on medications but some may not see effects for 3-4 weeks,  if tolerable patients should continue meds and see if there is improvement.  If symptoms are intolerable or for any suicidal thoughts the medication should be stopped immediately and contact the clinic.      These medications should be used for 6-9 months before stopping, to avoid rebound symptoms.   Contact the clinic if having any problems tolerating these medications.  Take the medication daily and do not stop the medication abruptly.    Follow up in one month to discuss improvement and whether or not we need to change meds or increase dose.  Follow up sooner if problems. Escitalopram (Lexapro) Take 1 tablet (5 mg) daily for 2 weeks, then if no improvement in symptoms can increase to 2 tablets (10 mg) daily     Please plan to contact the clinic or 24 hour nurse line at any time if you are having any thoughts of self harm.    Ema Melendez, ANNETTA

## 2018-09-14 NOTE — NURSING NOTE
"Chief Complaint   Patient presents with     Depression     Discuss starting medication therapy.      Derm Problem     Dry red flaky rash under breasts and spreading to torso.        Initial BP (!) 128/7 (BP Location: Right arm, Patient Position: Chair, Cuff Size: Adult Regular)  Pulse 67  Temp 97.3  F (36.3  C) (Tympanic)  Resp 18  Ht 5' 6\" (1.676 m)  Wt 252 lb (114.3 kg)  SpO2 96%  BMI 40.67 kg/m2 Estimated body mass index is 40.67 kg/(m^2) as calculated from the following:    Height as of this encounter: 5' 6\" (1.676 m).    Weight as of this encounter: 252 lb (114.3 kg).    Medication Reconciliation: complete  Perla Tompkins MA    "

## 2018-09-15 ASSESSMENT — ANXIETY QUESTIONNAIRES: GAD7 TOTAL SCORE: 1

## 2018-09-15 ASSESSMENT — PATIENT HEALTH QUESTIONNAIRE - PHQ9: SUM OF ALL RESPONSES TO PHQ QUESTIONS 1-9: 17

## 2018-09-19 ENCOUNTER — HOSPITAL ENCOUNTER (OUTPATIENT)
Facility: CLINIC | Age: 83
Setting detail: OBSERVATION
Discharge: HOME | End: 2018-09-20
Attending: EMERGENCY MEDICINE | Admitting: FAMILY MEDICINE
Payer: COMMERCIAL

## 2018-09-19 ENCOUNTER — APPOINTMENT (OUTPATIENT)
Dept: GENERAL RADIOLOGY | Facility: CLINIC | Age: 83
End: 2018-09-19
Attending: EMERGENCY MEDICINE
Payer: COMMERCIAL

## 2018-09-19 DIAGNOSIS — R06.02 SOB (SHORTNESS OF BREATH) ON EXERTION: ICD-10-CM

## 2018-09-19 DIAGNOSIS — J44.1 COPD EXACERBATION (H): ICD-10-CM

## 2018-09-19 DIAGNOSIS — J44.9 CHRONIC OBSTRUCTIVE PULMONARY DISEASE, UNSPECIFIED COPD TYPE (H): Chronic | ICD-10-CM

## 2018-09-19 DIAGNOSIS — R06.02 SOB (SHORTNESS OF BREATH): ICD-10-CM

## 2018-09-19 DIAGNOSIS — Z87.891 PERSONAL HISTORY OF SMOKING: ICD-10-CM

## 2018-09-19 LAB
ALBUMIN SERPL-MCNC: 3 G/DL (ref 3.4–5)
ALP SERPL-CCNC: 146 U/L (ref 40–150)
ALT SERPL W P-5'-P-CCNC: 26 U/L (ref 0–50)
ANION GAP SERPL CALCULATED.3IONS-SCNC: 5 MMOL/L (ref 3–14)
AST SERPL W P-5'-P-CCNC: 21 U/L (ref 0–45)
BASOPHILS # BLD AUTO: 0 10E9/L (ref 0–0.2)
BASOPHILS NFR BLD AUTO: 0.3 %
BILIRUB SERPL-MCNC: 0.4 MG/DL (ref 0.2–1.3)
BUN SERPL-MCNC: 21 MG/DL (ref 7–30)
CALCIUM SERPL-MCNC: 7.8 MG/DL (ref 8.5–10.1)
CHLORIDE SERPL-SCNC: 105 MMOL/L (ref 94–109)
CO2 SERPL-SCNC: 30 MMOL/L (ref 20–32)
CREAT SERPL-MCNC: 1.04 MG/DL (ref 0.52–1.04)
DIFFERENTIAL METHOD BLD: NORMAL
EOSINOPHIL # BLD AUTO: 0.2 10E9/L (ref 0–0.7)
EOSINOPHIL NFR BLD AUTO: 1.5 %
ERYTHROCYTE [DISTWIDTH] IN BLOOD BY AUTOMATED COUNT: 12.9 % (ref 10–15)
GFR SERPL CREATININE-BSD FRML MDRD: 50 ML/MIN/1.7M2
GLUCOSE SERPL-MCNC: 118 MG/DL (ref 70–99)
HCT VFR BLD AUTO: 38.1 % (ref 35–47)
HGB BLD-MCNC: 12.3 G/DL (ref 11.7–15.7)
IMM GRANULOCYTES # BLD: 0.1 10E9/L (ref 0–0.4)
IMM GRANULOCYTES NFR BLD: 0.5 %
LYMPHOCYTES # BLD AUTO: 1.1 10E9/L (ref 0.8–5.3)
LYMPHOCYTES NFR BLD AUTO: 11.3 %
MCH RBC QN AUTO: 32.1 PG (ref 26.5–33)
MCHC RBC AUTO-ENTMCNC: 32.3 G/DL (ref 31.5–36.5)
MCV RBC AUTO: 100 FL (ref 78–100)
MONOCYTES # BLD AUTO: 1.3 10E9/L (ref 0–1.3)
MONOCYTES NFR BLD AUTO: 12.5 %
NEUTROPHILS # BLD AUTO: 7.5 10E9/L (ref 1.6–8.3)
NEUTROPHILS NFR BLD AUTO: 73.9 %
NRBC # BLD AUTO: 0 10*3/UL
NRBC BLD AUTO-RTO: 0 /100
PLATELET # BLD AUTO: 239 10E9/L (ref 150–450)
POTASSIUM SERPL-SCNC: 3.5 MMOL/L (ref 3.4–5.3)
PROT SERPL-MCNC: 6.8 G/DL (ref 6.8–8.8)
RBC # BLD AUTO: 3.83 10E12/L (ref 3.8–5.2)
SODIUM SERPL-SCNC: 140 MMOL/L (ref 133–144)
TROPONIN I SERPL-MCNC: <0.015 UG/L (ref 0–0.04)
WBC # BLD AUTO: 10.1 10E9/L (ref 4–11)

## 2018-09-19 PROCEDURE — 96374 THER/PROPH/DIAG INJ IV PUSH: CPT | Performed by: EMERGENCY MEDICINE

## 2018-09-19 PROCEDURE — 25000125 ZZHC RX 250: Performed by: EMERGENCY MEDICINE

## 2018-09-19 PROCEDURE — 94640 AIRWAY INHALATION TREATMENT: CPT

## 2018-09-19 PROCEDURE — 85025 COMPLETE CBC W/AUTO DIFF WBC: CPT | Performed by: EMERGENCY MEDICINE

## 2018-09-19 PROCEDURE — 40000275 ZZH STATISTIC RCP TIME EA 10 MIN

## 2018-09-19 PROCEDURE — 84484 ASSAY OF TROPONIN QUANT: CPT | Performed by: EMERGENCY MEDICINE

## 2018-09-19 PROCEDURE — 99285 EMERGENCY DEPT VISIT HI MDM: CPT | Mod: 25 | Performed by: EMERGENCY MEDICINE

## 2018-09-19 PROCEDURE — 71046 X-RAY EXAM CHEST 2 VIEWS: CPT

## 2018-09-19 PROCEDURE — 93005 ELECTROCARDIOGRAM TRACING: CPT | Performed by: EMERGENCY MEDICINE

## 2018-09-19 PROCEDURE — 93010 ELECTROCARDIOGRAM REPORT: CPT | Mod: Z6 | Performed by: EMERGENCY MEDICINE

## 2018-09-19 PROCEDURE — 25000128 H RX IP 250 OP 636: Performed by: EMERGENCY MEDICINE

## 2018-09-19 PROCEDURE — 82803 BLOOD GASES ANY COMBINATION: CPT | Performed by: EMERGENCY MEDICINE

## 2018-09-19 PROCEDURE — 99284 EMERGENCY DEPT VISIT MOD MDM: CPT | Mod: 25 | Performed by: EMERGENCY MEDICINE

## 2018-09-19 PROCEDURE — 80053 COMPREHEN METABOLIC PANEL: CPT | Performed by: EMERGENCY MEDICINE

## 2018-09-19 PROCEDURE — 40000270 ZZH STATISTIC OXYGEN  O2DAILY TECH TIME

## 2018-09-19 RX ORDER — METHYLPREDNISOLONE SODIUM SUCCINATE 125 MG/2ML
125 INJECTION, POWDER, LYOPHILIZED, FOR SOLUTION INTRAMUSCULAR; INTRAVENOUS ONCE
Status: COMPLETED | OUTPATIENT
Start: 2018-09-19 | End: 2018-09-19

## 2018-09-19 RX ORDER — IPRATROPIUM BROMIDE AND ALBUTEROL SULFATE 2.5; .5 MG/3ML; MG/3ML
3 SOLUTION RESPIRATORY (INHALATION) ONCE
Status: COMPLETED | OUTPATIENT
Start: 2018-09-19 | End: 2018-09-19

## 2018-09-19 RX ADMIN — METHYLPREDNISOLONE SODIUM SUCCINATE 125 MG: 125 INJECTION, POWDER, FOR SOLUTION INTRAMUSCULAR; INTRAVENOUS at 23:08

## 2018-09-19 RX ADMIN — IPRATROPIUM BROMIDE AND ALBUTEROL SULFATE 3 ML: .5; 3 SOLUTION RESPIRATORY (INHALATION) at 23:08

## 2018-09-19 NOTE — LETTER
Transition Communication Hand-off for Care Transitions to Next Level of Care Provider    Name: Susan Haq  : 1/10/1932  MRN #: [id number]  Primary Care Provider: Ema Melendez  Primary Care MD Name: Ema Melendez, NP  Primary Clinic: [address] [facility] [address]  Primary Care Clinic Name: [facility]  Reason for Hospitalization:  COPD exacerbation (H) [J44.1]  Admit Date/Time: 2018 10:41 PM  Discharge Date: 2018  Payor Source: Payor: [employer] / Plan: [employer] / Product Type: HMO /     Readmission Assessment Measure (KAL) Risk Score/category: COPD  Reason for Communication Hand-off Referral: Admission diagnoses: COPD    Discharge Plan: Home     Concern for non-adherence with plan of care:   Y/N no  Discharge Needs Assessment:  Needs       Most Recent Value    Transportation Available family or friend will provide        Already enrolled in Tele-monitoring program and name of program:  no  Follow-up specialty is recommended: No    Follow-up plan:  No future appointments.    Key Recommendations:  Patient presents to hospital with Increased coughing over the last week. Has Lif[employer] necklace.    Patient agreeable to receiving clinical care coordination.       Vidya Kelley    AVS/Discharge Summary is the source of truth; this is a helpful guide for improved communication of patient story

## 2018-09-19 NOTE — IP AVS SNAPSHOT
[facility]    [address] [address]    Phone:  [phone]    Fax:  [phone]                                       After Visit Summary   9/19/2018    Susna Haq    MRN: [id number]           After Visit Summary Signature Page     I have received my discharge instructions, and my questions have been answered. I have discussed any challenges I see with this plan with the nurse or doctor.    ..........................................................................................................................................  Patient/Patient Representative Signature      ..........................................................................................................................................  Patient Representative Print Name and Relationship to Patient    ..................................................               ................................................  Date                                   Time    ..........................................................................................................................................  Reviewed by Signature/Title    ...................................................              ..............................................  Date                                               Time          22EPIC Rev 08/18

## 2018-09-20 VITALS
SYSTOLIC BLOOD PRESSURE: 134 MMHG | OXYGEN SATURATION: 95 % | HEIGHT: 65 IN | RESPIRATION RATE: 22 BRPM | HEART RATE: 78 BPM | WEIGHT: 252.43 LBS | TEMPERATURE: 98.3 F | DIASTOLIC BLOOD PRESSURE: 60 MMHG | BODY MASS INDEX: 42.06 KG/M2

## 2018-09-20 PROBLEM — M25.511 ACUTE PAIN OF RIGHT SHOULDER: Status: RESOLVED | Noted: 2017-06-15 | Resolved: 2018-09-20

## 2018-09-20 PROBLEM — J44.9 COPD (CHRONIC OBSTRUCTIVE PULMONARY DISEASE) (H): Status: ACTIVE | Noted: 2018-09-20

## 2018-09-20 PROBLEM — J44.1 CHRONIC OBSTRUCTIVE PULMONARY DISEASE WITH ACUTE EXACERBATION (H): Status: ACTIVE | Noted: 2018-09-20

## 2018-09-20 LAB
BACTERIA SPEC CULT: ABNORMAL
BASE EXCESS BLDV CALC-SCNC: 4.5 MMOL/L
GRAM STN SPEC: ABNORMAL
HCO3 BLDV-SCNC: 31 MMOL/L (ref 21–28)
Lab: ABNORMAL
MRSA DNA SPEC QL NAA+PROBE: NEGATIVE
PCO2 BLDV: 52 MM HG (ref 40–50)
PH BLDV: 7.38 PH (ref 7.32–7.43)
PO2 BLDV: 53 MM HG (ref 25–47)
SPECIMEN SOURCE: ABNORMAL
SPECIMEN SOURCE: ABNORMAL
SPECIMEN SOURCE: NORMAL

## 2018-09-20 PROCEDURE — 25000132 ZZH RX MED GY IP 250 OP 250 PS 637: Performed by: INTERNAL MEDICINE

## 2018-09-20 PROCEDURE — 25000125 ZZHC RX 250: Performed by: EMERGENCY MEDICINE

## 2018-09-20 PROCEDURE — 25000125 ZZHC RX 250: Performed by: INTERNAL MEDICINE

## 2018-09-20 PROCEDURE — 87640 STAPH A DNA AMP PROBE: CPT | Mod: XU | Performed by: FAMILY MEDICINE

## 2018-09-20 PROCEDURE — G0378 HOSPITAL OBSERVATION PER HR: HCPCS

## 2018-09-20 PROCEDURE — 87205 SMEAR GRAM STAIN: CPT | Performed by: EMERGENCY MEDICINE

## 2018-09-20 PROCEDURE — 99235 HOSP IP/OBS SAME DATE MOD 70: CPT | Performed by: INTERNAL MEDICINE

## 2018-09-20 PROCEDURE — 87641 MR-STAPH DNA AMP PROBE: CPT | Performed by: FAMILY MEDICINE

## 2018-09-20 RX ORDER — ATENOLOL 50 MG/1
50 TABLET ORAL 2 TIMES DAILY
Status: DISCONTINUED | OUTPATIENT
Start: 2018-09-20 | End: 2018-09-20 | Stop reason: HOSPADM

## 2018-09-20 RX ORDER — PREDNISONE 20 MG/1
40 TABLET ORAL DAILY
Qty: 6 TABLET | Refills: 0 | Status: SHIPPED | OUTPATIENT
Start: 2018-09-20 | End: 2018-09-23

## 2018-09-20 RX ORDER — ACETAMINOPHEN 325 MG/1
650 TABLET ORAL EVERY 4 HOURS PRN
Status: DISCONTINUED | OUTPATIENT
Start: 2018-09-20 | End: 2018-09-20 | Stop reason: HOSPADM

## 2018-09-20 RX ORDER — LEVOFLOXACIN 750 MG/1
750 TABLET, FILM COATED ORAL EVERY OTHER DAY
Qty: 2 TABLET | Refills: 0 | Status: SHIPPED | OUTPATIENT
Start: 2018-09-20 | End: 2018-10-16

## 2018-09-20 RX ORDER — LOSARTAN POTASSIUM AND HYDROCHLOROTHIAZIDE 25; 100 MG/1; MG/1
1 TABLET ORAL DAILY
Status: DISCONTINUED | OUTPATIENT
Start: 2018-09-20 | End: 2018-09-20 | Stop reason: RX

## 2018-09-20 RX ORDER — HYDROCHLOROTHIAZIDE 25 MG/1
25 TABLET ORAL DAILY
Status: DISCONTINUED | OUTPATIENT
Start: 2018-09-20 | End: 2018-09-20 | Stop reason: HOSPADM

## 2018-09-20 RX ORDER — AMLODIPINE BESYLATE 10 MG/1
10 TABLET ORAL DAILY
Status: DISCONTINUED | OUTPATIENT
Start: 2018-09-20 | End: 2018-09-20 | Stop reason: HOSPADM

## 2018-09-20 RX ORDER — PREDNISONE 20 MG/1
40 TABLET ORAL DAILY
Status: DISCONTINUED | OUTPATIENT
Start: 2018-09-20 | End: 2018-09-20 | Stop reason: HOSPADM

## 2018-09-20 RX ORDER — IPRATROPIUM BROMIDE AND ALBUTEROL SULFATE 2.5; .5 MG/3ML; MG/3ML
3 SOLUTION RESPIRATORY (INHALATION) ONCE
Status: COMPLETED | OUTPATIENT
Start: 2018-09-20 | End: 2018-09-20

## 2018-09-20 RX ORDER — POTASSIUM CHLORIDE 750 MG/1
10 TABLET, EXTENDED RELEASE ORAL 2 TIMES DAILY
Status: DISCONTINUED | OUTPATIENT
Start: 2018-09-20 | End: 2018-09-20 | Stop reason: HOSPADM

## 2018-09-20 RX ORDER — ALBUTEROL SULFATE 0.83 MG/ML
SOLUTION RESPIRATORY (INHALATION)
Qty: 120 VIAL | Refills: 3 | Status: SHIPPED | OUTPATIENT
Start: 2018-09-20 | End: 2019-08-06

## 2018-09-20 RX ORDER — ATORVASTATIN CALCIUM 20 MG/1
20 TABLET, FILM COATED ORAL EVERY EVENING
Status: DISCONTINUED | OUTPATIENT
Start: 2018-09-20 | End: 2018-09-20 | Stop reason: HOSPADM

## 2018-09-20 RX ORDER — LOSARTAN POTASSIUM 50 MG/1
100 TABLET ORAL DAILY
Status: DISCONTINUED | OUTPATIENT
Start: 2018-09-20 | End: 2018-09-20 | Stop reason: HOSPADM

## 2018-09-20 RX ORDER — IPRATROPIUM BROMIDE AND ALBUTEROL SULFATE 2.5; .5 MG/3ML; MG/3ML
3 SOLUTION RESPIRATORY (INHALATION) EVERY 4 HOURS PRN
Status: DISCONTINUED | OUTPATIENT
Start: 2018-09-20 | End: 2018-09-20 | Stop reason: HOSPADM

## 2018-09-20 RX ADMIN — ATENOLOL 50 MG: 50 TABLET ORAL at 09:16

## 2018-09-20 RX ADMIN — HYDROCHLOROTHIAZIDE 25 MG: 25 TABLET ORAL at 09:16

## 2018-09-20 RX ADMIN — POTASSIUM CHLORIDE 10 MEQ: 750 TABLET, FILM COATED, EXTENDED RELEASE ORAL at 09:16

## 2018-09-20 RX ADMIN — LEVOFLOXACIN 750 MG: 250 TABLET, FILM COATED ORAL at 12:18

## 2018-09-20 RX ADMIN — AMLODIPINE BESYLATE 10 MG: 10 TABLET ORAL at 09:16

## 2018-09-20 RX ADMIN — IPRATROPIUM BROMIDE AND ALBUTEROL SULFATE 3 ML: .5; 3 SOLUTION RESPIRATORY (INHALATION) at 01:08

## 2018-09-20 RX ADMIN — LOSARTAN POTASSIUM 100 MG: 50 TABLET ORAL at 09:16

## 2018-09-20 RX ADMIN — PREDNISONE 40 MG: 20 TABLET ORAL at 12:18

## 2018-09-20 RX ADMIN — IPRATROPIUM BROMIDE AND ALBUTEROL SULFATE 3 ML: .5; 3 SOLUTION RESPIRATORY (INHALATION) at 12:18

## 2018-09-20 ASSESSMENT — ENCOUNTER SYMPTOMS
APPETITE CHANGE: 0
HEADACHES: 0
CHILLS: 0
NAUSEA: 0
VOMITING: 0
FEVER: 0
PALPITATIONS: 0
TROUBLE SWALLOWING: 0
SHORTNESS OF BREATH: 1
FATIGUE: 0
LIGHT-HEADEDNESS: 0
BACK PAIN: 0
COUGH: 1
ABDOMINAL PAIN: 0
CHEST TIGHTNESS: 1

## 2018-09-20 NOTE — ED NOTES
Patient presents with  Increased coughing over the last week   Has had more difficulty caring for self at home  Does have help from PCA with medications and hygiene. Any activity causes coughing and decrease in oxygen saturation  Patient is on oxygen continuously. Patient is over weight. Patient having no chest pain  Patient is unable to transfer from W/C to car. Patient is alert and oriented  Very hard of hearing.  Green phlegm production with cough.

## 2018-09-20 NOTE — ED PROVIDER NOTES
History     Chief Complaint   Patient presents with     Shortness of Breath     rapid respirations    wet lung sounds   on oxygen constantly     HPI  Susan Haq is a 86 year old female with a history of hypertension, obstructive sleep apnea, interstitial pulmonary fibrosis with COPD who presents for evaluation of increasing difficulty breathing over the past few days.  She uses oxygen at baseline at 3 L and has had increased oxygen need for increased difficulty breathing despite that.  She reports a productive cough of yellowish sputum with a more pronounced cough as well.  Patient's care provider she reports she does not normally cough.  No reported fever or chills.  Denies significant chest pain but does have some tightness.  No abdominal pain, nausea, or vomiting.    Problem List:    Patient Active Problem List    Diagnosis Date Noted     COPD (chronic obstructive pulmonary disease) (H) 09/20/2018     Priority: Medium     Physical deconditioning 11/16/2017     Priority: Medium     Morbid obesity (H) 07/03/2017     Priority: Medium     Acute pain of right shoulder 06/15/2017     Priority: Medium     Alcohol abuse 06/11/2017     Priority: Medium     Risk for falls 06/10/2017     Priority: Medium     Major depressive disorder, single episode, moderate (H) 03/13/2017     Priority: Medium     Chronic obstructive pulmonary disease, unspecified COPD type (H) 05/19/2016     Priority: Medium     Continuous oxygen at 3 liters        Interstitial pulmonary fibrosis (H) 05/26/2015     Priority: Medium     SNHL (sensorineural hearing loss) 06/10/2014     Priority: Medium     Umbilical hernia 04/08/2013     Priority: Medium     HL (hearing loss) 03/25/2013     Priority: Medium     Bilateral leg edema 06/14/2012     Priority: Medium     SUSSY (obstructive sleep apnea)-Moderately severe (AHI 21) 04/10/2012     Priority: Medium     Initial diagnosis 2007 at [facility]   Polysomnography 4/9/2012: 244.1 lbs.   BMI 40.7.  [place] sleepiness scale 0.0.  Re-evalaution of previously diagnosed moderately severe SUSSY. She was on auto-BiPAP with 2L O2 bleed in and nocturnal oximetry showed persistently low SpO2. Diagnostic PSG maximum TCM of 50 mmHg and baseline TCM of 43.  Baseline ABG showed a PaCO2 of 38 which is normal. Baseline oxygen saturation was 87.8%.  The lowest oxygen saturation was 59.2%.  Snoring was reported as loud.  Apnea/Hypopnea Index 20.5 events per hour.  REM AHI 48.0.  RERA index 16.0. CPAP  titrated at pressures ranging from 6 cm/H20 up to 12 cm/H20.  The optimal pressure was 12.0 with an AHI of 0 including lateral REM sleep.       Advance care planning 04/25/2011     Priority: Medium     Advance Care Planning 6/28/2016: Receipt of ACP document:  Received: POLST which was signed and dated by provider on 3/22/16.  Document previously scanned on 4/4/16.  Order reviewed and found to be valid.  Code Status reflects choices in most recent ACP document.  Confirmed/documented designated decision maker(s).  Added by Trinh Lott   Advance Care Planning Liaison  Advance Care Planning 4/25/11: Discussed Advance Directive planning with patient; information given to patient to review.  Marine Guillen MA         Adenomatous polyp of colon 04/25/2011     Priority: Medium     PVC's (premature ventricular contractions) 04/25/2011     Priority: Medium     Hypertension goal BP (blood pressure) < 140/90 01/12/2011     Priority: Medium     Obesity 01/12/2011     Priority: Medium     Osteopenia 12/21/2010     Priority: Medium     Insomnia 12/15/2009     Priority: Medium     Hyperlipidemia LDL goal <130 12/10/2009     Priority: Medium        Past Medical History:    Past Medical History:   Diagnosis Date     Basal cell carcinoma      Bronchospasm      Diverticulosis      Fracture, Metacarpal Shaft - right 4th 7/5/2010     High cholesterol      HL (hearing loss) 3/25/2013     Hypertension      Hypokalemia 9/17/2013      "Hypoxia 9/5/2013     PVC's (premature ventricular contractions) 4/25/2011     Smoker 1986     Type 2 diabetes mellitus (H) 9/26/2011     Venous insufficiency        Past Surgical History:    Past Surgical History:   Procedure Laterality Date     APPENDECTOMY       C BSO, OMENTECTOMY W/SHANIA       C NONSPECIFIC PROCEDURE      (L) clavicle orif     C STOMACH SURGERY PROCEDURE UNLISTED       CHOLECYSTECTOMY, LAPOROSCOPIC  10/13/2011    Cholecystectomy, Laparoscopic     HERNIA REPAIR, UMBILICAL  10/13/2011     HYSTERECTOMY, CERVIX STATUS UNKNOWN         Family History:    Family History   Problem Relation Age of Onset     Diabetes Father      Coronary Artery Disease Maternal Grandmother      Coronary Artery Disease Paternal Uncle        Social History:  Marital Status:   [5]  Social History   Substance Use Topics     Smoking status: Former Smoker     Packs/day: 1.00     Years: 35.00     Quit date: 1/1/1990     Smokeless tobacco: Former User     Alcohol use Yes      Comment: Drink 1 (3oz) drink a day        Medications:      No current outpatient prescriptions on file.      Review of Systems   Constitutional: Negative for appetite change, chills, fatigue and fever.   HENT: Negative for congestion and trouble swallowing.    Respiratory: Positive for cough, chest tightness and shortness of breath.    Cardiovascular: Negative for chest pain, palpitations and leg swelling.   Gastrointestinal: Negative for abdominal pain, nausea and vomiting.   Musculoskeletal: Negative for back pain.   Skin: Negative for rash.   Neurological: Negative for light-headedness and headaches.   All other systems reviewed and are negative.      Physical Exam   BP: 137/58  Pulse: 68  Heart Rate: 84  Temp: 99.2  F (37.3  C)  Resp: 27  Height: 167.6 cm (5' 6\")  Weight: 114.3 kg (252 lb)  SpO2: 97 %      Physical Exam   Constitutional: She appears well-developed and well-nourished. No distress.   HENT:   Head: Normocephalic and atraumatic. "   Mouth/Throat: Oropharynx is clear and moist.   Eyes: Conjunctivae are normal.   Cardiovascular: Normal rate.    Pulmonary/Chest: Effort normal. No respiratory distress (but does have some increased work of breathing). She has wheezes. She has no rales.   Skin: She is not diaphoretic.   Nursing note and vitals reviewed.      ED Course     ED Course     Procedures               EKG Interpretation:      Interpreted by Husam Reyes  Time reviewed: 2258  Symptoms at time of EKG: dyspnea   Rhythm: Possible atrial fibrillation versus sinus with arrhythmia  Rate: Normal  Axis: Normal  Ectopy: none  Conduction: normal  ST Segments/ T Waves: No acute ischemic changes  Q Waves: none  Comparison to prior: Previous EKG also with low voltage P waves with some rate variation, likely unchanged from the previous    Clinical Impression: Probable sinus rhythm with sinus arrhythmia but possibly atrial fibrillation                         Results for orders placed or performed during the hospital encounter of 09/19/18 (from the past 24 hour(s))   CBC with platelets differential   Result Value Ref Range    WBC 10.1 4.0 - 11.0 10e9/L    RBC Count 3.83 3.8 - 5.2 10e12/L    Hemoglobin 12.3 11.7 - 15.7 g/dL    Hematocrit 38.1 35.0 - 47.0 %     78 - 100 fl    MCH 32.1 26.5 - 33.0 pg    MCHC 32.3 31.5 - 36.5 g/dL    RDW 12.9 10.0 - 15.0 %    Platelet Count 239 150 - 450 10e9/L    Diff Method Automated Method     % Neutrophils 73.9 %    % Lymphocytes 11.3 %    % Monocytes 12.5 %    % Eosinophils 1.5 %    % Basophils 0.3 %    % Immature Granulocytes 0.5 %    Nucleated RBCs 0 0 /100    Absolute Neutrophil 7.5 1.6 - 8.3 10e9/L    Absolute Lymphocytes 1.1 0.8 - 5.3 10e9/L    Absolute Monocytes 1.3 0.0 - 1.3 10e9/L    Absolute Eosinophils 0.2 0.0 - 0.7 10e9/L    Absolute Basophils 0.0 0.0 - 0.2 10e9/L    Abs Immature Granulocytes 0.1 0 - 0.4 10e9/L    Absolute Nucleated RBC 0.0    Comprehensive metabolic panel   Result Value Ref  Range    Sodium 140 133 - 144 mmol/L    Potassium 3.5 3.4 - 5.3 mmol/L    Chloride 105 94 - 109 mmol/L    Carbon Dioxide 30 20 - 32 mmol/L    Anion Gap 5 3 - 14 mmol/L    Glucose 118 (H) 70 - 99 mg/dL    Urea Nitrogen 21 7 - 30 mg/dL    Creatinine 1.04 0.52 - 1.04 mg/dL    GFR Estimate 50 (L) >60 mL/min/1.7m2    GFR Estimate If Black 61 >60 mL/min/1.7m2    Calcium 7.8 (L) 8.5 - 10.1 mg/dL    Bilirubin Total 0.4 0.2 - 1.3 mg/dL    Albumin 3.0 (L) 3.4 - 5.0 g/dL    Protein Total 6.8 6.8 - 8.8 g/dL    Alkaline Phosphatase 146 40 - 150 U/L    ALT 26 0 - 50 U/L    AST 21 0 - 45 U/L   Troponin I   Result Value Ref Range    Troponin I ES <0.015 0.000 - 0.045 ug/L   Blood gas venous   Result Value Ref Range    Ph Venous 7.38 7.32 - 7.43 pH    PCO2 Venous 52 (H) 40 - 50 mm Hg    PO2 Venous 53 (H) 25 - 47 mm Hg    Bicarbonate Venous 31 (H) 21 - 28 mmol/L    Base Excess Venous 4.5 mmol/L   Chest XR,  PA & LAT    Narrative    CHEST TWO VIEWS  9/20/2018 12:00 AM     HISTORY: Dyspnea, cough, increased sputum.    COMPARISON: None.    FINDINGS: Heart size and pulmonary vascularity are within normal  limits. The lungs are clear. No pneumothorax or pleural effusion.       Impression    IMPRESSION: No radiographic evidence of acute chest abnormality.     YANETH LAWSON MD   Sputum Culture Aerobic Bacterial   Result Value Ref Range    Specimen Description Sputum     Culture Micro (A)      Canceled, Test credited  >10 Squamous epithelial cells/low power field indicates oral contamination. Please   recollect.     Gram stain   Result Value Ref Range    Specimen Description Sputum     Special Requests Screen     Gram Stain (A)      >10 Squamous epithelial cells/low power field indicates oral contamination. Please   recollect.      Gram Stain >25 PMNs/low power field     Gram Stain       Many  Mixed gram positive and gram negative bacteria present.      Gram Stain       Notification of test cancellation was given to  ARIADNA Ferguson [id number]  9/20/18. MS         Medications   acetaminophen (TYLENOL) tablet 650 mg (not administered)   ipratropium - albuterol 0.5 mg/2.5 mg/3 mL (DUONEB) neb solution 3 mL (3 mLs Nebulization Given 9/19/18 2308)   methylPREDNISolone sodium succinate (solu-MEDROL) injection 125 mg (125 mg Intravenous Given 9/19/18 2308)   ipratropium - albuterol 0.5 mg/2.5 mg/3 mL (DUONEB) neb solution 3 mL (3 mLs Nebulization Given 9/20/18 0108)     1:17 AM: Re-assessed. Feeling better after nebs, but still with some tightness and pt and care providers do not feel comfortable with discharge home. Paged hospitalist.       1:20 AM: Discussed with Dr Paul. Obs admission.     Assessments & Plan (with Medical Decision Making)  86-year-old female with extensive past medical history including COPD presented for evaluation of increasing difficulty breathing with cough.  Afebrile with diffuse wheezing on exam.  Improved with nebulizer treatment.  X-ray negative for pneumonia.  Treated with Solu-Medrol and repeated nebulizers with continued improvement.  She initially needed some additional supplemental oxygen beyond her baseline but was able to be weaned back to her baseline oxygen.  Patient however continued to feel uncomfortable with her breathing status and requested admission for further care.     I have reviewed the nursing notes.    I have reviewed the findings, diagnosis, plan and need for follow up with the patient.       Current Discharge Medication List          Final diagnoses:   COPD exacerbation (H)       9/19/2018   [facility] EMERGENCY DEPARTMENT     Reyes, Husam Ozuna MD  09/20/18 0600

## 2018-09-20 NOTE — PROGRESS NOTES
"[facility] [facility] ADMISSION NOTE    Patient admitted to room 1010 at approximately 0210 via cart from emergency room. Patient was accompanied by transport tech.     Verbal SBAR report received from Maricel Xiao RN prior to patient arrival.     Patient ambulated to bed with stand-by assist. Patient alert and oriented X 3. The patient is not having any pain. 0-10 Pain Scale: 3. Admission vital signs: Blood pressure 147/64, pulse 68, temperature 98.2  F (36.8  C), temperature source Oral, resp. rate 18, height 1.651 m (5' 5\"), weight 114.5 kg (252 lb 6.8 oz), SpO2 92 %, not currently breastfeeding. Patient was oriented to plan of care, call light, bed controls, tv, telephone, bathroom and visiting hours.     Risk Assessment    The following safety risks were identified during admission: fall. Yellow risk band applied: YES.     Skin Initial Assessment    This writer admitted this patient and completed a full skin assessment and Cricket score in the Adult PCS flowsheet. Appropriate interventions initiated as needed.     Secondary skin check completed by Phyllis Aquino RN.    Skin  Inspection of bony prominences: Full  Skin WDL: WDL    Cricket Risk Assessment  Sensory Perception: 3-->slightly limited  Moisture: 3-->occasionally moist  Activity: 3-->walks occasionally  Mobility: 3-->slightly limited  Nutrition: 3-->adequate  Friction and Shear: 3-->no apparent problem  Cricket Score: 18    Olya Zee    "

## 2018-09-20 NOTE — CONSULTS
Care Transition Initial Assessment - RN/ Discharge note  Reason For Consult: discharge planning   Met with: Patient.    DATA   Active Problems:    COPD (chronic obstructive pulmonary disease) (H)       Primary Care Clinic Name: [facility]  Primary Care MD Name: Ema Melendez NP  Contact information and PCP information verified: Yes    ASSESSMENT  Cognitive Status: awake, alert and oriented.  Lives With: alone  Who is your support system?: Other (specify) (Friends)   Insurance Concerns: No Insurance issues identified    This writer met with pt, introduced self and role. Pt lives alone in the community. Pt has no services at home and does have friends support. Patients goal is to return home upon discharge. COPD educational resources provided. Transportation will be provided by patients' friends.  Patient is agreeable to CCC. Chart reviewed and the patient was discussed in Interdisciplinary Rounds.  There are no discharge needs anticipated.  Care Management will continue to monitor through Interdisciplinary Rounds and intervene if needed.  If immediate needs arise, please contact Care Management at [phone].  Sr, linkage brochure brochure provided. Patient currently has lifeline necklace, declined the [facility] lifeline program.    As a system [facility] wants to ensure that across the care continuum that you have support through care coordination services that include nurses and social workers in the outpatient setting.  Due to your COPD, I feel it is important you have this support when you discharge.  They will be calling you within 24-48 hours of your discharge.   A brochure describing the services was provided.  If you have questions you can reach out to your clinic directly and ask for the Care Coordinator assigned to your care.    PLAN  Home        Vidya Kelley RN Care Coordinator  [facility] [phone]  [facility] [phone]

## 2018-09-20 NOTE — DISCHARGE INSTRUCTIONS
As a system [facility] wants to ensure that across the care continuum that you have support through care coordination services that include nurses and social workers in the outpatient setting.  Due to your COPD, I feel it is important you have this support when you discharge.  They will be calling you within 24-48 hours of your discharge.   A brochure describing the services was provided.  If you have questions you can reach out to your clinic directly and ask for the Care Coordinator assigned to your care.

## 2018-09-20 NOTE — PHARMACY - DISCHARGE MEDICATION RECONCILIATION
Discharge medication review for this patient is complete. Pharmacist assisted with medication reconciliation of discharge medications with prior to admission medications.     The following changes were made to the discharge medication list based on pharmacist review:  Added:  none  Discontinued: none  Changed: none      Patient's Discharge Medication List  - medications as listed on After Visit Summary (AVS)     Review of your medicines      START taking       Dose / Directions    levofloxacin 750 MG tablet   Commonly known as:  LEVAQUIN   Indication:  copd exac   Used for:  COPD exacerbation (H)        Dose:  750 mg   Take 1 tablet (750 mg) by mouth every other day Next dose Saturday, 9/22   Quantity:  2 tablet   Refills:  0       predniSONE 20 MG tablet   Commonly known as:  DELTASONE   Used for:  COPD exacerbation (H)        Dose:  40 mg   Take 2 tablets (40 mg) by mouth daily for 3 days Next dose Friday 9/21   Quantity:  6 tablet   Refills:  0         CONTINUE these medicines which have NOT CHANGED       Dose / Directions    acetaminophen 500 MG tablet   Commonly known as:  TYLENOL        Dose:  1000 mg   Take 2 tablets (1,000 mg) by mouth every 8 hours   Quantity:  60 tablet   Refills:  6       * albuterol 108 (90 Base) MCG/ACT inhaler   Commonly known as:  PROAIR HFA/PROVENTIL HFA/VENTOLIN HFA   Used for:  Chronic obstructive pulmonary disease, unspecified COPD type (H)        Dose:  2 puff   Inhale 2 puffs into the lungs every 4 hours as needed for shortness of breath / dyspnea or wheezing   Quantity:  3 Inhaler   Refills:  3       * albuterol (2.5 MG/3ML) 0.083% neb solution   Used for:  SOB (shortness of breath), Chronic obstructive pulmonary disease, unspecified COPD type (H)        NEBULIZE ONE VIAL (2.5 MG) EVERY FOUR HOURS AS NEEDED FOR SHORTNESS OF BREATH/DYSPNEA OR WHEEZING   Quantity:  120 vial   Refills:  3       ALLERGY RELIEF 10 MG tablet   Used for:  Acute seasonal allergic rhinitis, unspecified  trigger   Generic drug:  loratadine        TAKE ONE TABLET BY MOUTH ONCE DAILY   Quantity:  90 tablet   Refills:  1       amLODIPine 10 MG tablet   Commonly known as:  NORVASC   Used for:  Hypertension goal BP (blood pressure) < 140/90        TAKE 1 TABLET (10 MG) BY MOUTH DAILY   Quantity:  90 tablet   Refills:  1       atenolol 50 MG tablet   Commonly known as:  TENORMIN   Used for:  Hypertension goal BP (blood pressure) < 140/90        Dose:  50 mg   Take 1 tablet (50 mg) by mouth 2 times daily   Quantity:  180 tablet   Refills:  3       atorvastatin 20 MG tablet   Commonly known as:  LIPITOR   Used for:  Hyperlipidemia LDL goal <130        Dose:  20 mg   Take 1 tablet (20 mg) by mouth daily   Quantity:  90 tablet   Refills:  3       budesonide-formoterol 160-4.5 MCG/ACT Inhaler   Commonly known as:  SYMBICORT   Used for:  Chronic obstructive pulmonary disease, unspecified COPD type (H)        INHALE 2 PUFFS INTO THE LUNGS 2 TIMES DAILY   Quantity:  10.2 g   Refills:  5       CALCIUM 600 + D PO        Take  by mouth.   Refills:  0       camphor-menthol 0.5-0.5 % Lotn   Commonly known as:  DERMASARRA   Used for:  Rash and nonspecific skin eruption        Dose:  1 applicator   Apply 1 mL topically every 8 hours as needed for skin care (apply to rash on legs)   Quantity:  1 Bottle   Refills:  1       escitalopram 5 MG tablet   Commonly known as:  LEXAPRO   Used for:  Major depressive disorder, single episode, moderate (H)        Take 1 tablet (5 mg) daily for 2 weeks, then if no improvement in symptoms can increase to 2 tablets (10 mg) daily   Quantity:  30 tablet   Refills:  1       fish oil-omega-3 fatty acids 1000 MG capsule        1 cap daily   Refills:  0       folic acid 1 MG tablet   Commonly known as:  FOLVITE   Used for:  Interstitial pulmonary fibrosis (H)        Dose:  1 mg   Take 1 tablet (1 mg) by mouth daily   Quantity:  90 tablet   Refills:  3       losartan-hydrochlorothiazide 100-25 MG per tablet    Commonly known as:  HYZAAR   Used for:  Type 2 diabetes mellitus without complication, without long-term current use of insulin (H), Hypertension goal BP (blood pressure) < 140/90        Dose:  1 tablet   Take 1 tablet by mouth daily   Quantity:  90 tablet   Refills:  3       Nebulizer Compressor Kit        Dose:  1 kit   1 kit every 4 hours as needed   Quantity:  1 kit   Refills:  0       nystatin 618224 UNIT/GM Powd   Commonly known as:  MYCOSTATIN   Used for:  Rash and nonspecific skin eruption        Apply topically 3 times daily as needed   Quantity:  60 g   Refills:  1       * order for DME   Used for:  SUSSY (obstructive sleep apnea)        1.  CPAP pressure 12 cm/H20 with heated humidity and auto-titrating capability.  2.  Provide mask to fit and CPAP supplies. 3.  Length of need lifetime. 4.  Ok to d/c O2 bleed in to her PAP overnight.   Refills:  0       * order for DME   Used for:  Leg edema        TEDs stockings knee high to be worn during the day.   Quantity:  1 Units   Refills:  0       * order for DME   Used for:  Hypoxia        Equipment being ordered: Oxygen   Quantity:  1 Units   Refills:  0       * order for DME   Used for:  SUSSY (obstructive sleep apnea)        Equipment being ordered: CPAP supplies   Quantity:  1 Units   Refills:  0       order for DME   Used for:  COPD (chronic obstructive pulmonary disease) (H)        Equipment being ordered: Oxygen - 3 liters continous   Quantity:  1 Device   Refills:  0       order for DME   Used for:  COPD exacerbation (H), SOB (shortness of breath), Chronic obstructive pulmonary disease, unspecified COPD type (H), Interstitial pulmonary fibrosis (H)        Equipment being ordered: Nebulizer   Quantity:  1 each   Refills:  0       potassium chloride 10 MEQ tablet   Commonly known as:  K-TAB,KLOR-CON   Used for:  Hypokalemia        TAKE 1 TABLET (10 MEQ) BY MOUTH 2 TIMES DAILY   Quantity:  60 tablet   Refills:  9       SENNA PLUS 8.6-50 MG per tablet   Used  for:  Constipation   Generic drug:  senna-docusate        TAKE ONE TO TWO TABLETS BY MOUTH TWICE DAILY AS NEEDED FOR CONSTIPATION   Quantity:  100 tablet   Refills:  1       SIMETHICONE-80 PO        Dose:  80 mg   Take 80 mg by mouth every morning And TID prn belching   Refills:  0       tiotropium 18 MCG capsule   Commonly known as:  SPIRIVA HANDIHALER   Used for:  Chronic obstructive pulmonary disease, unspecified COPD type (H), SOB (shortness of breath) on exertion        Inhale  into the lungs. Inhale contents of one capsule daily   Quantity:  90 capsule   Refills:  3       * Notice:  This list has 6 medication(s) that are the same as other medications prescribed for you. Read the directions carefully, and ask your doctor or other care provider to review them with you.         Where to get your medicines      These medications were sent to [facility] [place] - [address] [place] [address]  [address] [place] [address], [place] [address]     Phone:  [phone]      albuterol (2.5 MG/3ML) 0.083% neb solution     levofloxacin 750 MG tablet     predniSONE 20 MG tablet           Hanna Arroyo, AngelaD

## 2018-09-20 NOTE — PROGRESS NOTES
"Pt has been very SOB with minimal activity, grunts and groans with any  movement. Wearing o2 at 2 L nasal cannula. Pt reports to be at baseline. Pt's friends, Martina and Gaurav, who are also pt's PCA's confirm that \" this is how is always is\"  Pt discharged in [facility], will stop and get prescriptions today. Pt and friends instructed to return if pt. worsens or to call 911 if needed.   "

## 2018-09-20 NOTE — PROGRESS NOTES
Skin affirmation note    Admitting nurse completed full skin assessment, Cricket score and Cricket interventions. This writer agrees with the initial skin assessment findings.    Phyllis Aquino RN

## 2018-09-21 ENCOUNTER — PATIENT OUTREACH (OUTPATIENT)
Dept: CARE COORDINATION | Facility: CLINIC | Age: 83
End: 2018-09-21

## 2018-09-21 ASSESSMENT — ACTIVITIES OF DAILY LIVING (ADL): DEPENDENT_IADLS:: TRANSPORTATION

## 2018-09-21 NOTE — LETTER
[facility]  Complex Care Plan  About Me  Patient Name:  Susan Haq    YOB: 1932  Age:     86 year old   [place] MRN:   [id number] Telephone Information:    Home Phone [phone]   Mobile Not on file.       Address:    [address] [place] [address] Email address:  No e-mail address on record      Emergency Contact(s)  Name Relationship Lgl Grd Work Phone Home Phone Mobile Phone   1. HARDY ENNIS Friend   [phone]   2. SANFORD SANCHEZ Friend    [phone]           Primary language:  English     needed? No   [facility] [employer]:  [phone] op. 1  Other communication barriers: [place] (Hard of hearing), Glasses  Preferred Method of Communication:  Mail  Current living arrangement: I live alone  Mobility Status/ Medical Equipment: Independent w/Device    Health Maintenance  Health Maintenance Reviewed: Up to date    My Access Plan  Medical Emergency 911   Primary Clinic Line [facility] - [phone]   24 Hour Appointment Line [phone] or  [phone]) (toll-free)   24 Hour Nurse Line [phone] (toll-free)   Preferred Urgent Care [facility], [phone]   Preferred Hospital [place], [place]  [phone]   Preferred Pharmacy [facility] [address], [address]     Behavioral Health Crisis Line The National Suicide Prevention Lifeline at 1-141.549.7636 or 911     My Care Team Members    Patient Care Team       Relationship Specialty Notifications Start End    Ema eMlendez NP PCP - General Nurse Practitioner - Family  1/29/16     Phone: [phone] Fax: [phone] [address] [facility] [address]    Nadine Cooper, ARIADNA Lead Care Coordinator Primary Care - CC Admissions 9/21/18     Phone: [phone] Fax: [phone]                My Care Plans  Self Management and Treatment  "Plan  Goals and (Comments)  Goals        General    Functional (pt-stated)     Notes - Note created  9/21/2018 10:10 AM by Nadine Cooper RN    Goal Statement: I want to be able to get around my house without \"huffing and puffing.\"    Measure of Success: Comfortable breathing    Supportive Steps to Achieve: I will use continuous oxygen. I will take medications are directed. I will use my nebulizer.    Barriers: Progressive disease    Strengths: Motivated -Wants to be able to go to [facility]    Date to Achieve By: Ongoing    Patient expressed understanding of goal: Yes               Action Plans on File:                       Advance Care Plans/Directives Type:   Type Advanced Care Plans/Directives: POLST    My Medical and Care Information  Problem List   Patient Active Problem List   Diagnosis     Hyperlipidemia LDL goal <130     Insomnia     Osteopenia     Hypertension goal BP (blood pressure) < 140/90     Obesity     Advance care planning     Adenomatous polyp of colon     PVC's (premature ventricular contractions)     SUSSY (obstructive sleep apnea)-Moderately severe (AHI 21)     Bilateral leg edema     HL (hearing loss)     Umbilical hernia     SNHL (sensorineural hearing loss)     Interstitial pulmonary fibrosis (H)     Chronic obstructive pulmonary disease, unspecified COPD type (H)     Major depressive disorder, single episode, moderate (H)     Risk for falls     Alcohol abuse     Morbid obesity (H)     Physical deconditioning     Chronic obstructive pulmonary disease with acute exacerbation (H)      Current Medications and Allergies:  See printed Medication Report.    Care Coordination Start Date: 9/21/2018   Frequency of Care Coordination: 2 weeks   Form Last Updated: 09/21/2018       "

## 2018-09-21 NOTE — PROGRESS NOTES
Clinic Care Coordination Contact    Clinic Care Coordination Contact  OUTREACH  Referral Information:  Referral Source: IP Handoff    Primary Diagnosis: COPD    Chief Complaint   Patient presents with     Clinic Care Coordination - [facility]     RN   [place] Utilization:   Clinic Utilization  Difficulty keeping appointments:: No  Compliance Concerns: No  No-Show Concerns: No  No PCP office visit in Past Year: No  Utilization    Last refreshed: 9/21/2018  7:18 AM:  No Show Count (past year) 1       Last refreshed: 9/21/2018  7:18 AM:  ED visits 0       Last refreshed: 9/21/2018  7:18 AM:  Hospital admissions 1          Current as of: 9/21/2018  7:18 AM         Clinical Concerns:    Current Medical Concerns:  Patient discharged from [facility] on 9/20/18 following COPD exacerbation. Admitted 9/19/18.      Current Behavioral Concerns: Denies concerns      Education Provided to patient: Prednisone - side effects     Pain  Pain (GOAL):: No    Health Maintenance Reviewed: Up to date    Clinical Pathway: Clinic Care Coordination COPD Assessment    Discharge:    Hospital summary:  Acute on chronic hypoxic respiratory failure due to acute COPD exacerbation    Chronic obstructive pulmonary disease with acute exacerbation (H)    Interstitial pulmonary fibrosis (H)   Chronically on O2 at 3 lpm. Presented with tachypnea and increased O2 needs, and increased productive cough. Treated with nebulized albuterol and ipratropium, IV solumedrol and increased O2 and has improved. Now on home level of O2 supplementation. Appears stable for discharge.    - Prednisone 40 mg to complete 5 days total steroid burst   - albuterol nebs at least 4 times daily for the next week and then as needed   - resume spiriva at home   - levofloxacin 750 mg every other day x 3 doses   - continue home O2 at 3 lpm    Day of hospital discharge: 9/20/18  What recommendations were made for follow up after your recent hospitalization? None  Have the follow up  appointments been scheduled? No   Patient will discuss with her friends when they are available to take her to PCP clinic follow.  Patient states they all go to the same clinic and see the same PCP.       Transportation concerns (GOAL):: No  Is there a referral for Pulmonary Rehab? No     Symptom Review:    How have you been feeling now that you are home? Feels great to be home and in my own bed   Are you having any increased shortness of breath? No Patient is very short of breath while speaking on the phone. Encouraged her to take her time and conserve her energy     Do you have a COPD Action Plan? Yes **send pt a copy       Is the COPD Action Plan on refrigerator or available: Yes    What number would you call if you were in the YELLOW zones:  [phone]    Medications:    Were you started on any new medications?  Yes, Levaquin and Prednisone   Were any of your previous medications changed? Yes, Albuterol nebulizer  Do you have all of your medications? Yes,   Have you had trouble filling your prescriptions? No  Are you medications effective in controlling your symptoms? Yes,   Are you currently on Prednisone (* does pt understand the tapering instructions)?  Patient understands the taper    Oxygen/DME:    Are you currently on oxygen?  Yes   Is this new for you? No   Is it set up at home? Yes   What liter flow were you instructed to use and how often do you use it? 3 L  What type of home oxygen system do you have (*ask about portability and ability to manage a portable oxygen delivery)?  Concentrator and portable  Also uses a Cpap at night  Review with patient how to use/maintain nebulizers and inhalers: Reviewed    Activity:    How much activity can you do before you are SOB? Minimal activity  Have you had to reduce your activities because of dyspnea or other symptoms? No, it is the same      Diet:    Do you weigh yourself daily? No    Are there any current diet restrictions or changes per discharge  instructions? No    Emotions/Lifestyle:    Do you smoke?  reports that she quit smoking about 28 years ago. She has a 35.00 pack-year smoking history. She has quit using smokeless tobacco.    Medication Management:  Patient utilizes a medication dispenser. Her friend sets up her medications and all patient has to do is push the button.   She has her current medication list and all of her medications. Her friend will set them up this morning     Functional Status:  Dependent ADLs:: Ambulation-walker  Dependent IADLs:: Transportation  Bed or wheelchair confined:: No  Mobility Status: Independent w/Device  Fallen 2 or more times in the past year?: No  Any fall with injury in the past year?: No    Living Situation:  Current living arrangement:: I live alone  Type of residence:: Private home - stairs - does not need to use the stairs.    Patient states she has a huge window over looking the trees in hr back yard. She enjoys sitting and watching nature.     Diet/Exercise/Sleep:  Diet:: Regular  Inadequate nutrition (GOAL):: No  Food Insecurity: No  Tube Feeding: No  Exercise:: Unable to exercise  Inadequate activity/exercise (GOAL):: No  Significant changes in sleep pattern (GOAL): No    Transportation:  Transportation concerns (GOAL):: No  Transportation means:: Accessible car, Regular car     Psychosocial:  Presybeterian or spiritual beliefs that impact treatment:: No  Mental health DX:: No  Mental health management concern (GOAL):: No  Informal Support system:: Friends     Financial/Insurance:   Financial/Insurance concerns (GOAL):: No       Resources and Interventions:  Current Resources: Refuses the need for home care   ;   Community Resources: Lifeline  Supplies used at home:: Oxygen Tubing/Supplies, Nebulizer tubing, Compression Stockings  Equipment Currently Used at Home: oxygen, shower chair    Advance Care Plan/Directive  Advanced Care Plans/Directives on file:: Yes  Type Advanced Care Plans/Directives:  "POLST    Referrals Placed: None     Goals:   Goals        General    Functional (pt-stated)     Notes - Note created  9/21/2018 10:10 AM by Nadine Cooper RN    Goal Statement: I want to be able to get around my house without \"huffing and puffing.\"    Measure of Success: Comfortable breathing    Supportive Steps to Achieve: I will use continuous oxygen. I will take medications are directed. I will use my nebulizer.    Barriers: Progressive disease    Strengths: Motivated -Wants to be able to go to [place] [facility]    Date to Achieve By: Ongoing    Patient expressed understanding of goal: Yes          Patient/Caregiver understanding: Good understanding    Outreach Frequency: 2 weeks      Plan: Patient will make PCP follow up appointment.    Clinic care coordinator will continue to follow.    Nadine Cooper RN, CCM - Care Coordinator     9/21/2018    11:52 AM  [phone]     "

## 2018-10-03 ENCOUNTER — PATIENT OUTREACH (OUTPATIENT)
Dept: CARE COORDINATION | Facility: CLINIC | Age: 83
End: 2018-10-03

## 2018-10-03 ASSESSMENT — ACTIVITIES OF DAILY LIVING (ADL): DEPENDENT_IADLS:: TRANSPORTATION

## 2018-10-16 ENCOUNTER — OFFICE VISIT (OUTPATIENT)
Dept: FAMILY MEDICINE | Facility: CLINIC | Age: 83
End: 2018-10-16
Payer: COMMERCIAL

## 2018-10-16 VITALS
DIASTOLIC BLOOD PRESSURE: 80 MMHG | TEMPERATURE: 98.5 F | WEIGHT: 254.5 LBS | RESPIRATION RATE: 14 BRPM | HEART RATE: 64 BPM | OXYGEN SATURATION: 92 % | SYSTOLIC BLOOD PRESSURE: 120 MMHG | BODY MASS INDEX: 42.35 KG/M2

## 2018-10-16 DIAGNOSIS — E11.9 TYPE 2 DIABETES MELLITUS WITHOUT COMPLICATION, WITHOUT LONG-TERM CURRENT USE OF INSULIN (H): Chronic | ICD-10-CM

## 2018-10-16 DIAGNOSIS — E66.01 MORBID OBESITY (H): ICD-10-CM

## 2018-10-16 DIAGNOSIS — I10 HYPERTENSION GOAL BP (BLOOD PRESSURE) < 140/90: Chronic | ICD-10-CM

## 2018-10-16 DIAGNOSIS — J84.10 INTERSTITIAL PULMONARY FIBROSIS (H): Chronic | ICD-10-CM

## 2018-10-16 DIAGNOSIS — E78.5 HYPERLIPIDEMIA LDL GOAL <130: Chronic | ICD-10-CM

## 2018-10-16 DIAGNOSIS — J44.9 CHRONIC OBSTRUCTIVE PULMONARY DISEASE, UNSPECIFIED COPD TYPE (H): Chronic | ICD-10-CM

## 2018-10-16 DIAGNOSIS — R53.81 PHYSICAL DECONDITIONING: ICD-10-CM

## 2018-10-16 DIAGNOSIS — F32.1 MAJOR DEPRESSIVE DISORDER, SINGLE EPISODE, MODERATE (H): Primary | Chronic | ICD-10-CM

## 2018-10-16 PROCEDURE — 99215 OFFICE O/P EST HI 40 MIN: CPT | Performed by: NURSE PRACTITIONER

## 2018-10-16 RX ORDER — ATORVASTATIN CALCIUM 20 MG/1
20 TABLET, FILM COATED ORAL DAILY
Qty: 90 TABLET | Refills: 3 | Status: SHIPPED | OUTPATIENT
Start: 2018-10-16 | End: 2019-09-26

## 2018-10-16 RX ORDER — FOLIC ACID 1 MG/1
1 TABLET ORAL DAILY
Qty: 90 TABLET | Refills: 3 | Status: SHIPPED | OUTPATIENT
Start: 2018-10-16 | End: 2019-09-26

## 2018-10-16 RX ORDER — LOSARTAN POTASSIUM AND HYDROCHLOROTHIAZIDE 25; 100 MG/1; MG/1
1 TABLET ORAL DAILY
Qty: 90 TABLET | Refills: 3 | Status: SHIPPED | OUTPATIENT
Start: 2018-10-16 | End: 2019-10-03

## 2018-10-16 RX ORDER — AMLODIPINE BESYLATE 10 MG/1
TABLET ORAL
Qty: 90 TABLET | Refills: 1 | Status: SHIPPED | OUTPATIENT
Start: 2018-10-16 | End: 2019-10-16

## 2018-10-16 NOTE — PATIENT INSTRUCTIONS
Chronic Obstructive Pulmonary Disease (COPD)            What is chronic obstructive pulmonary disease (COPD)?   Chronic obstructive pulmonary disease (COPD) is a condition in which some of your airways are permanently blocked. COPD makes it harder for you to breathe. It causes strain on your heart. It increases the blood pressure in your lungs (pulmonary hypertension) and makes your heart get bigger (cor pulmonale).   How does it occur?   There are 2 main types of COPD: chronic bronchitis (inflamed airways) and emphysema (damage to the lung tissue). Chronic bronchitis and emphysema result from irritation of your airways over a long time, usually from smoking and sometimes from air pollution. Other causes are on-the-job exposure to irritants such as dust or chemicals, or frequent lung infections.   Chronic bronchitis and emphysema can occur separately but often they develop together. In chronic bronchitis, the insides of the airways thicken and swell. This makes the passageway for air smaller. The damaged airways make more mucus, which can block the airways and make it hard to breathe. In emphysema, the tiny air sacs (alveoli) in the lungs may become badly damaged or destroyed and lose their ability to stretch (get bigger and smaller). This makes it harder for you to breathe out carbon dioxide after breathing in air. As the carbon dioxide collects in your lungs, there is less room for oxygen to be breathed in.   COPD is not contagious. You cannot give it to someone or get it from someone else.   What are the symptoms?   Symptoms of COPD include:   deep, persistent cough that produces lots of mucus (sputum)   thick sputum that is hard to cough up   wheezing   shortness of breath, trouble breathing   rapid breathing   blue-purple color of the skin (cyanosis), especially of the fingers, toes, and lips   weight loss   frequent lung infections   swelling in the legs, ankles, and feet.   In the early stages  of the disease you may not have any symptoms.   How is it diagnosed?   Your healthcare provider will ask you about:   your symptoms and if you are less active because of the symptoms   your smoking habits   exposure to other people's smoke (secondhand smoke) and other irritants such as aerosol sprays, industrial chemicals, and air pollution   your medical history, for example, if you have had asthma.   Your healthcare provider will examine you. You may have the following tests:   a breathing test called a PFT (pulmonary function test) or spirometry (you breathe into a tube to measure airflow into and out of your lungs to see how well your lungs are working)   chest X-ray   blood tests   lab tests of sputum to look for infection.   How is it treated?   The damage to your lungs cannot be reversed, so treatment goals are:   Relieve symptoms to help you breathe better and feel better.   Help you be more physically active.   Treat infections when they happen.   Help prevent complications.   Help prevent the condition from getting worse.   If you are a smoker, the most important part of your treatment is to quit smoking. Talk to your healthcare provider about ways to stop smoking. You might find it helpful to join a quit-smoking program, to use nicotine patches or gum, or to try one of the prescription medicines that can help you quit.   Your healthcare provider may prescribe:   Medicine that relaxes and opens the airways (called a bronchodilator). This makes it easier to breathe. Some forms of this medicine are taken as pills or liquid. Some are inhaled. Some need to be used with a nebulizer. A nebulizer is a machine that makes a mist of the bronchodilator medicine so you can inhale it through a face mask or breathing tube.   Steroid medicines to reduce inflammation.   Antibiotics to treat bacterial infection.   Medicine (called an expectorant) that loosens the mucus and helps you cough it up.   Ask your healthcare  provider if you would benefit from:   regular exercise, such as walking or riding a stationary bicycle, according to your healthcare provider's recommendations   breathing exercises   a humidifier to increase air moisture   changes in your work environment to reduce exposure to irritants   oxygen therapy.   Your need for oxygen is not based on being short of breath. You may need oxygen and not feel short of breath, or you may feel very short of breath and not need oxygen. The need for oxygen is found by measuring the amount of oxygen in your blood. If the oxygen level is too low, your heart can be damaged. Some people whose oxygen levels are low all the time may need oxygen nearly 24 hours a day to help prevent heart failure and help them live longer. Other people need to use oxygen only during activity, or when they sleep, because these are the times when the oxygen level in their blood goes too low. It can help to talk with your provider about your concerns and ask him or her if oxygen is recommended for you.   Also ask your healthcare provider how much fluid you should drink every day.   A pulmonary rehab program can help you learn how to live and feel better with COPD. The program may offer supervised exercise and information about a healthy diet. It can help you learn about how your lungs work and how to care for your COPD. Ask your provider if there is such a program in your area.   In rare cases of severe COPD, surgery may be an option. Surgery can remove the most diseased part of the lungs, or a lung transplant might be considered, depending on your overall health.   How long will the effects last?   COPD cannot be cured. Once you have COPD, it does not get better, but taking good care of yourself is the best way to slow the progress of the disease.   The best way to take care of yourself is to avoid things that may have caused the COPD, such as tobacco smoke or exposure to dust, fumes, or chemicals at the  workplace. This will give you the greatest chance of stopping the disease from getting worse.   How can I take care of myself?   Follow these guidelines to take care of yourself:   If you smoke, quit.   Follow your healthcare provider's advice for treating COPD. Take all of your medicine according to your provider's instructions.   Avoid secondhand smoke, air pollution, and extreme changes in temperature and humidity.   Ask about getting a yearly flu shot.   Ask about getting a shot to prevent some types of pneumonia (Pneumovax).   Avoid close contact with people who have colds or the flu.   Eat healthy foods.   Eat high-calorie snacks between meals if you are underweight.   Take vitamin and mineral supplements if recommended by your healthcare provider.   Ask your provider about doing some physical activity every day. If you are not used to being physically active, ask about exercise training, such as pulmonary rehab, to safely build up your strength.   Get plenty of rest and sleep.   Consider lifestyle changes such as changing jobs or moving to a less polluted climate or lower altitude.   If you plan to travel, discuss your plans with your healthcare provider. It's good to make sure there will be no problems with high altitude, humidity, temperature, pressurized airplane cabins, or smoggy cities, especially if you are using oxygen.   A flare-up (exacerbation) is a worsening of the usual symptoms of COPD. You, or sometimes a family member, are usually the first to know when your lung disease is getting worse. Watch for:   shortness of breath that gets worse   more coughing than usual, often with chest tightness   coughing up more sputum   a change in how the sputum looks, such as a change to a darker color or streaks of blood   sputum that has gotten thicker and stickier and harder to cough up   new or worsening wheezing   not being able to do as much activity   being more short of breath during or after physical  activity   fever   swelling of your legs   not being able to sleep well because of shortness of breath   chest pain or discomfort that is new or getting worse   feeling drowsy or not able to think clearly.   Any of these symptoms might be a warning sign of a flare-up. If you catch these changes right away and get prompt treatment, you may be able to prevent a trip to the hospital. Ask your healthcare provider for instructions about what to do when you have these symptoms and if there are any other symptoms you should watch fort.   When should I get emergency help?   It is important to know the difference between earlier signs and symptoms of sickness and the signs of an emergency. You should call 911 to get emergency help if:   You cannot talk because you are so short of breath.   It is hard to walk because you are short of breath.   Your lips or fingernails turn gray or blue.   Your heart is beating very fast.   Your medicine does not help for very long or does not help at all.   You are breathing fast and hard.   You feel like you are going to die.   You are having bad chest pain that does not go away within about 5 minutes.   Do not turn up your oxygen unless your healthcare provider tells you to do this.   How can I help prevent COPD?   85% of COPD cases are caused by tobacco smoke. This includes the smokers themselves and people who are exposed to secondhand smoke. In most cases you can prevent COPD by never smoking and not being around others who are smoking.       Published by [employer].  This content is reviewed periodically and is subject to change as new health information becomes available. The information is intended to inform and educate and is not a replacement for medical evaluation, advice, diagnosis or treatment by a healthcare professional.   Developed by [employer].   ? 2010 [employer] and/or its affiliates. All Rights Reserved.   Copyright   [employer] 2011

## 2018-10-16 NOTE — MR AVS SNAPSHOT
After Visit Summary   10/16/2018    Susan Haq    MRN: [id number]           Patient Information     Date Of Birth          1/10/1932        Visit Information        Provider Department      10/16/2018 11:00 AM Ema Melendez NP [facility]        Today's Diagnoses     Hypertension goal BP (blood pressure) < 140/90        Hyperlipidemia LDL goal <130        Chronic obstructive pulmonary disease, unspecified COPD type (H)        Interstitial pulmonary fibrosis (H)        Type 2 diabetes mellitus without complication, without long-term current use of insulin (H)          Care Instructions                    Chronic Obstructive Pulmonary Disease (COPD)            What is chronic obstructive pulmonary disease (COPD)?   Chronic obstructive pulmonary disease (COPD) is a condition in which some of your airways are permanently blocked. COPD makes it harder for you to breathe. It causes strain on your heart. It increases the blood pressure in your lungs (pulmonary hypertension) and makes your heart get bigger (cor pulmonale).   How does it occur?   There are 2 main types of COPD: chronic bronchitis (inflamed airways) and emphysema (damage to the lung tissue). Chronic bronchitis and emphysema result from irritation of your airways over a long time, usually from smoking and sometimes from air pollution. Other causes are on-the-job exposure to irritants such as dust or chemicals, or frequent lung infections.   Chronic bronchitis and emphysema can occur separately but often they develop together. In chronic bronchitis, the insides of the airways thicken and swell. This makes the passageway for air smaller. The damaged airways make more mucus, which can block the airways and make it hard to breathe. In emphysema, the tiny air sacs (alveoli) in the lungs may become badly damaged or destroyed and lose their ability to stretch (get bigger and smaller). This makes it harder for you to breathe  out carbon dioxide after breathing in air. As the carbon dioxide collects in your lungs, there is less room for oxygen to be breathed in.   COPD is not contagious. You cannot give it to someone or get it from someone else.   What are the symptoms?   Symptoms of COPD include:   deep, persistent cough that produces lots of mucus (sputum)   thick sputum that is hard to cough up   wheezing   shortness of breath, trouble breathing   rapid breathing   blue-purple color of the skin (cyanosis), especially of the fingers, toes, and lips   weight loss   frequent lung infections   swelling in the legs, ankles, and feet.   In the early stages of the disease you may not have any symptoms.   How is it diagnosed?   Your healthcare provider will ask you about:   your symptoms and if you are less active because of the symptoms   your smoking habits   exposure to other people's smoke (secondhand smoke) and other irritants such as aerosol sprays, industrial chemicals, and air pollution   your medical history, for example, if you have had asthma.   Your healthcare provider will examine you. You may have the following tests:   a breathing test called a PFT (pulmonary function test) or spirometry (you breathe into a tube to measure airflow into and out of your lungs to see how well your lungs are working)   chest X-ray   blood tests   lab tests of sputum to look for infection.   How is it treated?   The damage to your lungs cannot be reversed, so treatment goals are:   Relieve symptoms to help you breathe better and feel better.   Help you be more physically active.   Treat infections when they happen.   Help prevent complications.   Help prevent the condition from getting worse.   If you are a smoker, the most important part of your treatment is to quit smoking. Talk to your healthcare provider about ways to stop smoking. You might find it helpful to join a quit-smoking program, to use nicotine patches or gum, or to try one of the  prescription medicines that can help you quit.   Your healthcare provider may prescribe:   Medicine that relaxes and opens the airways (called a bronchodilator). This makes it easier to breathe. Some forms of this medicine are taken as pills or liquid. Some are inhaled. Some need to be used with a nebulizer. A nebulizer is a machine that makes a mist of the bronchodilator medicine so you can inhale it through a face mask or breathing tube.   Steroid medicines to reduce inflammation.   Antibiotics to treat bacterial infection.   Medicine (called an expectorant) that loosens the mucus and helps you cough it up.   Ask your healthcare provider if you would benefit from:   regular exercise, such as walking or riding a stationary bicycle, according to your healthcare provider's recommendations   breathing exercises   a humidifier to increase air moisture   changes in your work environment to reduce exposure to irritants   oxygen therapy.   Your need for oxygen is not based on being short of breath. You may need oxygen and not feel short of breath, or you may feel very short of breath and not need oxygen. The need for oxygen is found by measuring the amount of oxygen in your blood. If the oxygen level is too low, your heart can be damaged. Some people whose oxygen levels are low all the time may need oxygen nearly 24 hours a day to help prevent heart failure and help them live longer. Other people need to use oxygen only during activity, or when they sleep, because these are the times when the oxygen level in their blood goes too low. It can help to talk with your provider about your concerns and ask him or her if oxygen is recommended for you.   Also ask your healthcare provider how much fluid you should drink every day.   A pulmonary rehab program can help you learn how to live and feel better with COPD. The program may offer supervised exercise and information about a healthy diet. It can help you learn about how your  lungs work and how to care for your COPD. Ask your provider if there is such a program in your area.   In rare cases of severe COPD, surgery may be an option. Surgery can remove the most diseased part of the lungs, or a lung transplant might be considered, depending on your overall health.   How long will the effects last?   COPD cannot be cured. Once you have COPD, it does not get better, but taking good care of yourself is the best way to slow the progress of the disease.   The best way to take care of yourself is to avoid things that may have caused the COPD, such as tobacco smoke or exposure to dust, fumes, or chemicals at the workplace. This will give you the greatest chance of stopping the disease from getting worse.   How can I take care of myself?   Follow these guidelines to take care of yourself:   If you smoke, quit.   Follow your healthcare provider's advice for treating COPD. Take all of your medicine according to your provider's instructions.   Avoid secondhand smoke, air pollution, and extreme changes in temperature and humidity.   Ask about getting a yearly flu shot.   Ask about getting a shot to prevent some types of pneumonia (Pneumovax).   Avoid close contact with people who have colds or the flu.   Eat healthy foods.   Eat high-calorie snacks between meals if you are underweight.   Take vitamin and mineral supplements if recommended by your healthcare provider.   Ask your provider about doing some physical activity every day. If you are not used to being physically active, ask about exercise training, such as pulmonary rehab, to safely build up your strength.   Get plenty of rest and sleep.   Consider lifestyle changes such as changing jobs or moving to a less polluted climate or lower altitude.   If you plan to travel, discuss your plans with your healthcare provider. It's good to make sure there will be no problems with high altitude, humidity, temperature, pressurized airplane cabins, or  smoggy cities, especially if you are using oxygen.   A flare-up (exacerbation) is a worsening of the usual symptoms of COPD. You, or sometimes a family member, are usually the first to know when your lung disease is getting worse. Watch for:   shortness of breath that gets worse   more coughing than usual, often with chest tightness   coughing up more sputum   a change in how the sputum looks, such as a change to a darker color or streaks of blood   sputum that has gotten thicker and stickier and harder to cough up   new or worsening wheezing   not being able to do as much activity   being more short of breath during or after physical activity   fever   swelling of your legs   not being able to sleep well because of shortness of breath   chest pain or discomfort that is new or getting worse   feeling drowsy or not able to think clearly.   Any of these symptoms might be a warning sign of a flare-up. If you catch these changes right away and get prompt treatment, you may be able to prevent a trip to the hospital. Ask your healthcare provider for instructions about what to do when you have these symptoms and if there are any other symptoms you should watch fort.   When should I get emergency help?   It is important to know the difference between earlier signs and symptoms of sickness and the signs of an emergency. You should call 911 to get emergency help if:   You cannot talk because you are so short of breath.   It is hard to walk because you are short of breath.   Your lips or fingernails turn gray or blue.   Your heart is beating very fast.   Your medicine does not help for very long or does not help at all.   You are breathing fast and hard.   You feel like you are going to die.   You are having bad chest pain that does not go away within about 5 minutes.   Do not turn up your oxygen unless your healthcare provider tells you to do this.   How can I help prevent COPD?   85% of COPD cases are caused by tobacco  smoke. This includes the smokers themselves and people who are exposed to secondhand smoke. In most cases you can prevent COPD by never smoking and not being around others who are smoking.       Published by [employer].  This content is reviewed periodically and is subject to change as new health information becomes available. The information is intended to inform and educate and is not a replacement for medical evaluation, advice, diagnosis or treatment by a healthcare professional.   Developed by [employer].   ? 2010 [facility] and/or its affiliates. All Rights Reserved.   [employer] 2011                Follow-ups after your visit        Who to contact     If you have questions or need follow up information about today's clinic visit or your schedule please contact [facility] directly at [phone].  Normal or non-critical lab and imaging results will be communicated to you by MyChart, letter or phone within 4 business days after the clinic has received the results. If you do not hear from us within 7 days, please contact the clinic through [employer] or phone. If you have a critical or abnormal lab result, we will notify you by phone as soon as possible.  Submit refill requests through [employer] or call your pharmacy and they will forward the refill request to us. Please allow 3 business days for your refill to be completed.          Additional Information About Your Visit        Care EveryWhere ID     This is your Care EveryWhere ID. This could be used by other organizations to access your [facility] medical records  [id number]        Your Vitals Were     Pulse Temperature Respirations Pulse Oximetry BMI (Body Mass Index)       64 98.5  F (36.9  C) (Tympanic) 14 92% 42.35 kg/m2        Blood Pressure from Last 3 Encounters:   10/16/18 120/80   09/20/18 134/60   09/14/18 128/74    Weight from Last 3 Encounters:   10/16/18 254 lb 8 oz (115.4 kg)   09/20/18 252 lb 6.8 oz  "(114.5 kg)   09/14/18 252 lb (114.3 kg)              Today, you had the following     No orders found for display         Where to get your medicines      These medications were sent to [facility] [place] [address] [place] [address]  [address] [place] [address], [place] [address]     Phone:  [phone]     amLODIPine 10 MG tablet    atorvastatin 20 MG tablet    folic acid 1 MG tablet    losartan-hydrochlorothiazide 100-25 MG per tablet          Primary Care Provider Office Phone # Fax #    Ema Melendez, EDWIGE [phone] [address] [facility] [address]        Goals        General    Functional (pt-stated)     Notes - Note created  9/21/2018 10:10 AM by Nadine Cooper RN    Goal Statement: I want to be able to get around my house without \"huffing and puffing.\"    Measure of Success: Comfortable breathing    Supportive Steps to Achieve: I will use continuous oxygen. I will take medications are directed. I will use my nebulizer.    Barriers: Progressive disease    Strengths: Motivated -Wants to be able to go [facility]    Date to Achieve By: Ongoing    Patient expressed understanding of goal: Yes          Equal Access to Services     DESIREE PORTILLO AH: Hadkayce Tony, wamirandada lusameer, qaybta kaalmada adepeggyyada, nyla peters. So [facility] [phone].    ATENCIÓN: Si habla español, tiene a petersen disposición servicios gratuitos de asistencia lingüística. Llame al [phone].    We comply with applicable federal civil rights laws and [place] laws. We do not discriminate on the basis of race, color, national origin, age, disability, sex, sexual orientation, or gender identity.            Thank you!     Thank you for choosing [facility]  for your care. Our goal is always to provide you with excellent care. Hearing back from our patients is one way we can continue to improve our services. " Please take a few minutes to complete the written survey that you may receive in the mail after your visit with us. Thank you!             Your Updated Medication List - Protect others around you: Learn how to safely use, store and throw away your medicines at www.disposemymeds.org.          This list is accurate as of 10/16/18 11:38 AM.  Always use your most recent med list.                   Brand Name Dispense Instructions for use Diagnosis    acetaminophen 500 MG tablet    TYLENOL    60 tablet    Take 2 tablets (1,000 mg) by mouth every 8 hours        * albuterol 108 (90 Base) MCG/ACT inhaler    PROAIR HFA/PROVENTIL HFA/VENTOLIN HFA    3 Inhaler    Inhale 2 puffs into the lungs every 4 hours as needed for shortness of breath / dyspnea or wheezing    Chronic obstructive pulmonary disease, unspecified COPD type (H)       * albuterol (2.5 MG/3ML) 0.083% neb solution     120 vial    NEBULIZE ONE VIAL (2.5 MG) EVERY FOUR HOURS AS NEEDED FOR SHORTNESS OF BREATH/DYSPNEA OR WHEEZING    SOB (shortness of breath), Chronic obstructive pulmonary disease, unspecified COPD type (H)       ALLERGY RELIEF 10 MG tablet   Generic drug:  loratadine     90 tablet    TAKE ONE TABLET BY MOUTH ONCE DAILY    Acute seasonal allergic rhinitis, unspecified trigger       amLODIPine 10 MG tablet    NORVASC    90 tablet    TAKE 1 TABLET (10 MG) BY MOUTH DAILY    Hypertension goal BP (blood pressure) < 140/90       atenolol 50 MG tablet    TENORMIN    180 tablet    Take 1 tablet (50 mg) by mouth 2 times daily    Hypertension goal BP (blood pressure) < 140/90       atorvastatin 20 MG tablet    LIPITOR    90 tablet    Take 1 tablet (20 mg) by mouth daily    Hyperlipidemia LDL goal <130       budesonide-formoterol 160-4.5 MCG/ACT Inhaler    SYMBICORT    10.2 g    INHALE 2 PUFFS INTO THE LUNGS 2 TIMES DAILY    Chronic obstructive pulmonary disease, unspecified COPD type (H)       CALCIUM 600 + D PO      Take  by mouth.        camphor-menthol  0.5-0.5 % Lotn    DERMASARRA    1 Bottle    Apply 1 mL topically every 8 hours as needed for skin care (apply to rash on legs)    Rash and nonspecific skin eruption       escitalopram 5 MG tablet    LEXAPRO    30 tablet    Take 1 tablet (5 mg) daily for 2 weeks, then if no improvement in symptoms can increase to 2 tablets (10 mg) daily    Major depressive disorder, single episode, moderate (H)       fish oil-omega-3 fatty acids 1000 MG capsule      1 cap daily        folic acid 1 MG tablet    FOLVITE    90 tablet    Take 1 tablet (1 mg) by mouth daily    Interstitial pulmonary fibrosis (H)       losartan-hydrochlorothiazide 100-25 MG per tablet    HYZAAR    90 tablet    Take 1 tablet by mouth daily    Type 2 diabetes mellitus without complication, without long-term current use of insulin (H), Hypertension goal BP (blood pressure) < 140/90       Nebulizer Compressor Kit     1 kit    1 kit every 4 hours as needed        nystatin 113046 UNIT/GM Powd    MYCOSTATIN    60 g    Apply topically 3 times daily as needed    Rash and nonspecific skin eruption       * order for DME      1.  CPAP pressure 12 cm/H20 with heated humidity and auto-titrating capability.  2.  Provide mask to fit and CPAP supplies. 3.  Length of need lifetime. 4.  Ok to d/c O2 bleed in to her PAP overnight.    SUSSY (obstructive sleep apnea)       * order for DME     1 Units    TEDs stockings knee high to be worn during the day.    Leg edema       * order for DME     1 Units    Equipment being ordered: Oxygen    Hypoxia       * order for DME     1 Units    Equipment being ordered: CPAP supplies    SUSSY (obstructive sleep apnea)       order for DME     1 Device    Equipment being ordered: Oxygen - 3 liters continous    COPD (chronic obstructive pulmonary disease) (H)       order for DME     1 each    Equipment being ordered: Nebulizer    COPD exacerbation (H), SOB (shortness of breath), Chronic obstructive pulmonary disease, unspecified COPD type (H),  Interstitial pulmonary fibrosis (H)       potassium chloride 10 MEQ tablet    K-TAB,KLOR-CON    60 tablet    TAKE 1 TABLET (10 MEQ) BY MOUTH 2 TIMES DAILY    Hypokalemia       SENNA PLUS 8.6-50 MG per tablet   Generic drug:  senna-docusate     100 tablet    TAKE ONE TO TWO TABLETS BY MOUTH TWICE DAILY AS NEEDED FOR CONSTIPATION    Constipation       SIMETHICONE-80 PO      Take 80 mg by mouth every morning And TID prn belching        tiotropium 18 MCG capsule    SPIRIVA HANDIHALER    90 capsule    Inhale  into the lungs. Inhale contents of one capsule daily    Chronic obstructive pulmonary disease, unspecified COPD type (H), SOB (shortness of breath) on exertion       * Notice:  This list has 6 medication(s) that are the same as other medications prescribed for you. Read the directions carefully, and ask your doctor or other care provider to review them with you.

## 2018-10-16 NOTE — NURSING NOTE
"Chief Complaint   Patient presents with     COPD     following up after COPD exacerbation.        Initial /80 (BP Location: Right arm, Patient Position: Chair, Cuff Size: Adult Regular)  Pulse 64  Temp 98.5  F (36.9  C) (Tympanic)  Resp 14  Wt 254 lb 8 oz (115.4 kg)  SpO2 92%  BMI 42.35 kg/m2 Estimated body mass index is 42.35 kg/(m^2) as calculated from the following:    Height as of 9/20/18: 5' 5\" (1.651 m).    Weight as of this encounter: 254 lb 8 oz (115.4 kg).    Medication Reconciliation: complete  Perla Tompkins MA  "

## 2018-10-16 NOTE — PROGRESS NOTES
SUBJECTIVE:   Susan Haq is a 86 year old female who presents to clinic today with Trina and Martina for the following health issues:    Chief Complaint   Patient presents with     COPD     following up after COPD exacerbation.      COPD Follow-Up    Symptoms are currently: slightly worsened    Current fatigue or dyspnea with ambulation: worsened from baseline    Shortness of breath: slightly worsened     Increased or change in Cough/Sputum: Yes-  After recent pneumonia a couple weeks ago. Phlegm is now clear and coughing has improved. Was admitted overnight for COPD exacerbation.  Chest xray was negative. Placed on antibiotics and discharged on these to home - [facility] and told to do nebs at home.  Feels like mostly everything has improved except still dyspneic on exertion.    Fever(s): No    Baseline ambulation without stopping to rest:  10-15 feet. Able to walk up < 1 flights of stairs without stopping to rest.    Any ER/UC or hospital admissions since your last visit? Yes - Hospital on 9/19/2020 for COPD exacerbation     History   Smoking Status     Former Smoker     Packs/day: 1.00     Years: 35.00     Quit date: 1/1/1990   Smokeless Tobacco     Former User     Lab Results   Component Value Date    FEV1 1.31 10/07/2013    VLA1FPV 44% 10/07/2013         Amount of exercise or physical activity: None    Problems taking medications regularly: No    Medication side effects: none    Diet: low salt        Depression and Anxiety Follow-Up    Status since last visit: Improved although unsure of which serotonin specific reuptake inhibitor she is actually taking per patient and care giver.     Other associated symptoms:None    Complicating factors:     Significant life event: No     Current substance abuse: None    PHQ 12/6/2017 6/14/2018 9/14/2018   PHQ-9 Total Score 15 11 17   Q9: Suicide Ideation Not at all Several days Several days     MELISSA-7 SCORE 12/6/2017 9/14/2018   Total Score 1 1     In the past two  "weeks have you had thoughts of suicide or self-harm?  No.    Do you have concerns about your personal safety or the safety of others?   No  PHQ-9  English  PHQ-9   Any Language  MELISSA-7  Suicide Assessment Five-step Evaluation and Treatment (SAFE-T)    Care giver (s) present today in clinic.  At last visit 9/14/2018 - concern for low energy and depression.  Advised stopping Zoloft and switching to Lexapro.  When reviewing medications - caregiver stated she is still taking a \"Blue pill\"  When we reviewed that Lexapro is white and Zoloft is blue - they are unsure if they stopped the Zoloft.    Reports more active - although breathing limits this.    Problem list and histories reviewed & adjusted, as indicated.  Additional history: as documented    Patient Active Problem List   Diagnosis     Hyperlipidemia LDL goal <130     Insomnia     Osteopenia     Hypertension goal BP (blood pressure) < 140/90     Obesity     Advance care planning     Adenomatous polyp of colon     PVC's (premature ventricular contractions)     SUSSY (obstructive sleep apnea)-Moderately severe (AHI 21)     Bilateral leg edema     HL (hearing loss)     Umbilical hernia     SNHL (sensorineural hearing loss)     Interstitial pulmonary fibrosis (H)     Chronic obstructive pulmonary disease, unspecified COPD type (H)     Major depressive disorder, single episode, moderate (H)     Risk for falls     Alcohol abuse     Morbid obesity (H)     Physical deconditioning     Chronic obstructive pulmonary disease with acute exacerbation (H)     Past Surgical History:   Procedure Laterality Date     APPENDECTOMY       C BSO, OMENTECTOMY W/SHANIA       C NONSPECIFIC PROCEDURE      (L) clavicle orif     C STOMACH SURGERY PROCEDURE UNLISTED       CHOLECYSTECTOMY, LAPOROSCOPIC  10/13/2011    Cholecystectomy, Laparoscopic     HERNIA REPAIR, UMBILICAL  10/13/2011     HYSTERECTOMY, CERVIX STATUS UNKNOWN         Social History   Substance Use Topics     Smoking status: Former " Smoker     Packs/day: 1.00     Years: 35.00     Quit date: 1/1/1990     Smokeless tobacco: Former User     Alcohol use Yes      Comment: Drink 1 (3oz) drink a day     Family History   Problem Relation Age of Onset     Diabetes Father      Coronary Artery Disease Maternal Grandmother      Coronary Artery Disease Paternal Uncle          Current Outpatient Prescriptions   Medication Sig Dispense Refill     albuterol (2.5 MG/3ML) 0.083% neb solution NEBULIZE ONE VIAL (2.5 MG) EVERY FOUR HOURS AS NEEDED FOR SHORTNESS OF BREATH/DYSPNEA OR WHEEZING 120 vial 3     albuterol (PROAIR HFA/PROVENTIL HFA/VENTOLIN HFA) 108 (90 Base) MCG/ACT Inhaler Inhale 2 puffs into the lungs every 4 hours as needed for shortness of breath / dyspnea or wheezing 3 Inhaler 3     amLODIPine (NORVASC) 10 MG tablet TAKE 1 TABLET (10 MG) BY MOUTH DAILY 90 tablet 1     atenolol (TENORMIN) 50 MG tablet Take 1 tablet (50 mg) by mouth 2 times daily 180 tablet 3     atorvastatin (LIPITOR) 20 MG tablet Take 1 tablet (20 mg) by mouth daily 90 tablet 3     budesonide-formoterol (SYMBICORT) 160-4.5 MCG/ACT Inhaler INHALE 2 PUFFS INTO THE LUNGS 2 TIMES DAILY 10.2 g 5     camphor-menthol (DERMASARRA) 0.5-0.5 % LOTN Apply 1 mL topically every 8 hours as needed for skin care (apply to rash on legs) 1 Bottle 1     escitalopram (LEXAPRO) 5 MG tablet Take 1 tablet (5 mg) daily for 2 weeks, then if no improvement in symptoms can increase to 2 tablets (10 mg) daily 30 tablet 1     folic acid (FOLVITE) 1 MG tablet Take 1 tablet (1 mg) by mouth daily 90 tablet 3     losartan-hydrochlorothiazide (HYZAAR) 100-25 MG per tablet Take 1 tablet by mouth daily 90 tablet 3     nystatin (MYCOSTATIN) 837457 UNIT/GM POWD Apply topically 3 times daily as needed 60 g 1     order for DME Equipment being ordered: Nebulizer 1 each 0     ORDER FOR DME Equipment being ordered: Oxygen - 3 liters continous 1 Device 0     ORDER FOR DME Equipment being ordered: CPAP supplies 1 Units 0      ORDER FOR DME Equipment being ordered: Oxygen 1 Units 0     ORDER FOR DME TEDs stockings knee high to be worn during the day. 1 Units 0     potassium chloride (K-TAB,KLOR-CON) 10 MEQ tablet TAKE 1 TABLET (10 MEQ) BY MOUTH 2 TIMES DAILY 60 tablet 9     Respiratory Therapy Supplies (NEBULIZER COMPRESSOR) KIT 1 kit every 4 hours as needed 1 kit 0     SENNA PLUS 8.6-50 MG per tablet TAKE ONE TO TWO TABLETS BY MOUTH TWICE DAILY AS NEEDED FOR CONSTIPATION 100 tablet 1     tiotropium (SPIRIVA HANDIHALER) 18 MCG capsule Inhale  into the lungs. Inhale contents of one capsule daily 90 capsule 3     acetaminophen (TYLENOL) 500 MG tablet Take 2 tablets (1,000 mg) by mouth every 8 hours 60 tablet 6     ALLERGY RELIEF 10 MG tablet TAKE ONE TABLET BY MOUTH ONCE DAILY 90 tablet 1     Calcium Carbonate-Vitamin D (CALCIUM 600 + D OR) Take  by mouth.       FISH OIL 1000 MG OR CAPS 1 cap daily       ORDER FOR DME 1.  CPAP pressure 12 cm/H20 with heated humidity and auto-titrating capability.   2.  Provide mask to fit and CPAP supplies.  3.  Length of need lifetime.  4.  Ok to d/c O2 bleed in to her PAP overnight.           SIMETHICONE-80 PO Take 80 mg by mouth every morning And TID prn belching       [DISCONTINUED] amLODIPine (NORVASC) 10 MG tablet TAKE 1 TABLET (10 MG) BY MOUTH DAILY 90 tablet 1     [DISCONTINUED] atorvastatin (LIPITOR) 20 MG tablet Take 1 tablet (20 mg) by mouth daily 90 tablet 3     [DISCONTINUED] losartan-hydrochlorothiazide (HYZAAR) 100-25 MG per tablet Take 1 tablet by mouth daily 90 tablet 3     Allergies   Allergen Reactions     Ace Inhibitors Cough     Asa [Aspirin]      Penicillins      Recent Labs   Lab Test  09/19/18   2258  06/01/18   1020  12/06/17   1035   06/10/17   1440   03/05/16   2030   07/22/15   1219  04/21/15   1038  02/18/15   1353   A1C   --   5.3  5.7   --    --    --    --    --   5.7   --    --    LDL   --   93  91   --    --    --    --    --    --   103   --    HDL   --   60  61   --    --     --    --    --    --   52   --    TRIG   --   140  196*   --    --    --    --    --    --   162*   --    ALT  26   --    --    --   26   --   24   < >   --    --   20   CR  1.04  0.91  0.86   < >  1.01   < >  1.11*   < >   --   0.91  0.87   GFRESTIMATED  50*  58*  63   < >  52*   < >  47*   < >   --   59*  62   GFRESTBLACK  61  71  76   < >  63   < >  57*   < >   --   71  75   POTASSIUM  3.5  3.7  3.2*   < >  3.5   < >  3.2*   < >   --   3.2*  3.9   TSH   --    --    --    --   3.06   --    --    --    --    --   2.60    < > = values in this interval not displayed.      BP Readings from Last 3 Encounters:   10/16/18 120/80   09/20/18 134/60   09/14/18 128/74    Wt Readings from Last 3 Encounters:   10/16/18 254 lb 8 oz (115.4 kg)   09/20/18 252 lb 6.8 oz (114.5 kg)   09/14/18 252 lb (114.3 kg)                  Labs reviewed in EPIC    Reviewed and updated as needed this visit by clinical staff       Reviewed and updated as needed this visit by Provider         ROS:  Constitutional, HEENT, cardiovascular, pulmonary, GI, , musculoskeletal, neuro, skin, endocrine and psych systems are negative, except as otherwise noted.    OBJECTIVE:     /80 (BP Location: Right arm, Patient Position: Chair, Cuff Size: Adult Regular)  Pulse 64  Temp 98.5  F (36.9  C) (Tympanic)  Resp 14  Wt 254 lb 8 oz (115.4 kg)  SpO2 92%  BMI 42.35 kg/m2  Body mass index is 42.35 kg/(m^2).  GENERAL: alert, no distress, frail, elderly and sitting upright in wheelchair, accompanied by friend and caregiver.  NECK: no adenopathy, no asymmetry, masses, or scars and thyroid normal to palpation  RESP: no rales , no rhonchi, no wheezes, decreased breath sounds throughout and on portable oxygen at 3 liters sat 92%.  No dyspnea at rest.  CV: regular rate and rhythm, normal S1 S2, no S3 or S4, no murmur, click or rub, no peripheral edema and peripheral pulses strong  ABDOMEN: soft, nontender, no hepatosplenomegaly, no masses and bowel sounds  normal  MS: no gross musculoskeletal defects noted, no edema  PSYCH: mentation appears normal and affect normal/bright    Diagnostic Test Results:  Results for orders placed or performed during the hospital encounter of 09/19/18   Chest XR,  PA & LAT    Narrative    CHEST TWO VIEWS  9/20/2018 12:00 AM     HISTORY: Dyspnea, cough, increased sputum.    COMPARISON: None.    FINDINGS: Heart size and pulmonary vascularity are within normal  limits. The lungs are clear. No pneumothorax or pleural effusion.       Impression    IMPRESSION: No radiographic evidence of acute chest abnormality.     YANETH LAWSON MD   CBC with platelets differential   Result Value Ref Range    WBC 10.1 4.0 - 11.0 10e9/L    RBC Count 3.83 3.8 - 5.2 10e12/L    Hemoglobin 12.3 11.7 - 15.7 g/dL    Hematocrit 38.1 35.0 - 47.0 %     78 - 100 fl    MCH 32.1 26.5 - 33.0 pg    MCHC 32.3 31.5 - 36.5 g/dL    RDW 12.9 10.0 - 15.0 %    Platelet Count 239 150 - 450 10e9/L    Diff Method Automated Method     % Neutrophils 73.9 %    % Lymphocytes 11.3 %    % Monocytes 12.5 %    % Eosinophils 1.5 %    % Basophils 0.3 %    % Immature Granulocytes 0.5 %    Nucleated RBCs 0 0 /100    Absolute Neutrophil 7.5 1.6 - 8.3 10e9/L    Absolute Lymphocytes 1.1 0.8 - 5.3 10e9/L    Absolute Monocytes 1.3 0.0 - 1.3 10e9/L    Absolute Eosinophils 0.2 0.0 - 0.7 10e9/L    Absolute Basophils 0.0 0.0 - 0.2 10e9/L    Abs Immature Granulocytes 0.1 0 - 0.4 10e9/L    Absolute Nucleated RBC 0.0    Comprehensive metabolic panel   Result Value Ref Range    Sodium 140 133 - 144 mmol/L    Potassium 3.5 3.4 - 5.3 mmol/L    Chloride 105 94 - 109 mmol/L    Carbon Dioxide 30 20 - 32 mmol/L    Anion Gap 5 3 - 14 mmol/L    Glucose 118 (H) 70 - 99 mg/dL    Urea Nitrogen 21 7 - 30 mg/dL    Creatinine 1.04 0.52 - 1.04 mg/dL    GFR Estimate 50 (L) >60 mL/min/1.7m2    GFR Estimate If Black 61 >60 mL/min/1.7m2    Calcium 7.8 (L) 8.5 - 10.1 mg/dL    Bilirubin Total 0.4 0.2 - 1.3 mg/dL     Albumin 3.0 (L) 3.4 - 5.0 g/dL    Protein Total 6.8 6.8 - 8.8 g/dL    Alkaline Phosphatase 146 40 - 150 U/L    ALT 26 0 - 50 U/L    AST 21 0 - 45 U/L   Troponin I   Result Value Ref Range    Troponin I ES <0.015 0.000 - 0.045 ug/L   Blood gas venous   Result Value Ref Range    Ph Venous 7.38 7.32 - 7.43 pH    PCO2 Venous 52 (H) 40 - 50 mm Hg    PO2 Venous 53 (H) 25 - 47 mm Hg    Bicarbonate Venous 31 (H) 21 - 28 mmol/L    Base Excess Venous 4.5 mmol/L   Care Transition RN/SW IP Consult    Narrative    Vidya Kelley RN     9/20/2018  1:48 PM  Care Transition Initial Assessment - RN/ Discharge note  Reason For Consult: discharge planning   Met with: Patient.    DATA   Active Problems:    COPD (chronic obstructive pulmonary disease) (H)       Primary Care Clinic Name: [facility]  Primary Care MD Name: Ema Melendez NP  Contact information and PCP information verified: Yes    ASSESSMENT  Cognitive Status: awake, alert and oriented.  Lives With: alone  Who is your support system?: Other (specify) (Friends)   Insurance Concerns: No Insurance issues identified    This writer met with pt, introduced self and role. Pt lives alone   in the community. Pt has no services at home and does have   friends support. Patients goal is to return home upon discharge.   COPD educational resources provided. Transportation will be   provided by patients' friends.  Patient is agreeable to [facility].   Chart reviewed and the patient was discussed in Interdisciplinary   Rounds.  There are no discharge needs anticipated.  Care   Management will continue to monitor through Interdisciplinary   Rounds and intervene if needed.  If immediate needs arise, please   contact Care Management at [phone].  Sr, linkage brochure   brochure provided. Patient currently has lifeline necklace,   declined the [facility] lifeline program.    As a system [facility] wants to ensure that across the care   continuum that you have support  through care coordination   services that include nurses and social workers in the outpatient   setting.  Due to your COPD, I feel it is important you have this   support when you discharge.  They will be calling you within   24-48 hours of your discharge.   A brochure describing the   services was provided.  If you have questions you can reach out   to your clinic directly and ask for the Care Coordinator assigned   to your care.    PLAN  Home        Vidya Kelley RN Care Coordinator  [facility] [phone]  [facility] [phone]   Sputum Culture Aerobic Bacterial   Result Value Ref Range    Specimen Description Sputum     Culture Micro (A)      Canceled, Test credited  >10 Squamous epithelial cells/low power field indicates oral contamination. Please   recollect.     Methicillin Resistant Staph Aureus PCR   Result Value Ref Range    Specimen Description Nares     Methicillin Resist/Sens S. aureus PCR Negative NEG^Negative   Gram stain   Result Value Ref Range    Specimen Description Sputum     Special Requests Screen     Gram Stain (A)      >10 Squamous epithelial cells/low power field indicates oral contamination. Please   recollect.      Gram Stain >25 PMNs/low power field     Gram Stain       Many  Mixed gram positive and gram negative bacteria present.      Gram Stain       Notification of test cancellation was given to  ARIADNA Ferguson [id number] 9/20/18. MS         ASSESSMENT/PLAN:     1. Hypertension goal BP (blood pressure) < 140/90  Controlled.    - amLODIPine (NORVASC) 10 MG tablet; TAKE 1 TABLET (10 MG) BY MOUTH DAILY  Dispense: 90 tablet; Refill: 1  - losartan-hydrochlorothiazide (HYZAAR) 100-25 MG per tablet; Take 1 tablet by mouth daily  Dispense: 90 tablet; Refill: 3    2. Hyperlipidemia LDL goal <130     - atorvastatin (LIPITOR) 20 MG tablet; Take 1 tablet (20 mg) by mouth daily  Dispense: 90 tablet; Refill: 3    3. Chronic obstructive pulmonary disease, unspecified COPD type (H)  At  baseline since 9/19 admission for acute exacerbation - progressive worsening of symptoms.  Severe COPD.  On continuous oxygen.    4. Interstitial pulmonary fibrosis (H)  Contributing to above.    - folic acid (FOLVITE) 1 MG tablet; Take 1 tablet (1 mg) by mouth daily  Dispense: 90 tablet; Refill: 3    5. Type 2 diabetes mellitus without complication, without long-term current use of insulin (H)  A1C at goal.    - losartan-hydrochlorothiazide (HYZAAR) 100-25 MG per tablet; Take 1 tablet by mouth daily  Dispense: 90 tablet; Refill: 3    6. Major depressive disorder  Improved but unsure why considering we don't know which ssri patient is taking.  Caregiver will call with current medications.  Sent home with updated medication list including updated new serotonin specific reuptake inhibitor Lexapro.    7. Morbid obesity  Encourage small bouts of activity - will be limited with SEVERE Copd.    8. Physical deconditioning  Due to above.      Patient Instructions                   Chronic Obstructive Pulmonary Disease (COPD)            What is chronic obstructive pulmonary disease (COPD)?   Chronic obstructive pulmonary disease (COPD) is a condition in which some of your airways are permanently blocked. COPD makes it harder for you to breathe. It causes strain on your heart. It increases the blood pressure in your lungs (pulmonary hypertension) and makes your heart get bigger (cor pulmonale).   How does it occur?   There are 2 main types of COPD: chronic bronchitis (inflamed airways) and emphysema (damage to the lung tissue). Chronic bronchitis and emphysema result from irritation of your airways over a long time, usually from smoking and sometimes from air pollution. Other causes are on-the-job exposure to irritants such as dust or chemicals, or frequent lung infections.   Chronic bronchitis and emphysema can occur separately but often they develop together. In chronic bronchitis, the insides of the airways thicken and  swell. This makes the passageway for air smaller. The damaged airways make more mucus, which can block the airways and make it hard to breathe. In emphysema, the tiny air sacs (alveoli) in the lungs may become badly damaged or destroyed and lose their ability to stretch (get bigger and smaller). This makes it harder for you to breathe out carbon dioxide after breathing in air. As the carbon dioxide collects in your lungs, there is less room for oxygen to be breathed in.   COPD is not contagious. You cannot give it to someone or get it from someone else.   What are the symptoms?   Symptoms of COPD include:   deep, persistent cough that produces lots of mucus (sputum)   thick sputum that is hard to cough up   wheezing   shortness of breath, trouble breathing   rapid breathing   blue-purple color of the skin (cyanosis), especially of the fingers, toes, and lips   weight loss   frequent lung infections   swelling in the legs, ankles, and feet.   In the early stages of the disease you may not have any symptoms.   How is it diagnosed?   Your healthcare provider will ask you about:   your symptoms and if you are less active because of the symptoms   your smoking habits   exposure to other people's smoke (secondhand smoke) and other irritants such as aerosol sprays, industrial chemicals, and air pollution   your medical history, for example, if you have had asthma.   Your healthcare provider will examine you. You may have the following tests:   a breathing test called a PFT (pulmonary function test) or spirometry (you breathe into a tube to measure airflow into and out of your lungs to see how well your lungs are working)   chest X-ray   blood tests   lab tests of sputum to look for infection.   How is it treated?   The damage to your lungs cannot be reversed, so treatment goals are:   Relieve symptoms to help you breathe better and feel better.   Help you be more physically active.   Treat infections when they happen.    Help prevent complications.   Help prevent the condition from getting worse.   If you are a smoker, the most important part of your treatment is to quit smoking. Talk to your healthcare provider about ways to stop smoking. You might find it helpful to join a quit-smoking program, to use nicotine patches or gum, or to try one of the prescription medicines that can help you quit.   Your healthcare provider may prescribe:   Medicine that relaxes and opens the airways (called a bronchodilator). This makes it easier to breathe. Some forms of this medicine are taken as pills or liquid. Some are inhaled. Some need to be used with a nebulizer. A nebulizer is a machine that makes a mist of the bronchodilator medicine so you can inhale it through a face mask or breathing tube.   Steroid medicines to reduce inflammation.   Antibiotics to treat bacterial infection.   Medicine (called an expectorant) that loosens the mucus and helps you cough it up.   Ask your healthcare provider if you would benefit from:   regular exercise, such as walking or riding a stationary bicycle, according to your healthcare provider's recommendations   breathing exercises   a humidifier to increase air moisture   changes in your work environment to reduce exposure to irritants   oxygen therapy.   Your need for oxygen is not based on being short of breath. You may need oxygen and not feel short of breath, or you may feel very short of breath and not need oxygen. The need for oxygen is found by measuring the amount of oxygen in your blood. If the oxygen level is too low, your heart can be damaged. Some people whose oxygen levels are low all the time may need oxygen nearly 24 hours a day to help prevent heart failure and help them live longer. Other people need to use oxygen only during activity, or when they sleep, because these are the times when the oxygen level in their blood goes too low. It can help to talk with your provider about your concerns  and ask him or her if oxygen is recommended for you.   Also ask your healthcare provider how much fluid you should drink every day.   A pulmonary rehab program can help you learn how to live and feel better with COPD. The program may offer supervised exercise and information about a healthy diet. It can help you learn about how your lungs work and how to care for your COPD. Ask your provider if there is such a program in your area.   In rare cases of severe COPD, surgery may be an option. Surgery can remove the most diseased part of the lungs, or a lung transplant might be considered, depending on your overall health.   How long will the effects last?   COPD cannot be cured. Once you have COPD, it does not get better, but taking good care of yourself is the best way to slow the progress of the disease.   The best way to take care of yourself is to avoid things that may have caused the COPD, such as tobacco smoke or exposure to dust, fumes, or chemicals at the workplace. This will give you the greatest chance of stopping the disease from getting worse.   How can I take care of myself?   Follow these guidelines to take care of yourself:   If you smoke, quit.   Follow your healthcare provider's advice for treating COPD. Take all of your medicine according to your provider's instructions.   Avoid secondhand smoke, air pollution, and extreme changes in temperature and humidity.   Ask about getting a yearly flu shot.   Ask about getting a shot to prevent some types of pneumonia (Pneumovax).   Avoid close contact with people who have colds or the flu.   Eat healthy foods.   Eat high-calorie snacks between meals if you are underweight.   Take vitamin and mineral supplements if recommended by your healthcare provider.   Ask your provider about doing some physical activity every day. If you are not used to being physically active, ask about exercise training, such as pulmonary rehab, to safely build up your strength.   Get  plenty of rest and sleep.   Consider lifestyle changes such as changing jobs or moving to a less polluted climate or lower altitude.   If you plan to travel, discuss your plans with your healthcare provider. It's good to make sure there will be no problems with high altitude, humidity, temperature, pressurized airplane cabins, or smoggy cities, especially if you are using oxygen.   A flare-up (exacerbation) is a worsening of the usual symptoms of COPD. You, or sometimes a family member, are usually the first to know when your lung disease is getting worse. Watch for:   shortness of breath that gets worse   more coughing than usual, often with chest tightness   coughing up more sputum   a change in how the sputum looks, such as a change to a darker color or streaks of blood   sputum that has gotten thicker and stickier and harder to cough up   new or worsening wheezing   not being able to do as much activity   being more short of breath during or after physical activity   fever   swelling of your legs   not being able to sleep well because of shortness of breath   chest pain or discomfort that is new or getting worse   feeling drowsy or not able to think clearly.   Any of these symptoms might be a warning sign of a flare-up. If you catch these changes right away and get prompt treatment, you may be able to prevent a trip to the hospital. Ask your healthcare provider for instructions about what to do when you have these symptoms and if there are any other symptoms you should watch fort.   When should I get emergency help?   It is important to know the difference between earlier signs and symptoms of sickness and the signs of an emergency. You should call 911 to get emergency help if:   You cannot talk because you are so short of breath.   It is hard to walk because you are short of breath.   Your lips or fingernails turn gray or blue.   Your heart is beating very fast.   Your medicine does not help for very long or  does not help at all.   You are breathing fast and hard.   You feel like you are going to die.   You are having bad chest pain that does not go away within about 5 minutes.   Do not turn up your oxygen unless your healthcare provider tells you to do this.   How can I help prevent COPD?   85% of COPD cases are caused by tobacco smoke. This includes the smokers themselves and people who are exposed to secondhand smoke. In most cases you can prevent COPD by never smoking and not being around others who are smoking.       Published by [employer].  This content is reviewed periodically and is subject to change as new health information becomes available. The information is intended to inform and educate and is not a replacement for medical evaluation, advice, diagnosis or treatment by a healthcare professional.   Developed by [employer].   ? 2010 [employer] and/or its affiliates. All Rights Reserved.   Copyright   [employer] 2011            Ema Melendez NP  [facility]      Total times spent with patient 40 minutes of which > 50% of the time was spent counseling and coordination of care review of hospitalization, medications - current, depressive symptoms, SE, COPD - symptoms, oxygen, recommendations, follow up and treatment plan.

## 2018-10-18 DIAGNOSIS — F32.1 MAJOR DEPRESSIVE DISORDER, SINGLE EPISODE, MODERATE (H): Chronic | ICD-10-CM

## 2018-10-18 RX ORDER — ESCITALOPRAM OXALATE 5 MG/1
TABLET ORAL
Qty: 30 TABLET | Refills: 1 | OUTPATIENT
Start: 2018-10-18

## 2018-10-18 RX ORDER — ESCITALOPRAM OXALATE 5 MG/1
10 TABLET ORAL DAILY
Qty: 180 TABLET | Refills: 0 | Status: SHIPPED | OUTPATIENT
Start: 2018-10-18 | End: 2018-11-12

## 2018-10-18 NOTE — TELEPHONE ENCOUNTER
"Pharmacy requests a new script for the 2 tabs daily.    Requested Prescriptions   Pending Prescriptions Disp Refills     escitalopram (LEXAPRO) 5 MG tablet  Last Written Prescription Date:  09/14/18  Last Fill Quantity: 30,  # refills: 1   Last office visit: 10/16/2018 with prescribing provider:  10/16/18   Future Office Visit:     30 tablet 1     Sig: Take 1 tablet (5 mg) daily for 2 weeks, then if no improvement in symptoms can increase to 2 tablets (10 mg) daily    SSRIs Protocol Failed    10/18/2018 10:59 AM       Failed - PHQ-9 score less than 5 in past 6 months    Please review last PHQ-9 score.   PHQ-9 SCORE 12/6/2017 6/14/2018 9/14/2018   Total Score - - -   Total Score 15 11 17          Passed - Patient is age 18 or older       Passed - No active pregnancy on record       Passed - No positive pregnancy test in last 12 months       Passed - Recent (6 mo) or future (30 days) visit within the authorizing provider's specialty    Patient had office visit in the last 6 months or has a visit in the next 30 days with authorizing provider or within the authorizing provider's specialty.  See \"Patient Info\" tab in inbasket, or \"Choose Columns\" in Meds & Orders section of the refill encounter.              "

## 2018-10-30 ENCOUNTER — PATIENT OUTREACH (OUTPATIENT)
Dept: CARE COORDINATION | Facility: CLINIC | Age: 83
End: 2018-10-30

## 2018-10-30 ASSESSMENT — ACTIVITIES OF DAILY LIVING (ADL): DEPENDENT_IADLS:: TRANSPORTATION

## 2018-10-30 NOTE — PROGRESS NOTES
Clinic Care Coordination Contact    Situation: Patient chart reviewed by care coordinator.    Background: 86 year old female with severe COPD. Oxygen dependent.    Assessment: patient was seen by PCP on 10/16/18. COPD was stable. Patient's is taking Lexapro 2 tablets and feels depression may be better.     Plan/Recommendations: Called patient. No voicemail. No answer after 10+ rings.  Clinic care coordinator will continue to follow.      Nadine Cooper RN, CCM - Care Coordinator     10/30/2018    2:54 PM  [phone]

## 2018-11-12 DIAGNOSIS — F32.1 MAJOR DEPRESSIVE DISORDER, SINGLE EPISODE, MODERATE (H): Chronic | ICD-10-CM

## 2018-11-12 NOTE — TELEPHONE ENCOUNTER
"Requested Prescriptions   Pending Prescriptions Disp Refills     escitalopram (LEXAPRO) 5 MG tablet [Pharmacy Med Name: ESCITALOPRAM 5 MG TABLET 5 TAB] 180 tablet 0     Sig: TAKE TWO TABLETS (10 MG) BY MOUTH DAILY    SSRIs Protocol Failed    11/12/2018  2:16 PM       Failed - PHQ-9 score less than 5 in past 6 months    Please review last PHQ-9 score.          Passed - Patient is age 18 or older       Passed - No active pregnancy on record       Passed - No positive pregnancy test in last 12 months       Passed - Recent (6 mo) or future (30 days) visit within the authorizing provider's specialty    Patient had office visit in the last 6 months or has a visit in the next 30 days with authorizing provider or within the authorizing provider's specialty.  See \"Patient Info\" tab in inbasket, or \"Choose Columns\" in Meds & Orders section of the refill encounter.            Last Written Prescription Date:  10/18/18  Last Fill Quantity: 180,  # refills: 0   Last office visit: 10/16/2018 with prescribing provider:  NAN   Future Office Visit:      "

## 2018-11-13 NOTE — TELEPHONE ENCOUNTER
Left message on machine to call back for the person that sets up the medication for the patient.    The patient is not able to answer any questions.  There is a friend that sets up her pills.  She is not available today.  Her PCA reports she has enough pills for at least 10 days set up.  Patient's last fill was 10/18/18 for 90 days.      Waiting for clarification if patient needs medication.      Trina ZULUAGA RN

## 2018-11-15 RX ORDER — ESCITALOPRAM OXALATE 5 MG/1
TABLET ORAL
Qty: 180 TABLET | Refills: 0 | Status: SHIPPED | OUTPATIENT
Start: 2018-11-15 | End: 2019-04-12

## 2018-11-15 NOTE — TELEPHONE ENCOUNTER
"The note from pharmacy on the actual request says \"Patient has lost their last refill can we get another please. \"    Unable to do this per RN protocol, will route to provider.   Tawana Khan RNC    "

## 2018-11-27 ENCOUNTER — PATIENT OUTREACH (OUTPATIENT)
Dept: CARE COORDINATION | Facility: CLINIC | Age: 83
End: 2018-11-27

## 2018-11-27 ASSESSMENT — ACTIVITIES OF DAILY LIVING (ADL): DEPENDENT_IADLS:: TRANSPORTATION;CLEANING;SHOPPING

## 2018-11-27 NOTE — PROGRESS NOTES
Clinic Care Coordination Contact    Clinic Care Coordination Contact  OUTREACH    Referral Information:  Referral Source: IP Handoff    Primary Diagnosis: COPD  Chief Complaint   Patient presents with     Clinic Care Coordination - Follow-up     RN   [place] Utilization:   Clinic Utilization  Difficulty keeping appointments:: No  Compliance Concerns: No  No-Show Concerns: No  No PCP office visit in Past Year: No  Utilization    Last refreshed: 11/27/2018  9:37 AM:  No Show Count (past year) 2       Last refreshed: 11/27/2018  9:37 AM:  ED visits 0       Last refreshed: 11/27/2018  9:37 AM:  Hospital admissions 1          Current as of: 11/27/2018  9:37 AM         Clinical Concerns:    Current Medical Concerns:  COPD    Patient states her digestive track is finally normal. She was having trouble with constipation and diarrhea, but this is now resolved.     Current Behavioral Concerns: Denies concerns      Education Provided to patient: Humidity during the winter.  Patient states she has her furnace set at 74 degrees. That is where she is comfortable.      Pain  Pain (GOAL):: No  Health Maintenance Reviewed: Up to date    Clinical Pathway: Clinic Care Coordination COPD Assessment  Symptom Review:    How have you been feeling? Patient states she is tired all the time. She states she moves slow. She is up late at night and then does not get moving until around 2 pm.    Are you having any increased shortness of breath? Patient feels her shortness of breath may be getting a bit worse. Denies cough. She understands this is a progressive disease     Do you have a COPD Action Plan? Yes       Is the COPD Action Plan on refrigerator or available: Yes     What number would you call if you were in the YELLOW zones:  [phone] Patient is always in yellow zone.     Medications:    Were you started on any new medications?  No  Were any of your previous medications changed? No  Do you have all of your medications? Yes  Have you  had trouble filling your prescriptions? No  Are you medications effective in controlling your symptoms? Yes, as much as they can be.   Are you currently on Prednisone?  No    Medication reconciliation completed? Yes    Oxygen/DME:    Are you currently on oxygen?  Yes   Is this new for you? No   Is it set up at home? Yes   What liter flow were you instructed to use and how often do you use it? 3 L  What type of home oxygen system do you have (*ask about portability and ability to manage a portable oxygen delivery)?  Concentrator with portables    Review with patient how to use/maintain nebulizers and inhalers: Patient is knowledgeable    Activity:    How much activity can you do before you are SOB? Minimal. Patient is short of breath just talking.   Have you had to reduce your activities because of dyspnea or other symptoms? No about the same     Diet:    Do you weigh yourself daily? No    Are there any current diet restrictions or changes per discharge instructions? No    Emotions/Lifestyle:    Do you smoke?  reports that she quit smoking about 28 years ago. She has a 35.00 pack-year smoking history. She has quit using smokeless tobacco.    Medication Management:  Friends set up her meds and she administers them independently     Functional Status:  Dependent ADLs:: Ambulation-walker  Dependent IADLs:: Transportation, Cleaning, Shopping  Bed or wheelchair confined:: No  Mobility Status: Independent w/Device  Fallen 2 or more times in the past year?: No  Any fall with injury in the past year?: No    Living Situation:  Current living arrangement:: I live alone  Type of residence:: Private home - stairs    Diet/Exercise/Sleep:  Diet:: Regular  Inadequate nutrition (GOAL):: No  Food Insecurity: No  Tube Feeding: No  Exercise:: Unable to exercise  Inadequate activity/exercise (GOAL):: No  Significant changes in sleep pattern (GOAL): No    Transportation:  Transportation concerns (GOAL):: No - friends provide  "transportation  Transportation means:: Accessible car, Regular car     Psychosocial:  Moravian or spiritual beliefs that impact treatment:: No  Mental health DX:: No  Mental health management concern (GOAL):: No  Informal Support system:: Friends     Financial/Insurance:   Financial/Insurance concerns (GOAL):: No     Resources and Interventions:  Current Resources:    ;   Community Resources: Lif[employer], Housekeeping/[employer]  Supplies used at home:: Oxygen Tubing/Supplies, Nebulizer tubing, Compression Stockings  Equipment Currently Used at Home: oxygen, shower chair    Advance Care Plan/Directive  Advanced Care Plans/Directives on file:: Yes  Type Advanced Care Plans/Directives: POLST    Referrals Placed: None     Goals:   Goals        General    Functional (pt-stated)     Notes - Note created  9/21/2018 10:10 AM by Nadine Cooper RN    Goal Statement: I want to be able to get around my house without \"huffing and puffing.\"    Measure of Success: Comfortable breathing    Supportive Steps to Achieve: I will use continuous oxygen. I will take medications are directed. I will use my nebulizer.    Barriers: Progressive disease    Strengths: Motivated -Wants to be able to go to [facility]    Date to Achieve By: Ongoing    Patient expressed understanding of goal: Yes          Patient/Caregiver understanding: Patient has good understanding    Outreach Frequency: monthly    Plan: Clinic care coordinator will continue to follow.     Nadine Cooper RN, [facility] - Care Coordinator     11/27/2018    10:39 AM  [phone]    "

## 2018-12-19 ENCOUNTER — OFFICE VISIT (OUTPATIENT)
Dept: FAMILY MEDICINE | Facility: CLINIC | Age: 83
End: 2018-12-19
Payer: COMMERCIAL

## 2018-12-19 VITALS
BODY MASS INDEX: 42.82 KG/M2 | WEIGHT: 257 LBS | SYSTOLIC BLOOD PRESSURE: 116 MMHG | HEIGHT: 65 IN | OXYGEN SATURATION: 95 % | RESPIRATION RATE: 12 BRPM | HEART RATE: 62 BPM | DIASTOLIC BLOOD PRESSURE: 66 MMHG

## 2018-12-19 DIAGNOSIS — J44.1 COPD WITH ACUTE EXACERBATION (H): Primary | ICD-10-CM

## 2018-12-19 DIAGNOSIS — H90.6 MIXED CONDUCTIVE AND SENSORINEURAL HEARING LOSS OF BOTH EARS: ICD-10-CM

## 2018-12-19 PROCEDURE — 99214 OFFICE O/P EST MOD 30 MIN: CPT | Performed by: FAMILY MEDICINE

## 2018-12-19 RX ORDER — DOXYCYCLINE HYCLATE 100 MG
100 TABLET ORAL 2 TIMES DAILY
Qty: 14 TABLET | Refills: 0 | Status: SHIPPED | OUTPATIENT
Start: 2018-12-19 | End: 2018-12-29

## 2018-12-19 RX ORDER — PREDNISONE 20 MG/1
TABLET ORAL
Qty: 20 TABLET | Refills: 0 | Status: SHIPPED | OUTPATIENT
Start: 2018-12-19 | End: 2018-12-26

## 2018-12-19 ASSESSMENT — MIFFLIN-ST. JEOR: SCORE: 1606.62

## 2018-12-19 NOTE — PATIENT INSTRUCTIONS
Thank you for choosing [facility].  You may be receiving a survey in the mail from Iraida Rubio regarding your visit today.  Please take a few minutes to complete and return the survey to let us know how we are doing.      If you have questions or concerns, please contact us via [employer] or you can contact your care team at [phone].    Our Clinic hours are:  Monday 6:40 am  to 7:00 pm  Tuesday -Friday 6:40 am to 5:00 pm    The [place] outpatient lab hours are:  Monday - Friday 6:10 am to 4:45 pm  Saturdays 7:00 am to 11:00 am  Appointments are required, call [phone]    If you have clinical questions after hours or would like to schedule an appointment,  call the clinic at [phone].  Patient Education     COPD Flare    You have had a flare-up of your COPD.  COPD, or chronic obstructive pulmonary disease, is a common lung disease. It causes your airways to become irritated and narrower. This makes it harder for you to breathe. Emphysema and chronic bronchitis are both types of COPD. This is a chronic condition, which means you always have it. Sometimes it gets worse. When this happens, it is called a flare-up.  Symptoms of COPD  People with COPD may have symptoms most of the time. In a flare-up, your symptoms get worse. These symptoms may mean you are having a flare-up:    Shortness of breath, shallow or rapid breathing, or wheezing that gets worse    Lung infection    Cough that gets worse    More mucus, thicker mucus or mucus of a different color    Tiredness, decreased energy, or trouble doing your usual activities    Fever    Chest tightness    Your symptoms don t get better even when you use your usual medicines, inhalers, and nebulizer    Trouble talking    You feel confused  Causes of flare-ups  Unfortunately, a flare-up can happen even though you did everything right, and you followed your doctor s instructions. Some causes of flare-ups are:    Smoking or secondhand smoke    Colds, the  flu, or respiratory infections    Air pollution    Sudden change in the weather    Dust, irritating chemicals, or strong fumes    Not taking your medicines as prescribed  Home care  Here are some things you can do at home to treat a flare-up:    Try not to panic. This makes it harder to breathe, and keeps you from doing the right things.    Don t smoke or be around others who are smoking.    Try to drink more fluids than usual during a flare-up, unless your doctor has told you not to because of heart and kidney problems. More fluids can help loosen the mucus.    Use your inhalers and nebulizer, if you have one, as you have been told to.    If you were given antibiotics, take them until they are used up or your doctor tells you to stop. It s important to finish the antibiotics, even though you feel better. This will make sure the infection has cleared.    If you were given prednisone or another steroid, finish it even if you feel better.  Preventing a flare-up  Even though flare-ups happen, the best way to treat one is to prevent it before it starts. Here are some pointers:    Don t smoke or be around others who are smoking.    Take your medicines as you have been told.    Talk with your doctor about getting a flu shot every year. Also find out if you need a pneumonia shot.    If there is a weather advisory warning to stay indoors, try to stay inside when possible.    Try to eat healthy and get plenty of sleep.    Try to avoid things that usually set you off, like dust, chemical fumes, hairsprays, or strong perfumes.  Follow-up care  Follow up with your healthcare provider, or as advised.  If a culture was done, you will be told if your treatment needs to be changed. You can call as directed for the results.  If X-rays were done, you will be notified of any new findings that may affect your care.  Call 911  Call 911 if any of these occur:    You have trouble breathing    You feel confused or it s difficult to wake  you up    You faint or lose consciousness    You have a rapid heart rate    You have new pain in your chest, arm, shoulder, neck or upper back  When to seek medical advice  Call your healthcare provider right away if any of these occur:    Wheezing or shortness of breath gets worse    You need to use your inhalers more often than usual without relief    Fever of 100.4 F (38 C) or higher, or as directed by your healthcare provider    Coughing up lots of dark-colored or bloody mucus (sputum)    Chest pain with each breath    You do not start to get better within 24 hours    Swelling of your ankles gets worse    Dizziness or weakness  Date Last Reviewed: 9/1/2016 2000-2018 [employer]. [address] [place] [address], [place] [address]. All rights reserved. This information is not intended as a substitute for professional medical care. Always follow your healthcare professional's instructions.

## 2018-12-19 NOTE — PROGRESS NOTES
SUBJECTIVE:   Susan Haq is a 86 year old female who presents to clinic today for the following health issues:      Acute Illness   Acute illness concerns: cough  Onset: 2 weeks     Fever: no    Chills/Sweats: no    Headache (location?): no    Sinus Pressure:no    Conjunctivitis:  no    Ear Pain: no    Rhinorrhea: YES    Congestion: YES    Sore Throat: no      Cough: YES-productive of green sputum, with shortness of breath    Wheeze: YES    Decreased Appetite: no    Nausea: no    Vomiting: no    Diarrhea:  no    Dysuria/Freq.: no    Fatigue/Achiness: YES    Sick/Strep Exposure: no     Therapies Tried and outcome: none       History as above, 86 yr old female with past medical history significant for COPD and oxygen dependent. She comes in with cough ongoing for three weeks. Cough is productive of greenish sputum. She denies any fevers. She has been more short of breath. She is on 3 liters of oxygen at home. . She has not needed more oxygen. She reports no chest pain . She has had some wheezing. She is taking her inhalers as directed.       Patient also has some hearing loss but despite her hearing aids she is still having a had time hearing. She will like a referral to audiology.      Problem list and histories reviewed & adjusted, as indicated.  Additional history: as documented    Patient Active Problem List   Diagnosis     Hyperlipidemia LDL goal <130     Insomnia     Osteopenia     Hypertension goal BP (blood pressure) < 140/90     Obesity     Advance care planning     Adenomatous polyp of colon     PVC's (premature ventricular contractions)     SUSSY (obstructive sleep apnea)-Moderately severe (AHI 21)     Bilateral leg edema     HL (hearing loss)     Umbilical hernia     SNHL (sensorineural hearing loss)     Interstitial pulmonary fibrosis (H)     Chronic obstructive pulmonary disease, unspecified COPD type (H)     Major depressive disorder, single episode, moderate (H)     Risk for falls     Alcohol  abuse     Morbid obesity (H)     Physical deconditioning     Chronic obstructive pulmonary disease with acute exacerbation (H)     Past Surgical History:   Procedure Laterality Date     APPENDECTOMY       C BSO, OMENTECTOMY W/SHANIA       C NONSPECIFIC PROCEDURE      (L) clavicle orif     C STOMACH SURGERY PROCEDURE UNLISTED       CHOLECYSTECTOMY, LAPOROSCOPIC  10/13/2011    Cholecystectomy, Laparoscopic     HERNIA REPAIR, UMBILICAL  10/13/2011     HYSTERECTOMY, CERVIX STATUS UNKNOWN         Social History     Tobacco Use     Smoking status: Former Smoker     Packs/day: 1.00     Years: 35.00     Pack years: 35.00     Last attempt to quit: 1990     Years since quittin.9     Smokeless tobacco: Former User   Substance Use Topics     Alcohol use: Yes     Comment: Drink 1 (3oz) drink a day     Family History   Problem Relation Age of Onset     Diabetes Father      Coronary Artery Disease Maternal Grandmother      Coronary Artery Disease Paternal Uncle          Current Outpatient Medications   Medication Sig Dispense Refill     acetaminophen (TYLENOL) 500 MG tablet Take 2 tablets (1,000 mg) by mouth every 8 hours 60 tablet 6     albuterol (2.5 MG/3ML) 0.083% neb solution NEBULIZE ONE VIAL (2.5 MG) EVERY FOUR HOURS AS NEEDED FOR SHORTNESS OF BREATH/DYSPNEA OR WHEEZING 120 vial 3     albuterol (PROAIR HFA/PROVENTIL HFA/VENTOLIN HFA) 108 (90 Base) MCG/ACT Inhaler Inhale 2 puffs into the lungs every 4 hours as needed for shortness of breath / dyspnea or wheezing 3 Inhaler 3     ALLERGY RELIEF 10 MG tablet TAKE ONE TABLET BY MOUTH ONCE DAILY 90 tablet 1     amLODIPine (NORVASC) 10 MG tablet TAKE 1 TABLET (10 MG) BY MOUTH DAILY 90 tablet 1     atenolol (TENORMIN) 50 MG tablet Take 1 tablet (50 mg) by mouth 2 times daily 180 tablet 3     atorvastatin (LIPITOR) 20 MG tablet Take 1 tablet (20 mg) by mouth daily 90 tablet 3     budesonide-formoterol (SYMBICORT) 160-4.5 MCG/ACT Inhaler INHALE 2 PUFFS INTO THE LUNGS 2 TIMES  DAILY 10.2 g 5     camphor-menthol (DERMASARRA) 0.5-0.5 % LOTN Apply 1 mL topically every 8 hours as needed for skin care (apply to rash on legs) 1 Bottle 1     doxycycline hyclate (VIBRA-TABS) 100 MG tablet Take 1 tablet (100 mg) by mouth 2 times daily for 10 days 14 tablet 0     escitalopram (LEXAPRO) 5 MG tablet TAKE TWO TABLETS (10 MG) BY MOUTH DAILY 180 tablet 0     FISH OIL 1000 MG OR CAPS 1 cap daily       folic acid (FOLVITE) 1 MG tablet Take 1 tablet (1 mg) by mouth daily 90 tablet 3     losartan-hydrochlorothiazide (HYZAAR) 100-25 MG per tablet Take 1 tablet by mouth daily 90 tablet 3     potassium chloride (K-TAB,KLOR-CON) 10 MEQ tablet TAKE 1 TABLET (10 MEQ) BY MOUTH 2 TIMES DAILY 60 tablet 9     predniSONE (DELTASONE) 20 MG tablet Take 3 tabs (60 mg) by mouth daily x 3 days, 2 tabs (40 mg) daily x 3 days, 1 tab (20 mg) daily x 3 days, then 1/2 tab (10 mg) x 3 days.. 20 tablet 0     tiotropium (SPIRIVA HANDIHALER) 18 MCG capsule Inhale  into the lungs. Inhale contents of one capsule daily 90 capsule 3     Calcium Carbonate-Vitamin D (CALCIUM 600 + D OR) Take  by mouth.       nystatin (MYCOSTATIN) 824186 UNIT/GM POWD Apply topically 3 times daily as needed (Patient not taking: Reported on 12/19/2018) 60 g 1     order for DME Equipment being ordered: Nebulizer 1 each 0     ORDER FOR DME Equipment being ordered: Oxygen - 3 liters continous 1 Device 0     ORDER FOR DME Equipment being ordered: CPAP supplies 1 Units 0     ORDER FOR DME Equipment being ordered: Oxygen 1 Units 0     ORDER FOR DME TEDs stockings knee high to be worn during the day. 1 Units 0     ORDER FOR DME 1.  CPAP pressure 12 cm/H20 with heated humidity and auto-titrating capability.   2.  Provide mask to fit and CPAP supplies.  3.  Length of need lifetime.  4.  Ok to d/c O2 bleed in to her PAP overnight.           Respiratory Therapy Supplies (NEBULIZER COMPRESSOR) KIT 1 kit every 4 hours as needed 1 kit 0     SENNA PLUS 8.6-50 MG per  "tablet TAKE ONE TO TWO TABLETS BY MOUTH TWICE DAILY AS NEEDED FOR CONSTIPATION (Patient not taking: Reported on 12/19/2018) 100 tablet 1     SIMETHICONE-80 PO Take 80 mg by mouth every morning And TID prn belching       Allergies   Allergen Reactions     Ace Inhibitors Cough     Asa [Aspirin]      Penicillins      BP Readings from Last 3 Encounters:   12/19/18 116/66   10/16/18 120/80   09/20/18 134/60    Wt Readings from Last 3 Encounters:   12/19/18 116.6 kg (257 lb)   10/16/18 115.4 kg (254 lb 8 oz)   09/20/18 114.5 kg (252 lb 6.8 oz)                  Labs reviewed in EPIC    Reviewed and updated as needed this visit by clinical staff  Tobacco  Allergies  Meds  Med Hx  Surg Hx  Fam Hx  Soc Hx      Reviewed and updated as needed this visit by Provider         ROS:  Constitutional, HEENT, cardiovascular, pulmonary, gi and gu systems are negative, except as otherwise noted.    OBJECTIVE:     /66   Pulse 62   Resp 12   Ht 1.651 m (5' 5\")   Wt 116.6 kg (257 lb)   SpO2 95%   BMI 42.77 kg/m    Body mass index is 42.77 kg/m .  GENERAL: healthy, alert and no distress  EYES: Eyes grossly normal to inspection, PERRL and conjunctivae and sclerae normal  HENT: ear canals and TM's normal, nose and mouth without ulcers or lesions  NECK: no adenopathy, no asymmetry, masses, or scars and thyroid normal to palpation  RESP: expiratory wheezes throughout  CV: regular rate and rhythm, normal S1 S2, no S3 or S4, no murmur, click or rub, no peripheral edema and peripheral pulses strong  MS: no gross musculoskeletal defects noted, no edema    Diagnostic Test Results:  none     ASSESSMENT/PLAN:         1. COPD with acute exacerbation (H)  Antibiotics and prednisone faxed , asked that she use her nebs more frequently during this flare.   - doxycycline hyclate (VIBRA-TABS) 100 MG tablet; Take 1 tablet (100 mg) by mouth 2 times daily for 10 days  Dispense: 14 tablet; Refill: 0  - predniSONE (DELTASONE) 20 MG tablet; Take 3 " tabs (60 mg) by mouth daily x 3 days, 2 tabs (40 mg) daily x 3 days, 1 tab (20 mg) daily x 3 days, then 1/2 tab (10 mg) x 3 days..  Dispense: 20 tablet; Refill: 0    2. Mixed conductive and sensorineural hearing loss of both ears  - AUDIOLOGY ADULT REFERRAL  - OTOLARYNGOLOGY REFERRAL    FUTURE APPOINTMENTS:       - Follow-up visit as needed.    Chemo Gallo MD  [facility]

## 2019-01-04 ENCOUNTER — PATIENT OUTREACH (OUTPATIENT)
Dept: CARE COORDINATION | Facility: CLINIC | Age: 84
End: 2019-01-04

## 2019-01-04 NOTE — PROGRESS NOTES
Clinic Care Coordination Contact  Care Team Conversations    RN Care Coordinator called patient for monthly outreach. Introduced self and reason for call. Patient states the call woke her up and isn't ready to talk. Patient would like call back in afternoon.    Plan: RN CC will try patient again in afternoon.    RUSSELL LuiN, RN   RN Clinic Care Coordinator  [facility]:  [place]  Phone: [phone]

## 2019-01-04 NOTE — LETTER
[facility]  [address] [place] [address]      January 4, 2019      Susan DELUNA Severino  [address] [place] [address]      Dear Susan,    I am a clinic care coordinator who works with Ema Melendez NP at [facility].  I wanted to introduce myself and provide you with my contact information so that you can call me with questions or concerns about your health care. Below is a description of clinic care coordination and how I can further assist you.     The clinic care coordinator is a registered nurse and/or  who understand the health care system. The goal of clinic care coordination is to help you manage your health and improve access to the [facility] system in the most efficient manner. The registered nurse can assist you in meeting your health care goals by providing education, coordinating services, and strengthening the communication among your providers. The  can assist you with financial, behavioral, psychosocial, chemical dependency, counseling, and/or psychiatric resources.    Please feel free to contact me at [phone], with any questions or concerns. We at [facility] are focused on providing you with the highest-quality healthcare experience possible and that all starts with you.     Sincerely,     FÉLIX Lui, RN   RN Clinic Care Coordinator  [facility]:  [place]  Phone: [phone]

## 2019-01-04 NOTE — PROGRESS NOTES
Clinic Care Coordination Contact  [facility]/Voicemail       Clinical Data: Care Coordinator Outreach    RN CC had spoke to patient this morning but patient had requested call back in afternoon. Outreach attempted.  Left message on voicemail with call back information and requested return call.    Plan: Care Coordinator will try to reach patient again in 1-2 business days.    RUSSELL LuiN, RN   RN Clinic Care Coordinator  [facility]:  [place]  Phone: [phone]

## 2019-01-07 ASSESSMENT — ACTIVITIES OF DAILY LIVING (ADL): DEPENDENT_IADLS:: TRANSPORTATION;CLEANING;SHOPPING

## 2019-01-07 NOTE — PROGRESS NOTES
Clinic Care Coordination Contact  [facility]/Voicemail    Referral Source: IP Handoff (initially)    Clinical Data: Care Coordinator Outreach  Outreach attempted x 2.  Left message on voicemail with call back information and requested return call.    Plan: Care Coordinator mailed out care coordination introduction letter on 1/4/19. Care Coordinator will do no further outreaches at this time.    FÉLIX Lui, RN   RN Clinic Care Coordinator  [facility]:  [place]  Phone: [phone]

## 2019-01-15 ENCOUNTER — OFFICE VISIT (OUTPATIENT)
Dept: OTOLARYNGOLOGY | Facility: CLINIC | Age: 84
End: 2019-01-15
Payer: COMMERCIAL

## 2019-01-15 ENCOUNTER — OFFICE VISIT (OUTPATIENT)
Dept: AUDIOLOGY | Facility: CLINIC | Age: 84
End: 2019-01-15
Payer: COMMERCIAL

## 2019-01-15 VITALS
HEIGHT: 65 IN | SYSTOLIC BLOOD PRESSURE: 152 MMHG | BODY MASS INDEX: 42.77 KG/M2 | TEMPERATURE: 97.8 F | HEART RATE: 63 BPM | DIASTOLIC BLOOD PRESSURE: 68 MMHG

## 2019-01-15 DIAGNOSIS — H90.3 SENSORINEURAL HEARING LOSS, ASYMMETRICAL: Primary | ICD-10-CM

## 2019-01-15 DIAGNOSIS — H90.3 SENSORINEURAL HEARING LOSS, BILATERAL: Primary | ICD-10-CM

## 2019-01-15 DIAGNOSIS — H61.23 BILATERAL IMPACTED CERUMEN: ICD-10-CM

## 2019-01-15 PROCEDURE — 92557 COMPREHENSIVE HEARING TEST: CPT | Performed by: AUDIOLOGIST

## 2019-01-15 PROCEDURE — 99207 ZZC NO CHARGE LOS: CPT | Performed by: AUDIOLOGIST

## 2019-01-15 PROCEDURE — V5299 HEARING SERVICE: HCPCS | Performed by: AUDIOLOGIST

## 2019-01-15 PROCEDURE — 99203 OFFICE O/P NEW LOW 30 MIN: CPT | Mod: 25 | Performed by: OTOLARYNGOLOGY

## 2019-01-15 PROCEDURE — 69210 REMOVE IMPACTED EAR WAX UNI: CPT | Performed by: OTOLARYNGOLOGY

## 2019-01-15 NOTE — PROGRESS NOTES
History of Present Illness - Susan Haq is a 87 year old female with a longstanding history of hearing loss for which she wears bilateral hearing aids. Today, she has concerns of worsening hearing despite her hearing aids. She was told that she also has cerumen impactions, and would need these cleared before getting new hearing aid molds. She denies ear pain or drainage. No prior ear surgery.    Past Medical History -   Patient Active Problem List   Diagnosis     Hyperlipidemia LDL goal <130     Insomnia     Osteopenia     Hypertension goal BP (blood pressure) < 140/90     Obesity     Advance care planning     Adenomatous polyp of colon     PVC's (premature ventricular contractions)     SUSSY (obstructive sleep apnea)-Moderately severe (AHI 21)     Bilateral leg edema     HL (hearing loss)     Umbilical hernia     SNHL (sensorineural hearing loss)     Interstitial pulmonary fibrosis (H)     Chronic obstructive pulmonary disease, unspecified COPD type (H)     Major depressive disorder, single episode, moderate (H)     Risk for falls     Alcohol abuse     Morbid obesity (H)     Physical deconditioning     Chronic obstructive pulmonary disease with acute exacerbation (H)       Current Medications -   Current Outpatient Medications:      acetaminophen (TYLENOL) 500 MG tablet, Take 2 tablets (1,000 mg) by mouth every 8 hours, Disp: 60 tablet, Rfl: 6     albuterol (2.5 MG/3ML) 0.083% neb solution, NEBULIZE ONE VIAL (2.5 MG) EVERY FOUR HOURS AS NEEDED FOR SHORTNESS OF BREATH/DYSPNEA OR WHEEZING, Disp: 120 vial, Rfl: 3     albuterol (PROAIR HFA/PROVENTIL HFA/VENTOLIN HFA) 108 (90 Base) MCG/ACT Inhaler, Inhale 2 puffs into the lungs every 4 hours as needed for shortness of breath / dyspnea or wheezing, Disp: 3 Inhaler, Rfl: 3     ALLERGY RELIEF 10 MG tablet, TAKE ONE TABLET BY MOUTH ONCE DAILY, Disp: 90 tablet, Rfl: 1     amLODIPine (NORVASC) 10 MG tablet, TAKE 1 TABLET (10 MG) BY MOUTH DAILY, Disp: 90 tablet, Rfl: 1      atenolol (TENORMIN) 50 MG tablet, Take 1 tablet (50 mg) by mouth 2 times daily, Disp: 180 tablet, Rfl: 3     atorvastatin (LIPITOR) 20 MG tablet, Take 1 tablet (20 mg) by mouth daily, Disp: 90 tablet, Rfl: 3     budesonide-formoterol (SYMBICORT) 160-4.5 MCG/ACT Inhaler, INHALE 2 PUFFS INTO THE LUNGS 2 TIMES DAILY, Disp: 10.2 g, Rfl: 5     Calcium Carbonate-Vitamin D (CALCIUM 600 + D OR), Take  by mouth., Disp: , Rfl:      camphor-menthol (DERMASARRA) 0.5-0.5 % LOTN, Apply 1 mL topically every 8 hours as needed for skin care (apply to rash on legs), Disp: 1 Bottle, Rfl: 1     escitalopram (LEXAPRO) 5 MG tablet, TAKE TWO TABLETS (10 MG) BY MOUTH DAILY, Disp: 180 tablet, Rfl: 0     FISH OIL 1000 MG OR CAPS, 1 cap daily, Disp: , Rfl:      folic acid (FOLVITE) 1 MG tablet, Take 1 tablet (1 mg) by mouth daily, Disp: 90 tablet, Rfl: 3     losartan-hydrochlorothiazide (HYZAAR) 100-25 MG per tablet, Take 1 tablet by mouth daily, Disp: 90 tablet, Rfl: 3     nystatin (MYCOSTATIN) 141433 UNIT/GM POWD, Apply topically 3 times daily as needed, Disp: 60 g, Rfl: 1     order for DME, Equipment being ordered: Nebulizer, Disp: 1 each, Rfl: 0     ORDER FOR DME, Equipment being ordered: Oxygen - 3 liters continous, Disp: 1 Device, Rfl: 0     ORDER FOR DME, Equipment being ordered: CPAP supplies, Disp: 1 Units, Rfl: 0     ORDER FOR DME, Equipment being ordered: Oxygen, Disp: 1 Units, Rfl: 0     ORDER FOR DME, TEDs stockings knee high to be worn during the day., Disp: 1 Units, Rfl: 0     ORDER FOR DME, 1.  CPAP pressure 12 cm/H20 with heated humidity and auto-titrating capability.  2.  Provide mask to fit and CPAP supplies. 3.  Length of need lifetime. 4.  Ok to d/c O2 bleed in to her PAP overnight.  , Disp: , Rfl:      potassium chloride (K-TAB,KLOR-CON) 10 MEQ tablet, TAKE 1 TABLET (10 MEQ) BY MOUTH 2 TIMES DAILY, Disp: 60 tablet, Rfl: 9     Respiratory Therapy Supplies (NEBULIZER COMPRESSOR) KIT, 1 kit every 4 hours as needed, Disp: 1  kit, Rfl: 0     SENNA PLUS 8.6-50 MG per tablet, TAKE ONE TO TWO TABLETS BY MOUTH TWICE DAILY AS NEEDED FOR CONSTIPATION, Disp: 100 tablet, Rfl: 1     SIMETHICONE-80 PO, Take 80 mg by mouth every morning And TID prn belching, Disp: , Rfl:      tiotropium (SPIRIVA HANDIHALER) 18 MCG capsule, Inhale  into the lungs. Inhale contents of one capsule daily, Disp: 90 capsule, Rfl: 3    Allergies -   Allergies   Allergen Reactions     Ace Inhibitors Cough     Asa [Aspirin]      Penicillins        Social History -   Social History     Socioeconomic History     Marital status:      Spouse name: Not on file     Number of children: Not on file     Years of education: Not on file     Highest education level: Not on file   Social Needs     Financial resource strain: Not on file     Food insecurity - worry: Not on file     Food insecurity - inability: Not on file     Transportation needs - medical: Not on file     Transportation needs - non-medical: Not on file   Occupational History     Employer: RETIRED     Comment:  in [place]   Tobacco Use     Smoking status: Former Smoker     Packs/day: 1.00     Years: 35.00     Pack years: 35.00     Last attempt to quit: 1990     Years since quittin.0     Smokeless tobacco: Former User   Substance and Sexual Activity     Alcohol use: Yes     Comment: Drink 1 (3oz) drink a day     Drug use: No     Sexual activity: No   Other Topics Concern     Parent/sibling w/ CABG, MI or angioplasty before 65F 55M? Not Asked   Social History Narrative    Lives independently in own home.       Family History -   Family History   Problem Relation Age of Onset     Diabetes Father      Coronary Artery Disease Maternal Grandmother      Coronary Artery Disease Paternal Uncle        Review of Systems - As per HPI and PMHx, otherwise 7 system review of the head and neck negative. 10+ system review negative.    Physical Exam  /68 (BP Location: Right arm, Patient Position:  "Sitting, Cuff Size: Adult Regular)   Pulse 63   Temp 97.8  F (36.6  C) (Oral)   Ht 1.651 m (5' 5\")   BMI 42.77 kg/m    General - The patient is well nourished and well developed, and appears to have good nutritional status.  Alert and oriented to person and place, answers questions and cooperates with examination appropriately.   Head and Face - Normocephalic and atraumatic, with no gross asymmetry noted of the contour of the facial features.  The facial nerve is intact, with strong symmetric movements.  Voice and Breathing - The patient was breathing comfortably without the use of accessory muscles. There was no wheezing, stridor, or stertor.  The patients voice was clear and strong, and had appropriate pitch and quality.  Ears - Bilateral pinna and EACs with normal appearing overlying skin. Tympanic membrane intact with good mobility on pneumatic otoscopy bilaterally. Bony landmarks of the ossicular chain are normal. The tympanic membranes are normal in appearance. No retraction, perforation, or masses.  No fluid or purulence was seen in the external canal or the middle ear. Bilateral cerumen impaction.  Eyes - Extraocular movements intact.  Sclera were not icteric or injected, conjunctiva were pink and moist.  Mouth - Examination of the oral cavity showed pink, healthy oral mucosa. No lesions or ulcerations noted.  The tongue was mobile and midline, and the dentition were in good condition.    Throat - The walls of the oropharynx were smooth, pink, moist, symmetric, and had no lesions or ulcerations.  The tonsillar pillars and soft palate were symmetric.  The uvula was midline on elevation.  Neck - Normal midline excursion of the laryngotracheal complex during swallowing.  Full range of motion on passive movement.  Palpation of the occipital, submental, submandibular, internal jugular chain, and supraclavicular nodes did not demonstrate any abnormal lymph nodes or masses.  The carotid pulse was palpable " bilaterally.  Palpation of the thyroid was soft and smooth, with no nodules or goiter appreciated.  The trachea was mobile and midline.  Nose - External contour is symmetric, no gross deflection or scars.  Nasal mucosa is pink and moist with no abnormal mucus.  The septum was midline and non-obstructive, turbinates of normal size and position.  No polyps, masses, or purulence noted on examination.    Audiogram in 2016 demonstrates bilateral moderate to severe SNHL.    Audiogram 1/15/2019 bilateral moderate to severe sensorineural hearing loss.    Procedure: Cerumen Disimpaction  Diagnosis: Cerumen Impaction    Procedure and Findings  Ears - On examination of the ears, I found that the  ears were impacted with dense cerumen obscuring visualization of the right and left TM.  Therefore, I positioned the patient and I used the binocular surgical microscope to assist in visualization of the affected ear(s).  I began by using a cerumen loop to gently lift the edges of the cerumen mass away from the walls of the external canal.  Once I did this, I was able to suction away fragments of wax and debris using suction.  Once the mass was loose enough, the entire plug was pulled from the canal with microforceps.  The tympanic membrane was intact without sign of perforation or middle ear effusion and minimal ear canal trauma. Patient tolerated the procedure well.     Assessment - Susan Haq is a 87 year old female with bilateral moderate to severe sensorineural hearing loss. Disimpaction of cerumen preformed in clinic today. Patient commented on better hearing following disimpaction. I reassure the patient her hearing has not changed since her last audiology screening in 2016. I discussed her decreased hearing ability may be due to the background noise associated with her portable oxygen. I advised her positioning the portable oxygen behind her or under her wheelchair may help reducing the background noise that could be  causing her concerns of hearing loss. Follow up as needed.     Patient to follow up with Primary Care provider regarding elevated blood pressure.    Dr. Bayron Perez MD  Otolaryngology  [facility]    This document serves as a record of the services and decisions personally performed and made by Dr. Bayron Perez MD. It was created on his behalf by Ana Paula Holman, a trained medical scribe. The creation of this document is based the provider's statements to the medical scribe.    Scribemily Holman 11:04 AM 1/15/2019     The information in this document, created by a scribe for me, accurately reflects the services I personally performed and the decisions made by me. I have reviewed and approved this document for accuracy.

## 2019-01-15 NOTE — NURSING NOTE
"Initial /68 (BP Location: Right arm, Patient Position: Sitting, Cuff Size: Adult Regular)   Pulse 63   Temp 97.8  F (36.6  C) (Oral)   Ht 1.651 m (5' 5\")   BMI 42.77 kg/m   Estimated body mass index is 42.77 kg/m  as calculated from the following:    Height as of this encounter: 1.651 m (5' 5\").    Weight as of 12/19/18: 116.6 kg (257 lb). .    Hortensia Trujillo CMA    "

## 2019-01-15 NOTE — LETTER
1/15/2019         RE: Susan Haq  [address] [place] [address]        Dear Colleague,    Thank you for referring your patient, Susan Haq, to the [facility]. Please see a copy of my visit note below.    History of Present Illness - Susan Haq is a 87 year old female with a longstanding history of hearing loss for which she wears bilateral hearing aids. Today, she has concerns of worsening hearing despite her hearing aids. She was told that she also has cerumen impactions, and would need these cleared before getting new hearing aid molds. She denies ear pain or drainage. No prior ear surgery.    Past Medical History -   Patient Active Problem List   Diagnosis     Hyperlipidemia LDL goal <130     Insomnia     Osteopenia     Hypertension goal BP (blood pressure) < 140/90     Obesity     Advance care planning     Adenomatous polyp of colon     PVC's (premature ventricular contractions)     SUSSY (obstructive sleep apnea)-Moderately severe (AHI 21)     Bilateral leg edema     HL (hearing loss)     Umbilical hernia     SNHL (sensorineural hearing loss)     Interstitial pulmonary fibrosis (H)     Chronic obstructive pulmonary disease, unspecified COPD type (H)     Major depressive disorder, single episode, moderate (H)     Risk for falls     Alcohol abuse     Morbid obesity (H)     Physical deconditioning     Chronic obstructive pulmonary disease with acute exacerbation (H)       Current Medications -   Current Outpatient Medications:      acetaminophen (TYLENOL) 500 MG tablet, Take 2 tablets (1,000 mg) by mouth every 8 hours, Disp: 60 tablet, Rfl: 6     albuterol (2.5 MG/3ML) 0.083% neb solution, NEBULIZE ONE VIAL (2.5 MG) EVERY FOUR HOURS AS NEEDED FOR SHORTNESS OF BREATH/DYSPNEA OR WHEEZING, Disp: 120 vial, Rfl: 3     albuterol (PROAIR HFA/PROVENTIL HFA/VENTOLIN HFA) 108 (90 Base) MCG/ACT Inhaler, Inhale 2 puffs into the lungs every 4 hours as needed for shortness  of breath / dyspnea or wheezing, Disp: 3 Inhaler, Rfl: 3     ALLERGY RELIEF 10 MG tablet, TAKE ONE TABLET BY MOUTH ONCE DAILY, Disp: 90 tablet, Rfl: 1     amLODIPine (NORVASC) 10 MG tablet, TAKE 1 TABLET (10 MG) BY MOUTH DAILY, Disp: 90 tablet, Rfl: 1     atenolol (TENORMIN) 50 MG tablet, Take 1 tablet (50 mg) by mouth 2 times daily, Disp: 180 tablet, Rfl: 3     atorvastatin (LIPITOR) 20 MG tablet, Take 1 tablet (20 mg) by mouth daily, Disp: 90 tablet, Rfl: 3     budesonide-formoterol (SYMBICORT) 160-4.5 MCG/ACT Inhaler, INHALE 2 PUFFS INTO THE LUNGS 2 TIMES DAILY, Disp: 10.2 g, Rfl: 5     Calcium Carbonate-Vitamin D (CALCIUM 600 + D OR), Take  by mouth., Disp: , Rfl:      camphor-menthol (DERMASARRA) 0.5-0.5 % LOTN, Apply 1 mL topically every 8 hours as needed for skin care (apply to rash on legs), Disp: 1 Bottle, Rfl: 1     escitalopram (LEXAPRO) 5 MG tablet, TAKE TWO TABLETS (10 MG) BY MOUTH DAILY, Disp: 180 tablet, Rfl: 0     FISH OIL 1000 MG OR CAPS, 1 cap daily, Disp: , Rfl:      folic acid (FOLVITE) 1 MG tablet, Take 1 tablet (1 mg) by mouth daily, Disp: 90 tablet, Rfl: 3     losartan-hydrochlorothiazide (HYZAAR) 100-25 MG per tablet, Take 1 tablet by mouth daily, Disp: 90 tablet, Rfl: 3     nystatin (MYCOSTATIN) 964249 UNIT/GM POWD, Apply topically 3 times daily as needed, Disp: 60 g, Rfl: 1     order for DME, Equipment being ordered: Nebulizer, Disp: 1 each, Rfl: 0     ORDER FOR DME, Equipment being ordered: Oxygen - 3 liters continous, Disp: 1 Device, Rfl: 0     ORDER FOR DME, Equipment being ordered: CPAP supplies, Disp: 1 Units, Rfl: 0     ORDER FOR DME, Equipment being ordered: Oxygen, Disp: 1 Units, Rfl: 0     ORDER FOR DME, TEDs stockings knee high to be worn during the day., Disp: 1 Units, Rfl: 0     ORDER FOR DME, 1.  CPAP pressure 12 cm/H20 with heated humidity and auto-titrating capability.  2.  Provide mask to fit and CPAP supplies. 3.  Length of need lifetime. 4.  Ok to d/c O2 bleed in to her  PAP overnight.  , Disp: , Rfl:      potassium chloride (K-TAB,KLOR-CON) 10 MEQ tablet, TAKE 1 TABLET (10 MEQ) BY MOUTH 2 TIMES DAILY, Disp: 60 tablet, Rfl: 9     Respiratory Therapy Supplies (NEBULIZER COMPRESSOR) KIT, 1 kit every 4 hours as needed, Disp: 1 kit, Rfl: 0     SENNA PLUS 8.6-50 MG per tablet, TAKE ONE TO TWO TABLETS BY MOUTH TWICE DAILY AS NEEDED FOR CONSTIPATION, Disp: 100 tablet, Rfl: 1     SIMETHICONE-80 PO, Take 80 mg by mouth every morning And TID prn belching, Disp: , Rfl:      tiotropium (SPIRIVA HANDIHALER) 18 MCG capsule, Inhale  into the lungs. Inhale contents of one capsule daily, Disp: 90 capsule, Rfl: 3    Allergies -   Allergies   Allergen Reactions     Ace Inhibitors Cough     Asa [Aspirin]      Penicillins        Social History -   Social History     Socioeconomic History     Marital status:      Spouse name: Not on file     Number of children: Not on file     Years of education: Not on file     Highest education level: Not on file   Social Needs     Financial resource strain: Not on file     Food insecurity - worry: Not on file     Food insecurity - inability: Not on file     Transportation needs - medical: Not on file     Transportation needs - non-medical: Not on file   Occupational History     Employer: RETIRED     Comment:  in [place]   Tobacco Use     Smoking status: Former Smoker     Packs/day: 1.00     Years: 35.00     Pack years: 35.00     Last attempt to quit: 1990     Years since quittin.0     Smokeless tobacco: Former User   Substance and Sexual Activity     Alcohol use: Yes     Comment: Drink 1 (3oz) drink a day     Drug use: No     Sexual activity: No   Other Topics Concern     Parent/sibling w/ CABG, MI or angioplasty before 65F 55M? Not Asked   Social History Narrative    Lives independently in own home.       Family History -   Family History   Problem Relation Age of Onset     Diabetes Father      Coronary Artery Disease Maternal  "Grandmother      Coronary Artery Disease Paternal Uncle        Review of Systems - As per HPI and PMHx, otherwise 7 system review of the head and neck negative. 10+ system review negative.    Physical Exam  /68 (BP Location: Right arm, Patient Position: Sitting, Cuff Size: Adult Regular)   Pulse 63   Temp 97.8  F (36.6  C) (Oral)   Ht 1.651 m (5' 5\")   BMI 42.77 kg/m     General - The patient is well nourished and well developed, and appears to have good nutritional status.  Alert and oriented to person and place, answers questions and cooperates with examination appropriately.   Head and Face - Normocephalic and atraumatic, with no gross asymmetry noted of the contour of the facial features.  The facial nerve is intact, with strong symmetric movements.  Voice and Breathing - The patient was breathing comfortably without the use of accessory muscles. There was no wheezing, stridor, or stertor.  The patients voice was clear and strong, and had appropriate pitch and quality.  Ears - Bilateral pinna and EACs with normal appearing overlying skin. Tympanic membrane intact with good mobility on pneumatic otoscopy bilaterally. Bony landmarks of the ossicular chain are normal. The tympanic membranes are normal in appearance. No retraction, perforation, or masses.  No fluid or purulence was seen in the external canal or the middle ear. Bilateral cerumen impaction.  Eyes - Extraocular movements intact.  Sclera were not icteric or injected, conjunctiva were pink and moist.  Mouth - Examination of the oral cavity showed pink, healthy oral mucosa. No lesions or ulcerations noted.  The tongue was mobile and midline, and the dentition were in good condition.    Throat - The walls of the oropharynx were smooth, pink, moist, symmetric, and had no lesions or ulcerations.  The tonsillar pillars and soft palate were symmetric.  The uvula was midline on elevation.  Neck - Normal midline excursion of the laryngotracheal complex " during swallowing.  Full range of motion on passive movement.  Palpation of the occipital, submental, submandibular, internal jugular chain, and supraclavicular nodes did not demonstrate any abnormal lymph nodes or masses.  The carotid pulse was palpable bilaterally.  Palpation of the thyroid was soft and smooth, with no nodules or goiter appreciated.  The trachea was mobile and midline.  Nose - External contour is symmetric, no gross deflection or scars.  Nasal mucosa is pink and moist with no abnormal mucus.  The septum was midline and non-obstructive, turbinates of normal size and position.  No polyps, masses, or purulence noted on examination.    Audiogram in 2016 demonstrates bilateral moderate to severe SNHL.    Audiogram 1/15/2019 bilateral moderate to severe sensorineural hearing loss.    Procedure: Cerumen Disimpaction  Diagnosis: Cerumen Impaction    Procedure and Findings  Ears - On examination of the ears, I found that the  ears were impacted with dense cerumen obscuring visualization of the right and left TM.  Therefore, I positioned the patient and I used the binocular surgical microscope to assist in visualization of the affected ear(s).  I began by using a cerumen loop to gently lift the edges of the cerumen mass away from the walls of the external canal.  Once I did this, I was able to suction away fragments of wax and debris using suction.  Once the mass was loose enough, the entire plug was pulled from the canal with microforceps.  The tympanic membrane was intact without sign of perforation or middle ear effusion and minimal ear canal trauma. Patient tolerated the procedure well.     Assessment - Susan Haq is a 87 year old female with bilateral moderate to severe sensorineural hearing loss. Disimpaction of cerumen preformed in clinic today. Patient commented on better hearing following disimpaction. I reassure the patient her hearing has not changed since her last audiology screening in  2016. I discussed her decreased hearing ability may be due to the background noise associated with her portable oxygen. I advised her positioning the portable oxygen behind her or under her wheelchair may help reducing the background noise that could be causing her concerns of hearing loss. Follow up as needed.     Patient to follow up with Primary Care provider regarding elevated blood pressure.    Dr. Bayron Perez MD  Otolaryngology  [facility]    This document serves as a record of the services and decisions personally performed and made by Dr. Bayron Perez MD. It was created on his behalf by Ana Paula Holman, a trained medical scribe. The creation of this document is based the provider's statements to the medical scribe.    Elizabeth Holman 11:04 AM 1/15/2019     The information in this document, created by a scribe for me, accurately reflects the services I personally performed and the decisions made by me. I have reviewed and approved this document for accuracy.     Again, thank you for allowing me to participate in the care of your patient.        Sincerely,        Bayron Perez MD

## 2019-01-15 NOTE — PROGRESS NOTES
AUDIOLOGY REPORT    SUBJECTIVE:  Susan Haq is a 87 year old female who [facility], referred by Dr. Gallo.  The patient has been seen previously in this clinic for assessment and results indicated a moderate to severe sensorineural hearing loss bilaterally.The patient reports her friends feel she is not hearing as well. She is currently wearing Phonak Mobykoero V50-P hearing aids with custom earmolds that were fit 5/4/2015. The patient denies bilateral otalgia and bilateral drainage.     OBJECTIVE:  Otoscopic exam indicates partial obstruction with cerumen bilaterally     Pure Tone Thresholds assessed using conventional audiometry with good  reliability from 250-8000 Hz bilaterally using circumaural headphones     RIGHT:  moderate and severe sensorineural hearing loss    LEFT:    moderate and severe sensorineural hearing loss    Speech Reception Threshold:    RIGHT: 55 dB HL    LEFT:   60 dB HL  Word Recognition Score:     RIGHT: 48% at 75 dB HL using W22 recorded word list.    LEFT:   48% at 75 dB HL using W22 recorded word list.    Plugged and hardened earmold tubing was changed bilaterally.     ASSESSMENT:   Moderate to severe asymmetrical sensorineural hearing loss bilaterally.     Compared to patient's previous audiogram dated 5/27/2016, hearing has remained stable with decrease in speech recognition bilaterally.  Today s results were discussed with the patient and her PCA in detail.     PLAN: It is recommended that the patient be seen by Dr. Perez for medical evaluation of their ears and ear cleaning. Patient was counseled regarding hearing loss and impact on communication.    Please call this clinic with questions regarding these results or recommendations.        Caridad Elizabeth M.A. RENAAT-AAA  Clinical audiologist [place] # [id number]  1/15/2019

## 2019-03-07 ENCOUNTER — TELEPHONE (OUTPATIENT)
Dept: FAMILY MEDICINE | Facility: CLINIC | Age: 84
End: 2019-03-07

## 2019-03-12 DIAGNOSIS — J44.9 CHRONIC OBSTRUCTIVE PULMONARY DISEASE, UNSPECIFIED COPD TYPE (H): Chronic | ICD-10-CM

## 2019-03-12 NOTE — TELEPHONE ENCOUNTER
"Requested Prescriptions   Pending Prescriptions Disp Refills     budesonide-formoterol (SYMBICORT) 160-4.5 MCG/ACT Inhaler 10.2 g 5    Last Written Prescription Date:  6/1/18  Last Fill Quantity: 10.2g,  # refills: 5   Last office visit: 12/19/2018 with prescribing provider:  Sabino   Future Office Visit:     Sig: INHALE 2 PUFFS INTO THE LUNGS 2 TIMES DAILY    Inhaled Steroids Protocol Passed - 3/12/2019  3:35 PM       Passed - Patient is age 12 or older       Passed - Recent (12 mo) or future (30 days) visit within the authorizing provider's specialty    Patient had office visit in the last 12 months or has a visit in the next 30 days with authorizing provider or within the authorizing provider's specialty.  See \"Patient Info\" tab in inbasket, or \"Choose Columns\" in Meds & Orders section of the refill encounter.             Passed - Medication is active on med list          "

## 2019-03-13 RX ORDER — BUDESONIDE AND FORMOTEROL FUMARATE DIHYDRATE 160; 4.5 UG/1; UG/1
AEROSOL RESPIRATORY (INHALATION)
Qty: 10.2 G | Refills: 8 | Status: SHIPPED | OUTPATIENT
Start: 2019-03-13 | End: 2019-10-16

## 2019-03-13 NOTE — TELEPHONE ENCOUNTER
Prescription approved per [facility] Refill Protocol.  Tawana Khan RNC

## 2019-04-12 ENCOUNTER — TELEPHONE (OUTPATIENT)
Dept: FAMILY MEDICINE | Facility: CLINIC | Age: 84
End: 2019-04-12

## 2019-04-12 DIAGNOSIS — F32.1 MAJOR DEPRESSIVE DISORDER, SINGLE EPISODE, MODERATE (H): Chronic | ICD-10-CM

## 2019-04-12 NOTE — TELEPHONE ENCOUNTER
"Requested Prescriptions   Pending Prescriptions Disp Refills     escitalopram (LEXAPRO) 5 MG tablet 180 tablet 0       SSRIs Protocol Failed - 4/12/2019  8:27 AM        Failed - PHQ-9 score less than 5 in past 6 months     Please review last PHQ-9 score.           Passed - Medication is active on med list        Passed - Patient is age 18 or older        Passed - No active pregnancy on record        Passed - No positive pregnancy test in last 12 months        Passed - Recent (6 mo) or future (30 days) visit within the authorizing provider's specialty     Patient had office visit in the last 6 months or has a visit in the next 30 days with authorizing provider or within the authorizing provider's specialty.  See \"Patient Info\" tab in inbasket, or \"Choose Columns\" in Meds & Orders section of the refill encounter.            Last Written Prescription Date:  11/15/18  Last Fill Quantity: 180,  # refills: 0   Last office visit: 12/19/2018 with prescribing provider:  Nora   Future Office Visit:      "

## 2019-04-12 NOTE — LETTER
[facility]  [address] [address]  [phone]        April 16, 2019    Susan Haq                                                                                                                     C/O Martina Pickard  [address] [place] [address]. [id number]            Dear Argelia,    Enclosed is a PHQ-9 depression screening.   Please complete the form and either mail it back to the Clinic or call the clinic with the responses.   If you have any questions please call the clinic at [phone].      Sincerely,         Ema Melendez's Care Team

## 2019-04-16 RX ORDER — ESCITALOPRAM OXALATE 5 MG/1
TABLET ORAL
Qty: 180 TABLET | Refills: 0 | Status: SHIPPED | OUTPATIENT
Start: 2019-04-16 | End: 2019-07-20

## 2019-04-16 NOTE — TELEPHONE ENCOUNTER
"Call to patient, this writer tried several times to inform patient due for depression screening, patient keeps saying \"i'm not following what you are saying\", patient states \"I can hear you, I'm just not following what you are saying\". Patient asks this writer to call her friend Martina Pickard who helps her.   Call to Martina Pickard, Consent to Communicate in Epic, she reports patient is not comprehending what most people are saying anymore, asks to mail the screening to her and she will help the patient complete the PHQ-9 screening.   Please mail to: [address] [place] [address]. [phone], ,address added to chart.  Martina reports if you mail it to the patient she will just throw it away.   Placed in mail for completion.   Medication is being filled for 1 time refill only due to:  Needs updated PHQ-9, mailed screening to friends home for completion    "

## 2019-04-16 NOTE — TELEPHONE ENCOUNTER
Justin to patient, patient answers but asks for writer to call back in 5 minutes so she can witch to the other phone.   Will call back in a few minutes.

## 2019-04-25 ASSESSMENT — ANXIETY QUESTIONNAIRES
IF YOU CHECKED OFF ANY PROBLEMS ON THIS QUESTIONNAIRE, HOW DIFFICULT HAVE THESE PROBLEMS MADE IT FOR YOU TO DO YOUR WORK, TAKE CARE OF THINGS AT HOME, OR GET ALONG WITH OTHER PEOPLE: NOT DIFFICULT AT ALL
6. BECOMING EASILY ANNOYED OR IRRITABLE: NOT AT ALL
1. FEELING NERVOUS, ANXIOUS, OR ON EDGE: NOT AT ALL
5. BEING SO RESTLESS THAT IT IS HARD TO SIT STILL: NOT AT ALL
7. FEELING AFRAID AS IF SOMETHING AWFUL MIGHT HAPPEN: NOT AT ALL
2. NOT BEING ABLE TO STOP OR CONTROL WORRYING: NOT AT ALL
3. WORRYING TOO MUCH ABOUT DIFFERENT THINGS: NOT AT ALL
GAD7 TOTAL SCORE: 0

## 2019-04-25 ASSESSMENT — PATIENT HEALTH QUESTIONNAIRE - PHQ9
5. POOR APPETITE OR OVEREATING: NOT AT ALL
SUM OF ALL RESPONSES TO PHQ QUESTIONS 1-9: 21

## 2019-04-25 NOTE — TELEPHONE ENCOUNTER
Received phq-9 via mail from RN -see scores below.    PHQ-9 SCORE 6/14/2018 9/14/2018 4/25/2019   PHQ-9 Total Score - - -   PHQ-9 Total Score 11 17 21     MELISSA-7 SCORE 12/6/2017 9/14/2018 4/25/2019   Total Score 1 1 0     Perla Tompkins MA

## 2019-04-26 ASSESSMENT — ANXIETY QUESTIONNAIRES: GAD7 TOTAL SCORE: 0

## 2019-05-10 ENCOUNTER — OFFICE VISIT (OUTPATIENT)
Dept: FAMILY MEDICINE | Facility: CLINIC | Age: 84
End: 2019-05-10
Payer: COMMERCIAL

## 2019-05-10 VITALS
DIASTOLIC BLOOD PRESSURE: 86 MMHG | WEIGHT: 251 LBS | HEART RATE: 66 BPM | TEMPERATURE: 97.2 F | BODY MASS INDEX: 41.82 KG/M2 | OXYGEN SATURATION: 94 % | SYSTOLIC BLOOD PRESSURE: 124 MMHG | HEIGHT: 65 IN

## 2019-05-10 DIAGNOSIS — J44.1 COPD WITH ACUTE EXACERBATION (H): Primary | ICD-10-CM

## 2019-05-10 DIAGNOSIS — E66.01 MORBID OBESITY (H): ICD-10-CM

## 2019-05-10 PROCEDURE — 99213 OFFICE O/P EST LOW 20 MIN: CPT | Performed by: FAMILY MEDICINE

## 2019-05-10 RX ORDER — PREDNISONE 20 MG/1
40 TABLET ORAL DAILY
Qty: 10 TABLET | Refills: 0 | Status: SHIPPED | OUTPATIENT
Start: 2019-05-10 | End: 2019-05-15

## 2019-05-10 RX ORDER — DOXYCYCLINE 100 MG/1
100 CAPSULE ORAL 2 TIMES DAILY
Qty: 20 CAPSULE | Refills: 0 | Status: SHIPPED | OUTPATIENT
Start: 2019-05-10 | End: 2019-05-20

## 2019-05-10 ASSESSMENT — MIFFLIN-ST. JEOR: SCORE: 1574.41

## 2019-05-10 ASSESSMENT — PAIN SCALES - GENERAL: PAINLEVEL: NO PAIN (0)

## 2019-05-10 NOTE — NURSING NOTE
"Chief Complaint   Patient presents with     Cough     productive white cough x2 weeks        Initial /86   Pulse 66   Temp 97.2  F (36.2  C) (Tympanic)   Ht 1.651 m (5' 5\")   Wt 113.9 kg (251 lb)   SpO2 94%   BMI 41.77 kg/m   Estimated body mass index is 41.77 kg/m  as calculated from the following:    Height as of this encounter: 1.651 m (5' 5\").    Weight as of this encounter: 113.9 kg (251 lb).    Medication Reconciliation: complete  Perla Tompkins MA  "

## 2019-05-10 NOTE — PATIENT INSTRUCTIONS
Patient Education     COPD Flare    You have had a flare-up of your COPD.  COPD, or chronic obstructive pulmonary disease, is a common lung disease. It causes your airways to become irritated and narrower. This makes it harder for you to breathe. Emphysema and chronic bronchitis are both types of COPD. This is a chronic condition, which means you always have it. Sometimes it gets worse. When this happens, it is called a flare-up.  Symptoms of COPD  People with COPD may have symptoms most of the time. In a flare-up, your symptoms get worse. These symptoms may mean you are having a flare-up:    Shortness of breath, shallow or rapid breathing, or wheezing that gets worse    Lung infection    Cough that gets worse    More mucus, thicker mucus or mucus of a different color    Tiredness, decreased energy, or trouble doing your usual activities    Fever    Chest tightness    Your symptoms don t get better even when you use your usual medicines, inhalers, and nebulizer    Trouble talking    You feel confused  Causes of flare-ups  Unfortunately, a flare-up can happen even though you did everything right, and you followed your doctor s instructions. Some causes of flare-ups are:    Smoking or secondhand smoke    Colds, the flu, or respiratory infections    Air pollution    Sudden change in the weather    Dust, irritating chemicals, or strong fumes    Not taking your medicines as prescribed  Home care  Here are some things you can do at home to treat a flare-up:    Try not to panic. This makes it harder to breathe, and keeps you from doing the right things.    Don t smoke or be around others who are smoking.    Try to drink more fluids than usual during a flare-up, unless your doctor has told you not to because of heart and kidney problems. More fluids can help loosen the mucus.    Use your inhalers and nebulizer, if you have one, as you have been told to.    If you were given antibiotics, take them until they are used up or  your doctor tells you to stop. It s important to finish the antibiotics, even though you feel better. This will make sure the infection has cleared.    If you were given prednisone or another steroid, finish it even if you feel better.  Preventing a flare-up  Even though flare-ups happen, the best way to treat one is to prevent it before it starts. Here are some pointers:    Don t smoke or be around others who are smoking.    Take your medicines as you have been told.    Talk with your doctor about getting a flu shot every year. Also find out if you need a pneumonia shot.    If there is a weather advisory warning to stay indoors, try to stay inside when possible.    Try to eat healthy and get plenty of sleep.    Try to avoid things that usually set you off, like dust, chemical fumes, hairsprays, or strong perfumes.  Follow-up care  Follow up with your healthcare provider, or as advised.  If a culture was done, you will be told if your treatment needs to be changed. You can call as directed for the results.  If X-rays were done, you will be notified of any new findings that may affect your care.  Call 911  Call 911 if any of these occur:    You have trouble breathing    You feel confused or it s difficult to wake you up    You faint or lose consciousness    You have a rapid heart rate    You have new pain in your chest, arm, shoulder, neck or upper back  When to seek medical advice  Call your healthcare provider right away if any of these occur:    Wheezing or shortness of breath gets worse    You need to use your inhalers more often than usual without relief    Fever of 100.4 F (38 C) or higher, or as directed by your healthcare provider    Coughing up lots of dark-colored or bloody mucus (sputum)    Chest pain with each breath    You do not start to get better within 24 hours    Swelling of your ankles gets worse    Dizziness or weakness  Date Last Reviewed: 9/1/2016 2000-2018 [employer] [address]  [place] [address]. All rights reserved. This information is not intended as a substitute for professional medical care. Always follow your healthcare professional's instructions.           Patient Education     Discharge Instructions: Diaphragmatic (Controlled) Breathing  When you have lung problems, you may find it harder to take deep breaths. Learning to use controlled breathing can help you get more air into and out of your lungs, which will help you with shortness of breath. Diaphragmatic breathing or belly breathing helps you breathe with your diaphragm. The diaphragm is a large muscle that plays an important part in breathing. It is located below your lungs. It separates your chest from your abdomen (belly).  Home care  Follow these steps to use controlled breathing:    Sit in a comfortable chair or lie on your back with a pillow under your head with your knees bent.    Relax the muscles in your neck and shoulders.    Place one hand on your stomach and place the other hand on your upper chest.    Breathe in slowly through your nose as deeply as you can. Count to 2. As you inhale, your stomach should move out against your hand. Your chest should stay still.    Breathe out slowly with your lips together (called pursed lips) to the count of 4. You should feel your stomach muscles move in.    Repeat the above steps until you feel relaxed or are no longer feeling short of breath.    Follow-up care  Make a follow-up appointment as directed by our staff.     When to call the healthcare provider  Call your healthcare provider right away if you have any of the following:    Shortness of breath that is not relieved by controlled breathing exercises or by your medicine    Wheezing or coughing    Increased mucus    Yellow, green, bloody, or smelly mucus    Fever or chills    Tightness in your chest that does not go away with your normal medicines    Irregular heartbeat    Swollen ankles    Trouble  doing your usual activities   Date Last Reviewed: 5/1/2016 2000-2018 The "[employer]. [address] [place] [address], [place] [address]. All rights reserved. This information is not intended as a substitute for professional medical care. Always follow your healthcare professional's instructions.

## 2019-05-10 NOTE — PROGRESS NOTES
"  SUBJECTIVE:   Susan Haq is a 87 year old female who presents to clinic today for the following   health issues:    Chief Complaint   Patient presents with     Cough     productive white cough x2 weeks      Acute Illness   Acute illness concerns: cough   Onset: 2 weeks    Fever: no    Chills/Sweats: YES- night sweats     Headache (location?): no    Sinus Pressure:YES    Conjunctivitis:  no    Ear Pain: no    Rhinorrhea: no    Congestion: YES    Sore Throat: no     Cough: YES-productive of clear sputum    Wheeze: YES    Decreased Appetite: no    Nausea: no    Vomiting: no    Diarrhea:  no    Dysuria/Freq.: no    Fatigue/Achiness: YES    Sick/Strep Exposure: no     Therapies Tried and outcome: Symbicort inhaler and neb treatments     86 year old female with COPD oxygen dependent presenting with 2 weeks of cough productive with white phegm. Worsening fatigue.  Last flare 12/2018 treated with prednisone and doxy which worked well.      Additional history: as documented    Reviewed  and updated as needed this visit by clinical staff  Tobacco  Allergies  Meds  Med Hx  Surg Hx  Fam Hx  Soc Hx        Reviewed and updated as needed this visit by Provider         BP Readings from Last 3 Encounters:   05/10/19 124/86   01/15/19 152/68   12/19/18 116/66    Wt Readings from Last 3 Encounters:   05/10/19 113.9 kg (251 lb)   12/19/18 116.6 kg (257 lb)   10/16/18 115.4 kg (254 lb 8 oz)                    ROS:  Constitutional, HEENT, cardiovascular, pulmonary, gi and gu systems are negative, except as otherwise noted.    OBJECTIVE:     /86   Pulse 66   Temp 97.2  F (36.2  C) (Tympanic)   Ht 1.651 m (5' 5\")   Wt 113.9 kg (251 lb)   SpO2 94%   BMI 41.77 kg/m    Body mass index is 41.77 kg/m .  GENERAL: healthy, alert and no distress  NECK: no adenopathy, no asymmetry, masses, or scars and thyroid normal to palpation  RESP: rhonchi throughout and expiratory wheezes throughout  CV: regular rate and rhythm, " normal S1 S2, no S3 or S4, no murmur, click or rub, no peripheral edema and peripheral pulses strong  ABDOMEN: soft, nontender, no hepatosplenomegaly, no masses and bowel sounds normal  MS: no gross musculoskeletal defects noted, no edema    Diagnostic Test Results:  none     ASSESSMENT/PLAN:       Susan was seen today for cough.    Diagnoses and all orders for this visit:    COPD with acute exacerbation (H): no concern for pneumonia or infuenza (no fevers)  -treat with doxy and prednisone  --discussed risks, benefits, and side effects of this new medication. Patient understands and is in agreement.  -     doxycycline hyclate (VIBRAMYCIN) 100 MG capsule; Take 1 capsule (100 mg) by mouth 2 times daily for 10 days  -     predniSONE (DELTASONE) 20 MG tablet; Take 40 mg by mouth daily for 5 days.    Morbid obesity (H)        Patient Instructions       Patient Education     COPD Flare    You have had a flare-up of your COPD.  COPD, or chronic obstructive pulmonary disease, is a common lung disease. It causes your airways to become irritated and narrower. This makes it harder for you to breathe. Emphysema and chronic bronchitis are both types of COPD. This is a chronic condition, which means you always have it. Sometimes it gets worse. When this happens, it is called a flare-up.  Symptoms of COPD  People with COPD may have symptoms most of the time. In a flare-up, your symptoms get worse. These symptoms may mean you are having a flare-up:    Shortness of breath, shallow or rapid breathing, or wheezing that gets worse    Lung infection    Cough that gets worse    More mucus, thicker mucus or mucus of a different color    Tiredness, decreased energy, or trouble doing your usual activities    Fever    Chest tightness    Your symptoms don t get better even when you use your usual medicines, inhalers, and nebulizer    Trouble talking    You feel confused  Causes of flare-ups  Unfortunately, a flare-up can happen even though you  did everything right, and you followed your doctor s instructions. Some causes of flare-ups are:    Smoking or secondhand smoke    Colds, the flu, or respiratory infections    Air pollution    Sudden change in the weather    Dust, irritating chemicals, or strong fumes    Not taking your medicines as prescribed  Home care  Here are some things you can do at home to treat a flare-up:    Try not to panic. This makes it harder to breathe, and keeps you from doing the right things.    Don t smoke or be around others who are smoking.    Try to drink more fluids than usual during a flare-up, unless your doctor has told you not to because of heart and kidney problems. More fluids can help loosen the mucus.    Use your inhalers and nebulizer, if you have one, as you have been told to.    If you were given antibiotics, take them until they are used up or your doctor tells you to stop. It s important to finish the antibiotics, even though you feel better. This will make sure the infection has cleared.    If you were given prednisone or another steroid, finish it even if you feel better.  Preventing a flare-up  Even though flare-ups happen, the best way to treat one is to prevent it before it starts. Here are some pointers:    Don t smoke or be around others who are smoking.    Take your medicines as you have been told.    Talk with your doctor about getting a flu shot every year. Also find out if you need a pneumonia shot.    If there is a weather advisory warning to stay indoors, try to stay inside when possible.    Try to eat healthy and get plenty of sleep.    Try to avoid things that usually set you off, like dust, chemical fumes, hairsprays, or strong perfumes.  Follow-up care  Follow up with your healthcare provider, or as advised.  If a culture was done, you will be told if your treatment needs to be changed. You can call as directed for the results.  If X-rays were done, you will be notified of any new findings that  may affect your care.  Call 911  Call 911 if any of these occur:    You have trouble breathing    You feel confused or it s difficult to wake you up    You faint or lose consciousness    You have a rapid heart rate    You have new pain in your chest, arm, shoulder, neck or upper back  When to seek medical advice  Call your healthcare provider right away if any of these occur:    Wheezing or shortness of breath gets worse    You need to use your inhalers more often than usual without relief    Fever of 100.4 F (38 C) or higher, or as directed by your healthcare provider    Coughing up lots of dark-colored or bloody mucus (sputum)    Chest pain with each breath    You do not start to get better within 24 hours    Swelling of your ankles gets worse    Dizziness or weakness  Date Last Reviewed: 9/1/2016 2000-2018 [employer]. [address] [place], [place] [id number]. All rights reserved. This information is not intended as a substitute for professional medical care. Always follow your healthcare professional's instructions.           Patient Education     Discharge Instructions: Diaphragmatic (Controlled) Breathing  When you have lung problems, you may find it harder to take deep breaths. Learning to use controlled breathing can help you get more air into and out of your lungs, which will help you with shortness of breath. Diaphragmatic breathing or belly breathing helps you breathe with your diaphragm. The diaphragm is a large muscle that plays an important part in breathing. It is located below your lungs. It separates your chest from your abdomen (belly).  Home care  Follow these steps to use controlled breathing:    Sit in a comfortable chair or lie on your back with a pillow under your head with your knees bent.    Relax the muscles in your neck and shoulders.    Place one hand on your stomach and place the other hand on your upper chest.    Breathe in slowly through your nose as deeply as you  can. Count to 2. As you inhale, your stomach should move out against your hand. Your chest should stay still.    Breathe out slowly with your lips together (called pursed lips) to the count of 4. You should feel your stomach muscles move in.    Repeat the above steps until you feel relaxed or are no longer feeling short of breath.    Follow-up care  Make a follow-up appointment as directed by our staff.     When to call the healthcare provider  Call your healthcare provider right away if you have any of the following:    Shortness of breath that is not relieved by controlled breathing exercises or by your medicine    Wheezing or coughing    Increased mucus    Yellow, green, bloody, or smelly mucus    Fever or chills    Tightness in your chest that does not go away with your normal medicines    Irregular heartbeat    Swollen ankles    Trouble doing your usual activities   Date Last Reviewed: 5/1/2016 2000-2018 [employer]. [address] [place] [address], [place] [id number]. All rights reserved. This information is not intended as a substitute for professional medical care. Always follow your healthcare professional's instructions.               Srinivasa Hdz MD  [facility]

## 2019-06-04 ENCOUNTER — OFFICE VISIT (OUTPATIENT)
Dept: FAMILY MEDICINE | Facility: CLINIC | Age: 84
End: 2019-06-04
Payer: COMMERCIAL

## 2019-06-04 VITALS
BODY MASS INDEX: 41.77 KG/M2 | SYSTOLIC BLOOD PRESSURE: 136 MMHG | WEIGHT: 251 LBS | OXYGEN SATURATION: 91 % | TEMPERATURE: 98.5 F | DIASTOLIC BLOOD PRESSURE: 84 MMHG | HEART RATE: 68 BPM | RESPIRATION RATE: 21 BRPM

## 2019-06-04 DIAGNOSIS — J84.10 INTERSTITIAL PULMONARY FIBROSIS (H): Chronic | ICD-10-CM

## 2019-06-04 DIAGNOSIS — J44.9 CHRONIC OBSTRUCTIVE PULMONARY DISEASE, UNSPECIFIED COPD TYPE (H): Chronic | ICD-10-CM

## 2019-06-04 DIAGNOSIS — R30.0 DYSURIA: Primary | ICD-10-CM

## 2019-06-04 LAB
ALBUMIN UR-MCNC: ABNORMAL MG/DL
APPEARANCE UR: CLEAR
BACTERIA #/AREA URNS HPF: ABNORMAL /HPF
BILIRUB UR QL STRIP: ABNORMAL
COLOR UR AUTO: YELLOW
GLUCOSE UR STRIP-MCNC: NEGATIVE MG/DL
HGB UR QL STRIP: NEGATIVE
KETONES UR STRIP-MCNC: NEGATIVE MG/DL
LEUKOCYTE ESTERASE UR QL STRIP: ABNORMAL
NITRATE UR QL: NEGATIVE
NON-SQ EPI CELLS #/AREA URNS LPF: ABNORMAL /LPF
PH UR STRIP: 6 PH (ref 5–7)
RBC #/AREA URNS AUTO: ABNORMAL /HPF
SOURCE: ABNORMAL
SP GR UR STRIP: 1.02 (ref 1–1.03)
UROBILINOGEN UR STRIP-ACNC: 1 EU/DL (ref 0.2–1)
WBC #/AREA URNS AUTO: ABNORMAL /HPF

## 2019-06-04 PROCEDURE — 81001 URINALYSIS AUTO W/SCOPE: CPT | Performed by: NURSE PRACTITIONER

## 2019-06-04 PROCEDURE — 99214 OFFICE O/P EST MOD 30 MIN: CPT | Performed by: NURSE PRACTITIONER

## 2019-06-04 NOTE — NURSING NOTE
"Initial /84   Pulse 68   Temp 98.5  F (36.9  C) (Tympanic)   Resp 21   Wt 113.9 kg (251 lb)   SpO2 91%   BMI 41.77 kg/m   Estimated body mass index is 41.77 kg/m  as calculated from the following:    Height as of 5/10/19: 1.651 m (5' 5\").    Weight as of this encounter: 113.9 kg (251 lb). .    Georgina Buitrago    "

## 2019-06-04 NOTE — NURSING NOTE
Oxygen Walk Test    Room air Oxygen  Sit-  86%  Walk- O2 77%    1L O2 per Nasal Canula:  Sit 81%  Walk 74%    Patient walked approximately 75 feet and requested to sit because she was feeling week and requested that she have the O2 increased to 3L. Test was discontinued.

## 2019-06-04 NOTE — PATIENT INSTRUCTIONS
1. Schedule pulmonary function test          Thank you for choosing [facility].  You may be receiving an email and/or telephone survey request from [facility] [employer] regarding your visit today.  Please take a few minutes to respond to the survey to let us know how we are doing.      If you have questions or concerns, please contact us via [employer] or you can contact your care team at [phone].    Our Clinic hours are:  Monday 6:40 am  to 7:00 pm  Tuesday -Friday 6:40 am to 5:00 pm    The [place] outpatient lab hours are:  Monday - Friday 6:10 am to 4:45 pm  Saturdays 7:00 am to 11:00 am  Appointments are required, call [phone]    If you have clinical questions after hours or would like to schedule an appointment,  call the clinic at [phone].

## 2019-06-04 NOTE — PROGRESS NOTES
Subjective     Susan Haq is a 87 year old female who presents to clinic today for the following health issues:    HPI   URINARY TRACT SYMPTOMS  Onset: 2 weeks, having more nocturia- feel like she cannot completely empty first thing in the morning then as the day goes on she is able to void nausea/empty normally. She does not have any incontinence or leakage.     Description:   Painful urination (Dysuria): YES  Blood in urine (Hematuria): no   Delay in urine (Hesitency): YES    Intensity: mild to moderate    Progression of Symptoms:  worsening    Accompanying Signs & Symptoms:  Fever/chills: no   Flank pain no   Nausea and vomiting: no   Any vaginal symptoms: none  Abdominal/Pelvic Pain: no     History:   History of frequent UTI's: YES  History of kidney stones: no   Sexually Active: no   Possibility of pregnancy: No    Precipitating factors:   Low energy    Therapies Tried and outcome: none      Patient Active Problem List   Diagnosis     Hyperlipidemia LDL goal <130     Insomnia     Osteopenia     Hypertension goal BP (blood pressure) < 140/90     Obesity     Advance care planning     Adenomatous polyp of colon     PVC's (premature ventricular contractions)     SUSSY (obstructive sleep apnea)-Moderately severe (AHI 21)     Bilateral leg edema     HL (hearing loss)     Umbilical hernia     SNHL (sensorineural hearing loss)     Interstitial pulmonary fibrosis (H)     Chronic obstructive pulmonary disease, unspecified COPD type (H)     Major depressive disorder, single episode, moderate (H)     Risk for falls     Alcohol abuse     Morbid obesity (H)     Physical deconditioning     Chronic obstructive pulmonary disease with acute exacerbation (H)     Past Surgical History:   Procedure Laterality Date     APPENDECTOMY       C BSO, OMENTECTOMY W/SHANIA       C NONSPECIFIC PROCEDURE      (L) clavicle orif     C STOMACH SURGERY PROCEDURE UNLISTED       CHOLECYSTECTOMY, LAPOROSCOPIC  10/13/2011    Cholecystectomy,  Laparoscopic     HERNIA REPAIR, UMBILICAL  10/13/2011     HYSTERECTOMY, CERVIX STATUS UNKNOWN         Social History     Tobacco Use     Smoking status: Former Smoker     Packs/day: 1.00     Years: 35.00     Pack years: 35.00     Last attempt to quit: 1990     Years since quittin.4     Smokeless tobacco: Former User   Substance Use Topics     Alcohol use: Yes     Comment: Drink 1 (3oz) drink a day     Family History   Problem Relation Age of Onset     Diabetes Father      Coronary Artery Disease Maternal Grandmother      Coronary Artery Disease Paternal Uncle            -------------------------------------  Reviewed and updated as needed this visit by Provider        COPD Follow-Up    Overall, how are your COPD symptoms since your last clinic visit?  Worse- increase dyspnea on exertion- gradual onset - denies dizziness/chest pain/ edema in legs/ cough/wheeze.     How much fatigue or shortness of breath do you have when you are walking?  Shortness of breath /dyspnea on exertion - denies fatigue     How much shortness of breath do you have when you are resting?  A lot more than usual    How often do you cough? Sometimes    Have you noticed any change in your sputum/phlegm?  Yes- with some clumps of phlegm    Have you experienced a recent fever? No    Please describe how far you can walk without stopping to rest:  Less than 10 feet    How many flights of stairs are you able to walk up without stopping?  None    Have you had any Emergency Room Visits, Urgent Care Visits, or Hospital Admissions because of your COPD since your last office visit?  No    History   Smoking Status     Former Smoker     Packs/day: 1.00     Years: 35.00     Quit date: 1990   Smokeless Tobacco     Former User     Lab Results   Component Value Date    FEV1 1.31 10/07/2013    QPW1EUK 44% 10/07/2013         Amount of exercise or physical activity: None    Problems taking medications regularly: No    Medication side effects:  none    Diet: regular (no restrictions)    Patient previously followed by pulmonary for COPD and component of ILD.  - last seenin 2015- PFT's done last in 2015.      Patient needs [facility] order to renew home oxygen- she uses 3 liters continuous.     Review of Systems   ROS COMP: Constitutional, HEENT, cardiovascular, pulmonary, GI, , musculoskeletal, neuro, skin, endocrine and psych systems are negative, except as otherwise noted.      Objective    There were no vitals taken for this visit.  There is no height or weight on file to calculate BMI.  Physical Exam   GENERAL: alert, no distress, over weight, elderly and seated in wheelchair  NECK: no adenopathy, no asymmetry, masses, or scars and thyroid normal to palpation  RESP: lungs clear to auscultation - no rales, rhonchi or wheezes and decreased throughout   CV: regular rate and rhythm, normal S1 S2, no S3 or S4, no murmur, click or rub, no peripheral edema   MS: no gross musculoskeletal defects noted, no edema    Diagnostic Test Results:  Labs reviewed in Epic        Assessment & Plan     1. Dysuria  UA unremarkable for infection- encouraged patient to practice Kegel exercising for pelvic floor strengthening   - UA reflex to Microscopic and Culture  - Urine Microscopic    2. Chronic obstructive pulmonary disease, unspecified COPD type (H)  - gradual worsening with more dyspnea on exertion.   Previously followed by Pulmonary last seen in 2015-   - patient has been on fairly maximal therapy- encouraged patient to follow up with pulmonary- no history of CAD or heart failure- could consider echo/BNP if symptoms worsen or referral to pulmonary rehab   - General PFT Lab (Please always keep checked); Future  - Pulmonary Function Test; Future  - will send in new order to [facility] to update oxygen order.     3. Interstitial pulmonary fibrosis (H)    - General PFT Lab (Please always keep checked); Future  - Pulmonary Function Test; Future           No follow-ups on  file.    CHIP [facility]

## 2019-06-14 ENCOUNTER — ALLIED HEALTH/NURSE VISIT (OUTPATIENT)
Dept: AUDIOLOGY | Facility: CLINIC | Age: 84
End: 2019-06-14

## 2019-06-14 DIAGNOSIS — H90.3 SENSORINEURAL HEARING LOSS, BILATERAL: Primary | ICD-10-CM

## 2019-06-14 DIAGNOSIS — J44.9 CHRONIC OBSTRUCTIVE PULMONARY DISEASE, UNSPECIFIED COPD TYPE (H): Chronic | ICD-10-CM

## 2019-06-14 PROCEDURE — V5299 HEARING SERVICE: HCPCS | Performed by: AUDIOLOGIST

## 2019-06-14 PROCEDURE — 99207 ZZC NO CHARGE LOS: CPT | Performed by: AUDIOLOGIST

## 2019-06-14 RX ORDER — TIOTROPIUM BROMIDE 18 UG/1
CAPSULE ORAL; RESPIRATORY (INHALATION)
Qty: 90 CAPSULE | Refills: 3 | Status: SHIPPED | OUTPATIENT
Start: 2019-06-14 | End: 2019-10-16

## 2019-06-14 NOTE — PROGRESS NOTES
HEARING AID DROP-OFF    Patient Name:  Susan Haq    Patient Age:   87 year old    :  1/10/1932    Background:   Patient's hearing aid was dropped off for repair.      SIDE: Left   MAKE: Phonak   MODEL: Tori V50-P   S/N: [id number]   WARRANTY:     Procedures:   Visual inspection found hardened tubing dislodged from earmold. Retubed left earmold. Listening check then found good sound quality and output.     Left message for patient's friend Martina to  hearing aid.    Plan:   Martina will  hearing aid at the specialty clinic .       Shahzad Saeed, CCC-A  Licensed Audiologist  2019

## 2019-06-14 NOTE — TELEPHONE ENCOUNTER
"S:  Request for refill of tiotropium     B:  LOV 6/4/19  Tiotropium last written 3/2/18 for 12 month supply    A:  Requested Prescriptions   Pending Prescriptions Disp Refills     tiotropium (SPIRIVA HANDIHALER) 18 MCG inhaled capsule [Pharmacy Med Name: SPIRIVA 18 MCG CP-HANDIHALE 18 CAP] 90 capsule 3     Sig: INHALE INTO THE LUNGS. INHALE CONTENTS OF ONE CAPSULE DAILY       Asthma Maintenance Inhalers - Anticholinergics Passed - 6/14/2019  3:56 PM        Passed - Patient is age 12 years or older        Passed - Recent (12 mo) or future (30 days) visit within the authorizing provider's specialty     Patient had office visit in the last 12 months or has a visit in the next 30 days with authorizing provider or within the authorizing provider's specialty.  See \"Patient Info\" tab in inbasket, or \"Choose Columns\" in Meds & Orders section of the refill encounter.              Passed - Medication is active on med list        Passes [facility] refill protocol    R:  Filled per [facility] refill protocol    No further action necessary.  Encounter closed.    Alexei Sylvester RN    "

## 2019-06-26 ENCOUNTER — HOSPITAL ENCOUNTER (OUTPATIENT)
Dept: RESPIRATORY THERAPY | Facility: CLINIC | Age: 84
Discharge: HOME | End: 2019-06-26
Attending: INTERNAL MEDICINE | Admitting: INTERNAL MEDICINE
Payer: COMMERCIAL

## 2019-06-26 DIAGNOSIS — J84.10 INTERSTITIAL PULMONARY FIBROSIS (H): Chronic | ICD-10-CM

## 2019-06-26 DIAGNOSIS — J44.9 CHRONIC OBSTRUCTIVE PULMONARY DISEASE, UNSPECIFIED COPD TYPE (H): Chronic | ICD-10-CM

## 2019-06-26 PROCEDURE — 94726 PLETHYSMOGRAPHY LUNG VOLUMES: CPT | Mod: 26 | Performed by: INTERNAL MEDICINE

## 2019-06-26 PROCEDURE — 94726 PLETHYSMOGRAPHY LUNG VOLUMES: CPT

## 2019-06-26 PROCEDURE — 94060 EVALUATION OF WHEEZING: CPT | Mod: 26 | Performed by: INTERNAL MEDICINE

## 2019-06-26 PROCEDURE — 94729 DIFFUSING CAPACITY: CPT | Mod: 26 | Performed by: INTERNAL MEDICINE

## 2019-06-26 PROCEDURE — 94060 EVALUATION OF WHEEZING: CPT

## 2019-06-26 PROCEDURE — 94729 DIFFUSING CAPACITY: CPT

## 2019-06-26 PROCEDURE — 25000125 ZZHC RX 250: Performed by: NURSE PRACTITIONER

## 2019-06-26 RX ORDER — ALBUTEROL SULFATE 0.83 MG/ML
2.5 SOLUTION RESPIRATORY (INHALATION) ONCE
Status: COMPLETED | OUTPATIENT
Start: 2019-06-26 | End: 2019-06-26

## 2019-06-26 RX ADMIN — ALBUTEROL SULFATE 2.5 MG: 2.5 SOLUTION RESPIRATORY (INHALATION) at 13:40

## 2019-06-27 LAB
DLCOUNC-%PRED-PRE: 42 %
DLCOUNC-PRE: 7.94 ML/MIN/MMHG
DLCOUNC-PRED: 18.69 ML/MIN/MMHG
ERV-%PRED-PRE: -1458 %
ERV-PRE: 0.95 L
ERV-PRED: -0.06 L
EXPTIME-PRE: 7.42 SEC
FEF2575-%PRED-POST: 37 %
FEF2575-%PRED-PRE: 33 %
FEF2575-POST: 0.52 L/SEC
FEF2575-PRE: 0.47 L/SEC
FEF2575-PRED: 1.39 L/SEC
FEFMAX-%PRED-PRE: 77 %
FEFMAX-PRE: 3.23 L/SEC
FEFMAX-PRED: 4.15 L/SEC
FEV1-%PRED-PRE: 60 %
FEV1-PRE: 1.09 L
FEV1FEV6-PRE: 53 %
FEV1FEV6-PRED: 77 %
FEV1FVC-PRE: 51 %
FEV1FVC-PRED: 76 %
FEV1SVC-PRE: 37 %
FEV1SVC-PRED: 65 %
FIFMAX-PRE: 2.84 L/SEC
FRCPLETH-%PRED-PRE: 172 %
FRCPLETH-PRE: 4.74 L
FRCPLETH-PRED: 2.76 L
FVC-%PRED-PRE: 89 %
FVC-PRE: 2.15 L
FVC-PRED: 2.4 L
IC-%PRED-PRE: 64 %
IC-PRE: 1.83 L
IC-PRED: 2.83 L
RVPLETH-%PRED-PRE: 153 %
RVPLETH-PRE: 3.61 L
RVPLETH-PRED: 2.36 L
TLCPLETH-%PRED-PRE: 130 %
TLCPLETH-PRE: 6.57 L
TLCPLETH-PRED: 5.02 L
VA-%PRED-PRE: 85 %
VA-PRE: 3.97 L
VC-%PRED-PRE: 106 %
VC-PRE: 2.96 L
VC-PRED: 2.77 L

## 2019-07-01 DIAGNOSIS — J44.9 CHRONIC OBSTRUCTIVE PULMONARY DISEASE, UNSPECIFIED COPD TYPE (H): Primary | ICD-10-CM

## 2019-07-05 DIAGNOSIS — E87.6 HYPOKALEMIA: ICD-10-CM

## 2019-07-08 DIAGNOSIS — J44.9 CHRONIC OBSTRUCTIVE PULMONARY DISEASE, UNSPECIFIED COPD TYPE (H): Primary | ICD-10-CM

## 2019-07-08 NOTE — TELEPHONE ENCOUNTER
"Requested Prescriptions   Pending Prescriptions Disp Refills     potassium chloride ER (K-DUR/KLOR-CON M) 10 MEQ CR tablet [Pharmacy Med Name: POTASSIUM CL ER 10 MEQ TAB 10 TAB] 60 tablet 9     Sig: TAKE 1 TABLET (10 MEQ) BY MOUTH 2 TIMES DAILY   Last Written Prescription Date:  8/2/18  Last Fill Quantity: 60 tab,  # refills: 9   Last office visit: 6/4/2019 with prescribing provider:  KENN Yin   Future Office Visit:        Potassium Supplements Protocol Failed - 7/5/2019  4:59 PM        Failed - Medication is active on med list        Passed - Recent (12 mo) or future (30 days) visit within the authorizing provider's specialty     Patient had office visit in the last 12 months or has a visit in the next 30 days with authorizing provider or within the authorizing provider's specialty.  See \"Patient Info\" tab in inbasket, or \"Choose Columns\" in Meds & Orders section of the refill encounter.              Passed - Patient is age 18 or older        Passed - Normal serum potassium in past 12 months     Recent Labs   Lab Test 09/19/18  2258   POTASSIUM 3.5                      "

## 2019-07-09 RX ORDER — POTASSIUM CHLORIDE 750 MG/1
TABLET, EXTENDED RELEASE ORAL
Qty: 60 TABLET | Refills: 2 | Status: SHIPPED | OUTPATIENT
Start: 2019-07-09 | End: 2019-09-26

## 2019-07-20 DIAGNOSIS — F32.1 MAJOR DEPRESSIVE DISORDER, SINGLE EPISODE, MODERATE (H): Chronic | ICD-10-CM

## 2019-07-20 DIAGNOSIS — I10 HYPERTENSION GOAL BP (BLOOD PRESSURE) < 140/90: Chronic | ICD-10-CM

## 2019-07-22 NOTE — TELEPHONE ENCOUNTER
"Requested Prescriptions   Pending Prescriptions Disp Refills     atenolol (TENORMIN) 50 MG tablet [Pharmacy Med Name: ATENOLOL 50 MG TABLET 50 TAB] 180 tablet 3     Sig: TAKE ONE TABLET (50 MG) BY MOUTH TWO TIMES DAILY  Last Written Prescription Date:  6/1/2018  Last Fill Quantity: 180,  # refills: 3   Last office visit: 6/4/2019 with provider:  Humphrey   Future Office Visit:           Beta-Blockers Protocol Passed - 7/20/2019  2:10 PM        Passed - Blood pressure under 140/90 in past 12 months     BP Readings from Last 3 Encounters:   06/04/19 136/84   05/10/19 124/86   01/15/19 152/68                 Passed - Patient is age 6 or older        Passed - Recent (12 mo) or future (30 days) visit within the authorizing provider's specialty     Patient had office visit in the last 12 months or has a visit in the next 30 days with authorizing provider or within the authorizing provider's specialty.  See \"Patient Info\" tab in inbasket, or \"Choose Columns\" in Meds & Orders section of the refill encounter.              Passed - Medication is active on med list        escitalopram (LEXAPRO) 5 MG tablet [Pharmacy Med Name: ESCITALOPRAM 5 MG TABLET 5 TAB] 180 tablet 0     Sig: TAKE TWO TABLETS (10 MG) BY MOUTH DAILY  Last Written Prescription Date:  4/16/2019  Last Fill Quantity: 180,  # refills: 0   Last office visit: 6/4/2019 with  provider:  Humphrey   Future Office Visit:           SSRIs Protocol Failed - 7/20/2019  2:10 PM  MELISSA-7 SCORE 12/6/2017 9/14/2018 4/25/2019   Total Score 1 1 0             Failed - PHQ-9 score less than 5 in past 6 months     Please review last PHQ-9 score.   PHQ-9 SCORE 6/14/2018 9/14/2018 4/25/2019   PHQ-9 Total Score - - -   PHQ-9 Total Score 11 17 21             Passed - Medication is active on med list        Passed - Patient is age 18 or older        Passed - No active pregnancy on record        Passed - No positive pregnancy test in last 12 months        Passed - Recent (6 mo) or future (30 days) " "visit within the authorizing provider's specialty     Patient had office visit in the last 6 months or has a visit in the next 30 days with authorizing provider or within the authorizing provider's specialty.  See \"Patient Info\" tab in inbasket, or \"Choose Columns\" in Meds & Orders section of the refill encounter.              "

## 2019-07-23 RX ORDER — ESCITALOPRAM OXALATE 5 MG/1
TABLET ORAL
Qty: 60 TABLET | Refills: 0 | Status: SHIPPED | OUTPATIENT
Start: 2019-07-23 | End: 2019-09-05

## 2019-07-23 RX ORDER — ATENOLOL 50 MG/1
TABLET ORAL
Qty: 180 TABLET | Refills: 3 | Status: SHIPPED | OUTPATIENT
Start: 2019-07-23 | End: 2019-10-16

## 2019-07-23 NOTE — TELEPHONE ENCOUNTER
Atenolol refilled per protocol.       Routing refill request to provider for review/approval because:  PHQ9 was above score in April, we had mailed it out to her as she was unable to do it on the phone.     Tawana Khan RNC

## 2019-08-06 DIAGNOSIS — R06.02 SOB (SHORTNESS OF BREATH): ICD-10-CM

## 2019-08-06 DIAGNOSIS — J44.9 CHRONIC OBSTRUCTIVE PULMONARY DISEASE, UNSPECIFIED COPD TYPE (H): Chronic | ICD-10-CM

## 2019-08-06 RX ORDER — ALBUTEROL SULFATE 0.83 MG/ML
SOLUTION RESPIRATORY (INHALATION)
Qty: 120 VIAL | Refills: 3 | Status: SHIPPED | OUTPATIENT
Start: 2019-08-06 | End: 2020-04-29

## 2019-08-06 NOTE — TELEPHONE ENCOUNTER
"S:  Refill request for albuterol neb    B:  LOV 6/4/19  Albuterol neb last written 9/20/18 for 480 total vials prn supply    A:  Requested Prescriptions   Pending Prescriptions Disp Refills     albuterol (PROVENTIL) (2.5 MG/3ML) 0.083% neb solution 120 vial 3     Sig: NEBULIZE ONE VIAL (2.5 MG) EVERY FOUR HOURS AS NEEDED FOR SHORTNESS OF BREATH/DYSPNEA OR WHEEZING       Asthma Maintenance Inhalers - Anticholinergics Passed - 8/6/2019 10:08 AM        Passed - Patient is age 12 years or older        Passed - Recent (12 mo) or future (30 days) visit within the authorizing provider's specialty     Patient had office visit in the last 12 months or has a visit in the next 30 days with authorizing provider or within the authorizing provider's specialty.  See \"Patient Info\" tab in inbasket, or \"Choose Columns\" in Meds & Orders section of the refill encounter.              Passed - Medication is active on med list        Passes [facility] refill procotol    R:  Filled per [facility] refill protocol    No further action necessary.  Encounter closed.    Alexei Sylvester RN      "

## 2019-08-06 NOTE — TELEPHONE ENCOUNTER
"Requested Prescriptions   Pending Prescriptions Disp Refills     albuterol (PROVENTIL) (2.5 MG/3ML) 0.083% neb solution 120 vial 3     Sig: NEBULIZE ONE VIAL (2.5 MG) EVERY FOUR HOURS AS NEEDED FOR SHORTNESS OF BREATH/DYSPNEA OR WHEEZING   Last Written Prescription Date:  9/20/18  Last Fill Quantity: 120 vial,  # refills: 3   Last office visit: 6/4/2019 with prescribing provider:  KENN Yin   Future Office Visit:        Asthma Maintenance Inhalers - Anticholinergics Passed - 8/6/2019 10:07 AM        Passed - Patient is age 12 years or older        Passed - Recent (12 mo) or future (30 days) visit within the authorizing provider's specialty     Patient had office visit in the last 12 months or has a visit in the next 30 days with authorizing provider or within the authorizing provider's specialty.  See \"Patient Info\" tab in inbasket, or \"Choose Columns\" in Meds & Orders section of the refill encounter.              Passed - Medication is active on med list          "

## 2019-08-13 ENCOUNTER — ALLIED HEALTH/NURSE VISIT (OUTPATIENT)
Dept: AUDIOLOGY | Facility: CLINIC | Age: 84
End: 2019-08-13
Payer: COMMERCIAL

## 2019-08-13 DIAGNOSIS — H90.3 SENSORINEURAL HEARING LOSS, ASYMMETRICAL: Primary | ICD-10-CM

## 2019-08-13 PROCEDURE — 99207 ZZC NO CHARGE LOS: CPT | Performed by: AUDIOLOGIST

## 2019-08-13 PROCEDURE — V5299 HEARING SERVICE: HCPCS | Performed by: AUDIOLOGIST

## 2019-08-14 NOTE — PROGRESS NOTES
[facility]         SUBJECTIVE:  Susan Haq ,87 year old female requests in office repair of her right 2015 Phonak Bolero V50-P hearing aid and earmold.     OBJECTIVE:  Replaced plugged and hardened earmold tubing and broken earhook. Verified hearing aid functionality.      ASSESSMENT/PLAN:    Return to clinic for rechecks as needed. Patient will  the hearing aid at the specialty clinic . See chart in the hearing aid room.     Caridad Elizabeth M.A. F-KIAN, #[id number]

## 2019-08-15 NOTE — TELEPHONE ENCOUNTER
RECORDS RECEIVED FROM: Internal   DATE RECEIVED: 10.2.19   NOTES STATUS DETAILS   OFFICE NOTE from referring provider Internal 7.8.19 Cora Yin  6.4.19   OFFICE NOTE from other specialist Internal 5.10.19 Dr. Hdz  12.19.18 Dr. Gallo  6.1.18 Dr. Melendez  3.2.18 Dr. Melendez   DISCHARGE SUMMARY from hospital Internal 9.19.18   DISCHARGE REPORT from the ER N/A    MEDICATION LIST Internal    IMAGING  (NEED IMAGES AND REPORTS)     CT SCAN N/A    CHEST XRAY (CXR) Internal 9.19.19 6.1.18  3.2.18   TESTS     PULMONARY FUNCTION TESTING (PFT) In process Scheduled for 10.2.19

## 2019-08-27 DIAGNOSIS — J44.9 CHRONIC OBSTRUCTIVE PULMONARY DISEASE, UNSPECIFIED COPD TYPE (H): Primary | ICD-10-CM

## 2019-09-05 DIAGNOSIS — F32.1 MAJOR DEPRESSIVE DISORDER, SINGLE EPISODE, MODERATE (H): Chronic | ICD-10-CM

## 2019-09-05 NOTE — TELEPHONE ENCOUNTER
"Requested Prescriptions   Pending Prescriptions Disp Refills     escitalopram (LEXAPRO) 5 MG tablet [Pharmacy Med Name: ESCITALOPRAM 5 MG TABLET 5 TAB] 60 tablet 0     Sig: TAKE TWO TABLETS (10 MG) BY MOUTH DAILY       SSRIs Protocol Failed - 9/5/2019 12:28 PM  MELISSA-7 SCORE 12/6/2017 9/14/2018 4/25/2019   Total Score 1 1 0             Failed - PHQ-9 score less than 5 in past 6 months     Please review last PHQ-9 score.   PHQ-9 SCORE 6/14/2018 9/14/2018 4/25/2019   PHQ-9 Total Score - - -   PHQ-9 Total Score 11 17 21             Passed - Medication is active on med list        Passed - Patient is age 18 or older        Passed - No active pregnancy on record        Passed - No positive pregnancy test in last 12 months        Passed - Recent (6 mo) or future (30 days) visit within the authorizing provider's specialty     Patient had office visit in the last 6 months or has a visit in the next 30 days with authorizing provider or within the authorizing provider's specialty.  See \"Patient Info\" tab in inbasket, or \"Choose Columns\" in Meds & Orders section of the refill encounter.            Last Written Prescription Date:  7/23/2019  Last Fill Quantity: 60,  # refills: 0   Last office visit: 6/4/2019 with prescribing provider:  Humphrey   Future Office Visit:      "

## 2019-09-06 NOTE — TELEPHONE ENCOUNTER
S:  Refill request for escitalopram 5mg tab 2 tab (10mg) daily    B:  LOV 6/4/19  escitalopram 5mg 2 tab (10mg) daily last written 7/23/19 for 30 day supply    A:  Fails FMG d/t recent PHQ9 score  PHQ-9 and GAD7 Scores 4/25/2019   PHQ-9 Total Score 21   MELISSA-7 Total Score 0     R:  Routed to provider:  Can patient have refill of medication as pended?    Alexei Sylvester RN

## 2019-09-09 RX ORDER — ESCITALOPRAM OXALATE 5 MG/1
TABLET ORAL
Qty: 60 TABLET | Refills: 0 | Status: SHIPPED | OUTPATIENT
Start: 2019-09-09 | End: 2019-10-03

## 2019-09-26 DIAGNOSIS — J84.10 INTERSTITIAL PULMONARY FIBROSIS (H): Chronic | ICD-10-CM

## 2019-09-26 DIAGNOSIS — E78.5 HYPERLIPIDEMIA LDL GOAL <130: Chronic | ICD-10-CM

## 2019-09-26 DIAGNOSIS — E87.6 HYPOKALEMIA: ICD-10-CM

## 2019-09-26 NOTE — LETTER
September 27, 2019      Susan Haq  [address] [place] [address]        Dear Susan,       We received a refill request for your atorvastatin, folic acid and potassium medication.  These medications have been refilled for 30 days as you are due for an office visit at the end of October for further refills.  Please call [phone] to schedule an appointment.      Sincerely,        Ema Melendez' Care Team            raquel

## 2019-09-27 RX ORDER — ATORVASTATIN CALCIUM 20 MG/1
TABLET, FILM COATED ORAL
Qty: 30 TABLET | Refills: 0 | Status: SHIPPED | OUTPATIENT
Start: 2019-09-27 | End: 2019-10-03

## 2019-09-27 RX ORDER — FOLIC ACID 1 MG/1
TABLET ORAL
Qty: 30 TABLET | Refills: 0 | Status: SHIPPED | OUTPATIENT
Start: 2019-09-27 | End: 2019-10-03

## 2019-09-27 RX ORDER — POTASSIUM CHLORIDE 750 MG/1
TABLET, EXTENDED RELEASE ORAL
Qty: 60 TABLET | Refills: 0 | Status: SHIPPED | OUTPATIENT
Start: 2019-09-27 | End: 2019-10-03

## 2019-09-27 NOTE — TELEPHONE ENCOUNTER
Medication is being filled for 1 time refill only due to:  Patient needs to be seen because she is due to see [facility] end of Oct..   Letter sent.  Renetta CHAVEZ RN

## 2019-09-27 NOTE — TELEPHONE ENCOUNTER
"Requested Prescriptions   Pending Prescriptions Disp Refills     potassium chloride ER (K-DUR/KLOR-CON M) 10 MEQ CR tablet [Pharmacy Med Name: POTASSIUM CL ER 10 MEQ TAB 10 TAB] 60 tablet PRN     Sig: TAKE ONE TABLET (10 MEQ) BY MOUTH 2 TIMES DAILY  Last Written Prescription Date:  7/9/2019  Last Fill Quantity: 60,  # refills: 2   Last office visit: 10/16/2018 with prescribing provider:  Nora  6/4/2019 with Humphrey  Future Office Visit:           Potassium Supplements Protocol Failed - 9/26/2019  6:51 PM        Failed - Normal serum potassium in past 12 months     Recent Labs   Lab Test 09/19/18  2258   POTASSIUM 3.5                    Passed - Recent (12 mo) or future (30 days) visit within the authorizing provider's specialty     Patient had office visit in the last 12 months or has a visit in the next 30 days with authorizing provider or within the authorizing provider's specialty.  See \"Patient Info\" tab in inbasket, or \"Choose Columns\" in Meds & Orders section of the refill encounter.              Passed - Medication is active on med list        Passed - Patient is age 18 or older        folic acid (FOLVITE) 1 MG tablet [Pharmacy Med Name: FOLIC ACID 1 MG TAB 1 TAB] 90 tablet PRN     Sig: TAKE ONE TABLET (1 MG) BY MOUTH DAILY  Last Written Prescription Date:  10/16/2018  Last Fill Quantity: 90,  # refills: 3   Last office visit: 10/16/2018 with prescribing provider:  Nora  6/4/2019 with Humphrey         Vitamin Supplements (Adult) Protocol Passed - 9/26/2019  6:51 PM        Passed - High dose Vitamin D not ordered        Passed - Recent (12 mo) or future (30 days) visit within the authorizing provider's specialty     Patient had office visit in the last 12 months or has a visit in the next 30 days with authorizing provider or within the authorizing provider's specialty.  See \"Patient Info\" tab in inbasket, or \"Choose Columns\" in Meds & Orders section of the refill encounter.              Passed - Medication is " "active on med list        atorvastatin (LIPITOR) 20 MG tablet [Pharmacy Med Name: ATORVASTATIN CALCIUM 20 MG 20 TAB] 90 tablet PRN     Sig: TAKE ONE TABLET (20 MG) BY MOUTH DAILY  Last Written Prescription Date:  10/16/2018  Last Fill Quantity: 90,  # refills: 3   Last office visit: 10/16/2018 with prescribing provider:  Nora  6/4/2019 with Humphrey         Statins Protocol Failed - 9/26/2019  6:51 PM        Failed - LDL on file in past 12 months     Recent Labs   Lab Test 06/01/18  1020   LDL 93             Passed - No abnormal creatine kinase in past 12 months     Recent Labs   Lab Test 06/10/17  1440   CKT 80                Passed - Recent (12 mo) or future (30 days) visit within the authorizing provider's specialty     Patient had office visit in the last 12 months or has a visit in the next 30 days with authorizing provider or within the authorizing provider's specialty.  See \"Patient Info\" tab in inbasket, or \"Choose Columns\" in Meds & Orders section of the refill encounter.              Passed - Medication is active on med list        Passed - Patient is age 18 or older        Passed - No active pregnancy on record        Passed - No positive pregnancy test in past 12 months          "

## 2019-10-02 ENCOUNTER — ANCILLARY PROCEDURE (OUTPATIENT)
Dept: GENERAL RADIOLOGY | Facility: CLINIC | Age: 84
End: 2019-10-02
Payer: COMMERCIAL

## 2019-10-02 ENCOUNTER — PRE VISIT (OUTPATIENT)
Dept: PULMONOLOGY | Facility: CLINIC | Age: 84
End: 2019-10-02

## 2019-10-02 ENCOUNTER — TELEPHONE (OUTPATIENT)
Dept: PULMONOLOGY | Facility: CLINIC | Age: 84
End: 2019-10-02

## 2019-10-02 ENCOUNTER — OFFICE VISIT (OUTPATIENT)
Dept: PULMONOLOGY | Facility: CLINIC | Age: 84
End: 2019-10-02
Attending: INTERNAL MEDICINE
Payer: COMMERCIAL

## 2019-10-02 VITALS
DIASTOLIC BLOOD PRESSURE: 76 MMHG | HEIGHT: 65 IN | WEIGHT: 260 LBS | HEART RATE: 74 BPM | BODY MASS INDEX: 43.32 KG/M2 | RESPIRATION RATE: 18 BRPM | SYSTOLIC BLOOD PRESSURE: 163 MMHG | OXYGEN SATURATION: 92 %

## 2019-10-02 DIAGNOSIS — J43.2 CENTRILOBULAR EMPHYSEMA (H): Primary | ICD-10-CM

## 2019-10-02 DIAGNOSIS — J96.21 ACUTE AND CHRONIC RESPIRATORY FAILURE WITH HYPOXIA (H): ICD-10-CM

## 2019-10-02 DIAGNOSIS — J44.9 CHRONIC OBSTRUCTIVE PULMONARY DISEASE, UNSPECIFIED COPD TYPE (H): ICD-10-CM

## 2019-10-02 DIAGNOSIS — Z23 ENCOUNTER FOR IMMUNIZATION: ICD-10-CM

## 2019-10-02 PROCEDURE — 90662 IIV NO PRSV INCREASED AG IM: CPT | Mod: ZF | Performed by: STUDENT IN AN ORGANIZED HEALTH CARE EDUCATION/TRAINING PROGRAM

## 2019-10-02 PROCEDURE — G0008 ADMIN INFLUENZA VIRUS VAC: HCPCS

## 2019-10-02 PROCEDURE — G0009 ADMIN PNEUMOCOCCAL VACCINE: HCPCS

## 2019-10-02 PROCEDURE — G0008 ADMIN INFLUENZA VIRUS VAC: HCPCS | Mod: ZF

## 2019-10-02 PROCEDURE — 90732 PPSV23 VACC 2 YRS+ SUBQ/IM: CPT | Mod: ZF | Performed by: STUDENT IN AN ORGANIZED HEALTH CARE EDUCATION/TRAINING PROGRAM

## 2019-10-02 PROCEDURE — G0463 HOSPITAL OUTPT CLINIC VISIT: HCPCS | Mod: ZF

## 2019-10-02 PROCEDURE — G0009 ADMIN PNEUMOCOCCAL VACCINE: HCPCS | Mod: ZF

## 2019-10-02 PROCEDURE — 25000128 H RX IP 250 OP 636: Mod: ZF | Performed by: STUDENT IN AN ORGANIZED HEALTH CARE EDUCATION/TRAINING PROGRAM

## 2019-10-02 RX ADMIN — INFLUENZA A VIRUS A/MICHIGAN/45/2015 X-275 (H1N1) ANTIGEN (FORMALDEHYDE INACTIVATED), INFLUENZA A VIRUS A/SINGAPORE/INFIMH-16-0019/2016 IVR-186 (H3N2) ANTIGEN (FORMALDEHYDE INACTIVATED), AND INFLUENZA B VIRUS B/MARYLAND/15/2016 BX-69A (A B/COLORADO/6/2017-LIKE VIRUS) ANTIGEN (FORMALDEHYDE INACTIVATED) 0.5 ML: 60; 60; 60 INJECTION, SUSPENSION INTRAMUSCULAR at 15:31

## 2019-10-02 RX ADMIN — PNEUMOCOCCAL VACCINE POLYVALENT 0.5 ML
25; 25; 25; 25; 25; 25; 25; 25; 25; 25; 25; 25; 25; 25; 25; 25; 25; 25; 25; 25; 25; 25; 25 INJECTION, SOLUTION INTRAMUSCULAR; SUBCUTANEOUS at 15:32

## 2019-10-02 ASSESSMENT — MIFFLIN-ST. JEOR: SCORE: 1615.23

## 2019-10-02 ASSESSMENT — PAIN SCALES - GENERAL: PAINLEVEL: NO PAIN (0)

## 2019-10-02 NOTE — PATIENT INSTRUCTIONS
Flu and Pneumonia shots today    Schedule appointments for Echocardiogram    We will send message to your primary care doctor about ordering a stress test    Clinic will call with the results    If tests are abnormal will need further evaluation of your heart    If heart tests are normal then will discuss more about Pulmonary Rehab    Try to wear your support stockings    Follow up in Pulmonary clinic in 3 months

## 2019-10-02 NOTE — NURSING NOTE
Chief Complaint   Patient presents with     Consult     COPD     Medications reviewed and vital signs taken.   Domingo Carranza CMA

## 2019-10-02 NOTE — PROGRESS NOTES
[facility]    Pulmonary, Allergy, Critical Care and Sleep Medicine    Initial Clinic Visit  10/01/2019    Susan Haq MRN# [id number]   Age: 87 year old YOB: 1932        Assessment and Recommendations:    Susan Haq is a 87 year old female with a history of COPD on 3L O2, SUSSY on BiPAP, Hearing Loss, Hypertension, Depression, Obesity who presents for evaluation of COPD.    Moderate COPD  Progressive Dyspnea  Significant clinical impairment, per friends unable to move any real distance without needing a break and puff from albuterol. Was a long time smoker, imaging with emphysematous changes, but quit 30 years ago. PFTs from June with moderate obstruction (FEV1 1.09, 51% with FVC/FEV1 51%), hyperventilation and air trapping, reduced DLCO. Overall do not think level of symptoms fit with degree of obstruction seen on PFTs and lack of smoking for 30 years. This raises concern for different etiology and with bilateral edema should evaluate for heart failure, last echo in 2015 and will repeat. Obesity and lack of any physical activity likely contribute to some degree. Quit pulmonary rehab and PT in past, denies any depression, friends think she is stubborn. Discussed that if heart looks ok will need to attempt rehab in future. Will continue on currently inhaler regimen, has had several exacerbations in last year, may be a candidate for additional therapies in future. Has not been evaluated by Sleep in years and with significant fatigue may need re-titration of settings.  - Up to date with Prevnar, received Pneumovax and Influenza shots today  - Echocardiogram ordered, will message PCP about also considering stress test  - Have encouraged patient to wear support stockings  - Overnight oximetry study to see if hypoxic at night, if so will refer back to Sleep    Follow up in Pulmonary clinic in three months    Seen and discussed with Dr. Olu Sanchez MD  PhD  Pulmonary and Critical Care Fellow   Pager [phone]    Chief Complaint and History of Present Illness:    CC:  COPD    HPI:     History taken from patient, chart review, and friends accompanying her to clinic.    Per EMR briefly followed with Dr. Perlman in 2015 and 2016. At that time disease relatively stable on Spiriva, Symbicort, and PRN albuterol. More recently had one overnight stay in the hospital for Pneumonia/COPD exacerbation one year ago, discharged with antibiotic course and nebs. Treated with steroids and doxycycline for exacerbation in December and again in May. Progressive symptoms as of June and encouraged to be seen in Pulmonary clinic. Previously followed with Sleep clinic, last seen in 2015, and maintained on bi-level therapy with 3L O2.    Today patient's friends, one of whom is her PCA, report that she has trouble breathing whenever she walks and is tired all the time despite naps. Friends think symptoms have been slowly progressive over the past six months. Patient uses 3L of oxygen all the time, reports she is nervous will mess something up if tried to titrate it so never adjusts her O2 amount. Does not have an oximeter at home. Friends report will need a rest after walking into the next room and rest is 5-10 minutes with a puff of her albuterol. PCA helps with all dressing and showering and she needs breaks with these too. Will become more labored when eating but does not have difficulty eating. Coughs daily with sputum production that is usually white/clear. Some wheezing when more short of breath. When is sick sputum turns yellow green, no hemoptysis. Will have more wheezing and fever and sweats. Denies chest pain, not sure if has orthopnea, reports sleeps on side with her Bipap. Uses 3L at night, never noticed improvement in sleep after starting Bipap. Legs have been swollen, patient's friends report she does not like to wear her compression stockings.     Amount of albuterol used  depends on level of activity. Continues to use Symbicort and Spiriva. When asked about Pulmonary Rehab, friends report that patient did previously but quit after several sessions. They also tried to have home physical therapy but patient fired the therapist. Quit smoking thirty years ago, smoked from teens into 50s, up to one ppd. Was a , also worked at one point in corrections. No known TB exposures, no significant dust or fume exposures. Lives in a small house, has a renter in the basement and one cat. Does not do any of the cleaning and house has been adapted with carpet out, no mold or water damage.     Review of Systems:  Complete 12 point ROS negative unless mentioned in HPI    Histories, Prior to Admission Medications, Allergies:    Past Medical History:  Past Medical History:   Diagnosis Date     Basal cell carcinoma      Bronchospasm     copd vs. asthma     Diverticulosis      Fracture, Metacarpal Shaft - right 4th 7/5/2010     High cholesterol      HL (hearing loss) 3/25/2013     Hypertension      Hypokalemia 9/17/2013     Hypoxia 9/5/2013     PVC's (premature ventricular contractions) 4/25/2011     Smoker 1986    quit     Type 2 diabetes mellitus (H) 9/26/2011     Venous insufficiency      Past Surgical History:  Past Surgical History:   Procedure Laterality Date     APPENDECTOMY       C BSO, OMENTECTOMY W/SHANIA       C NONSPECIFIC PROCEDURE      (L) clavicle orif     C STOMACH SURGERY PROCEDURE UNLISTED       CHOLECYSTECTOMY, LAPOROSCOPIC  10/13/2011    Cholecystectomy, Laparoscopic     HERNIA REPAIR, UMBILICAL  10/13/2011     HYSTERECTOMY, CERVIX STATUS UNKNOWN       Past Social History:  Social History     Socioeconomic History     Marital status:      Spouse name: Not on file     Number of children: Not on file     Years of education: Not on file     Highest education level: Not on file   Occupational History     Employer: RETIRED     Comment:  in [place]    Social Needs     Financial resource strain: Not on file     Food insecurity:     Worry: Not on file     Inability: Not on file     Transportation needs:     Medical: Not on file     Non-medical: Not on file   Tobacco Use     Smoking status: Former Smoker     Packs/day: 1.00     Years: 35.00     Pack years: 35.00     Last attempt to quit: 1990     Years since quittin.7     Smokeless tobacco: Former User   Substance and Sexual Activity     Alcohol use: Yes     Comment: Drink 1 (3oz) drink a day     Drug use: No     Sexual activity: Never   Lifestyle     Physical activity:     Days per week: Not on file     Minutes per session: Not on file     Stress: Not on file   Relationships     Social connections:     Talks on phone: Not on file     Gets together: Not on file     Attends Voodoo service: Not on file     Active member of club or organization: Not on file     Attends meetings of clubs or organizations: Not on file     Relationship status: Not on file     Intimate partner violence:     Fear of current or ex partner: Not on file     Emotionally abused: Not on file     Physically abused: Not on file     Forced sexual activity: Not on file   Other Topics Concern     Parent/sibling w/ CABG, MI or angioplasty before 65F 55M? Not Asked   Social History Narrative    Lives independently in own home.     Family History:  Family History   Problem Relation Age of Onset     Diabetes Father      Coronary Artery Disease Maternal Grandmother      Coronary Artery Disease Paternal Uncle    Reports someone in family was on oxygen, not sure if they might have had cancer, possibly lung cancer.     Medications:  Current Outpatient Medications   Medication     acetaminophen (TYLENOL) 500 MG tablet     albuterol (PROAIR HFA/PROVENTIL HFA/VENTOLIN HFA) 108 (90 Base) MCG/ACT Inhaler     albuterol (PROVENTIL) (2.5 MG/3ML) 0.083% neb solution     ALLERGY RELIEF 10 MG tablet     amLODIPine (NORVASC) 10 MG tablet     atenolol  "(TENORMIN) 50 MG tablet     atorvastatin (LIPITOR) 20 MG tablet     budesonide-formoterol (SYMBICORT) 160-4.5 MCG/ACT Inhaler     Calcium Carbonate-Vitamin D (CALCIUM 600 + D OR)     camphor-menthol (DERMASARRA) 0.5-0.5 % LOTN     escitalopram (LEXAPRO) 5 MG tablet     FISH OIL 1000 MG OR CAPS     folic acid (FOLVITE) 1 MG tablet     losartan-hydrochlorothiazide (HYZAAR) 100-25 MG per tablet     nystatin (MYCOSTATIN) 602971 UNIT/GM POWD     order for DME     ORDER FOR DME     ORDER FOR DME     ORDER FOR DME     ORDER FOR DME     ORDER FOR DME     potassium chloride ER (K-DUR/KLOR-CON M) 10 MEQ CR tablet     Respiratory Therapy Supplies (NEBULIZER COMPRESSOR) KIT     SENNA PLUS 8.6-50 MG per tablet     SIMETHICONE-80 PO     tiotropium (SPIRIVA HANDIHALER) 18 MCG capsule     tiotropium (SPIRIVA HANDIHALER) 18 MCG inhaled capsule     No current facility-administered medications for this visit.      Allergies:  Allergies   Allergen Reactions     Ace Inhibitors Cough     Asa [Aspirin]      Penicillins      Physical Exam:    BP: ()/()   Arterial Line BP: ()/()     BP (!) 163/76   Pulse 74   Resp 18   Ht 1.651 m (5' 5\")   Wt 117.9 kg (260 lb)   SpO2 92%   BMI 43.27 kg/m      General: Alert, NAD  HEENT: anicteric, moist mucosa, oxygen nasal cannula in place  Chest: Slightly diminished breath sounds, no wheezing, some crackles at base  Cardiac: RRR no murmurs  Abdomen: Soft, obese  Extremities: Bilateral pitting edema  Neuro: Alert, answering questions appropriately but sometimes needs prompting or reminders from friends  Skin: no rash noted  Psych: Appropriate    Laboratory, imaging, and microbiologic data:    All laboratory and imaging data reviewed, pertinent results discussed above.    CXR 10/2/2019  FINDINGS:  The trachea is midline. The cardiomediastinal silhouette is well  defined. The pulmonary vasculature are distinct.   The included osseous thorax, soft tissues and upper abdomen and are  within normal limits. "   Lungs are clear without discrete nodule/mass, focal consolidative  opacity, pleural effusion, or pneumothorax. Mildly hyperlucent lung  apices.                                                              IMPRESSION: No acute airspace opacity. Emphysematous change.       Most Recent [facility] Pulmonary Function Testing    FVC-Pred   Date Value Ref Range Status   06/26/2019 2.40 L      FVC-Pre   Date Value Ref Range Status   06/26/2019 2.15 L      FVC-%Pred-Pre   Date Value Ref Range Status   06/26/2019 89 %      FEV1-Pre   Date Value Ref Range Status   06/26/2019 1.09 L      FEV1-%Pred-Pre   Date Value Ref Range Status   06/26/2019 60 %      FEV1FVC-Pred   Date Value Ref Range Status   06/26/2019 76 %      FEV1FVC-Pre   Date Value Ref Range Status   06/26/2019 51 %      No results found for: 20029  FEFMax-Pred   Date Value Ref Range Status   06/26/2019 4.15 L/sec      FEFMax-Pre   Date Value Ref Range Status   06/26/2019 3.23 L/sec      FEFMax-%Pred-Pre   Date Value Ref Range Status   06/26/2019 77 %      ExpTime-Pre   Date Value Ref Range Status   06/26/2019 7.42 sec      FIFMax-Pre   Date Value Ref Range Status   06/26/2019 2.84 L/sec      FEV1FEV6-Pred   Date Value Ref Range Status   06/26/2019 77 %      FEV1FEV6-Pre   Date Value Ref Range Status   06/26/2019 53 %      No results found for: 20055

## 2019-10-02 NOTE — LETTER
10/2/2019       RE: Susan Haq  [address] [place] [address]     Dear Colleague,    Thank you for referring your patient, Susan Haq, to the [facility] at [facility]. Please see a copy of my visit note below.    [facility]    Pulmonary, Allergy, Critical Care and Sleep Medicine    Initial Clinic Visit  10/01/2019    Susan Haq MRN# [id number]   Age: 87 year old YOB: 1932        Assessment and Recommendations:    Susan Haq is a 87 year old female with a history of COPD on 3L O2, SUSSY on BiPAP, Hearing Loss, Hypertension, Depression, Obesity who presents for evaluation of COPD.    Moderate COPD  Progressive Dyspnea  Significant clinical impairment, per friends unable to move any real distance without needing a break and puff from albuterol. Was a long time smoker, imaging with emphysematous changes, but quit 30 years ago. PFTs from June with moderate obstruction (FEV1 1.09, 51% with FVC/FEV1 51%), hyperventilation and air trapping, reduced DLCO. Overall do not think level of symptoms fit with degree of obstruction seen on PFTs and lack of smoking for 30 years. This raises concern for different etiology and with bilateral edema should evaluate for heart failure, last echo in 2015 and will repeat. Obesity and lack of any physical activity likely contribute to some degree. Quit pulmonary rehab and PT in past, denies any depression, friends think she is stubborn. Discussed that if heart looks ok will need to attempt rehab in future. Will continue on currently inhaler regimen, has had several exacerbations in last year, may be a candidate for additional therapies in future. Has not been evaluated by Sleep in years and with significant fatigue may need re-titration of settings.  - Up to date with Prevnar, received Pneumovax and Influenza shots today  - Echocardiogram  ordered, will message PCP about also considering stress test  - Have encouraged patient to wear support stockings  - Overnight oximetry study to see if hypoxic at night, if so will refer back to Sleep    Follow up in Pulmonary clinic in three months    Seen and discussed with Dr. Olu Sanchez MD PhD  Pulmonary and Critical Care Fellow   Pager [phone]    Chief Complaint and History of Present Illness:    CC:  COPD    HPI:     History taken from patient, chart review, and friends accompanying her to clinic.    Per EMR briefly followed with Dr. Perlman in 2015 and 2016. At that time disease relatively stable on Spiriva, Symbicort, and PRN albuterol. More recently had one overnight stay in the hospital for Pneumonia/COPD exacerbation one year ago, discharged with antibiotic course and nebs. Treated with steroids and doxycycline for exacerbation in December and again in May. Progressive symptoms as of June and encouraged to be seen in Pulmonary clinic. Previously followed with Sleep clinic, last seen in 2015, and maintained on bi-level therapy with 3L O2.    Today patient's friends, one of whom is her PCA, report that she has trouble breathing whenever she walks and is tired all the time despite naps. Friends think symptoms have been slowly progressive over the past six months. Patient uses 3L of oxygen all the time, reports she is nervous will mess something up if tried to titrate it so never adjusts her O2 amount. Does not have an oximeter at home. Friends report will need a rest after walking into the next room and rest is 5-10 minutes with a puff of her albuterol. PCA helps with all dressing and showering and she needs breaks with these too. Will become more labored when eating but does not have difficulty eating. Coughs daily with sputum production that is usually white/clear. Some wheezing when more short of breath. When is sick sputum turns yellow green, no hemoptysis. Will have more wheezing  and fever and sweats. Denies chest pain, not sure if has orthopnea, reports sleeps on side with her Bipap. Uses 3L at night, never noticed improvement in sleep after starting Bipap. Legs have been swollen, patient's friends report she does not like to wear her compression stockings.     Amount of albuterol used depends on level of activity. Continues to use Symbicort and Spiriva. When asked about Pulmonary Rehab, friends report that patient did previously but quit after several sessions. They also tried to have home physical therapy but patient fired the therapist. Quit smoking thirty years ago, smoked from teens into 50s, up to one ppd. Was a , also worked at one point in [place]. No known TB exposures, no significant dust or fume exposures. Lives in a small house, has a renter in the basement and one cat. Does not do any of the cleaning and house has been adapted with carpet out, no mold or water damage.     Review of Systems:  Complete 12 point ROS negative unless mentioned in HPI    Histories, Prior to Admission Medications, Allergies:    Past Medical History:  Past Medical History:   Diagnosis Date     Basal cell carcinoma      Bronchospasm     copd vs. asthma     Diverticulosis      Fracture, Metacarpal Shaft - right 4th 7/5/2010     High cholesterol      HL (hearing loss) 3/25/2013     Hypertension      Hypokalemia 9/17/2013     Hypoxia 9/5/2013     PVC's (premature ventricular contractions) 4/25/2011     Smoker 1986    quit     Type 2 diabetes mellitus (H) 9/26/2011     Venous insufficiency      Past Surgical History:  Past Surgical History:   Procedure Laterality Date     APPENDECTOMY       C BSO, OMENTECTOMY W/SHANIA       C NONSPECIFIC PROCEDURE      (L) clavicle orif     C STOMACH SURGERY PROCEDURE UNLISTED       CHOLECYSTECTOMY, LAPOROSCOPIC  10/13/2011    Cholecystectomy, Laparoscopic     HERNIA REPAIR, UMBILICAL  10/13/2011     HYSTERECTOMY, CERVIX STATUS UNKNOWN       Past Social  History:  Social History     Socioeconomic History     Marital status:      Spouse name: Not on file     Number of children: Not on file     Years of education: Not on file     Highest education level: Not on file   Occupational History     Employer: RETIRED     Comment:  in [place]   Social Needs     Financial resource strain: Not on file     Food insecurity:     Worry: Not on file     Inability: Not on file     Transportation needs:     Medical: Not on file     Non-medical: Not on file   Tobacco Use     Smoking status: Former Smoker     Packs/day: 1.00     Years: 35.00     Pack years: 35.00     Last attempt to quit: 1990     Years since quittin.7     Smokeless tobacco: Former User   Substance and Sexual Activity     Alcohol use: Yes     Comment: Drink 1 (3oz) drink a day     Drug use: No     Sexual activity: Never   Lifestyle     Physical activity:     Days per week: Not on file     Minutes per session: Not on file     Stress: Not on file   Relationships     Social connections:     Talks on phone: Not on file     Gets together: Not on file     Attends Worship service: Not on file     Active member of club or organization: Not on file     Attends meetings of clubs or organizations: Not on file     Relationship status: Not on file     Intimate partner violence:     Fear of current or ex partner: Not on file     Emotionally abused: Not on file     Physically abused: Not on file     Forced sexual activity: Not on file   Other Topics Concern     Parent/sibling w/ CABG, MI or angioplasty before 65F 55M? Not Asked   Social History Narrative    Lives independently in own home.     Family History:  Family History   Problem Relation Age of Onset     Diabetes Father      Coronary Artery Disease Maternal Grandmother      Coronary Artery Disease Paternal Uncle    Reports someone in family was on oxygen, not sure if they might have had cancer, possibly lung cancer.  "    Medications:  Current Outpatient Medications   Medication     acetaminophen (TYLENOL) 500 MG tablet     albuterol (PROAIR HFA/PROVENTIL HFA/VENTOLIN HFA) 108 (90 Base) MCG/ACT Inhaler     albuterol (PROVENTIL) (2.5 MG/3ML) 0.083% neb solution     ALLERGY RELIEF 10 MG tablet     amLODIPine (NORVASC) 10 MG tablet     atenolol (TENORMIN) 50 MG tablet     atorvastatin (LIPITOR) 20 MG tablet     budesonide-formoterol (SYMBICORT) 160-4.5 MCG/ACT Inhaler     Calcium Carbonate-Vitamin D (CALCIUM 600 + D OR)     camphor-menthol (DERMASARRA) 0.5-0.5 % LOTN     escitalopram (LEXAPRO) 5 MG tablet     FISH OIL 1000 MG OR CAPS     folic acid (FOLVITE) 1 MG tablet     losartan-hydrochlorothiazide (HYZAAR) 100-25 MG per tablet     nystatin (MYCOSTATIN) 923616 UNIT/GM POWD     order for DME     ORDER FOR DME     ORDER FOR DME     ORDER FOR DME     ORDER FOR DME     ORDER FOR DME     potassium chloride ER (K-DUR/KLOR-CON M) 10 MEQ CR tablet     Respiratory Therapy Supplies (NEBULIZER COMPRESSOR) KIT     SENNA PLUS 8.6-50 MG per tablet     SIMETHICONE-80 PO     tiotropium (SPIRIVA HANDIHALER) 18 MCG capsule     tiotropium (SPIRIVA HANDIHALER) 18 MCG inhaled capsule     No current facility-administered medications for this visit.      Allergies:  Allergies   Allergen Reactions     Ace Inhibitors Cough     Asa [Aspirin]      Penicillins      Physical Exam:    BP: ()/()   Arterial Line BP: ()/()     BP (!) 163/76   Pulse 74   Resp 18   Ht 1.651 m (5' 5\")   Wt 117.9 kg (260 lb)   SpO2 92%   BMI 43.27 kg/m       General: Alert, NAD  HEENT: anicteric, moist mucosa, oxygen nasal cannula in place  Chest: Slightly diminished breath sounds, no wheezing, some crackles at base  Cardiac: RRR no murmurs  Abdomen: Soft, obese  Extremities: Bilateral pitting edema  Neuro: Alert, answering questions appropriately but sometimes needs prompting or reminders from friends  Skin: no rash noted  Psych: Appropriate    Laboratory, imaging, and " microbiologic data:    All laboratory and imaging data reviewed, pertinent results discussed above.    CXR 10/2/2019  FINDINGS:  The trachea is midline. The cardiomediastinal silhouette is well  defined. The pulmonary vasculature are distinct.   The included osseous thorax, soft tissues and upper abdomen and are  within normal limits.   Lungs are clear without discrete nodule/mass, focal consolidative  opacity, pleural effusion, or pneumothorax. Mildly hyperlucent lung  apices.                                                              IMPRESSION: No acute airspace opacity. Emphysematous change.       Most Recent [facility] Pulmonary Function Testing    FVC-Pred   Date Value Ref Range Status   06/26/2019 2.40 L      FVC-Pre   Date Value Ref Range Status   06/26/2019 2.15 L      FVC-%Pred-Pre   Date Value Ref Range Status   06/26/2019 89 %      FEV1-Pre   Date Value Ref Range Status   06/26/2019 1.09 L      FEV1-%Pred-Pre   Date Value Ref Range Status   06/26/2019 60 %      FEV1FVC-Pred   Date Value Ref Range Status   06/26/2019 76 %      FEV1FVC-Pre   Date Value Ref Range Status   06/26/2019 51 %      No results found for: 20029  FEFMax-Pred   Date Value Ref Range Status   06/26/2019 4.15 L/sec      FEFMax-Pre   Date Value Ref Range Status   06/26/2019 3.23 L/sec      FEFMax-%Pred-Pre   Date Value Ref Range Status   06/26/2019 77 %      ExpTime-Pre   Date Value Ref Range Status   06/26/2019 7.42 sec      FIFMax-Pre   Date Value Ref Range Status   06/26/2019 2.84 L/sec      FEV1FEV6-Pred   Date Value Ref Range Status   06/26/2019 77 %      FEV1FEV6-Pre   Date Value Ref Range Status   06/26/2019 53 %      No results found for: 20055        Physician Attestation   I, Guillermo Raines MD, saw this patient with  and agree with the findings and plan of care as documented in the note this visit.      I personally reviewed vital signs, medications, labs and imaging.    Guillermo Raines MD  Date of Service (when  I saw the patient): 10/02/19      Again, thank you for allowing me to participate in the care of your patient.      Sincerely,    Clara Sanchez MD

## 2019-10-03 DIAGNOSIS — I10 HYPERTENSION GOAL BP (BLOOD PRESSURE) < 140/90: Chronic | ICD-10-CM

## 2019-10-03 DIAGNOSIS — J84.10 INTERSTITIAL PULMONARY FIBROSIS (H): Chronic | ICD-10-CM

## 2019-10-03 DIAGNOSIS — E78.5 HYPERLIPIDEMIA LDL GOAL <130: Chronic | ICD-10-CM

## 2019-10-03 DIAGNOSIS — E11.9 TYPE 2 DIABETES MELLITUS WITHOUT COMPLICATION, WITHOUT LONG-TERM CURRENT USE OF INSULIN (H): Chronic | ICD-10-CM

## 2019-10-03 DIAGNOSIS — E87.6 HYPOKALEMIA: ICD-10-CM

## 2019-10-03 DIAGNOSIS — F32.1 MAJOR DEPRESSIVE DISORDER, SINGLE EPISODE, MODERATE (H): Chronic | ICD-10-CM

## 2019-10-03 RX ORDER — LOSARTAN POTASSIUM AND HYDROCHLOROTHIAZIDE 25; 100 MG/1; MG/1
1 TABLET ORAL DAILY
Qty: 90 TABLET | Refills: 3 | Status: CANCELLED | OUTPATIENT
Start: 2019-10-03

## 2019-10-03 RX ORDER — ESCITALOPRAM OXALATE 5 MG/1
10 TABLET ORAL DAILY
Qty: 60 TABLET | Refills: 0 | Status: SHIPPED | OUTPATIENT
Start: 2019-10-03 | End: 2019-12-10

## 2019-10-03 RX ORDER — LOSARTAN POTASSIUM AND HYDROCHLOROTHIAZIDE 25; 100 MG/1; MG/1
1 TABLET ORAL DAILY
Qty: 30 TABLET | Refills: 0 | Status: SHIPPED | OUTPATIENT
Start: 2019-10-03 | End: 2019-10-16

## 2019-10-03 RX ORDER — ATORVASTATIN CALCIUM 20 MG/1
TABLET, FILM COATED ORAL
Qty: 30 TABLET | Refills: 0 | Status: SHIPPED | OUTPATIENT
Start: 2019-10-03 | End: 2019-10-16

## 2019-10-03 RX ORDER — LOSARTAN POTASSIUM 100 MG/1
TABLET ORAL
Qty: 90 TABLET | Refills: 0 | OUTPATIENT
Start: 2019-10-03

## 2019-10-03 RX ORDER — FOLIC ACID 1 MG/1
TABLET ORAL
Qty: 30 TABLET | Refills: 0 | Status: SHIPPED | OUTPATIENT
Start: 2019-10-03 | End: 2019-12-10

## 2019-10-03 RX ORDER — POTASSIUM CHLORIDE 750 MG/1
TABLET, EXTENDED RELEASE ORAL
Qty: 60 TABLET | Refills: 0 | Status: SHIPPED | OUTPATIENT
Start: 2019-10-03 | End: 2019-10-16

## 2019-10-03 NOTE — PROGRESS NOTES
Physician Attestation   I, Guillermo Raines MD, saw this patient with  and agree with the findings and plan of care as documented in the note this visit.      I personally reviewed vital signs, medications, labs and imaging.    Guillermo Raines MD  Date of Service (when I saw the patient): 10/02/19

## 2019-10-03 NOTE — TELEPHONE ENCOUNTER
"Requested Prescriptions   Pending Prescriptions Disp Refills     losartan (COZAAR) 100 MG tablet [Pharmacy Med Name: LOSARTAN POTASSIUM 100 MG T 100 TAB] 90 tablet 0     Sig: TAKE ONE TABLET BY MOUTH ONCE DAILY       Angiotensin-II Receptors Failed - 10/3/2019  1:19 PM        Failed - Last blood pressure under 140/90 in past 12 months     BP Readings from Last 3 Encounters:   10/02/19 (!) 163/76   06/04/19 136/84   05/10/19 124/86                 Failed - Medication is active on med list        Failed - Normal serum creatinine on file in past 12 months     Recent Labs   Lab Test 09/19/18  2258   CR 1.04             Failed - Normal serum potassium on file in past 12 months     Recent Labs   Lab Test 09/19/18  2258   POTASSIUM 3.5                    Passed - Recent (12 mo) or future (30 days) visit within the authorizing provider's specialty     Patient has had an office visit with the authorizing provider or a provider within the authorizing providers department within the previous 12 mos or has a future within next 30 days. See \"Patient Info\" tab in inbasket, or \"Choose Columns\" in Meds & Orders section of the refill encounter.              Passed - Patient is age 18 or older        Passed - No active pregnancy on record        Passed - No positive pregnancy test in past 12 months        Last Written Prescription Date:  10/16/2018  Last Fill Quantity: 90,  # refills: 3   Last office visit: 6/4/2019 with prescribing provider:  Humphrey   Future Office Visit:      "

## 2019-10-03 NOTE — TELEPHONE ENCOUNTER
Medications are being filled for 1 month refill only due to:  Patient needs to be seen because it has been more than one year since last visit.    I called Martina, person who schedules appts, and appt made for 10/16/19.    Rin Persaud RN

## 2019-10-03 NOTE — TELEPHONE ENCOUNTER
"Requested Prescriptions   Pending Prescriptions Disp Refills     escitalopram (LEXAPRO) 5 MG tablet 60 tablet 0       SSRIs Protocol Failed - 10/3/2019  2:18 PM        Failed - PHQ-9 score less than 5 in past 6 months     Please review last PHQ-9 score.           Passed - Medication is active on med list        Passed - Patient is age 18 or older        Passed - No active pregnancy on record        Passed - No positive pregnancy test in last 12 months        Passed - Recent (6 mo) or future (30 days) visit within the authorizing provider's specialty     Patient had office visit in the last 6 months or has a visit in the next 30 days with authorizing provider or within the authorizing provider's specialty.  See \"Patient Info\" tab in inbasket, or \"Choose Columns\" in Meds & Orders section of the refill encounter.       Last Written Prescription Date:  9/9/19  Last Fill Quantity: 60,  # refills: 0   Last office visit: 6/4/2019 with prescribing provider:  Nora   Future Office Visit:         losartan-hydrochlorothiazide (HYZAAR) 100-25 MG tablet 90 tablet 3     Sig: Take 1 tablet by mouth daily       Angiotensin-II Receptors Failed - 10/3/2019  2:18 PM        Failed - Last blood pressure under 140/90 in past 12 months     BP Readings from Last 3 Encounters:   10/02/19 (!) 163/76   06/04/19 136/84   05/10/19 124/86                 Failed - Normal serum creatinine on file in past 12 months     Recent Labs   Lab Test 09/19/18  2258   CR 1.04             Failed - Normal serum potassium on file in past 12 months     Recent Labs   Lab Test 09/19/18  2258   POTASSIUM 3.5                    Passed - Recent (12 mo) or future (30 days) visit within the authorizing provider's specialty     Patient has had an office visit with the authorizing provider or a provider within the authorizing providers department within the previous 12 mos or has a future within next 30 days. See \"Patient Info\" tab in inbasket, or \"Choose Columns\" in " "Meds & Orders section of the refill encounter.              Passed - Medication is active on med list        Passed - Patient is age 18 or older        Passed - No active pregnancy on record        Passed - No positive pregnancy test in past 12 months   Last Written Prescription Date:  10/16/18  Last Fill Quantity: 90,  # refills: 3   Last office visit: 6/4/2019 with prescribing provider:  Nora   Future Office Visit:         folic acid (FOLVITE) 1 MG tablet 30 tablet 0       Vitamin Supplements (Adult) Protocol Passed - 10/3/2019  2:18 PM        Passed - High dose Vitamin D not ordered        Passed - Recent (12 mo) or future (30 days) visit within the authorizing provider's specialty     Patient has had an office visit with the authorizing provider or a provider within the authorizing providers department within the previous 12 mos or has a future within next 30 days. See \"Patient Info\" tab in inbasket, or \"Choose Columns\" in Meds & Orders section of the refill encounter.              Passed - Medication is active on med list   Last Written Prescription Date:  9/27/19  Last Fill Quantity: 30,  # refills: 0   Last office visit: 6/4/2019 with prescribing provider:  Nora   Future Office Visit:         atorvastatin (LIPITOR) 20 MG tablet 30 tablet 0       Statins Protocol Failed - 10/3/2019  2:18 PM        Failed - LDL on file in past 12 months     Recent Labs   Lab Test 06/01/18  1020   LDL 93             Passed - No abnormal creatine kinase in past 12 months     Recent Labs   Lab Test 06/10/17  1440   CKT 80                Passed - Recent (12 mo) or future (30 days) visit within the authorizing provider's specialty     Patient has had an office visit with the authorizing provider or a provider within the authorizing providers department within the previous 12 mos or has a future within next 30 days. See \"Patient Info\" tab in inbasket, or \"Choose Columns\" in Meds & Orders section of the refill encounter.       " "       Passed - Medication is active on med list        Passed - Patient is age 18 or older        Passed - No active pregnancy on record        Passed - No positive pregnancy test in past 12 months   Last Written Prescription Date:  9/27/19  Last Fill Quantity: 30,  # refills: 0   Last office visit: 6/4/2019 with prescribing provider:  Nora   Future Office Visit:         potassium chloride ER (K-DUR/KLOR-CON M) 10 MEQ CR tablet 60 tablet 0       Potassium Supplements Protocol Failed - 10/3/2019  2:18 PM        Failed - Normal serum potassium in past 12 months     Recent Labs   Lab Test 09/19/18  2258   POTASSIUM 3.5                    Passed - Recent (12 mo) or future (30 days) visit within the authorizing provider's department     Patient has had an office visit with the authorizing provider or a provider within the authorizing providers department within the previous 12 mos or has a future within next 30 days. See \"Patient Info\" tab in inbasket, or \"Choose Columns\" in Meds & Orders section of the refill encounter.              Passed - Medication is active on med list        Passed - Patient is age 18 or older      Last Written Prescription Date:  9/27/19  Last Fill Quantity: 60,  # refills: 0   Last office visit: 6/4/2019 with prescribing provider:  Nora   Future Office Visit:        "

## 2019-10-03 NOTE — LETTER
[facility]  [address] [address]  Phone: [phone]       October 3, 2019         Susan Haq  [id number] [place] [address]            Dear Susan:    We are concerned about your health care.  We recently provided you with medication refills.  Many medications require routine follow-up with your doctor.    Your prescription for losartan-hydrochlorothiazide has been refilled for 30 day supply so you may have time for the above noted follow-up. Please call to schedule soon so we can assure you have an appointment before your next refills are needed.  You will need a follow up visit with provider for further refills.  Thank you,      ANNETTA Hoover / SABINO

## 2019-10-03 NOTE — TELEPHONE ENCOUNTER
"S:  Refill request for losartan 100mg tab daily    B:  LOV 6/4/19  Losartan 100mg tab daily not on the current medication list  Losartan-hydrochlorothiazide 100-25 last written 10/16/18 for 12 month supply    A:  Writer called pharmacy to clarify:  Flynn stated that the meds were split but that the patient currently has medications and we should probably just send the script for the combo pill instead any ways.  Requested Prescriptions   Pending Prescriptions Disp Refills     losartan-hydrochlorothiazide (HYZAAR) 100-25 MG tablet 90 tablet 3     Sig: Take 1 tablet by mouth daily       There is no refill protocol information for this order      Refused Prescriptions Disp Refills     losartan (COZAAR) 100 MG tablet [Pharmacy Med Name: LOSARTAN POTASSIUM 100 MG T 100 TAB] 90 tablet 0     Sig: TAKE ONE TABLET BY MOUTH ONCE DAILY       Angiotensin-II Receptors Failed - 10/3/2019  1:24 PM        Failed - Last blood pressure under 140/90 in past 12 months     BP Readings from Last 3 Encounters:   10/02/19 (!) 163/76   06/04/19 136/84   05/10/19 124/86                 Failed - Medication is active on med list        Failed - Normal serum creatinine on file in past 12 months     Recent Labs   Lab Test 09/19/18  2258   CR 1.04             Failed - Normal serum potassium on file in past 12 months     Recent Labs   Lab Test 09/19/18  2258   POTASSIUM 3.5                    Passed - Recent (12 mo) or future (30 days) visit within the authorizing provider's specialty     Patient has had an office visit with the authorizing provider or a provider within the authorizing providers department within the previous 12 mos or has a future within next 30 days. See \"Patient Info\" tab in inbasket, or \"Choose Columns\" in Meds & Orders section of the refill encounter.              Passed - Patient is age 18 or older        Passed - No active pregnancy on record        Passed - No positive pregnancy test in past 12 months        Fails [facility] " refill protocol for losartan-hydrochlorothiazide      R:  Writer refused the losartan request.  Writer pended the losartan-hydrochlorothiazide as requested.    30 days stephanie given of losartan-hydrochlorothiazide.  Pharmacy note attached and letter sent instructing patient that patient needs a follow up visit with provider for further refills.    No Further action necessary.  Alexei Sylvester RN.

## 2019-10-16 ENCOUNTER — OFFICE VISIT (OUTPATIENT)
Dept: FAMILY MEDICINE | Facility: CLINIC | Age: 84
End: 2019-10-16
Payer: COMMERCIAL

## 2019-10-16 VITALS
DIASTOLIC BLOOD PRESSURE: 88 MMHG | RESPIRATION RATE: 16 BRPM | OXYGEN SATURATION: 95 % | WEIGHT: 265.3 LBS | SYSTOLIC BLOOD PRESSURE: 136 MMHG | HEIGHT: 65 IN | HEART RATE: 61 BPM | TEMPERATURE: 97 F | BODY MASS INDEX: 44.2 KG/M2

## 2019-10-16 DIAGNOSIS — E55.9 VITAMIN D DEFICIENCY: ICD-10-CM

## 2019-10-16 DIAGNOSIS — J84.10 INTERSTITIAL PULMONARY FIBROSIS (H): Chronic | ICD-10-CM

## 2019-10-16 DIAGNOSIS — Z00.00 ENCOUNTER FOR MEDICARE ANNUAL WELLNESS EXAM: Primary | ICD-10-CM

## 2019-10-16 DIAGNOSIS — E87.6 HYPOKALEMIA: ICD-10-CM

## 2019-10-16 DIAGNOSIS — E78.5 HYPERLIPIDEMIA LDL GOAL <130: Chronic | ICD-10-CM

## 2019-10-16 DIAGNOSIS — N18.30 CKD (CHRONIC KIDNEY DISEASE) STAGE 3, GFR 30-59 ML/MIN (H): ICD-10-CM

## 2019-10-16 DIAGNOSIS — G47.33 OSA (OBSTRUCTIVE SLEEP APNEA): ICD-10-CM

## 2019-10-16 DIAGNOSIS — J44.9 CHRONIC OBSTRUCTIVE PULMONARY DISEASE, UNSPECIFIED COPD TYPE (H): Chronic | ICD-10-CM

## 2019-10-16 DIAGNOSIS — E11.9 TYPE 2 DIABETES MELLITUS WITHOUT COMPLICATION, WITHOUT LONG-TERM CURRENT USE OF INSULIN (H): Chronic | ICD-10-CM

## 2019-10-16 DIAGNOSIS — R53.81 PHYSICAL DECONDITIONING: ICD-10-CM

## 2019-10-16 DIAGNOSIS — I10 HYPERTENSION GOAL BP (BLOOD PRESSURE) < 140/90: Chronic | ICD-10-CM

## 2019-10-16 DIAGNOSIS — R60.0 BILATERAL LEG EDEMA: ICD-10-CM

## 2019-10-16 DIAGNOSIS — R06.02 SOB (SHORTNESS OF BREATH): ICD-10-CM

## 2019-10-16 LAB
ALBUMIN SERPL-MCNC: 3 G/DL (ref 3.4–5)
ALP SERPL-CCNC: 121 U/L (ref 40–150)
ALT SERPL W P-5'-P-CCNC: 30 U/L (ref 0–50)
ANION GAP SERPL CALCULATED.3IONS-SCNC: 5 MMOL/L (ref 3–14)
AST SERPL W P-5'-P-CCNC: 25 U/L (ref 0–45)
BILIRUB SERPL-MCNC: 0.7 MG/DL (ref 0.2–1.3)
BUN SERPL-MCNC: 15 MG/DL (ref 7–30)
CALCIUM SERPL-MCNC: 8.5 MG/DL (ref 8.5–10.1)
CHLORIDE SERPL-SCNC: 105 MMOL/L (ref 94–109)
CHOLEST SERPL-MCNC: 172 MG/DL
CO2 SERPL-SCNC: 31 MMOL/L (ref 20–32)
CREAT SERPL-MCNC: 0.88 MG/DL (ref 0.52–1.04)
ERYTHROCYTE [DISTWIDTH] IN BLOOD BY AUTOMATED COUNT: 13.3 % (ref 10–15)
GFR SERPL CREATININE-BSD FRML MDRD: 59 ML/MIN/{1.73_M2}
GLUCOSE SERPL-MCNC: 105 MG/DL (ref 70–99)
HBA1C MFR BLD: 5.3 % (ref 0–5.6)
HCT VFR BLD AUTO: 38.7 % (ref 35–47)
HDLC SERPL-MCNC: 60 MG/DL
HGB BLD-MCNC: 12.4 G/DL (ref 11.7–15.7)
LDLC SERPL CALC-MCNC: 83 MG/DL
MCH RBC QN AUTO: 32.9 PG (ref 26.5–33)
MCHC RBC AUTO-ENTMCNC: 32 G/DL (ref 31.5–36.5)
MCV RBC AUTO: 103 FL (ref 78–100)
NONHDLC SERPL-MCNC: 112 MG/DL
PLATELET # BLD AUTO: 221 10E9/L (ref 150–450)
POTASSIUM SERPL-SCNC: 3.6 MMOL/L (ref 3.4–5.3)
PROT SERPL-MCNC: 6.4 G/DL (ref 6.8–8.8)
RBC # BLD AUTO: 3.77 10E12/L (ref 3.8–5.2)
SODIUM SERPL-SCNC: 141 MMOL/L (ref 133–144)
TRIGL SERPL-MCNC: 146 MG/DL
WBC # BLD AUTO: 6.4 10E9/L (ref 4–11)

## 2019-10-16 PROCEDURE — 36415 COLL VENOUS BLD VENIPUNCTURE: CPT | Performed by: NURSE PRACTITIONER

## 2019-10-16 PROCEDURE — 99214 OFFICE O/P EST MOD 30 MIN: CPT | Mod: 25 | Performed by: NURSE PRACTITIONER

## 2019-10-16 PROCEDURE — 80061 LIPID PANEL: CPT | Performed by: NURSE PRACTITIONER

## 2019-10-16 PROCEDURE — 85027 COMPLETE CBC AUTOMATED: CPT | Performed by: NURSE PRACTITIONER

## 2019-10-16 PROCEDURE — G0439 PPPS, SUBSEQ VISIT: HCPCS | Performed by: NURSE PRACTITIONER

## 2019-10-16 PROCEDURE — 83036 HEMOGLOBIN GLYCOSYLATED A1C: CPT | Performed by: NURSE PRACTITIONER

## 2019-10-16 PROCEDURE — 82306 VITAMIN D 25 HYDROXY: CPT | Performed by: NURSE PRACTITIONER

## 2019-10-16 PROCEDURE — 80053 COMPREHEN METABOLIC PANEL: CPT | Performed by: NURSE PRACTITIONER

## 2019-10-16 RX ORDER — TIOTROPIUM BROMIDE 18 UG/1
CAPSULE ORAL; RESPIRATORY (INHALATION)
Qty: 90 CAPSULE | Refills: 3 | Status: SHIPPED | OUTPATIENT
Start: 2019-10-16 | End: 2020-12-15

## 2019-10-16 RX ORDER — LOSARTAN POTASSIUM AND HYDROCHLOROTHIAZIDE 25; 100 MG/1; MG/1
1 TABLET ORAL DAILY
Qty: 30 TABLET | Refills: 0 | Status: SHIPPED | OUTPATIENT
Start: 2019-10-16 | End: 2020-12-15 | Stop reason: ALTCHOICE

## 2019-10-16 RX ORDER — ATORVASTATIN CALCIUM 20 MG/1
TABLET, FILM COATED ORAL
Qty: 90 TABLET | Refills: 3 | Status: SHIPPED | OUTPATIENT
Start: 2019-10-16 | End: 2020-03-12

## 2019-10-16 RX ORDER — ATENOLOL 50 MG/1
TABLET ORAL
Qty: 180 TABLET | Refills: 3 | Status: SHIPPED | OUTPATIENT
Start: 2019-10-16 | End: 2020-08-12

## 2019-10-16 RX ORDER — BUDESONIDE AND FORMOTEROL FUMARATE DIHYDRATE 160; 4.5 UG/1; UG/1
AEROSOL RESPIRATORY (INHALATION)
Qty: 10.2 G | Refills: 8 | Status: SHIPPED | OUTPATIENT
Start: 2019-10-16 | End: 2020-02-24

## 2019-10-16 RX ORDER — POTASSIUM CHLORIDE 750 MG/1
TABLET, EXTENDED RELEASE ORAL
Qty: 180 TABLET | Refills: 1 | Status: SHIPPED | OUTPATIENT
Start: 2019-10-16 | End: 2020-03-12

## 2019-10-16 RX ORDER — AMLODIPINE BESYLATE 10 MG/1
TABLET ORAL
Qty: 90 TABLET | Refills: 3 | Status: SHIPPED | OUTPATIENT
Start: 2019-10-16 | End: 2020-11-11

## 2019-10-16 ASSESSMENT — MIFFLIN-ST. JEOR: SCORE: 1639.27

## 2019-10-16 NOTE — NURSING NOTE
"Initial BP (!) 140/84 (BP Location: Right arm, Patient Position: Sitting, Cuff Size: Adult Large)   Pulse 61   Temp 97  F (36.1  C) (Tympanic)   Resp 16   Ht 1.651 m (5' 5\")   Wt 120.3 kg (265 lb 4.8 oz)   SpO2 95%   BMI 44.15 kg/m   Estimated body mass index is 44.15 kg/m  as calculated from the following:    Height as of this encounter: 1.651 m (5' 5\").    Weight as of this encounter: 120.3 kg (265 lb 4.8 oz). .    Caroline Forde on 10/16/2019 at 11:12 AM    "

## 2019-10-16 NOTE — PROGRESS NOTES
Subjective     Susan Haq is a 87 year old female who presents to clinic today for the following health issues:    HPI   Hyperlipidemia Follow-Up      Are you having any of the following symptoms? (Select all that apply)  Shortness of breath    Are you regularly taking any medication or supplement to lower your cholesterol?   Yes- Atorvastatin    Are you having muscle aches or other side effects that you think could be caused by your cholesterol lowering medication?  No      Hypertension Follow-up      Do you check your blood pressure regularly outside of the clinic? No     Are you following a low salt diet? Yes    Are your blood pressures ever more than 140 on the top number (systolic) OR more   than 90 on the bottom number (diastolic), for example 140/90? No     Depression Followup    How are you doing with your depression since your last visit? Improved. Not worried about this per patient.     Are you having other symptoms that might be associated with depression? Yes:  fatigue.    Have you had a significant life event?  No     Are you feeling anxious or having panic attacks?   Yes:  a bit anxious.     Do you have any concerns with your use of alcohol or other drugs? No    Social History     Tobacco Use     Smoking status: Former Smoker     Packs/day: 1.00     Years: 35.00     Pack years: 35.00     Last attempt to quit: 1990     Years since quittin.8     Smokeless tobacco: Former User   Substance Use Topics     Alcohol use: Yes     Comment: Drink 1 (3oz) drink a day     Drug use: No     PHQ 2018   PHQ-9 Total Score 11 17 21   Q9: Thoughts of better off dead/self-harm past 2 weeks Several days Several days Several days       In the past two weeks have you had thoughts of suicide or self-harm?  No.    Do you have concerns about your personal safety or the safety of others?   No    Suicide Assessment Five-step Evaluation and Treatment (SAFE-T)    COPD Follow-Up    Overall, how  are your COPD symptoms since your last clinic visit?  Slightly worse    How much fatigue or shortness of breath do you have when you are walking?  More than usual    How much shortness of breath do you have when you are resting?  More than usual    How often do you cough? Sometimes after she eats.     Have you noticed any change in your sputum/phlegm?  No    Have you experienced a recent fever? No    Please describe how far you can walk without stopping to rest:  Less than 10 feet    How many flights of stairs are you able to walk up without stopping?  None    Have you had any Emergency Room Visits, Urgent Care Visits, or Hospital Admissions because of your COPD since your last office visit?  No    History   Smoking Status     Former Smoker     Packs/day: 1.00     Years: 35.00     Quit date: 1/1/1990   Smokeless Tobacco     Former User     Lab Results   Component Value Date    FEV1 1.31 10/07/2013    [id number] 44% 10/07/2013         How many servings of fruits and vegetables do you eat daily?  2-3    On average, how many sweetened beverages do you drink each day (soda, juice, sweet tea, etc)?   1 plus a martini with gin.     How many days per week do you miss taking your medication? 0    Saw Pulmonology at the [facility] and recommendations were:  - Up to date with Prevnar, received Pneumovax and Influenza shots today  - Echocardiogram ordered, will message PCP about also considering stress test  - Have encouraged patient to wear support stockings  - Overnight oximetry study to see if hypoxic at night, if so will refer back to Sleep  Annual Wellness Visit    Are you in the first 12 months of your Medicare Part B coverage?  No    Physical Health:    In general, how would you rate your overall physical health? fair    If you drink alcohol do you typically have >3 drinks per day or >7 drinks per week? No    Do you usually eat at least 4 servings of fruit and vegetables a day, include whole grains & fiber and avoid regularly  "eating high fat or \"junk\" foods? Yes    Needs assistance for the following daily activities: transportation, shopping, housework, bathing, laundry, money management and taking medicine    Which of the following safety concerns are present in your home?  none identified     Hearing impairment: Yes, pt has hearing aids    In the past 6 months, have you been bothered by leaking of urine? no    Mental Health:    In general, how would you rate your overall mental or emotional health? good    Do you feel safe in your environment? Yes     Do you have a Health Care Directive? She  Has a living will.    Fall risk:  Fallen 2 or more times in the past year?: No  Any fall with injury in the past year?: No    Cognitive Screenin) Repeat 3 items (Leader, Season, Table)    2) Clock draw: ABNORMAL  3) 3 item recall: Recalls 1 object   Results: ABNORMAL clock, 1-2 items recalled: PROBABLE COGNITIVE IMPAIRMENT, **INFORM PROVIDER**    Mini-CogTM Copyright JAZZMINE Murdock. Licensed by the author for use in [facility]; reprinted with permission ([email or web address]). All rights reserved.      Current providers sharing in care for this patient include:   Patient Care Team:  Ema Melendez NP as PCP - General (Nurse Practitioner - Family)  Ema Melendez NP as Assigned PCP         Do you have sleep apnea, excessive snoring or daytime drowsiness?: yes she has a CPAP. She is wondering if she can get new masks and hose for her cpap.     Patient Active Problem List   Diagnosis     Hyperlipidemia LDL goal <130     Insomnia     Osteopenia     Hypertension goal BP (blood pressure) < 140/90     Obesity     Advance care planning     Adenomatous polyp of colon     PVC's (premature ventricular contractions)     SUSSY (obstructive sleep apnea)-Moderately severe (AHI 21)     Bilateral leg edema     HL (hearing loss)     Umbilical hernia     SNHL (sensorineural hearing loss)     Interstitial pulmonary fibrosis (H)     Chronic " obstructive pulmonary disease, unspecified COPD type (H)     Major depressive disorder, single episode, moderate (H)     Risk for falls     Alcohol abuse     Morbid obesity (H)     Physical deconditioning     Chronic obstructive pulmonary disease with acute exacerbation (H)     CKD (chronic kidney disease) stage 3, GFR 30-59 ml/min (H)     Past Surgical History:   Procedure Laterality Date     APPENDECTOMY       C BSO, OMENTECTOMY W/SHANIA       C NONSPECIFIC PROCEDURE      (L) clavicle orif     C STOMACH SURGERY PROCEDURE UNLISTED       CHOLECYSTECTOMY, LAPOROSCOPIC  10/13/2011    Cholecystectomy, Laparoscopic     HERNIA REPAIR, UMBILICAL  10/13/2011     HYSTERECTOMY, CERVIX STATUS UNKNOWN         Social History     Tobacco Use     Smoking status: Former Smoker     Packs/day: 1.00     Years: 35.00     Pack years: 35.00     Last attempt to quit: 1990     Years since quittin.8     Smokeless tobacco: Former User   Substance Use Topics     Alcohol use: Yes     Comment: Drink 1 (3oz) drink a day     Family History   Problem Relation Age of Onset     Diabetes Father      Coronary Artery Disease Maternal Grandmother      Coronary Artery Disease Paternal Uncle          Current Outpatient Medications   Medication Sig Dispense Refill     albuterol (PROAIR HFA/PROVENTIL HFA/VENTOLIN HFA) 108 (90 Base) MCG/ACT Inhaler Inhale 2 puffs into the lungs every 4 hours as needed for shortness of breath / dyspnea or wheezing 3 Inhaler 3     albuterol (PROVENTIL) (2.5 MG/3ML) 0.083% neb solution NEBULIZE ONE VIAL (2.5 MG) EVERY FOUR HOURS AS NEEDED FOR SHORTNESS OF BREATH/DYSPNEA OR WHEEZING 120 vial 3     amLODIPine (NORVASC) 10 MG tablet TAKE 1 TABLET (10 MG) BY MOUTH DAILY 90 tablet 3     atenolol (TENORMIN) 50 MG tablet TAKE ONE TABLET (50 MG) BY MOUTH TWO TIMES DAILY 180 tablet 3     atorvastatin (LIPITOR) 20 MG tablet TAKE ONE TABLET (20 MG) BY MOUTH DAILY 90 tablet 3     budesonide-formoterol (SYMBICORT) 160-4.5 MCG/ACT  Inhaler INHALE 2 PUFFS INTO THE LUNGS 2 TIMES DAILY 10.2 g 8     Calcium Carbonate-Vitamin D (CALCIUM 600 + D OR) Take  by mouth.       FISH OIL 1000 MG OR CAPS 1 cap daily       folic acid (FOLVITE) 1 MG tablet TAKE ONE TABLET (1 MG) BY MOUTH DAILY 30 tablet 0     losartan-hydrochlorothiazide (HYZAAR) 100-25 MG tablet Take 1 tablet by mouth daily 30 tablet 0     order for DME 1.  CPAP pressure 12 cm/H20 with heated humidity and auto-titrating capability.   2.  Provide mask to fit and CPAP supplies.  3.  Length of need lifetime.  4.  Ok to d/c O2 bleed in to her PAP overnight. 1 Device 0     order for DME Equipment being ordered: Sigavirus - 1 pair compression. 1 Device 0     order for DME Equipment being ordered: Nebulizer 1 each 0     ORDER FOR DME Equipment being ordered: Oxygen - 3 liters continous 1 Device 0     ORDER FOR DME Equipment being ordered: CPAP supplies 1 Units 0     ORDER FOR DME Equipment being ordered: Oxygen 1 Units 0     ORDER FOR DME TEDs stockings knee high to be worn during the day. 1 Units 0     potassium chloride ER (K-DUR/KLOR-CON M) 10 MEQ CR tablet TAKE ONE TABLET (10 MEQ) BY MOUTH 2 TIMES DAILY 180 tablet 1     Respiratory Therapy Supplies (NEBULIZER COMPRESSOR) KIT 1 kit every 4 hours as needed 1 kit 0     SIMETHICONE-80 PO Take 80 mg by mouth every morning And TID prn belching       tiotropium (SPIRIVA HANDIHALER) 18 MCG capsule Inhale  into the lungs. Inhale contents of one capsule daily 90 capsule 3     tiotropium (SPIRIVA HANDIHALER) 18 MCG inhaled capsule INHALE INTO THE LUNGS. INHALE CONTENTS OF ONE CAPSULE DAILY 90 capsule 3     acetaminophen (TYLENOL) 500 MG tablet Take 2 tablets (1,000 mg) by mouth every 8 hours (Patient not taking: Reported on 6/4/2019) 60 tablet 6     ALLERGY RELIEF 10 MG tablet TAKE ONE TABLET BY MOUTH ONCE DAILY (Patient not taking: Reported on 10/16/2019) 90 tablet 1     camphor-menthol (DERMASARRA) 0.5-0.5 % LOTN Apply 1 mL topically every 8 hours as  needed for skin care (apply to rash on legs) (Patient not taking: Reported on 6/4/2019) 1 Bottle 1     escitalopram (LEXAPRO) 5 MG tablet Take 2 tablets (10 mg) by mouth daily (Patient not taking: Reported on 10/16/2019) 60 tablet 0     nystatin (MYCOSTATIN) 488829 UNIT/GM POWD Apply topically 3 times daily as needed (Patient not taking: Reported on 6/4/2019) 60 g 1     SENNA PLUS 8.6-50 MG per tablet TAKE ONE TO TWO TABLETS BY MOUTH TWICE DAILY AS NEEDED FOR CONSTIPATION (Patient not taking: Reported on 6/4/2019) 100 tablet 1     Allergies   Allergen Reactions     Ace Inhibitors Cough     Asa [Aspirin]      Penicillins      Recent Labs   Lab Test 09/19/18  2258 06/01/18  1020 12/06/17  1035  06/10/17  1440  03/05/16  2030  07/22/15  1219 04/21/15  1038 02/18/15  1353   A1C  --  5.3 5.7  --   --   --   --   --  5.7  --   --    LDL  --  93 91  --   --   --   --   --   --  103  --    HDL  --  60 61  --   --   --   --   --   --  52  --    TRIG  --  140 196*  --   --   --   --   --   --  162*  --    ALT 26  --   --   --  26  --  24   < >  --   --  20   CR 1.04 0.91 0.86   < > 1.01   < > 1.11*   < >  --  0.91 0.87   GFRESTIMATED 50* 58* 63   < > 52*   < > 47*   < >  --  59* 62   GFRESTBLACK 61 71 76   < > 63   < > 57*   < >  --  71 75   POTASSIUM 3.5 3.7 3.2*   < > 3.5   < > 3.2*   < >  --  3.2* 3.9   TSH  --   --   --   --  3.06  --   --   --   --   --  2.60    < > = values in this interval not displayed.      BP Readings from Last 3 Encounters:   10/16/19 (!) 140/84   10/02/19 (!) 163/76   06/04/19 136/84    Wt Readings from Last 3 Encounters:   10/16/19 120.3 kg (265 lb 4.8 oz)   10/02/19 117.9 kg (260 lb)   06/04/19 113.9 kg (251 lb)                    Reviewed and updated as needed this visit by Provider         Review of Systems   ROS COMP: Constitutional, HEENT, cardiovascular, pulmonary, GI, , musculoskeletal, neuro, skin, endocrine and psych systems are negative, except as otherwise noted.      Objective    BP  "(!) 140/84 (BP Location: Right arm, Patient Position: Sitting, Cuff Size: Adult Large)   Pulse 61   Temp 97  F (36.1  C) (Tympanic)   Resp 16   Ht 1.651 m (5' 5\")   Wt 120.3 kg (265 lb 4.8 oz)   SpO2 95%   BMI 44.15 kg/m    Body mass index is 44.15 kg/m .  Physical Exam   GENERAL: alert, no distress, frail, elderly and sitting upright in wheelchair  NECK: no adenopathy, no asymmetry, masses, or scars and thyroid normal to palpation  RESP: no rales , no rhonchi, no wheezes, prolonged expiratory phase and decreased breath sounds throughout; oxygen at 95% on 3 liters portable oxygen continuous.  Dyspnea with exertion present, none at rest.  CV: regular rate and rhythm, normal S1 S2, no S3 or S4, no murmur, click or rub, no peripheral edema and peripheral pulses strong  ABDOMEN: soft, nontender, no hepatosplenomegaly, no masses and bowel sounds normal  MS: no gross musculoskeletal defects noted, no edema  SKIN: no suspicious lesions or rashes  NEURO: Normal strength and tone, mentation intact and speech normal  PSYCH: mentation appears normal, affect normal/bright    Diagnostic Test Results:  Labs reviewed in Epic        Assessment & Plan     1. Encounter for Medicare annual wellness exam       2. Chronic obstructive pulmonary disease, unspecified COPD type (H)   Following with Pulmonology.  Would benefit from Pulm Rehab.    - budesonide-formoterol (SYMBICORT) 160-4.5 MCG/ACT Inhaler; INHALE 2 PUFFS INTO THE LUNGS 2 TIMES DAILY  Dispense: 10.2 g; Refill: 8  - tiotropium (SPIRIVA HANDIHALER) 18 MCG inhaled capsule; INHALE INTO THE LUNGS. INHALE CONTENTS OF ONE CAPSULE DAILY  Dispense: 90 capsule; Refill: 3  - CBC with platelets  - Comprehensive metabolic panel  - NM Lexiscan stress test; Future  - PULMONARY REHAB REFERRAL; Future  - OFFICE/OUTPT VISIT,EST,LEVL IV    3. Hypokalemia   Recheck.    - potassium chloride ER (K-DUR/KLOR-CON M) 10 MEQ CR tablet; TAKE ONE TABLET (10 MEQ) BY MOUTH 2 TIMES DAILY  Dispense: " 180 tablet; Refill: 1  - OFFICE/OUTPT VISIT,EST,LEVL IV    4. SUSSY (obstructive sleep apnea)  CPAP ordered with supplies.  Continue use.    - order for DME; 1.  CPAP pressure 12 cm/H20 with heated humidity and auto-titrating capability.   2.  Provide mask to fit and CPAP supplies.  3.  Length of need lifetime.  4.  Ok to d/c O2 bleed in to her PAP overnight.  Dispense: 1 Device; Refill: 0  - OFFICE/OUTPT VISIT,EST,LEVL IV    5. Type 2 diabetes mellitus without complication, without long-term current use of insulin (H)   Recheck A1C - diet controlled.    - losartan-hydrochlorothiazide (HYZAAR) 100-25 MG tablet; Take 1 tablet by mouth daily  Dispense: 30 tablet; Refill: 0  - Hemoglobin A1c  - OFFICE/OUTPT VISIT,EST,LEVL IV    6. Hypertension goal BP (blood pressure) < 140/90   Controlled.    - losartan-hydrochlorothiazide (HYZAAR) 100-25 MG tablet; Take 1 tablet by mouth daily  Dispense: 30 tablet; Refill: 0  - atenolol (TENORMIN) 50 MG tablet; TAKE ONE TABLET (50 MG) BY MOUTH TWO TIMES DAILY  Dispense: 180 tablet; Refill: 3  - amLODIPine (NORVASC) 10 MG tablet; TAKE 1 TABLET (10 MG) BY MOUTH DAILY  Dispense: 90 tablet; Refill: 3  - OFFICE/OUTPT VISIT,EST,LEVL IV    7. Hyperlipidemia LDL goal <130     - atorvastatin (LIPITOR) 20 MG tablet; TAKE ONE TABLET (20 MG) BY MOUTH DAILY  Dispense: 90 tablet; Refill: 3  - Lipid panel reflex to direct LDL Fasting  - OFFICE/OUTPT VISIT,EST,LEVL IV    8. SOB (shortness of breath)  Most likely related to ILD and COPD - but stress test ordered per Pulmonology recommendations.    - NM Lexiscan stress test; Future  - PULMONARY REHAB REFERRAL; Future  - OFFICE/OUTPT VISIT,EST,LEVL IV    9. CKD (chronic kidney disease) stage 3, GFR 30-59 ml/min (H)     - OFFICE/OUTPT VISIT,EST,LEVL IV    10. Physical deconditioning    - OFFICE/OUTPT VISIT,EST,LEVL IV    11. Interstitial pulmonary fibrosis (H)     - OFFICE/OUTPT VISIT,EST,LEVL IV    12. Bilateral leg edema     - order for DME; Equipment  "being ordered: Sigavirus - 1 pair compression.  Dispense: 1 Device; Refill: 0  - OFFICE/OUTPT VISIT,ESTLEVL IV    13. Vitamin D deficiency     - Vitamin D Deficiency     BMI:   Estimated body mass index is 44.15 kg/m  as calculated from the following:    Height as of this encounter: 1.651 m (5' 5\").    Weight as of this encounter: 120.3 kg (265 lb 4.8 oz).   Weight management plan: Discussed healthy diet and exercise guidelines        Patient Instructions       Patient Education   Personalized Prevention Plan  You are due for the preventive services outlined below.  Your care team is available to assist you in scheduling these services.  If you have already completed any of these items, please share that information with your care team to update in your medical record.  Health Maintenance Due   Topic Date Due     Eye Exam  01/10/1932     Zoster (Shingles) Vaccine (2 of 3) 07/25/2007     A1C Lab  12/01/2018     Annual Wellness Visit  12/06/2018     Diabetic Foot Exam  12/06/2018     Cholesterol Lab  06/01/2019     Thyroid Function Lab  06/10/2019     Kidney Microalbumin Urine Test  06/11/2019     Basic Metabolic Panel  09/19/2019     Depression Assessment  10/12/2019              Patient Education     Chronic Lung Disease: Coping Tips for Caregivers    When someone you love has chronic lung disease, such as COPD, it can change both of your lives. As a caregiver, you may have to support your loved one in new ways. This may be true whether you are caring for your spouse, your partner, a family member, or a friend. Learning some ways to cope can help you and your loved one.  Coping with your emotions  It s normal to feel a range of emotions. You may feel sad, or afraid. At times, you may be angry, or frustrated. It s important to accept these feelings. They are normal. If you find yourself feeling stressed, seek help by reaching out to family members or friends. Also, speak with a healthcare provider. He or she can " refer you to a counselor and other support services.  Taking care of yourself  Take time to manage your health and to refresh your mind and spirit. This gives you the energy to do your daily routine. Here are some basics that are important to your health:    Get enough sleep. Aim for 8 hours a day. Keep naps short so you can sleep at night. Limit alcohol and caffeine. These can affect how well you sleep.    Eat right. What you eat affects how you feel. Don t skip meals. Eat balanced meals with whole grains, fruits and vegetables, and low-fat meat and dairy products.    Exercise. Try for at least 30 minutes of physical activity a day. Breaking up your activity into three 10-minute sessions can make it easier.  Making time for you  Your needs are also important. Give yourself permission to maintain a life of your own. Take breaks from caregiving to relax and have fun. Here are some suggestions:    Share a meal with a friend.    Think about ways to relax. Take a walk, try yoga, deep breathing, or meditation.    Create a space in your home where you can be alone when needed.    Try to get away, even if it's just for a short time. Go to a friend's house, or take a drive or a long walk.  Accepting help  You may need help with caregiving. Knowing you can count on others can be a relief. Keep these tips in mind:    Make a list of things other people can help with. Ask family and friends to handle tasks such as running errands, giving rides, or preparing meals.    Make use of adult  and respite services, and home health aide programs. These provide temporary care for your loved one, when needed. Check the Internet or the phone book for organizations.  Support groups  Look for resources in your area:    There may be a support group for people with chronic lung disease and their caregivers. This can help you feel that you re not alone. You ll learn coping strategies and get advice from others going through the same  things you are.    Amish and [place]-based organizations may be a source of strength and support for you also. Reach out to a leader in your [place] community to discuss your spiritual needs.    The American Lung Association has an online support community. Their website is www.lung.org. There is a link from the Lung Disease page.  Date Last Reviewed: 5/1/2016 2000-2018 [employer]. [address] [place] [address], [place] [address]. All rights reserved. This information is not intended as a substitute for professional medical care. Always follow your healthcare professional's instructions.           Patient Education     Chronic Lung Disease, Day-to-Day Care: for Caregivers     A waterproof stool and grab bar make the shower safer.   Chronic lung disease may include COPD, which is chronic bronchitis and emphysema, pulmonary fibrosis, and sarcoidosis. With chronic lung disease, even getting in and out of bed can be difficult. Coping with the physical limits of these conditions can be a challenge for both your loved one and you. But you can each take steps to keep your daily routine simple.  Living healthier  Try to stay in good health. For example:    Encourage each other to stay active. Think of ways to be active together. You might go for a walk or do some gentle stretching exercises. Or try some chair exercises. For example, you can lift your legs up and down while sitting.  ? Physical therapy. Your loved one may benefit from physical therapy (PT). Talk with your healthcare provider about it.  ? Pulmonary rehab (rehabilitation). Look into programs in your area and encourage your loved one to participate. These programs help those with chronic lung conditions to better deal with their conditions and stay as active as possible.    Help each other plan and make meals.  ? Try to choose healthy foods. There are a lot of very unhealthy choices. But there are also healthier versions of most foods. There are  low-fat, low-salt frozen dinners for example.  ? Your loved one may be limited. But think of ways he or she can help. For example, sitting at the table to cut vegetables.    Try to manage stress. Talk with your healthcare providers if you are having a hard time managing stress. Or if you are feeling depressed or anxious. You might try yoga, deep-breathing, or meditation. Learning to relax and to breathe more slowly can help both of you.    Get enough sleep. Both of you should try to get enough sleep each night. A short nap may be OK. But too much napping makes it harder to sleep at night. If symptoms are making it hard for your loved one to sleep, talk with his or her healthcare provider.  Making living spaces safe  Even a few changes can make your home safer. Try these tips:    Get help. Therapists can help you with home safety issues. Talk with your healthcare provider about home physical and occupational therapy.    In the bathroom, make changes to the shower or tub for safety. Get a waterproof stool and handheld water nozzle. Install grab bars to make getting in and out easier. Use only nonslip bath mats or rugs.    In the kitchen, make sure items that you use often are within reach. This includes dishes and silverware. Keep these items as close to counter height as possible. Bending or reaching for items can cause injuries to your loved one.    Make sure floors are safe all through the house. And remove small rugs to prevent tripping. Make sure all areas are well lit. Install grab bars and hand rails.  If you or your loved one smokes  Smoking makes symptoms worse and harms health. If either of you smokes, it s time to stop. Speak with a healthcare provider or call the American Lung Association to learn about smoking cessation programs. Also try the following:    Make a list of reasons to quit. Keep the list handy and read it often.    List the places, feelings, and things that make you want to smoke. Think of  ways to avoid them.    Think about what you can do instead of smoking. Again, make a list. Then use one of the items on the list if you want to smoke. For example, you might go for a walk, call a friend, or chew a piece of sugarless gum.    Ask your healthcare provider or pharmacist about medicines to help you stop smoking. Some are available over-the-counter and some only by prescription.  Date Last Reviewed: 9/1/2018 2000-2018 [employer]. [address] [place] [address]. All rights reserved. This information is not intended as a substitute for professional medical care. Always follow your healthcare professional's instructions.               Return in about 53 weeks (around 10/21/2020) for Annual Wellness Visit.    Ema Melendez NP  [facility]

## 2019-10-16 NOTE — PATIENT INSTRUCTIONS
Patient Education   Personalized Prevention Plan  You are due for the preventive services outlined below.  Your care team is available to assist you in scheduling these services.  If you have already completed any of these items, please share that information with your care team to update in your medical record.  Health Maintenance Due   Topic Date Due     Eye Exam  01/10/1932     Zoster (Shingles) Vaccine (2 of 3) 07/25/2007     A1C Lab  12/01/2018     Annual Wellness Visit  12/06/2018     Diabetic Foot Exam  12/06/2018     Cholesterol Lab  06/01/2019     Thyroid Function Lab  06/10/2019     Kidney Microalbumin Urine Test  06/11/2019     Basic Metabolic Panel  09/19/2019     Depression Assessment  10/12/2019              Patient Education     Chronic Lung Disease: Coping Tips for Caregivers    When someone you love has chronic lung disease, such as COPD, it can change both of your lives. As a caregiver, you may have to support your loved one in new ways. This may be true whether you are caring for your spouse, your partner, a family member, or a friend. Learning some ways to cope can help you and your loved one.  Coping with your emotions  It s normal to feel a range of emotions. You may feel sad, or afraid. At times, you may be angry, or frustrated. It s important to accept these feelings. They are normal. If you find yourself feeling stressed, seek help by reaching out to family members or friends. Also, speak with a healthcare provider. He or she can refer you to a counselor and other support services.  Taking care of yourself  Take time to manage your health and to refresh your mind and spirit. This gives you the energy to do your daily routine. Here are some basics that are important to your health:    Get enough sleep. Aim for 8 hours a day. Keep naps short so you can sleep at night. Limit alcohol and caffeine. These can affect how well you sleep.    Eat right. What you eat affects how you feel. Don t skip  meals. Eat balanced meals with whole grains, fruits and vegetables, and low-fat meat and dairy products.    Exercise. Try for at least 30 minutes of physical activity a day. Breaking up your activity into three 10-minute sessions can make it easier.  Making time for you  Your needs are also important. Give yourself permission to maintain a life of your own. Take breaks from caregiving to relax and have fun. Here are some suggestions:    Share a meal with a friend.    Think about ways to relax. Take a walk, try yoga, deep breathing, or meditation.    Create a space in your home where you can be alone when needed.    Try to get away, even if it's just for a short time. Go to a friend's house, or take a drive or a long walk.  Accepting help  You may need help with caregiving. Knowing you can count on others can be a relief. Keep these tips in mind:    Make a list of things other people can help with. Ask family and friends to handle tasks such as running errands, giving rides, or preparing meals.    Make use of adult  and respite services, and home health aide programs. These provide temporary care for your loved one, when needed. Check the Internet or the phone book for organizations.  Support groups  Look for resources in your area:    There may be a support group for people with chronic lung disease and their caregivers. This can help you feel that you re not alone. You ll learn coping strategies and get advice from others going through the same things you are.    [place] and [place]-based organizations may be a source of strength and support for you also. Reach out to a leader in your [place] community to discuss your spiritual needs.    The American Lung Association has an online support community. Their website is www.lung.org. There is a link from the Lung Disease page.  Date Last Reviewed: 5/1/2016 2000-2018 [employer]. [address] [place] [address], [place] [address]. All rights reserved. This  information is not intended as a substitute for professional medical care. Always follow your healthcare professional's instructions.           Patient Education     Chronic Lung Disease, Day-to-Day Care: for Caregivers     A waterproof stool and grab bar make the shower safer.   Chronic lung disease may include COPD, which is chronic bronchitis and emphysema, pulmonary fibrosis, and sarcoidosis. With chronic lung disease, even getting in and out of bed can be difficult. Coping with the physical limits of these conditions can be a challenge for both your loved one and you. But you can each take steps to keep your daily routine simple.  Living healthier  Try to stay in good health. For example:    Encourage each other to stay active. Think of ways to be active together. You might go for a walk or do some gentle stretching exercises. Or try some chair exercises. For example, you can lift your legs up and down while sitting.  ? Physical therapy. Your loved one may benefit from physical therapy (PT). Talk with your healthcare provider about it.  ? Pulmonary rehab (rehabilitation). Look into programs in your area and encourage your loved one to participate. These programs help those with chronic lung conditions to better deal with their conditions and stay as active as possible.    Help each other plan and make meals.  ? Try to choose healthy foods. There are a lot of very unhealthy choices. But there are also healthier versions of most foods. There are low-fat, low-salt frozen dinners for example.  ? Your loved one may be limited. But think of ways he or she can help. For example, sitting at the table to cut vegetables.    Try to manage stress. Talk with your healthcare providers if you are having a hard time managing stress. Or if you are feeling depressed or anxious. You might try yoga, deep-breathing, or meditation. Learning to relax and to breathe more slowly can help both of you.    Get enough sleep. Both of you  should try to get enough sleep each night. A short nap may be OK. But too much napping makes it harder to sleep at night. If symptoms are making it hard for your loved one to sleep, talk with his or her healthcare provider.  Making living spaces safe  Even a few changes can make your home safer. Try these tips:    Get help. Therapists can help you with home safety issues. Talk with your healthcare provider about home physical and occupational therapy.    In the bathroom, make changes to the shower or tub for safety. Get a waterproof stool and handheld water nozzle. Install grab bars to make getting in and out easier. Use only nonslip bath mats or rugs.    In the kitchen, make sure items that you use often are within reach. This includes dishes and silverware. Keep these items as close to counter height as possible. Bending or reaching for items can cause injuries to your loved one.    Make sure floors are safe all through the house. And remove small rugs to prevent tripping. Make sure all areas are well lit. Install grab bars and hand rails.  If you or your loved one smokes  Smoking makes symptoms worse and harms health. If either of you smokes, it s time to stop. Speak with a healthcare provider or call the American Lung Association to learn about smoking cessation programs. Also try the following:    Make a list of reasons to quit. Keep the list handy and read it often.    List the places, feelings, and things that make you want to smoke. Think of ways to avoid them.    Think about what you can do instead of smoking. Again, make a list. Then use one of the items on the list if you want to smoke. For example, you might go for a walk, call a friend, or chew a piece of sugarless gum.    Ask your healthcare provider or pharmacist about medicines to help you stop smoking. Some are available over-the-counter and some only by prescription.  Date Last Reviewed: 9/1/2018 2000-2018 [employer]. [address] [place] [address]  [place] [address]. All rights reserved. This information is not intended as a substitute for professional medical care. Always follow your healthcare professional's instructions.

## 2019-10-17 LAB — DEPRECATED CALCIDIOL+CALCIFEROL SERPL-MC: 33 UG/L (ref 20–75)

## 2019-10-17 NOTE — RESULT ENCOUNTER NOTE
All of your lab results are normal and/or unchanged or stable.    Please notify patient of results.  ANNETTA Hoover

## 2019-11-14 ENCOUNTER — DOCUMENTATION ONLY (OUTPATIENT)
Dept: SLEEP MEDICINE | Facility: CLINIC | Age: 84
End: 2019-11-14

## 2019-11-14 ENCOUNTER — TELEPHONE (OUTPATIENT)
Dept: FAMILY MEDICINE | Facility: CLINIC | Age: 84
End: 2019-11-14

## 2019-11-14 DIAGNOSIS — G47.33 OSA (OBSTRUCTIVE SLEEP APNEA): ICD-10-CM

## 2019-11-14 NOTE — LETTER
November 14, 2019      Susan Haq  [address] [place] [address]        Dear Susan,     In order to ensure we are providing the best quality care, we have reviewed your chart and see that you are due for:  An update on the depression and anxiety questionnaires.  These tools help your provider to monitor and manage your symptoms and treatment plan.  Please complete the enclosed  questionnaires and send back to the clinic in the provided envelope.    Please call the clinic at your earliest convenience to schedule an appointment.    Thank you for trusting us with your health care.  Sincerely,    Your [facility] Team/lw

## 2019-11-14 NOTE — TELEPHONE ENCOUNTER
Panel Management Review      Patient has the following on her problem list:     Depression / Dysthymia review    Measure:  Needs PHQ-9 score of 4 or less during index window.  Administer PHQ-9 and if score is 5 or more, send encounter to provider for next steps.    4 - 8 month window range: 8/26/19-12/24/19    PHQ-9 SCORE 6/14/2018 9/14/2018 4/25/2019   PHQ-9 Total Score - - -   PHQ-9 Total Score 11 17 21       If PHQ-9 recheck is 5 or more, route to provider for next steps.    Patient is due for:  PHQ9      Composite cancer screening  Chart review shows that this patient is due/due soon for the following None  Summary:    Patient is due/failing the following:   PHQ9    Action needed:   Patient needs to do PHQ9.    Type of outreach:    Letter mailed with phq/pankaj for the patient to complete and return.      Questions for provider review:    None                                                                                                                                    SIGRID Abdullahi MA       Chart routed to Care Team .

## 2019-11-14 NOTE — TELEPHONE ENCOUNTER
Reason for Call: Request for an order or referral:    Order or referral being requested: Martina calling for pt, her CPAP machine broke and she has been without it for several nights, Sleep cannot get her an appt and suggested she ask PCP for new CPAP order, please advise    Date needed: as soon as possible    Has the patient been seen by the PCP for this problem? NO    Additional comments:     Phone number Patient can be reached at:  Cell number on file:    No relevant phone numbers on file.     Martina [phone]  Best Time:  any    Can we leave a detailed message on this number?  YES    Call taken on 11/14/2019 at 10:45 AM by Galina Tsai

## 2019-11-14 NOTE — TELEPHONE ENCOUNTER
I spoke with pt and her caregiver.  Pt's CPAP is broken.  They thought it was just the cord and replaced it but the device is still not working.  They would like replacement asap.    Pt last seen in sleep med by Dr Alfonso 5/12/15.  While on the phone with them, caregiver wishes to contact Dr Alfonso's staff for replacement order asap.    They had been using [facility] for supplies but now they prefer to go thru [facility].    They will call back if they need further assistance from this office/PCP.    Rin Persaud, RN

## 2019-11-14 NOTE — TELEPHONE ENCOUNTER
Martina called back.  She said that sleep clinic told her pt must be seen in sleep medicine for evaluation of new device since pt has not been seen since 2014.  She will make appt but next avail is not for 6 weeks.    They ask what to do in the meantime?    Advised to call sleep clinic back and ask this question.  Also, could try [facility] for suggestions of what to do.    Martina will call back if further concerns.    Rin Persaud RN

## 2019-11-14 NOTE — TELEPHONE ENCOUNTER
Martina is calling looking for DME order for CPAP machine. Please call once completed, she will pick it up.    Olga MATTA  Station

## 2019-11-14 NOTE — PROGRESS NOTES
11/14/2019-HARDY GREEN- RETURNED CALL TO OLIVA AND GAVE HER CENTRAL SCHEDULING NUMBER TO RE- ESTABLISH CARE WITH DR ANGEL.  IT HAS BEEN SEVERAL YEARS SINCE HER LAST F2F VISIT.  AFTER SHE GETS NEW RX SHE WILL CALL [facility]  TO SCHEDULE REPLACEMENT SETUP

## 2019-11-15 NOTE — TELEPHONE ENCOUNTER
Please see note below  Patient was advised to contact sleep center for sleep evaluation, she has appt 12/30/19.    Please advise on DME for CPAP machine    Routing to provider.    Reny CHAVEZ Rn

## 2019-11-15 NOTE — TELEPHONE ENCOUNTER
Martina is calling back today stating that patient needs an prescription for a CPAP machine ASAP.  Appt is scheduled with sleep clinic on 12/30 but patients machine is broke and she needs a new.  Martina is currently at [facility] to get a new machine.  She stated that after her conversation yesterday that there would be an order sent for a machine.

## 2019-11-19 ENCOUNTER — HOSPITAL ENCOUNTER (OUTPATIENT)
Dept: NUCLEAR MEDICINE | Facility: CLINIC | Age: 84
Setting detail: NUCLEAR MEDICINE
Discharge: HOME | End: 2019-11-19
Attending: NURSE PRACTITIONER | Admitting: NURSE PRACTITIONER
Payer: COMMERCIAL

## 2019-11-19 ENCOUNTER — DOCUMENTATION ONLY (OUTPATIENT)
Dept: SLEEP MEDICINE | Facility: CLINIC | Age: 84
End: 2019-11-19

## 2019-11-19 ENCOUNTER — ALLIED HEALTH/NURSE VISIT (OUTPATIENT)
Dept: FAMILY MEDICINE | Facility: CLINIC | Age: 84
End: 2019-11-19
Payer: COMMERCIAL

## 2019-11-19 VITALS — BODY MASS INDEX: 44.15 KG/M2 | WEIGHT: 265 LBS | HEIGHT: 65 IN

## 2019-11-19 DIAGNOSIS — R06.02 SOB (SHORTNESS OF BREATH): ICD-10-CM

## 2019-11-19 DIAGNOSIS — J44.9 CHRONIC OBSTRUCTIVE PULMONARY DISEASE, UNSPECIFIED COPD TYPE (H): Chronic | ICD-10-CM

## 2019-11-19 DIAGNOSIS — Z00.00 HEALTHCARE MAINTENANCE: Primary | ICD-10-CM

## 2019-11-19 DIAGNOSIS — G47.33 OSA (OBSTRUCTIVE SLEEP APNEA): Primary | ICD-10-CM

## 2019-11-19 LAB
CV STRESS MAX HR HE: 88
NUC STRESS EJECTION FRACTION: 64 %
RATE PRESSURE PRODUCT: NORMAL
STRESS ECHO BASELINE DIASTOLIC HE: 72
STRESS ECHO BASELINE HR: 63
STRESS ECHO BASELINE SYSTOLIC BP: 152
STRESS ECHO CALCULATED PERCENT HR: 66 %
STRESS ECHO LAST STRESS DIASTOLIC BP: 68
STRESS ECHO LAST STRESS SYSTOLIC BP: 140
STRESS ECHO TARGET HR: 133

## 2019-11-19 PROCEDURE — 93017 CV STRESS TEST TRACING ONLY: CPT

## 2019-11-19 PROCEDURE — 93018 CV STRESS TEST I&R ONLY: CPT | Performed by: INTERNAL MEDICINE

## 2019-11-19 PROCEDURE — A9502 TC99M TETROFOSMIN: HCPCS | Performed by: NURSE PRACTITIONER

## 2019-11-19 PROCEDURE — 93016 CV STRESS TEST SUPVJ ONLY: CPT | Performed by: INTERNAL MEDICINE

## 2019-11-19 PROCEDURE — 78452 HT MUSCLE IMAGE SPECT MULT: CPT | Mod: 26 | Performed by: INTERNAL MEDICINE

## 2019-11-19 PROCEDURE — 25000128 H RX IP 250 OP 636: Performed by: NURSE PRACTITIONER

## 2019-11-19 PROCEDURE — 34300033 ZZH RX 343: Performed by: NURSE PRACTITIONER

## 2019-11-19 PROCEDURE — 78452 HT MUSCLE IMAGE SPECT MULT: CPT

## 2019-11-19 PROCEDURE — 99207 ZZC NO CHARGE NURSE ONLY: CPT

## 2019-11-19 RX ORDER — REGADENOSON 0.08 MG/ML
0.4 INJECTION, SOLUTION INTRAVENOUS ONCE
Status: COMPLETED | OUTPATIENT
Start: 2019-11-19 | End: 2019-11-19

## 2019-11-19 RX ADMIN — REGADENOSON 0.4 MG: 0.08 INJECTION, SOLUTION INTRAVENOUS at 14:07

## 2019-11-19 RX ADMIN — TETROFOSMIN 38.2 MCI.: 1.38 INJECTION, POWDER, LYOPHILIZED, FOR SOLUTION INTRAVENOUS at 14:12

## 2019-11-19 RX ADMIN — TETROFOSMIN 12.4 MCI.: 1.38 INJECTION, POWDER, LYOPHILIZED, FOR SOLUTION INTRAVENOUS at 12:41

## 2019-11-19 ASSESSMENT — MIFFLIN-ST. JEOR: SCORE: 1637.91

## 2019-11-19 NOTE — TELEPHONE ENCOUNTER
Can we get another signed copy please. DME not located by secretaries Friday.   Pended again.  Thank you Dr. Fernandez.

## 2019-11-19 NOTE — PROGRESS NOTES
Pt here for Lexiscan NM stress test today.  Having SOB before 1 minute recovery, resolved by 5 minutes recovery.  Pt denied any chest pain.  Uses oxygen via NC at 3 liters all of the time.  Oxygen saturations 94-97% throughout test.  Pt feeling back to baseline.  Dr Fernandez notified, pt ok to go home after test completed.

## 2019-11-19 NOTE — PROGRESS NOTES
Caregiver Martina Pickard stops by to request CPAP order. It appears this was just signed again today. Order was picked up by caregiver Martina Pickard. Consent on file.      RUSSELL ThomsonN, RN

## 2019-11-19 NOTE — PROGRESS NOTES
Patient caregiver, Martina contacted [facility], states that patient CPAP is not working. Upon investigation patient is using a BiPap that was given by Michael on 3/20/15 and is not yet eligible for a new machine. Provided a loaner Bilevel machine, set pressure to 12/5 cm H2O, also provided new mask, tubing, filters and water chamber. Martina states she will contact me later this week to see if she is able to bring in patients other machine to be sent in for repairs.     Ana Paula Daniel, DME Coordinator

## 2019-12-10 ENCOUNTER — TELEPHONE (OUTPATIENT)
Dept: FAMILY MEDICINE | Facility: CLINIC | Age: 84
End: 2019-12-10

## 2019-12-10 DIAGNOSIS — F32.1 MAJOR DEPRESSIVE DISORDER, SINGLE EPISODE, MODERATE (H): Chronic | ICD-10-CM

## 2019-12-10 DIAGNOSIS — J44.9 CHRONIC OBSTRUCTIVE PULMONARY DISEASE, UNSPECIFIED COPD TYPE (H): Chronic | ICD-10-CM

## 2019-12-10 DIAGNOSIS — J84.10 INTERSTITIAL PULMONARY FIBROSIS (H): Chronic | ICD-10-CM

## 2019-12-10 NOTE — TELEPHONE ENCOUNTER
"Requested Prescriptions   Pending Prescriptions Disp Refills     escitalopram (LEXAPRO) 5 MG tablet [Pharmacy Med Name: ESCITALOPRAM 5 MG TABLET 5 TAB] 60 tablet 0     Sig: TAKE TWO TABLETS (10 MG) BY MOUTH DAILY  Last Written Prescription Date:  10/3/2019  Last Fill Quantity: 60,  # refills: 0   Last office visit: 10/16/2019 with prescribing provider:  Nora Dumont Office Visit:           SSRIs Protocol Failed - 12/10/2019  4:31 PM  MELISSA-7 SCORE 12/6/2017 9/14/2018 4/25/2019   Total Score 1 1 0             Failed - PHQ-9 score less than 5 in past 6 months     Please review last PHQ-9 score.  PHQ-9 SCORE 6/14/2018 9/14/2018 4/25/2019   PHQ-9 Total Score - - -   PHQ-9 Total Score 11 17 21              Passed - Medication is active on med list        Passed - Patient is age 18 or older        Passed - No active pregnancy on record        Passed - No positive pregnancy test in last 12 months        Passed - Recent (6 mo) or future (30 days) visit within the authorizing provider's specialty     Patient had office visit in the last 6 months or has a visit in the next 30 days with authorizing provider or within the authorizing provider's specialty.  See \"Patient Info\" tab in inbasket, or \"Choose Columns\" in Meds & Orders section of the refill encounter.            folic acid (FOLVITE) 1 MG tablet [Pharmacy Med Name: FOLIC ACID 1 MG TAB 1 TAB] 30 tablet 0     Sig: TAKE ONE TABLET (1 MG) BY MOUTH DAILY  Last Written Prescription Date:  10/3/2019  Last Fill Quantity: 30,  # refills: 0   Last office visit: 10/16/2019 with prescribing provider:  Nora   Future Office Visit:           Vitamin Supplements (Adult) Protocol Passed - 12/10/2019  4:31 PM        Passed - High dose Vitamin D not ordered        Passed - Recent (12 mo) or future (30 days) visit within the authorizing provider's specialty     Patient has had an office visit with the authorizing provider or a provider within the authorizing providers department " "within the previous 12 mos or has a future within next 30 days. See \"Patient Info\" tab in inbasket, or \"Choose Columns\" in Meds & Orders section of the refill encounter.              Passed - Medication is active on med list        PROAIR  (90 Base) MCG/ACT inhaler [Pharmacy Med Name: PROAIR HFA 90 MCG INHALER 108 (90 BAS AERO] 25.5 g 3     Sig: INHALE TWO PUFFS INTO THE LUNGS EVERY FOUR HOURS AS NEEDED FOR SHORTNESS OF BREATH/DYSPNEA OR WHEEZING  Last Written Prescription Date:  6/1/2018  Last Fill Quantity: 3,  # refills: 3   Last office visit: 10/16/2019 with prescribing provider:  Nora   Future Office Visit:           Asthma Maintenance Inhalers - Anticholinergics Passed - 12/10/2019  4:31 PM        Passed - Patient is age 12 years or older        Passed - Recent (12 mo) or future (30 days) visit within the authorizing provider's specialty     Patient has had an office visit with the authorizing provider or a provider within the authorizing providers department within the previous 12 mos or has a future within next 30 days. See \"Patient Info\" tab in inbasket, or \"Choose Columns\" in Meds & Orders section of the refill encounter.              Passed - Medication is active on med list          "

## 2019-12-12 RX ORDER — ALBUTEROL SULFATE 90 UG/1
AEROSOL, METERED RESPIRATORY (INHALATION)
Qty: 25.5 G | Refills: 2 | Status: SHIPPED | OUTPATIENT
Start: 2019-12-12 | End: 2020-03-12

## 2019-12-12 RX ORDER — FOLIC ACID 1 MG/1
TABLET ORAL
Qty: 90 TABLET | Refills: 1 | Status: SHIPPED | OUTPATIENT
Start: 2019-12-12 | End: 2020-06-12

## 2019-12-12 RX ORDER — ESCITALOPRAM OXALATE 5 MG/1
TABLET ORAL
Qty: 60 TABLET | Refills: 6 | Status: SHIPPED | OUTPATIENT
Start: 2019-12-12 | End: 2020-06-12

## 2019-12-12 NOTE — TELEPHONE ENCOUNTER
Ema,    Routing refill request to provider for review/approval because:  A break in medication Lexapro, folic acid.  I refilled the Proair MDI, DX COPD.   Needs PHQ-9 and MELISSA-7 updated.  Carline GARCIA RN

## 2019-12-30 ENCOUNTER — OFFICE VISIT (OUTPATIENT)
Dept: SLEEP MEDICINE | Facility: CLINIC | Age: 84
End: 2019-12-30
Payer: COMMERCIAL

## 2019-12-30 VITALS
SYSTOLIC BLOOD PRESSURE: 134 MMHG | BODY MASS INDEX: 44.1 KG/M2 | OXYGEN SATURATION: 93 % | HEART RATE: 72 BPM | DIASTOLIC BLOOD PRESSURE: 70 MMHG | HEIGHT: 65 IN

## 2019-12-30 DIAGNOSIS — J44.9 CHRONIC OBSTRUCTIVE PULMONARY DISEASE, UNSPECIFIED COPD TYPE (H): Primary | Chronic | ICD-10-CM

## 2019-12-30 DIAGNOSIS — R06.89 HYPERCAPNIA: ICD-10-CM

## 2019-12-30 DIAGNOSIS — G47.33 OSA (OBSTRUCTIVE SLEEP APNEA): Primary | ICD-10-CM

## 2019-12-30 DIAGNOSIS — N18.30 CKD (CHRONIC KIDNEY DISEASE) STAGE 3, GFR 30-59 ML/MIN (H): ICD-10-CM

## 2019-12-30 PROCEDURE — 99205 OFFICE O/P NEW HI 60 MIN: CPT | Performed by: FAMILY MEDICINE

## 2019-12-30 NOTE — PATIENT INSTRUCTIONS
It was a pleasure to see you today.    1.)  I will put in the order to do the echocardiogram (sound picture of the heart) to see if any evidence of increased heart pressures or pressures to the lungs.    2.)  We will arrange the overnight oxygen test.  Wear it on a usual night with your bilevel and oxygen.    3.)  I will also put in to check a blood test looking at your carbon dioxide levels, would likely get this drawn on the day you get your echocardiogram.

## 2019-12-30 NOTE — PROGRESS NOTES
Patient instructed on SCOUT use. Patient demonstrated and verbalized knowledge of use.  Device programmed. Device will be returned tomorrow before noon.      Soniya Martin MA on 12/30/2019 at 3:45 PM

## 2019-12-30 NOTE — NURSING NOTE
"Chief Complaint   Patient presents with     CPAP Follow Up     Motor stopped working, lost O2 connector for the rental machine so has been sleeping without O2 at night.       Initial /70   Pulse 72   Ht 1.651 m (5' 5\")   SpO2 93%   BMI 44.10 kg/m   Estimated body mass index is 44.1 kg/m  as calculated from the following:    Height as of this encounter: 1.651 m (5' 5\").    Weight as of 11/19/19: 120.2 kg (265 lb).    Medication Reconciliation: complete        DME: [facility]    "

## 2019-12-31 ENCOUNTER — DOCUMENTATION ONLY (OUTPATIENT)
Dept: SLEEP MEDICINE | Facility: CLINIC | Age: 84
End: 2019-12-31
Payer: COMMERCIAL

## 2020-01-02 ENCOUNTER — DOCUMENTATION ONLY (OUTPATIENT)
Dept: SLEEP MEDICINE | Facility: CLINIC | Age: 85
End: 2020-01-02

## 2020-01-02 NOTE — PROCEDURES
Documenting results of home overnight pulse oximetry performed night of 12/31/2019 on bilevel PAP and supplemental oxygen.    otal recording time 10:25:26 with valid time of 10:25:26.  Lowest pulse rate 54 with average pulse rate of 65.9.  Lowest SpO2 80%, mean / baseline SpO2 87.5%.  SpO2 < 88% for 324.4 minutes.  Oxygen desaturation index (4% or more desaturation, lasting 10+ seconds) of 8.8 / hour.    A/P:  1.) Significant residual sleep-associated hypoxemia and sustained hypoxemia  2.)  Recommend repeat PAP + O2 titration PSG with TCM and pre-study blood gases    Neal Alfonso MD

## 2020-01-02 NOTE — PROGRESS NOTES
"  Sleep Consultation:    Date on this visit: 12/30/2019    Susan Haq is sent by No ref. provider found for a sleep consultation regarding re-establishing care for SUSSY with chronic hypoxic respiratory failure.    Primary Physician: Ema Melendez     Chief Complaint: \"I never feel rested.\"    HPI:  Susan Haq is a 86 yo F who presents for SUSSY and chronic hypoxic respiratory failure managed with bilevel PAP spontaneous and continuous supplemental oxygen.     Pertinent PMHx of SUSSY, interstitial pulmonary fibrosis, COPD, obesity, hyperlipidemia, HTN, CKD III, depression.    9/24/2013 - Sleep clinic visit with Ramírez Mosquera at the [facility] to review actigraphy, CPAP follow-up. Noted during this visit to have baseline SpO2 of 87% on room air and as low as 84% with ambulation. After this visit, she did have spirometry done showing FEV1 > 70%, but no lung volumes or DLCO performed and unclear if it was done with pre-post bronchodialators. She was started on continuous supplemental oxygen, currently 3.5 LPM, but admits she does not always bring it with her.      2/18/2015 - First visit with me at [facility], presented for routine CPAP follow-up.  She feels her SOB and LUCIO have progressively worsened to needing to stop if ambulating more than 40-50 feet.  Continues on continuous supplemental oxygen at 3 - 3.5 LPM.  Concern from me that straight CPAP is likely not optimal therapy given her SOB / LUCIO / chronic hypoxemia as well as not having an adequate explanation for her sxs and oxygen needs.  Plan for all-night bilevel + O2 titration PSG with CPAP felt to be inappropriate along with checking full PFT's w/ and w/o bronchodilators and baseline TSH / CBC / BMP.     2/25/2015 - Full PFT's w/ and w/o bronchodilators.  Mild obstructive disease with air trapping, no change with bronchodilators.  Mildly reduced DLCO.     3/3/2015 - All-night bilevel + O2 titration PSG.  Technically limited by low TST " (184.5 minutes), appeared to be fairly well controlled in lateral sleep position on bilevel 12/5 cm H2O in spontaneous mode with nocturnal supplemental oxygen at 2 LPM.  Plan to to start treatment with bilevel 12/5 with O2 at 2 LPM and avoidance of supine sleep position.     5/12/2015 - Follow-up for bilevel compliance (bilevel and O2 through [employer]).  Unfortunately, did not bring machine so unable to perform download today.  But reports wearing the bilevel and oxygen every night (currently at 3 LPM) and feels it is helpful.  Has not yet completed echocardiogram as recommended by Ema Melendez.  She is using her inhalers on a regular basis, both ventolin and Symbicort.  A/P to perform SCOUT on bilevel 12/5 spontaneous with O2 at 3 LPM, discussed concern for underlying parenchymal disease or pulmonary HTN and recommendation for repeat echo, chest CT and likely pulmonary referral.    Today - She returns for follow-up in order to get new supplies, specifically the port to allow bleed in of oxygen to bilevel.  She has been unable to use her oxygen overnight for ~1 month.  Still on continuous supplemental oxygen at 3 LPM.  Now has diagnosis of interstitial pulmonary fibrosis.    She is a relatively poor historian, most provided by her two friends who provide much of her care.  She does report feeling constantly tired.  Denies AM headaches, seems to deny AM confusion, seems to deny feeling SOB when supine.  Sounds to have minimal physical activity.    Currently goes to bed ~MN, falls asleep quickly, seems to awaken after 6 hours.  And usually seems to fall asleep intermittently during the day.    Results for FUNMI NAVA (MRN [id number]) as of 1/2/2020 10:32   Ref. Range 3/5/2016 18:42 9/19/2018 22:58   Ph Venous Latest Ref Range: 7.32 - 7.43 pH 7.43 7.38   PCO2 Venous Latest Ref Range: 40 - 50 mm Hg 48 52 (H)   PO2 Venous Latest Ref Range: 25 - 47 mm Hg 36 53 (H)   Bicarbonate Venous Latest Ref Range: 21 - 28 mmol/L  32 (H) 31 (H)   Base Excess Venous Latest Units: mmol/L 6.8 4.5     Last Arterial Blood Gas:  Ph   Date Value Ref Range Status   04/09/2012 7.45 7.35 - 7.45      PCO2   Date Value Ref Range Status   04/09/2012 38 35 - 45 mmHG      PO2   Date Value Ref Range Status   04/09/2012 66 (A) 80 - 105 mmHG      Comment:     room air     HCO3   Date Value Ref Range Status   04/09/2012 26 21 - 28 mmol/L      % O2SAT   Date Value Ref Range Status   04/09/2012 94 (A) 20 - 32 mmol/L      Base Excess   Date Value Ref Range Status   04/09/2012 2         Reviewed last 30 days on her bilevel, 12/5 spontaneous, with usage of 9 hours and AHI 3.5.     Summary of prior PSG's:  2007 - Split-night PSG at [place], report of moderately severe SUSSY with profound desats. She was apparently on auto-Bilevel following this with supplemental oxygen at 2 LPM.     4/19/2012 - Split-night PSG with ABG and TCM. ABG with normal pH and pCO2. TCM stable. AHI 20.5, trey desat listed as 56% and baseline SpO2 of 88%. Titrated on CPAP 12 cm H2O and no mention of use of supplemental oxygen. Avg SpO2 on CPAP 12 listed as ~91-92%.     3/3/2015 - All-night bilevel + O2 titration PSG.  Technically limited by low TST (184.5 minutes), appeared to be fairly well controlled in lateral sleep position on bilevel 12/5 cm H2O in spontaneous mode with nocturnal supplemental oxygen at 2 LPM      Allergies:    Allergies   Allergen Reactions     Ace Inhibitors Cough     Asa [Aspirin]      Penicillins      Aspirin Rash     redness       Medications:    Current Outpatient Medications   Medication Sig Dispense Refill     albuterol (PROVENTIL) (2.5 MG/3ML) 0.083% neb solution NEBULIZE ONE VIAL (2.5 MG) EVERY FOUR HOURS AS NEEDED FOR SHORTNESS OF BREATH/DYSPNEA OR WHEEZING 120 vial 3     amLODIPine (NORVASC) 10 MG tablet TAKE 1 TABLET (10 MG) BY MOUTH DAILY 90 tablet 3     atenolol (TENORMIN) 50 MG tablet TAKE ONE TABLET (50 MG) BY MOUTH TWO TIMES DAILY 180 tablet 3      atorvastatin (LIPITOR) 20 MG tablet TAKE ONE TABLET (20 MG) BY MOUTH DAILY 90 tablet 3     budesonide-formoterol (SYMBICORT) 160-4.5 MCG/ACT Inhaler INHALE 2 PUFFS INTO THE LUNGS 2 TIMES DAILY 10.2 g 8     escitalopram (LEXAPRO) 5 MG tablet TAKE TWO TABLETS (10 MG) BY MOUTH DAILY 60 tablet 6     FISH OIL 1000 MG OR CAPS 1 cap daily       folic acid (FOLVITE) 1 MG tablet TAKE ONE TABLET (1 MG) BY MOUTH DAILY 90 tablet 1     losartan-hydrochlorothiazide (HYZAAR) 100-25 MG tablet Take 1 tablet by mouth daily 30 tablet 0     potassium chloride ER (K-DUR/KLOR-CON M) 10 MEQ CR tablet TAKE ONE TABLET (10 MEQ) BY MOUTH 2 TIMES DAILY 180 tablet 1     PROAIR  (90 Base) MCG/ACT inhaler INHALE TWO PUFFS INTO THE LUNGS EVERY FOUR HOURS AS NEEDED FOR SHORTNESS OF BREATH/DYSPNEA OR WHEEZING 25.5 g 2     Respiratory Therapy Supplies (NEBULIZER COMPRESSOR) KIT 1 kit every 4 hours as needed 1 kit 0     tiotropium (SPIRIVA HANDIHALER) 18 MCG capsule Inhale  into the lungs. Inhale contents of one capsule daily 90 capsule 3     tiotropium (SPIRIVA HANDIHALER) 18 MCG inhaled capsule INHALE INTO THE LUNGS. INHALE CONTENTS OF ONE CAPSULE DAILY 90 capsule 3     acetaminophen (TYLENOL) 500 MG tablet Take 2 tablets (1,000 mg) by mouth every 8 hours (Patient not taking: Reported on 6/4/2019) 60 tablet 6     ALLERGY RELIEF 10 MG tablet TAKE ONE TABLET BY MOUTH ONCE DAILY (Patient not taking: Reported on 10/16/2019) 90 tablet 1     camphor-menthol (DERMASARRA) 0.5-0.5 % LOTN Apply 1 mL topically every 8 hours as needed for skin care (apply to rash on legs) (Patient not taking: Reported on 6/4/2019) 1 Bottle 1     nystatin (MYCOSTATIN) 606667 UNIT/GM POWD Apply topically 3 times daily as needed (Patient not taking: Reported on 6/4/2019) 60 g 1     order for DME 1.  CPAP pressure 12 cm/H20 with heated humidity and auto-titrating capability.   2.  Provide mask to fit and CPAP supplies.  3.  Length of need lifetime.  4.  Ok to d/c O2 bleed in  to her PAP overnight. 1 Device 0     order for DME Equipment being ordered: Sigavirus - 1 pair compression. 1 Device 0     order for DME Equipment being ordered: Nebulizer 1 each 0     ORDER FOR DME Equipment being ordered: Oxygen - 3 liters continous 1 Device 0     ORDER FOR DME Equipment being ordered: CPAP supplies 1 Units 0     ORDER FOR DME Equipment being ordered: Oxygen 1 Units 0     ORDER FOR DME TEDs stockings knee high to be worn during the day. 1 Units 0     SENNA PLUS 8.6-50 MG per tablet TAKE ONE TO TWO TABLETS BY MOUTH TWICE DAILY AS NEEDED FOR CONSTIPATION (Patient not taking: Reported on 6/4/2019) 100 tablet 1       Problem List:  Patient Active Problem List    Diagnosis Date Noted     CKD (chronic kidney disease) stage 3, GFR 30-59 ml/min (H) 10/16/2019     Priority: Medium     Chronic obstructive pulmonary disease with acute exacerbation (H) 09/20/2018     Priority: Medium     Physical deconditioning 11/16/2017     Priority: Medium     Morbid obesity (H) 07/03/2017     Priority: Medium     Alcohol abuse 06/11/2017     Priority: Medium     Risk for falls 06/10/2017     Priority: Medium     Major depressive disorder, single episode, moderate (H) 03/13/2017     Priority: Medium     Chronic obstructive pulmonary disease, unspecified COPD type (H) 05/19/2016     Priority: Medium     Continuous oxygen at 3 liters        Interstitial pulmonary fibrosis (H) 05/26/2015     Priority: Medium     SNHL (sensorineural hearing loss) 06/10/2014     Priority: Medium     Umbilical hernia 04/08/2013     Priority: Medium     HL (hearing loss) 03/25/2013     Priority: Medium     Bilateral leg edema 06/14/2012     Priority: Medium     SUSSY (obstructive sleep apnea)-Moderately severe (AHI 21) 04/10/2012     Priority: Medium     Initial diagnosis 2007 at [facility]   Polysomnography 4/9/2012: 244.1 lbs.  BMI 40.7.  Andes sleepiness scale 0.0.  Re-evalaution of previously diagnosed moderately severe SUSSY. She  was on auto-BiPAP with 2L O2 bleed in and nocturnal oximetry showed persistently low SpO2. Diagnostic PSG maximum TCM of 50 mmHg and baseline TCM of 43.  Baseline ABG showed a PaCO2 of 38 which is normal. Baseline oxygen saturation was 87.8%.  The lowest oxygen saturation was 59.2%.  Snoring was reported as loud.  Apnea/Hypopnea Index 20.5 events per hour.  REM AHI 48.0.  RERA index 16.0. CPAP  titrated at pressures ranging from 6 cm/H20 up to 12 cm/H20.  The optimal pressure was 12.0 with an AHI of 0 including lateral REM sleep.       Advance care planning 04/25/2011     Priority: Medium     Advance Care Planning 6/28/2016: Receipt of ACP document:  Received: POLST which was signed and dated by provider on 3/22/16.  Document previously scanned on 4/4/16.  Order reviewed and found to be valid.  Code Status reflects choices in most recent ACP document.  Confirmed/documented designated decision maker(s).  Added by Trinh Lott   Advance Care Planning Liaison  Advance Care Planning 4/25/11: Discussed Advance Directive planning with patient; information given to patient to review.  Marine Guillen MA         Adenomatous polyp of colon 04/25/2011     Priority: Medium     PVC's (premature ventricular contractions) 04/25/2011     Priority: Medium     Hypertension goal BP (blood pressure) < 140/90 01/12/2011     Priority: Medium     Obesity 01/12/2011     Priority: Medium     Osteopenia 12/21/2010     Priority: Medium     Insomnia 12/15/2009     Priority: Medium     Hyperlipidemia LDL goal <130 12/10/2009     Priority: Medium        Past Medical/Surgical History:  Past Medical History:   Diagnosis Date     Basal cell carcinoma      Bronchospasm     copd vs. asthma     CKD (chronic kidney disease) stage 3, GFR 30-59 ml/min (H) 10/16/2019     Diverticulosis      Fracture, Metacarpal Shaft - right 4th 7/5/2010     High cholesterol      HL (hearing loss) 3/25/2013     Hypertension      Hypokalemia 9/17/2013     Hypoxia  2013     PVC's (premature ventricular contractions) 2011     Smoker 1986    quit     Type 2 diabetes mellitus (H) 2011     Venous insufficiency      Past Surgical History:   Procedure Laterality Date     APPENDECTOMY       C BSO, OMENTECTOMY W/SHANIA       C NONSPECIFIC PROCEDURE      (L) clavicle orif     C STOMACH SURGERY PROCEDURE UNLISTED       CHOLECYSTECTOMY, LAPOROSCOPIC  10/13/2011    Cholecystectomy, Laparoscopic     HERNIA REPAIR, UMBILICAL  10/13/2011     HYSTERECTOMY, CERVIX STATUS UNKNOWN         Social History:  Social History     Socioeconomic History     Marital status:      Spouse name: Not on file     Number of children: Not on file     Years of education: Not on file     Highest education level: Not on file   Occupational History     Employer: RETIRED     Comment:  in [place]   Social Needs     Financial resource strain: Not on file     Food insecurity:     Worry: Not on file     Inability: Not on file     Transportation needs:     Medical: Not on file     Non-medical: Not on file   Tobacco Use     Smoking status: Former Smoker     Packs/day: 1.00     Years: 35.00     Pack years: 35.00     Last attempt to quit: 1990     Years since quittin.0     Smokeless tobacco: Former User   Substance and Sexual Activity     Alcohol use: Yes     Comment: Drink 1 (3oz) drink a day     Drug use: No     Sexual activity: Never   Lifestyle     Physical activity:     Days per week: Not on file     Minutes per session: Not on file     Stress: Not on file   Relationships     Social connections:     Talks on phone: Not on file     Gets together: Not on file     Attends Congregation service: Not on file     Active member of club or organization: Not on file     Attends meetings of clubs or organizations: Not on file     Relationship status: Not on file     Intimate partner violence:     Fear of current or ex partner: Not on file     Emotionally abused: Not on file      "Physically abused: Not on file     Forced sexual activity: Not on file   Other Topics Concern     Parent/sibling w/ CABG, MI or angioplasty before 65F 55M? Not Asked   Social History Narrative    Lives independently in own home.       Family History:  Family History   Problem Relation Age of Onset     Diabetes Father      Coronary Artery Disease Maternal Grandmother      Coronary Artery Disease Paternal Uncle        Review of Systems:  A complete review of systems reviewed by me is negative with the exeption of what has been mentioned in the history of present illness.    Physical Examination:  Vitals: /70   Pulse 72   Ht 1.651 m (5' 5\")   SpO2 93%   BMI 44.10 kg/m    BMI= Body mass index is 44.1 kg/m .         [place] Total Score 12/30/2019   Total score - [place] 7       JO Total Score: 17 (12/30/19 1200)    GENERAL APPEARANCE: healthy, alert, no distress and over weight  PSYCH: mentation appears normal, affect normal and hearing poor    Impression/Plan:    Susan Haq is a 86 yo F who presents for SUSSY and chronic hypoxic respiratory failure managed with bilevel PAP spontaneous and continuous supplemental oxygen.     Pertinent PMHx of SUSSY, interstitial pulmonary fibrosis, COPD, obesity, hyperlipidemia, HTN, CKD III, depression.    1.) Moderate SUSSY  2.)  Chronic hypoxic and hypercapnic respiratory failure.  - Current treatment is bilevel PAP 12/5 cm H2O spontaneous with O2 at 3 LPM continuous  - It is hard for me to reconcile the findings of the 2007 and 2012 PSG with seemingly disparate treatment recommendations  - SUSSY appears controlled on current bilevel settings, though I am concerned for residual hypoxemia and hypercapnia.  - Will recheck baseline echocardiogram, VBG and overnight oximetry on her current settings.  - If abnormal, recommend repeat bilevel PAP + O2 titration PSG with TCM and blood gases.  Consider if indication for VAPS or higher level of respiratory support.     Plan as given to " patient as above.    I have spent 60 minutes with this patient today in which greater than 50% of this time was spent in the counseling / coordination of care regarding SUSSY, hypercapnia, hypoxemia.    Neal Alfonso MD    CC: No ref. provider found

## 2020-01-03 ENCOUNTER — HOSPITAL ENCOUNTER (OUTPATIENT)
Dept: CARDIOLOGY | Facility: CLINIC | Age: 85
Discharge: HOME | End: 2020-01-03
Attending: FAMILY MEDICINE | Admitting: FAMILY MEDICINE
Payer: COMMERCIAL

## 2020-01-03 ENCOUNTER — TELEPHONE (OUTPATIENT)
Dept: SLEEP MEDICINE | Facility: CLINIC | Age: 85
End: 2020-01-03

## 2020-01-03 DIAGNOSIS — R06.89 HYPERCAPNIA: ICD-10-CM

## 2020-01-03 DIAGNOSIS — J44.9 CHRONIC OBSTRUCTIVE PULMONARY DISEASE, UNSPECIFIED COPD TYPE (H): Chronic | ICD-10-CM

## 2020-01-03 DIAGNOSIS — R09.02 HYPOXEMIA: ICD-10-CM

## 2020-01-03 DIAGNOSIS — J44.9 CHRONIC OBSTRUCTIVE PULMONARY DISEASE, UNSPECIFIED COPD TYPE (H): Primary | ICD-10-CM

## 2020-01-03 DIAGNOSIS — G47.33 OSA (OBSTRUCTIVE SLEEP APNEA): ICD-10-CM

## 2020-01-03 LAB
BASE EXCESS BLDV CALC-SCNC: 4.9 MMOL/L
HCO3 BLDV-SCNC: 30 MMOL/L (ref 21–28)
PCO2 BLDV: 47 MM HG (ref 40–50)
PH BLDV: 7.42 PH (ref 7.32–7.43)
PO2 BLDV: 38 MM HG (ref 25–47)

## 2020-01-03 PROCEDURE — 25500064 ZZH RX 255 OP 636: Performed by: FAMILY MEDICINE

## 2020-01-03 PROCEDURE — 82803 BLOOD GASES ANY COMBINATION: CPT | Performed by: FAMILY MEDICINE

## 2020-01-03 PROCEDURE — 36415 COLL VENOUS BLD VENIPUNCTURE: CPT | Performed by: FAMILY MEDICINE

## 2020-01-03 PROCEDURE — 40000264 ECHOCARDIOGRAM COMPLETE

## 2020-01-03 PROCEDURE — 93306 TTE W/DOPPLER COMPLETE: CPT | Mod: 26 | Performed by: INTERNAL MEDICINE

## 2020-01-03 RX ADMIN — PERFLUTREN 2 ML: 6.52 INJECTION, SUSPENSION INTRAVENOUS at 14:00

## 2020-01-03 NOTE — PROGRESS NOTES
Entering order for all-night bilevel PAP + O2 titration PSG with TCM.  VBG performed recently.  Ordered due to evidence of significant residual hypoxemia, though without hypercapnia on VBG, on current bilevel PAP settings of 12/5 cm H2O spontaneous with supplemental oxygen at 3 LPM.

## 2020-01-03 NOTE — TELEPHONE ENCOUNTER
Pt caregiver called, stating need for a new BiLevel machine. Updated her on Dr. Alfonso's recommendations for a repeat titration study. She states understanding and would like to complete that in at the [place] [facility] sleep lab. Please call caregiver Martina on cell phone listed at [phone] to schedule.  Informed her that once repeat study is completed, we will get her set up on a new machine as she is currently using a loaner bipap from [employer].    Ana Paula Daniel, DME Coordinator

## 2020-01-14 ENCOUNTER — ALLIED HEALTH/NURSE VISIT (OUTPATIENT)
Dept: AUDIOLOGY | Facility: CLINIC | Age: 85
End: 2020-01-14

## 2020-01-14 DIAGNOSIS — H90.3 SENSORINEURAL HEARING LOSS, ASYMMETRICAL: Primary | ICD-10-CM

## 2020-01-14 PROCEDURE — 99207 ZZC NO CHARGE LOS: CPT | Performed by: AUDIOLOGIST

## 2020-01-14 PROCEDURE — V5299 HEARING SERVICE: HCPCS | Performed by: AUDIOLOGIST

## 2020-01-15 ENCOUNTER — TELEPHONE (OUTPATIENT)
Dept: FAMILY MEDICINE | Facility: CLINIC | Age: 85
End: 2020-01-15

## 2020-01-15 NOTE — TELEPHONE ENCOUNTER
Reason for Call:  Other call back    Detailed comments: Miguel from [facility] is calling asking if PCP would think of switch patient to a [employer]ilogy Machine   His number is [phone]  Phone Number Patient can be reached at: Home number on file [phone] (home)    Best Time: any    Can we leave a detailed message on this number? YES    Call taken on 1/15/2020 at 11:19 AM by Lanette Pop

## 2020-01-15 NOTE — TELEPHONE ENCOUNTER
Left message for her caretaker Martina to call us back. She needs a face to face visit and we can let Miguel know when her appt is. He will fax over the order for her to sign. It helps ventilate the lungs more than a cpap does. Germaine Aguilar RN

## 2020-01-22 DIAGNOSIS — I10 HYPERTENSION GOAL BP (BLOOD PRESSURE) < 140/90: Chronic | ICD-10-CM

## 2020-01-22 DIAGNOSIS — E11.9 TYPE 2 DIABETES MELLITUS WITHOUT COMPLICATION, WITHOUT LONG-TERM CURRENT USE OF INSULIN (H): Chronic | ICD-10-CM

## 2020-01-23 NOTE — TELEPHONE ENCOUNTER
"Requested Prescriptions   Pending Prescriptions Disp Refills     hydrochlorothiazide (HYDRODIURIL) 25 MG tablet [Pharmacy Med Name: HYDROCHLOROTHIAZIDE 25 MG T 25 TAB] 90 tablet 0     Sig: TAKE ONE TABLET BY MOUTH ONCE DAILY   Not on med list; appears to only be given on med/surg          Diuretics (Including Combos) Protocol Failed - 1/22/2020  5:02 PM        Failed - Medication is active on med list        Passed - Blood pressure under 140/90 in past 12 months     BP Readings from Last 3 Encounters:   12/30/19 134/70   10/16/19 136/88   10/02/19 (!) 163/76                 Passed - Recent (12 mo) or future (30 days) visit within the authorizing provider's specialty     Patient has had an office visit with the authorizing provider or a provider within the authorizing providers department within the previous 12 mos or has a future within next 30 days. See \"Patient Info\" tab in inbasket, or \"Choose Columns\" in Meds & Orders section of the refill encounter.              Passed - Patient is age 18 or older        Passed - No active pregancy on record        Passed - Normal serum creatinine on file in past 12 months     Recent Labs   Lab Test 10/16/19  1213   CR 0.88              Passed - Normal serum potassium on file in past 12 months     Recent Labs   Lab Test 10/16/19  1213   POTASSIUM 3.6                    Passed - Normal serum sodium on file in past 12 months     Recent Labs   Lab Test 10/16/19  1213                 Passed - No positive pregnancy test in past 12 months        losartan (COZAAR) 100 MG tablet [Pharmacy Med Name: LOSARTAN POTASSIUM 100 MG T 100 TAB] 30 tablet 0     Sig: TAKE 1 TABLET BY MOUTH DAILY. TAKE ALONG WITH THE HYDROCHLORITHIAZIDE. *NEED TO BE SEEN FOR FURTHER REFILLS**  Last Written Prescription Date:  10/16/2019  Last Fill Quantity: 30,  # refills: 0   Last office visit: 10/16/2019 with prescribing provider:  Nora    Future Office Visit:           Angiotensin-II Receptors Failed - " "1/22/2020  5:02 PM        Failed - Medication is active on med list        Passed - Last blood pressure under 140/90 in past 12 months     BP Readings from Last 3 Encounters:   12/30/19 134/70   10/16/19 136/88   10/02/19 (!) 163/76                 Passed - Recent (12 mo) or future (30 days) visit within the authorizing provider's specialty     Patient has had an office visit with the authorizing provider or a provider within the authorizing providers department within the previous 12 mos or has a future within next 30 days. See \"Patient Info\" tab in inbasket, or \"Choose Columns\" in Meds & Orders section of the refill encounter.              Passed - Patient is age 18 or older        Passed - No active pregnancy on record        Passed - Normal serum creatinine on file in past 12 months     Recent Labs   Lab Test 10/16/19  1213   CR 0.88             Passed - Normal serum potassium on file in past 12 months     Recent Labs   Lab Test 10/16/19  1213   POTASSIUM 3.6                    Passed - No positive pregnancy test in past 12 months          "

## 2020-01-23 NOTE — TELEPHONE ENCOUNTER
Pt. Caregiver, Martina stopped in to [facility] to ask what is next. Informed her that I will ask Dr. Alfonso to place the orders for the titration also provided her with number to call [facility] to schedule titration.

## 2020-01-27 NOTE — TELEPHONE ENCOUNTER
I left a message for the Martina Pickard to return my call (Consent to Comm on file).  Pt has the combo drug on med list (losartan-hydrochlorothiazide (HYZAAR) 100-25 MG tablet), however this was last ordered 10/16/19 for #30 with 0 refills.  How is pt taking this currently?     Soniya TADEO RN

## 2020-01-28 NOTE — TELEPHONE ENCOUNTER
Patient is not sure what she is taking. [facility] Martina sets up medications. States Martina is out of the state until Friday.     Called pharmacy. Received separately 12/10/19 for 30 day supply.     Routing refill request to provider for review/approval because:  Drug interaction warning-aspirin allergy listed     LOV 10/16/19     RUSSELL ThomsonN, RN

## 2020-02-03 RX ORDER — HYDROCHLOROTHIAZIDE 25 MG/1
TABLET ORAL
Qty: 90 TABLET | Refills: 2 | Status: SHIPPED | OUTPATIENT
Start: 2020-02-03 | End: 2020-12-15

## 2020-02-03 RX ORDER — LOSARTAN POTASSIUM 100 MG/1
100 TABLET ORAL DAILY
Qty: 90 TABLET | Refills: 2 | Status: SHIPPED | OUTPATIENT
Start: 2020-02-03 | End: 2020-12-15

## 2020-02-04 ENCOUNTER — THERAPY VISIT (OUTPATIENT)
Dept: SLEEP MEDICINE | Facility: CLINIC | Age: 85
End: 2020-02-04
Payer: COMMERCIAL

## 2020-02-04 DIAGNOSIS — R09.02 HYPOXEMIA: ICD-10-CM

## 2020-02-04 DIAGNOSIS — G47.33 OSA (OBSTRUCTIVE SLEEP APNEA): ICD-10-CM

## 2020-02-04 DIAGNOSIS — J44.9 CHRONIC OBSTRUCTIVE PULMONARY DISEASE, UNSPECIFIED COPD TYPE (H): ICD-10-CM

## 2020-02-04 PROCEDURE — 95811 POLYSOM 6/>YRS CPAP 4/> PARM: CPT | Performed by: FAMILY MEDICINE

## 2020-02-12 LAB — SLPCOMP: NORMAL

## 2020-02-19 ENCOUNTER — TELEPHONE (OUTPATIENT)
Dept: SLEEP MEDICINE | Facility: CLINIC | Age: 85
End: 2020-02-19

## 2020-02-19 NOTE — TELEPHONE ENCOUNTER
Patients care taker Martina Pickard([phone]) called today concerned about Susan she is not sleeping very well had a sleep study on February 4th and has not been contacted would like a call from the doctor.Tiffany Baker

## 2020-02-24 ENCOUNTER — TELEPHONE (OUTPATIENT)
Dept: FAMILY MEDICINE | Facility: CLINIC | Age: 85
End: 2020-02-24

## 2020-02-24 DIAGNOSIS — J44.9 CHRONIC OBSTRUCTIVE PULMONARY DISEASE, UNSPECIFIED COPD TYPE (H): Chronic | ICD-10-CM

## 2020-02-24 RX ORDER — BUDESONIDE AND FORMOTEROL FUMARATE DIHYDRATE 160; 4.5 UG/1; UG/1
AEROSOL RESPIRATORY (INHALATION)
Qty: 10.2 G | Refills: 5 | Status: SHIPPED | OUTPATIENT
Start: 2020-02-24 | End: 2020-08-20

## 2020-02-24 NOTE — TELEPHONE ENCOUNTER
Prescription approved per [facility] Refill Protocol.  Patient informed.

## 2020-02-27 ENCOUNTER — VIRTUAL VISIT (OUTPATIENT)
Dept: SLEEP MEDICINE | Facility: CLINIC | Age: 85
End: 2020-02-27
Payer: COMMERCIAL

## 2020-02-27 DIAGNOSIS — R09.02 HYPOXEMIA: ICD-10-CM

## 2020-02-27 DIAGNOSIS — G47.33 OSA (OBSTRUCTIVE SLEEP APNEA): ICD-10-CM

## 2020-02-27 DIAGNOSIS — R06.89 HYPERCAPNIA: ICD-10-CM

## 2020-02-27 DIAGNOSIS — J84.10 INTERSTITIAL PULMONARY FIBROSIS (H): Chronic | ICD-10-CM

## 2020-02-27 DIAGNOSIS — J44.9 CHRONIC OBSTRUCTIVE PULMONARY DISEASE, UNSPECIFIED COPD TYPE (H): Primary | ICD-10-CM

## 2020-02-27 PROCEDURE — G2012 BRIEF CHECK IN BY MD/QHP: HCPCS | Performed by: FAMILY MEDICINE

## 2020-02-27 NOTE — PROGRESS NOTES
Called and reviewed bilevel PAP and O2 titration PSG with Martina, who assists Tiffany.    Plan for set-up with bilevel PAP 18/9 cm H2O in spontaneous mode with O2 at 3 LPM.  Will send to [facility].    Time spent on phone of 10 minutes.

## 2020-03-05 ENCOUNTER — DOCUMENTATION ONLY (OUTPATIENT)
Dept: SLEEP MEDICINE | Facility: CLINIC | Age: 85
End: 2020-03-05

## 2020-03-05 NOTE — PROGRESS NOTES
Patient was offered choice of vendor and chose [facility].  Patient Susan Haq was set up at [place]  on March 5, 2020. Patient received a Resmed AirCurve 10 Auto. Pressures were set at 18/9 cm H2O.   Patient s ramp is 7 cm H2O for 20 min and FLEX/EPR is off.  Patient received a Resmed Mask name: F20  Full Face mask size Medium, heated tubing and heated humidifier.  Patient does need to meet compliance. Patient has a follow up on 4/22/20 with Eduar Khalil PA-C.    Ana Paula Daniel

## 2020-03-09 ENCOUNTER — ALLIED HEALTH/NURSE VISIT (OUTPATIENT)
Dept: AUDIOLOGY | Facility: CLINIC | Age: 85
End: 2020-03-09
Payer: COMMERCIAL

## 2020-03-09 DIAGNOSIS — E78.5 HYPERLIPIDEMIA LDL GOAL <130: Chronic | ICD-10-CM

## 2020-03-09 DIAGNOSIS — H90.3 SENSORINEURAL HEARING LOSS, BILATERAL: Primary | ICD-10-CM

## 2020-03-09 DIAGNOSIS — J44.9 CHRONIC OBSTRUCTIVE PULMONARY DISEASE, UNSPECIFIED COPD TYPE (H): Chronic | ICD-10-CM

## 2020-03-09 DIAGNOSIS — E87.6 HYPOKALEMIA: ICD-10-CM

## 2020-03-09 PROCEDURE — 99207 ZZC NO CHARGE LOS: CPT | Performed by: AUDIOLOGIST

## 2020-03-09 NOTE — PROGRESS NOTES
[facility]        SUBJECTIVE:  Susan Haq, 88 year old female requests in office repair of her right earmold. She states the earmold tubing is dislodged. She is wearing 2015 Phonak Bolero V50-P hearing aids bilaterally.     OBJECTIVE:  Replaced plugged and hardened tubing on the right earmold. Verified hearing aid functionality.      See chart in the hearing aid room.     ASSESSMENT/PLAN:    Return to clinic for hearing aid checks as needed.    Caridad Elizabeth M.A. -[facility], #[id number]

## 2020-03-10 ENCOUNTER — DOCUMENTATION ONLY (OUTPATIENT)
Dept: SLEEP MEDICINE | Facility: CLINIC | Age: 85
End: 2020-03-10
Payer: COMMERCIAL

## 2020-03-10 NOTE — PROGRESS NOTES
3 DAY STM VISIT    Diagnostic AHI: 20  PSG    Patient contacted for 3 day STM visit.    Confirmed with patient at time of call- Yes Patient is still interested in STM service     Subjective measures:  Patient asked me to call her back.    Replacement device: No    STM ordered by provider: Yes     Device type: Bilevel  PAP settings from order::  18/9 cm H20.  Mask type:    Full Face Mask     Device settings from machine        Assessment: Nightly usage over four hours.  Action plan: Patient to have 14 day STM visit. Patient has a follow up visit scheduled:   yes within 31-90 days of set up.     Total time spent on accessing and  interpreting remote patient PAP therapy data  10 minutes  Total time spent counseling, coaching  and reviewing PAP therapy data with patient  2 minutes  93430 no

## 2020-03-12 RX ORDER — ATORVASTATIN CALCIUM 20 MG/1
TABLET, FILM COATED ORAL
Qty: 90 TABLET | Refills: 1 | Status: SHIPPED | OUTPATIENT
Start: 2020-03-12 | End: 2020-08-12

## 2020-03-12 RX ORDER — POTASSIUM CHLORIDE 750 MG/1
TABLET, EXTENDED RELEASE ORAL
Qty: 60 TABLET | Refills: 6 | Status: SHIPPED | OUTPATIENT
Start: 2020-03-12 | End: 2020-10-07

## 2020-03-12 RX ORDER — ALBUTEROL SULFATE 90 UG/1
AEROSOL, METERED RESPIRATORY (INHALATION)
Qty: 8.5 G | Refills: 2 | Status: SHIPPED | OUTPATIENT
Start: 2020-03-12 | End: 2020-10-07

## 2020-03-12 NOTE — TELEPHONE ENCOUNTER
Prescriptiona approved per [facility] Refill Protocol.  Pharmacy transfer to [facility].  Rin Persaud RN

## 2020-03-12 NOTE — TELEPHONE ENCOUNTER
Atorvastatin last filled on 11/16/19 for 90 count plus 3 refills.  This one is approved for transfer since has remaining refills.    ProAir  (90 Base) Mcg/act inhaler  Last Written Prescription Date:  12/12/19  Last Fill Quantity: 25.5,  # refills: 2   Last office visit: 11/19/2019 with prescribing provider:  Ema Melendez NP   Future Office Visit:        Potassium chloride ER (K-Dur/Klor-Con M) 10 meq CR tab  Last Written Prescription Date:  10/16/19  Last Fill Quantity: 180,  # refills: 1   Last office visit: 11/19/2019 with prescribing provider:  Ema Melendez NP   Future Office Visit:

## 2020-03-12 NOTE — TELEPHONE ENCOUNTER
"Requested Prescriptions   Pending Prescriptions Disp Refills     atorvastatin (LIPITOR) 20 MG tablet [Pharmacy Med Name: ATORVASTATIN 20 MG TABLET 20 TAB] 90 tablet 1     Sig: TAKE ONE TABLET (20 MG) BY MOUTH DAILY       Statins Protocol Passed - 3/12/2020  6:11 PM        Passed - LDL on file in past 12 months     Recent Labs   Lab Test 10/16/19  1213   LDL 83             Passed - No abnormal creatine kinase in past 12 months     Recent Labs   Lab Test 06/10/17  1440   CKT 80                Passed - Recent (12 mo) or future (30 days) visit within the authorizing provider's specialty     Patient has had an office visit with the authorizing provider or a provider within the authorizing providers department within the previous 12 mos or has a future within next 30 days. See \"Patient Info\" tab in inbasket, or \"Choose Columns\" in Meds & Orders section of the refill encounter.              Passed - Medication is active on med list        Passed - Patient is age 18 or older        Passed - No active pregnancy on record        Passed - No positive pregnancy test in past 12 months           potassium chloride ER (KLOR-CON M) 10 MEQ CR tablet [Pharmacy Med Name: POTASSIUM CL ER 10 mEq 10 TAB] 60 tablet 0     Sig: TAKE ONE TABLET (10 MEQ) BY MOUTH 2 TIMES DAILY       Potassium Supplements Protocol Passed - 3/12/2020  6:11 PM        Passed - Recent (12 mo) or future (30 days) visit within the authorizing provider's department     Patient has had an office visit with the authorizing provider or a provider within the authorizing providers department within the previous 12 mos or has a future within next 30 days. See \"Patient Info\" tab in inbasket, or \"Choose Columns\" in Meds & Orders section of the refill encounter.              Passed - Medication is active on med list        Passed - Patient is age 18 or older        Passed - Normal serum potassium in past 12 months     Recent Labs   Lab Test 10/16/19  1213   POTASSIUM 3.6    " "                   PROAIR  (90 Base) MCG/ACT inhaler [Pharmacy Med Name: PROAIR HFA 90 MCG INHALER 108 (90 BAS AERO] 8.5 g 2     Sig: INHALE TWO PUFFS INTO THE LUNGS EVERY FOUR HOURS AS NEEDED FOR SHORTNESS OF BREATH/DYSPNEA OR WHEEZING       Asthma Maintenance Inhalers - Anticholinergics Passed - 3/12/2020  6:11 PM        Passed - Patient is age 12 years or older        Passed - Recent (12 mo) or future (30 days) visit within the authorizing provider's specialty     Patient has had an office visit with the authorizing provider or a provider within the authorizing providers department within the previous 12 mos or has a future within next 30 days. See \"Patient Info\" tab in inbasket, or \"Choose Columns\" in Meds & Orders section of the refill encounter.              Passed - Medication is active on med list       Short-Acting Beta Agonist Inhalers Protocol  Passed - 3/12/2020  6:11 PM        Passed - Patient is age 12 or older        Passed - Recent (12 mo) or future (30 days) visit within the authorizing provider's specialty     Patient has had an office visit with the authorizing provider or a provider within the authorizing providers department within the previous 12 mos or has a future within next 30 days. See \"Patient Info\" tab in inbasket, or \"Choose Columns\" in Meds & Orders section of the refill encounter.              Passed - Medication is active on med list             "

## 2020-03-20 ENCOUNTER — DOCUMENTATION ONLY (OUTPATIENT)
Dept: SLEEP MEDICINE | Facility: CLINIC | Age: 85
End: 2020-03-20

## 2020-03-20 NOTE — PROGRESS NOTES
14  DAY STM VISIT    Diagnostic AHI: 20    PSG    Subjective measures:   Pt reports that she is working on mask fit.  This is a replacement for her and she she been on a machine for a long time.      Assessment: Pt not meeting objective benchmarks for leak Patient failing following subjective benchmarks: leak issues     Action plan: STM removal pt will keep working the mask.       Device type: Bilevel         Mask type:  Full Face Mask    Objective measures: 14 day rolling measures      Compliance  100 %      Leak  63.44 lpm  last  upload      AHI 5.01   last  upload      Average number of minutes 547      Objective measure goal  Compliance   Goal >70%  Leak   Goal < 24 lpm  AHI  Goal < 5  Usage  Goal >240        Total time spent on accessing and interpreting remote patient PAP therapy data  10 minutes    Total time spent counseling, coaching  and reviewing PAP therapy data with patient  2 minutes    86550  no  52013  no (3 day STM)       room air

## 2020-04-20 ENCOUNTER — ALLIED HEALTH/NURSE VISIT (OUTPATIENT)
Dept: AUDIOLOGY | Facility: CLINIC | Age: 85
End: 2020-04-20
Payer: COMMERCIAL

## 2020-04-20 DIAGNOSIS — H90.3 SENSORINEURAL HEARING LOSS, ASYMMETRICAL: Primary | ICD-10-CM

## 2020-04-20 PROCEDURE — 99207 ZZC NO CHARGE LOS: CPT | Performed by: AUDIOLOGIST

## 2020-04-20 PROCEDURE — V5299 HEARING SERVICE: HCPCS | Performed by: AUDIOLOGIST

## 2020-04-20 NOTE — PROGRESS NOTES
[facility]        SUBJECTIVE:  Susan JOSE Haq, 88 year old female request in office repair of her right 2015 Phonak Bolero V50-P hearing aid. She reports it is not working.     OBJECTIVE:  Examination reveals the earmold is plugged with a water bubble. Replaced right earmold tubing. Verified hearing aid functionality.      ASSESSMENT/PLAN:    Patient's friend, Martina DESHAUN will  the repaired hearing aid at the specialty clinic .    Caridad YANEZ-KIAN, #[id number]

## 2020-04-21 VITALS — WEIGHT: 250 LBS | HEIGHT: 65 IN | BODY MASS INDEX: 41.65 KG/M2

## 2020-04-21 ASSESSMENT — MIFFLIN-ST. JEOR: SCORE: 1564.87

## 2020-04-21 NOTE — PROGRESS NOTES
"UNABLE TO GO OVER MEDICATION W/ PT OR CAREGIVER. Trina (PCA)    Susan Haq is a 88 year old female who is being evaluated via a billable telephone visit.      The patient has been notified of following:     \"This telephone visit will be conducted via a call between you and your physician/provider. We have found that certain health care needs can be provided without the need for a physical exam.  This service lets us provide the care you need with a short phone conversation.  If a prescription is necessary we can send it directly to your pharmacy.  If lab work is needed we can place an order for that and you can then stop by our lab to have the test done at a later time.    Telephone visits are billed at different rates depending on your insurance coverage. During this emergency period, for some insurers they may be billed the same as an in-person visit.  Please reach out to your insurance provider with any questions.    If during the course of the call the physician/provider feels a telephone visit is not appropriate, you will not be charged for this service.\"    Patient has given verbal consent for Telephone visit?  Yes Per NORMA Mena     How would you like to obtain your AVS? Mail a copy    Mary Hodge MA on 4/21/2020 at 1:30 PM    Total TIME:16 minutes    Clinic visit changed to phone visit due to COVID-19    Obstructive Sleep Apnea - PAP Follow-Up Visit:    Chief Complaint   Patient presents with     CPAP Follow Up     Called and reviewed with Martina, who assists Tiffany.    Susan Haq follows-up of their SUSSY and chronic hypoxic respiratory failure managed with bilevel PAP spontaneous and continuous supplemental oxygen (3 lpm).     Pertinent PMHx of SUSSY, interstitial pulmonary fibrosis, COPD, obesity, hyperlipidemia, HTN, CKD III, depression.    2007 - Split-night PSG at [facility], report of moderately severe SUSSY with profound desats. She was apparently on auto-Bilevel following this with " supplemental oxygen at 2 LPM.      Polysomnography 4/9/2012: 244.1 lbs. BMI 40.7. [place] sleepiness scale 0.0. Re-evalaution of previously diagnosed moderately severe SUSSY. She was on auto-BiPAP with 2L O2 bleed in and nocturnal oximetry showed persistently low SpO2. Diagnostic PSG maximum TCM of 50 mmHg and baseline TCM of 43. Baseline ABG showed a PaCO2 of 38 which is normal. Baseline oxygen saturation was 87.8%. The lowest oxygen saturation was 59.2%. Snoring was reported as loud. Apnea/Hypopnea Index 20.5 events per hour. REM AHI 48.0. RERA index 16.0. CPAP titrated at pressures ranging from 6 cm/H20 up to 12 cm/H20. The optimal pressure was 12.0 with an AHI of 0 including lateral REM sleep.    3/3/2015 - All-night bilevel + O2 titration PSG. Technically limited by low TST (184.5 minutes), appeared to be fairly well controlled in lateral sleep position on bilevel 12/5 cm H2O in spontaneous mode with nocturnal supplemental oxygen at 2 LPM.    Documenting results of home overnight pulse oximetry performed night of 12/31/2019 on bilevel PAP and supplemental oxygen. Total recording time 10:25:26 with valid time of 10:25:26. Lowest pulse rate 54 with average pulse rate of 65.9. Lowest SpO2 80%, mean / baseline SpO2 87.5%. SpO2 < 88% for 324.4 minutes. Oxygen desaturation index (4% or more desaturation, lasting 10+ seconds) of 8.8 / hour.    2/4/2020-Bilevel PAP at 15/6 and 18/9 cm H2O in spontaneous mode with nocturnal supplemental oxygen at 3 LPM appeared the most effective in preventing obstructive events and normalizing SpO2 with good sleep efficiency. TCM stable. No REM observed during titration.    on March 5, 2020. Patient received a Resmed AirCurve 10 Auto. Pressures were set at 18/9 cm H2O. Patient's ramp is 7 cm H2O for 20 min and FLEX/EPR is off. Patient received a Resmed Mask name: F20 Full Face mask size Medium, heated tubing and heated humidifier. Patient does need to meet compliance.      Her PAP  "interface is Full Face Mask.      Total score - [place]: 8 (4/21/2020  1:00 PM)      ResMed   Bilevel PAP 18/9 cmH2O 30 day usage data:  100% of days with > 4 hours of use. 0/30 days with no use.   Average use 563 minutes per day.   95%ile Leak 35.19 L/min.   AHI 3.18 events per hour.     \"She is doing well\"    Past medical/surgical history, family history, social history, medications and allergies were reviewed.      Problem List:  Patient Active Problem List    Diagnosis Date Noted     CKD (chronic kidney disease) stage 3, GFR 30-59 ml/min (H) 10/16/2019     Priority: Medium     Chronic obstructive pulmonary disease with acute exacerbation (H) 09/20/2018     Priority: Medium     Physical deconditioning 11/16/2017     Priority: Medium     Morbid obesity (H) 07/03/2017     Priority: Medium     Alcohol abuse 06/11/2017     Priority: Medium     Risk for falls 06/10/2017     Priority: Medium     Major depressive disorder, single episode, moderate (H) 03/13/2017     Priority: Medium     Chronic obstructive pulmonary disease, unspecified COPD type (H) 05/19/2016     Priority: Medium     Continuous oxygen at 3 liters        Interstitial pulmonary fibrosis (H) 05/26/2015     Priority: Medium     SNHL (sensorineural hearing loss) 06/10/2014     Priority: Medium     Umbilical hernia 04/08/2013     Priority: Medium     HL (hearing loss) 03/25/2013     Priority: Medium     Bilateral leg edema 06/14/2012     Priority: Medium     SUSSY (obstructive sleep apnea)-Moderately severe (AHI 21) 04/10/2012     Priority: Medium     Initial diagnosis 2007 at [facility]   Polysomnography 4/9/2012: 244.1 lbs.  BMI 40.7.  [place] sleepiness scale 0.0.  Re-evalaution of previously diagnosed moderately severe SUSSY. She was on auto-BiPAP with 2L O2 bleed in and nocturnal oximetry showed persistently low SpO2. Diagnostic PSG maximum TCM of 50 mmHg and baseline TCM of 43.  Baseline ABG showed a PaCO2 of 38 which is normal. Baseline " "oxygen saturation was 87.8%.  The lowest oxygen saturation was 59.2%.  Snoring was reported as loud.  Apnea/Hypopnea Index 20.5 events per hour.  REM AHI 48.0.  RERA index 16.0. CPAP  titrated at pressures ranging from 6 cm/H20 up to 12 cm/H20.  The optimal pressure was 12.0 with an AHI of 0 including lateral REM sleep.       Advance care planning 04/25/2011     Priority: Medium     Advance Care Planning 6/28/2016: Receipt of ACP document:  Received: POLST which was signed and dated by provider on 3/22/16.  Document previously scanned on 4/4/16.  Order reviewed and found to be valid.  Code Status reflects choices in most recent ACP document.  Confirmed/documented designated decision maker(s).  Added by Trinh Lott   Advance Care Planning Liaison  Advance Care Planning 4/25/11: Discussed Advance Directive planning with patient; information given to patient to review.  Marine Guillen MA         Adenomatous polyp of colon 04/25/2011     Priority: Medium     PVC's (premature ventricular contractions) 04/25/2011     Priority: Medium     Hypertension goal BP (blood pressure) < 140/90 01/12/2011     Priority: Medium     Obesity 01/12/2011     Priority: Medium     Osteopenia 12/21/2010     Priority: Medium     Insomnia 12/15/2009     Priority: Medium     Hyperlipidemia LDL goal <130 12/10/2009     Priority: Medium        Ht 1.651 m (5' 5\")   Wt 113.4 kg (250 lb)   BMI 41.60 kg/m      Impression/Plan:  1. SUSSY and chronic hypoxic respiratory failure managed with bilevel PAP spontaneous and continuous supplemental oxygen.   Excellent  PAP compliance and AHI is well controlled on bilevel PAP 18/9 cm/H20.  Continue current treatment.   Comprehensive DME      Susan Haq will follow up in about 1 year or sooner if any concerns.     Eduar Khalil PA-C      "

## 2020-04-21 NOTE — PATIENT INSTRUCTIONS
Your BMI is Body mass index is 41.6 kg/m .  Weight management is a personal decision.  If you are interested in exploring weight loss strategies, the following discussion covers the approaches that may be successful. Body mass index (BMI) is one way to tell whether you are at a healthy weight, overweight, or obese. It measures your weight in relation to your height.  A BMI of 18.5 to 24.9 is in the healthy range. A person with a BMI of 25 to 29.9 is considered overweight, and someone with a BMI of 30 or greater is considered obese. More than two-thirds of American adults are considered overweight or obese.  Being overweight or obese increases the risk for further weight gain. Excess weight may lead to heart disease and diabetes.  Creating and following plans for healthy eating and physical activity may help you improve your health.  Weight control is part of healthy lifestyle and includes exercise, emotional health, and healthy eating habits. Careful eating habits lifelong are the mainstay of weight control. Though there are significant health benefits from weight loss, long-term weight loss with diet alone may be very difficult to achieve- studies show long-term success with dietary management in less than 10% of people. Attaining a healthy weight may be especially difficult to achieve in those with severe obesity. In some cases, medications, devices and surgical management might be considered.  What can you do?  If you are overweight or obese and are interested in methods for weight loss, you should discuss this with your provider.     Consider reducing daily calorie intake by 500 calories.     Keep a food journal.     Avoiding skipping meals, consider cutting portions instead.    Diet combined with exercise helps maintain muscle while optimizing fat loss. Strength training is particularly important for building and maintaining muscle mass. Exercise helps reduce stress, increase energy, and improves fitness.  Increasing exercise without diet control, however, may not burn enough calories to loose weight.       Start walking three days a week 10-20 minutes at a time    Work towards walking thirty minutes five days a week     Eventually, increase the speed of your walking for 1-2 minutes at time    In addition, we recommend that you review healthy lifestyles and methods for weight loss available through the National Institutes of Health patient information sites:  http://win.niddk.nih.gov/publications/index.htm    And look into health and wellness programs that may be available through your health insurance provider, employer, local community center, or myla club.    Weight management plan: Patient was referred to their PCP to discuss a diet and exercise plan.

## 2020-04-23 ENCOUNTER — OFFICE VISIT (OUTPATIENT)
Dept: SLEEP MEDICINE | Facility: CLINIC | Age: 85
End: 2020-04-23
Payer: COMMERCIAL

## 2020-04-23 DIAGNOSIS — J96.11 CHRONIC RESPIRATORY FAILURE WITH HYPOXIA (H): ICD-10-CM

## 2020-04-23 DIAGNOSIS — G47.33 OSA (OBSTRUCTIVE SLEEP APNEA): Primary | ICD-10-CM

## 2020-04-23 PROCEDURE — 99213 OFFICE O/P EST LOW 20 MIN: CPT | Mod: 95 | Performed by: PHYSICIAN ASSISTANT

## 2020-04-29 ENCOUNTER — VIRTUAL VISIT (OUTPATIENT)
Dept: PULMONOLOGY | Facility: CLINIC | Age: 85
End: 2020-04-29
Attending: INTERNAL MEDICINE
Payer: COMMERCIAL

## 2020-04-29 DIAGNOSIS — R06.02 SOB (SHORTNESS OF BREATH): ICD-10-CM

## 2020-04-29 DIAGNOSIS — J44.9 CHRONIC OBSTRUCTIVE PULMONARY DISEASE, UNSPECIFIED COPD TYPE (H): Chronic | ICD-10-CM

## 2020-04-29 RX ORDER — ALBUTEROL SULFATE 0.83 MG/ML
SOLUTION RESPIRATORY (INHALATION)
Qty: 120 VIAL | Refills: 3 | Status: SHIPPED | OUTPATIENT
Start: 2020-04-29 | End: 2020-08-27 | Stop reason: ALTCHOICE

## 2020-04-29 NOTE — PROGRESS NOTES
"Susan Haq is a 88 year old female who is being evaluated via a billable telephone visit.      The patient has been notified of following:     \"This telephone visit will be conducted via a call between you and your physician/provider. We have found that certain health care needs can be provided without the need for a physical exam.  This service lets us provide the care you need with a short phone conversation.  If a prescription is necessary we can send it directly to your pharmacy.  If lab work is needed we can place an order for that and you can then stop by our lab to have the test done at a later time.    Telephone visits are billed at different rates depending on your insurance coverage. During this emergency period, for some insurers they may be billed the same as an in-person visit.  Please reach out to your insurance provider with any questions.    If during the course of the call the physician/provider feels a telephone visit is not appropriate, you will not be charged for this service.\"    Patient has given verbal consent for Telephone visit?  Yes    How would you like to obtain your AVS? Mail a copy    Phone call duration: 30 minutes      Susan Haq is a 87 year old female with a history of chronic hypoxic and hypercapnic respiratory failure on 3L O2, COPD, SUSSY on BiPAP, Hearing Loss, Hypertension, Depression, Obesity who presents for follow up of COPD.    Last Pulmonary visit in Oct 2019 where noted to have significant dyspnea on exertion and fatigue prompting further cardiac and sleep workup. Seen in Sleep clinic 12/30/2019. Subsequent overnight oximetry showed significant residual hypoxemia. Titration PSG completed in February, settings adjusted to bilevel PAP 18/9 cm H2O in spontaneous mode with O2 at 3 LPM. Lexiscan performed in Oct 2019 with Echo in Jan 2020.    Interim History  Patient has extreme difficulty with hearing. Interview conducted with the assistance of her friend and PCA " Martina. Patient reports her dyspnea with activity is worse than last year. Takes a puff of albuterol before and after doing any movement. Thinks is using the albuterol at least 5-6 times per day. Not using nebs anymore. No cough except sometimes while eating. Martina does not think she is choking, patient denies heartburn. Does having wheezing, does not think the albuterol helps. Dyspnea mostly with exertion but can occur at rest. Weight has been stable, not using compression stockings but her friends wrap her right leg which is the more swollen of the two. Taking her Symbicort and Spiriva daily.    Assessment and Plan  Chronic respiratory failure on 3L O2, COPD with moderate obstruction, air trapping, and hyperinflation on last PFTs in June 2019. Recent cardiac workup negative for signs of ischemia but with mild/moderate pulmonary hypertension and diastolic dysfunction on Echo. Progressive dyspnea on exertion despite triple inhaler therapy. Michaelkibernice has not had recent exacerbations and given age and co-morbidities do not think would be a good candidate for chronic azithromycin or roflumilast. Concerned that may not be able to take inhaler correctly and will trial albuterol nebs. If feels like neb formula working better will switch to pure neb regimen.  - Transition PRN albuterol inhaler to nebulizer  - If clear improvement in symptoms will transition all inhalers over to nebulized formulas  - PCA will watch for evidence of choking given cough with eating  - Follow up with Pulmonary in 3-4 months    Patient discussed with Dr. Damon Sanchez MD PhD  Pulmonary Critical Care Fellow  [phone]    Physician Attestation   I, Seth Jose MD, spoke with this patient over the phone and agree with the findings and plan of care as documented in the note.      Items personally reviewed/procedural attestation: labs, imaging and agree with the interpretation documented in the note and spirometry report and  agree with the interpretation documented in the note.    Seth Jose MD

## 2020-04-30 NOTE — PATIENT INSTRUCTIONS
Start using albuterol nebulizer in place of albuterol inhaler    Let us know if you are feeling better using the nebs. If they improve your symptoms we will switch your other inhalers to nebulizer treatments.    Watch for difficulty swallowing or choking with eating    Follow up with Pulmonary in 3-4 months

## 2020-06-01 ENCOUNTER — ALLIED HEALTH/NURSE VISIT (OUTPATIENT)
Dept: AUDIOLOGY | Facility: CLINIC | Age: 85
End: 2020-06-01
Payer: COMMERCIAL

## 2020-06-01 DIAGNOSIS — J44.9 CHRONIC OBSTRUCTIVE PULMONARY DISEASE, UNSPECIFIED COPD TYPE (H): Chronic | ICD-10-CM

## 2020-06-01 DIAGNOSIS — H90.3 SENSORINEURAL HEARING LOSS, ASYMMETRICAL: Primary | ICD-10-CM

## 2020-06-01 PROCEDURE — 99207 ZZC NO CHARGE LOS: CPT | Performed by: AUDIOLOGIST

## 2020-06-01 PROCEDURE — V5299 HEARING SERVICE: HCPCS | Performed by: AUDIOLOGIST

## 2020-06-01 NOTE — PROGRESS NOTES
[facility]        SUBJECTIVE:  Susan Haq, 88 year old requests repair of her right 2015 Phonak Bolero V50-P hearing aid. She reports the hearing aid has stopped working.     OBJECTIVE:  Replaced plugged right earmold tubing. Again a water bubble is plugging the tubing. Verified hearing aid functionality.      ASSESSMENT/PLAN:    Discussed repair with Martina. Martina will  hearing aid at the specialty clinic .     Caridad YANEZ-KIAN, #[id number]

## 2020-06-02 NOTE — TELEPHONE ENCOUNTER
Routing refill request to provider for review/approval because:  Last ordered by    Clara Sanchez MD      OK for further refills?     RUSSELL ThomsonN, RN

## 2020-06-05 RX ORDER — ALBUTEROL SULFATE 90 UG/1
AEROSOL, METERED RESPIRATORY (INHALATION)
Qty: 8.5 G | Refills: 2 | OUTPATIENT
Start: 2020-06-05

## 2020-06-05 NOTE — TELEPHONE ENCOUNTER
Clara Sanchez MD Young, Jacqueline A, RN               Yes, thanks    Previous Messages     ----- Message -----   From: Malia Navarro RN   Sent: 6/2/2020   3:54 PM CDT   To: Clara Sanchez MD   Subject: RE: Refill request                               Dr. Sanchez,     We received the request straight from the pharmacy, which was most likely an auto refill or a mistake on their part. Would you like me to deny the inhaler with the note that states she was switched to neb solution instead?     Thanks for your help,      FÉLIX Thomson, RN     ----- Message -----   From: Clara Sanchez MD   Sent: 6/2/2020   3:43 PM CDT   To: Malia Navarro, RN   Subject: Refill request                                   Hi,     For Tiffany we had planned to try albuterol nebulizer rather than inhaler. I am worried she was not able to use the inhaler properly. Can we give her albuterol neb instead?     Thanks,   Clara

## 2020-06-12 DIAGNOSIS — J84.10 INTERSTITIAL PULMONARY FIBROSIS (H): Chronic | ICD-10-CM

## 2020-06-12 DIAGNOSIS — F32.1 MAJOR DEPRESSIVE DISORDER, SINGLE EPISODE, MODERATE (H): Chronic | ICD-10-CM

## 2020-06-12 RX ORDER — ESCITALOPRAM OXALATE 5 MG/1
TABLET ORAL
Qty: 60 TABLET | Refills: 0 | Status: SHIPPED | OUTPATIENT
Start: 2020-06-12 | End: 2020-11-19

## 2020-06-12 RX ORDER — FOLIC ACID 1 MG/1
TABLET ORAL
Qty: 30 TABLET | Refills: 0 | Status: SHIPPED | OUTPATIENT
Start: 2020-06-12 | End: 2020-08-13

## 2020-06-12 NOTE — LETTER
[facility]  [address] [address]  Phone: [phone]       June 12, 2020         Susan Haq  [address] [place] [address]            Dear Susan:    We are concerned about your health care.  We recently provided you with medication refills.  Many medications require routine follow-up with your doctor.    Please schedule a virtual visit (either telephone or video visit) by calling the clinic.     Your prescription(s) have been refilled for 30 days so you may have time for the above noted follow-up. Please call to schedule soon so we can assure you have an appointment before your next refills are needed.    Thank you,      ANNETTA Hoover / leonel

## 2020-06-12 NOTE — TELEPHONE ENCOUNTER
30 day supply sent to pharmacy. Please schedule virtual visit with PCP prior to next refill. Thanks!    Shea Slaughter DNP, NP-C 6/12/2020 2:54 PM

## 2020-06-12 NOTE — TELEPHONE ENCOUNTER
"Requested Prescriptions   Pending Prescriptions Disp Refills     escitalopram (LEXAPRO) 5 MG tablet  Last Written Prescription Date:  12/12/2019  Last Fill Quantity: 60,  # refills: 3   Last office visit: 10/16/2019 with prescribing provider:  Nora   Future Office Visit:           60 tablet 6       SSRIs Protocol Failed - 6/12/2020  1:20 PM  MELISSA-7 SCORE 12/6/2017 9/14/2018 4/25/2019   Total Score 1 1 0             Failed - PHQ-9 score less than 5 in past 6 months     Please review last PHQ-9 score.   PHQ 6/14/2018 9/14/2018 4/25/2019   PHQ-9 Total Score 11 17 21   Q9: Thoughts of better off dead/self-harm past 2 weeks Several days Several days Several days             Failed - Recent (6 mo) or future (30 days) visit within the authorizing provider's specialty     Patient had office visit in the last 6 months or has a visit in the next 30 days with authorizing provider or within the authorizing provider's specialty.  See \"Patient Info\" tab in [employer], or \"Choose Columns\" in Meds & Orders section of the refill encounter.            Passed - Medication is active on med list        Passed - Patient is age 18 or older        Passed - No active pregnancy on record        Passed - No positive pregnancy test in last 12 months           folic acid (FOLVITE) 1 MG tablet  Last Written Prescription Date:  12/12/2019  Last Fill Quantity: 90,  # refills: 1   Last office visit: 10/16/2019 with prescribing provider:  Nora    Future Office Visit:           90 tablet 1       Vitamin Supplements (Adult) Protocol Passed - 6/12/2020  1:20 PM        Passed - High dose Vitamin D not ordered        Passed - Recent (12 mo) or future (30 days) visit within the authorizing provider's specialty     Patient has had an office visit with the authorizing provider or a provider within the authorizing providers department within the previous 12 mos or has a future within next 30 days. See \"Patient Info\" tab in inbasket, or \"Choose Columns\" in " Meds & Orders section of the refill encounter.              Passed - Medication is active on med list

## 2020-07-03 DIAGNOSIS — J44.9 CHRONIC OBSTRUCTIVE PULMONARY DISEASE, UNSPECIFIED COPD TYPE (H): Chronic | ICD-10-CM

## 2020-07-03 NOTE — TELEPHONE ENCOUNTER
"Requested Prescriptions   Pending Prescriptions Disp Refills     tiotropium (SPIRIVA HANDIHALER) 18 MCG inhaled capsule [Pharmacy Med Name: SPIRIVA 18 MCG CP-HANDIHALE 18 CAP] 90 capsule 3     Sig: INHALE THE CONTENTS OF ONE CAPSULE BY MOUTH DAILY       Asthma Maintenance Inhalers - Anticholinergics Passed - 7/3/2020 10:39 AM        Passed - Patient is age 12 years or older        Passed - Recent (12 mo) or future (30 days) visit within the authorizing provider's specialty     Patient has had an office visit with the authorizing provider or a provider within the authorizing providers department within the previous 12 mos or has a future within next 30 days. See \"Patient Info\" tab in inbasket, or \"Choose Columns\" in Meds & Orders section of the refill encounter.              Passed - Medication is active on med list       Inhaled Steroids Protocol Passed - 7/3/2020 10:39 AM        Passed - Patient is age 12 or older        Passed - Recent (12 mo) or future (30 days) visit within the authorizing provider's specialty     Patient has had an office visit with the authorizing provider or a provider within the authorizing providers department within the previous 12 mos or has a future within next 30 days. See \"Patient Info\" tab in inbasket, or \"Choose Columns\" in Meds & Orders section of the refill encounter.              Passed - Medication is active on med list           Last Written Prescription Date:  3/2/2018  Last Fill Quantity: 90 capsules,  # refills: 3   Last office visit: 10/16/2019 with prescribing provider:  Nora   Future Office Visit:      Routing refill request to provider for review/approval because:  A break in medication            "

## 2020-08-11 RX ORDER — TIOTROPIUM BROMIDE 18 UG/1
CAPSULE ORAL; RESPIRATORY (INHALATION)
Qty: 90 CAPSULE | Refills: 3 | Status: SHIPPED | OUTPATIENT
Start: 2020-08-11 | End: 2021-01-14

## 2020-08-12 DIAGNOSIS — E78.5 HYPERLIPIDEMIA LDL GOAL <130: Chronic | ICD-10-CM

## 2020-08-12 DIAGNOSIS — J84.10 INTERSTITIAL PULMONARY FIBROSIS (H): Chronic | ICD-10-CM

## 2020-08-12 DIAGNOSIS — I10 HYPERTENSION GOAL BP (BLOOD PRESSURE) < 140/90: Chronic | ICD-10-CM

## 2020-08-12 RX ORDER — ATENOLOL 50 MG/1
TABLET ORAL
Qty: 180 TABLET | Refills: 0 | Status: SHIPPED | OUTPATIENT
Start: 2020-08-12 | End: 2020-11-09

## 2020-08-12 RX ORDER — ATORVASTATIN CALCIUM 20 MG/1
TABLET, FILM COATED ORAL
Qty: 90 TABLET | Refills: 0 | Status: SHIPPED | OUTPATIENT
Start: 2020-08-12 | End: 2020-11-09

## 2020-08-12 NOTE — TELEPHONE ENCOUNTER
Routing refill request to provider for review/approval because: Last filled by float provider     Soniya TADEO RN, BSN

## 2020-08-13 RX ORDER — FOLIC ACID 1 MG/1
TABLET ORAL
Qty: 30 TABLET | Refills: 0 | Status: SHIPPED | OUTPATIENT
Start: 2020-08-13 | End: 2020-09-04

## 2020-08-17 DIAGNOSIS — J44.9 CHRONIC OBSTRUCTIVE PULMONARY DISEASE, UNSPECIFIED COPD TYPE (H): Chronic | ICD-10-CM

## 2020-08-17 NOTE — TELEPHONE ENCOUNTER
"Requested Prescriptions   Pending Prescriptions Disp Refills     budesonide-formoterol (SYMBICORT) 160-4.5 MCG/ACT Inhaler [Pharmacy Med Name: SYMBICORT 160-4.5 MCG -4.5 AERO] 10.2 g 5     Sig: INHALE TWO PUFFS BY MOUTH 2 TIMES DAILY       Long-Acting Beta Agonist Inhalers Protocol  Failed - 8/17/2020 12:26 PM        Failed - Order for Serevent, Striverdi, or Foradil and pt has steroid inhaler        Passed - Patient is age 12 or older        Passed - Recent (12 mo) or future (30 days) visit within the authorizing provider's specialty     Patient has had an office visit with the authorizing provider or a provider within the authorizing providers department within the previous 12 mos or has a future within next 30 days. See \"Patient Info\" tab in inbasket, or \"Choose Columns\" in Meds & Orders section of the refill encounter.              Passed - Medication is active on med list       Inhaled Steroids Protocol Passed - 8/17/2020 12:26 PM        Passed - Patient is age 12 or older        Passed - Recent (12 mo) or future (30 days) visit within the authorizing provider's specialty     Patient has had an office visit with the authorizing provider or a provider within the authorizing providers department within the previous 12 mos or has a future within next 30 days. See \"Patient Info\" tab in inbasket, or \"Choose Columns\" in Meds & Orders section of the refill encounter.              Passed - Medication is active on med list             "

## 2020-08-20 RX ORDER — BUDESONIDE AND FORMOTEROL FUMARATE DIHYDRATE 160; 4.5 UG/1; UG/1
AEROSOL RESPIRATORY (INHALATION)
Qty: 10.2 G | Refills: 5 | Status: SHIPPED | OUTPATIENT
Start: 2020-08-20 | End: 2020-09-14

## 2020-08-20 NOTE — TELEPHONE ENCOUNTER
Routing refill request to provider for review/approval because:    Long-Acting Beta Agonist Inhalers Protocol  Failed - 8/17/2020 12:26 PM           Failed - Order for Serevent, Striverdi, or Foradil and pt has steroid inhaler     Tesha Gerard RN

## 2020-08-27 DIAGNOSIS — J84.10 INTERSTITIAL PULMONARY FIBROSIS (H): Primary | Chronic | ICD-10-CM

## 2020-08-27 RX ORDER — ALBUTEROL SULFATE 90 UG/1
2 AEROSOL, METERED RESPIRATORY (INHALATION) EVERY 6 HOURS PRN
Qty: 8.5 G | Refills: 3 | Status: SHIPPED | OUTPATIENT
Start: 2020-08-27 | End: 2021-05-11

## 2020-08-31 ENCOUNTER — ALLIED HEALTH/NURSE VISIT (OUTPATIENT)
Dept: AUDIOLOGY | Facility: CLINIC | Age: 85
End: 2020-08-31

## 2020-08-31 DIAGNOSIS — H90.3 SENSORINEURAL HEARING LOSS, ASYMMETRICAL: Primary | ICD-10-CM

## 2020-08-31 PROCEDURE — V5014 HEARING AID REPAIR/MODIFYING: HCPCS | Performed by: AUDIOLOGIST

## 2020-08-31 PROCEDURE — 99207 ZZC NO CHARGE LOS: CPT | Performed by: AUDIOLOGIST

## 2020-08-31 NOTE — PROGRESS NOTES
[facility]        SUBJECTIVE:  Susan Haq ,88 year old female drops off her 2015 Phonak Bolero V50-P left hearing aid. She reports it is not working.     OBJECTIVE:  Replaced hardened and plugged left earmold tubing. Patient is unable to clean her hearing aid. Verified hearing aid functionality.      ASSESSMENT/PLAN:    Contacted Martina GOODRICH To come and  the hearing aid at the specialty clinic ,    Caridad VALERO, #[id number]

## 2020-09-04 DIAGNOSIS — J84.10 INTERSTITIAL PULMONARY FIBROSIS (H): Chronic | ICD-10-CM

## 2020-09-04 RX ORDER — FOLIC ACID 1 MG/1
TABLET ORAL
Qty: 30 TABLET | Refills: 0 | Status: SHIPPED | OUTPATIENT
Start: 2020-09-04 | End: 2020-10-07

## 2020-09-04 NOTE — TELEPHONE ENCOUNTER
Routing refill request to provider for review/approval because:  Allergy warning on the sig, needs med review.    Per lab on 10-16-30:    Vitamin D Deficiency screening  20 - 75 ug/L  33        ARAIDNA Hoover

## 2020-09-10 ENCOUNTER — HOSPITAL ENCOUNTER (EMERGENCY)
Facility: CLINIC | Age: 85
Discharge: HOME | End: 2020-09-10
Attending: EMERGENCY MEDICINE | Admitting: EMERGENCY MEDICINE
Payer: COMMERCIAL

## 2020-09-10 VITALS
RESPIRATION RATE: 24 BRPM | HEART RATE: 62 BPM | SYSTOLIC BLOOD PRESSURE: 166 MMHG | HEIGHT: 63 IN | BODY MASS INDEX: 46.07 KG/M2 | WEIGHT: 260 LBS | TEMPERATURE: 98.1 F | DIASTOLIC BLOOD PRESSURE: 81 MMHG | OXYGEN SATURATION: 99 %
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
## 2020-09-10 DIAGNOSIS — J44.9 CHRONIC OBSTRUCTIVE PULMONARY DISEASE, UNSPECIFIED COPD TYPE (H): ICD-10-CM

## 2020-09-10 PROCEDURE — 94640 AIRWAY INHALATION TREATMENT: CPT

## 2020-09-10 PROCEDURE — 99284 EMERGENCY DEPT VISIT MOD MDM: CPT | Mod: Z6 | Performed by: EMERGENCY MEDICINE

## 2020-09-10 PROCEDURE — 99283 EMERGENCY DEPT VISIT LOW MDM: CPT | Mod: 25 | Performed by: EMERGENCY MEDICINE

## 2020-09-10 PROCEDURE — 25000125 ZZHC RX 250: Performed by: EMERGENCY MEDICINE

## 2020-09-10 RX ORDER — IPRATROPIUM BROMIDE AND ALBUTEROL SULFATE 2.5; .5 MG/3ML; MG/3ML
3 SOLUTION RESPIRATORY (INHALATION) ONCE
Status: COMPLETED | OUTPATIENT
Start: 2020-09-10 | End: 2020-09-10

## 2020-09-10 RX ADMIN — IPRATROPIUM BROMIDE AND ALBUTEROL SULFATE 3 ML: .5; 3 SOLUTION RESPIRATORY (INHALATION) at 03:40

## 2020-09-10 ASSESSMENT — ENCOUNTER SYMPTOMS
FATIGUE: 0
VOMITING: 0
ABDOMINAL PAIN: 0
DIAPHORESIS: 0
HEADACHES: 0
COUGH: 0
APPETITE CHANGE: 0
FEVER: 0
DECREASED CONCENTRATION: 0
NUMBNESS: 0
VOICE CHANGE: 0
NECK PAIN: 0
DIARRHEA: 0
DYSURIA: 0
BACK PAIN: 0
SHORTNESS OF BREATH: 1
SORE THROAT: 0
WEAKNESS: 0
FLANK PAIN: 0
NAUSEA: 0
NECK STIFFNESS: 0
WHEEZING: 1
WOUND: 0
LIGHT-HEADEDNESS: 0
CHEST TIGHTNESS: 0

## 2020-09-10 ASSESSMENT — MIFFLIN-ST. JEOR: SCORE: 1578.48

## 2020-09-10 NOTE — ED PROVIDER NOTES
History     Chief Complaint   Patient presents with     Fall     slid to floor from bed/      HPI  Susan Haq is a 88 year old female with history of obesity, hypertension, type 2 diabetes, chronic kidney disease, and COPD presenting for evaluation after a fall.  Patient reports he was at home sitting on her couch when she moved to get up but instead slipped to the floor.  Due to her obesity and underlying chronic weakness, she was unable to get herself up.  She eventually yelled out to get some help and her neighbor below her came to her assistance.  He was unable to help her get up so EMS was contacted and came to her home.  Patient reported feeling somewhat weak and tired and was surprised that she was not able to get herself up however usually she has several friends who will, and they were not available due to the late hour of this incident.  Patient denies any injury from her incident.  Denies feeling lightheaded or dizzy.  Denies any chest pain or difficulty breathing.  Denies fever or chills.  Reports her chronic difficulty breathing is more or less at baseline.  Denies any new cough or congestion.  Denies any new weakness.  Reports she chronically has difficulty getting around and usually uses a walker or cane.  She does not feel this is any worse than baseline.  Patient reports she is tired and really just wants to go home and sleep.  She states that she never really wanted to come in but felt she had to when the ambulance arrived.    Allergies:  Allergies   Allergen Reactions     Ace Inhibitors Cough     Asa [Aspirin]      Penicillins      Aspirin Rash     redness       Problem List:    Patient Active Problem List    Diagnosis Date Noted     CKD (chronic kidney disease) stage 3, GFR 30-59 ml/min (H) 10/16/2019     Priority: Medium     Chronic obstructive pulmonary disease with acute exacerbation (H) 09/20/2018     Priority: Medium     Physical deconditioning 11/16/2017     Priority: Medium      Morbid obesity (H) 07/03/2017     Priority: Medium     Alcohol abuse 06/11/2017     Priority: Medium     Risk for falls 06/10/2017     Priority: Medium     Major depressive disorder, single episode, moderate (H) 03/13/2017     Priority: Medium     Chronic obstructive pulmonary disease, unspecified COPD type (H) 05/19/2016     Priority: Medium     Continuous oxygen at 3 liters        Interstitial pulmonary fibrosis (H) 05/26/2015     Priority: Medium     SNHL (sensorineural hearing loss) 06/10/2014     Priority: Medium     Umbilical hernia 04/08/2013     Priority: Medium     HL (hearing loss) 03/25/2013     Priority: Medium     Bilateral leg edema 06/14/2012     Priority: Medium     SUSSY (obstructive sleep apnea)-Moderately severe (AHI 21) 04/10/2012     Priority: Medium     Initial diagnosis 2007 at [facility]   Polysomnography 4/9/2012: 244.1 lbs.  BMI 40.7.  Reddell sleepiness scale 0.0.  Re-evalaution of previously diagnosed moderately severe SUSSY. She was on auto-BiPAP with 2L O2 bleed in and nocturnal oximetry showed persistently low SpO2. Diagnostic PSG maximum TCM of 50 mmHg and baseline TCM of 43.  Baseline ABG showed a PaCO2 of 38 which is normal. Baseline oxygen saturation was 87.8%.  The lowest oxygen saturation was 59.2%.  Snoring was reported as loud.  Apnea/Hypopnea Index 20.5 events per hour.  REM AHI 48.0.  RERA index 16.0. CPAP  titrated at pressures ranging from 6 cm/H20 up to 12 cm/H20.  The optimal pressure was 12.0 with an AHI of 0 including lateral REM sleep.       Advance care planning 04/25/2011     Priority: Medium     Advance Care Planning 6/28/2016: Receipt of ACP document:  Received: POLST which was signed and dated by provider on 3/22/16.  Document previously scanned on 4/4/16.  Order reviewed and found to be valid.  Code Status reflects choices in most recent ACP document.  Confirmed/documented designated decision maker(s).  Added by Trinh Ltot   Advance Care  Planning Liaison  Advance Care Planning 11: Discussed Advance Directive planning with patient; information given to patient to review.  Marine Guillen MA         Adenomatous polyp of colon 2011     Priority: Medium     PVC's (premature ventricular contractions) 2011     Priority: Medium     Hypertension goal BP (blood pressure) < 140/90 2011     Priority: Medium     Obesity 2011     Priority: Medium     Osteopenia 2010     Priority: Medium     Insomnia 12/15/2009     Priority: Medium     Hyperlipidemia LDL goal <130 12/10/2009     Priority: Medium        Past Medical History:    Past Medical History:   Diagnosis Date     Basal cell carcinoma      Bronchospasm      CKD (chronic kidney disease) stage 3, GFR 30-59 ml/min (H) 10/16/2019     Diverticulosis      Fracture, Metacarpal Shaft - right 4th 2010     High cholesterol      HL (hearing loss) 3/25/2013     Hypertension      Hypokalemia 2013     Hypoxia 2013     PVC's (premature ventricular contractions) 2011     Smoker 1986     Type 2 diabetes mellitus (H) 2011     Venous insufficiency        Past Surgical History:    Past Surgical History:   Procedure Laterality Date     APPENDECTOMY       C BSO, OMENTECTOMY W/SHANIA       C NONSPECIFIC PROCEDURE      (L) clavicle orif     C STOMACH SURGERY PROCEDURE UNLISTED       CHOLECYSTECTOMY, LAPOROSCOPIC  10/13/2011    Cholecystectomy, Laparoscopic     HERNIA REPAIR, UMBILICAL  10/13/2011     HYSTERECTOMY, CERVIX STATUS UNKNOWN         Family History:    Family History   Problem Relation Age of Onset     Diabetes Father      Coronary Artery Disease Maternal Grandmother      Coronary Artery Disease Paternal Uncle        Social History:  Marital Status:   [5]  Social History     Tobacco Use     Smoking status: Former Smoker     Packs/day: 1.00     Years: 35.00     Pack years: 35.00     Last attempt to quit: 1990     Years since quittin.7     Smokeless  tobacco: Former User   Substance Use Topics     Alcohol use: Yes     Comment: Drink 1 (3oz) drink a day     Drug use: No        Medications:    acetaminophen (TYLENOL) 500 MG tablet  albuterol (PROAIR HFA/PROVENTIL HFA/VENTOLIN HFA) 108 (90 Base) MCG/ACT inhaler  ALLERGY RELIEF 10 MG tablet  amLODIPine (NORVASC) 10 MG tablet  atenolol (TENORMIN) 50 MG tablet  atorvastatin (LIPITOR) 20 MG tablet  budesonide-formoterol (SYMBICORT) 160-4.5 MCG/ACT Inhaler  camphor-menthol (DERMASARRA) 0.5-0.5 % LOTN  escitalopram (LEXAPRO) 5 MG tablet  FISH OIL 1000 MG OR CAPS  folic acid (FOLVITE) 1 MG tablet  hydrochlorothiazide (HYDRODIURIL) 25 MG tablet  losartan (COZAAR) 100 MG tablet  losartan-hydrochlorothiazide (HYZAAR) 100-25 MG tablet  nystatin (MYCOSTATIN) 009676 UNIT/GM POWD  order for DME  order for DME  order for DME  ORDER FOR DME  ORDER FOR DME  ORDER FOR DME  ORDER FOR DME  potassium chloride ER (KLOR-CON M) 10 MEQ CR tablet  PROAIR  (90 Base) MCG/ACT inhaler  Respiratory Therapy Supplies (NEBULIZER COMPRESSOR) KIT  SENNA PLUS 8.6-50 MG per tablet  tiotropium (SPIRIVA HANDIHALER) 18 MCG capsule  tiotropium (SPIRIVA HANDIHALER) 18 MCG inhaled capsule  tiotropium (SPIRIVA HANDIHALER) 18 MCG inhaled capsule          Review of Systems   Constitutional: Negative for appetite change, diaphoresis, fatigue and fever.   HENT: Negative for congestion, sore throat and voice change.         Denies change in taste or smell   Respiratory: Positive for shortness of breath (Chronic, at baseline) and wheezing. Negative for cough and chest tightness.    Cardiovascular: Negative for chest pain.   Gastrointestinal: Negative for abdominal pain, diarrhea, nausea and vomiting.   Genitourinary: Negative for dysuria and flank pain.   Musculoskeletal: Negative for back pain, neck pain and neck stiffness.   Skin: Negative for rash and wound.   Neurological: Negative for weakness, light-headedness, numbness and headaches.  "  Psychiatric/Behavioral: Negative for decreased concentration.   All other systems reviewed and are negative.      Physical Exam   BP: (!) 166/81  Pulse: 62  Temp: 98.1  F (36.7  C)  Resp: 24  Height: 160 cm (5' 3\")  Weight: 117.9 kg (260 lb)  SpO2: 99 %      Physical Exam  Vitals signs and nursing note reviewed.   Constitutional:       Appearance: Normal appearance. She is obese. She is not ill-appearing or diaphoretic.   HENT:      Head: Atraumatic.      Nose: Nose normal.      Mouth/Throat:      Mouth: Mucous membranes are moist.   Eyes:      Conjunctiva/sclera: Conjunctivae normal.   Cardiovascular:      Rate and Rhythm: Normal rate and regular rhythm.      Pulses: Normal pulses.   Pulmonary:      Effort: Pulmonary effort is normal.   Abdominal:      Palpations: Abdomen is soft.      Tenderness: There is no abdominal tenderness.      Comments: Obese   Musculoskeletal:      Right lower leg: Edema present.      Left lower leg: Edema present.      Comments: Mild chronic lower extremity edema, reported to be at baseline per patient   Skin:     General: Skin is warm and dry.      Capillary Refill: Capillary refill takes less than 2 seconds.   Neurological:      Mental Status: She is alert and oriented to person, place, and time.      Comments: Able to move all extremities equally with reasonable symmetric strength.  Ambulated in the ED with a wheelchair for stability assistance as she usually uses a walker   Psychiatric:         Mood and Affect: Mood normal.         ED Course        Procedures           No results found for this or any previous visit (from the past 24 hour(s)).    Medications   ipratropium - albuterol 0.5 mg/2.5 mg/3 mL (DUONEB) neb solution 3 mL (3 mLs Nebulization Given 9/10/20 0340)       Assessments & Plan (with Medical Decision Making)  8-year-old female with history of obesity and chronic gait instability presenting for evaluation after slumping out of her low laying the bed tonight while " watching TV.  Patient reports she was trying to get up but slid down to the floor and was too weak to get herself up.  She reports she never has the strength to get herself up when she gets to the ground and often requires some assistance to get up but feels she can get around her apartment adequately and feels safe managing her care at home.  EMS was contacted to help her get up off the ground but ultimately transported her here.  Upon arrival patient reports no new symptoms and states she feels like she is at baseline currently.  Review of systems indicated no new or concerning changes in her strength or activity level.  No clinical evidence of infection.  Patient declined any further work-up and felt she was safe to go home.  Was able to ambulate in the ED with assistive device as per her baseline.  Discharged home with a friend with a plan for primary care follow-up as needed.     I have reviewed the nursing notes.    I have reviewed the findings, diagnosis, plan and need for follow up with the patient.       Discharge Medication List as of 9/10/2020  4:24 AM          Final diagnoses:   Chronic obstructive pulmonary disease, unspecified COPD type (H)   Fall, initial encounter - without injury       9/10/2020   [facility] EMERGENCY DEPARTMENT     Reyes, Husam Ozuna MD  09/10/20 0600

## 2020-09-10 NOTE — ED NOTES
Walked pt down to room 12 from room 7. Pt was using a wheelchair to assist in walking while on 3 L NC. Pt returned to room 7 is sitting on the edge of the bed placed spo2 sensor on finger and is at 90%, pt is struggling for air, this is not new for her. Pt states she uses and inhaler after any movement around the house. Pt's friend stated that she uses the inhaler upon any exertion, pt is now resting comfortably.

## 2020-09-10 NOTE — ED AVS SNAPSHOT
[facility] Emergency Department  [address] [address]  Phone:  [phone]  Fax:  [phone]                                    Susan Haq   MRN: [id number]    Department:  [facility] Emergency Department   Date of Visit:  9/10/2020           After Visit Summary Signature Page    I have received my discharge instructions, and my questions have been answered. I have discussed any challenges I see with this plan with the nurse or doctor.    ..........................................................................................................................................  Patient/Patient Representative Signature      ..........................................................................................................................................  Patient Representative Print Name and Relationship to Patient    ..................................................               ................................................  Date                                   Time    ..........................................................................................................................................  Reviewed by Signature/Title    ...................................................              ..............................................  Date                                               Time          22EPIC Rev 08/18

## 2020-09-10 NOTE — ED NOTES
Per pt and pts friend pt is ok to go home without Oxygen. Filippo david was offered so that she could could remain on oxygen the entire way home, they stated they have a portable tank they are figuring out the batteries on and that they do it all the time. Medidavid was offered again and pt and pts friend declined and wanted to go home via friends car.

## 2020-09-14 DIAGNOSIS — J44.9 CHRONIC OBSTRUCTIVE PULMONARY DISEASE, UNSPECIFIED COPD TYPE (H): Chronic | ICD-10-CM

## 2020-09-14 RX ORDER — BUDESONIDE AND FORMOTEROL FUMARATE DIHYDRATE 160; 4.5 UG/1; UG/1
AEROSOL RESPIRATORY (INHALATION)
Qty: 10.2 G | Refills: 5 | Status: SHIPPED | OUTPATIENT
Start: 2020-09-14 | End: 2021-01-14

## 2020-09-14 NOTE — TELEPHONE ENCOUNTER
Routing refill request to provider for review/approval because:  Failed protocol for ASHLEY Khan RNC

## 2020-10-02 DIAGNOSIS — J44.9 CHRONIC OBSTRUCTIVE PULMONARY DISEASE, UNSPECIFIED COPD TYPE (H): Chronic | ICD-10-CM

## 2020-10-02 DIAGNOSIS — J84.10 INTERSTITIAL PULMONARY FIBROSIS (H): Chronic | ICD-10-CM

## 2020-10-02 DIAGNOSIS — E87.6 HYPOKALEMIA: ICD-10-CM

## 2020-10-02 NOTE — TELEPHONE ENCOUNTER
"Requested Prescriptions   Pending Prescriptions Disp Refills     folic acid (FOLVITE) 1 MG tablet [Pharmacy Med Name: FOLIC ACID 1 MG TAB 1 Tablet] 30 tablet 0     Sig: TAKE ONE TABLET BY MOUTH DAILY       Vitamin Supplements (Adult) Protocol Passed - 10/2/2020 12:13 PM        Passed - High dose Vitamin D not ordered        Passed - Recent (12 mo) or future (30 days) visit within the authorizing provider's specialty     Patient has had an office visit with the authorizing provider or a provider within the authorizing providers department within the previous 12 mos or has a future within next 30 days. See \"Patient Info\" tab in inbasket, or \"Choose Columns\" in Meds & Orders section of the refill encounter.              Passed - Medication is active on med list           potassium chloride ER (KLOR-CON M) 10 MEQ CR tablet [Pharmacy Med Name: POTASSIUM CL ER 10 mEq 10 Tablet] 48 tablet 6     Sig: TAKE ONE TABLET (10 MEQ) BY MOUTH 2 TIMES DAILY       Potassium Supplements Protocol Passed - 10/2/2020 12:13 PM        Passed - Recent (12 mo) or future (30 days) visit within the authorizing provider's department     Patient has had an office visit with the authorizing provider or a provider within the authorizing providers department within the previous 12 mos or has a future within next 30 days. See \"Patient Info\" tab in inbasket, or \"Choose Columns\" in Meds & Orders section of the refill encounter.              Passed - Medication is active on med list        Passed - Patient is age 18 or older        Passed - Normal serum potassium in past 12 months     Recent Labs   Lab Test 10/16/19  1213   POTASSIUM 3.6                       albuterol (PROAIR HFA/PROVENTIL HFA/VENTOLIN HFA) 108 (90 Base) MCG/ACT inhaler [Pharmacy Med Name: ALBUTEROL SULFATE  ( 108 (90 BAS Aerosol] 8.5 g 2     Sig: INHALE TWO PUFFS INTO THE LUNGS EVERY FOUR HOURS AS NEEDED FOR SHORTNESS OF BREATH/DYSPNEA OR WHEEZING       Asthma Maintenance " "Inhalers - Anticholinergics Passed - 10/2/2020 12:13 PM        Passed - Patient is age 12 years or older        Passed - Recent (12 mo) or future (30 days) visit within the authorizing provider's specialty     Patient has had an office visit with the authorizing provider or a provider within the authorizing providers department within the previous 12 mos or has a future within next 30 days. See \"Patient Info\" tab in inbasket, or \"Choose Columns\" in Meds & Orders section of the refill encounter.              Passed - Medication is active on med list       Short-Acting Beta Agonist Inhalers Protocol  Passed - 10/2/2020 12:13 PM        Passed - Patient is age 12 or older        Passed - Recent (12 mo) or future (30 days) visit within the authorizing provider's specialty     Patient has had an office visit with the authorizing provider or a provider within the authorizing providers department within the previous 12 mos or has a future within next 30 days. See \"Patient Info\" tab in inbasket, or \"Choose Columns\" in Meds & Orders section of the refill encounter.              Passed - Medication is active on med list             "

## 2020-10-05 ENCOUNTER — ALLIED HEALTH/NURSE VISIT (OUTPATIENT)
Dept: AUDIOLOGY | Facility: CLINIC | Age: 85
End: 2020-10-05

## 2020-10-05 DIAGNOSIS — H90.3 SENSORINEURAL HEARING LOSS, ASYMMETRICAL: Primary | ICD-10-CM

## 2020-10-05 PROCEDURE — V5014 HEARING AID REPAIR/MODIFYING: HCPCS | Performed by: AUDIOLOGIST

## 2020-10-05 PROCEDURE — 99207 PR NO CHARGE LOS: CPT | Performed by: AUDIOLOGIST

## 2020-10-05 NOTE — TELEPHONE ENCOUNTER
Routing refill requests to provider for review/approval because:  Patient needs to be seen because:  Last OV 10/16/2019    Soniya TADEO RN, BSN

## 2020-10-06 NOTE — PROGRESS NOTES
[facility]        SUBJECTIVE:  Susanjaiden Haq , 88 year old female requests in office repair of her right 2015 Phonak Bolero V50-P hearing aid.    OBJECTIVE:  Examination reveals a hardened and dislodged earmold tubing. Replaced right earmold tubing. Verified hearing aid functionality.      ASSESSMENT/PLAN:  Patient's friend, Martina, was contacted to  repaired hearing aid at the specialty clinic .     Caridad Elizabeth M.A. -[facility], #[id number]

## 2020-10-07 RX ORDER — FOLIC ACID 1 MG/1
TABLET ORAL
Qty: 30 TABLET | Refills: 0 | Status: SHIPPED | OUTPATIENT
Start: 2020-10-07 | End: 2020-12-08

## 2020-10-07 RX ORDER — ALBUTEROL SULFATE 90 UG/1
AEROSOL, METERED RESPIRATORY (INHALATION)
Qty: 8.5 G | Refills: 2 | Status: SHIPPED | OUTPATIENT
Start: 2020-10-07 | End: 2021-01-14

## 2020-10-07 RX ORDER — POTASSIUM CHLORIDE 750 MG/1
TABLET, EXTENDED RELEASE ORAL
Qty: 48 TABLET | Refills: 6 | Status: SHIPPED | OUTPATIENT
Start: 2020-10-07 | End: 2021-01-14

## 2020-11-03 DIAGNOSIS — I10 HYPERTENSION GOAL BP (BLOOD PRESSURE) < 140/90: Chronic | ICD-10-CM

## 2020-11-03 DIAGNOSIS — E78.5 HYPERLIPIDEMIA LDL GOAL <130: Chronic | ICD-10-CM

## 2020-11-03 NOTE — TELEPHONE ENCOUNTER
"Requested Prescriptions   Pending Prescriptions Disp Refills     atenolol (TENORMIN) 50 MG tablet [Pharmacy Med Name: ATENOLOL 50 MG TABLET 50 Tablet] 60 tablet 0     Sig: TAKE ONE TABLET (50 MG) BY MOUTH TWO TIMES DAILY. **NEED APPOINTMENT FOR MORE REFILLS**       Beta-Blockers Protocol Failed - 11/3/2020  1:36 PM        Failed - Blood pressure under 140/90 in past 12 months     BP Readings from Last 3 Encounters:   09/10/20 (!) 166/81   12/30/19 134/70   10/16/19 136/88                 Passed - Patient is age 6 or older        Passed - Recent (12 mo) or future (30 days) visit within the authorizing provider's specialty     Patient has had an office visit with the authorizing provider or a provider within the authorizing providers department within the previous 12 mos or has a future within next 30 days. See \"Patient Info\" tab in inbasket, or \"Choose Columns\" in Meds & Orders section of the refill encounter.              Passed - Medication is active on med list           atorvastatin (LIPITOR) 20 MG tablet [Pharmacy Med Name: ATORVASTATIN 20 MG TABLET 20 Tablet] 30 tablet 0     Sig: TAKE ONE TABLET (20 MG) BY MOUTH DAILY. **NEED APPOINTMENT FOR FURTHER REFILLS**       Statins Protocol Failed - 11/3/2020  1:36 PM        Failed - LDL on file in past 12 months     Recent Labs   Lab Test 10/16/19  1213   LDL 83             Passed - No abnormal creatine kinase in past 12 months     Recent Labs   Lab Test 06/10/17  1440   CKT 80                Passed - Recent (12 mo) or future (30 days) visit within the authorizing provider's specialty     Patient has had an office visit with the authorizing provider or a provider within the authorizing providers department within the previous 12 mos or has a future within next 30 days. See \"Patient Info\" tab in inbasket, or \"Choose Columns\" in Meds & Orders section of the refill encounter.              Passed - Medication is active on med list        Passed - Patient is age 18 or older "        Passed - No active pregnancy on record        Passed - No positive pregnancy test in past 12 months

## 2020-11-06 NOTE — TELEPHONE ENCOUNTER
Routing refill request to provider for review/approval because:  Patient needs to be seen because it has been more than 1 year since last office visit.    Renetta CHAVEZ MSN, RN

## 2020-11-09 ENCOUNTER — ALLIED HEALTH/NURSE VISIT (OUTPATIENT)
Dept: AUDIOLOGY | Facility: CLINIC | Age: 85
End: 2020-11-09

## 2020-11-09 DIAGNOSIS — I10 HYPERTENSION GOAL BP (BLOOD PRESSURE) < 140/90: Chronic | ICD-10-CM

## 2020-11-09 DIAGNOSIS — H90.3 SENSORINEURAL HEARING LOSS, ASYMMETRICAL: Primary | ICD-10-CM

## 2020-11-09 PROCEDURE — 99207 PR NO CHARGE LOS: CPT | Performed by: AUDIOLOGIST

## 2020-11-09 PROCEDURE — V5010 ASSESSMENT FOR HEARING AID: HCPCS | Performed by: AUDIOLOGIST

## 2020-11-09 RX ORDER — ATORVASTATIN CALCIUM 20 MG/1
TABLET, FILM COATED ORAL
Qty: 30 TABLET | Refills: 0 | Status: SHIPPED | OUTPATIENT
Start: 2020-11-09 | End: 2021-01-05

## 2020-11-09 RX ORDER — ATENOLOL 50 MG/1
TABLET ORAL
Qty: 60 TABLET | Refills: 0 | Status: SHIPPED | OUTPATIENT
Start: 2020-11-09 | End: 2021-01-05

## 2020-11-09 NOTE — TELEPHONE ENCOUNTER
"Requested Prescriptions   Pending Prescriptions Disp Refills     amLODIPine (NORVASC) 10 MG tablet [Pharmacy Med Name: AMLODIPINE BESYLATE 10 MG T 10 Tablet] 24 tablet 3     Sig: TAKE ONE TABLET BY MOUTH ONCE DAILY       Calcium Channel Blockers Protocol  Failed - 11/9/2020  3:47 PM        Failed - Blood pressure under 140/90 in past 12 months     BP Readings from Last 3 Encounters:   09/10/20 (!) 166/81   12/30/19 134/70   10/16/19 136/88           Failed - Normal serum creatinine on file in past 12 months     Recent Labs   Lab Test 10/16/19  1213   CR 0.88       Ok to refill medication if creatinine is low          Passed - Recent (12 mo) or future (30 days) visit within the authorizing provider's specialty     Patient has had an office visit with the authorizing provider or a provider within the authorizing providers department within the previous 12 mos or has a future within next 30 days. See \"Patient Info\" tab in inbasket, or \"Choose Columns\" in Meds & Orders section of the refill encounter.          Passed - Medication is active on med list        Passed - Patient is age 18 or older        Passed - No active pregnancy on record        Passed - No positive pregnancy test in past 12 months             "

## 2020-11-09 NOTE — PROGRESS NOTES
[facility]        SUBJECTIVE:  Susan Haq , 88 year old requests in office repair of her 2015 Phonak Bolero V50-P hearing aid. She reports it is completely dead.     OBJECTIVE:  Listening check reveals the hearing aid is not working.     Sent hearing aid for 12 month repair warranty.     A new hearing aid case, cleaning tool and earmold are in the hearing aid cupboard and will be picked up when the hearing aid is back from repair.     ASSESSMENT/PLAN:    Patient will be contacted when her hearing aid is ready to be picked up.     Caridad YANEZ-[facility], #[id number]

## 2020-11-11 ENCOUNTER — TELEPHONE (OUTPATIENT)
Dept: SCHEDULING | Facility: CLINIC | Age: 85
End: 2020-11-11

## 2020-11-11 RX ORDER — AMLODIPINE BESYLATE 10 MG/1
TABLET ORAL
Qty: 30 TABLET | Refills: 0 | Status: SHIPPED | OUTPATIENT
Start: 2020-11-11 | End: 2021-01-05

## 2020-11-11 NOTE — TELEPHONE ENCOUNTER
Routing refill request to provider for review/approval because:  BP too high.    Rin Persaud RN

## 2020-11-17 ENCOUNTER — ALLIED HEALTH/NURSE VISIT (OUTPATIENT)
Dept: AUDIOLOGY | Facility: CLINIC | Age: 85
End: 2020-11-17

## 2020-11-17 DIAGNOSIS — H90.3 SENSORINEURAL HEARING LOSS, ASYMMETRICAL: Primary | ICD-10-CM

## 2020-11-17 PROCEDURE — V5014 HEARING AID REPAIR/MODIFYING: HCPCS | Performed by: AUDIOLOGIST

## 2020-11-17 PROCEDURE — 99207 PR NO CHARGE LOS: CPT | Performed by: AUDIOLOGIST

## 2020-11-17 NOTE — PROGRESS NOTES
[facility]        SUBJECTIVE:  Susan Haq , 88 year old is contacted to  her repaired 2015 left Phonak Bolero V50-P hearing aid.     OBJECTIVE:  Verified hearing aid functionality.  Reviewed hearing aid repair done and date of warranty expiration (5/13/2021). Patient was only able to get a 6 month warranty as the hearing aid was over 5 years old.     Discussed above with her caregiver.     ASSESSMENT/PLAN:    Caregiver will  the repaired hearing aid at the specialty clinic .    Caridad YANEZ-KIAN, #[id number]

## 2020-11-19 DIAGNOSIS — F32.1 MAJOR DEPRESSIVE DISORDER, SINGLE EPISODE, MODERATE (H): Chronic | ICD-10-CM

## 2020-11-19 RX ORDER — ESCITALOPRAM OXALATE 5 MG/1
TABLET ORAL
Qty: 60 TABLET | Refills: 0 | Status: SHIPPED | OUTPATIENT
Start: 2020-11-19 | End: 2021-01-05

## 2020-11-19 NOTE — TELEPHONE ENCOUNTER
Routing refill request to provider for review/approval because:  Labs out of range:  PHQ-9  A break in medication: last prescribed for 60 tabs 6/12/2020  Patient needs to be seen because it has been more than 1 year since last office visit.

## 2020-11-19 NOTE — TELEPHONE ENCOUNTER
"Requested Prescriptions   Pending Prescriptions Disp Refills     escitalopram (LEXAPRO) 5 MG tablet 60 tablet 0     Sig: TAKE TWO TABLETS (10 MG) BY MOUTH DAILY       SSRIs Protocol Failed - 11/19/2020  2:53 PM        Failed - PHQ-9 score less than 5 in past 6 months     Please review last PHQ-9 score.           Failed - Recent (6 mo) or future (30 days) visit within the authorizing provider's specialty     Patient had office visit in the last 6 months or has a visit in the next 30 days with authorizing provider or within the authorizing provider's specialty.  See \"Patient Info\" tab in inbasket, or \"Choose Columns\" in Meds & Orders section of the refill encounter.            Passed - Medication is active on med list        Passed - Patient is age 18 or older        Passed - No active pregnancy on record        Passed - No positive pregnancy test in last 12 months             "

## 2020-12-02 DIAGNOSIS — J84.10 INTERSTITIAL PULMONARY FIBROSIS (H): Chronic | ICD-10-CM

## 2020-12-02 NOTE — TELEPHONE ENCOUNTER
"Requested Prescriptions   Pending Prescriptions Disp Refills     folic acid (FOLVITE) 1 MG tablet [Pharmacy Med Name: FOLIC ACID 1 MG TAB 1 Tablet] 30 tablet 0     Sig: TAKE ONE TABLET BY MOUTH DAILY       Vitamin Supplements (Adult) Protocol Failed - 12/2/2020  3:46 PM        Failed - Recent (12 mo) or future (30 days) visit within the authorizing provider's specialty     Patient has had an office visit with the authorizing provider or a provider within the authorizing providers department within the previous 12 mos or has a future within next 30 days. See \"Patient Info\" tab in inbasket, or \"Choose Columns\" in Meds & Orders section of the refill encounter.              Passed - High dose Vitamin D not ordered        Passed - Medication is active on med list             "

## 2020-12-08 RX ORDER — FOLIC ACID 1 MG/1
TABLET ORAL
Qty: 30 TABLET | Refills: 0 | Status: SHIPPED | OUTPATIENT
Start: 2020-12-08 | End: 2021-02-18

## 2020-12-15 ENCOUNTER — TELEPHONE (OUTPATIENT)
Dept: FAMILY MEDICINE | Facility: CLINIC | Age: 85
End: 2020-12-15

## 2020-12-15 DIAGNOSIS — E11.9 TYPE 2 DIABETES MELLITUS WITHOUT COMPLICATION, WITHOUT LONG-TERM CURRENT USE OF INSULIN (H): Chronic | ICD-10-CM

## 2020-12-15 DIAGNOSIS — I10 HYPERTENSION GOAL BP (BLOOD PRESSURE) < 140/90: Chronic | ICD-10-CM

## 2020-12-15 RX ORDER — LOSARTAN POTASSIUM 100 MG/1
100 TABLET ORAL DAILY
Qty: 30 TABLET | Refills: 0 | Status: SHIPPED | OUTPATIENT
Start: 2020-12-15 | End: 2021-01-14

## 2020-12-15 RX ORDER — HYDROCHLOROTHIAZIDE 25 MG/1
25 TABLET ORAL DAILY
Qty: 30 TABLET | Refills: 0 | Status: SHIPPED | OUTPATIENT
Start: 2020-12-15 | End: 2021-01-14

## 2020-12-15 NOTE — TELEPHONE ENCOUNTER
Reason for Call:  Other prescription    Detailed comments: Neda from pharmacy and she is calling asking if patient is still taking losartan-hydrochlorothiazide (HYZAAR) 100-25 MG tablet   And or   hydrochlorothiazide (HYDRODIURIL) 25 MG tablet     They are trying to get all her medications to be filled at same times.     Neda from [facility] [phone]    Phone Number Patient can be reached at: Home number on file [phone] (home)    Best Time: any    Can we leave a detailed message on this number? YES    Call taken on 12/15/2020 at 11:33 AM by Lanette Pop

## 2020-12-15 NOTE — TELEPHONE ENCOUNTER
Pharmacy is reached and a 30 day is given for refills until virtual visit can be scheduled. Carline GARCIA RN

## 2020-12-23 ENCOUNTER — TELEPHONE (OUTPATIENT)
Dept: FAMILY MEDICINE | Facility: CLINIC | Age: 85
End: 2020-12-23
Payer: COMMERCIAL

## 2020-12-23 ENCOUNTER — TELEPHONE (OUTPATIENT)
Dept: FAMILY MEDICINE | Facility: CLINIC | Age: 85
End: 2020-12-23

## 2020-12-23 DIAGNOSIS — F32.1 MAJOR DEPRESSIVE DISORDER, SINGLE EPISODE, MODERATE (H): Chronic | ICD-10-CM

## 2020-12-23 DIAGNOSIS — E78.5 HYPERLIPIDEMIA LDL GOAL <130: Chronic | ICD-10-CM

## 2020-12-23 DIAGNOSIS — I10 HYPERTENSION GOAL BP (BLOOD PRESSURE) < 140/90: Chronic | ICD-10-CM

## 2020-12-23 RX ORDER — ATORVASTATIN CALCIUM 20 MG/1
TABLET, FILM COATED ORAL
Qty: 30 TABLET | Refills: 0 | Status: CANCELLED | OUTPATIENT
Start: 2020-12-23

## 2020-12-23 RX ORDER — ATENOLOL 50 MG/1
TABLET ORAL
Qty: 60 TABLET | Refills: 0 | Status: CANCELLED | OUTPATIENT
Start: 2020-12-23

## 2020-12-23 RX ORDER — AMLODIPINE BESYLATE 10 MG/1
TABLET ORAL
Qty: 30 TABLET | Refills: 0 | Status: CANCELLED | OUTPATIENT
Start: 2020-12-23

## 2020-12-23 RX ORDER — ESCITALOPRAM OXALATE 5 MG/1
TABLET ORAL
Qty: 60 TABLET | Refills: 0 | Status: CANCELLED | OUTPATIENT
Start: 2020-12-23

## 2020-12-23 NOTE — TELEPHONE ENCOUNTER
Reason for call:    Symptom or request:     Martina is calling on behalf of patient, she states that patient is hard of hearing, has not left the house since march, asking for a few months of refills ( although did not know meds) . Martina feels that virtual visits are not appropriate.  She reports patient has difficulty breathing, and hearing.      Martina, would like PCP to advise on appt. as pcp knows patient.     Pharmacy: [facility]      Best Time:  any    Can we leave a detailed message on this number?  YES     Olga MATTA  Station

## 2020-12-29 DIAGNOSIS — I10 HYPERTENSION GOAL BP (BLOOD PRESSURE) < 140/90: Chronic | ICD-10-CM

## 2020-12-29 DIAGNOSIS — E78.5 HYPERLIPIDEMIA LDL GOAL <130: Chronic | ICD-10-CM

## 2020-12-29 DIAGNOSIS — F32.1 MAJOR DEPRESSIVE DISORDER, SINGLE EPISODE, MODERATE (H): Chronic | ICD-10-CM

## 2020-12-29 NOTE — TELEPHONE ENCOUNTER
"Requested Prescriptions   Pending Prescriptions Disp Refills     amLODIPine (NORVASC) 10 MG tablet [Pharmacy Med Name: AMLODIPINE BESYLATE 10 MG T 10 Tablet] 30 tablet 0     Sig: TAKE ONE TABLET BY MOUTH ONCE DAILY. **PATIENT NEEDS TO SEE PRIMARY CARE PROVIDER FOR FURTHER REFILLS**       Calcium Channel Blockers Protocol  Failed - 12/29/2020  1:19 PM        Failed - Blood pressure under 140/90 in past 12 months     BP Readings from Last 3 Encounters:   09/10/20 (!) 166/81   12/30/19 134/70   10/16/19 136/88                 Failed - Normal serum creatinine on file in past 12 months     Recent Labs   Lab Test 10/16/19  1213   CR 0.88       Ok to refill medication if creatinine is low          Passed - Recent (12 mo) or future (30 days) visit within the authorizing provider's specialty     Patient has had an office visit with the authorizing provider or a provider within the authorizing providers department within the previous 12 mos or has a future within next 30 days. See \"Patient Info\" tab in inbasket, or \"Choose Columns\" in Meds & Orders section of the refill encounter.              Passed - Medication is active on med list        Passed - Patient is age 18 or older        Passed - No active pregnancy on record        Passed - No positive pregnancy test in past 12 months             "

## 2020-12-29 NOTE — TELEPHONE ENCOUNTER
"Requested Prescriptions   Pending Prescriptions Disp Refills     escitalopram (LEXAPRO) 5 MG tablet [Pharmacy Med Name: ESCITALOPRAM 5 MG TABLET 5 Tablet] 60 tablet 0     Sig: TAKE TWO TABLETS (10 MG) BY MOUTH DAILY       SSRIs Protocol Failed - 12/29/2020  1:20 PM        Failed - PHQ-9 score less than 5 in past 6 months     Please review last PHQ-9 score.           Passed - Medication is active on med list        Passed - Patient is age 18 or older        Passed - No active pregnancy on record        Passed - No positive pregnancy test in last 12 months        Passed - Recent (6 mo) or future (30 days) visit within the authorizing provider's specialty     Patient had office visit in the last 6 months or has a visit in the next 30 days with authorizing provider or within the authorizing provider's specialty.  See \"Patient Info\" tab in inbasket, or \"Choose Columns\" in Meds & Orders section of the refill encounter.               atorvastatin (LIPITOR) 20 MG tablet [Pharmacy Med Name: ATORVASTATIN 20 MG TABLET 20 Tablet] 30 tablet 0     Sig: TAKE ONE TABLET (20 MG) BY MOUTH DAILY. **NEED APPOINTMENT FOR FURTHER REFILLS**       Statins Protocol Failed - 12/29/2020  1:20 PM        Failed - LDL on file in past 12 months     Recent Labs   Lab Test 10/16/19  1213   LDL 83             Passed - No abnormal creatine kinase in past 12 months     Recent Labs   Lab Test 06/10/17  1440   CKT 80                Passed - Recent (12 mo) or future (30 days) visit within the authorizing provider's specialty     Patient has had an office visit with the authorizing provider or a provider within the authorizing providers department within the previous 12 mos or has a future within next 30 days. See \"Patient Info\" tab in inbasket, or \"Choose Columns\" in Meds & Orders section of the refill encounter.              Passed - Medication is active on med list        Passed - Patient is age 18 or older        Passed - No active pregnancy on record " "       Passed - No positive pregnancy test in past 12 months           atenolol (TENORMIN) 50 MG tablet [Pharmacy Med Name: ATENOLOL 50 MG TABS 50 Tablet] 60 tablet 0     Sig: TAKE ONE TABLET (50 MG) BY MOUTH TWO TIMES DAILY. **NEED APPOINTMENT FOR MORE REFILLS**       Beta-Blockers Protocol Failed - 12/29/2020  1:20 PM        Failed - Blood pressure under 140/90 in past 12 months     BP Readings from Last 3 Encounters:   09/10/20 (!) 166/81   12/30/19 134/70   10/16/19 136/88                 Passed - Patient is age 6 or older        Passed - Recent (12 mo) or future (30 days) visit within the authorizing provider's specialty     Patient has had an office visit with the authorizing provider or a provider within the authorizing providers department within the previous 12 mos or has a future within next 30 days. See \"Patient Info\" tab in inbasket, or \"Choose Columns\" in Meds & Orders section of the refill encounter.              Passed - Medication is active on med list             "

## 2021-01-05 RX ORDER — ATENOLOL 50 MG/1
TABLET ORAL
Qty: 60 TABLET | Refills: 0 | Status: SHIPPED | OUTPATIENT
Start: 2021-01-05 | End: 2021-01-14

## 2021-01-05 RX ORDER — ATORVASTATIN CALCIUM 20 MG/1
TABLET, FILM COATED ORAL
Qty: 30 TABLET | Refills: 0 | Status: SHIPPED | OUTPATIENT
Start: 2021-01-05 | End: 2021-01-14

## 2021-01-05 RX ORDER — ESCITALOPRAM OXALATE 5 MG/1
TABLET ORAL
Qty: 60 TABLET | Refills: 0 | Status: SHIPPED | OUTPATIENT
Start: 2021-01-05 | End: 2021-01-14

## 2021-01-05 RX ORDER — AMLODIPINE BESYLATE 10 MG/1
TABLET ORAL
Qty: 30 TABLET | Refills: 0 | Status: SHIPPED | OUTPATIENT
Start: 2021-01-05 | End: 2021-01-14

## 2021-01-11 DIAGNOSIS — J44.9 CHRONIC OBSTRUCTIVE PULMONARY DISEASE, UNSPECIFIED COPD TYPE (H): Chronic | ICD-10-CM

## 2021-01-11 NOTE — TELEPHONE ENCOUNTER
"Requested Prescriptions   Pending Prescriptions Disp Refills     albuterol (PROAIR HFA/PROVENTIL HFA/VENTOLIN HFA) 108 (90 Base) MCG/ACT inhaler [Pharmacy Med Name: ALBUTEROL SULFATE  ( 108 (90 BAS Aerosol] 8.5 g 2     Sig: INHALE TWO PUFFS INTO THE LUNGS EVERY FOUR HOURS AS NEEDED FOR SHORTNESS OF BREATH/DYSPNEA OR WHEEZING       Asthma Maintenance Inhalers - Anticholinergics Passed - 1/11/2021  1:20 PM        Passed - Patient is age 12 years or older        Passed - Recent (12 mo) or future (30 days) visit within the authorizing provider's specialty     Patient has had an office visit with the authorizing provider or a provider within the authorizing providers department within the previous 12 mos or has a future within next 30 days. See \"Patient Info\" tab in inbasket, or \"Choose Columns\" in Meds & Orders section of the refill encounter.              Passed - Medication is active on med list       Short-Acting Beta Agonist Inhalers Protocol  Passed - 1/11/2021  1:20 PM        Passed - Patient is age 12 or older        Passed - Recent (12 mo) or future (30 days) visit within the authorizing provider's specialty     Patient has had an office visit with the authorizing provider or a provider within the authorizing providers department within the previous 12 mos or has a future within next 30 days. See \"Patient Info\" tab in inbasket, or \"Choose Columns\" in Meds & Orders section of the refill encounter.              Passed - Medication is active on med list             "

## 2021-01-14 ENCOUNTER — OFFICE VISIT (OUTPATIENT)
Dept: FAMILY MEDICINE | Facility: CLINIC | Age: 86
End: 2021-01-14
Payer: COMMERCIAL

## 2021-01-14 VITALS
RESPIRATION RATE: 17 BRPM | SYSTOLIC BLOOD PRESSURE: 128 MMHG | TEMPERATURE: 98.4 F | BODY MASS INDEX: 42.15 KG/M2 | HEIGHT: 65 IN | WEIGHT: 253 LBS | HEART RATE: 55 BPM | OXYGEN SATURATION: 97 % | DIASTOLIC BLOOD PRESSURE: 62 MMHG

## 2021-01-14 DIAGNOSIS — R53.81 PHYSICAL DECONDITIONING: ICD-10-CM

## 2021-01-14 DIAGNOSIS — I10 HYPERTENSION GOAL BP (BLOOD PRESSURE) < 140/90: Chronic | ICD-10-CM

## 2021-01-14 DIAGNOSIS — R60.0 PERIPHERAL EDEMA: ICD-10-CM

## 2021-01-14 DIAGNOSIS — J96.21 ACUTE ON CHRONIC RESPIRATORY FAILURE WITH HYPOXIA (H): Primary | ICD-10-CM

## 2021-01-14 DIAGNOSIS — J44.9 CHRONIC OBSTRUCTIVE PULMONARY DISEASE, UNSPECIFIED COPD TYPE (H): Chronic | ICD-10-CM

## 2021-01-14 DIAGNOSIS — N18.32 STAGE 3B CHRONIC KIDNEY DISEASE (H): ICD-10-CM

## 2021-01-14 DIAGNOSIS — J84.10 INTERSTITIAL PULMONARY FIBROSIS (H): Chronic | ICD-10-CM

## 2021-01-14 DIAGNOSIS — Z91.81 RISK FOR FALLS: ICD-10-CM

## 2021-01-14 DIAGNOSIS — R33.9 URINARY RETENTION: ICD-10-CM

## 2021-01-14 DIAGNOSIS — R82.90 NONSPECIFIC FINDING ON EXAMINATION OF URINE: ICD-10-CM

## 2021-01-14 DIAGNOSIS — J44.1 CHRONIC OBSTRUCTIVE PULMONARY DISEASE WITH ACUTE EXACERBATION (H): ICD-10-CM

## 2021-01-14 DIAGNOSIS — E11.9 TYPE 2 DIABETES MELLITUS WITHOUT COMPLICATION, WITHOUT LONG-TERM CURRENT USE OF INSULIN (H): Chronic | ICD-10-CM

## 2021-01-14 DIAGNOSIS — E78.5 HYPERLIPIDEMIA LDL GOAL <130: Chronic | ICD-10-CM

## 2021-01-14 DIAGNOSIS — L85.3 DRY SKIN: ICD-10-CM

## 2021-01-14 DIAGNOSIS — J96.11 CHRONIC RESPIRATORY FAILURE WITH HYPOXIA (H): ICD-10-CM

## 2021-01-14 DIAGNOSIS — F32.1 MAJOR DEPRESSIVE DISORDER, SINGLE EPISODE, MODERATE (H): Chronic | ICD-10-CM

## 2021-01-14 DIAGNOSIS — N39.0 URINARY TRACT INFECTION WITHOUT HEMATURIA, SITE UNSPECIFIED: ICD-10-CM

## 2021-01-14 DIAGNOSIS — E87.6 HYPOKALEMIA: ICD-10-CM

## 2021-01-14 DIAGNOSIS — E66.01 MORBID OBESITY (H): ICD-10-CM

## 2021-01-14 LAB
ALBUMIN SERPL-MCNC: 3.4 G/DL (ref 3.4–5)
ALBUMIN UR-MCNC: NEGATIVE MG/DL
ALP SERPL-CCNC: 147 U/L (ref 40–150)
ALT SERPL W P-5'-P-CCNC: 37 U/L (ref 0–50)
ANION GAP SERPL CALCULATED.3IONS-SCNC: 4 MMOL/L (ref 3–14)
APPEARANCE UR: ABNORMAL
AST SERPL W P-5'-P-CCNC: 29 U/L (ref 0–45)
BACTERIA #/AREA URNS HPF: ABNORMAL /HPF
BILIRUB SERPL-MCNC: 0.4 MG/DL (ref 0.2–1.3)
BILIRUB UR QL STRIP: NEGATIVE
BUN SERPL-MCNC: 18 MG/DL (ref 7–30)
CALCIUM SERPL-MCNC: 9.3 MG/DL (ref 8.5–10.1)
CHLORIDE SERPL-SCNC: 102 MMOL/L (ref 94–109)
CHOLEST SERPL-MCNC: 210 MG/DL
CO2 SERPL-SCNC: 32 MMOL/L (ref 20–32)
COLOR UR AUTO: YELLOW
CREAT SERPL-MCNC: 0.92 MG/DL (ref 0.52–1.04)
CREAT UR-MCNC: 132 MG/DL
ERYTHROCYTE [DISTWIDTH] IN BLOOD BY AUTOMATED COUNT: 12.3 % (ref 10–15)
GFR SERPL CREATININE-BSD FRML MDRD: 55 ML/MIN/{1.73_M2}
GLUCOSE SERPL-MCNC: 100 MG/DL (ref 70–99)
GLUCOSE UR STRIP-MCNC: NEGATIVE MG/DL
HBA1C MFR BLD: 5.3 % (ref 0–5.6)
HCT VFR BLD AUTO: 41.8 % (ref 35–47)
HDLC SERPL-MCNC: 73 MG/DL
HGB BLD-MCNC: 13.6 G/DL (ref 11.7–15.7)
HGB UR QL STRIP: NEGATIVE
KETONES UR STRIP-MCNC: NEGATIVE MG/DL
LDLC SERPL CALC-MCNC: 93 MG/DL
LEUKOCYTE ESTERASE UR QL STRIP: ABNORMAL
MCH RBC QN AUTO: 32.7 PG (ref 26.5–33)
MCHC RBC AUTO-ENTMCNC: 32.5 G/DL (ref 31.5–36.5)
MCV RBC AUTO: 101 FL (ref 78–100)
MICROALBUMIN UR-MCNC: 17 MG/L
MICROALBUMIN/CREAT UR: 12.73 MG/G CR (ref 0–25)
NITRATE UR QL: POSITIVE
NON-SQ EPI CELLS #/AREA URNS LPF: ABNORMAL /LPF
NONHDLC SERPL-MCNC: 137 MG/DL
PH UR STRIP: 6 PH (ref 5–7)
PLATELET # BLD AUTO: 246 10E9/L (ref 150–450)
POTASSIUM SERPL-SCNC: 4 MMOL/L (ref 3.4–5.3)
PROT SERPL-MCNC: 7.3 G/DL (ref 6.8–8.8)
RBC # BLD AUTO: 4.16 10E12/L (ref 3.8–5.2)
RBC #/AREA URNS AUTO: ABNORMAL /HPF
SODIUM SERPL-SCNC: 138 MMOL/L (ref 133–144)
SOURCE: ABNORMAL
SP GR UR STRIP: 1.02 (ref 1–1.03)
TRIGL SERPL-MCNC: 222 MG/DL
UROBILINOGEN UR STRIP-ACNC: 0.2 EU/DL (ref 0.2–1)
WBC # BLD AUTO: 8.3 10E9/L (ref 4–11)
WBC #/AREA URNS AUTO: ABNORMAL /HPF

## 2021-01-14 PROCEDURE — 87186 SC STD MICRODIL/AGAR DIL: CPT | Performed by: NURSE PRACTITIONER

## 2021-01-14 PROCEDURE — 99215 OFFICE O/P EST HI 40 MIN: CPT | Performed by: NURSE PRACTITIONER

## 2021-01-14 PROCEDURE — 81001 URINALYSIS AUTO W/SCOPE: CPT | Performed by: NURSE PRACTITIONER

## 2021-01-14 PROCEDURE — 87088 URINE BACTERIA CULTURE: CPT | Performed by: NURSE PRACTITIONER

## 2021-01-14 PROCEDURE — 80061 LIPID PANEL: CPT | Performed by: NURSE PRACTITIONER

## 2021-01-14 PROCEDURE — 80053 COMPREHEN METABOLIC PANEL: CPT | Performed by: NURSE PRACTITIONER

## 2021-01-14 PROCEDURE — 85027 COMPLETE CBC AUTOMATED: CPT | Performed by: NURSE PRACTITIONER

## 2021-01-14 PROCEDURE — 99417 PROLNG OP E/M EACH 15 MIN: CPT | Performed by: NURSE PRACTITIONER

## 2021-01-14 PROCEDURE — 87086 URINE CULTURE/COLONY COUNT: CPT | Performed by: NURSE PRACTITIONER

## 2021-01-14 PROCEDURE — 36415 COLL VENOUS BLD VENIPUNCTURE: CPT | Performed by: NURSE PRACTITIONER

## 2021-01-14 PROCEDURE — 83036 HEMOGLOBIN GLYCOSYLATED A1C: CPT | Performed by: NURSE PRACTITIONER

## 2021-01-14 PROCEDURE — 82043 UR ALBUMIN QUANTITATIVE: CPT | Performed by: NURSE PRACTITIONER

## 2021-01-14 RX ORDER — ALBUTEROL SULFATE 90 UG/1
AEROSOL, METERED RESPIRATORY (INHALATION)
Qty: 8.5 G | Refills: 2
Start: 2021-01-14

## 2021-01-14 RX ORDER — LOSARTAN POTASSIUM 100 MG/1
100 TABLET ORAL DAILY
Qty: 30 TABLET | Refills: 0 | Status: SHIPPED | OUTPATIENT
Start: 2021-01-14 | End: 2021-03-11

## 2021-01-14 RX ORDER — AMLODIPINE BESYLATE 10 MG/1
10 TABLET ORAL DAILY
Qty: 90 TABLET | Refills: 1 | Status: SHIPPED | OUTPATIENT
Start: 2021-01-14 | End: 2021-05-11

## 2021-01-14 RX ORDER — TIOTROPIUM BROMIDE 18 UG/1
CAPSULE ORAL; RESPIRATORY (INHALATION)
Qty: 90 CAPSULE | Refills: 3 | Status: SHIPPED | OUTPATIENT
Start: 2021-01-14 | End: 2021-05-11

## 2021-01-14 RX ORDER — BUDESONIDE AND FORMOTEROL FUMARATE DIHYDRATE 160; 4.5 UG/1; UG/1
AEROSOL RESPIRATORY (INHALATION)
Qty: 10.2 G | Refills: 5 | Status: SHIPPED | OUTPATIENT
Start: 2021-01-14 | End: 2021-06-18

## 2021-01-14 RX ORDER — POTASSIUM CHLORIDE 750 MG/1
20 TABLET, EXTENDED RELEASE ORAL DAILY
Qty: 60 TABLET | Refills: 6 | Status: SHIPPED | OUTPATIENT
Start: 2021-01-14 | End: 2021-05-11

## 2021-01-14 RX ORDER — PREDNISONE 20 MG/1
40 TABLET ORAL DAILY
Qty: 10 TABLET | Refills: 0 | Status: CANCELLED | OUTPATIENT
Start: 2021-01-14 | End: 2021-01-19

## 2021-01-14 RX ORDER — ATORVASTATIN CALCIUM 20 MG/1
20 TABLET, FILM COATED ORAL DAILY
Qty: 90 TABLET | Refills: 3 | Status: SHIPPED | OUTPATIENT
Start: 2021-01-14 | End: 2021-12-29

## 2021-01-14 RX ORDER — FUROSEMIDE 20 MG
20 TABLET ORAL DAILY
Qty: 30 TABLET | Refills: 0 | Status: SHIPPED | OUTPATIENT
Start: 2021-01-14 | End: 2021-03-11

## 2021-01-14 RX ORDER — ATENOLOL 50 MG/1
50 TABLET ORAL 2 TIMES DAILY
Qty: 180 TABLET | Refills: 1 | Status: SHIPPED | OUTPATIENT
Start: 2021-01-14 | End: 2021-05-11

## 2021-01-14 RX ORDER — ESCITALOPRAM OXALATE 5 MG/1
10 TABLET ORAL DAILY
Qty: 60 TABLET | Refills: 3 | Status: SHIPPED | OUTPATIENT
Start: 2021-01-14 | End: 2021-05-11

## 2021-01-14 RX ORDER — HYDROCHLOROTHIAZIDE 25 MG/1
25 TABLET ORAL DAILY
Qty: 90 TABLET | Refills: 3 | Status: SHIPPED | OUTPATIENT
Start: 2021-01-14 | End: 2021-05-11

## 2021-01-14 RX ORDER — ALBUTEROL SULFATE 90 UG/1
AEROSOL, METERED RESPIRATORY (INHALATION)
Qty: 8.5 G | Refills: 2 | Status: SHIPPED | OUTPATIENT
Start: 2021-01-14 | End: 2021-05-11

## 2021-01-14 ASSESSMENT — MIFFLIN-ST. JEOR: SCORE: 1573.48

## 2021-01-14 NOTE — PROGRESS NOTES
Patient presents to clinic with significant and audible Shortness of breath, SpO2: 73% on room air. Patient did not bring home oxygen. This writer placed Oxygen via nasal cannula on patient at 4L / Min, after about 2 minutes SpO2% increased to 97% on 4 L/Min, P : 59. Audible breathes decreased with placement of oxygen.   Patient is accompanied by caregivers Martina and Trina.   Provider informed.     Sharda Terry RN

## 2021-01-14 NOTE — PATIENT INSTRUCTIONS
Patient Education     Discharge Instructions: COPD  You have been diagnosed with chronic obstructive pulmonary disease (COPD). This is a name given to a group of diseases that limit the flow of air in and out of your lungs. This makes it harder to breathe. With COPD, you are also more likely to get lung infections. COPD includes chronic bronchitis and emphysema. COPD is most often caused by heavy, long-term cigarette smoking.  Home care  Quit smoking    If you smoke, get help to quit. It's the best thing you can do for your COPD and your overall health.    Join a stop-smoking program. There are even telephone, text message, and online programs to help you quit.    Ask your healthcare provider about medicines or other methods to help you quit.    Ask family members to quit smoking as well.    Don't allow people to smoke in your home, in your car, or when they are around you.    Don't use e-cigarettes because they have harmful side effects.  Protect yourself from infection    Wash your hands often. Do your best to keep your hands away from your face. Most germs are spread from your hands to your mouth.    Get a flu shot every year. Also ask your provider about pneumonia vaccines.    Stay away from crowds. It's especially important to do this in the winter when more people have colds and flu.    To stay healthy, get enough sleep, exercise regularly, and eat a balanced diet. You should:  ? Get about 8 hours of sleep every night.  ? Try to exercise for at least 30 minutes on most days.  ? Have healthy foods including fruits and vegetables, 100% whole grains, lean meats and fish, and low-fat dairy products. Try to stay away from foods high in fats and sugar.  Take your medicines and oxygen therapy  Take your medicines exactly as directed. Don't skip doses.  During each appointment, talk with your healthcare provider about your ability to:    Correctly use inhaler techniques    [place] with other conditions you have and  their treatments and if they affect your COPD  If you use oxygen, use it correctly. That means the amount you use and the length of time you use it.     Discuss long-term oxygen therapy with your provider.    Don t allow smoking in your home, in your car, or around you. This is very important if you use oxygen.    Try to stay away from things that may affect your breathing. This includes cold weather, high humidity, smoke, air pollution, dust, and allergens    Unless your provider has told you otherwise, drink at least 8 glasses of fluid every day to keep mucus thin. Ask about other things that can help.    Ask your provider to show you pursed-lip breathing to help decrease shortness of breath.  Manage your stress  Stress can make COPD worse. Use this stress management method:    Find a quiet place and sit or lie in a comfortable position.    Close your eyes and do breathing exercises for several minutes. Ask your provider about the best way to breathe.Talk to your healthcare provider about your ability to cope in your normal environment.  Pulmonary rehabilitation    Pulmonary rehab can help you feel better. These programs include exercise, breathing methods, information about COPD, counseling, and help for smokers.    Ask your provider or your local hospital about programs in your area. Also talk to your healthcare provider about a self-management program to help control your symptoms.    When to call your healthcare provider  Call your provider right away if you have any of the following:    More mucus    Yellow, green, bloody, or smelly mucus    Fever or chills    Swollen ankles  Call 911  Call 911 if you have:    Shortness of breath, wheezing, or trouble breathing that doesn't improve with treatment    Tightness in your chest that does not go away with your normal medicines    An irregular heartbeat or feeling that your heart is racing    Trouble talking    Feeling of lightheadedness or dizziness    Feeling of  doom    Skin turning blue, gray, or purple in color  [place] last reviewed this educational content on 10/1/2018    1320-2942 [employer]. [address] [place] [address], [place] [address]. All rights reserved. This information is not intended as a substitute for professional medical care. Always follow your healthcare professional's instructions.           Patient Education     Discharge Instructions: Controlled Breathing for COPD   When you have lung problems, you may find it harder to take deep breaths. Learning to use controlled breathing can help you get more air into and out of your lungs. This will help you with shortness of breath. Belly breathing (diaphragmatic breathing) helps you breathe with your diaphragm. The diaphragm is a large muscle that plays an important part in breathing. It is located below your lungs. It separates your chest from your belly (abdomen).   Home care   Follow these steps to use controlled breathing:    Sit in a comfortable chair. Or lie on your back with a pillow under your head with your knees bent.    Relax the muscles in your neck and shoulders.    Place one hand on your stomach. Place the other hand on your upper chest.    Breathe in (inhale) slowly through your nose as deeply as you can. As you inhale, your stomach should move out against your hand. Your chest should stay still.    Breathe out (exhale) slowly with your lips together (called pursed lips). You should exhale 2 to 4 times longer than your inhale. You should feel your stomach muscles move in.    Repeat the above steps until you feel relaxed. Or until you no longer feel short of breath.    Follow-up care   Follow up with your healthcare provider, or as advised.   When to call the healthcare provider   Call your healthcare provider right away if you have any of the following:     Shortness of breath that is not eased by controlled breathing exercises or by your medicine    Wheezing or coughing    More  mucus    Yellow, green, bloody, or smelly mucus    Fever or chills    Tightness in your chest that does not go away with your normal medicines    Irregular heartbeat    Swollen ankles    Trouble doing your normal activities  [place] last reviewed this educational content on 8/1/2018 2000-2020 [employer] [place]. [address] [place] [address], [place] [address]. All rights reserved. This information is not intended as a substitute for professional medical care. Always follow your healthcare professional's instructions.

## 2021-01-14 NOTE — PROGRESS NOTES
Assessment & Plan     Acute on chronic respiratory failure with hypoxia (H)  Upon arrival without oxygen - patients home aid and friend attended appointment and reported left tank in car and was inoperable.  RN notified and attempted to find another tank for patient to take home with her.  Attempted to wean off oxygen while here so she could be transported home - but within 7 minutes on RA - patient's O2 was at 86%.  Discussed we can not let patient discharge without portable tank.  Patient's friend/aid brought in tank and tank was operable.  Placed on 2-3 liters and assisted to car via wheelchair.      Addendum: Patient had fall in the bathroom while attempting to leave a urine for lab.  Patient had [facility] called and assessed.  Offered Ed visit but patient declined and signed to leave against medical advise.  Patient became dyspneic with the episode sats in the 80's but was back in the 90's after 5-6 minutes.  Attempted to use patient's portable tank but patient tank was not working properly Discussed that I would recommend admission to ED for this but patient declined.  Discussed risks including death with hypoxia which patient stated understanding.  Wheeled patient to friend's car and assisted to car at 5:10 pm.  Car coordination and Sw referral placed to assess home needs and ? Vulnerable adult status.    Chronic obstructive pulmonary disease, unspecified COPD type (H)   No exacerbation but ? Worsening/progression of disease.  Continue current therapy.  Monitor for infection or new symptoms.    - albuterol (PROAIR HFA/PROVENTIL HFA/VENTOLIN HFA) 108 (90 Base) MCG/ACT inhaler; INHALE TWO PUFFS INTO THE LUNGS EVERY FOUR HOURS AS NEEDED FOR SHORTNESS OF BREATH/DYSPNEA OR WHEEZING  - budesonide-formoterol (SYMBICORT) 160-4.5 MCG/ACT Inhaler; INHALE TWO PUFFS BY MOUTH 2 TIMES DAILY  - tiotropium (SPIRIVA HANDIHALER) 18 MCG inhaled capsule; INHALE THE CONTENTS OF ONE CAPSULE BY MOUTH DAILY  - furosemide  (LASIX) 20 MG tablet; Take 1 tablet (20 mg) by mouth daily  - Comprehensive metabolic panel (BMP + Alb, Alk Phos, ALT, AST, Total. Bili, TP)  - CBC with platelets    Type 2 diabetes mellitus without complication, without long-term current use of insulin (H)   Check labs.    - hydrochlorothiazide (HYDRODIURIL) 25 MG tablet; Take 1 tablet (25 mg) by mouth daily  - losartan (COZAAR) 100 MG tablet; Take 1 tablet (100 mg) by mouth daily  - furosemide (LASIX) 20 MG tablet; Take 1 tablet (20 mg) by mouth daily  - Lipid panel reflex to direct LDL Fasting  - Hemoglobin A1c  - Comprehensive metabolic panel (BMP + Alb, Alk Phos, ALT, AST, Total. Bili, TP)  - CBC with platelets  - Albumin Random Urine Quantitative with Creat Ratio    Chronic obstructive pulmonary disease with acute exacerbation (H)     - Comprehensive metabolic panel (BMP + Alb, Alk Phos, ALT, AST, Total. Bili, TP)  - CBC with platelets    Interstitial pulmonary fibrosis (H)  Worsening/progression. Continue Albuterol.  Continue continuous oxygen.    Morbid obesity (H)  Encouraged weight loss     Hypertension goal BP (blood pressure) < 140/90   Controlled.    - amLODIPine (NORVASC) 10 MG tablet; Take 1 tablet (10 mg) by mouth daily TAKE ONE TABLET BY MOUTH ONCE DAILY.  - atenolol (TENORMIN) 50 MG tablet; Take 1 tablet (50 mg) by mouth 2 times daily  - hydrochlorothiazide (HYDRODIURIL) 25 MG tablet; Take 1 tablet (25 mg) by mouth daily  - losartan (COZAAR) 100 MG tablet; Take 1 tablet (100 mg) by mouth daily    Hyperlipidemia LDL goal <130     - atorvastatin (LIPITOR) 20 MG tablet; Take 1 tablet (20 mg) by mouth daily    Major depressive disorder, single episode, moderate (H)  Stable.    - escitalopram (LEXAPRO) 5 MG tablet; Take 2 tablets (10 mg) by mouth daily    Hypokalemia   Resume with use of furosemide.    - potassium chloride ER (KLOR-CON M) 10 MEQ CR tablet; Take 2 tablets (20 mEq) by mouth daily    Chronic respiratory failure with hypoxia (H)    "    Physical deconditioning       Stage 3b chronic kidney disease       Risk for falls   Discussed this and use of safety measures at home. Has equipment but not using at home per patient.    Dry skin     - camphor-menthol (DERMASARRA) 0.5-0.5 % external lotion; Apply 1 mL topically every 8 hours as needed for skin care    Urinary retention     - *UA reflex to Microscopic and Culture ([facility] (except [place] and [place])    Peripheral edema   Suspect due to sedentary lifestyle and weight gain.    - furosemide (LASIX) 20 MG tablet; Take 1 tablet (20 mg) by mouth daily  - Comprehensive metabolic panel (BMP + Alb, Alk Phos, ALT, AST, Total. Bili, TP)              80 minutes spent on the date of the encounter doing chart review, history and exam, documentation and further activities as noted above          BMI:   Estimated body mass index is 42.1 kg/m  as calculated from the following:    Height as of this encounter: 1.651 m (5' 5\").    Weight as of this encounter: 114.8 kg (253 lb).   Weight management plan: Discussed healthy diet and exercise guidelines      Patient Instructions     Patient Education     Discharge Instructions: COPD  You have been diagnosed with chronic obstructive pulmonary disease (COPD). This is a name given to a group of diseases that limit the flow of air in and out of your lungs. This makes it harder to breathe. With COPD, you are also more likely to get lung infections. COPD includes chronic bronchitis and emphysema. COPD is most often caused by heavy, long-term cigarette smoking.  Home care  Quit smoking    If you smoke, get help to quit. It's the best thing you can do for your COPD and your overall health.    Join a stop-smoking program. There are even telephone, text message, and online programs to help you quit.    Ask your healthcare provider about medicines or other methods to help you quit.    Ask family members to quit smoking as well.    Don't allow people to " smoke in your home, in your car, or when they are around you.    Don't use e-cigarettes because they have harmful side effects.  Protect yourself from infection    Wash your hands often. Do your best to keep your hands away from your face. Most germs are spread from your hands to your mouth.    Get a flu shot every year. Also ask your provider about pneumonia vaccines.    Stay away from crowds. It's especially important to do this in the winter when more people have colds and flu.    To stay healthy, get enough sleep, exercise regularly, and eat a balanced diet. You should:  ? Get about 8 hours of sleep every night.  ? Try to exercise for at least 30 minutes on most days.  ? Have healthy foods including fruits and vegetables, 100% whole grains, lean meats and fish, and low-fat dairy products. Try to stay away from foods high in fats and sugar.  Take your medicines and oxygen therapy  Take your medicines exactly as directed. Don't skip doses.  During each appointment, talk with your healthcare provider about your ability to:    Correctly use inhaler techniques    [place] with other conditions you have and their treatments and if they affect your COPD  If you use oxygen, use it correctly. That means the amount you use and the length of time you use it.     Discuss long-term oxygen therapy with your provider.    Don t allow smoking in your home, in your car, or around you. This is very important if you use oxygen.    Try to stay away from things that may affect your breathing. This includes cold weather, high humidity, smoke, air pollution, dust, and allergens    Unless your provider has told you otherwise, drink at least 8 glasses of fluid every day to keep mucus thin. Ask about other things that can help.    Ask your provider to show you pursed-lip breathing to help decrease shortness of breath.  Manage your stress  Stress can make COPD worse. Use this stress management method:    Find a quiet place and sit or lie in a  comfortable position.    Close your eyes and do breathing exercises for several minutes. Ask your provider about the best way to breathe.Talk to your healthcare provider about your ability to cope in your normal environment.  Pulmonary rehabilitation    Pulmonary rehab can help you feel better. These programs include exercise, breathing methods, information about COPD, counseling, and help for smokers.    Ask your provider or your local hospital about programs in your area. Also talk to your healthcare provider about a self-management program to help control your symptoms.    When to call your healthcare provider  Call your provider right away if you have any of the following:    More mucus    Yellow, green, bloody, or smelly mucus    Fever or chills    Swollen ankles  Call 911  Call 911 if you have:    Shortness of breath, wheezing, or trouble breathing that doesn't improve with treatment    Tightness in your chest that does not go away with your normal medicines    An irregular heartbeat or feeling that your heart is racing    Trouble talking    Feeling of lightheadedness or dizziness    Feeling of doom    Skin turning blue, gray, or purple in color  [employer] last reviewed this educational content on 10/1/2018    5649-6021 [employer]. [address] [place] [address]. All rights reserved. This information is not intended as a substitute for professional medical care. Always follow your healthcare professional's instructions.           Patient Education     Discharge Instructions: Controlled Breathing for COPD   When you have lung problems, you may find it harder to take deep breaths. Learning to use controlled breathing can help you get more air into and out of your lungs. This will help you with shortness of breath. Belly breathing (diaphragmatic breathing) helps you breathe with your diaphragm. The diaphragm is a large muscle that plays an important part in breathing. It is located below  your lungs. It separates your chest from your belly (abdomen).   Home care   Follow these steps to use controlled breathing:    Sit in a comfortable chair. Or lie on your back with a pillow under your head with your knees bent.    Relax the muscles in your neck and shoulders.    Place one hand on your stomach. Place the other hand on your upper chest.    Breathe in (inhale) slowly through your nose as deeply as you can. As you inhale, your stomach should move out against your hand. Your chest should stay still.    Breathe out (exhale) slowly with your lips together (called pursed lips). You should exhale 2 to 4 times longer than your inhale. You should feel your stomach muscles move in.    Repeat the above steps until you feel relaxed. Or until you no longer feel short of breath.    Follow-up care   Follow up with your healthcare provider, or as advised.   When to call the healthcare provider   Call your healthcare provider right away if you have any of the following:     Shortness of breath that is not eased by controlled breathing exercises or by your medicine    Wheezing or coughing    More mucus    Yellow, green, bloody, or smelly mucus    Fever or chills    Tightness in your chest that does not go away with your normal medicines    Irregular heartbeat    Swollen ankles    Trouble doing your normal activities  [employer] last reviewed this educational content on 8/1/2018 2000-2020 [employer]. [address] [place], [place] [address]. All rights reserved. This information is not intended as a substitute for professional medical care. Always follow your healthcare professional's instructions.               Return in about 3 months (around 4/14/2021) for Recheck symptoms, Medication Follow up.    Ema Melendez NP  [facility] Allen is a 89 year old who presents to clinic today for the following health issues     HPI     Would like all scripts sent to  [facility].     Hyperlipidemia Follow-Up      Are you regularly taking any medication or supplement to lower your cholesterol?   Yes- atorvastatin    Are you having muscle aches or other side effects that you think could be caused by your cholesterol lowering medication?  No    Hypertension Follow-up      Do you check your blood pressure regularly outside of the clinic? No     Are you following a low salt diet? No    Are your blood pressures ever more than 140 on the top number (systolic) OR more   than 90 on the bottom number (diastolic), for example 140/90? Yes    Depression Followup    How are you doing with your depression since your last visit? No change    Are you having other symptoms that might be associated with depression? No    Have you had a significant life event?  No     Are you feeling anxious or having panic attacks?   No    Do you have any concerns with your use of alcohol or other drugs? No    Social History     Tobacco Use     Smoking status: Former Smoker     Packs/day: 1.00     Years: 35.00     Pack years: 35.00     Quit date: 1990     Years since quittin.0     Smokeless tobacco: Former User   Substance Use Topics     Alcohol use: Yes     Comment: Drink 1 (3oz) drink a day     Drug use: No     PHQ 2018   PHQ-9 Total Score 11 17 21   Q9: Thoughts of better off dead/self-harm past 2 weeks Several days Several days Several days     MELISSA-7 SCORE 2017   Total Score 1 1 0           Suicide Assessment Five-step Evaluation and Treatment (SAFE-T)    COPD Follow-Up    Overall, how are your COPD symptoms since your last clinic visit?  Much worse    How much fatigue or shortness of breath do you have when you are walking?  A lot more than usual    How much shortness of breath do you have when you are resting?  Same as usual    How often do you cough? Often every day. After she eats.     Have you noticed any change in your sputum/phlegm?   "No    Have you experienced a recent fever? No    Please describe how far you can walk without stopping to rest:  Less than 10 feet    How many flights of stairs are you able to walk up without stopping?  None    Have you had any Emergency Room Visits, Urgent Care Visits, or Hospital Admissions because of your COPD since your last office visit?  No    Arrived today without oxygen on RA - and with exertion was moderately distressed.    At home wears 4-6 liters continuous.  Dyspneic with any exertion.  Does very little activity per patient.    Sits in recliner and goes to bathroom and back.    Has home aid who is here today with her at this visit along with friend.  History   Smoking Status     Former Smoker     Packs/day: 1.00     Years: 35.00     Quit date: 1/1/1990   Smokeless Tobacco     Former User     Lab Results   Component Value Date    FEV1 1.31 10/07/2013    RPL5ZQM 44% 10/07/2013       Chronic Kidney Disease Follow-up      Do you take any over the counter pain medicine?: No      How many servings of fruits and vegetables do you eat daily?  2-3    On average, how many sweetened beverages do you drink each day (Examples: soda, juice, sweet tea, etc.  Do NOT count diet or artificially sweetened beverages)?   0    How many days per week do you exercise enough to make your heart beat faster? 3 or less    How many minutes a day do you exercise enough to make your heart beat faster? 9 or less    How many days per week do you miss taking your medication? 0    States that her feet and legs are swelling - had to wear someone's shoes today because hers do not fit. She also reports that her legs are \"peeling\"       Review of Systems   Constitutional, HEENT, cardiovascular, pulmonary, GI, , musculoskeletal, neuro, skin, endocrine and psych systems are negative, except as otherwise noted.      Objective    /62 (BP Location: Right arm, Patient Position: Sitting, Cuff Size: Adult Large)   Pulse 55   Temp 98.4  F " "(36.9  C) (Tympanic)   Resp 17   Ht 1.651 m (5' 5\")   Wt 114.8 kg (253 lb)   SpO2 97%   BMI 42.10 kg/m    Body mass index is 42.1 kg/m .   Oxygen was at 72% on arrival and patient was dyspneic - on RA.  Did not have oxygen.  Placed on 4 liters NC and oxygen eventually after 5-6 minutes improved to > 90% and RR was at 22-24 down from 30's.    Physical Exam   GENERAL: alert, moderate to severe distress on arrival - improved after placing on oxygen here in clinic and obese  EYES: Eyes grossly normal to inspection, PERRL and conjunctivae and sclerae normal  HENT: ear canals and TM's normal, nose and mouth without ulcers or lesions  NECK: no adenopathy, no asymmetry, masses, or scars and thyroid normal to palpation  RESP: no rales , no rhonchi, no wheezes and decreased breath sounds throughout, O2 on 4 liters NC 98%.  RR 18-22.  CV: regular rates and rhythm, normal S1 S2, no S3 or S4, no murmur, click or rub, decreased pulse 1 and 2+ bilateral lower extremity pitting edema to shin    ABDOMEN: soft, nontender, no hepatosplenomegaly, no masses and bowel sounds normal  MS: extremities normal- no gross deformities noted  SKIN: no suspicious lesions or rashes, bilateral lower legs with scaly and peeling skin.  No open sores noted.  NEURO: Normal strength and tone, mentation intact and speech normal  PSYCH: mentation appears normal, affect normal/bright            "

## 2021-01-14 NOTE — LETTER
"January 15, 2021      Susan Haq  C/O HARDY ENNIS  [address]  [place] [address]        Dear MsDeedee,        We are writing to inform you of your test results.    \"All of your lab results are normal.   Awaiting urine culture.\"   We will contact you of the culture results when completed.    Resulted Orders   Lipid panel reflex to direct LDL Fasting   Result Value Ref [facility]    Cholesterol 210 (H) <200 mg/dL      Comment:      Desirable:       <200 mg/dl    Triglycerides 222 (H) <150 mg/dL      Comment:      Borderline high:  150-199 mg/dl  High:             200-499 mg/dl  Very high:       >499 mg/dl      HDL Cholesterol 73 >49 mg/dL    LDL Cholesterol Calculated 93 <100 mg/dL      Comment:      Desirable:       <100 mg/dl    Non HDL Cholesterol 137 (H) <130 mg/dL      Comment:      Above Desirable:  130-159 mg/dl  Borderline high:  160-189 mg/dl  High:             190-219 mg/dl  Very high:       >219 mg/dl     Hemoglobin A1c   Result Value Ref [facility]    Hemoglobin A1C 5.3 0 - 5.6 %      Comment:      Normal <5.7% Prediabetes 5.7-6.4%  Diabetes 6.5% or higher - adopted from ADA   consensus guidelines.     Comprehensive metabolic panel (BMP + Alb, Alk Phos, ALT, AST, Total. Bili, TP)   Result Value Ref [facility]    Sodium 138 133 - 144 mmol/L    Potassium 4.0 3.4 - 5.3 mmol/L    Chloride 102 94 - 109 mmol/L    Carbon Dioxide 32 20 - 32 mmol/L    Anion Gap 4 3 - 14 mmol/L    Glucose 100 (H) 70 - 99 mg/dL    Urea Nitrogen 18 7 - 30 mg/dL    Creatinine 0.92 0.52 - 1.04 mg/dL    GFR Estimate 55 (L) >60 mL/min/[1.73_m2]      Comment:      Non  GFR Calc  Starting 12/18/2018, serum creatinine based estimated GFR (eGFR) will be   calculated using the Chronic Kidney Disease Epidemiology Collaboration   (CKD-EPI) equation.      GFR Estimate If Black 64 >60 mL/min/[1.73_m2]      Comment:       GFR Calc  Starting 12/18/2018, serum creatinine based estimated GFR (eGFR) will be   calculated " using the Chronic Kidney Disease Epidemiology Collaboration   (CKD-EPI) equation.      Calcium 9.3 8.5 - 10.1 mg/dL    Bilirubin Total 0.4 0.2 - 1.3 mg/dL    Albumin 3.4 3.4 - 5.0 g/dL    Protein Total 7.3 6.8 - 8.8 g/dL    Alkaline Phosphatase 147 40 - 150 U/L    ALT 37 0 - 50 U/L    AST 29 0 - 45 U/L   CBC with platelets   Result Value Ref [facility]    WBC 8.3 4.0 - 11.0 10e9/L    RBC Count 4.16 3.8 - 5.2 10e12/L    Hemoglobin 13.6 11.7 - 15.7 g/dL    Hematocrit 41.8 35.0 - 47.0 %     (H) 78 - 100 fl    MCH 32.7 26.5 - 33.0 pg    MCHC 32.5 31.5 - 36.5 g/dL    RDW 12.3 10.0 - 15.0 %    Platelet Count 246 150 - 450 10e9/L   *UA reflex to Microscopic and Culture ([place] [facility] (except [place] and [place])   Result Value Ref [facility]    Color Urine Yellow     Appearance Urine Cloudy     Glucose Urine Negative NEG^Negative mg/dL    Bilirubin Urine Negative NEG^Negative    Ketones Urine Negative NEG^Negative mg/dL    Specific Gravity Urine 1.020 1.003 - 1.035    Blood Urine Negative NEG^Negative    pH Urine 6.0 5.0 - 7.0 pH    Protein Albumin Urine Negative NEG^Negative mg/dL    Urobilinogen Urine 0.2 0.2 - 1.0 EU/dL    Nitrite Urine Positive (A) NEG^Negative    Leukocyte Esterase Urine Small (A) NEG^Negative    Source Midstream Urine    Albumin Random Urine Quantitative with Creat Ratio   Result Value Ref [facility]    Creatinine Urine 132 mg/dL    Albumin Urine mg/L 17 mg/L    Albumin Urine mg/g Cr 12.73 0 - 25 mg/g Cr   Urine Microscopic   Result Value Ref [facility]    WBC Urine 5-10 (A) OTO5^0 - 5 /HPF    RBC Urine O - 2 OTO2^O - 2 /HPF    Squamous Epithelial /LPF Urine Moderate (A) FEW^Few /LPF    Bacteria Urine Many (A) NEG^Negative /HPF   Urine Culture Aerobic Bacterial   Result Value Ref [facility]    Specimen Description Midstream Urine     Special Requests Specimen received in preservative     Culture Micro PENDING        If you have any questions or concerns, please call the clinic at the number listed  above.       Sincerely,      Ema Melendez NP

## 2021-01-14 NOTE — PROGRESS NOTES
Late entry from 1635, patient was placed on 6 L/min via nasal canula. Home oxygen meter patient brought with is not holding a charge and will not turn on. Patient was advised by provider and this writer to seek care int he Emergency Department. Patient declined Emergency Department medical advise. Patient while on 6 L/min oxygen was stable SpO2 at 98%, P; 68.   Assisted patient while on oxygen to front door in wheelchair. Patient was discharged  Against medical advise to seek care in Emergency Department and left with her caregivers by car.     Sharda Terry RN

## 2021-01-15 ENCOUNTER — PATIENT OUTREACH (OUTPATIENT)
Dept: CARE COORDINATION | Facility: CLINIC | Age: 86
End: 2021-01-15

## 2021-01-15 DIAGNOSIS — J84.10 INTERSTITIAL PULMONARY FIBROSIS (H): Chronic | ICD-10-CM

## 2021-01-15 DIAGNOSIS — R53.81 PHYSICAL DECONDITIONING: Primary | ICD-10-CM

## 2021-01-15 DIAGNOSIS — J96.11 CHRONIC RESPIRATORY FAILURE WITH HYPOXIA (H): ICD-10-CM

## 2021-01-15 NOTE — PROGRESS NOTES
Clinic Care Coordination Contact  [facility]/Voicemail       Clinical Data: Care Coordinator Outreach  Outreach attempted x 1.  Left message on patient's voicemail with call back information and requested return call.  Plan: Care Coordinator will try to reach patient again in 1-2 business days.    Ophelia Samson [facility] Care Coordination  Tel: [phone]

## 2021-01-15 NOTE — RESULT ENCOUNTER NOTE
All of your lab results are normal.  Awaiting urine culture.      Please notify patient of results. Ok to send letter.  ANNETTA Hoover

## 2021-01-16 LAB
BACTERIA SPEC CULT: ABNORMAL
Lab: ABNORMAL
SPECIMEN SOURCE: ABNORMAL

## 2021-01-17 RX ORDER — SULFAMETHOXAZOLE/TRIMETHOPRIM 800-160 MG
1 TABLET ORAL 2 TIMES DAILY
Qty: 10 TABLET | Refills: 0 | Status: SHIPPED | OUTPATIENT
Start: 2021-01-17 | End: 2021-01-22

## 2021-01-18 ENCOUNTER — PATIENT OUTREACH (OUTPATIENT)
Dept: NURSING | Facility: CLINIC | Age: 86
End: 2021-01-18
Payer: COMMERCIAL

## 2021-01-18 ASSESSMENT — ACTIVITIES OF DAILY LIVING (ADL): DEPENDENT_IADLS:: INDEPENDENT

## 2021-01-18 NOTE — LETTER
[facility]  Complex Care Plan  About Me:    Patient Name:  Susan Haq    YOB: 1932  Age:         89 year old   [facility] MRN:    [id number] Telephone Information:  Home Phone [phone]   Mobile [phone]       Address:  [place]  [address]  [place] [address] Email address:  No e-mail address on record      Emergency Contact(s)    Name Relationship Lgl Grd Work Phone Home Phone Mobile Phone   1. HARDY ENNIS Friend    [phone]   2. SANFORD SANCHEZ Friend    [phone]           Primary language:  English     needed? No   [facility] [employer]:  [phone] op. 1  Other communication barriers: Visual impairment, Physical impairment, [place] (Hard of hearing), Lack of coping, Caregiver, Access to technology  Preferred Method of Communication:  Mail  Current living arrangement: I live alone  Mobility Status/ Medical Equipment: Independent    Health Maintenance  Health Maintenance Reviewed: Due/Overdue   Health Maintenance Due   Topic Date Due     EYE EXAM  01/10/1932     ZOSTER IMMUNIZATION (2 of 3) 07/25/2007     DIABETIC FOOT EXAM  12/06/2018     PHQ-9  10/12/2019     INFLUENZA VACCINE (1) 09/01/2020     MEDICARE ANNUAL WELLNESS VISIT  10/16/2020     FALL RISK ASSESSMENT  10/16/2020         My Access Plan  Medical Emergency 911   Primary Clinic Line [facility] - [phone]   24 Hour Appointment Line [phone] or  [phone]) (toll-free)   24 Hour Nurse Line [phone] (toll-free)   Preferred Urgent Care [facility], [phone]   Preferred Hospital [place], [place]  [phone]   Pref[facility] [address], [address]     Behavioral Health Crisis Line The National Suicide Prevention Lifeline at 1-545.594.1845 or 911             My Care Team Members  Patient Care Team       Relationship  Specialty Notifications Start End    Ema Melendez NP PCP - General Nurse Practitioner - Family  1/29/16     Phone: [phone] Fax: [phone]         [address] [facility] [address]    Ema Melendez NP Assigned PCP   10/27/19     Phone: [phone] Fax: [phone]         [address] [facility] [address]    Neal Alfonso MD Assigned Sleep Provider   10/23/20     Phone: [phone] Fax: [phone]         [address] [facility] [address]    Ophelia Samson LSW Clinic Care Coordinator Primary Care - CC Admissions 1/15/21     Phone: [phone]         Ophelia Samson LSW Lead Care Coordinator Primary Care - CC Admissions 1/18/21     Phone: [phone]                 My Care Plans  Self Management and Treatment Plan  Goals and (Comments)  Goals        General    Medication 1 (pt-stated)     Notes - Note edited  1/18/2021 11:54 AM by Ophelia Samson LSW    Goal Statement: I will take care of myself  Date Goal set: 01/18/2021  Barriers: oxygen tank; medications  Strengths: support from care takers  Date to Achieve By: 04/18/21  Patient expressed understanding of goal: yes  Action steps to achieve this goal:  1. I will keep a functional oxygen tank  2. I will take my medications as directed  3. I will take care of myself.        Reducing Risks (pt-stated)     Notes - Note created  1/18/2021 11:52 AM by Ophelia Samson LSW    Goal Statement: I will participate in the Community Paramedic program.  Date Goal set: 01/18/2021  Barriers: Not familiar with the program  Strengths: willing to work with care team  Date to Achieve By: 04/18/2021  Patient expressed understanding of goal: yes  Action steps to achieve this goal:  1. I understand a referral was placed to the community paramedic and I will receive a call within 1 week.   2. I will work with the community paramedic to set up an initial visit.  I will call my  CC if I have not received a visit within 1-2 weeks.                Action Plans on File:                       Advance Care Plans/Directives Type:        My Medical and Care Information  Problem List   Patient Active Problem List   Diagnosis     Hyperlipidemia LDL goal <130     Insomnia     Osteopenia     Hypertension goal BP (blood pressure) < 140/90     Advance care planning     PVC's (premature ventricular contractions)     SUSSY (obstructive sleep apnea)-Moderately severe (AHI 21)     Bilateral leg edema     HL (hearing loss)     SNHL (sensorineural hearing loss)     Interstitial pulmonary fibrosis (H)     Chronic obstructive pulmonary disease, unspecified COPD type (H)     Major depressive disorder, single episode, moderate (H)     Risk for falls     Alcohol abuse     Morbid obesity (H)     Physical deconditioning     Chronic obstructive pulmonary disease with acute exacerbation (H)     CKD (chronic kidney disease) stage 3, GFR 30-59 ml/min     Chronic respiratory failure with hypoxia (H)      Current Medications and Allergies:  See printed Medication Report.    Care Coordination Start Date: 1/18/2021   Frequency of Care Coordination: monthly   Form Last Updated: 01/18/2021

## 2021-01-18 NOTE — PROGRESS NOTES
Clinic Care Coordination Contact    Clinic Care Coordination Contact  OUTREACH    Referral Information:  Referral Source: PCP    Reason for Referral: Complex Medical Concerns/Education: Chronic diagnosis COPD, hypoxia, deconditioning, falls and Compliance with medical treatment plan      Additional pertinent details:         Clinical Staff have discussed the Care Coordination Referral with the patient and/or caregiver: yes         Primary Diagnosis: Psychosocial    Chief Complaint   Patient presents with     Clinic Care Coordination - Initial     Social work        Universal Utilization:   Clinic Utilization  Difficulty keeping appointments:: No  Compliance Concerns: No  No-Show Concerns: No  No PCP office visit in Past Year: No  Utilization    Last refreshed: 1/17/2021  8:33 PM: Hospital Admissions 0           Last refreshed: 1/17/2021  8:33 PM: ED Visits 1           Last refreshed: 1/17/2021  8:33 PM: No Show Count (past year) 0              Current as of: 1/17/2021  8:33 PM              Clinical Concerns:  Current Medical Concerns:    Patient Active Problem List   Diagnosis     Hyperlipidemia LDL goal <130     Insomnia     Osteopenia     Hypertension goal BP (blood pressure) < 140/90     Advance care planning     PVC's (premature ventricular contractions)     SUSSY (obstructive sleep apnea)-Moderately severe (AHI 21)     Bilateral leg edema     HL (hearing loss)     SNHL (sensorineural hearing loss)     Interstitial pulmonary fibrosis (H)     Chronic obstructive pulmonary disease, unspecified COPD type (H)     Major depressive disorder, single episode, moderate (H)     Risk for falls     Alcohol abuse     Morbid obesity (H)     Physical deconditioning     Chronic obstructive pulmonary disease with acute exacerbation (H)     CKD (chronic kidney disease) stage 3, GFR 30-59 ml/min     Chronic respiratory failure with hypoxia (H)         Current Behavioral Concerns:  Patient was in her PCP office and her oxygen tank  was not working correctly. According to chart review, the oximeter was not reading an oxygen level that the provider felt comfortable with.    Education Provided to patient: Patient and writer collaborated on goals to increase patient's safety in her home including having the community paramedic out to visit her and setting up a medication routine and having her oxygen tank fixed.     Pain  Pain (GOAL):: No  Health Maintenance Reviewed: Due/Overdue   Health Maintenance Due   Topic Date Due     EYE EXAM  01/10/1932     ZOSTER IMMUNIZATION (2 of 3) 07/25/2007     DIABETIC FOOT EXAM  12/06/2018     PHQ-9  10/12/2019     INFLUENZA VACCINE (1) 09/01/2020     MEDICARE ANNUAL WELLNESS VISIT  10/16/2020     FALL RISK ASSESSMENT  10/16/2020       Clinical Pathway: None    Medication Management:  Current Outpatient Medications   Medication     albuterol (PROAIR HFA/PROVENTIL HFA/VENTOLIN HFA) 108 (90 Base) MCG/ACT inhaler     albuterol (PROAIR HFA/PROVENTIL HFA/VENTOLIN HFA) 108 (90 Base) MCG/ACT inhaler     amLODIPine (NORVASC) 10 MG tablet     atenolol (TENORMIN) 50 MG tablet     atorvastatin (LIPITOR) 20 MG tablet     budesonide-formoterol (SYMBICORT) 160-4.5 MCG/ACT Inhaler     camphor-menthol (DERMASARRA) 0.5-0.5 % external lotion     camphor-menthol (DERMASARRA) 0.5-0.5 % LOTN     escitalopram (LEXAPRO) 5 MG tablet     FISH OIL 1000 MG OR CAPS     folic acid (FOLVITE) 1 MG tablet     furosemide (LASIX) 20 MG tablet     hydrochlorothiazide (HYDRODIURIL) 25 MG tablet     losartan (COZAAR) 100 MG tablet     order for DME     order for DME     order for DME     ORDER FOR DME     ORDER FOR DME     ORDER FOR DME     ORDER FOR DME     potassium chloride ER (KLOR-CON M) 10 MEQ CR tablet     Respiratory Therapy Supplies (NEBULIZER COMPRESSOR) KIT     SENNA PLUS 8.6-50 MG per tablet     sulfamethoxazole-trimethoprim (BACTRIM DS) 800-160 MG tablet     tiotropium (SPIRIVA HANDIHALER) 18 MCG inhaled capsule     No current  "facility-administered medications for this visit.           Functional Status:  Dependent ADLs:: Independent  Dependent IADLs:: Independent  Bed or wheelchair confined:: No  Mobility Status: Independent  Fallen 2 or more times in the past year?: Yes  Any fall with injury in the past year?: No    Living Situation:  Current living arrangement:: I live alone  [facility]    Lifestyle & Psychosocial Needs:        Diet:: Regular  Inadequate nutrition (GOAL):: No  Tube Feeding: No  Inadequate activity/exercise (GOAL):: No  Significant changes in sleep pattern (GOAL): No  Transportation means:: Friend     Faith or spiritual beliefs that impact treatment:: No  Mental health DX:: No  Mental health management concern (GOAL):: No  Chemical Dependency Status: No Current Concerns  Informal Support system:: Friends   Socioeconomic History     Marital status:      Spouse name: Not on file     Number of children: Not on file     Years of education: Not on file     Highest education level: Not on file   Occupational History     Employer: RETIRED     Comment:  in [place]     Tobacco Use     Smoking status: Former Smoker     Packs/day: 1.00     Years: 35.00     Pack years: 35.00     Quit date: 1990     Years since quittin.0     Smokeless tobacco: Former User   Substance and Sexual Activity     Alcohol use: Yes     Comment: Drink 1 (3oz) drink a day     Drug use: No     Sexual activity: Never          Patient was contacted today by writer for an outreach after her clinic visit on 2021. Patient was in the office with two care takers who patient identifies as friends. Patient reports her oxygen tank was not working that day but it is working \"fine\" now. Patient fell in the bathroom and declined to go to the ED to confirm she was not injured. Patient reports she is using her walker as she was instructed and has sufficient support. Patient agreed to visits from " the [facility] paramedic and check ins from writer.    Patient reports she is very independent and when she does need help she has her caretaker, Martina who she can rely on.     Resources and Interventions:  Current Resources:      Community Resources: None  Supplies used at home:: None  Equipment Currently Used at Home: walker, standard  Employment Status: retired)   )    Advance Care Plan/Directive  Advanced Care Plans/Directives on file:: No  Advanced Care Plan/Directive Status: Declined Further Information    Referrals Placed: Other (Community Paramedic)     Goals:   Goals        General    Medication 1 (pt-stated)     Notes - Note edited  1/18/2021 11:54 AM by Ophelia Samson LSW    Goal Statement: I will take care of myself  Date Goal set: 01/18/2021  Barriers: oxygen tank; medications  Strengths: support from care takers  Date to Achieve By: 04/18/21  Patient expressed understanding of goal: yes  Action steps to achieve this goal:  1. I will keep a functional oxygen tank  2. I will take my medications as directed  3. I will take care of myself.        Reducing Risks (pt-stated)     Notes - Note created  1/18/2021 11:52 AM by Ophelia Samson LSW    Goal Statement: I will participate in the Community Paramedic program.  Date Goal set: 01/18/2021  Barriers: Not familiar with the program  Strengths: willing to work with care team  Date to Achieve By: 04/18/2021  Patient expressed understanding of goal: yes  Action steps to achieve this goal:  1. I understand a referral was placed to the [facility] paramedic and I will receive a call within 1 week.   2. I will work with the [facility] paramedic to set up an initial visit.  I will call my [facility] if I have not received a visit within 1-2 weeks.              Patient/Caregiver understanding: Patient verbalized understanding of the goal.    Outreach Frequency: monthly      Plan: 1. Patient is going to work with the community paramedic on safety in her home.   2. Patient is  going to use her walker to get around.   3.Patient is going to take her medications as instructed.  4. [facility] will send intro letter and CCP to patient.   5. [facility] will follow up in 2 weeks.    Ophelia Samson [facility] Care Coordination  Tel: [phone]

## 2021-01-19 ENCOUNTER — PATIENT OUTREACH (OUTPATIENT)
Dept: NURSING | Facility: CLINIC | Age: 86
End: 2021-01-19
Payer: COMMERCIAL

## 2021-01-19 NOTE — PROGRESS NOTES
Clinic Care Coordination Contact    Follow Up Progress Note      Assessment: Patient's friend, Martina called [facility] in response to [facility]'s call to patient yesterday. Martina asked SWCC when SWCC was coming to visit patient. SWCC explained she was not coming to visit patient. [facility] explained to Martina that a consent to communicate was not on file for patient so SWCC could not speak to Martina without patient's permission. Martina reported that patient needed DME as patient could not shower without help and often Martina or her wife, Trina, would have to get into the shower with her. [facility] listened to the concerns that Martina had about patient and reported them to Community paramedic who is planning a visit next week.    Goals addressed this encounter:   Goals Addressed                 This Visit's Progress      Reducing Risks (pt-stated)   10%     Goal Statement: I will participate in the Community Paramedic program.  Date Goal set: 01/18/2021  Barriers: Not familiar with the program  Strengths: willing to work with care team  Date to Achieve By: 04/18/2021  Patient expressed understanding of goal: yes  Action steps to achieve this goal:  1. I understand a referral was placed to the community paramedic and I will receive a call within 1 week.   2. I will work with the community paramedic to set up an initial visit.  I will call my SW CC if I have not received a visit within 1-2 weeks.               Intervention/Education provided during outreach: [facility] used active listening during conversation.          Plan:   Care Coordinator will follow up in 2 weeks.    Ophelia Samson, [facility] Care Coordination  Tel: [phone]

## 2021-01-24 NOTE — PROGRESS NOTES
Completed a all night titration PSG per provider order.    Preliminary AHI 20.5.  A final therapeutic PAP pressure was achieved.    Supine REM was not seen on therapeutic pressure.    Patient reports feeling refreshed in AM.  
no

## 2021-01-25 ENCOUNTER — ALLIED HEALTH/NURSE VISIT (OUTPATIENT)
Dept: AUDIOLOGY | Facility: CLINIC | Age: 86
End: 2021-01-25

## 2021-01-25 DIAGNOSIS — H90.3 SENSORINEURAL HEARING LOSS, ASYMMETRICAL: Primary | ICD-10-CM

## 2021-01-25 PROCEDURE — 99207 PR NO CHARGE LOS: CPT | Performed by: AUDIOLOGIST

## 2021-01-25 PROCEDURE — V5299 HEARING SERVICE: HCPCS | Performed by: AUDIOLOGIST

## 2021-01-25 NOTE — PROGRESS NOTES
[facility]        SUBJECTIVE:  Susan JOSE Haq , 89 year old female requests in office repair of her left 2015 Phonak Bolero V50-P hearing aid. She reports the hearing aid is not working.    OBJECTIVE:  Replaced plugged and hardened earmold tubing. Verified hearing aid functionality.      ASSESSMENT/PLAN:    Patient's caretaker will  the hearing aid at the specialty clinic .     Caridad VALERO, #[id number]

## 2021-01-26 ENCOUNTER — ALLIED HEALTH/NURSE VISIT (OUTPATIENT)
Dept: BEHAVIORAL HEALTH | Facility: CLINIC | Age: 86
End: 2021-01-26
Attending: NURSE PRACTITIONER
Payer: COMMERCIAL

## 2021-01-26 DIAGNOSIS — R53.81 PHYSICAL DECONDITIONING: ICD-10-CM

## 2021-01-26 DIAGNOSIS — J84.10 INTERSTITIAL PULMONARY FIBROSIS (H): Chronic | ICD-10-CM

## 2021-01-26 DIAGNOSIS — J96.11 CHRONIC RESPIRATORY FAILURE WITH HYPOXIA (H): ICD-10-CM

## 2021-01-26 PROCEDURE — 99207 PR COMMUNITY PARAMEDIC - PATIENT NOT BILLABLE: CPT

## 2021-01-26 ASSESSMENT — ACTIVITIES OF DAILY LIVING (ADL): DEPENDENT_IADLS:: INDEPENDENT

## 2021-01-29 VITALS
OXYGEN SATURATION: 95 % | HEART RATE: 90 BPM | DIASTOLIC BLOOD PRESSURE: 68 MMHG | TEMPERATURE: 98 F | SYSTOLIC BLOOD PRESSURE: 122 MMHG | RESPIRATION RATE: 16 BRPM

## 2021-01-30 NOTE — PROGRESS NOTES
Community Paramedics Initial Visit  January 26, 2021 TIME: 14:30    Susan Haq is a 89 year old female being seen at home for a Community Paramedic Home visit.    Present at appointment: Patient, Care Team Member, Friend, Caregiver    Primary Diagnosis: Psychosocial    Chief Complaint   Patient presents with     Outreach       [place] Utilization:   Clinic Utilization  Difficulty keeping appointments:: No  Compliance Concerns: No  No-Show Concerns: No  No PCP office visit in Past Year: No  Utilization    Last refreshed: 1/28/2021  6:40 PM: Hospital Admissions 0           Last refreshed: 1/28/2021  6:40 PM: ED Visits 1           Last refreshed: 1/28/2021  6:40 PM: No Show Count (past year) 0              Current as of: 1/28/2021  6:40 PM              Clinical Concerns:  Current Medical Concerns:  COPD, Oxygen use    Current Behavioral Concerns: none    Education Provided to patient: none     Vitals: /68   Pulse 90   Temp 98  F (36.7  C)   Resp 16   SpO2 95%   BMI: There is no height or weight on file to calculate BMI.    Pain  Pain (GOAL):: No  Health Maintenance Reviewed: Not assessed    Clinical Pathway: None    Review of Symptoms/PE    Skin: negative  Eyes: negative  Ears/Nose/Throat: negative  Respiratory: Extreme Shortness of breath and Dyspnea on exertion  Cardiovascular: dyspnea on exertion  Gastrointestinal: negative  Genitourinary: negative  Musculoskeletal: leg pain  Neurologic: negative and memory problems  Psychiatric: negative    Medication Management:  Taken care of by her two friends   Medications need to be refilled:unknown - medications are taken care of by her two friends     Medications set up? No  Pill Box issued: No  Scale issued: No    Functional Status:  Dependent ADLs:: Independent  Dependent IADLs:: Independent  Bed or wheelchair confined:: No  Mobility Status: Independent    Living Situation:  Current living arrangement:: I live alone  Type of residence:: Private home -  stairs    Community Paramedics Home Safety Assessment  Floors:  Are there throw rugs on the floor?: Yes  Are there papers, books, towels, shoes, magazines on the floor?: No  Are there loose wires and cords you need to walk around? : No  Stairs and Steps  Are there papers, books, towels, shoes, magazines on the stairs?: No  Are some steps broken or uneven?: No  Is there a light over the stairway?: No  Has the stairway bulb burned out?: No  Is the carpet on the steps loose or torn?: No  Are the handrails loose or broken?: No  Kitchen:  Are the things you use often on high shelves?: No  Is your step stool unsteady?: No  Bathrooms:  Is the tub or shower floor slippery?: No  Do you need support when you get in and out of the tub?: No  Bedrooms:  Is the light near the bed hard to reach?: Yes  Is the path from your bed to the bathroom dark?: Yes  Comments: : hand rail needed near toliet    Diet/Exercise/Sleep:  Diet:: Regular  Inadequate nutrition (GOAL):: No  Tube Feeding: No  Inadequate activity/exercise (GOAL):: No  Significant changes in sleep pattern (GOAL): No    Transportation:     Transportation means:: Friend     Psychosocial:  Jehovah's witness or spiritual beliefs that impact treatment:: No  Mental health DX:: No  Mental health management concern (GOAL):: No  Informal Support system:: Friends         No  Face to Face in Home / Community    Today's PHQ-2 Score:      Today's PHQ-9 Score:   PHQ-9 SCORE 4/25/2019   PHQ-9 Total Score -   PHQ-9 Total Score 21        Today's GAD7 score:   MELISSA-7 SCORE 4/25/2019   Total Score 0          Resources and Interventions:  Current Resources:    ;   Community Resources: None  Supplies used at home:: None  Equipment Currently Used at Home: walker, standard    Advance Care Plan/Directive  Advanced Care Plans/Directives on file:: No  Advanced Care Plan/Directive Status: Declined Further Information    HEALTH CARE GOALS:  Goals Addressed as of 1/29/2021 at 6:56 PM                 1/26/21     1/19/21      Reducing Risks (pt-stated)   10%  10%    Added 1/18/21 by Ophelia Samson LSW     Goal Statement: I will participate in the Community Paramedic program.  Date Goal set: 01/18/2021  Barriers: Not familiar with the program  Strengths: willing to work with care team  Date to Achieve By: 04/18/2021  Patient expressed understanding of goal: yes  Action steps to achieve this goal:  1. I understand a referral was placed to the community paramedic and I will receive a call within 1 week.   2. I will work with the community paramedic to set up an initial visit.  I will call my  CC if I have not received a visit within 1-2 weeks.            Patient Active Problem List   Diagnosis     Hyperlipidemia LDL goal <130     Insomnia     Osteopenia     Hypertension goal BP (blood pressure) < 140/90     Advance care planning     PVC's (premature ventricular contractions)     SUSSY (obstructive sleep apnea)-Moderately severe (AHI 21)     Bilateral leg edema     HL (hearing loss)     SNHL (sensorineural hearing loss)     Interstitial pulmonary fibrosis (H)     Chronic obstructive pulmonary disease, unspecified COPD type (H)     Major depressive disorder, single episode, moderate (H)     Risk for falls     Alcohol abuse     Morbid obesity (H)     Physical deconditioning     Chronic obstructive pulmonary disease with acute exacerbation (H)     CKD (chronic kidney disease) stage 3, GFR 30-59 ml/min     Chronic respiratory failure with hypoxia (H)         Current Outpatient Medications:      albuterol (PROAIR HFA/PROVENTIL HFA/VENTOLIN HFA) 108 (90 Base) MCG/ACT inhaler, INHALE TWO PUFFS INTO THE LUNGS EVERY FOUR HOURS AS NEEDED FOR SHORTNESS OF BREATH/DYSPNEA OR WHEEZING, Disp: 8.5 g, Rfl: 2     albuterol (PROAIR HFA/PROVENTIL HFA/VENTOLIN HFA) 108 (90 Base) MCG/ACT inhaler, Inhale 2 puffs into the lungs every 6 hours as needed for shortness of breath / dyspnea or wheezing, Disp: 8.5 g, Rfl: 3     amLODIPine (NORVASC) 10 MG  tablet, Take 1 tablet (10 mg) by mouth daily TAKE ONE TABLET BY MOUTH ONCE DAILY., Disp: 90 tablet, Rfl: 1     atenolol (TENORMIN) 50 MG tablet, Take 1 tablet (50 mg) by mouth 2 times daily, Disp: 180 tablet, Rfl: 1     atorvastatin (LIPITOR) 20 MG tablet, Take 1 tablet (20 mg) by mouth daily, Disp: 90 tablet, Rfl: 3     budesonide-formoterol (SYMBICORT) 160-4.5 MCG/ACT Inhaler, INHALE TWO PUFFS BY MOUTH 2 TIMES DAILY, Disp: 10.2 g, Rfl: 5     camphor-menthol (DERMASARRA) 0.5-0.5 % external lotion, Apply 1 mL topically every 8 hours as needed for skin care, Disp: 222 mL, Rfl: 1     camphor-menthol (DERMASARRA) 0.5-0.5 % LOTN, Apply 1 mL topically every 8 hours as needed for skin care (apply to rash on legs) (Patient not taking: Reported on 6/4/2019), Disp: 1 Bottle, Rfl: 1     escitalopram (LEXAPRO) 5 MG tablet, Take 2 tablets (10 mg) by mouth daily, Disp: 60 tablet, Rfl: 3     FISH OIL 1000 MG OR CAPS, 1 cap daily, Disp: , Rfl:      folic acid (FOLVITE) 1 MG tablet, TAKE ONE TABLET BY MOUTH DAILY, Disp: 30 tablet, Rfl: 0     furosemide (LASIX) 20 MG tablet, Take 1 tablet (20 mg) by mouth daily, Disp: 30 tablet, Rfl: 0     hydrochlorothiazide (HYDRODIURIL) 25 MG tablet, Take 1 tablet (25 mg) by mouth daily, Disp: 90 tablet, Rfl: 3     losartan (COZAAR) 100 MG tablet, Take 1 tablet (100 mg) by mouth daily, Disp: 30 tablet, Rfl: 0     order for DME, 1.  CPAP pressure 12 cm/H20 with heated humidity and auto-titrating capability.  2.  Provide mask to fit and CPAP supplies. 3.  Length of need lifetime. 4.  Ok to d/c O2 bleed in to her PAP overnight., Disp: 1 Device, Rfl: 0     order for DME, Equipment being ordered: Sigavirus - 1 pair compression., Disp: 1 Device, Rfl: 0     order for DME, Equipment being ordered: Nebulizer, Disp: 1 each, Rfl: 0     ORDER FOR DME, Equipment being ordered: Oxygen - 3 liters continous, Disp: 1 Device, Rfl: 0     ORDER FOR DME, Equipment being ordered: CPAP supplies, Disp: 1 Units, Rfl:  0     ORDER FOR DME, Equipment being ordered: Oxygen, Disp: 1 Units, Rfl: 0     ORDER FOR DME, TEDs stockings knee high to be worn during the day., Disp: 1 Units, Rfl: 0     potassium chloride ER (KLOR-CON M) 10 MEQ CR tablet, Take 2 tablets (20 mEq) by mouth daily, Disp: 60 tablet, Rfl: 6     Respiratory Therapy Supplies (NEBULIZER COMPRESSOR) KIT, 1 kit every 4 hours as needed, Disp: 1 kit, Rfl: 0     SENNA PLUS 8.6-50 MG per tablet, TAKE ONE TO TWO TABLETS BY MOUTH TWICE DAILY AS NEEDED FOR CONSTIPATION (Patient not taking: Reported on 6/4/2019), Disp: 100 tablet, Rfl: 1     tiotropium (SPIRIVA HANDIHALER) 18 MCG inhaled capsule, INHALE THE CONTENTS OF ONE CAPSULE BY MOUTH DAILY, Disp: 90 capsule, Rfl: 3    Time spent with patient: 90    The patient meets one or more of the following criteria:  * Has been identified by their primary care provider at risk of nursing home placement    Acute concern/Follow-up recommendations: follow current treatment plan    Next CP visit scheduled: TBD    Issues for Provider to follow up on: Community Paramedic visited with Patient at her home with her two friends who care for her on a regular basis. Home assessment was completed. Patient is in need of someone to come into the home and cut her toenails. Community Paramedic will talk with  about this. Patient appears to have all other needs covered by her two friends.    Patient's portable oxygen machine's battery no longer works. The two friends are unsure how to get this replaced. The machine they have is an [employer]. Community Paramedic will contact  to work on getting a new battery or a new portable machine for patient.    Patient offered continued services from the Community Paramedic program. The two friends and the patient feel at this time it is not needed as all the patient cares are taken care of except bathing. Advised this is something the program does not do and they would need to look into a PCA  for the patient.    Provider follow up visit needed: TBD

## 2021-02-02 ENCOUNTER — TELEPHONE (OUTPATIENT)
Dept: FAMILY MEDICINE | Facility: CLINIC | Age: 86
End: 2021-02-02

## 2021-02-02 ENCOUNTER — PATIENT OUTREACH (OUTPATIENT)
Dept: BEHAVIORAL HEALTH | Facility: CLINIC | Age: 86
End: 2021-02-02

## 2021-02-02 ENCOUNTER — PATIENT OUTREACH (OUTPATIENT)
Dept: NURSING | Facility: CLINIC | Age: 86
End: 2021-02-02
Payer: COMMERCIAL

## 2021-02-02 DIAGNOSIS — F32.1 MAJOR DEPRESSIVE DISORDER, SINGLE EPISODE, MODERATE (H): Chronic | ICD-10-CM

## 2021-02-02 DIAGNOSIS — J84.10 INTERSTITIAL PULMONARY FIBROSIS (H): Primary | Chronic | ICD-10-CM

## 2021-02-02 DIAGNOSIS — E78.5 HYPERLIPIDEMIA LDL GOAL <130: Chronic | ICD-10-CM

## 2021-02-02 DIAGNOSIS — I10 HYPERTENSION GOAL BP (BLOOD PRESSURE) < 140/90: Chronic | ICD-10-CM

## 2021-02-02 NOTE — TELEPHONE ENCOUNTER
"Requested Prescriptions   Pending Prescriptions Disp Refills     atorvastatin (LIPITOR) 20 MG tablet [Pharmacy Med Name: ATORVASTATIN 20 MG TABLET 20 Tablet] 30 tablet 0     Sig: TAKE ONE TABLET (20 MG) BY MOUTH DAILY. **NEED APPOINTMENT FOR FURTHER REFILLS**       Statins Protocol Passed - 2/2/2021 12:09 PM        Passed - LDL on file in past 12 months     Recent Labs   Lab Test 01/14/21  1603   LDL 93             Passed - No abnormal creatine kinase in past 12 months     Recent Labs   Lab Test 06/10/17  1440   CKT 80                Passed - Recent (12 mo) or future (30 days) visit within the authorizing provider's specialty     Patient has had an office visit with the authorizing provider or a provider within the authorizing providers department within the previous 12 mos or has a future within next 30 days. See \"Patient Info\" tab in inbasket, or \"Choose Columns\" in Meds & Orders section of the refill encounter.              Passed - Medication is active on med list        Passed - Patient is age 18 or older        Passed - No active pregnancy on record        Passed - No positive pregnancy test in past 12 months           escitalopram (LEXAPRO) 5 MG tablet [Pharmacy Med Name: ESCITALOPRAM 5 MG TABLET 5 Tablet] 60 tablet 0     Sig: TAKE TWO TABLETS (10 MG) BY MOUTH DAILY       SSRIs Protocol Failed - 2/2/2021 12:09 PM        Failed - PHQ-9 score less than 5 in past 6 months     Please review last PHQ-9 score.           Passed - Medication is active on med list        Passed - Patient is age 18 or older        Passed - No active pregnancy on record        Passed - No positive pregnancy test in last 12 months        Passed - Recent (6 mo) or future (30 days) visit within the authorizing provider's specialty     Patient had office visit in the last 6 months or has a visit in the next 30 days with authorizing provider or within the authorizing provider's specialty.  See \"Patient Info\" tab in inBlue Photo Storieset, or \"Choose " "Columns\" in Meds & Orders section of the refill encounter.               atenolol (TENORMIN) 50 MG tablet [Pharmacy Med Name: ATENOLOL 50 MG TABS 50 Tablet] 60 tablet 0     Sig: TAKE ONE TABLET (50 MG) BY MOUTH TWO TIMES DAILY. **NEED APPOINTMENT FOR MORE REFILLS**       Beta-Blockers Protocol Passed - 2/2/2021 12:09 PM        Passed - Blood pressure under 140/90 in past 12 months     BP Readings from Last 3 Encounters:   01/26/21 122/68   01/14/21 128/62   09/10/20 (!) 166/81                 Passed - Patient is age 6 or older        Passed - Recent (12 mo) or future (30 days) visit within the authorizing provider's specialty     Patient has had an office visit with the authorizing provider or a provider within the authorizing providers department within the previous 12 mos or has a future within next 30 days. See \"Patient Info\" tab in inbasket, or \"Choose Columns\" in Meds & Orders section of the refill encounter.              Passed - Medication is active on med list           amLODIPine (NORVASC) 10 MG tablet [Pharmacy Med Name: AMLODIPINE BESYLATE 10 MG T 10 Tablet] 30 tablet 0     Sig: TAKE ONE TABLET BY MOUTH ONCE DAILY. **PATIENT NEEDS TO SEE PRIMARY CARE PROVIDER FOR FURTHER REFILLS**       Calcium Channel Blockers Protocol  Passed - 2/2/2021 12:09 PM        Passed - Blood pressure under 140/90 in past 12 months     BP Readings from Last 3 Encounters:   01/26/21 122/68   01/14/21 128/62   09/10/20 (!) 166/81                 Passed - Recent (12 mo) or future (30 days) visit within the authorizing provider's specialty     Patient has had an office visit with the authorizing provider or a provider within the authorizing providers department within the previous 12 mos or has a future within next 30 days. See \"Patient Info\" tab in inbasket, or \"Choose Columns\" in Meds & Orders section of the refill encounter.              Passed - Medication is active on med list        Passed - Patient is age 18 or older        " Passed - No active pregnancy on record        Passed - Normal serum creatinine on file in past 12 months     Recent Labs   Lab Test 01/14/21  1603   CR 0.92       Ok to refill medication if creatinine is low          Passed - No positive pregnancy test in past 12 months

## 2021-02-02 NOTE — TELEPHONE ENCOUNTER
Patients friend Martina called and is asking for a RX for 4 liters of oxygen for the patient.  Martina would like a call back at [phone]      Dorys Macario,  Station

## 2021-02-02 NOTE — PROGRESS NOTES
Clinic Care Coordination Contact    Follow Up Progress Note      Assessment: [facility] contacted patient in regards to monthly social work outreach. Patient reported she needs her nails cut. Patient will not let anyone touch them and they are getting long. CC gave patient and patient's friend, Martina, two resources for LPN's who do nail cutting in home for home-bound patient. ([employer]: Sharon Andrews [phone] and [employer] Cassi Cruz [phone]).    Patient reported she still needs a portable oxygen tank battery. [facility] inquired if patient has spoken with anyone from [facility] about getting a new battery and patient reported she has not but will be calling today.    Goals addressed this encounter:   Goals Addressed                 This Visit's Progress      Medication 1 (pt-stated)        Goal Statement: I will take care of myself  Date Goal set: 01/18/2021  Barriers: oxygen tank; medications  Strengths: support from care takers  Date to Achieve By: 04/18/21  Patient expressed understanding of goal: yes  Action steps to achieve this goal:  1. I will keep a functional oxygen tank  2. I will get my nails clipped  3. I will get my covid vaccine.               Intervention/Education provided during outreach: [facility] reviewed goals with patient.     Outreach Frequency: monthly    Plan: Patient will call [facility] to inquire about batteries for her portable tank. Patient will get her nails trimmed.  Care Coordinator will follow up in 1 month.    Ophelia Samson [facility] Care Coordination  Tel: [phone]

## 2021-02-02 NOTE — PROGRESS NOTES
Community Paramedic reached out to Martina MEADE in care of Susan Haq. Provided Martina with number for getting Lonas toe nails cut - Cassi Arroyo with [employer] [phone].  Talked with Martina about notifying the Oxygen company that supplies [place] home oxygen about getting a portable oxygen tank from them. She indicated that she had already done this and needed a RX from Susan's . She stated she had already called into the clinic and left a message to get this RX so she can send it to the oxygen company so she can get a portable oxygen tank for Susan.

## 2021-02-03 NOTE — TELEPHONE ENCOUNTER
CSS, please call patient or friend, please assist with faxing the order if needed.    ARIADNA Hoover

## 2021-02-04 RX ORDER — AMLODIPINE BESYLATE 10 MG/1
TABLET ORAL
Qty: 30 TABLET | Refills: 0 | OUTPATIENT
Start: 2021-02-04

## 2021-02-04 RX ORDER — ESCITALOPRAM OXALATE 5 MG/1
TABLET ORAL
Qty: 60 TABLET | Refills: 0 | OUTPATIENT
Start: 2021-02-04

## 2021-02-04 RX ORDER — ATORVASTATIN CALCIUM 20 MG/1
TABLET, FILM COATED ORAL
Qty: 30 TABLET | Refills: 0 | OUTPATIENT
Start: 2021-02-04

## 2021-02-04 RX ORDER — ATENOLOL 50 MG/1
TABLET ORAL
Qty: 60 TABLET | Refills: 0 | OUTPATIENT
Start: 2021-02-04

## 2021-02-04 NOTE — TELEPHONE ENCOUNTER
[phone]    Lanette Pop on 2/4/2021 at 8:08 AM

## 2021-02-15 DIAGNOSIS — J84.10 INTERSTITIAL PULMONARY FIBROSIS (H): Chronic | ICD-10-CM

## 2021-02-15 NOTE — TELEPHONE ENCOUNTER
"Requested Prescriptions   Pending Prescriptions Disp Refills     folic acid (FOLVITE) 1 MG tablet 30 tablet 0     Sig: Take 1 tablet (1,000 mcg) by mouth daily       Vitamin Supplements (Adult) Protocol Passed - 2/15/2021  9:56 AM        Passed - High dose Vitamin D not ordered        Passed - Recent (12 mo) or future (30 days) visit within the authorizing provider's specialty     Patient has had an office visit with the authorizing provider or a provider within the authorizing providers department within the previous 12 mos or has a future within next 30 days. See \"Patient Info\" tab in inbasket, or \"Choose Columns\" in Meds & Orders section of the refill encounter.              Passed - Medication is active on med list             "

## 2021-02-18 RX ORDER — FOLIC ACID 1 MG/1
1000 TABLET ORAL DAILY
Qty: 30 TABLET | Refills: 8 | Status: SHIPPED | OUTPATIENT
Start: 2021-02-18

## 2021-02-25 ENCOUNTER — PATIENT OUTREACH (OUTPATIENT)
Dept: NURSING | Facility: CLINIC | Age: 86
End: 2021-02-25
Payer: COMMERCIAL

## 2021-02-25 NOTE — PROGRESS NOTES
Clinic Care Coordination Contact    Follow Up Progress Note      Assessment: Patient's care giver, Martina, contacted [facility] as she is having trouble with replacing patient's portable oxygen tank. [facility] asked if she has called the company who services her tank to see if they are able to assist and she reports she has left a message for the company. [facility] encouraged her to call again as that is the best route to getting her tank replaced. As patient had purchased this one out of pocket, she may need to purchase another one if the company is not willing to replace it. She has a functional one at home but this is specific to the portable one she will need if she has to leave the house.    Goals addressed this encounter:   Goals Addressed                 This Visit's Progress      Medication 1 (pt-stated)   10%     Goal Statement: I will take care of myself  Date Goal set: 01/18/2021  Barriers: oxygen tank; medications  Strengths: support from care takers  Date to Achieve By: 04/18/21  Patient expressed understanding of goal: yes  Action steps to achieve this goal:  1. I will get and keep a functional oxygen tank  2. I will get my nails clipped  3. I will get my covid vaccine.               Intervention/Education provided during outreach: [facility] reviewed goal of keeping a functioning oxygen tank with patient's caregiver     Outreach Frequency: monthly    Plan: Patient will get a new oxygen tank.  Care Coordinator will follow up in 1 month.    ZONIA Cortez   [facility] Care Coordination  Tel: [phone]

## 2021-03-10 DIAGNOSIS — J44.9 CHRONIC OBSTRUCTIVE PULMONARY DISEASE, UNSPECIFIED COPD TYPE (H): Chronic | ICD-10-CM

## 2021-03-10 DIAGNOSIS — E11.9 TYPE 2 DIABETES MELLITUS WITHOUT COMPLICATION, WITHOUT LONG-TERM CURRENT USE OF INSULIN (H): Chronic | ICD-10-CM

## 2021-03-10 DIAGNOSIS — R60.0 PERIPHERAL EDEMA: ICD-10-CM

## 2021-03-10 DIAGNOSIS — I10 HYPERTENSION GOAL BP (BLOOD PRESSURE) < 140/90: Chronic | ICD-10-CM

## 2021-03-11 RX ORDER — FUROSEMIDE 20 MG
TABLET ORAL
Qty: 30 TABLET | Refills: 0 | Status: SHIPPED | OUTPATIENT
Start: 2021-03-11 | End: 2021-03-31

## 2021-03-11 RX ORDER — LOSARTAN POTASSIUM 100 MG/1
TABLET ORAL
Qty: 30 TABLET | Refills: 0 | Status: SHIPPED | OUTPATIENT
Start: 2021-03-11 | End: 2021-04-23

## 2021-03-11 NOTE — TELEPHONE ENCOUNTER
"Requested Prescriptions   Pending Prescriptions Disp Refills     losartan (COZAAR) 100 MG tablet [Pharmacy Med Name: LOSARTAN POTASSIUM 100MG TABS] 30 tablet 0     Sig: TAKE ONE TABLET BY MOUTH ONCE DAILY       Angiotensin-II Receptors Passed - 3/10/2021  9:45 AM        Passed - Last blood pressure under 140/90 in past 12 months     BP Readings from Last 3 Encounters:   01/26/21 122/68   01/14/21 128/62   09/10/20 (!) 166/81                 Passed - Recent (12 mo) or future (30 days) visit within the authorizing provider's specialty     Patient has had an office visit with the authorizing provider or a provider within the authorizing providers department within the previous 12 mos or has a future within next 30 days. See \"Patient Info\" tab in inbasket, or \"Choose Columns\" in Meds & Orders section of the refill encounter.              Passed - Medication is active on med list        Passed - Patient is age 18 or older        Passed - No active pregnancy on record        Passed - Normal serum creatinine on file in past 12 months     Recent Labs   Lab Test 01/14/21  1603   CR 0.92       Ok to refill medication if creatinine is low          Passed - Normal serum potassium on file in past 12 months     Recent Labs   Lab Test 01/14/21  1603   POTASSIUM 4.0                    Passed - No positive pregnancy test in past 12 months           furosemide (LASIX) 20 MG tablet [Pharmacy Med Name: FUROSEMIDE 20MG TABS] 30 tablet 0     Sig: TAKE ONE TABLET BY MOUTH ONCE DAILY       Diuretics (Including Combos) Protocol Passed - 3/10/2021  9:45 AM        Passed - Blood pressure under 140/90 in past 12 months     BP Readings from Last 3 Encounters:   01/26/21 122/68   01/14/21 128/62   09/10/20 (!) 166/81                 Passed - Recent (12 mo) or future (30 days) visit within the authorizing provider's specialty     Patient has had an office visit with the authorizing provider or a provider within the authorizing providers " "department within the previous 12 mos or has a future within next 30 days. See \"Patient Info\" tab in inbasket, or \"Choose Columns\" in Meds & Orders section of the refill encounter.              Passed - Medication is active on med list        Passed - Patient is age 18 or older        Passed - No active pregancy on record        Passed - Normal serum creatinine on file in past 12 months     Recent Labs   Lab Test 01/14/21  1603   CR 0.92              Passed - Normal serum potassium on file in past 12 months     Recent Labs   Lab Test 01/14/21  1603   POTASSIUM 4.0                    Passed - Normal serum sodium on file in past 12 months     Recent Labs   Lab Test 01/14/21  1603                 Passed - No positive pregnancy test in past 12 months             "

## 2021-03-16 ENCOUNTER — TELEPHONE (OUTPATIENT)
Dept: FAMILY MEDICINE | Facility: CLINIC | Age: 86
End: 2021-03-16

## 2021-03-16 NOTE — TELEPHONE ENCOUNTER
[employer] One Portable Oxygen Concentrator order form signed and faxed back.  Forms placed in basket to save.  Lanette Hughes CSS on 3/16/2021 at 12:52 PM

## 2021-03-19 NOTE — TELEPHONE ENCOUNTER
Form placed in [place] basket got call from family saying that Michael told them it was not filled out all the way.     Lanette Hughes CSS on 3/19/2021 at 4:20 PM

## 2021-03-29 ENCOUNTER — ALLIED HEALTH/NURSE VISIT (OUTPATIENT)
Dept: AUDIOLOGY | Facility: CLINIC | Age: 86
End: 2021-03-29

## 2021-03-29 DIAGNOSIS — H90.3 SENSORINEURAL HEARING LOSS, ASYMMETRICAL: Primary | ICD-10-CM

## 2021-03-29 PROCEDURE — V5014 HEARING AID REPAIR/MODIFYING: HCPCS | Mod: LT | Performed by: AUDIOLOGIST

## 2021-03-29 PROCEDURE — 99207 PR NO CHARGE LOS: CPT | Performed by: AUDIOLOGIST

## 2021-03-29 NOTE — PROGRESS NOTES
[facility]        SUBJECTIVE:  Susan DELUNA Haq , 89 year old female requests in office repair of her left earmold. She reports the tubing is dislodged. She is not using the retraction string.     OBJECTIVE:  Replaced and glued in heavy tubing. Verified hearing aid functionality.      ASSESSMENT/PLAN:    Patient's guardian will  the repaired hearing aid at the specialty clinic .     Caridad Elizabeth M.A. -KIAN, #[id number]

## 2021-03-31 ENCOUNTER — PATIENT OUTREACH (OUTPATIENT)
Dept: NURSING | Facility: CLINIC | Age: 86
End: 2021-03-31
Payer: COMMERCIAL

## 2021-03-31 DIAGNOSIS — J44.9 CHRONIC OBSTRUCTIVE PULMONARY DISEASE, UNSPECIFIED COPD TYPE (H): Chronic | ICD-10-CM

## 2021-03-31 DIAGNOSIS — E11.9 TYPE 2 DIABETES MELLITUS WITHOUT COMPLICATION, WITHOUT LONG-TERM CURRENT USE OF INSULIN (H): Chronic | ICD-10-CM

## 2021-03-31 DIAGNOSIS — R60.0 PERIPHERAL EDEMA: ICD-10-CM

## 2021-03-31 RX ORDER — FUROSEMIDE 20 MG
TABLET ORAL
Qty: 30 TABLET | Refills: 0 | Status: SHIPPED | OUTPATIENT
Start: 2021-03-31 | End: 2021-04-23

## 2021-03-31 NOTE — TELEPHONE ENCOUNTER
"Requested Prescriptions   Pending Prescriptions Disp Refills     furosemide (LASIX) 20 MG tablet [Pharmacy Med Name: FUROSEMIDE 20MG TABS] 30 tablet 0     Sig: TAKE ONE TABLET BY MOUTH ONCE DAILY       Diuretics (Including Combos) Protocol Passed - 3/31/2021  9:54 AM        Passed - Blood pressure under 140/90 in past 12 months     BP Readings from Last 3 Encounters:   01/26/21 122/68   01/14/21 128/62   09/10/20 (!) 166/81                 Passed - Recent (12 mo) or future (30 days) visit within the authorizing provider's specialty     Patient has had an office visit with the authorizing provider or a provider within the authorizing providers department within the previous 12 mos or has a future within next 30 days. See \"Patient Info\" tab in inbasket, or \"Choose Columns\" in Meds & Orders section of the refill encounter.              Passed - Medication is active on med list        Passed - Patient is age 18 or older        Passed - No active pregancy on record        Passed - Normal serum creatinine on file in past 12 months     Recent Labs   Lab Test 01/14/21  1603   CR 0.92              Passed - Normal serum potassium on file in past 12 months     Recent Labs   Lab Test 01/14/21  1603   POTASSIUM 4.0                    Passed - Normal serum sodium on file in past 12 months     Recent Labs   Lab Test 01/14/21  1603                 Passed - No positive pregnancy test in past 12 months             "

## 2021-03-31 NOTE — TELEPHONE ENCOUNTER
Routing refill request to provider for review/approval because:  Sara given x1 and patient did not follow up, please advise    Renetta CHAVEZ MSN, RN

## 2021-03-31 NOTE — PROGRESS NOTES
Clinic Care Coordination Contact    Follow Up Progress Note      Assessment: [facility] contacted patient for pro-active monthly social work outreach. Patients advocate /caregiver Martina madrid took the call. Consent to communicate on file. Patient was able to get her nails clipped and they were able to get her a functioning oxygen tank. Patient has not been able to get her covid vaccine yet as they are waiting for the 1 dose option.     Goals addressed this encounter:   Goals Addressed                 This Visit's Progress      Medication 1 (pt-stated)   20%     Goal Statement: I will take care of myself  Date Goal set: 01/18/2021  Barriers: oxygen tank; medications  Strengths: support from care takers  Date to Achieve By: 04/18/21  Patient expressed understanding of goal: yes  Action steps to achieve this goal:  1. I will get and keep a functional oxygen tank  2. I will get my nails clipped  3. I will get my covid vaccine.               Intervention/Education provided during outreach: [facility] reviewed goals.          Plan:   Patient will get her covid vaccine.  Care Coordinator will follow up in 1 month.    ZONIA Cortez   [facility] Care Coordination  Tel: [phone]

## 2021-04-01 NOTE — TELEPHONE ENCOUNTER
Routing refill request to provider for review/approval because:  Was only ordered #30 with 0 R of each med at 1/14/21 DOMINIC TADEO, RN, BSN             Pupils equal, round and reactive to light, Extra-ocular movement intact, eyes are clear b/l

## 2021-04-13 DIAGNOSIS — G47.33 OSA (OBSTRUCTIVE SLEEP APNEA): Primary | ICD-10-CM

## 2021-04-23 DIAGNOSIS — I10 HYPERTENSION GOAL BP (BLOOD PRESSURE) < 140/90: Chronic | ICD-10-CM

## 2021-04-23 DIAGNOSIS — E11.9 TYPE 2 DIABETES MELLITUS WITHOUT COMPLICATION, WITHOUT LONG-TERM CURRENT USE OF INSULIN (H): Chronic | ICD-10-CM

## 2021-04-23 DIAGNOSIS — J44.9 CHRONIC OBSTRUCTIVE PULMONARY DISEASE, UNSPECIFIED COPD TYPE (H): Chronic | ICD-10-CM

## 2021-04-23 DIAGNOSIS — R60.0 PERIPHERAL EDEMA: ICD-10-CM

## 2021-04-23 RX ORDER — FUROSEMIDE 20 MG
TABLET ORAL
Qty: 30 TABLET | Refills: 0 | Status: SHIPPED | OUTPATIENT
Start: 2021-04-23 | End: 2021-05-11

## 2021-04-23 RX ORDER — LOSARTAN POTASSIUM 100 MG/1
TABLET ORAL
Qty: 30 TABLET | Refills: 0 | Status: SHIPPED | OUTPATIENT
Start: 2021-04-23 | End: 2021-05-11

## 2021-04-23 NOTE — LETTER
[facility]  [address]  [facility] [address]  [phone]        April 23, 2021  Susan Haq  [address]  [address]  [place] [address]    Dear Susan,    I care about your health and have reviewed your health plan. I have reviewed your medical conditions, medication list, and lab results and am making recommendations based on this review, to better manage your health.    You are in particular need of attention regarding:  -Medication follow up, recheck symptoms    I am recommending that you:  -schedule a FOLLOWUP OFFICE APPOINTMENT      Please call us at [phone] (or use [employer]) to address the above recommendations.     Thank you for trusting [facility] and we appreciate the opportunity to serve you.  We look forward to supporting your healthcare needs in the future.    Healthy Regards,      Ema Melendez NP/Soniya TADEO RN, BSN

## 2021-04-23 NOTE — TELEPHONE ENCOUNTER
Medications are being filled for 1 time refill only due to:  Patient needs to be seen because 1/14/21 OV note: Return in about 3 months (around 4/14/2021) for Recheck symptoms, Medication Follow up..     Letter mailed.    Soniya TADEO RN, BSN

## 2021-04-30 ENCOUNTER — PATIENT OUTREACH (OUTPATIENT)
Dept: NURSING | Facility: CLINIC | Age: 86
End: 2021-04-30
Payer: COMMERCIAL

## 2021-04-30 DIAGNOSIS — J84.10 INTERSTITIAL PULMONARY FIBROSIS (H): Chronic | ICD-10-CM

## 2021-04-30 DIAGNOSIS — R53.81 PHYSICAL DECONDITIONING: Primary | ICD-10-CM

## 2021-04-30 DIAGNOSIS — R60.0 BILATERAL LEG EDEMA: ICD-10-CM

## 2021-04-30 NOTE — LETTER
[facility] CARE COORDINATION  [address] [facility] [place] [address]    April 30, 2021        Susan DELUNA Haq  [address]  [address]  [place] [address]          Dear Janice Davis is an updated Complex Care Plan for your continued enrollment in Care Coordination. Please let us know if you have additional questions, concerns, or goals that we can assist with.    Sincerely,    ZONIA Cortez   [facility] Care Coordination  Tel: [phone]            [facility]  Complex Care Plan  About Me:    Patient Name:  Susan Haq    YOB: 1932  Age:         89 year old   [facility] MRN:    [id number] Telephone Information:  Home Phone [phone]   Mobile [phone]       Address:  [address]  [address]  [place] [address] Email address:  No e-mail address on record      Emergency Contact(s)    Name Relationship Lgl Grd Work Phone Home Phone Mobile Phone   1. HARDY ENNIS Friend    [phone]   2. SANFORD SANCHEZ Friend    [phone]           Primary language:  English     needed? No   [facility]:  [phone] op. 1  Other communication barriers:    Preferred Method of Communication:  Mail  Current living arrangement:    Mobility Status/ Medical Equipment:      Health Maintenance  Health Maintenance Reviewed:      My Access Plan  Medical Emergency 911   Primary Clinic Line [facility] - [phone]   24 Hour Appointment Line [phone] or  [phone]) (toll-free)   24 Hour Nurse Line [phone] (toll-free)   Preferred Urgent Care     Preferred Hospital     [facility] [place] [facility] [address] [facility], [address]     Behavioral Health Crisis Line The National Suicide Prevention Lifeline at 1-747.834.1203 or 911             My Care Team Members  Patient Care Team       Relationship Specialty Notifications Start End    Nora,  Ema Langley NP PCP - General Nurse Practitioner - Family  1/29/16     Phone: [phone] Fax: [phone]         [address] [facility] [address]    Ema Melendez NP Assigned PCP   10/27/19     Phone: [phone] Fax: [phone]         [address] [facility] [address]    Neal Alfonso MD Assigned Sleep Provider   10/23/20     Phone: [phone] Fax: [phone]         [address] [address]    Ophelia Samson LSW Lead Care Coordinator Primary Care - [facility] Admissions 1/18/21     Phone: [phone]         Libby Tapia Community Health Worker Primary Care - [facility]  3/31/21     Phone: [phone]                 My Care Plans  Self Management and Treatment Plan  Goals and (Comments)  Goals        General    Medication 1 (pt-stated)     Notes - Note edited  2/25/2021 10:08 AM by Ophelia Samson LSW    Goal Statement: I will take care of myself  Date Goal set: 01/18/2021  Barriers: oxygen tank; medications  Strengths: support from care takers  Date to Achieve By: 04/18/21  Patient expressed understanding of goal: yes  Action steps to achieve this goal:  1. I will get and keep a functional oxygen tank  2. I will get my nails clipped  3. I will get my covid vaccine.        Reducing Risks (pt-stated)     Notes - Note created  1/18/2021 11:52 AM by Ophelia Samson LSW    Goal Statement: I will participate in the Community Paramedic program.  Date Goal set: 01/18/2021  Barriers: Not familiar with the program  Strengths: willing to work with care team  Date to Achieve By: 04/18/2021  Patient expressed understanding of goal: yes  Action steps to achieve this goal:  1. I understand a referral was placed to the community paramedic and I will receive a call within 1 week.   2. I will work with the community paramedic to set up an initial visit.  I will call my  CC if I have not received a visit within 1-2 weeks.               Action Plans on File:                       Advance Care  Plans/Directives Type:        My Medical and Care Information  Problem List   Patient Active Problem List   Diagnosis     Hyperlipidemia LDL goal <130     Insomnia     Osteopenia     Hypertension goal BP (blood pressure) < 140/90     Advance care planning     PVC's (premature ventricular contractions)     SUSSY (obstructive sleep apnea)-Moderately severe (AHI 21)     Bilateral leg edema     HL (hearing loss)     SNHL (sensorineural hearing loss)     Interstitial pulmonary fibrosis (H)     Chronic obstructive pulmonary disease, unspecified COPD type (H)     Major depressive disorder, single episode, moderate (H)     Risk for falls     Alcohol abuse     Morbid obesity (H)     Physical deconditioning     Chronic obstructive pulmonary disease with acute exacerbation (H)     CKD (chronic kidney disease) stage 3, GFR 30-59 ml/min     Chronic respiratory failure with hypoxia (H)      Current Medications and Allergies:  See printed Medication Report.    Care Coordination Start Date: No linked episodes   Frequency of Care Coordination:     Form Last Updated: 04/30/2021

## 2021-05-04 ENCOUNTER — IMMUNIZATION (OUTPATIENT)
Dept: FAMILY MEDICINE | Facility: CLINIC | Age: 86
End: 2021-05-04
Payer: COMMERCIAL

## 2021-05-04 PROCEDURE — 0011A PR COVID VAC MODERNA 100 MCG/0.5 ML IM: CPT

## 2021-05-04 PROCEDURE — 91301 PR COVID VAC MODERNA 100 MCG/0.5 ML IM: CPT

## 2021-05-11 ENCOUNTER — ANCILLARY PROCEDURE (OUTPATIENT)
Dept: GENERAL RADIOLOGY | Facility: CLINIC | Age: 86
End: 2021-05-11
Attending: NURSE PRACTITIONER
Payer: COMMERCIAL

## 2021-05-11 ENCOUNTER — OFFICE VISIT (OUTPATIENT)
Dept: FAMILY MEDICINE | Facility: CLINIC | Age: 86
End: 2021-05-11
Payer: COMMERCIAL

## 2021-05-11 VITALS
DIASTOLIC BLOOD PRESSURE: 62 MMHG | HEART RATE: 58 BPM | SYSTOLIC BLOOD PRESSURE: 134 MMHG | OXYGEN SATURATION: 97 % | TEMPERATURE: 96.4 F | RESPIRATION RATE: 16 BRPM

## 2021-05-11 DIAGNOSIS — E11.9 TYPE 2 DIABETES MELLITUS WITHOUT COMPLICATION, WITHOUT LONG-TERM CURRENT USE OF INSULIN (H): Chronic | ICD-10-CM

## 2021-05-11 DIAGNOSIS — R33.9 URINARY RETENTION: ICD-10-CM

## 2021-05-11 DIAGNOSIS — J84.10 INTERSTITIAL PULMONARY FIBROSIS (H): Chronic | ICD-10-CM

## 2021-05-11 DIAGNOSIS — R39.9 URINARY SYMPTOM OR SIGN: Primary | ICD-10-CM

## 2021-05-11 DIAGNOSIS — R53.81 PHYSICAL DECONDITIONING: ICD-10-CM

## 2021-05-11 DIAGNOSIS — J44.9 CHRONIC OBSTRUCTIVE PULMONARY DISEASE, UNSPECIFIED COPD TYPE (H): Chronic | ICD-10-CM

## 2021-05-11 DIAGNOSIS — R82.90 NONSPECIFIC FINDING ON EXAMINATION OF URINE: ICD-10-CM

## 2021-05-11 DIAGNOSIS — E87.6 HYPOKALEMIA: ICD-10-CM

## 2021-05-11 DIAGNOSIS — R60.0 PERIPHERAL EDEMA: ICD-10-CM

## 2021-05-11 DIAGNOSIS — I10 HYPERTENSION GOAL BP (BLOOD PRESSURE) < 140/90: Chronic | ICD-10-CM

## 2021-05-11 DIAGNOSIS — R21 RASH AND NONSPECIFIC SKIN ERUPTION: ICD-10-CM

## 2021-05-11 DIAGNOSIS — R60.0 BILATERAL LEG EDEMA: ICD-10-CM

## 2021-05-11 DIAGNOSIS — F32.1 MAJOR DEPRESSIVE DISORDER, SINGLE EPISODE, MODERATE (H): Chronic | ICD-10-CM

## 2021-05-11 LAB
ALBUMIN UR-MCNC: NEGATIVE MG/DL
ANION GAP SERPL CALCULATED.3IONS-SCNC: 6 MMOL/L (ref 3–14)
APPEARANCE UR: ABNORMAL
BACTERIA #/AREA URNS HPF: ABNORMAL /HPF
BILIRUB UR QL STRIP: NEGATIVE
BUN SERPL-MCNC: 18 MG/DL (ref 7–30)
CALCIUM SERPL-MCNC: 8.8 MG/DL (ref 8.5–10.1)
CHLORIDE SERPL-SCNC: 104 MMOL/L (ref 94–109)
CO2 SERPL-SCNC: 28 MMOL/L (ref 20–32)
COLOR UR AUTO: YELLOW
CREAT SERPL-MCNC: 0.85 MG/DL (ref 0.52–1.04)
GFR SERPL CREATININE-BSD FRML MDRD: 61 ML/MIN/{1.73_M2}
GLUCOSE SERPL-MCNC: 112 MG/DL (ref 70–99)
GLUCOSE UR STRIP-MCNC: NEGATIVE MG/DL
HGB UR QL STRIP: NEGATIVE
KETONES UR STRIP-MCNC: NEGATIVE MG/DL
LEUKOCYTE ESTERASE UR QL STRIP: ABNORMAL
NITRATE UR QL: NEGATIVE
NON-SQ EPI CELLS #/AREA URNS LPF: ABNORMAL /LPF
PH UR STRIP: 6 PH (ref 5–7)
POTASSIUM SERPL-SCNC: 3.9 MMOL/L (ref 3.4–5.3)
RBC #/AREA URNS AUTO: ABNORMAL /HPF
SODIUM SERPL-SCNC: 138 MMOL/L (ref 133–144)
SOURCE: ABNORMAL
SP GR UR STRIP: 1.01 (ref 1–1.03)
T4 FREE SERPL-MCNC: 0.95 NG/DL (ref 0.76–1.46)
TSH SERPL DL<=0.005 MIU/L-ACNC: 5.78 MU/L (ref 0.4–4)
UROBILINOGEN UR STRIP-ACNC: 0.2 EU/DL (ref 0.2–1)
VIT B12 SERPL-MCNC: 384 PG/ML (ref 193–986)
WBC #/AREA URNS AUTO: ABNORMAL /HPF

## 2021-05-11 PROCEDURE — 87186 SC STD MICRODIL/AGAR DIL: CPT | Performed by: NURSE PRACTITIONER

## 2021-05-11 PROCEDURE — 96127 BRIEF EMOTIONAL/BEHAV ASSMT: CPT | Mod: 59 | Performed by: NURSE PRACTITIONER

## 2021-05-11 PROCEDURE — 71046 X-RAY EXAM CHEST 2 VIEWS: CPT | Performed by: RADIOLOGY

## 2021-05-11 PROCEDURE — 82607 VITAMIN B-12: CPT | Performed by: NURSE PRACTITIONER

## 2021-05-11 PROCEDURE — 36415 COLL VENOUS BLD VENIPUNCTURE: CPT | Performed by: NURSE PRACTITIONER

## 2021-05-11 PROCEDURE — 82306 VITAMIN D 25 HYDROXY: CPT | Performed by: NURSE PRACTITIONER

## 2021-05-11 PROCEDURE — 87086 URINE CULTURE/COLONY COUNT: CPT | Performed by: NURSE PRACTITIONER

## 2021-05-11 PROCEDURE — 81001 URINALYSIS AUTO W/SCOPE: CPT | Performed by: NURSE PRACTITIONER

## 2021-05-11 PROCEDURE — 80048 BASIC METABOLIC PNL TOTAL CA: CPT | Performed by: NURSE PRACTITIONER

## 2021-05-11 PROCEDURE — 84443 ASSAY THYROID STIM HORMONE: CPT | Performed by: NURSE PRACTITIONER

## 2021-05-11 PROCEDURE — 84439 ASSAY OF FREE THYROXINE: CPT | Performed by: NURSE PRACTITIONER

## 2021-05-11 PROCEDURE — 99215 OFFICE O/P EST HI 40 MIN: CPT | Performed by: NURSE PRACTITIONER

## 2021-05-11 PROCEDURE — 87088 URINE BACTERIA CULTURE: CPT | Performed by: NURSE PRACTITIONER

## 2021-05-11 RX ORDER — POTASSIUM CHLORIDE 1500 MG/1
20 TABLET, EXTENDED RELEASE ORAL DAILY
Qty: 90 TABLET | Refills: 1 | Status: SHIPPED | OUTPATIENT
Start: 2021-05-11 | End: 2021-08-31

## 2021-05-11 RX ORDER — AMLODIPINE BESYLATE 10 MG/1
10 TABLET ORAL DAILY
Qty: 90 TABLET | Refills: 3 | Status: SHIPPED | OUTPATIENT
Start: 2021-05-11

## 2021-05-11 RX ORDER — ESCITALOPRAM OXALATE 5 MG/1
10 TABLET ORAL DAILY
Qty: 60 TABLET | Refills: 3 | Status: CANCELLED | OUTPATIENT
Start: 2021-05-11

## 2021-05-11 RX ORDER — ATENOLOL 50 MG/1
50 TABLET ORAL 2 TIMES DAILY
Qty: 180 TABLET | Refills: 3 | Status: SHIPPED | OUTPATIENT
Start: 2021-05-11 | End: 2021-12-29

## 2021-05-11 RX ORDER — ALBUTEROL SULFATE 90 UG/1
2 AEROSOL, METERED RESPIRATORY (INHALATION) EVERY 6 HOURS PRN
Qty: 8.5 G | Refills: 3 | Status: SHIPPED | OUTPATIENT
Start: 2021-05-11 | End: 2021-05-11

## 2021-05-11 RX ORDER — ALBUTEROL SULFATE 90 UG/1
AEROSOL, METERED RESPIRATORY (INHALATION)
Qty: 8.5 G | Refills: 2 | Status: SHIPPED | OUTPATIENT
Start: 2021-05-11

## 2021-05-11 RX ORDER — TIOTROPIUM BROMIDE 18 UG/1
CAPSULE ORAL; RESPIRATORY (INHALATION)
Qty: 90 CAPSULE | Refills: 3 | Status: SHIPPED | OUTPATIENT
Start: 2021-05-11 | End: 2021-12-29

## 2021-05-11 RX ORDER — LOSARTAN POTASSIUM 100 MG/1
100 TABLET ORAL DAILY
Qty: 90 TABLET | Refills: 3 | Status: SHIPPED | OUTPATIENT
Start: 2021-05-11 | End: 2021-12-29

## 2021-05-11 RX ORDER — FUROSEMIDE 20 MG
20 TABLET ORAL DAILY
Qty: 90 TABLET | Refills: 1 | Status: SHIPPED | OUTPATIENT
Start: 2021-05-11 | End: 2021-12-29

## 2021-05-11 ASSESSMENT — ANXIETY QUESTIONNAIRES
1. FEELING NERVOUS, ANXIOUS, OR ON EDGE: NOT AT ALL
6. BECOMING EASILY ANNOYED OR IRRITABLE: NOT AT ALL
3. WORRYING TOO MUCH ABOUT DIFFERENT THINGS: NOT AT ALL
GAD7 TOTAL SCORE: 0
5. BEING SO RESTLESS THAT IT IS HARD TO SIT STILL: NOT AT ALL
7. FEELING AFRAID AS IF SOMETHING AWFUL MIGHT HAPPEN: NOT AT ALL
2. NOT BEING ABLE TO STOP OR CONTROL WORRYING: NOT AT ALL

## 2021-05-11 ASSESSMENT — PATIENT HEALTH QUESTIONNAIRE - PHQ9
5. POOR APPETITE OR OVEREATING: NOT AT ALL
SUM OF ALL RESPONSES TO PHQ QUESTIONS 1-9: 16

## 2021-05-11 NOTE — PROGRESS NOTES
Assessment & Plan     Interstitial pulmonary fibrosis (H)  Per caregivers/patient on 4 liters and with intermittent cough - unsure if worsening from baseline.  Chest xray today to assess function/ongoing cough.  Chest xray personally reviewed and no infiltrates or effusions seen.    - XR Chest 2 Views    Chronic obstructive pulmonary disease, unspecified COPD type (H)  Stable - discussed use of oxygen and O2 sat goals.  Ambulation tolerance poor - declines pulmonary rehab.  Continue inhalers.    - albuterol (PROAIR HFA/PROVENTIL HFA/VENTOLIN HFA) 108 (90 Base) MCG/ACT inhaler  Dispense: 8.5 g; Refill: 2  - tiotropium (SPIRIVA HANDIHALER) 18 MCG inhaled capsule  Dispense: 90 capsule; Refill: 3  - XR Chest 2 Views    Hypertension goal BP (blood pressure) < 140/90  Controlled.    - amLODIPine (NORVASC) 10 MG tablet  Dispense: 90 tablet; Refill: 3  - losartan (COZAAR) 100 MG tablet  Dispense: 90 tablet; Refill: 3  - atenolol (TENORMIN) 50 MG tablet  Dispense: 180 tablet; Refill: 3    Rash and nonspecific skin eruption     - camphor-menthol (DERMASARRA) 0.5-0.5 % external lotion  Dispense: 222 mL; Refill: 1    Type 2 diabetes mellitus without complication, without long-term current use of insulin (H)  Controlled.    - losartan (COZAAR) 100 MG tablet  Dispense: 90 tablet; Refill: 3    Major depressive disorder, single episode, moderate (H)  Worsening - Lexapro ineffective.  Switch to Fluoxetine - and follow up in 1 month with me in clinic or virtual.  Monitor for worsening moods or new SI.    - TSH with free T4 reflex  - Vitamin D Deficiency  - FLUoxetine (PROZAC) 20 MG capsule  Dispense: 90 capsule; Refill: 0    Peripheral edema  Monitor this. Ambulation tolerance is poor.    Urinary symptom or sign  UA done today.  History of recurent urinary tract infections.  Increase fluids.    - *UA reflex to Microscopic and Culture ([place] [facility] (except [place] and [place])  - Urine Microscopic  - T4  free    Physical deconditioning     - Vitamin D Deficiency  - Vitamin B12    Hypokalemia       Urinary retention     - *UA reflex to Microscopic and Culture ([place] [facility] (except [place] and [place])    Bilateral leg edema   Has not been taking her Furosemide daily.  Reviewed last BMP which was stable.  Recheck this today.  If normal cr+ would continue Furosemide daily.  Monitor weights and edema at home and send follow up in 2-3 weeks.    - Basic metabolic panel  (Ca, Cl, CO2, Creat, Gluc, K, Na, BUN)  - furosemide (LASIX) 20 MG tablet  Dispense: 90 tablet; Refill: 1  - potassium chloride ER (K-TAB) 20 MEQ CR tablet  Dispense: 90 tablet; Refill: 1  - XR Chest 2 Views    Body mass index (BMI) 36.0-36.9, adult      - Vitamin D Deficiency    Nonspecific finding on examination of urine     - Urine Culture Aerobic Bacterial          Depression Screening Follow Up    PHQ 5/11/2021   PHQ-9 Total Score 16   Q9: Thoughts of better off dead/self-harm past 2 weeks Several days     Last PHQ-9 5/11/2021   1.  Little interest or pleasure in doing things 3   2.  Feeling down, depressed, or hopeless 3   3.  Trouble falling or staying asleep, or sleeping too much 3   4.  Feeling tired or having little energy 3   5.  Poor appetite or overeating 0   6.  Feeling bad about yourself 3   7.  Trouble concentrating 0   8.  Moving slowly or restless 0   Q9: Thoughts of better off dead/self-harm past 2 weeks 1   PHQ-9 Total Score 16   Difficulty at work, home, or with people Not difficult at all          No flowsheet data found.      Follow Up      Follow Up Actions Taken  Mental Health Referral placed  Patient declined referral.    Discussed the following ways the patient can remain in a safe environment:  dispose of old medications  and be around others  Patient Instructions   For swelling in legs/blood pressure:  Continue medications as prescribed.  Stop Hydrochlorothiazide and continue Furosemide 20 mg once daily -  every day.  Take Potassium 20 meq once daily every day.    Monitor blood pressure - goal to have this < 140/90.  Monitor weights at home with daily water pill (Furosemide/Lasix).  Monitor swelling.    Lymphedema wraps.    Oxygen at 3 liters or to keep oxygen saturation at 92%.    For depression :  Stop Lexapro - start Fluoxetine 20 mg capsule once daily in am.  Take with food.    Recheck symptoms in 1 month - can do virtual visit for follow up if needed.    Labs and chest xray today - will message with results.    ANNETTA Hoover        Return in about 4 weeks (around 6/8/2021) for Medication Follow up, Recheck symptoms, BP Recheck.    Ema Melendez NP  [facility] Allen is a 89 year old who presents for the following health issues     HPI     Hypertension Follow-up      Do you check your blood pressure regularly outside of the clinic? No     Are you following a low salt diet? Yes    Are your blood pressures ever more than 140 on the top number (systolic) OR more   than 90 on the bottom number (diastolic), for example 140/90? No    COPD Follow-Up    Overall, how are your COPD symptoms since your last clinic visit?  Slightly worse    How much fatigue or shortness of breath do you have when you are walking?  More than usual    How much shortness of breath do you have when you are resting?  More than usual    How often do you cough? Rarely    Have you noticed any change in your sputum/phlegm?  Yes- think that she has to clear her throat more and it is thicker    Have you experienced a recent fever? No    Please describe how far you can walk without stopping to rest:  Less than 10 feet    How many flights of stairs are you able to walk up without stopping?  None - also does not need to or have any     Have you had any Emergency Room Visits, Urgent Care Visits, or Hospital Admissions because of your COPD since your last office visit?  No     Hasnt used her nebulizer for  "approx 1 mo. States it does not do anything for her.     Currently using a walker at home - having more SOB upon exertion and moving around - currently has 3 inhalers and would like to discuss more options as far as treatment for her COPD as she feels is has gotten worse.     History   Smoking Status     Former Smoker     Packs/day: 1.00     Years: 35.00     Quit date: 1990   Smokeless Tobacco     Former User     Lab Results   Component Value Date    FEV1 1.31 10/07/2013    VTM7PGV 44% 10/07/2013     Depression Followup    How are you doing with your depression since your last visit? Worsened - Emma states that every day she says that she doesn't care if she is alive or dead. She doesn't like to get out of bed in the mornings. Just feels \"blah and wants to sleep her life away\". She states if she didn't have Trina and Martina coming she would sleep her life away.     Are you having other symptoms that might be associated with depression? No    Have you had a significant life event?  No     Are you feeling anxious or having panic attacks?   No    Do you have any concerns with your use of alcohol or other drugs? No    Social History     Tobacco Use     Smoking status: Former Smoker     Packs/day: 1.00     Years: 35.00     Pack years: 35.00     Quit date: 1990     Years since quittin.3     Smokeless tobacco: Former User   Substance Use Topics     Alcohol use: Yes     Comment: Drink 1 (3oz) drink a day     Drug use: No     PHQ 2018   PHQ-9 Total Score 17 21 16   Q9: Thoughts of better off dead/self-harm past 2 weeks Several days Several days Several days     MELISSA-7 SCORE 2018   Total Score 1 0 0         Suicide Assessment Five-step Evaluation and Treatment (SAFE-T)        How many servings of fruits and vegetables do you eat daily?  2-3    On average, how many sweetened beverages do you drink each day (Examples: soda, juice, sweet tea, etc.  Do NOT count " diet or artificially sweetened beverages)?   3 baileys, kaluha, and coffee.     How many days per week do you exercise enough to make your heart beat faster? 3 or less    How many minutes a day do you exercise enough to make your heart beat faster? 9 or less  How many days per week do you miss taking your medication? Once in a while has trouble remembering when to take the night time meds. Always remembers the morning medications.     What makes it hard for you to take your medications?  remembering to take, admits to having difficulty taking night time medications every once in a while    Medication Followup of Cirojaiden Keke     Taking Medication as prescribed: yes    Side Effects:  None    Medication Helping Symptoms:  yes     URINARY TRACT SYMPTOMS      Duration: a few weeks     Description  Urinary retention - states it is only in the mornings     Intensity:  mild    Accompanying signs and symptoms:  Fever/chills: no   Flank pain no   Nausea and vomiting: no   Vaginal symptoms: none  Abdominal/Pelvic Pain: no     History   History of frequent UTI's: YES  History of kidney stones: no   Sexually Active: no   Possibility of pregnancy: No    Precipitating or alleviating factors: None    Therapies tried and outcome: none   Outcome: NA        Review of Systems   CONSTITUTIONAL:NEGATIVE for fever, chills, change in weight and POS for fatigue  INTEGUMENTARY/SKIN: NEGATIVE for worrisome rashes, moles or lesions  EYES: NEGATIVE for vision changes or irritation  RESP:NEGATIVE for worsening shortness of breath or productive cough POS for dry intermittent cough, LUCIO and at rest - baseline, on 4 liters sat 98% at rest, RR 22,   CV: NEGATIVE for chest pain, palpitations POS for peripheral edema left > right  GI: NEGATIVE for nausea, abdominal pain, heartburn, or change in bowel habits  MUSCULOSKELETAL: NEGATIVE for significant arthralgias or myalgia  NEURO: NEGATIVE for weakness, dizziness or paresthesias  ENDOCRINE: NEGATIVE  for temperature intolerance, skin/hair changes  PSYCHIATRIC: POS for depressive symptoms - worsening, fatigue      Objective    /62 (BP Location: Left arm, Patient Position: Sitting, Cuff Size: Adult Large)   Pulse 58   Temp 96.4  F (35.8  C) (Tympanic)   Resp 16   SpO2 97%   There is no height or weight on file to calculate BMI.  Physical Exam   GENERAL: alert, no distress and obese  EYES: Eyes grossly normal to inspection, PERRL and conjunctivae and sclerae normal  HENT: ear canals and TM's normal, nose and mouth without ulcers or lesions  NECK: no adenopathy, no asymmetry, masses, or scars and thyroid normal to palpation  RESP: no rales , no rhonchi, expiratory wheezes R upper posterior and L upper posterior, prolonged expiratory phase, decreased breath sounds throughout and O2 at 4 liters - sat 98%, RR 22, no dyspnea at rest, occasional harsh dry cough.  CV: regular rates and rhythm, normal S1 S2, no S3 or S4, no murmur, click or rub, peripheral pulses strong and pitting + left > right  lower extremity pitting edema to ankles    ABDOMEN: soft, nontender, no hepatosplenomegaly, no masses and bowel sounds normal  MS: no gross musculoskeletal defects noted, no edema  SKIN: no suspicious lesions or rashes  NEURO: Normal strength and tone, mentation intact and speech normal  PSYCH: mentation appears normal, affect normal/bright    Xray - Reviewed and interpreted by me.  Negative for infiltrate or effusion    Total times spent with patient 40 minutes of which > 50% of the time was spent counseling and coordination of care discussion of medication changes, COPD, oxygen use/sats, follow up and recommendations.

## 2021-05-11 NOTE — LETTER
May 13, 2021      Susan Haq  [address]  [address]  [place] [address]        Dear ,    We are writing to inform you of your test results.    All of your lab results are normal.     Resulted Orders   *UA reflex to Microscopic and Culture ([facility] (except [place] and [place])   Result Value Ref Range    Color Urine Yellow     Appearance Urine Cloudy     Glucose Urine Negative NEG^Negative mg/dL    Bilirubin Urine Negative NEG^Negative    Ketones Urine Negative NEG^Negative mg/dL    Specific Gravity Urine 1.010 1.003 - 1.035    Blood Urine Negative NEG^Negative    pH Urine 6.0 5.0 - 7.0 pH    Protein Albumin Urine Negative NEG^Negative mg/dL    Urobilinogen Urine 0.2 0.2 - 1.0 EU/dL    Nitrite Urine Negative NEG^Negative    Leukocyte Esterase Urine Small (A) NEG^Negative    Source Midstream Urine    TSH with free T4 reflex   Result Value Ref Range    TSH 5.78 (H) 0.40 - 4.00 mU/L   Vitamin D Deficiency   Result Value Ref Range    Vitamin D Deficiency screening 43 20 - 75 ug/L      Comment:      Season, race, dietary intake, and treatment affect the concentration of   25-hydroxy-Vitamin D. Values may decrease during winter months and increase   during summer months. Values 20-29 ug/L may indicate Vitamin D insufficiency   and values <20 ug/L may indicate Vitamin D deficiency.  Vitamin D determination is routinely performed by an immunoassay specific for   25 hydroxyvitamin D3.  If an individual is on vitamin D2 (ergocalciferol)   supplementation, please specify 25 OH vitamin D2 and D3 level determination by   LCMSMS test VITD23.     Vitamin B12   Result Value Ref Range    Vitamin B12 384 193 - 986 pg/mL   Basic metabolic panel  (Ca, Cl, CO2, Creat, Gluc, K, Na, BUN)   Result Value Ref Range    Sodium 138 133 - 144 mmol/L    Potassium 3.9 3.4 - 5.3 mmol/L    Chloride 104 94 - 109 mmol/L    Carbon Dioxide 28 20 - 32 mmol/L    Anion Gap 6 3 - 14 mmol/L    Glucose 112 (H)  70 - 99 mg/dL    Urea Nitrogen 18 7 - 30 mg/dL    Creatinine 0.85 0.52 - 1.04 mg/dL    GFR Estimate 61 >60 mL/min/[1.73_m2]      Comment:      Non  GFR Calc  Starting 12/18/2018, serum creatinine based estimated GFR (eGFR) will be   calculated using the Chronic Kidney Disease Epidemiology Collaboration   (CKD-EPI) equation.      GFR Estimate If Black 70 >60 mL/min/[1.73_m2]      Comment:       GFR Calc  Starting 12/18/2018, serum creatinine based estimated GFR (eGFR) will be   calculated using the Chronic Kidney Disease Epidemiology Collaboration   (CKD-EPI) equation.      Calcium 8.8 8.5 - 10.1 mg/dL   Urine Microscopic   Result Value Ref Range    WBC Urine 10-25 (A) OTO5^0 - 5 /HPF    RBC Urine O - 2 OTO2^O - 2 /HPF    Squamous Epithelial /LPF Urine Many (A) FEW^Few /LPF    Bacteria Urine Many (A) NEG^Negative /HPF   Urine Culture Aerobic Bacterial   Result Value Ref Range    Specimen Description Midstream Urine     Culture Micro Culture in progress    T4 free   Result Value Ref Range    T4 Free 0.95 0.76 - 1.46 ng/dL     If you have any questions or concerns, please call the clinic at the number listed above.       Sincerely,      Ema Melendez NP

## 2021-05-11 NOTE — PATIENT INSTRUCTIONS
For swelling in legs/blood pressure:  Continue medications as prescribed.  Stop Hydrochlorothiazide and continue Furosemide 20 mg once daily - every day.  Take Potassium 20 meq once daily every day.    Monitor blood pressure - goal to have this < 140/90.  Monitor weights at home with daily water pill (Furosemide/Lasix).  Monitor swelling.    Lymphedema wraps.    Oxygen at 3 liters or to keep oxygen saturation at 92%.    For depression :  Stop Lexapro - start Fluoxetine 20 mg capsule once daily in am.  Take with food.    Recheck symptoms in 1 month - can do virtual visit for follow up if needed.    Labs and chest xray today - will message with results.    ANNETTA Hoover

## 2021-05-12 LAB — DEPRECATED CALCIDIOL+CALCIFEROL SERPL-MC: 43 UG/L (ref 20–75)

## 2021-05-12 ASSESSMENT — ANXIETY QUESTIONNAIRES: GAD7 TOTAL SCORE: 0

## 2021-05-15 LAB
BACTERIA SPEC CULT: ABNORMAL
BACTERIA SPEC CULT: ABNORMAL
SPECIMEN SOURCE: ABNORMAL

## 2021-05-18 DIAGNOSIS — N39.0 URINARY TRACT INFECTION WITHOUT HEMATURIA, SITE UNSPECIFIED: Primary | ICD-10-CM

## 2021-05-18 RX ORDER — CEFDINIR 300 MG/1
300 CAPSULE ORAL 2 TIMES DAILY
Qty: 10 CAPSULE | Refills: 0 | Status: SHIPPED | OUTPATIENT
Start: 2021-05-18 | End: 2021-05-23

## 2021-05-20 ENCOUNTER — TELEPHONE (OUTPATIENT)
Dept: FAMILY MEDICINE | Facility: CLINIC | Age: 86
End: 2021-05-20

## 2021-05-20 NOTE — TELEPHONE ENCOUNTER
Reason for Call:  Home Health Care    Caroline with [facility] called regarding (reason for call): Verbal    Orders are needed for this patient.   Verbal Lymphedema Therapy 3 times a week for 3 weeks    Phone Number Homecare Nurse can be reached at: [phone]    Can we leave a detailed message on this number? YES    Phone number patient can be reached at: Home number on file [phone] (home)    Best Time: any    Call taken on 5/20/2021 at 1:57 PM by Lanette Pop

## 2021-05-28 NOTE — PROGRESS NOTES
CHW on care team.     CHW will continue with outreaches at this time. CHW will inform SW CC of any concerns or changes regarding patient as needed.     SW CC will chart review every 6 weeks and outreach to patient as needed. Moved next outreach date    ZONIA Manley   Social Work Clinic Care Coordinator   [facility]  [phone]  [email or web address]

## 2021-06-01 ENCOUNTER — IMMUNIZATION (OUTPATIENT)
Dept: FAMILY MEDICINE | Facility: CLINIC | Age: 86
End: 2021-06-01
Attending: FAMILY MEDICINE
Payer: COMMERCIAL

## 2021-06-01 PROCEDURE — 91301 PR COVID VAC MODERNA 100 MCG/0.5 ML IM: CPT

## 2021-06-01 PROCEDURE — 0012A PR COVID VAC MODERNA 100 MCG/0.5 ML IM: CPT

## 2021-06-02 ENCOUNTER — PATIENT OUTREACH (OUTPATIENT)
Dept: CARE COORDINATION | Facility: CLINIC | Age: 86
End: 2021-06-02

## 2021-06-02 NOTE — PROGRESS NOTES
Clinic Care Coordination Contact  [facility]/Voicemail     Clinical Data: Care Coordination Outreach  Outreach attempted x 1.  Left message on patient's caregiver's voicemail with call back information and requested return call.  Plan: CHW will try to reach Martina again in 3-5 business days.    Libby MEADE  Community Health Worker  [facility] Care Coordination  [facility]  [email or web address]   Office: [phone]

## 2021-06-03 ENCOUNTER — TELEPHONE (OUTPATIENT)
Dept: FAMILY MEDICINE | Facility: CLINIC | Age: 86
End: 2021-06-03

## 2021-06-03 DIAGNOSIS — R60.0 PERIPHERAL EDEMA: Primary | ICD-10-CM

## 2021-06-03 NOTE — TELEPHONE ENCOUNTER
Reason for Call:  Home Health Care    Caroline with [facility] called regarding (reason for call): Verbal orders    Orders are needed for this patient.     Calling to get verbal orders to continue lymphedema treatment 2x a week for 3 weeks.       Pt Provider: Ema Melendez    Phone Number Homecare Nurse can be reached at:  [phone]    Can we leave a detailed message on this number? YES    Call taken on 6/3/2021 at 1:06 PM by Juany Gutierrez

## 2021-06-04 DIAGNOSIS — F32.1 MAJOR DEPRESSIVE DISORDER, SINGLE EPISODE, MODERATE (H): Chronic | ICD-10-CM

## 2021-06-04 RX ORDER — ESCITALOPRAM OXALATE 5 MG/1
TABLET ORAL
Qty: 60 TABLET | Refills: 3 | OUTPATIENT
Start: 2021-06-04

## 2021-06-09 ENCOUNTER — PATIENT OUTREACH (OUTPATIENT)
Dept: NURSING | Facility: CLINIC | Age: 86
End: 2021-06-09
Payer: COMMERCIAL

## 2021-06-09 NOTE — PROGRESS NOTES
Clinic Care Coordination Contact  Community Health Worker Follow Up    Goals: Not discussed today.     Intervention and Education during outreach: Monthly    CHW Plan: CHW will call Martina Pickard [phone] caregiver/friend again in 1 week per her request, she was at the store and could not update on patient's goals/progress. Patient CESAR Gradywna DESHAUN  Community Health Worker  [facility]  Clinic Care Coordination  [facility]  [email or web address]   Office: [phone]

## 2021-06-17 NOTE — PROGRESS NOTES
Clinic Care Coordination Contact  [facility]/Voicemail    Clinical Data: Care Coordination Outreach  Outreach attempted.  Left message on patient's caregiver Martina's voicemail with call back information and requested return call.  Plan: CHW will try to reach Martina again in 5-10 business days.    Libby MEADE  Community Health Worker  [facility] Care Coordination  [facility]  [email or web address]   Office: [phone]

## 2021-06-18 ENCOUNTER — PATIENT OUTREACH (OUTPATIENT)
Dept: CARE COORDINATION | Facility: CLINIC | Age: 86
End: 2021-06-18

## 2021-06-18 DIAGNOSIS — J44.9 CHRONIC OBSTRUCTIVE PULMONARY DISEASE, UNSPECIFIED COPD TYPE (H): Chronic | ICD-10-CM

## 2021-06-18 RX ORDER — BUDESONIDE AND FORMOTEROL FUMARATE DIHYDRATE 160; 4.5 UG/1; UG/1
AEROSOL RESPIRATORY (INHALATION)
Qty: 10.2 G | Refills: 11 | Status: SHIPPED | OUTPATIENT
Start: 2021-06-18

## 2021-06-18 NOTE — PROGRESS NOTES
Clinic Care Coordination Contact    Situation: Patient chart reviewed by care coordinator.     Background: Care Coordination following patient to assist with Goal(s).     Assessment: Per chart review, CC CHW in process of contacting patient. Next CHW outreach is scheduled for 6/23/21. Goal due date is overdue.      Plan/Recommendations:  CC will await update following CC CHW patient outreach.      ZONIA Manley   Social Work Clinic Care Coordinator   [facility]  [phone]  [email or web address]

## 2021-06-29 ENCOUNTER — PATIENT OUTREACH (OUTPATIENT)
Dept: NURSING | Facility: CLINIC | Age: 86
End: 2021-06-29
Payer: COMMERCIAL

## 2021-06-29 NOTE — PROGRESS NOTES
Clinic Care Coordination Contact  Community Health Worker Follow Up    Goals:   Goals Addressed as of 6/29/2021 at 6:41 PM                 Today    4/30/21       Patient Stated      1. Medication 1 (pt-stated)   90%  80%1     Added 1/18/21 by Ophelia Samson LSW     Goal Statement: I will take care of myself.  Date Goal set: 01/18/2021  Barriers: oxygen tank; medications  Strengths: support from care takers  Date to Achieve By: 04/18/21 // extended to 6/30/21  Patient expressed understanding of goal: yes    Action steps to achieve this goal:  1. I will get and keep a functional oxygen tank. (Completed)  2. I will get my nails clipped.  3. I will get my COVID-19 vaccine. (Completed)             1  Progress evaluated against: Medication 1   CHW Outreach Interval: Monthly     Intervention and Education during outreach:  CHW spoke to patient's friend/cargiver Martina today for care coordination monthly outreach and Martina was not aware patient had / still has services through community paramedic program. Martina stated she is feeling worn out as she has difficulty helping patient with personal hygiene tasks (I.e bathing, nails) and said it takes her forever to assist with compression socks. Martina stated she doesn't understand why home care resolved patient and that Susan needs help with the things Martina cannot physically do.        CHW Plan: CHW will route encounter to Community Paramedic, Eun Ortiz and CP CHW, Odette SHEIKH.  CHW will also route to [facility] to update.    CHW provided Martina again with the contact number given by the CP (outreach from 2/2/21) for Susan's toe nails/foot care - Cassi Arroyo with [facility] [facility] [employer] [phone].    Martina stated that home care has been resolved and she questions this because she feels patient continues to need assistance in the home.  Martina stated she has difficulty with the compression socks and other personal hygiene tasks she feels an RN/skilled professional should assist with.    Libby MEADE   Community Health Worker  [facility] Care Coordination  [facility]  [email or web address]   Office: [phone]

## 2021-06-30 ENCOUNTER — TELEPHONE (OUTPATIENT)
Dept: FAMILY MEDICINE | Facility: CLINIC | Age: 86
End: 2021-06-30

## 2021-06-30 DIAGNOSIS — I89.0 LYMPHEDEMA: Primary | ICD-10-CM

## 2021-06-30 NOTE — TELEPHONE ENCOUNTER
Reason for call:  Patient reporting a symptom    Symptom or request: Would like referral to lymphedema again for swelling in her legs. She talked to the  lymphedema therapist and she said she needs new orders.       Have you been treated for this before? Yes    Additional comments:      Phone Number patient can be reached at:   [phone]   Martina Pickard/ Caregiver    Best Time: anytime    Can we leave a detailed message on this number:  YES    Call taken on 6/30/2021 at 1:02 PM by Juany Gutierrez

## 2021-06-30 NOTE — TELEPHONE ENCOUNTER
Appears on 6/3/2021 Lymphedema Referral generated by Ema Melendez NP    Will send to FP to review.    Jenifer Montgomery   Ob/Gyn Clinic  RN

## 2021-06-30 NOTE — TELEPHONE ENCOUNTER
I spoke with Martina, caregiver.  (consent on file)    Odette PEREZ, was previous provider who did pt's lymphedema treatments in the home.  The treatments were very beneficial to pt.  Pt was beginning to walk a little in the home.    However, orders comleted 3 weeks ago.    Lymphedema symptoms are back again.  Legs are very swollen again.  Pt is asking for new order for in-home lymphedema treatments.    Rin Persaud RN

## 2021-07-09 NOTE — PROGRESS NOTES
Clinic Care Coordination Contact    Care Coordination Clinician Chart Review    Situation: Patient chart reviewed by care coordinator.       Background: Care Coordination initial assessment and enrollment to Care Coordination was 1/18/21. Patient centered goals were developed with participation from patient. VIKTOR SALDANA handed patient off to CHW for continued outreach every 30 days.        Assessment: Per chart review, patient outreach completed by CC CHW on 6/29/21. Patient is actively working to accomplish goal. Patient's goal remains appropriate and relevant at this time.       Goal date extended. Resources provided at last outreach by CHW.     Patient is not due for updated Complex Care Plan.      Annual assessment will be due 1/18/22.        Goals       1. Medication 1 (pt-stated)      Goal Statement: I will take care of myself.  Date Goal set: 01/18/2021  Barriers: oxygen tank; medications  Strengths: support from care takers  Date to Achieve By: 04/18/21 // extended to 8/30/21  Patient expressed understanding of goal: yes    Action steps to achieve this goal:  1. I will get and keep a functional oxygen tank. (Completed)  2. I will get my nails clipped.  3. I will get my COVID-19 vaccine. (Completed)                  Plan/Recommendations: The patient will continue working with Care Coordination to achieve goal as above.      CHW will involve VIKTOR SALDANA as needed or if patient is ready to move to maintenance. Next outreach date noted as 7/29/21.     VIKTOR CC will continue to monitor progress to goals and CHW outreaches every 6 weeks.     Care Plan updated and mailed to patient: ZONIA Voss   Social Work Clinic Care Coordinator   [facility]  [phone]  [email or web address]

## 2021-08-05 ENCOUNTER — PATIENT OUTREACH (OUTPATIENT)
Dept: NURSING | Facility: CLINIC | Age: 86
End: 2021-08-05
Payer: COMMERCIAL

## 2021-08-06 NOTE — PROGRESS NOTES
Clinic Care Coordination Contact  Community Health Worker Follow Up    Goals:   Goals Addressed as of 8/6/2021 at 6:59 PM                    6/29/21 4/30/21       Patient Stated       1. Medication 1 (pt-stated)   90%  80%1     Added 1/18/21 by Ophelia Samson LSW      Goal Statement: I will take care of myself.  Date Goal set: 01/18/2021  Barriers: oxygen tank; medications  Strengths: support from care takers  Date to Achieve By: 04/18/21 // extended to 8/30/21  Patient expressed understanding of goal: yes    Action steps to achieve this goal:  1. I will get and keep a functional oxygen tank. (Completed)  2. I will get my nails clipped.  3. I will get my COVID-19 vaccine. (Completed)              1    Progress evaluated against:   Medication 1    CHW Outreach Interval: Monthly    Intervention and Education during outreach: CHW spoke to Martina Pickard, friend and caregiver to patient. Martina reported that she provides general PCA services to patient. Martina explained that she inquired/requested lymphedema wrap orders be placed and she was waiting to hear back on the status and/or an RN to come out and assist patient, but has not heard back.  Martina also inquired about the community paramedic program and if patient will still have check ins from a paramedic going forward.     CHW Plan: Routed encounter with message to community paramedic CHW to inquire about patient's active episode and to identify how to best provide support to Martina and patient going forward.  CHW will discuss future outreach plan with CP CHW and determine who is most appropriate to reach out to Dr. Melendez re: Lymphedema referral (6/30) if necessary.     Chart review: Note by Rin Persaud RN on  6/30:   Lymphedema symptoms are back again.  Legs are very swollen again.  Pt is asking for new order for in-home lymphedema treatments.    Libby MEADE  Community Health Worker  [facility]  Clinic Care Coordination  [facility]  Art  [email or web address]   Office: [phone]

## 2021-08-16 DIAGNOSIS — F32.1 MAJOR DEPRESSIVE DISORDER, SINGLE EPISODE, MODERATE (H): Chronic | ICD-10-CM

## 2021-08-18 NOTE — TELEPHONE ENCOUNTER
"Requested Prescriptions   Pending Prescriptions Disp Refills     FLUoxetine (PROZAC) 20 MG capsule [Pharmacy Med Name: FLUOXETINE HCL 20MG CAPS] 90 capsule 0     Sig: TAKE 1 CAPSULE (20 MG) BY MOUTH DAILY       SSRIs Protocol Failed - 8/16/2021  9:57 AM        Failed - PHQ-9 score less than 5 in past 6 months     Please review last PHQ-9 score.           Passed - Medication is active on med list        Passed - Patient is age 18 or older        Passed - No active pregnancy on record        Passed - No positive pregnancy test in last 12 months        Passed - Recent (6 mo) or future (30 days) visit within the authorizing provider's specialty     Patient had office visit in the last 6 months or has a visit in the next 30 days with authorizing provider or within the authorizing provider's specialty.  See \"Patient Info\" tab in inbasket, or \"Choose Columns\" in Meds & Orders section of the refill encounter.                 "

## 2021-08-23 ENCOUNTER — PATIENT OUTREACH (OUTPATIENT)
Dept: CARE COORDINATION | Facility: CLINIC | Age: 86
End: 2021-08-23

## 2021-08-23 NOTE — PROGRESS NOTES
Clinic Care Coordination Contact    Care Coordination Clinician Chart Review    Situation: Patient chart reviewed by care coordinator.       Background: Care Coordination initial assessment and enrollment to Care Coordination was 1/18/21. Patient centered goals were developed with participation from patient. VIKTOR CC handed patient off to CHW for continued outreach every 30 days.        Assessment: Per chart review, patient outreach completed by CC CHW on 8/5/21. Patient is actively working to accomplish goal. Patient's goal remains appropriate and relevant at this time.       Patient is due for updated Plan of Care.      Annual assessment will be due 1/18/22.        Goals        1. Medication 1 (pt-stated)       Goal Statement: I will take care of myself.  Date Goal set: 01/18/2021  Barriers: oxygen tank; medications  Strengths: support from care takers  Date to Achieve By: 04/18/21 // extended to 8/30/21  Patient expressed understanding of goal: yes    Action steps to achieve this goal:  1. I will get and keep a functional oxygen tank. (Completed)  2. I will get my nails clipped.  3. I will get my COVID-19 vaccine. (Completed)                  Plan/Recommendations: The patient will continue working with Care Coordination to achieve goal as above.     CHW will involve VIKTOR CC as needed or if patient is ready to move to maintenance. Next outreach noted as 9/6/21.    Pt graduated from CP program 1/26/21. Please inquire what is needed from CP program, since pt/caregiver are requesting continued involvement.    CHW to inquire if home care lymphedema therapy was restarted, new orders entered by PCP 7/1/21. If not, pt/caregiver can call  home care at [phone]. Appears referral may have been missed.     VIKTOR SALDANA will continue to monitor progress to goals and CHW outreaches every 6 weeks.     Plan of Care updated and mailed to patient: Yes, via mail     ZONIA Manley   Social Work Clinic Care Coordinator   [facility]  [facility]  [phone]  [email or web address]

## 2021-08-23 NOTE — LETTER
[place] CARE COORDINATION  [facility]  [address] [facility] [place] [address]    August 23, 2021        Susan Haq  [address]  [address]  [place] [address]    Dear Janice Davis is an updated complex care plan for your continued enrollment in clinic care coordination. Please let us know if you have additional questions, goals, or concerns that we can assist with.      Cass Mendieta [facility]   Social Work Clinic Care Coordinator   [facility]  [phone]  [email or web address]  Patient Centered Plan of Care  About Me:        Patient Name:  Susan JOSE Haq    YOB: 1932  Age:         89 year old   [place] MRN:    [id number] Telephone Information:  Home Phone [phone]   Mobile [phone]       Address:  [address]  [address]  [place] [address] Email address:  No e-mail address on record      Emergency Contact(s)    Name Relationship Lgl Grd Work Phone Home Phone Mobile Phone   1. HARDY ENNIS Friend    [phone]   2. SANFORD SANCHEZ Friend    [phone]           Primary language:  English     needed? No   [place] [facility]:  [phone] op. 1  Other communication barriers: Visual impairment, Physical impairment, [place] (Hard of hearing), Lack of coping, Caregiver, Access to technology  Preferred Method of Communication:  Mail  Current living arrangement:    Mobility Status/ Medical Equipment:      Health Maintenance  Health Maintenance Reviewed: Due/Overdue   Health Maintenance Due   Topic Date Due     EYE EXAM  Never done     ZOSTER IMMUNIZATION (2 of 3) 07/25/2007     DIABETIC FOOT EXAM  12/06/2018     MEDICARE ANNUAL WELLNESS VISIT  10/16/2020     ADVANCE CARE PLANNING  06/28/2021     A1C  07/14/2021     My Access Plan  Medical Emergency 911   Primary Clinic Line [facility] - [phone]   24 Hour Appointment Line  [phone] or  [phone]) (toll-free)   24 Hour Nurse Line [phone] (toll-free)   Preferred Urgent Care     Preferred Hospital     [facility] [place] [facility] [address], [address]     Behavioral Health Crisis Line [facility] [place] Suicide Prevention Lifeline at 1-282.213.8300 or 911     My Care Team Members  Patient Care Team       Relationship Specialty Notifications Start End    Ema Melendez NP PCP - General Nurse Practitioner - Family  1/29/16     Phone: [phone] Fax: [phone]         [address] [facility] [address]    Ema Melendez NP Assigned PCP   10/27/19     Phone: [phone] Fax: [phone]         [address] [facility] [address]    Neal Alfonso MD Assigned Sleep Provider   10/23/20     Phone: [phone] Fax: [phone]         [address] [place] [address]    Libby Tapia Community Health Worker Primary Care - CC  3/31/21     Phone: [phone]         Cass Mendieta LSW Lead Care Coordinator Primary Care - [facility] Admissions 4/30/21     Phone: [phone]          [address] [facility] [address]            My Care Plans  Self Management and Treatment Plan  Goals and (Comments)  Goals        General     1. Medication 1 (pt-stated)      Notes - Note edited  8/23/2021  9:53 AM by Cass Mendieta LSW     Goal Statement: I will take care of myself.  Date Goal set: 01/18/2021  Barriers: oxygen tank; medications  Strengths: support from care takers  Date to Achieve By: 04/18/21 // extended to 8/30/21  Patient expressed understanding of goal: yes    Action steps to achieve this goal:  1. I will get and keep a functional oxygen tank. (Completed)  2. I will get my nails clipped.  3. I will get my COVID-19 vaccine. (Completed)  4. I will work with a PCA.   5. I will continue to work with care coordination as needed.               Action Plans on File:                       Advance Care  Plans/Directives Type:      Honoring Choices    Advance Care Planning and Health Care Directives  When it comes to decisions about your health care, it s important that your voice is heard. You may not always be able to speak for yourself.    We encourage you to have discussions with your loved ones, cultural or spiritual leaders and health care providers about your goals, values, beliefs and choices.    We are a part of [employer] [place] , supporting and promoting the benefits of advance care planning conversations.    Our goals are to:    Help you make informed decisions about your healthcare choices and ensure that those choices are honored    Offer advance care planning discussions with trained staff    Ensure your choices are clearly defined, documented and available in your medical record    Translate your choices into medical orders as needed    Why is Advance Care Planning important?    Know what your health care choices are and decide what is right for you    An unexpected illness or injury could leave you unable to participate in important treatment decisions    When choices are left to others to decide that responsibility can be difficult and stressful    By discussing and outlining your choices, your voice is heard in the care you want to receive    How can I learn more?  We offer free classes at multiple locations, days and times. Our trained facilitators will provide information and guide you through a Health Care Directive document. They can also review, notarize and add your document to your medical record.    Call [employer] at [phone] or toll free at [phone] for assistance.      My Medical and Care Information  Problem List   Patient Active Problem List   Diagnosis     Hyperlipidemia LDL goal <130     Insomnia     Osteopenia     Hypertension goal BP (blood pressure) < 140/90     Advance care planning     PVC's (premature ventricular contractions)     SUSSY  (obstructive sleep apnea)-Moderately severe (AHI 21)     Bilateral leg edema     HL (hearing loss)     SNHL (sensorineural hearing loss)     Interstitial pulmonary fibrosis (H)     Chronic obstructive pulmonary disease, unspecified COPD type (H)     Major depressive disorder, single episode, moderate (H)     Risk for falls     Alcohol abuse     Morbid obesity (H)     Physical deconditioning     Chronic obstructive pulmonary disease with acute exacerbation (H)     CKD (chronic kidney disease) stage 3, GFR 30-59 ml/min     Chronic respiratory failure with hypoxia (H)      Current Medications and Allergies:    Current Outpatient Medications   Medication Instructions     albuterol (PROAIR HFA/PROVENTIL HFA/VENTOLIN HFA) 108 (90 Base) MCG/ACT inhaler INHALE TWO PUFFS INTO THE LUNGS EVERY FOUR HOURS AS NEEDED FOR SHORTNESS OF BREATH/DYSPNEA OR WHEEZING     amLODIPine (NORVASC) 10 mg, Oral, DAILY, TAKE ONE TABLET BY MOUTH ONCE DAILY.     atenolol (TENORMIN) 50 mg, Oral, 2 TIMES DAILY     atorvastatin (LIPITOR) 20 mg, Oral, DAILY     budesonide-formoterol (SYMBICORT) 160-4.5 MCG/ACT Inhaler INHALE TWO PUFFS BY MOUTH 2 TIMES DAILY     camphor-menthol (DERMASARRA) 0.5-0.5 % external lotion 1 mL, Topical, EVERY 8 HOURS PRN     camphor-menthol (DERMASARRA) 0.5-0.5 % external lotion 1 mL, Topical, EVERY 8 HOURS PRN     FISH OIL 1000 MG OR CAPS 1 cap daily     FLUoxetine (PROZAC) 20 mg, Oral, DAILY     folic acid (FOLVITE) 1,000 mcg, Oral, DAILY     furosemide (LASIX) 20 mg, Oral, DAILY     losartan (COZAAR) 100 mg, Oral, DAILY     ORDER FOR DME TEDs stockings knee high to be worn during the day.     ORDER FOR DME Equipment being ordered: Oxygen     ORDER FOR DME Equipment being ordered: CPAP supplies     ORDER FOR DME Equipment being ordered: Oxygen - 3 liters continous     order for DME Equipment being ordered: Nebulizer     order for DME Equipment being ordered: Sigavirus - 1 pair compression.     order for DME 1.  CPAP  pressure 12 cm/H20 with heated humidity and auto-titrating capability. <BR>2.  Provide mask to fit and CPAP supplies.<BR>3.  Length of need lifetime.<BR>4.  Ok to d/c O2 bleed in to her PAP overnight.<BR><BR>     potassium chloride ER (K-TAB) 20 MEQ CR tablet 20 mEq, Oral, DAILY     Respiratory Therapy Supplies (NEBULIZER COMPRESSOR) KIT 1 kit, Does not apply, EVERY 4 HOURS PRN     SENNA PLUS 8.6-50 MG per tablet TAKE ONE TO TWO TABLETS BY MOUTH TWICE DAILY AS NEEDED FOR CONSTIPATION     tiotropium (SPIRIVA HANDIHALER) 18 MCG inhaled capsule INHALE THE CONTENTS OF ONE CAPSULE BY MOUTH DAILY     Care Coordination Start Date: 1/18/2021   Frequency of Care Coordination: monthly   Form Last Updated: 08/23/2021

## 2021-08-30 DIAGNOSIS — R60.0 BILATERAL LEG EDEMA: ICD-10-CM

## 2021-08-31 RX ORDER — POTASSIUM CHLORIDE 1500 MG/1
20 TABLET, EXTENDED RELEASE ORAL DAILY
Qty: 90 TABLET | Refills: 0 | Status: SHIPPED | OUTPATIENT
Start: 2021-08-31 | End: 2021-12-29

## 2021-09-13 ENCOUNTER — PATIENT OUTREACH (OUTPATIENT)
Dept: NURSING | Facility: CLINIC | Age: 86
End: 2021-09-13
Payer: COMMERCIAL

## 2021-09-13 NOTE — PROGRESS NOTES
Clinic Care Coordination Contact    Community Health Worker Follow Up    Care Gaps:     Health Maintenance Due   Topic Date Due     EYE EXAM  Never done     ZOSTER IMMUNIZATION (2 of 3) 07/25/2007     DIABETIC FOOT EXAM  12/06/2018     MEDICARE ANNUAL WELLNESS VISIT  10/16/2020     ADVANCE CARE PLANNING  06/28/2021     A1C  07/14/2021     INFLUENZA VACCINE (1) 09/01/2021       Postponed to next outreach     Goals:   Goals Addressed as of 9/13/2021 at 2:51 PM                    Today    6/29/21       Patient Stated       1. Medication 1 (pt-stated)   90%  90%    Added 1/18/21 by Ophelia Samson LSW      Goal Statement: I will continue to accept assistance and take care of myself.  Date Goal set: 01/18/2021  Barriers: oxygen tank; medications  Strengths: support from care takers  Date to Achieve By: 04/18/21 // extended to 8/30/21  Patient expressed understanding of goal: yes    Action steps to achieve this goal:  1. I will get and keep a functional oxygen tank. (Completed)  2. I will get my nails clipped.  3. I will get my COVID-19 vaccine. (Completed)  4. I will work with a PCA, Martina, friend and caretaker to continue to address my needs.   5. I will have Martina, caretaker, set up Home Care and/or CP  Lymphedema therapy, nail trimmings, compression socks, etc.  6. I (and Martina) will continue to work with care coordination to find resources/support.            Intervention and Education during outreach: CHW spoke with caregiver, Martina, who stated that she spoke to PCP and is aware PCP put in a home care order awhile back to assist with Lymphedema therapy.  Martina stated home care has not reached out since order was placed 2 months ago, Martina informed CHW that patient is still in need of assistance with therapy, nail trims, compression socks, etc.  CHW provided Martina with home care # [phone], CHW contact # and PCP clinic # (new order if needed) and Martina stated she will call home care ASAP for an update today.       CHW Plan: CHW reached out to Community Paramedic CHW to inquire about any scheduled upcoming visit(s) as Martina stated they had been out to assist patient in the past and she was unsure what happened.  CP CHW and Clinic CHW discussed the possibility of CP order from PCP to bridge the care gap until home care can begin services.  CP CHW and Clinic CHW will await update from Alissa, patient's caregiver, re: barriers/updates and will proceed as appropriate/necessary.     Libby MEADE  Community Health Worker  [facility]  Clinic Care Coordination  [facility]  [email or web address]   Office: [phone]

## 2021-09-29 ENCOUNTER — PATIENT OUTREACH (OUTPATIENT)
Dept: NURSING | Facility: CLINIC | Age: 86
End: 2021-09-29
Payer: COMMERCIAL

## 2021-09-29 NOTE — PROGRESS NOTES
Clinic Care Coordination Contact    Community Health Worker Follow Up    Care Gaps:     Health Maintenance Due   Topic Date Due     EYE EXAM  Never done     ZOSTER IMMUNIZATION (2 of 3) 07/25/2007     DIABETIC FOOT EXAM  12/06/2018     MEDICARE ANNUAL WELLNESS VISIT  10/16/2020     ADVANCE CARE PLANNING  06/28/2021     A1C  07/14/2021     INFLUENZA VACCINE (1) 09/01/2021       Postponed to next outreach call     Goals:   Goals Addressed as of 9/29/2021 at 12:33 PM                    Today    9/13/21       Patient Stated       1. Medication 1 (pt-stated)   90%  90%    Added 1/18/21 by Ophelia Samson LSW      Goal Statement: I will continue to accept assistance and take care of myself.  Date Goal set: 01/18/2021  Barriers: oxygen tank; medications  Strengths: support from care takers  Date to Achieve By: 04/18/21 // extended to 8/30/21  Patient expressed understanding of goal: yes    Action steps to achieve this goal:  1. I will get and keep a functional oxygen tank. (Completed)  2. I will get my nails clipped.  3. I will get my COVID-19 vaccine. (Completed)  4. I will work with a PCA, Martina, friend and caretaker to continue to address my needs.   5. I will have Martina, caretaker, set up Home Care and/or CP  9/29 Pending - Martina will call home care, PCP put in order months ago  Lymphedema therapy, nail trimmings, compression socks, etc.  6. I (and Martina) will continue to work with care coordination to find resources/support.            Intervention and Education during outreach: Martina, caregiver, stated that she and patient have still not heard from home care re: Lymphedema thearpy.  Martina stated patient's legs are constantly swollen and sore.  Martina stated that she fell recently and has been recovering which is why she has not reached out to home care.  CHW provided home care number again  [phone]  today and Martina stated she will call immediately to discuss potential start of care/assessment.      CHW Plan:  will reach out to Martina in 1-5 days to discuss home care and CP needs.      Libby MEADE  Community Health Worker  [facility] Care Coordination  [facility]  [email or web address]   Office: [phone]

## 2021-10-04 NOTE — PROGRESS NOTES
FYI - pt will need new orders from PCP for home care and comm paramedic due to time lapse.     Pt will also likely need a PCP OV to qualify for home care as well (needed within last 30 days for medicare insurance coverage, last PCP OV 5/11/21). Home care needs to review and call pt within 2-3 days for medicare insurance coverage. Last home care order entered 6/3/21 and 6/30/21.     It appears home care/lymphedema therapy was involved per Epic note 6/3/21, but services ended.     Cass Mendieta, ZONIA   Social Work Clinic Care Coordinator   [facility]  [phone]  [email or web address]

## 2021-10-05 ENCOUNTER — PATIENT OUTREACH (OUTPATIENT)
Dept: NURSING | Facility: CLINIC | Age: 86
End: 2021-10-05
Payer: COMMERCIAL

## 2021-10-05 DIAGNOSIS — J84.10 INTERSTITIAL PULMONARY FIBROSIS (H): ICD-10-CM

## 2021-10-05 DIAGNOSIS — Z91.81 RISK FOR FALLS: Primary | ICD-10-CM

## 2021-10-05 DIAGNOSIS — R53.81 PHYSICAL DECONDITIONING: ICD-10-CM

## 2021-10-05 NOTE — PROGRESS NOTES
Clinic Care Coordination Contact    Community Health Worker Follow Up    Care Gaps:     Health Maintenance Due   Topic Date Due     EYE EXAM  Never done     ZOSTER IMMUNIZATION (2 of 3) 07/25/2007     DIABETIC FOOT EXAM  12/06/2018     MEDICARE ANNUAL WELLNESS VISIT  10/16/2020     ADVANCE CARE PLANNING  06/28/2021     A1C  07/14/2021     INFLUENZA VACCINE (1) 09/01/2021       Postponed to next outreach     Goals:   Goals Addressed as of 10/5/2021 at 2:56 PM                    Today    9/29/21       Patient Stated       1. Medication 1 (pt-stated)   90%  90%    Added 1/18/21 by Ophelia Samson LSW      Goal Statement: I will continue to accept assistance and take care of myself.  Date Goal set: 01/18/2021  Barriers: oxygen tank; medications  Strengths: support from care takers  Date to Achieve By: 04/18/21 // extended to 8/30/21  Patient expressed understanding of goal: yes    Action steps to achieve this goal:  1. I will get and keep a functional oxygen tank. (Completed)  2. I will get my nails clipped.  3. I will get my COVID-19 vaccine. (Completed)  4. I will work with a PCA, Martina, friend and caretaker to continue to address my needs.   5. I will have Martina, caretaker, set up Home Care and/or CP  9/29 Pending - Martina will call home care, PCP put in order months ago  Lymphedema therapy, nail trimmings, compression socks, etc.  6. I (and Martina) will continue to work with care coordination to find resources/support.              Intervention and Education during outreach: CHW spoke to Martina, caregiver/PCA, who stated that she was able to connect with home care and understands patient will need new orders from PCP for home care.  Martina stated she was under the impression that they would assist in calling the MD to get an order/verbal, but was told that they would not help as it is not protocol.     Note: Last home care order entered 6/3/21 and 6/30/21.  Home care needs to review and call pt within 2-3 days for  medicare insurance coverage.  davon Mack, is uncertain why services were never started and stated that patient needs lymphedema therapy, nail trimmings, compression socks, etc.   Martina and CHW discussed possibly having community paramedic visit(s) to bridge the gap while patient/Martina attempt to initiate home care services again.    CHW Plan: will send message to PCP to update MD and confirm that patient will need to schedule an office visit to qualify for home care/lymphedema therapy (needed within last 30 days for medicare insurance coverage, last PCP OV 5/11/21).      Clinic care coordination CHW will route encounter to CP CHW to further discuss re enrolling patient in program and a viable plan for patient going forward.     Libby MEADE  Community Health Worker  [facility]  Clinic Care Coordination  [facility]  [email or web address]   Office: [phone]

## 2021-10-15 ENCOUNTER — ALLIED HEALTH/NURSE VISIT (OUTPATIENT)
Dept: AUDIOLOGY | Facility: CLINIC | Age: 86
End: 2021-10-15
Payer: COMMERCIAL

## 2021-10-15 ENCOUNTER — PATIENT OUTREACH (OUTPATIENT)
Dept: CARE COORDINATION | Facility: CLINIC | Age: 86
End: 2021-10-15

## 2021-10-15 DIAGNOSIS — H90.3 SENSORINEURAL HEARING LOSS, BILATERAL: Primary | ICD-10-CM

## 2021-10-15 PROCEDURE — V5010 ASSESSMENT FOR HEARING AID: HCPCS | Performed by: AUDIOLOGIST

## 2021-10-15 PROCEDURE — 99207 PR NO CHARGE LOS: CPT | Performed by: AUDIOLOGIST

## 2021-10-15 NOTE — PROGRESS NOTES
Clinic Care Coordination Contact    Care Coordination Clinician Chart Review    Situation: Patient chart reviewed by care coordinator.       Background: Care Coordination initial assessment and enrollment to Care Coordination was 1/18/21. Patient centered goals were developed with participation from patient. VIKTOR SALDANA handed patient off to CHW for continued outreach every 30 days.        Assessment: Per chart review, patient outreach completed by CC CHW on 10/5/21. Patient is actively working to accomplish goal.      Patient's goal remains appropriate and relevant at this time.       Patient is not due for updated Plan of Care.     Annual assessment will be due 1/18/22.        Goals        1. Medication 1 (pt-stated)       Goal Statement: I will continue to accept assistance and take care of myself.  Date Goal set: 01/18/2021  Barriers: oxygen tank; medications  Strengths: support from care takers  Date to Achieve By: 04/18/21 // extended to 12/31/21  Patient expressed understanding of goal: yes    Action steps to achieve this goal:  1. I will get and keep a functional oxygen tank. (Completed)  2. I will get my nails clipped.  3. I will get my COVID-19 vaccine. (Completed)  4. I will work with a PCA, Martina, friend and caretaker to continue to address my needs.   5. I will have Martina, caretaker, set up Home Care and/or CP.  9/29 Pending - Martina will call home care, PCP put in order months ago; Lymphedema therapy, nail trimmings, compression socks, etc.  6. I (and Martina) will continue to work with care coordination to find resources/support.                  Plan/Recommendations: The patient will continue working with Care Coordination to achieve goal as above.     CHW will involve VIKTOR SALDANA as needed or if patient is ready to move to maintenance. Next outreach noted as 11/5/21. Pt needs to schedule PCP OV for home care/lymphedema orders. Pt can contact an in home nail care company for care, or schedule podiatry appt.    VIKTOR  CC will continue to monitor progress to goals and CHW outreaches every 6 weeks.     Plan of Care updated and mailed to patient: ZONIA Voss   Social Work Clinic Care Coordinator   [facility]  [phone]  [email or web address]

## 2021-10-15 NOTE — PROGRESS NOTES
[facility]           SUBJECTIVE:  Susan Haq , 89 year old female requests in office repair of her left hearing aid, non-functioning.     OBJECTIVE:  Hearing aid maintenance was completed, vacuumed mics and cleaned battery contacts. Retubed earmold left earmold, there was wax occluding the tube. Verified hearing aid functionality.       ASSESSMENT/PLAN:    Patient's guardian will  the repaired hearing aid at the specialty clinic .      Shahzad Brock Licensed Audiologist #[id number]

## 2021-10-29 NOTE — PROGRESS NOTES
CHW performed chart review today, will collaborate with provider and  CC to initiate community paramedic services.  Caregiver, Martina is requesting assistance in the home with specific tasks (lymphedema wraps, nail care, etc.)     CHW Plan: routing to PCP for [facility] community paramedic referral.  CHW will outreach to Martina, [phone]  caregiver/friend, in 3 weeks to check in and discuss additional support/resource needs.     Libby MEADE  Community Health Worker  [facility]  Clinic Care Coordination  [facility]  [email or web address]   Office: [phone]

## 2021-11-02 ENCOUNTER — PATIENT OUTREACH (OUTPATIENT)
Dept: OTHER | Facility: CLINIC | Age: 86
End: 2021-11-02

## 2021-11-02 NOTE — PROGRESS NOTES
Community Paramedic Program Contact  [facility]/Voicemail    CP Community Health Worker Outreach  Outreach attempted x 1.  Left message on pt's caregiver/friend Martina's voicemail with call back information and requested return call.  Plan: Community Health Worker will try to reach patient again in 1-2 business days.    Note:  Available with CP1 tomorrow (11/3) in the am or with CP5 on 11/9 in the am?      Referral source: Pt's PCP & CC CHW  Referral reason:  Reason(s) for visit: Initial Assessment     Goals for visit(s): Medication Compliance     How often should patient be seen: Every Other Week     Preference on when patient should be seen: Within 2 Weeks     Comments   PCP: Ema Melendez  Other info that would be helpful: Patient's caregiver has requested assistance for patient through home care which was previously ordered, but did not start timely and would require a new order. Caregiver stated she is uncomfortable performing certain medical related tasks.   In the meantime, it would be helpful for patient to have assistance from community paramedic for lymphedema wrap/nail care/support and resources needed for overall review of wellness/safety  Thank you!

## 2021-11-03 NOTE — PROGRESS NOTES
Community Paramedic Program Contact  [facility]/Voicemail    CP Community Health Worker Outreach  Outreach attempted x 2.  Left message on pt's caregiver/friend Martina's voicemail with call back information and requested return call.  Plan: Community Health Worker will try to reach patient again in 1-2 business days.    Note:  Available with CP 5 on 11/10 or CP1 on 11/11?    Referral source: Pt's PCP & CC CHW  Referral reason:  Reason(s) for visit: Initial Assessment     Goals for visit(s): Medication Compliance     How often should patient be seen: Every Other Week     Preference on when patient should be seen: Within 2 Weeks     Comments   PCP: Ema Melendez  Other info that would be helpful: Patient's caregiver has requested assistance for patient through home care which was previously ordered, but did not start timely and would require a new order. Caregiver stated she is uncomfortable performing certain medical related tasks.   In the meantime, it would be helpful for patient to have assistance from community paramedic for lymphedema wrap/nail care/support and resources needed for overall review of wellness/safety  Thank you!     Initial visit:  Scheduled for         Additional information:

## 2021-11-05 NOTE — PROGRESS NOTES
Community Paramedic Program Contact  [facility]/[place]    CP Community Health Worker Outreach  Outreach attempted x 3.  Left message on pt's caregiver/friend Martina's voicemail with call back information and requested return call.  Plan: Community Health Worker will do no further outreaches at this time.    Referral source: Pt's PCP & CC CHW  Referral reason:  Reason(s) for visit: Initial Assessment     Goals for visit(s): Medication Compliance     How often should patient be seen: Every Other Week     Preference on when patient should be seen: Within 2 Weeks     Comments   PCP: Ema Melendez  Other info that would be helpful: Patient's caregiver has requested assistance for patient through home care which was previously ordered, but did not start timely and would require a new order. Caregiver stated she is uncomfortable performing certain medical related tasks.   In the meantime, it would be helpful for patient to have assistance from community paramedic for lymphedema wrap/nail care/support and resources needed for overall review of wellness/safety  Thank you!

## 2021-11-22 NOTE — PROGRESS NOTES
Clinic Care Coordination Contact    Clinic Care Coordination Contact  OUTREACH  Referral Information:  Referral Source: IP Handoff    Primary Diagnosis: COPD  Chief Complaint   Patient presents with     Clinic Care Coordination - Follow-up     RN   [place] Utilization:   Clinic Utilization  Difficulty keeping appointments:: No  Compliance Concerns: No  No-Show Concerns: No  No PCP office visit in Past Year: No  Utilization    Last refreshed: 10/2/2018  3:41 PM:  No Show Count (past year) 1       Last refreshed: 10/2/2018  3:41 PM:  ED visits 0       Last refreshed: 10/2/2018  3:41 PM:  Hospital admissions 1          Current as of: 10/2/2018  3:41 PM         Clinical Concerns:    Current Medical Concerns:  Patient states she is doing fine. She has not moved around much yet this am (10:45 am)  She continues with cpap at night and oxygen continuously.     Patient sounds much improved from our last conversation. She is able to talk in full sentences without shortness of breath.     Patient states her two friends come to see her daily. They also take her to appointments. She states she feels very blessed to have such good caring friends. She states she spoke to one of her cousins last evening. The cousin lives in [place] and had been sick, so it was good to know she was better.      Current Behavioral Concerns: Denies concerns      Education Provided to patient: Continued use of inhalers. Patient needs to see PCP      Pain  Pain (GOAL):: No    Health Maintenance Reviewed:      Clinical Pathway: COPD    Medication Management:  Patient manages her won medications, one of her friends helps set them up     Functional Status:  Dependent ADLs:: Ambulation-walker  Dependent IADLs:: Transportation  Bed or wheelchair confined:: No  Mobility Status: Independent w/Device    Living Situation:  Current living arrangement:: I live alone  Type of residence:: Private home - stairs    Diet/Exercise/Sleep:  Diet:: Regular  Inadequate  Left msg to schedule pel us + with Johann or Tyson for results     "nutrition (GOAL):: No  Food Insecurity: No  Tube Feeding: No  Exercise:: Unable to exercise  Inadequate activity/exercise (GOAL):: No  Significant changes in sleep pattern (GOAL): No    Patient states she does not do much cooking. Mainly eats microwave meals.    Transportation:  Transportation concerns (GOAL):: No  Transportation means:: Accessible car, Regular car     Psychosocial:  Advent or spiritual beliefs that impact treatment:: No  Mental health DX:: No  Mental health management concern (GOAL):: No  Informal Support system:: Friends     Financial/Insurance:   Financial/Insurance concerns (GOAL):: No    Resources and Interventions:  Current Resources:     Community Resources: Lifeline  Supplies used at home:: Oxygen Tubing/Supplies, Nebulizer tubing, Compression Stockings  Equipment Currently Used at Home: oxygen, shower chair    Advance Care Plan/Directive  Advanced Care Plans/Directives on file:: Yes  Type Advanced Care Plans/Directives: POLST    Referrals Placed: None     Goals:   Goals        General    Functional (pt-stated)     Notes - Note created  9/21/2018 10:10 AM by Nadine Cooper RN    Goal Statement: I want to be able to get around my house without \"huffing and puffing.\"    Measure of Success: Comfortable breathing    Supportive Steps to Achieve: I will use continuous oxygen. I will take medications are directed. I will use my nebulizer.    Barriers: Progressive disease    Strengths: Motivated -Wants to be able to go to [facility]    Date to Achieve By: Ongoing    Patient expressed understanding of goal: Yes          Patient/Caregiver understanding: Expresses understanding    Outreach Frequency: monthly    Plan: Patient will schedule an appointment with PCP.     Clinic care coordinator will continue to follow.    Nadine Cooper RN, CCM - Care Coordinator     10/3/2018    11:05 AM  [phone]    "

## 2021-12-01 ENCOUNTER — PATIENT OUTREACH (OUTPATIENT)
Dept: CARE COORDINATION | Facility: CLINIC | Age: 86
End: 2021-12-01
Payer: COMMERCIAL

## 2021-12-01 NOTE — PROGRESS NOTES
Clinic Care Coordination Contact  [facility]/Voicemail    Clinical Data: Care Coordination Outreach  Outreach attempted x 1.  Left message on patient's caregiver Martina's voicemail with call back information and requested return call to discuss CP program and attempts to begin home visits.   Plan: CHW will try to reach patient/caregiver again in 10-12 business days.    Libby MEADE  Community Health Worker  [facility] Care Coordination  [facility]  [email or web address]   Office: [phone]

## 2021-12-01 NOTE — LETTER
[facility]  [address] [place] [address]    December 28, 2021      Susan Haq  C[address]O [address]  [address]  [place] [address]    Dear Susan,    We have been trying to reach you for a monthly check in as you are currently enrolled in [facility] s Care Coordination program.  The goal of care coordination is to help you manage your health and improve access to the [facility] system in the most efficient manner.  The Care Coordination team understands the healthcare system and will assist you in improving your access to care.     We will continue to reach out once more; however, if you are able to call your Community Health Worker at the number below that would be appreciated.      We at [facility] are focused on providing you with the highest-quality healthcare experience possible.  It is a pleasure to partner with you as we work towards achieving your optimal state of wellness.        Sincerely,      Libby MEADE  Community Health Worker  [facility] Care Coordination  [facility]  [email or web address]   Office: [phone]

## 2021-12-06 ENCOUNTER — PATIENT OUTREACH (OUTPATIENT)
Dept: CARE COORDINATION | Facility: CLINIC | Age: 86
End: 2021-12-06
Payer: COMMERCIAL

## 2021-12-06 NOTE — PROGRESS NOTES
Clinic Care Coordination Contact    Situation: Patient chart reviewed by care coordinator.     Background: Care Coordination following patient to assist with Goal(s).     Assessment: Per chart review, CC CHW in process of contacting patient. Next CHW outreach is scheduled for 12/16/21.     -[facility] CP program outreached to pt/caregiver x3 to schedule. [facility].   -pt needs to be seen by PCP in order to obtain new home care orders, has not been seen in last 30 days for Medicare coverage  -nail care at home may be out of pocket. Here are some local options:   [facility] [phone]  [facility], [facility]   [employer] [phone]  [facility] [employer] [phone]    Plan/Recommendations:  CC will await update following CC CHW patient outreach.      ZONIA Manley   Social Work Clinic Care Coordinator   [facility]  [phone]  [email or web address]

## 2021-12-28 NOTE — PROGRESS NOTES
Clinic Care Coordination Contact  [facility]/Voicemail    Clinical Data: Care Coordination Outreach  Outreach attempted x 2.  Left message on patient's voicemail with call back information and requested return call.  Plan: CHW will send unable to contact letter with care coordination contact information via mail. CHW will try to reach patient's contact Martina Pickard again in 2-3 weeks.     Libby MEADE  Community Health Worker  [facility] Care Coordination  [facility]  [email or web address]   Office: [phone]

## 2021-12-29 ENCOUNTER — TELEPHONE (OUTPATIENT)
Dept: SLEEP MEDICINE | Facility: CLINIC | Age: 86
End: 2021-12-29

## 2021-12-29 ENCOUNTER — OFFICE VISIT (OUTPATIENT)
Dept: FAMILY MEDICINE | Facility: CLINIC | Age: 86
End: 2021-12-29
Payer: COMMERCIAL

## 2021-12-29 VITALS
WEIGHT: 250 LBS | BODY MASS INDEX: 41.65 KG/M2 | HEIGHT: 65 IN | DIASTOLIC BLOOD PRESSURE: 60 MMHG | SYSTOLIC BLOOD PRESSURE: 132 MMHG | OXYGEN SATURATION: 96 % | HEART RATE: 58 BPM | TEMPERATURE: 98.6 F

## 2021-12-29 DIAGNOSIS — K92.1 MELENA: ICD-10-CM

## 2021-12-29 DIAGNOSIS — J44.9 CHRONIC OBSTRUCTIVE PULMONARY DISEASE, UNSPECIFIED COPD TYPE (H): Chronic | ICD-10-CM

## 2021-12-29 DIAGNOSIS — I10 HYPERTENSION GOAL BP (BLOOD PRESSURE) < 140/90: Chronic | ICD-10-CM

## 2021-12-29 DIAGNOSIS — R41.89 COGNITIVE IMPAIRMENT: ICD-10-CM

## 2021-12-29 DIAGNOSIS — E78.5 HYPERLIPIDEMIA LDL GOAL <130: Chronic | ICD-10-CM

## 2021-12-29 DIAGNOSIS — F32.1 MAJOR DEPRESSIVE DISORDER, SINGLE EPISODE, MODERATE (H): Chronic | ICD-10-CM

## 2021-12-29 DIAGNOSIS — G47.33 OSA (OBSTRUCTIVE SLEEP APNEA): ICD-10-CM

## 2021-12-29 DIAGNOSIS — N90.89 LABIAL LESION: ICD-10-CM

## 2021-12-29 DIAGNOSIS — E11.9 TYPE 2 DIABETES MELLITUS WITHOUT COMPLICATION, WITHOUT LONG-TERM CURRENT USE OF INSULIN (H): Chronic | ICD-10-CM

## 2021-12-29 DIAGNOSIS — R60.0 BILATERAL LEG EDEMA: ICD-10-CM

## 2021-12-29 DIAGNOSIS — Z00.00 ENCOUNTER FOR MEDICARE ANNUAL WELLNESS EXAM: Primary | ICD-10-CM

## 2021-12-29 DIAGNOSIS — J84.10 INTERSTITIAL PULMONARY FIBROSIS (H): Chronic | ICD-10-CM

## 2021-12-29 LAB
ANION GAP SERPL CALCULATED.3IONS-SCNC: 5 MMOL/L (ref 3–14)
BUN SERPL-MCNC: 16 MG/DL (ref 7–30)
CALCIUM SERPL-MCNC: 8.6 MG/DL (ref 8.5–10.1)
CHLORIDE BLD-SCNC: 103 MMOL/L (ref 94–109)
CHOLEST SERPL-MCNC: 270 MG/DL
CO2 SERPL-SCNC: 33 MMOL/L (ref 20–32)
CREAT SERPL-MCNC: 0.86 MG/DL (ref 0.52–1.04)
ERYTHROCYTE [DISTWIDTH] IN BLOOD BY AUTOMATED COUNT: 12.3 % (ref 10–15)
FASTING STATUS PATIENT QL REPORTED: ABNORMAL
GFR SERPL CREATININE-BSD FRML MDRD: 64 ML/MIN/1.73M2
GLUCOSE BLD-MCNC: 116 MG/DL (ref 70–99)
HBA1C MFR BLD: 5.4 % (ref 0–5.6)
HCT VFR BLD AUTO: 37.9 % (ref 35–47)
HDLC SERPL-MCNC: 49 MG/DL
HGB BLD-MCNC: 12.3 G/DL (ref 11.7–15.7)
LDLC SERPL CALC-MCNC: 186 MG/DL
MCH RBC QN AUTO: 32.8 PG (ref 26.5–33)
MCHC RBC AUTO-ENTMCNC: 32.5 G/DL (ref 31.5–36.5)
MCV RBC AUTO: 101 FL (ref 78–100)
NONHDLC SERPL-MCNC: 221 MG/DL
PLATELET # BLD AUTO: 222 10E3/UL (ref 150–450)
POTASSIUM BLD-SCNC: 3 MMOL/L (ref 3.4–5.3)
RBC # BLD AUTO: 3.75 10E6/UL (ref 3.8–5.2)
SODIUM SERPL-SCNC: 141 MMOL/L (ref 133–144)
TRIGL SERPL-MCNC: 177 MG/DL
WBC # BLD AUTO: 7.5 10E3/UL (ref 4–11)

## 2021-12-29 PROCEDURE — 36415 COLL VENOUS BLD VENIPUNCTURE: CPT | Performed by: NURSE PRACTITIONER

## 2021-12-29 PROCEDURE — 99214 OFFICE O/P EST MOD 30 MIN: CPT | Mod: 25 | Performed by: NURSE PRACTITIONER

## 2021-12-29 PROCEDURE — 80048 BASIC METABOLIC PNL TOTAL CA: CPT | Performed by: NURSE PRACTITIONER

## 2021-12-29 PROCEDURE — 85027 COMPLETE CBC AUTOMATED: CPT | Performed by: NURSE PRACTITIONER

## 2021-12-29 PROCEDURE — 83036 HEMOGLOBIN GLYCOSYLATED A1C: CPT | Performed by: NURSE PRACTITIONER

## 2021-12-29 PROCEDURE — 99397 PER PM REEVAL EST PAT 65+ YR: CPT | Performed by: NURSE PRACTITIONER

## 2021-12-29 PROCEDURE — 80061 LIPID PANEL: CPT | Performed by: NURSE PRACTITIONER

## 2021-12-29 RX ORDER — FOLIC ACID 1 MG/1
1000 TABLET ORAL DAILY
Qty: 30 TABLET | Refills: 8 | Status: CANCELLED | OUTPATIENT
Start: 2021-12-29

## 2021-12-29 RX ORDER — POTASSIUM CHLORIDE 1500 MG/1
20 TABLET, EXTENDED RELEASE ORAL DAILY
Qty: 90 TABLET | Refills: 3 | Status: SHIPPED | OUTPATIENT
Start: 2021-12-29

## 2021-12-29 RX ORDER — ATENOLOL 50 MG/1
50 TABLET ORAL 2 TIMES DAILY
Qty: 180 TABLET | Refills: 3 | Status: SHIPPED | OUTPATIENT
Start: 2021-12-29

## 2021-12-29 RX ORDER — FUROSEMIDE 20 MG
20 TABLET ORAL DAILY
Qty: 90 TABLET | Refills: 3 | Status: SHIPPED | OUTPATIENT
Start: 2021-12-29

## 2021-12-29 RX ORDER — TIOTROPIUM BROMIDE 18 UG/1
CAPSULE ORAL; RESPIRATORY (INHALATION)
Qty: 90 CAPSULE | Refills: 3 | Status: SHIPPED | OUTPATIENT
Start: 2021-12-29

## 2021-12-29 RX ORDER — LOSARTAN POTASSIUM 100 MG/1
100 TABLET ORAL DAILY
Qty: 90 TABLET | Refills: 3 | Status: SHIPPED | OUTPATIENT
Start: 2021-12-29

## 2021-12-29 RX ORDER — ATORVASTATIN CALCIUM 20 MG/1
20 TABLET, FILM COATED ORAL DAILY
Qty: 90 TABLET | Refills: 3 | Status: SHIPPED | OUTPATIENT
Start: 2021-12-29

## 2021-12-29 ASSESSMENT — ENCOUNTER SYMPTOMS
HEADACHES: 0
BREAST MASS: 0
FREQUENCY: 0
HEMATURIA: 0
NERVOUS/ANXIOUS: 1
HEMATOCHEZIA: 1
PARESTHESIAS: 0
SHORTNESS OF BREATH: 1
CONSTIPATION: 0
MYALGIAS: 0
FEVER: 0
DIZZINESS: 1
COUGH: 1
PALPITATIONS: 0
ABDOMINAL PAIN: 0
DIARRHEA: 1
CHILLS: 0
WEAKNESS: 0
SORE THROAT: 0
EYE PAIN: 0
NAUSEA: 0
JOINT SWELLING: 0
HEARTBURN: 0
ARTHRALGIAS: 0

## 2021-12-29 ASSESSMENT — ACTIVITIES OF DAILY LIVING (ADL)
CURRENT_FUNCTION: MONEY MANAGEMENT REQUIRES ASSISTANCE
CURRENT_FUNCTION: MEDICATION ADMINISTRATION REQUIRES ASSISTANCE
CURRENT_FUNCTION: SHOPPING REQUIRES ASSISTANCE
CURRENT_FUNCTION: NO ASSISTANCE NEEDED
CURRENT_FUNCTION: HOUSEWORK REQUIRES ASSISTANCE
CURRENT_FUNCTION: BATHING REQUIRES ASSISTANCE
CURRENT_FUNCTION: PREPARING MEALS REQUIRES ASSISTANCE
CURRENT_FUNCTION: TRANSPORTATION REQUIRES ASSISTANCE
CURRENT_FUNCTION: LAUNDRY REQUIRES ASSISTANCE

## 2021-12-29 ASSESSMENT — PATIENT HEALTH QUESTIONNAIRE - PHQ9
SUM OF ALL RESPONSES TO PHQ QUESTIONS 1-9: 14
10. IF YOU CHECKED OFF ANY PROBLEMS, HOW DIFFICULT HAVE THESE PROBLEMS MADE IT FOR YOU TO DO YOUR WORK, TAKE CARE OF THINGS AT HOME, OR GET ALONG WITH OTHER PEOPLE: VERY DIFFICULT
SUM OF ALL RESPONSES TO PHQ QUESTIONS 1-9: 14

## 2021-12-29 ASSESSMENT — MIFFLIN-ST. JEOR: SCORE: 1559.87

## 2021-12-29 NOTE — PATIENT INSTRUCTIONS
Home care ordered.  Labs ordered  Medication refilled and printed.  New CPAP machine ordered.      Make appointment in OB/GYN clinic for labial nodule.      Follow up with Ema in one month.          Patient Education   Personalized Prevention Plan  You are due for the preventive services outlined below.  Your care team is available to assist you in scheduling these services.  If you have already completed any of these items, please share that information with your care team to update in your medical record.  Health Maintenance Due   Topic Date Due     ANNUAL REVIEW OF HM ORDERS  Never done     Eye Exam  Never done     Zoster (Shingles) Vaccine (2 of 3) 07/25/2007     Diabetic Foot Exam  12/06/2018     Discuss Advance Care Planning  06/28/2021     A1C Lab  07/14/2021     Flu Vaccine (1) 09/01/2021     COVID-19 Vaccine (3 - Booster for Moderna series) 12/01/2021     Cholesterol Lab  01/14/2022     Kidney Microalbumin Urine Test  01/14/2022     Hemoglobin  01/14/2022     FALL RISK ASSESSMENT  01/18/2022     Your Health Risk Assessment indicates you feel you are not in good health    A healthy lifestyle helps keep the body fit and the mind alert. It helps protect you from disease, helps you fight disease, and helps prevent chronic disease (disease that doesn't go away) from getting worse. This is important as you get older and begin to notice twinges in muscles and joints and a decline in the strength and stamina you once took for granted. A healthy lifestyle includes good healthcare, good nutrition, weight control, recreation, and regular exercise. Avoid harmful substances and do what you can to keep safe. Another part of a healthy lifestyle is stay mentally active and socially involved.    Good healthcare     Have a wellness visit every year.     If you have new symptoms, let us know right away. Don't wait until the next checkup.     Take medicines exactly as prescribed and keep your medicines in a safe place.  Tell us if your medicine causes problems.   Healthy diet and weight control     Eat 3 or 4 small, nutritious, low-fat, high-fiber meals a day. Include a variety of fruits, vegetables, and whole-grain foods.     Make sure you get enough calcium in your diet. Calcium, vitamin D, and exercise help prevent osteoporosis (bone thinning).     If you live alone, try eating with others when you can. That way you get a good meal and have company while you eat it.     Try to keep a healthy weight. If you eat more calories than your body uses for energy, it will be stored as fat and you will gain weight.     Recreation   Recreation is not limited to sports and team events. It includes any activity that provides relaxation, interest, enjoyment, and exercise. Recreation provides an outlet for physical, mental, and social energy. It can give a sense of worth and achievement. It can help you stay healthy.    Mental Exercise and Social Involvement  Mental and emotional health is as important as physical health. Keep in touch with friends and family. Stay as active as possible. Continue to learn and challenge yourself.   Things you can do to stay mentally active are:    Learn something new, like a foreign language or musical instrument.     Play SCRABBLE or do crossword puzzles. If you cannot find people to play these games with you at home, you can play them with others on your computer through the Internet.     Join a games club--anything from card games to chess or checkers or lawn bowling.     Start a new hobby.     Go back to school.     Volunteer.     Read.   Keep up with world events.    Exercise for a Healthier Heart  You may wonder how you can improve the health of your heart. If you re thinking about exercise, you re on the right track. You don t need to become an athlete. But you do need a certain amount of brisk exercise to help strengthen your heart. If you have been diagnosed with a heart condition, your healthcare  provider may advise exercise to help stabilize your condition. To help make exercise a habit, choose safe, fun activities.      Exercise with a friend. When activity is fun, you're more likely to stick with it.   Before you start  Check with your healthcare provider before starting an exercise program. This is especially important if you have not been active for a while. It's also important if you have a long-term (chronic) health problem such as heart disease, diabetes, or obesity. Or if you are at high risk for having these problems.   Why exercise?  Exercising regularly offers many healthy rewards. It can help you do all of the following:     Improve your blood cholesterol level to help prevent further heart trouble    Lower your blood pressure to help prevent a stroke or heart attack    Control diabetes, or reduce your risk of getting this disease    Improve your heart and lung function    Reach and stay at a healthy weight    Make your muscles stronger so you can stay active    Prevent falls and fractures by slowing the loss of bone mass (osteoporosis)    Manage stress better    Reduce your blood pressure    Improve your sense of self and your body image  Exercise tips      Ease into your routine. Set small goals. Then build on them. If you are not sure what your activity level should be, talk with your healthcare provider first before starting an exercise routine.    Exercise on most days. Aim for a total of 150 minutes (2 hours and 30 minutes) or more of moderate-intensity aerobic activity each week. Or 75 minutes (1 hour and 15 minutes) or more of vigorous-intensity aerobic activity each week. Or try for a combination of both. Moderate activity means that you breathe heavier and your heart rate increases but you can still talk. Think about doing 40 minutes of moderate exercise, 3 to 4 times a week. For best results, activity should last for about 40 minutes to lower blood pressure and cholesterol. It's OK to  work up to the 40-minute period over time. Examples of moderate-intensity activity are walking 1 mile in 15 minutes. Or doing 30 to 45 minutes of yard work.    Step up your daily activity level.  Along with your exercise program, try being more active the whole day. Walk instead of drive. Or park further away so that you take more steps each day. Do more household tasks or yard work. You may not be able to meet the advised mount of physical activity. But doing some moderate- or vigorous-intensity aerobic activity can help reduce your risk for heart disease. Your healthcare provider can help you figure out what is best for you.    Choose 1 or more activities you enjoy.  Walking is one of the easiest things you can do. You can also try swimming, riding a bike, dancing, or taking an exercise class.    When to call your healthcare provider  Call your healthcare provider if you have any of these:     Chest pain or feel dizzy or lightheaded    Burning, tightness, pressure, or heaviness in your chest, neck, shoulders, back, or arms    Abnormal shortness of breath    More joint or muscle pain    A very fast or irregular heartbeat (palpitations)  [employer] last reviewed this educational content on 7/1/2019 2000-2021 [employer]. All rights reserved. This information is not intended as a substitute for professional medical care. Always follow your healthcare professional's instructions.          Understanding USDA MyPlate  The USDA has guidelines to help you make healthy food choices. These are called MyPlate. MyPlate shows the food groups that make up healthy meals using the image of a place setting. Before you eat, think about the healthiest choices for what to put on your plate or in your cup or bowl. To learn more about building a healthy plate, visit www.choosemyplate.gov.    The food groups    Fruits. Any fruit or 100% fruit juice counts as part of the Fruit Group. Fruits may be fresh, canned, frozen, or  dried, and may be whole, cut-up, or pureed. Make 1/2 of your plate fruits and vegetables.    Vegetables. Any vegetable or 100% vegetable juice counts as a member of the Vegetable Group. Vegetables may be fresh, frozen, canned, or dried. They can be served raw or cooked and may be whole, cut-up, or mashed. Make 1/2 of your plate fruits and vegetables.    Grains. All foods made from grains are part of the Grains Group. These include wheat, rice, oats, cornmeal, and barley. Grains are often used to make foods such as bread, pasta, oatmeal, cereal, tortillas, and grits. Grains should be no more than 1/4 of your plate. At least half of your grains should be whole grains.    Protein. This group includes meat, poultry, seafood, beans and peas, eggs, processed soy products (such as tofu), nuts (including nut butters), and seeds. Make protein choices no more than 1/4 of your plate. Meat and poultry choices should be lean or low fat.    Dairy. The Dairy Group includes all fluid milk products and foods made from milk that contain calcium, such as yogurt and cheese. (Foods that have little calcium, such as cream, butter, and cream cheese, are not part of this group.) Most dairy choices should be low-fat or fat-free.    Oils. Oils aren't a food group, but they do contain essential nutrients. However it's important to watch your intake of oils. These are fats that are liquid at room temperature. They include canola, corn, olive, soybean, vegetable, and sunflower oil. Foods that are mainly oil include mayonnaise, certain salad dressings, and soft margarines. You likely already get your daily oil allowance from the foods you eat.  Things to limit  Eating healthy also means limiting these things in your diet:       Salt (sodium). Many processed foods have a lot of sodium. To keep sodium intake down, eat fresh vegetables, meats, poultry, and seafood when possible. Purchase low-sodium, reduced-sodium, or no-salt-added food products at  the store. And don't add salt to your meals at home. Instead, season them with herbs and spices such as dill, oregano, cumin, and paprika. Or try adding flavor with lemon or lime zest and juice.    Saturated fat. Saturated fats are most often found in animal products such as beef, pork, and chicken. They are often solid at room temperature, such as butter. To reduce your saturated fat intake, choose leaner cuts of meat and poultry. And try healthier cooking methods such as grilling, broiling, roasting, or baking. For a simple lower-fat swap, use plain nonfat yogurt instead of mayonnaise when making potato salad or macaroni salad.    Added sugars. These are sugars added to foods. They are in foods such as ice cream, candy, soda, fruit drinks, sports drinks, energy drinks, cookies, pastries, jams, and syrups. Cut down on added sugars by sharing sweet treats with a family member or friend. You can also choose fruit for dessert, and drink water or other unsweetened beverages.     [employer] last reviewed this educational content on 6/1/2020 2000-2021 [employer]. All rights reserved. This information is not intended as a substitute for professional medical care. Always follow your healthcare professional's instructions.        Activities of Daily Living    Your Health Risk Assessment indicates you have difficulties with activities of daily living such as housework, bathing, preparing meals, taking medication, etc. Please make a follow up appointment for us to address this issue in more detail.    Signs of Hearing Loss      Hearing much better with one ear can be a sign of hearing loss.   Hearing loss is a problem shared by many people. In fact, it is one of the most common health problems, particularly as people age. Most people age 65 and older have some hearing loss. By age 80, almost everyone does. Hearing loss often occurs slowly over the years. So you may not realize your hearing has gotten  worse.  Have your hearing checked  Call your healthcare provider if you:    Have to strain to hear normal conversation    Have to watch other people s faces very carefully to follow what they re saying    Need to ask people to repeat what they ve said    Often misunderstand what people are saying    Turn the volume of the television or radio up so high that others complain    Feel that people are mumbling when they re talking to you    Find that the effort to hear leaves you feeling tired and irritated    Notice, when using the phone, that you hear better with one ear than the other  [employer] last reviewed this educational content on 1/1/2020 2000-2021 [employer]. All rights reserved. This information is not intended as a substitute for professional medical care. Always follow your healthcare professional's instructions.          Urinary Incontinence, Female (Adult)   Urinary incontinence means loss of bladder control. This problem affects many women, especially as they get older. If you have incontinence, you may be embarrassed to ask for help. But know that this problem can be treated.   Types of Incontinence  There are different types of incontinence. Two of the main types are described here. You can have more than one type.     Stress incontinence. With this type, urine leaks when pressure (stress) is put on the bladder. This may happen when you cough, sneeze, or laugh. Stress incontinence most often occurs because the pelvic floor muscles that support the bladder and urethra are weak. This can happen after pregnancy and vaginal childbirth or a hysterectomy. It can also be due to excess body weight or hormone changes.    Urge incontinence (also called overactive bladder). With this type, a sudden urge to urinate is felt often. This may happen even though there may not be much urine in the bladder. The need to urinate often during the night is common. Urge incontinence most often occurs because  of bladder spasms. This may be due to bladder irritation or infection. Damage to bladder nerves or pelvic muscles, constipation, and certain medicines can also lead to urge incontinence.  Treatment depends on the cause. Further evaluation is needed to find the type you have. This will likely include an exam and certain tests. Based on the results, you and your healthcare provider can then plan treatment. Until a diagnosis is made, the home care tips below can help ease symptoms.   Home care    Do pelvic floor muscle exercises, if they are prescribed. The pelvic floor muscles help support the bladder and urethra. Many women find that their symptoms improve when doing special exercises that strengthen these muscles. To do the exercises, contract the muscles you would use to stop your stream of urine. But do this when you re not urinating. Hold for 10 seconds, then relax. Repeat 10 to 20 times in a row, at least 3 times a day. Your healthcare provider may give you other instructions for how to do the exercises and how often.    Keep a bladder diary. This helps track how often and how much you urinate over a set period of time. Bring this diary with you to your next visit with the provider. The information can help your provider learn more about your bladder problem.    Lose weight, if advised to by your provider. Extra weight puts pressure on the bladder. Your provider can help you create a weight-loss plan that s right for you. This may include exercising more and making certain diet changes.    Don't have foods and drinks that may irritate the bladder. These can include alcohol and caffeinated drinks.    Quit smoking. Smoking and other tobacco use can lead to a long-term (chronic) cough that strains the pelvic floor muscles. Smoking may also damage the bladder and urethra. Talk with your provider about treatments or methods you can use to quit smoking.    If drinking large amounts of fluid makes you have  symptoms, you may be advised to limit your fluid intake. You may also be advised to drink most of your fluids during the day and to limit fluids at night.    If you re worried about urine leakage or accidents, you may wear absorbent pads to catch urine. Change the pads often. This helps reduce discomfort. It may also reduce the risk of skin or bladder infections.    Follow-up care  Follow up with your healthcare provider, or as directed. It may take some to find the right treatment for your problem. But healthy lifestyle changes can be made right away. These include such things as exercising on a regular basis, eating a healthy diet, losing weight (if needed), and quitting smoking. Your treatment plan may include special therapies or medicines. Certain procedures or surgery may also be options. Talk about any questions you have with your provider.   When to seek medical advice  Call the healthcare provider right away if any of these occur:    Fever of 100.4 F (38 C) or higher, or as directed by your provider    Bladder pain or fullness    Belly swelling    Nausea or vomiting    Back pain    Weakness, dizziness, or fainting  [employer] last reviewed this educational content on 1/1/2020 2000-2021 [employer]. All rights reserved. This information is not intended as a substitute for professional medical care. Always follow your healthcare professional's instructions.        Your Health Risk Assessment indicates you feel you are not in good emotional health.    Recreation   Recreation is not limited to sports and team events. It includes any activity that provides relaxation, interest, enjoyment, and exercise. Recreation provides an outlet for physical, mental, and social energy. It can give a sense of worth and achievement. It can help you stay healthy.    Mental Exercise and Social Involvement  Mental and emotional health is as important as physical health. Keep in touch with friends and family. Stay as  "active as possible. Continue to learn and challenge yourself.   Things you can do to stay mentally active are:    Learn something new, like a foreign language or musical instrument.     Play SCRABBLE or do crossword puzzles. If you cannot find people to play these games with you at home, you can play them with others on your computer through the Internet.     Join a games club--anything from card games to chess or checkers or lawn bowling.     Start a new hobby.     Go back to school.     Volunteer.     Read.   Keep up with world events.    Depression and Suicide in Older Adults    Nearly 2 million older Americans have some type of depression. Some of them even take their own lives. Yet depression among older adults is often ignored. Learn the warning signs. You may help spare a loved one needless pain. You may also save a life.   What is depression?  Depression is a common and serious illness that affects the way you think and feel. It is not a normal part of aging, nor is it a sign of weakness, a character flaw, or something you can snap out of. Most people with depression need treatment to get better. The most common symptom is a feeling of deep sadness. People who are depressed also may seem tired and listless. And nothing seems to give them pleasure. It s normal to grieve or be sad sometimes. But sadness lessens or passes with time. Depression rarely goes away or improves on its own. A person with clinical depression can't \"snap out of it.\" Other symptoms of depression are:     Sleeping more or less than normal    Eating more or less than normal    Having headaches, stomachaches, or other pains that don t go away    Feeling nervous,  empty,  or worthless    Crying a great deal    Thinking or talking about suicide or death    Loss of interest in activities previously enjoyed    Social isolation    Feeling confused or forgetful  What causes it?  The causes of depression aren t fully known. But it is thought to " result from a complex blend of these factors:     Biochemistry. Certain chemicals in the brain play a role.    Genes. Depression does run in families.    Life stress. Life stresses can also trigger depression in some people. Older adults often face many stressors, such as death of friends or a spouse, health problems, and financial concerns.    Chronic conditions. This includes conditions such as diabetes, heart disease, or cancer. These can cause symptoms of depression. Medicine side effects can cause changes in thoughts and behaviors.  How you can help  Often, depressed people may not want to ask for help. When they do, they may be ignored. Or, they may receive the wrong treatment. You can help by showing parents and older friends love and support. If they seem depressed, don t lecture the person, ignore the symptoms, or discount the symptoms as a  normal  part of aging -which they are not. Get involved, listen, and show interest and support.   Help them understand that depression is a treatable illness. Tell them you can help them find the right treatment. Offer to go to their healthcare provider's appointment with them for support when the symptoms are discussed. With their approval, contact a local mental health center, social service agency, or hospital about services.   You can be an advocate for him or her at healthcare appointments. Many older adults have chronic illnesses that can cause symptoms of depression. Medicine side effects can change thoughts and behaviors. You can help make sure that the healthcare provider looks at all of these factors. He or she should refer your family member or friend to a mental healthcare provider when needed. in some cases, untreated depression can lead to a misdiagnosis. A person may be diagnosed with a brain disorder such as dementia. If the healthcare provider does not take the issue of depression seriously, help your family member or friend to find another provider.    Don't be afraid to ask  If you think an older person you care about could be suicidal, ask,  Have you thought about suicide?  Most people will tell you the truth. If they say  yes,  they may already have a plan for how and when they will attempt it. Find out as much as you can. The more detailed the plan, and the easier it is to carry out, the more danger the person is in right now. Tell the person you are there for them and do not want them to harm him or herself. Don't wait to get help for the person. Call the person's healthcare provider, local hospital, or emergency services.   To learn more    National Suicide Prevention Lifeline (crisis hotline) 838-890-OVMV (883-906-0349)    National [place] of Mental Usvhsw856-682-2773bvc.[facility] Centerh.nih.gov    National [place] on Mental Frxvslo349-224-1574mia.nakul.org    Mental Health Eofegej725-274-9776mhp.[facility].org    National Suicide Cnrfhjj993-GHTYOZW (534-854-0380)    Call 911  Never leave the person alone. A person who is actively suicidal needs psychiatric care right away. They will need constant supervision. Never leave the person out of sight. Call 911 or the national 24-hour suicide crisis hotline at 471-518-GRGD (311-992-9443). You can also take the person to the closest emergency room.   Antonio last reviewed this educational content on 5/1/2020 2000-2021 [employer]. All rights reserved. This information is not intended as a substitute for professional medical care. Always follow your healthcare professional's instructions.

## 2021-12-29 NOTE — PROGRESS NOTES
"SUBJECTIVE:   Susan Haq is a 89 year old female who presents for Preventive Visit.      Patient has been advised of split billing requirements and indicates understanding: Yes   Are you in the first 12 months of your Medicare coverage?  No    Healthy Habits:     In general, how would you rate your overall health?  Poor    Frequency of exercise:  None    Do you usually eat at least 4 servings of fruit and vegetables a day, include whole grains    & fiber and avoid regularly eating high fat or \"junk\" foods?  No    Taking medications regularly:  No    Barriers to taking medications:  Other    Medication side effects:  None    Ability to successfully perform activities of daily living:  Transportation requires assistance, shopping requires assistance, preparing meals requires assistance, housework requires assistance, bathing requires assistance, laundry requires assistance, medication administration requires assistance, money management requires assistance and no assistance needed    Home Safety:  No safety concerns identified    Hearing Impairment:  Difficulty following a conversation in a noisy restaurant or crowded room, feel that people are mumbling or not speaking clearly, difficulty following dialogue in the theater, difficult to understand a speaker at a public meeting or Confucianist service, need to ask people to speak up or repeat themselves, find that men's voices are easier to understand than woman's, difficulty understanding soft or whispered speech and difficulty understanding speech on the telephone    In the past 6 months, have you been bothered by leaking of urine? Yes    In general, how would you rate your overall mental or emotional health?  Fair      PHQ-2 Total Score: 5    Additional concerns today:  No    Do you feel safe in your environment? Not able to answer    Have you ever done Advance Care Planning? (For example, a Health Directive, POLST, or a discussion with a medical provider or your " loved ones about your wishes): No, advance care planning information given to patient to review.  Patient declined advance care planning discussion at this time.       Fall risk-  Patient reports no, but caregiver with her today said she has fallen  Unable to do get up and go test.  Fallen 2 or more times in the past year?: No  Any fall with injury in the past year?: No    Cognitive Screening Not appropriate due to mental handicap    Do you have sleep apnea, excessive snoring or daytime drowsiness?: yes    Reviewed and updated as needed this visit by clinical staff  Tobacco  Allergies  Meds   Med Hx  Surg Hx  Fam Hx  Soc Hx       Reviewed and updated as needed this visit by Provider               Social History     Tobacco Use     Smoking status: Former Smoker     Packs/day: 1.00     Years: 35.00     Pack years: 35.00     Quit date: 1990     Years since quittin.0     Smokeless tobacco: Former User   Substance Use Topics     Alcohol use: Yes     Comment: 7 shots per day-  she denies         Alcohol Use 2021   Prescreen: >3 drinks/day or >7 drinks/week? No   Prescreen: >3 drinks/day or >7 drinks/week? -         PROBLEMS TO ADD ON...    Patient brought in today by friends of the family.  She has no children/living relatives    Renew all medications- print out please    Arrange homecare    Rin hale  -  in [facility] needs copy of [facility] office visit/ physical exam  [phone]    Dark stool - seen once by the friend that cleans the toilet  No caesar red blood    Dementia issues - memory has been gradually declining.  Friends say that not only has she been forgetful, but that she has been subject to fraud attempts over the phone (giving money over the phone or giving her Social Security number over the phone).   has taken over her finances.      Check ears- decreased hearing even with hearing aids    Lump near labia-  Very painful  This was noticed by a friend who  was bathing her.  Patient states that she has a painful lump in her genitals that she notices with wiping and bathing      cpap not working - needs new one    Diabetes:  Due for labs.  She is not currently on any medications.  She has been diet controlled.  Friends are cooking for her and doing all her shopping currently.    Hyperlipidemia Follow-Up      Are you regularly taking any medication or supplement to lower your cholesterol?   Yes- atorvastatin    Are you having muscle aches or other side effects that you think could be caused by your cholesterol lowering medication?   Unknown-  Lots of body aches    Depression Followup    How are you doing with your depression since your last visit? Worsened     Are you having other symptoms that might be associated with depression?     Have you had a significant life event?  Health Concerns     Are you feeling anxious or having panic attacks?   Yes:  .    Do you have any concerns with your use of alcohol or other drugs? Yes:  friends here say she takes 7 shots of alcohol daily    Social History     Tobacco Use     Smoking status: Former Smoker     Packs/day: 1.00     Years: 35.00     Pack years: 35.00     Quit date: 1990     Years since quittin.0     Smokeless tobacco: Former User   Vaping Use     Vaping Use: Never used   Substance Use Topics     Alcohol use: Yes     Comment: 7 shots per day-  she denies     Drug use: No     PHQ 2021   PHQ-9 Total Score 21 16 14   Q9: Thoughts of better off dead/self-harm past 2 weeks Several days Several days Several days   F/U: Thoughts of suicide or self-harm - - Yes   F/U: Self harm-plan - - No   F/U: Self-harm action - - No   F/U: Safety concerns - - Yes     MELISSA-7 SCORE 2018   Total Score 1 0 0         Suicide Assessment Five-step Evaluation and Treatment (SAFE-T)      Current providers sharing in care for this patient include:   Patient Care Team:  Ema Melendez NP  "as PCP - General (Nurse Practitioner - Family)  Ema Melendez, NP as Assigned PCP  Libby Tapia as Community Health Worker (Primary Care - CC)  Cass Mednieta LSW as Lead Care Coordinator (Primary Care - CC)    The following health maintenance items are reviewed in Epic and correct as of today:  Health Maintenance Due   Topic Date Due     ANNUAL REVIEW OF HM ORDERS  Never done     EYE EXAM  Never done     ZOSTER IMMUNIZATION (2 of 3) 07/25/2007     DIABETIC FOOT EXAM  12/06/2018     ADVANCE CARE PLANNING  06/28/2021     A1C  07/14/2021     INFLUENZA VACCINE (1) 09/01/2021     COVID-19 Vaccine (3 - Booster for Moderna series) 12/01/2021     LIPID  01/14/2022     MICROALBUMIN  01/14/2022     HEMOGLOBIN  01/14/2022     FALL RISK ASSESSMENT  01/18/2022             Pertinent mammograms are reviewed under the imaging tab.    Review of Systems   Constitutional: Negative for chills and fever.   HENT: Positive for hearing loss. Negative for congestion, ear pain and sore throat.    Eyes: Negative for pain and visual disturbance.   Respiratory: Positive for cough and shortness of breath.    Cardiovascular: Positive for peripheral edema. Negative for chest pain and palpitations.   Gastrointestinal: Positive for diarrhea and hematochezia. Negative for abdominal pain, constipation, heartburn and nausea.   Breasts:  Positive for tenderness. Negative for breast mass and discharge.   Genitourinary: Positive for genital sores, urgency and vaginal bleeding. Negative for frequency, hematuria, pelvic pain and vaginal discharge.   Musculoskeletal: Negative for arthralgias, joint swelling and myalgias.   Skin: Negative for rash.   Neurological: Positive for dizziness. Negative for weakness, headaches and paresthesias.   Psychiatric/Behavioral: Negative for mood changes. The patient is nervous/anxious.          OBJECTIVE:   /60 (BP Location: Right arm)   Pulse 58   Temp 98.6  F (37  C) (Tympanic)   Ht 1.651 m (5' 5\")   Wt " "113.4 kg (250 lb)   SpO2 96%   BMI 41.60 kg/m   Estimated body mass index is 41.6 kg/m  as calculated from the following:    Height as of this encounter: 1.651 m (5' 5\").    Weight as of this encounter: 113.4 kg (250 lb).  Physical Exam  GENERAL APPEARANCE: healthy, alert and no distress  EYES: Eyes grossly normal to inspection, PERRL and conjunctivae and sclerae normal  HENT: ear canals and TM's normal, nose and mouth without ulcers or lesions, oropharynx clear and oral mucous membranes moist  NECK: no adenopathy, no asymmetry, masses, or scars and thyroid normal to palpation  RESP: lungs clear to auscultation - no rales, rhonchi or wheezes  BREAST: normal without masses, tenderness or nipple discharge and no palpable axillary masses or adenopathy  CV: regular rate and rhythm, normal S1 S2, no S3 or S4, no murmur, click or rub, no peripheral edema and peripheral pulses strong  ABDOMEN: soft, nontender, no hepatosplenomegaly, no masses and bowel sounds normal  PSYCH: affect normal/bright.  Her friends answered most of the questions.    Diagnostic Test Results:  Results for orders placed or performed in visit on 12/29/21 (from the past 24 hour(s))   Hemoglobin A1c   Result Value Ref Range    Hemoglobin A1C 5.4 0.0 - 5.6 %   CBC with platelets   Result Value Ref Range    WBC Count 7.5 4.0 - 11.0 10e3/uL    RBC Count 3.75 (L) 3.80 - 5.20 10e6/uL    Hemoglobin 12.3 11.7 - 15.7 g/dL    Hematocrit 37.9 35.0 - 47.0 %     (H) 78 - 100 fL    MCH 32.8 26.5 - 33.0 pg    MCHC 32.5 31.5 - 36.5 g/dL    RDW 12.3 10.0 - 15.0 %    Platelet Count 222 150 - 450 10e3/uL       ASSESSMENT / PLAN:       ICD-10-CM    1. Encounter for Medicare annual wellness exam    Z00.00    2. Hyperlipidemia LDL goal <130  E78.5 Due for labs today.  Continue atorvastatin (LIPITOR) 20 MG tablet     Home Care Nursing Referral     Lipid panel reflex to direct LDL Fasting     Lipid panel reflex to direct LDL Fasting     3. Major depressive disorder, " single episode, moderate (H)  F32.1 Well controlled.  FLUoxetine (PROZAC) 20 MG capsule     Home Care Nursing Referral     4. Interstitial pulmonary fibrosis (H)  J84.10 Stable.  Home Care Nursing Referral     5. Bilateral leg edema  R60.0 Stable.  furosemide (LASIX) 20 MG tablet     potassium chloride ER (K-TAB) 20 MEQ CR tablet     Home Care Nursing Referral     6. Hypertension goal BP (blood pressure) < 140/90  I10 Well controlled.  atenolol (TENORMIN) 50 MG tablet     losartan (COZAAR) 100 MG tablet     Home Care Nursing Referral     Basic metabolic panel  (Ca, Cl, CO2, Creat, Gluc, K, Na, BUN)     Basic metabolic panel  (Ca, Cl, CO2, Creat, Gluc, K, Na, BUN)     7. Type 2 diabetes mellitus without complication, without long-term current use of insulin (H)  E11.9 Well controlled.  losartan (COZAAR) 100 MG tablet     Home Care Nursing Referral     Hemoglobin A1c     Hemoglobin A1c     8. Chronic obstructive pulmonary disease, unspecified COPD type (H)  J44.9 Stable.  tiotropium (SPIRIVA HANDIHALER) 18 MCG inhaled capsule     Home Care Nursing Referral     9. SUSSY (obstructive sleep apnea)-Moderately severe (AHI 21)  G47.33 CPAP Order for DME - ONLY FOR DME     Home Care Nursing Referral     10. Cognitive impairment  R41.89 Patient brought into clinic today by 2 friends.  Patient needs help with all ADLs at this point.  Cognitive issues have been worsening.  Physical condition is declining.  She needs help with cooking, all household tasks, shopping, bathing, dressing, transferring, etc.  Recommend home care.  She has a  in the community that is helping as well.  Will likely better assess her needs once home care starts.  Home Care Nursing Referral     11. Melena  K92.1 One dark stool noted by the person that was cleaning her toilet.  No other stools were observed.  Hemoglobin stable.  Have advised friends to monitor for now.  CBC with platelets     CBC with platelets     12. Labial lesion  N90.89  "Ob/Gyn Referral     Patient would benefit from closer follow-up with her PCP, Ema Melendez.  Recommend that patient follow-up with her PCP in 1 month.  At minimum should be following every 3 months.    Patient has been advised of split billing requirements and indicates understanding: Yes     COUNSELING:  Reviewed preventive health counseling, as reflected in patient instructions    Estimated body mass index is 41.6 kg/m  as calculated from the following:    Height as of this encounter: 1.651 m (5' 5\").    Weight as of this encounter: 113.4 kg (250 lb).    Weight management plan: Discussed healthy diet and exercise guidelines    She reports that she quit smoking about 32 years ago. She has a 35.00 pack-year smoking history. She has quit using smokeless tobacco.      Appropriate preventive services were discussed with this patient, including applicable screening as appropriate for cardiovascular disease, diabetes, osteopenia/osteoporosis, and glaucoma.  As appropriate for age/gender, discussed screening for colorectal cancer, prostate cancer, breast cancer, and cervical cancer. Checklist reviewing preventive services available has been given to the patient.    Reviewed patients plan of care and provided an AVS. The Basic Care Plan (routine screening as documented in Health Maintenance) for Susan meets the Care Plan requirement. This Care Plan has been established and reviewed with the Patient and caregiver.      The risks, benefits and treatment options of prescribed medications or other treatments have been discussed with the patient. The patient verbalized their understanding and should call or follow up if no improvement or if they develop further problems.    [facility]    Identified Health Risks:    The patient was provided with suggestions to help her develop a healthy physical lifestyle.  She is at risk for lack of exercise and has been provided with " information to increase physical activity for the benefit of her well-being.  The patient was counseled and encouraged to consider modifying their diet and eating habits. She was provided with information on recommended healthy diet options.  The patient reports that she has difficulty with activities of daily living. I have asked that the patient make a follow up appointment in 4 weeks where this issue will be further evaluated and addressed.  The patient was provided with written information regarding signs of hearing loss.  Information on urinary incontinence and treatment options given to patient.  The patient was provided with suggestions to help her develop a healthy emotional lifestyle.  The patient s PHQ-9 score is consistent with moderate depression. She was provided with information regarding depression and was advised to schedule a follow up appointment in 4 weeks to further address this issue.

## 2021-12-29 NOTE — TELEPHONE ENCOUNTER
LET ALLYSON KNOW THAT PT WAS RECENTLY GIVEN A BIPAP LAST YEAR AND THAT SHE IS NOT ELIGIBLE FOR A NEW MACHINE. SHE STATES THE MACHINE IS NOT WORKING PROPERLY AND HER MASK IS LEAKING. LET HER KNOW THAT I CAN PROVIDE A LOANER MACHINE, BUT WILL NEED TO BRING MACHINE TO ME IN [place] AND THAT WE ARE ALSO ABLE TO PROVIDE NEW SUPPLIES AS SHE IS ELIGIBLE FOR NEW CUSHIONS EACH MONTH. SHE STATES UNDERSTANDING AND WILL DROP OFF MACHINE SOMETIME IN THE NEXT WEEK. REQUESTED THAT SHE CALL AHEAD TO MAKE AN APPT TO BE SURE THAT I AM AVAILABLE.

## 2022-01-03 ENCOUNTER — PATIENT OUTREACH (OUTPATIENT)
Dept: CARE COORDINATION | Facility: CLINIC | Age: 87
End: 2022-01-03
Payer: COMMERCIAL

## 2022-01-03 ENCOUNTER — TELEPHONE (OUTPATIENT)
Dept: CARE COORDINATION | Facility: CLINIC | Age: 87
End: 2022-01-03
Payer: COMMERCIAL

## 2022-01-03 DIAGNOSIS — J84.10 INTERSTITIAL PULMONARY FIBROSIS (H): Primary | ICD-10-CM

## 2022-01-03 DIAGNOSIS — R41.89 COGNITIVE IMPAIRMENT: ICD-10-CM

## 2022-01-03 DIAGNOSIS — J44.9 CHRONIC OBSTRUCTIVE PULMONARY DISEASE, UNSPECIFIED COPD TYPE (H): ICD-10-CM

## 2022-01-03 NOTE — PROGRESS NOTES
Clinic Care Coordination Contact  Care Team Conversations    VIKTOR SALDANA received call from Rin [facility] APS VIKTOR. She has been working with pt because of an APS report awhile back.     Pt was seen in clinic 12/29/21 and PCP placed orders for home care. Rin wanted to clarify contact information for pt. Her usual contact/POA, Bob Pickard has been in hospital since November and is likely at end of life. Pt cant hear on phone, but will say she is fine if she can hear at all. New contact is Prema Galindo [phone]. Pt's other friend Trina Sheikh is legally blind and not well either. CC reviewed media tab, doesn't see CTC but will mail to pt to complete.     Rin phone #[phone]  Fax #[phone]    VIKTOR CC made call to [facility] to check on status of home care referral made 12/29/21. Intake staff doesn't see it in their system. Last orders were from June 2021. Noted 1/7/22 earliest start, resend orders.     VIKTOR CC sent separate encounter to PCP to enter new orders. Completed.     CC updated demographics tab.     Plan: CHW will continue with outreaches at this time. CHW will inform VIKTOR SALDANA of any concerns or changes regarding patient as needed. VIKTOR CC will chart review every 6 weeks and outreach to patient as needed. VIKTOR CC will mail letter and blank CTC to address on file.     Cass Mendieta [facility]   Social Work Clinic Care Coordinator   [facility]  [phone]  [email or web address]

## 2022-01-03 NOTE — LETTER
[facility] CARE COORDINATION  [facility]  [address] [place] [address]    January 3, 2022    Susan Haq  [address] [place] [address]      Dear Susan,    Please complete the attached consent to communicate and return to the clinic.     Sincerely,     Cass Mendieta, [facility]   Social Work Clinic Care Coordinator   [facility]  [phone]  [email or web address]     Enclosed: I have enclosed an Authorization to Discuss Protected Health Information form. Please review, fill out, sign and send back to the clinic so I can make sure we have this on file to be able to talk to family/friends if you would like us to be able to.

## 2022-01-03 NOTE — LETTER
[facility]  Patient Centered Plan of Care  About Me:        Patient Name:  Susan Haq    YOB: 1932  Age:         89 year old   Flore[place]io MRN:    [id number] Telephone Information:  Home Phone [phone]   Mobile [phone]       Address:  [address] [place] [address] Email address:  No e-mail address on record      Emergency Contact(s)    Name Relationship Lgl Grd Work Phone Home Phone Mobile Phone   1. ALLYSON THURSTON Friend   [phone]    2. HARDY ENNIS Friend    [phone]   3. SANFORD SANCHEZ Friend    [phone]           Primary language:  English     needed? No   [facility] [employer] [facility]:  [phone] op. 1  Other communication barriers:Visual impairment; Physical impairment; [place] (Hard of hearing); Lack of coping; Caregiver; Access to technology  Preferred Method of Communication:  Mail  Current living arrangement: I live alone  Mobility Status/ Medical Equipment: Independent    Health Maintenance  Health Maintenance Reviewed: Due/Overdue   Health Maintenance Due   Topic Date Due     EYE EXAM  Never done     ZOSTER IMMUNIZATION (2 of 3) 07/25/2007     DIABETIC FOOT EXAM  12/06/2018     INFLUENZA VACCINE (1) 09/01/2021     COVID-19 Vaccine (3 - Booster for Moderna series) 12/01/2021     MICROALBUMIN  01/14/2022     My Access Plan  Medical Emergency 911   Primary Clinic Line [facility] - [phone]   24 Hour Appointment Line [phone] or  [phone]) (toll-free)   24 Hour Nurse Line [phone] (toll-free)   Preferred Urgent Care [facility], [phone]     Preferred Hospital [place], [place]  [phone]     Preferred Pharmacy [facility] [place], [place] - [address], [address]     Behavioral Health Crisis Line The National Suicide Prevention Lifeline at 1-272.903.7558 or 911             My Care Team Members  Patient Care Team        Relationship Specialty Notifications Start End    Ema Melendez NP PCP - General Nurse Practitioner - Family  1/29/16     Phone: [phone] Fax: [phone]         [address] [facility] [address]    Ema Melendez, EDWIGE Assigned PCP   10/27/19     Phone: [phone] Fax: [phone]         [address] [facility] [address]    Libby Tapia Community Health Worker Primary Care - CC  3/31/21     Phone: [phone]         Cass Mendieta LSW Lead Care Coordinator Primary Care - CC Admissions 4/30/21     Phone: [phone]          [address] [facility] [address]            My Care Plans  Self Management and Treatment Plan  Goals and (Comments)  Goals        General     1. Medication 1 (pt-stated)      Notes - Note edited  10/15/2021  8:50 AM by Cass Mendieta LSW     Goal Statement: I will continue to accept assistance and take care of myself.  Date Goal set: 01/18/2021  Barriers: oxygen tank; medications  Strengths: support from care takers  Date to Achieve By: 04/18/21 // extended to 12/31/21  Patient expressed understanding of goal: yes    Action steps to achieve this goal:  1. I will get and keep a functional oxygen tank. (Completed)  2. I will get my nails clipped.  3. I will get my COVID-19 vaccine. (Completed)  4. I will work with a PCA, Martina, friend and caretaker to continue to address my needs.   5. I will have Martina, caretaker, set up Home Care and/or CP.  9/29 Pending - Martina will call home care, PCP put in order months ago; Lymphedema therapy, nail trimmings, compression socks, etc.  6. I (and Martina) will continue to work with care coordination to find resources/support.               Action Plans on File:                       Advance Care Plans/Directives Type:   No data recorded    My Medical and Care Information  Problem List   Patient Active Problem List   Diagnosis     Hyperlipidemia LDL goal <130     Insomnia     Osteopenia     Hypertension goal BP (blood pressure) <  140/90     Advance care planning     PVC's (premature ventricular contractions)     SUSSY (obstructive sleep apnea)-Moderately severe (AHI 21)     Bilateral leg edema     HL (hearing loss)     SNHL (sensorineural hearing loss)     Interstitial pulmonary fibrosis (H)     Chronic obstructive pulmonary disease, unspecified COPD type (H)     Major depressive disorder, single episode, moderate (H)     Risk for falls     Alcohol abuse     Morbid obesity (H)     Physical deconditioning     Chronic obstructive pulmonary disease with acute exacerbation (H)     CKD (chronic kidney disease) stage 3, GFR 30-59 ml/min (H)     Chronic respiratory failure with hypoxia (H)      Current Medications and Allergies:  See printed Medication Report.    Care Coordination Start Date: 1/18/2021   Frequency of Care Coordination: monthly     Form Last Updated: 01/03/2022

## 2022-01-03 NOTE — TELEPHONE ENCOUNTER
VIKTOR CC spoke to pt's [facility] and [facility] home [facility]. Home care orders didn't go through for some reason. Pt's new main point of contact is friend Prema [phone]. VIKTOR CC to send blank CTC to complete.    [facility] needs new home care orders (originally entered 12/29/21). In notes, please say to contact Prema at [phone] and start of care 1/7/22 is ok (earliest they can start in her area).      Routing to provider pt saw and PCP to complete.     Thank you!!    Cass Mendieta, [facility]   Social Work Clinic Care Coordinator   [facility]  [phone]  [email or web address]

## 2022-01-03 NOTE — TELEPHONE ENCOUNTER
Covering for primary/ordering provider:  Both PCP and ordering provider are out of office today. New home care order is placed.

## 2022-01-27 ENCOUNTER — PATIENT OUTREACH (OUTPATIENT)
Dept: CARE COORDINATION | Facility: CLINIC | Age: 87
End: 2022-01-27
Payer: COMMERCIAL

## 2022-01-27 ASSESSMENT — ACTIVITIES OF DAILY LIVING (ADL): DEPENDENT_IADLS:: INDEPENDENT

## 2022-01-27 NOTE — LETTER
[facility]   To:   [facility]          Please give to facility    From:   Cass Mendieta [facility] Care Coordinator [facility]     Patient Name:  Susan Haq YOB: 1932   Admit date: 1/26/22      *Information Needed:  Please contact me when the patient will discharge (or if they will move to long term care)- include the discharge date, disposition, and main diagnosis   - If the patient is discharged with home care services, please provide the name of the agency    Phone, Fax or Email with information       Thank You,   ZONIA Manley   Social Work Clinic Care Coordinator   [facility]  [phone]  [email or web address]

## 2022-01-27 NOTE — PROGRESS NOTES
Clinic Care Coordination Contact    Care Coordination Transition Communication    Referral Source: [facility]    Clinical Data: Patient was hospitalized at [facility] from 1/12/22 to 1/26/22 with diagnosis of diarrhea, COPD exacerbation.     Pt tested positive for COVID-19 1/12/22, back to baseline upon discharge. Pt using oxygen (2 liters/min per nasal cannula for 1 month), nebulizer, and CPAP.      Transition to Facility:              Facility Name: [facility]              Contact name and phone number/fax: phone [phone], fax [phone]     CC sent fax to facility for discharge planning.     Plan:  Care Coordinator will await notification from facility staff informing  Care Coordinator of patient's discharge plans/needs.  Care Coordinator will review chart and outreach to facility staff every 3-4 weeks and as needed.     ZONIA Manley   Social Work Clinic Care Coordinator   [facility]  [phone]  [email or web address]

## 2022-02-10 ENCOUNTER — PATIENT OUTREACH (OUTPATIENT)
Dept: CARE COORDINATION | Facility: CLINIC | Age: 87
End: 2022-02-10
Payer: COMMERCIAL

## 2022-02-10 NOTE — PROGRESS NOTES
Clinic Care Coordination Contact  [facility]/Voicemail    Clinical Data: Care Coordinator Outreach  TCU check in    Outreach attempted x 1.  Left message on patient's TCU SW's voicemail with call back information and requested return call with discharge plans or updates for PCP.    Plan: Care Coordinator will try to reach patient again in 10-15 business days.    Cass Mendieta ANA   Social Work Clinic Care Coordinator   [facility]  [phone]  [email or web address]

## 2022-02-20 NOTE — PROGRESS NOTES
Clinic Care Coordination Contact    Follow Up Progress Note      Assessment: [facility] called patients caregiver for pro-active monthly outreach. Patients caregiver, Martina (consent on file) reports that Tiffany needs leg wraps. She reports her lymphedema has gotten very bad and patient needs to have her legs wrapped.     In review of current goals, patient has gotten a working oxygen tank that is portable. Patient also had her toenails clipped by a home care nurse.  Patient is scheduled to get her covid vaccine and will likely go to maintenance at next check in. [facility] will transfer patient to new [facility] as current writer is transferring clinic assignments.  Complex care plan updated and mailed.    Goals addressed this encounter:   Goals Addressed                 This Visit's Progress      Medication 1 (pt-stated)   80%     Goal Statement: I will take care of myself  Date Goal set: 01/18/2021  Barriers: oxygen tank; medications  Strengths: support from care takers  Date to Achieve By: 04/18/21  Patient expressed understanding of goal: yes  Action steps to achieve this goal:  1. I will get and keep a functional oxygen tank  2. I will get my nails clipped  3. I will get my covid vaccine.               Intervention/Education provided during outreach: [facility] used active listening and reviewed goals.          Plan: [facility] will message dr. James: home care lymphedema wraps.  Care Coordinator will follow up in 1 month.    ZONIA Cortez   [facility] Care Coordination  Tel: [phone]     Awake/Alert

## 2022-03-02 NOTE — PROGRESS NOTES
Clinic Care Coordination Contact  [facility]/Voicemail     Clinical Data: Care Coordinator Outreach  TCU Check in     Outreach attempted x 2.  Left message on patient's TCU SW's voicemail with call back information and requested return call with discharge plans or updates for PCP.    Plan: Care Coordinator will try to reach patient again in 5-7 business days.    Cass Mendieta [facility]   Social Work Clinic Care Coordinator   [facility]  [phone]  [email or web address]

## 2022-03-08 NOTE — PROGRESS NOTES
Clinic Care Coordination Contact  Care Team Conversations    SW CC made call to [facility], spoke to . Pt moved to their LTC yesterday or day before. Plans to establish with new PCP on site in future.     Pt is not a candidate for CCC due to LTC status and new PCP pending.     Routing to previous PCP as FYI.     ZONIA Manley   Social Work Clinic Care Coordinator   [facility]  [phone]  [email or web address]

## 2022-03-24 ENCOUNTER — MEDICAL CORRESPONDENCE (OUTPATIENT)
Dept: HEALTH INFORMATION MANAGEMENT | Facility: CLINIC | Age: 87
End: 2022-03-24

## 2022-12-30 NOTE — TELEPHONE ENCOUNTER
"Requested Prescriptions   Pending Prescriptions Disp Refills     budesonide-formoterol (SYMBICORT) 160-4.5 MCG/ACT Inhaler [Pharmacy Med Name: SYMBICORT 160-4.5 MCG -4.5 AERO] 10.2 g 8     Sig: INHALE 2 PUFFS BY MOUTH 2 TIMES DAILY       Inhaled Steroids Protocol Passed - 2/24/2020 11:33 AM        Passed - Patient is age 12 or older        Passed - Recent (12 mo) or future (30 days) visit within the authorizing provider's specialty     Patient has had an office visit with the authorizing provider or a provider within the authorizing providers department within the previous 12 mos or has a future within next 30 days. See \"Patient Info\" tab in inbasket, or \"Choose Columns\" in Meds & Orders section of the refill encounter.              Passed - Medication is active on med list        Last Written Prescription Date:  10/16/2019  Last Fill Quantity: 10.2g,  # refills: 8   Last office visit: 10/16/2019 with prescribing provider:  Nora    Future Office Visit:      " 31-Dec-2022

## (undated) RX ORDER — REGADENOSON 0.08 MG/ML
INJECTION, SOLUTION INTRAVENOUS
Status: DISPENSED
Start: 2019-11-19